# Patient Record
Sex: FEMALE | Race: WHITE | NOT HISPANIC OR LATINO | Employment: OTHER | URBAN - METROPOLITAN AREA
[De-identification: names, ages, dates, MRNs, and addresses within clinical notes are randomized per-mention and may not be internally consistent; named-entity substitution may affect disease eponyms.]

---

## 2017-11-02 ENCOUNTER — HOSPITAL ENCOUNTER (OUTPATIENT)
Dept: RADIOLOGY | Facility: CLINIC | Age: 82
Discharge: HOME | End: 2017-11-02
Attending: PODIATRIST
Payer: MEDICARE

## 2017-11-02 ENCOUNTER — OFFICE VISIT (OUTPATIENT)
Dept: PODIATRY | Facility: CLINIC | Age: 82
End: 2017-11-02
Payer: MEDICARE

## 2017-11-02 VITALS — HEIGHT: 61 IN | WEIGHT: 160 LBS | BODY MASS INDEX: 30.21 KG/M2

## 2017-11-02 DIAGNOSIS — M79.672 FOOT PAIN, LEFT: ICD-10-CM

## 2017-11-02 DIAGNOSIS — M25.572 ACUTE LEFT ANKLE PAIN: ICD-10-CM

## 2017-11-02 DIAGNOSIS — M79.672 FOOT PAIN, LEFT: Primary | ICD-10-CM

## 2017-11-02 PROCEDURE — 73630 X-RAY EXAM OF FOOT: CPT | Mod: 26,LT,S$GLB, | Performed by: RADIOLOGY

## 2017-11-02 PROCEDURE — 99213 OFFICE O/P EST LOW 20 MIN: CPT | Mod: 25,S$PBB,, | Performed by: PODIATRIST

## 2017-11-02 PROCEDURE — 99213 OFFICE O/P EST LOW 20 MIN: CPT | Mod: PBBFAC,25,PO | Performed by: PODIATRIST

## 2017-11-02 PROCEDURE — 29540 STRAPPING ANKLE &/FOOT: CPT | Mod: S$PBB,LT,, | Performed by: PODIATRIST

## 2017-11-02 PROCEDURE — 73610 X-RAY EXAM OF ANKLE: CPT | Mod: TC,PO,LT

## 2017-11-02 PROCEDURE — 29540 STRAPPING ANKLE &/FOOT: CPT | Mod: PBBFAC,PO,LT | Performed by: PODIATRIST

## 2017-11-02 PROCEDURE — 73630 X-RAY EXAM OF FOOT: CPT | Mod: TC,PO,LT

## 2017-11-02 PROCEDURE — 99999 PR PBB SHADOW E&M-EST. PATIENT-LVL III: CPT | Mod: PBBFAC,,, | Performed by: PODIATRIST

## 2017-11-02 PROCEDURE — 73610 X-RAY EXAM OF ANKLE: CPT | Mod: 26,LT,S$GLB, | Performed by: RADIOLOGY

## 2017-11-02 RX ORDER — AMLODIPINE BESYLATE 2.5 MG/1
2.5 TABLET ORAL DAILY
COMMUNITY
Start: 2017-11-01 | End: 2018-08-01

## 2017-11-02 RX ORDER — SOTALOL HYDROCHLORIDE 80 MG/1
TABLET ORAL
Status: ON HOLD | COMMUNITY
Start: 2017-10-09 | End: 2017-12-12 | Stop reason: HOSPADM

## 2017-11-02 RX ORDER — VALACYCLOVIR HYDROCHLORIDE 1 G/1
1000 TABLET, FILM COATED ORAL
COMMUNITY
Start: 2017-09-11 | End: 2018-08-29 | Stop reason: SDUPTHER

## 2017-11-02 RX ORDER — LIDOCAINE HYDROCHLORIDE 20 MG/ML
JELLY TOPICAL
Qty: 30 ML | Refills: 2 | Status: ON HOLD | OUTPATIENT
Start: 2017-11-02 | End: 2017-12-09

## 2017-11-02 RX ORDER — HYDROXYZINE HYDROCHLORIDE 25 MG/1
TABLET, FILM COATED ORAL
Status: ON HOLD | COMMUNITY
Start: 2017-08-25 | End: 2017-12-12 | Stop reason: HOSPADM

## 2017-11-02 RX ORDER — FLUCONAZOLE 150 MG/1
150 TABLET ORAL
COMMUNITY
Start: 2017-08-25 | End: 2018-01-03 | Stop reason: ALTCHOICE

## 2017-11-02 RX ORDER — PRAVASTATIN SODIUM 10 MG/1
TABLET ORAL
COMMUNITY
Start: 2017-09-12 | End: 2018-08-01

## 2017-11-02 RX ORDER — CYCLOBENZAPRINE HCL 5 MG
TABLET ORAL
Status: ON HOLD | COMMUNITY
Start: 2017-11-01 | End: 2017-12-08

## 2017-11-02 RX ORDER — MONTELUKAST SODIUM 10 MG/1
TABLET ORAL
COMMUNITY
Start: 2017-10-10 | End: 2018-01-03 | Stop reason: SDUPTHER

## 2017-11-02 NOTE — PROGRESS NOTES
Subjective:      Patient ID: Karma Chen is a 81 y.o. female.    Chief Complaint: Ankle Pain (Left)    Pt is a 80 y/o female  has a past medical history of Coronary artery disease; Dermatitis; Diabetes mellitus; Diabetes mellitus type II; GERD (gastroesophageal reflux disease); Hypertension; and MI (myocardial infarction). Presents to clinic with CC of left rearfoot/ankle pain. States the pain is different in nature than it was back in 2015 during previous clinic visit. States her pain is on the inside of her ankle. Deep, throbbing pain with gradual onset and without any inciting event. Pt relates she has not done anything for the pain, denies trauma. Denies signs of infection, N/V/F/C. No other pedal complaints at this time.       Review of Systems   Musculoskeletal: Positive for joint pain.   All other systems reviewed and are negative.          Objective:      Physical Exam   Constitutional: She is oriented to person, place, and time. She appears well-developed and well-nourished.   Cardiovascular: Intact distal pulses.    DP and PT pulses 1/4, left. CRT < 3 seconds to digits 1-5, left. No erythema or edema noted, no varicosities noted, left.    Musculoskeletal: She exhibits tenderness.   Strength 5/5, to all LE muscle groups, left.     Bony tenderness to talonavicular joint, left.     Equinus contracture noted to left foot with ankle dorsiflexion of < 10 degrees left.    Neurological: She is alert and oriented to person, place, and time.   Sensation intact to digits via light touch, digits 1-5, left   Skin: Skin is warm and dry. Capillary refill takes 2 to 3 seconds.   Skin is warm, dry, and supple to LLE. No open lesions or SOI noted, LLE.    Psychiatric: She has a normal mood and affect. Her behavior is normal. Judgment and thought content normal.             Assessment:       Encounter Diagnoses   Name Primary?    Foot pain, left Yes    Acute left ankle pain          Plan:       Karma was seen today for  ankle pain.    Diagnoses and all orders for this visit:    Foot pain, left  -     X-Ray Foot Complete Left; Future  -     X-Ray Ankle Complete Left; Future    Acute left ankle pain  -     X-Ray Foot Complete Left; Future  -     X-Ray Ankle Complete Left; Future    Other orders  -     lidocaine HCL 2% (XYLOCAINE) 2 % jelly; Apply topically as needed. Apply topically once nightly to the affected area left foot/ankle.      I counseled the patient on her conditions, their implications and medical management.  Discussed etiology and conservative vs sx treatment options for arthritis. Educated pt on importance of rigid supportive shoes with arch support to help immobilize the joint and decrease inflammation. Educated pt on importance of RICE therapy with stretching   Pt to stretch posterior calf muscle 3x per day as demonstrated in office and handout   I applied a plantar rest strapping to the patient's foot to offload symptomatic area, support the arch, and relieve pain.  Patient will obtain over the counter arch supports and wear them in shoes whenever possible.  Athletic shoes intended for walking or running are usually best.  Xrays left foot/ankle.  Pt to f/u within one month or sooner PRN if symptoms arise.

## 2017-11-02 NOTE — PROGRESS NOTES
Reviewed resident note, exam and procedures were performed under my direct supervision.  Agree with note and care.  Discrepancies discussed.    Topical lidocaine.    I applied a plantar rest strapping to the patient's left foot to offload symptomatic area, support the arch, and relieve pain.  Patient will stretch the tendo achilles complex three times daily as demonstrated in the office.  Literature was dispensed illustrating proper stretching technique.  Patient will obtain over the counter arch supports and wear them in shoes whenever possible.  Athletic shoes intended for walking or running are usually best.  xrays left foot/ankle.    Return to clinic one month, sooner prn.

## 2017-11-30 ENCOUNTER — OFFICE VISIT (OUTPATIENT)
Dept: PODIATRY | Facility: CLINIC | Age: 82
End: 2017-11-30
Payer: MEDICARE

## 2017-11-30 VITALS — BODY MASS INDEX: 30.22 KG/M2 | WEIGHT: 160.06 LBS | HEIGHT: 61 IN

## 2017-11-30 DIAGNOSIS — M79.672 FOOT PAIN, LEFT: Primary | ICD-10-CM

## 2017-11-30 DIAGNOSIS — M19.079 ARTHRITIS OF FOOT: ICD-10-CM

## 2017-11-30 PROCEDURE — 99999 PR PBB SHADOW E&M-EST. PATIENT-LVL III: CPT | Mod: PBBFAC,,, | Performed by: PODIATRIST

## 2017-11-30 PROCEDURE — 99213 OFFICE O/P EST LOW 20 MIN: CPT | Mod: S$PBB,,, | Performed by: PODIATRIST

## 2017-11-30 PROCEDURE — 99213 OFFICE O/P EST LOW 20 MIN: CPT | Mod: PBBFAC,PO | Performed by: PODIATRIST

## 2017-11-30 RX ORDER — NITROGLYCERIN 0.4 MG/1
0.4 TABLET SUBLINGUAL EVERY 5 MIN PRN
COMMUNITY
Start: 2017-11-22 | End: 2021-05-25 | Stop reason: SDUPTHER

## 2017-11-30 RX ORDER — GABAPENTIN 300 MG/1
CAPSULE ORAL
Status: ON HOLD | COMMUNITY
Start: 2017-11-28 | End: 2017-12-08 | Stop reason: CLARIF

## 2017-11-30 NOTE — PROGRESS NOTES
Subjective:      Patient ID: Karma Chen is a 81 y.o. female.    Chief Complaint: Foot Pain (left foot)    Pt is a 82 y/o female  has a past medical history of Coronary artery disease; Dermatitis; Diabetes mellitus; Diabetes mellitus type II; GERD (gastroesophageal reflux disease); Hypertension; and MI (myocardial infarction). Presents to clinic with CC of left rearfoot/ankle pain. States the pain is different in nature than it was back in 2015 during previous clinic visit. States her pain is on the inside of her ankle. Deep, throbbing pain with gradual onset and without any inciting event. Didn't use gel, inserts, or stretch much.    xrays negative acute injury, positive midfoot arthritis.    Review of Systems   Musculoskeletal: Positive for joint pain.   All other systems reviewed and are negative.          Objective:      Physical Exam   Constitutional: She is oriented to person, place, and time. She appears well-developed and well-nourished.   Cardiovascular: Intact distal pulses.    DP and PT pulses 1/4, left. CRT < 3 seconds to digits 1-5, left. No erythema or edema noted, no varicosities noted, left.    Musculoskeletal: She exhibits tenderness.   Strength 5/5, to all LE muscle groups, left.     Bony tenderness to talonavicular joint, left.     Equinus contracture noted to right and left foot with ankle dorsiflexion of < 10 degrees left.    Neurological: She is alert and oriented to person, place, and time. She has normal strength. She displays no atrophy and no tremor. No sensory deficit. She exhibits normal muscle tone.   Sensation intact to digits via light touch, digits 1-5, left   Skin: Skin is warm and dry. Capillary refill takes 2 to 3 seconds.   Skin is warm, dry, and supple to LLE. No open lesions or SOI noted, LLE.    Psychiatric: She has a normal mood and affect. Her behavior is normal. Judgment and thought content normal.             Assessment:       Encounter Diagnoses   Name Primary?     Foot pain, left Yes    Arthritis of foot          Plan:       Karma was seen today for foot pain.    Diagnoses and all orders for this visit:    Foot pain, left  -     Ambulatory consult to Physical Therapy  -     ORTHOTIC DEVICE (DME)    Arthritis of foot  -     Ambulatory consult to Physical Therapy  -     ORTHOTIC DEVICE (DME)      I counseled the patient on her conditions, their implications and medical management.  Patient will stretch the tendo achilles complex three times daily as demonstrated in the office.  Literature was dispensed illustrating proper stretching technique.    Patient will obtain over the counter arch supports and wear them in shoes whenever possible.  Athletic shoes intended for walking or running are usually best.    The patient was advised that NSAID-type medications have two very important potential side effects: gastrointestinal irritation including hemorrhage and renal injuries. She was asked to take the medication with food and to stop if she experiences any GI upset. I asked her to call for vomiting, abdominal pain or black/bloody stools. The patient expresses understanding of these issues and questions were answered.    Discussed conservative treatment with shoes of adequate dimensions, material, and style to alleviate symptoms and delay or prevent surgical intervention.    Rx custom orthotics, PT.    Use nsaid gel prn.    Declines scheduled follow up.

## 2017-12-08 ENCOUNTER — DOCUMENTATION ONLY (OUTPATIENT)
Dept: CARDIOLOGY | Facility: CLINIC | Age: 82
End: 2017-12-08

## 2017-12-08 ENCOUNTER — HOSPITAL ENCOUNTER (INPATIENT)
Facility: HOSPITAL | Age: 82
LOS: 4 days | Discharge: HOME | DRG: 246 | End: 2017-12-12
Attending: HOSPITALIST | Admitting: HOSPITALIST
Payer: MEDICARE

## 2017-12-08 DIAGNOSIS — I25.10 CORONARY ARTERY DISEASE: ICD-10-CM

## 2017-12-08 DIAGNOSIS — I20.0 UNSTABLE ANGINA: ICD-10-CM

## 2017-12-08 DIAGNOSIS — E11.9 DIABETES MELLITUS TYPE 2, DIET-CONTROLLED: ICD-10-CM

## 2017-12-08 DIAGNOSIS — I25.10 CORONARY ARTERY DISEASE INVOLVING LEFT MAIN CORONARY ARTERY: ICD-10-CM

## 2017-12-08 DIAGNOSIS — I10 ESSENTIAL HYPERTENSION: Primary | ICD-10-CM

## 2017-12-08 DIAGNOSIS — R07.9 CHEST PAIN: ICD-10-CM

## 2017-12-08 PROBLEM — I24.9 ACS (ACUTE CORONARY SYNDROME): Status: ACTIVE | Noted: 2017-12-08

## 2017-12-08 PROBLEM — E78.5 HYPERLIPIDEMIA: Status: ACTIVE | Noted: 2017-12-08

## 2017-12-08 PROBLEM — E03.9 HYPOTHYROIDISM: Status: ACTIVE | Noted: 2017-12-08

## 2017-12-08 PROBLEM — M79.7 FIBROMYALGIA: Status: ACTIVE | Noted: 2017-12-08

## 2017-12-08 PROBLEM — F41.9 ANXIETY: Status: ACTIVE | Noted: 2017-12-08

## 2017-12-08 PROBLEM — L30.9 DERMATITIS: Status: ACTIVE | Noted: 2017-12-08

## 2017-12-08 LAB
ALBUMIN SERPL BCP-MCNC: 3.4 G/DL
ALP SERPL-CCNC: 60 U/L
ALT SERPL W/O P-5'-P-CCNC: 13 U/L
ANION GAP SERPL CALC-SCNC: 11 MMOL/L
AST SERPL-CCNC: 14 U/L
BASOPHILS # BLD AUTO: 0.06 K/UL
BASOPHILS NFR BLD: 0.4 %
BILIRUB SERPL-MCNC: 0.3 MG/DL
BUN SERPL-MCNC: 24 MG/DL
CALCIUM SERPL-MCNC: 8.8 MG/DL
CHLORIDE SERPL-SCNC: 108 MMOL/L
CHOLEST SERPL-MCNC: 237 MG/DL
CHOLEST/HDLC SERPL: 5.5 {RATIO}
CO2 SERPL-SCNC: 24 MMOL/L
CREAT SERPL-MCNC: 1.2 MG/DL
DIFFERENTIAL METHOD: ABNORMAL
EOSINOPHIL # BLD AUTO: 0 K/UL
EOSINOPHIL NFR BLD: 0.2 %
ERYTHROCYTE [DISTWIDTH] IN BLOOD BY AUTOMATED COUNT: 13.5 %
EST. GFR  (AFRICAN AMERICAN): 49.3 ML/MIN/1.73 M^2
EST. GFR  (NON AFRICAN AMERICAN): 42.8 ML/MIN/1.73 M^2
GLUCOSE SERPL-MCNC: 128 MG/DL
HCT VFR BLD AUTO: 35.2 %
HDLC SERPL-MCNC: 43 MG/DL
HDLC SERPL: 18.1 %
HGB BLD-MCNC: 11.5 G/DL
IMM GRANULOCYTES # BLD AUTO: 0.16 K/UL
IMM GRANULOCYTES NFR BLD AUTO: 1.1 %
LDLC SERPL CALC-MCNC: 150.6 MG/DL
LYMPHOCYTES # BLD AUTO: 3.8 K/UL
LYMPHOCYTES NFR BLD: 26.2 %
MAGNESIUM SERPL-MCNC: 2.2 MG/DL
MCH RBC QN AUTO: 29.3 PG
MCHC RBC AUTO-ENTMCNC: 32.7 G/DL
MCV RBC AUTO: 90 FL
MONOCYTES # BLD AUTO: 1.4 K/UL
MONOCYTES NFR BLD: 9.7 %
NEUTROPHILS # BLD AUTO: 9.1 K/UL
NEUTROPHILS NFR BLD: 62.4 %
NONHDLC SERPL-MCNC: 194 MG/DL
NRBC BLD-RTO: 0 /100 WBC
PLATELET # BLD AUTO: 346 K/UL
PLATELET RESPONSE PLAVIX: 170 PRU
PMV BLD AUTO: 9.9 FL
POTASSIUM SERPL-SCNC: 3.4 MMOL/L
PROT SERPL-MCNC: 7.2 G/DL
RBC # BLD AUTO: 3.93 M/UL
SODIUM SERPL-SCNC: 143 MMOL/L
TRIGL SERPL-MCNC: 217 MG/DL
TSH SERPL DL<=0.005 MIU/L-ACNC: 2.34 UIU/ML
WBC # BLD AUTO: 14.52 K/UL

## 2017-12-08 PROCEDURE — 63600175 PHARM REV CODE 636 W HCPCS: Performed by: NURSE PRACTITIONER

## 2017-12-08 PROCEDURE — 85576 BLOOD PLATELET AGGREGATION: CPT

## 2017-12-08 PROCEDURE — 93005 ELECTROCARDIOGRAM TRACING: CPT

## 2017-12-08 PROCEDURE — 85025 COMPLETE CBC W/AUTO DIFF WBC: CPT

## 2017-12-08 PROCEDURE — 84443 ASSAY THYROID STIM HORMONE: CPT

## 2017-12-08 PROCEDURE — 99223 1ST HOSP IP/OBS HIGH 75: CPT | Mod: ,,, | Performed by: NURSE PRACTITIONER

## 2017-12-08 PROCEDURE — 93306 TTE W/DOPPLER COMPLETE: CPT

## 2017-12-08 PROCEDURE — 25000003 PHARM REV CODE 250: Performed by: HOSPITALIST

## 2017-12-08 PROCEDURE — 93306 TTE W/DOPPLER COMPLETE: CPT | Mod: 26,,, | Performed by: INTERNAL MEDICINE

## 2017-12-08 PROCEDURE — 93010 ELECTROCARDIOGRAM REPORT: CPT | Mod: ,,, | Performed by: INTERNAL MEDICINE

## 2017-12-08 PROCEDURE — 36415 COLL VENOUS BLD VENIPUNCTURE: CPT

## 2017-12-08 PROCEDURE — 83735 ASSAY OF MAGNESIUM: CPT

## 2017-12-08 PROCEDURE — 99223 1ST HOSP IP/OBS HIGH 75: CPT | Mod: GC,,, | Performed by: INTERNAL MEDICINE

## 2017-12-08 PROCEDURE — A4216 STERILE WATER/SALINE, 10 ML: HCPCS | Performed by: NURSE PRACTITIONER

## 2017-12-08 PROCEDURE — 80061 LIPID PANEL: CPT

## 2017-12-08 PROCEDURE — 20600001 HC STEP DOWN PRIVATE ROOM

## 2017-12-08 PROCEDURE — 80053 COMPREHEN METABOLIC PANEL: CPT

## 2017-12-08 PROCEDURE — 25000003 PHARM REV CODE 250: Performed by: NURSE PRACTITIONER

## 2017-12-08 RX ORDER — LOSARTAN POTASSIUM 50 MG/1
50 TABLET ORAL NIGHTLY
Status: DISCONTINUED | OUTPATIENT
Start: 2017-12-08 | End: 2017-12-12 | Stop reason: HOSPADM

## 2017-12-08 RX ORDER — LEVOTHYROXINE SODIUM 25 UG/1
50 TABLET ORAL DAILY
Status: DISCONTINUED | OUTPATIENT
Start: 2017-12-09 | End: 2017-12-12 | Stop reason: HOSPADM

## 2017-12-08 RX ORDER — ENOXAPARIN SODIUM 100 MG/ML
40 INJECTION SUBCUTANEOUS EVERY 24 HOURS
Status: DISCONTINUED | OUTPATIENT
Start: 2017-12-08 | End: 2017-12-12 | Stop reason: HOSPADM

## 2017-12-08 RX ORDER — METOPROLOL SUCCINATE 25 MG/1
25 TABLET, EXTENDED RELEASE ORAL DAILY
Status: DISCONTINUED | OUTPATIENT
Start: 2017-12-09 | End: 2017-12-12 | Stop reason: HOSPADM

## 2017-12-08 RX ORDER — TRAMADOL HYDROCHLORIDE 50 MG/1
50 TABLET ORAL EVERY 6 HOURS PRN
Status: DISCONTINUED | OUTPATIENT
Start: 2017-12-08 | End: 2017-12-12 | Stop reason: HOSPADM

## 2017-12-08 RX ORDER — SODIUM CHLORIDE 0.9 % (FLUSH) 0.9 %
3 SYRINGE (ML) INJECTION EVERY 8 HOURS
Status: DISCONTINUED | OUTPATIENT
Start: 2017-12-08 | End: 2017-12-12 | Stop reason: HOSPADM

## 2017-12-08 RX ORDER — CHOLECALCIFEROL (VITAMIN D3) 25 MCG
3000 TABLET ORAL DAILY
Status: DISCONTINUED | OUTPATIENT
Start: 2017-12-09 | End: 2017-12-12 | Stop reason: HOSPADM

## 2017-12-08 RX ORDER — NITROGLYCERIN 0.4 MG/1
0.4 TABLET SUBLINGUAL EVERY 5 MIN PRN
Status: DISCONTINUED | OUTPATIENT
Start: 2017-12-08 | End: 2017-12-12 | Stop reason: HOSPADM

## 2017-12-08 RX ORDER — POLYETHYLENE GLYCOL 3350 17 G/17G
17 POWDER, FOR SOLUTION ORAL 2 TIMES DAILY PRN
Status: DISCONTINUED | OUTPATIENT
Start: 2017-12-08 | End: 2017-12-12 | Stop reason: HOSPADM

## 2017-12-08 RX ORDER — ALPRAZOLAM 0.25 MG/1
0.25 TABLET ORAL 2 TIMES DAILY PRN
Status: DISCONTINUED | OUTPATIENT
Start: 2017-12-08 | End: 2017-12-12 | Stop reason: HOSPADM

## 2017-12-08 RX ORDER — PRAVASTATIN SODIUM 10 MG/1
10 TABLET ORAL NIGHTLY
Status: DISCONTINUED | OUTPATIENT
Start: 2017-12-08 | End: 2017-12-09

## 2017-12-08 RX ORDER — ONDANSETRON 2 MG/ML
4 INJECTION INTRAMUSCULAR; INTRAVENOUS EVERY 8 HOURS PRN
Status: DISCONTINUED | OUTPATIENT
Start: 2017-12-08 | End: 2017-12-12 | Stop reason: HOSPADM

## 2017-12-08 RX ORDER — MONTELUKAST SODIUM 10 MG/1
10 TABLET ORAL NIGHTLY
Status: DISCONTINUED | OUTPATIENT
Start: 2017-12-08 | End: 2017-12-12 | Stop reason: HOSPADM

## 2017-12-08 RX ORDER — ACETAMINOPHEN 325 MG/1
650 TABLET ORAL EVERY 4 HOURS PRN
Status: DISCONTINUED | OUTPATIENT
Start: 2017-12-08 | End: 2017-12-12 | Stop reason: HOSPADM

## 2017-12-08 RX ORDER — RAMELTEON 8 MG/1
8 TABLET ORAL NIGHTLY PRN
Status: DISCONTINUED | OUTPATIENT
Start: 2017-12-08 | End: 2017-12-12 | Stop reason: HOSPADM

## 2017-12-08 RX ORDER — POTASSIUM CHLORIDE 20 MEQ/1
40 TABLET, EXTENDED RELEASE ORAL ONCE
Status: COMPLETED | OUTPATIENT
Start: 2017-12-08 | End: 2017-12-08

## 2017-12-08 RX ORDER — ONDANSETRON 8 MG/1
8 TABLET, ORALLY DISINTEGRATING ORAL EVERY 8 HOURS PRN
Status: DISCONTINUED | OUTPATIENT
Start: 2017-12-08 | End: 2017-12-12 | Stop reason: HOSPADM

## 2017-12-08 RX ORDER — AMOXICILLIN 250 MG
1 CAPSULE ORAL 2 TIMES DAILY
Status: DISCONTINUED | OUTPATIENT
Start: 2017-12-08 | End: 2017-12-12 | Stop reason: HOSPADM

## 2017-12-08 RX ORDER — BISACODYL 10 MG
10 SUPPOSITORY, RECTAL RECTAL DAILY PRN
Status: DISCONTINUED | OUTPATIENT
Start: 2017-12-08 | End: 2017-12-12 | Stop reason: HOSPADM

## 2017-12-08 RX ORDER — ASPIRIN 81 MG/1
81 TABLET ORAL DAILY
Status: DISCONTINUED | OUTPATIENT
Start: 2017-12-09 | End: 2017-12-12 | Stop reason: HOSPADM

## 2017-12-08 RX ORDER — AMLODIPINE BESYLATE 5 MG/1
5 TABLET ORAL DAILY
Status: DISCONTINUED | OUTPATIENT
Start: 2017-12-09 | End: 2017-12-12 | Stop reason: HOSPADM

## 2017-12-08 RX ORDER — CLOPIDOGREL BISULFATE 75 MG/1
75 TABLET ORAL DAILY
Status: DISCONTINUED | OUTPATIENT
Start: 2017-12-09 | End: 2017-12-12 | Stop reason: HOSPADM

## 2017-12-08 RX ORDER — FAMOTIDINE 20 MG/1
20 TABLET, FILM COATED ORAL 2 TIMES DAILY
Status: DISCONTINUED | OUTPATIENT
Start: 2017-12-08 | End: 2017-12-12 | Stop reason: HOSPADM

## 2017-12-08 RX ORDER — ISOSORBIDE MONONITRATE 30 MG/1
30 TABLET, EXTENDED RELEASE ORAL DAILY
Status: DISCONTINUED | OUTPATIENT
Start: 2017-12-09 | End: 2017-12-12 | Stop reason: HOSPADM

## 2017-12-08 RX ORDER — EPINEPHRINE 0.22MG
100 AEROSOL WITH ADAPTER (ML) INHALATION 2 TIMES DAILY
Status: DISCONTINUED | OUTPATIENT
Start: 2017-12-08 | End: 2017-12-12 | Stop reason: HOSPADM

## 2017-12-08 RX ADMIN — PRAVASTATIN SODIUM 10 MG: 10 TABLET ORAL at 09:12

## 2017-12-08 RX ADMIN — ENOXAPARIN SODIUM 40 MG: 100 INJECTION SUBCUTANEOUS at 06:12

## 2017-12-08 RX ADMIN — ALPRAZOLAM 0.25 MG: 0.25 TABLET ORAL at 11:12

## 2017-12-08 RX ADMIN — Medication 3 ML: at 09:12

## 2017-12-08 RX ADMIN — Medication 100 MG: at 09:12

## 2017-12-08 RX ADMIN — FAMOTIDINE 20 MG: 20 TABLET, FILM COATED ORAL at 09:12

## 2017-12-08 RX ADMIN — POTASSIUM CHLORIDE 40 MEQ: 1500 TABLET, EXTENDED RELEASE ORAL at 09:12

## 2017-12-08 RX ADMIN — STANDARDIZED SENNA CONCENTRATE AND DOCUSATE SODIUM 1 TABLET: 8.6; 5 TABLET, FILM COATED ORAL at 09:12

## 2017-12-08 RX ADMIN — TRAMADOL HYDROCHLORIDE 50 MG: 50 TABLET, FILM COATED ORAL at 09:12

## 2017-12-08 RX ADMIN — MONTELUKAST SODIUM 10 MG: 10 TABLET, FILM COATED ORAL at 09:12

## 2017-12-08 NOTE — PROGRESS NOTES
Lia Called.  Patient post CABG with ACS...[facility] [address] [phone]    Severe CAD with ACS.  Cath shows patent SVG to RCA, LIMA to LAD supplying small distal LAD, Diagonals fill from proximal LAD which has 90% ostial stenosis.  There are stents from the ostial LCX into the OMB's.  Needs LM bifurcation stenting. Distal disease as well in OMB.    Transferring her to us for complex PCI.  Will let my PA and colleagues know.

## 2017-12-08 NOTE — HPI
81 y/o F with 3 day complaint of right jaw pain that is worse with exertion and relieved with rest. Also relieved with nitro Sl.  Went to [facility], and had an angiogram that demonstrated significant Distal left main disease, ostial LCX and OM disease, there is patent SVG to RCA, LIMA to LAD supplying small distal LAD. Per patient CABG was in 2006 as well as a PCI in 2006. Patient does not recall detailed medical hx.   States that over the past month she has had limitations in her day to day activities, felt increasingly fatigued. Denies chest discomfort, SOB, diaphoeresis, N/v, pre-syncope. States that she is not a diabetic, has not smoked in over 40 years. Has HTN, HLD, and previous Mi.    Just arrived from [place] therefore no EKG in file yet. NO outside hospital course available other than angiogram disk.  Angiogram reviewed and described above.

## 2017-12-08 NOTE — PLAN OF CARE
"Outside Transfer Acceptance Note    Transferring Provider/Specialty: Cardiology (Dr Fitzgerald)    Accepting Physician: Jacques Rice     Date of Acceptance: 12/08/2017     Code Status: Full    Transferring Facility/Hospital: [facility]    Reason for Transfer to [facility]: Advanced cardiac intervention    Report from Transferring provider/ Hospital course:     82 yr o woman with severe CAD now with ACS accepted by Dr. Yoel Rios for complex PCI at [facility] today " Cath shows patent SVG to RCA, LIMA to LAD supplying small distal LAD, Diagonals fill from proximal LAD which has 90% ostial stenosis.  There are stents from the ostial LCX into the OMB's.  Needs LM bifurcation stenting. Distal disease as well in OMB Transferring her to us for complex PCI"  per Dr Yoel Rios.       A copy of the medical record and images on disc have been requested    To do list upon patient arrival:     Consult interventional cardiology    Please call extension [phone] upon patient arrival to floor for Hospital Medicine admit team assignment and for additional admit orders. If patient is coming from another [facility] facility please also call [phone] to inform the admit team/office that patient has arrived from the [facility] facility to the floor so patient can be evaluated.    Jacques Rice MD, Staff Physician, Hospital Medicine,       "

## 2017-12-08 NOTE — SUBJECTIVE & OBJECTIVE
Past Medical History:   Diagnosis Date    Coronary artery disease     Dermatitis     Dermatitis 12/8/2017    Diabetes mellitus     Diabetes mellitus type II     GERD (gastroesophageal reflux disease)     Hypertension     MI (myocardial infarction)        Past Surgical History:   Procedure Laterality Date    adenoids      APPENDECTOMY      CHOLECYSTECTOMY      CORONARY ARTERY BYPASS GRAFT      coronary stents      HYSTERECTOMY      TONSILLECTOMY         Review of patient's allergies indicates:   Allergen Reactions    Arthrotec 50 [diclofenac-misoprostol]     Doxycycline     Effexor [venlafaxine]     Flagyl [metronidazole]     Fluconazole     Iodine     Levaquin [levofloxacin]     Nexium [esomeprazole magnesium]     Penicillins     Shellfish containing products     Sulfa (sulfonamide antibiotics)     Yeast, dried        PTA Medications   Medication Sig    alprazolam (XANAX) 0.25 MG tablet Take 0.25 mg by mouth nightly as needed.      amLODIPine (NORVASC) 5 MG tablet     b complex vitamins tablet Take 1 tablet by mouth once daily.      clopidogrel (PLAVIX) 75 mg tablet Take 75 mg by mouth once daily.      co-enzyme Q-10 30 mg capsule Take 30 mg by mouth 2 (two) times daily.      cyanocobalamin 1,000 mcg/mL injection     estradiol (ESTRACE) 0.01 % (0.1 mg/g) vaginal cream Place vaginally once a week.      hydrOXYzine HCl (ATARAX) 25 MG tablet     levocetirizine (XYZAL) 2.5 mg/5 mL solution Take 2.5 mg by mouth every evening.      levothyroxine (SYNTHROID) 50 MCG tablet Take 50 mcg by mouth once daily.      lidocaine HCL 2% (XYLOCAINE) 2 % jelly Apply topically as needed. Apply topically once nightly to the affected area left foot/ankle.    losartan (COZAAR) 50 MG tablet Take 50 mg by mouth every evening.      montelukast (SINGULAIR) 10 mg tablet     nitroGLYCERIN (NITROBID) 2.5 MG CR capsule Take 2.5 mg by mouth once daily.      nitroGLYCERIN (NITROSTAT) 0.4 MG SL tablet      omeprazole-sodium bicarbonate (ZEGERID) 40-1.1 mg-gram per capsule Take 1 capsule by mouth before breakfast.    pravastatin (PRAVACHOL) 10 MG tablet     sotalol (BETAPACE) 80 MG tablet     tramadol (ULTRAM) 50 mg tablet Take 50 mg by mouth every 6 (six) hours as needed.      vitamin D 185 MG Tab Take 3,000 Units by mouth once daily.      ciclopirox (PENLAC) 8 % Soln Apply topically once daily. Apply topically to affected nails once daily.  Remove once weekly.  Repeat.  Follow package insert.    diclofenac sodium 1 % Gel Apply 2 g topically 4 (four) times daily.    fluconazole (DIFLUCAN) 150 MG Tab 150 mg as needed.     metronidazole (NORITATE) 1 % cream Apply 1 applicator topically once daily.      valACYclovir (VALTREX) 1000 MG tablet Take 1,000 mg by mouth as needed.      Family History     None        Social History Main Topics    Smoking status: Never Smoker    Smokeless tobacco: Not on file    Alcohol use No    Drug use: No    Sexual activity: Not on file     Review of Systems   Constitution: Negative for fever.   Eyes: Negative for double vision.   Cardiovascular: Negative for chest pain, dyspnea on exertion, irregular heartbeat, leg swelling, near-syncope, palpitations and syncope.   Respiratory: Negative for shortness of breath.    Musculoskeletal:        Right jaw pain   Gastrointestinal: Negative for abdominal pain and nausea.   Genitourinary: Negative for dysuria.   Neurological: Negative for brief paralysis.   Psychiatric/Behavioral: Negative for altered mental status.     Objective:     Vital Signs (Most Recent):  Temp: 97.9 °F (36.6 °C) (12/08/17 1500)  Pulse: 69 (12/08/17 1600)  Resp: 18 (12/08/17 1500)  BP: (!) 146/67 (12/08/17 1500)  SpO2: 99 % (12/08/17 1500) Vital Signs (24h Range):  Temp:  [97.9 °F (36.6 °C)] 97.9 °F (36.6 °C)  Pulse:  [69-72] 69  Resp:  [18] 18  SpO2:  [99 %] 99 %  BP: (146)/(67) 146/67        There is no height or weight on file to calculate BMI.    SpO2: 99 %  O2  Device (Oxygen Therapy): room air    No intake or output data in the 24 hours ending 12/08/17 1648    Lines/Drains/Airways          No matching active lines, drains, or airways          Physical Exam   Constitutional: She is oriented to person, place, and time. She appears well-developed and well-nourished. No distress.   HENT:   Head: Normocephalic and atraumatic.   Eyes: Right eye exhibits no discharge. Left eye exhibits no discharge. No scleral icterus.   Neck: No JVD present.   Cardiovascular: Normal rate, regular rhythm, normal heart sounds and intact distal pulses.    No murmur heard.  Pulses:       Radial pulses are 2+ on the right side, and 2+ on the left side.        Femoral pulses are 2+ on the right side, and 1+ on the left side.       Dorsalis pedis pulses are 2+ on the right side, and 2+ on the left side.   Pulmonary/Chest: Effort normal and breath sounds normal.   Abdominal: Soft.   Musculoskeletal: She exhibits no edema.   Neurological: She is alert and oriented to person, place, and time.   Skin: Skin is warm. She is not diaphoretic.   Psychiatric: She has a normal mood and affect.       Significant Labs: Pending

## 2017-12-08 NOTE — ASSESSMENT & PLAN NOTE
83 y/o F with multivessel CAD,  here with stable angina, OSH angiogram shows CABG with patent grafts SVG to RCA and LIMA to LAD, left main disease as well as ostial LCX and Om1.     -Planned for complex PCI on 12/11/2017 (monday)  -Keep NPO after midnight on Sunday   -Recommend adding Imdur 30mg daily and uptitrate as blood pressure tolerates   -Recommend adding metoprolol succinate 25mg daily, and uptitrate as tolerated   -C/w Asa and plavix   -Please get platelet response plavix assay if greater than 210 then load with brilinta 180mg and thereafter start brilinta maintenance dose 90mg Q12h.  -has contrast allergy, pre-medication regimen ordered    1. Cardiac catheterization with probable PCI.   2. Antiplatelets: ASA + plavix   3. Access: Possible right femoral   4. Catheters: Jl4 and Jr4   5. Pt is a OSMANY candidate and understands the importance of taking plavix for at least one year, understands that in case of receiving a drug coated stent the failure to comply with dual anti-platelet therapy is likely to result in stent clothing, heart attack and death.   6. The risks, benefits, and alternatives of coronary vascular angiography and possible intervention were discussed with the patient. All questions were answered and informed consent was obtained. I had a detailed discussion with the patient regarding risk of stroke, MI, bleeding access site complications including limb loss, allergy, kidney failure including dialysis and death.  7. The patient understands the risks and benefits and wishes to go ahead with the procedure.  8. All patient's questions were answered    Madelyn Scott DO  Cardiology Fellow

## 2017-12-08 NOTE — CONSULTS
[facility]  Interventional Cardiology  Consult Note    Patient Name: Karma Chen  MRN: [id number]  Admission Date: 12/8/2017  Hospital Length of Stay: 0 days  Code Status: Full Code   Attending Provider: Jacques Rice MD  Consulting Provider: Madelyn Scott MD  Primary Care Physician: Elena Sullivan MD  Principal Problem:Unstable angina    Patient information was obtained from patient and ER records.     Inpatient consult to Interventional Cardiology  Consult performed by: MADELYN SCOTT  Consult ordered by: DARCIE BUENO        Subjective:     Chief Complaint:  Multivessel CAD     HPI:  83 y/o F with 3 day complaint of right jaw pain that is worse with exertion and relieved with rest. Also relieved with nitro Sl.  Went to [facility], and had an angiogram that demonstrated significant Distal left main disease, ostial LCX and OM disease, there is patent SVG to RCA, LIMA to LAD supplying small distal LAD. Per patient CABG was in 2006 as well as a PCI in 2006. Patient does not recall detailed medical hx.   States that over the past month she has had limitations in her day to day activities, felt increasingly fatigued. Denies chest discomfort, SOB, diaphoeresis, N/v, pre-syncope. States that she is not a diabetic, has not smoked in over 40 years. Has HTN, HLD, and previous Mi.    Just arrived from [place] therefore no EKG in file yet. NO outside hospital course available other than angiogram disk.  Angiogram reviewed and described above.    Past Medical History:   Diagnosis Date    Coronary artery disease     Dermatitis     Dermatitis 12/8/2017    Diabetes mellitus     Diabetes mellitus type II     GERD (gastroesophageal reflux disease)     Hypertension     MI (myocardial infarction)        Past Surgical History:   Procedure Laterality Date    adenoids      APPENDECTOMY      CHOLECYSTECTOMY      CORONARY ARTERY BYPASS GRAFT      coronary stents      HYSTERECTOMY       TONSILLECTOMY         Review of patient's allergies indicates:   Allergen Reactions    Arthrotec 50 [diclofenac-misoprostol]     Doxycycline     Effexor [venlafaxine]     Flagyl [metronidazole]     Fluconazole     Iodine     Levaquin [levofloxacin]     Nexium [esomeprazole magnesium]     Penicillins     Shellfish containing products     Sulfa (sulfonamide antibiotics)     Yeast, dried        PTA Medications   Medication Sig    alprazolam (XANAX) 0.25 MG tablet Take 0.25 mg by mouth nightly as needed.      amLODIPine (NORVASC) 5 MG tablet     b complex vitamins tablet Take 1 tablet by mouth once daily.      clopidogrel (PLAVIX) 75 mg tablet Take 75 mg by mouth once daily.      co-enzyme Q-10 30 mg capsule Take 30 mg by mouth 2 (two) times daily.      cyanocobalamin 1,000 mcg/mL injection     estradiol (ESTRACE) 0.01 % (0.1 mg/g) vaginal cream Place vaginally once a week.      hydrOXYzine HCl (ATARAX) 25 MG tablet     levocetirizine (XYZAL) 2.5 mg/5 mL solution Take 2.5 mg by mouth every evening.      levothyroxine (SYNTHROID) 50 MCG tablet Take 50 mcg by mouth once daily.      lidocaine HCL 2% (XYLOCAINE) 2 % jelly Apply topically as needed. Apply topically once nightly to the affected area left foot/ankle.    losartan (COZAAR) 50 MG tablet Take 50 mg by mouth every evening.      montelukast (SINGULAIR) 10 mg tablet     nitroGLYCERIN (NITROBID) 2.5 MG CR capsule Take 2.5 mg by mouth once daily.      nitroGLYCERIN (NITROSTAT) 0.4 MG SL tablet     omeprazole-sodium bicarbonate (ZEGERID) 40-1.1 mg-gram per capsule Take 1 capsule by mouth before breakfast.    pravastatin (PRAVACHOL) 10 MG tablet     sotalol (BETAPACE) 80 MG tablet     tramadol (ULTRAM) 50 mg tablet Take 50 mg by mouth every 6 (six) hours as needed.      vitamin D 185 MG Tab Take 3,000 Units by mouth once daily.      ciclopirox (PENLAC) 8 % Soln Apply topically once daily. Apply topically to affected nails once daily.   Remove once weekly.  Repeat.  Follow package insert.    diclofenac sodium 1 % Gel Apply 2 g topically 4 (four) times daily.    fluconazole (DIFLUCAN) 150 MG Tab 150 mg as needed.     metronidazole (NORITATE) 1 % cream Apply 1 applicator topically once daily.      valACYclovir (VALTREX) 1000 MG tablet Take 1,000 mg by mouth as needed.      Family History     None        Social History Main Topics    Smoking status: Never Smoker    Smokeless tobacco: Not on file    Alcohol use No    Drug use: No    Sexual activity: Not on file     Review of Systems   Constitution: Negative for fever.   Eyes: Negative for double vision.   Cardiovascular: Negative for chest pain, dyspnea on exertion, irregular heartbeat, leg swelling, near-syncope, palpitations and syncope.   Respiratory: Negative for shortness of breath.    Musculoskeletal:        Right jaw pain   Gastrointestinal: Negative for abdominal pain and nausea.   Genitourinary: Negative for dysuria.   Neurological: Negative for brief paralysis.   Psychiatric/Behavioral: Negative for altered mental status.     Objective:     Vital Signs (Most Recent):  Temp: 97.9 °F (36.6 °C) (12/08/17 1500)  Pulse: 69 (12/08/17 1600)  Resp: 18 (12/08/17 1500)  BP: (!) 146/67 (12/08/17 1500)  SpO2: 99 % (12/08/17 1500) Vital Signs (24h Range):  Temp:  [97.9 °F (36.6 °C)] 97.9 °F (36.6 °C)  Pulse:  [69-72] 69  Resp:  [18] 18  SpO2:  [99 %] 99 %  BP: (146)/(67) 146/67        There is no height or weight on file to calculate BMI.    SpO2: 99 %  O2 Device (Oxygen Therapy): room air    No intake or output data in the 24 hours ending 12/08/17 1648    Lines/Drains/Airways          No matching active lines, drains, or airways          Physical Exam   Constitutional: She is oriented to person, place, and time. She appears well-developed and well-nourished. No distress.   HENT:   Head: Normocephalic and atraumatic.   Eyes: Right eye exhibits no discharge. Left eye exhibits no discharge. No  scleral icterus.   Neck: No JVD present.   Cardiovascular: Normal rate, regular rhythm, normal heart sounds and intact distal pulses.    No murmur heard.  Pulses:       Radial pulses are 2+ on the right side, and 2+ on the left side.        Femoral pulses are 2+ on the right side, and 1+ on the left side.       Dorsalis pedis pulses are 2+ on the right side, and 2+ on the left side.   Pulmonary/Chest: Effort normal and breath sounds normal.   Abdominal: Soft.   Musculoskeletal: She exhibits no edema.   Neurological: She is alert and oriented to person, place, and time.   Skin: Skin is warm. She is not diaphoretic.   Psychiatric: She has a normal mood and affect.       Significant Labs: Pending         Assessment and Plan:     Coronary artery disease involving left main coronary artery    83 y/o F with multivessel CAD,  here with stable angina, OSH angiogram shows CABG with patent grafts SVG to RCA and LIMA to LAD, left main disease as well as ostial LCX and Om1.     -Planned for complex PCI on 12/11/2017 (monday)  -Keep NPO after midnight on Sunday   -Recommend adding Imdur 30mg daily and uptitrate as blood pressure tolerates   -Recommend adding metoprolol succinate 25mg daily, and uptitrate as tolerated   -C/w Asa and plavix   -Please get platelet response plavix assay if greater than 210 then load with brilinta 180mg and thereafter start brilinta maintenance dose 90mg Q12h.  -has contrast allergy, pre-medication regimen ordered    1. Cardiac catheterization with probable PCI.   2. Antiplatelets: ASA + plavix   3. Access: Possible right femoral   4. Catheters: Jl4 and Jr4   5. Pt is a OSMANY candidate and understands the importance of taking plavix for at least one year, understands that in case of receiving a drug coated stent the failure to comply with dual anti-platelet therapy is likely to result in stent clothing, heart attack and death.   6. The risks, benefits, and alternatives of coronary vascular angiography  and possible intervention were discussed with the patient. All questions were answered and informed consent was obtained. I had a detailed discussion with the patient regarding risk of stroke, MI, bleeding access site complications including limb loss, allergy, kidney failure including dialysis and death.  7. The patient understands the risks and benefits and wishes to go ahead with the procedure.  8. All patient's questions were answered    Madelyn Scott DO  Cardiology Fellow             VTE Risk Mitigation         Ordered     enoxaparin injection 40 mg  Daily     Route:  Subcutaneous        12/08/17 1652     Place NICHOLE hose  Until discontinued      12/08/17 1652     Medium Risk of VTE  Once      12/08/17 1652          Thank you for your consult.    Madelyn Scott MD  Interventional Cardiology   [facility]

## 2017-12-08 NOTE — NURSING
Received report from RAMIN Hercules, [facility], [phone].  Patient arrived to floor via stretcher.  Placed on cardiac monitoring and called centralized telemetry to admit patient.  Placed patient on VISI.  DANI Fraire NP notified of patient's arrival.  Vitals and admit assessment completed.  VSS.  Patient oriented to room.  NIA Scott and NIA Garcia at bedside.  Consents signed.  Will continue to monitor patient.  Patient stated she cannot have surgery today because she is allergic to the dye.  Per NIA Garcia patient will be prepped prior to procedure.

## 2017-12-09 PROBLEM — M79.7 FIBROMYALGIA: Status: ACTIVE | Noted: 2017-12-09

## 2017-12-09 PROBLEM — E78.5 HYPERLIPIDEMIA: Status: ACTIVE | Noted: 2017-12-09

## 2017-12-09 PROBLEM — F41.9 ANXIETY: Status: ACTIVE | Noted: 2017-12-09

## 2017-12-09 PROBLEM — E03.9 ACQUIRED HYPOTHYROIDISM: Status: ACTIVE | Noted: 2017-12-09

## 2017-12-09 LAB
ANION GAP SERPL CALC-SCNC: 7 MMOL/L
BASOPHILS # BLD AUTO: 0.06 K/UL
BASOPHILS NFR BLD: 0.6 %
BUN SERPL-MCNC: 23 MG/DL
CALCIUM SERPL-MCNC: 8.6 MG/DL
CHLORIDE SERPL-SCNC: 109 MMOL/L
CO2 SERPL-SCNC: 25 MMOL/L
CREAT SERPL-MCNC: 1 MG/DL
DIFFERENTIAL METHOD: ABNORMAL
EOSINOPHIL # BLD AUTO: 0.2 K/UL
EOSINOPHIL NFR BLD: 1.6 %
ERYTHROCYTE [DISTWIDTH] IN BLOOD BY AUTOMATED COUNT: 13.9 %
EST. GFR  (AFRICAN AMERICAN): >60 ML/MIN/1.73 M^2
EST. GFR  (NON AFRICAN AMERICAN): 53.3 ML/MIN/1.73 M^2
ESTIMATED AVG GLUCOSE: 123 MG/DL
GLUCOSE SERPL-MCNC: 84 MG/DL
HBA1C MFR BLD HPLC: 5.9 %
HCT VFR BLD AUTO: 33.8 %
HGB BLD-MCNC: 11.1 G/DL
IMM GRANULOCYTES # BLD AUTO: 0.05 K/UL
IMM GRANULOCYTES NFR BLD AUTO: 0.5 %
LYMPHOCYTES # BLD AUTO: 4.3 K/UL
LYMPHOCYTES NFR BLD: 41.3 %
MAGNESIUM SERPL-MCNC: 2 MG/DL
MCH RBC QN AUTO: 28.8 PG
MCHC RBC AUTO-ENTMCNC: 32.8 G/DL
MCV RBC AUTO: 88 FL
MONOCYTES # BLD AUTO: 1.3 K/UL
MONOCYTES NFR BLD: 12.3 %
NEUTROPHILS # BLD AUTO: 4.6 K/UL
NEUTROPHILS NFR BLD: 43.7 %
NRBC BLD-RTO: 0 /100 WBC
PLATELET # BLD AUTO: 320 K/UL
PMV BLD AUTO: 10 FL
POTASSIUM SERPL-SCNC: 3.9 MMOL/L
RBC # BLD AUTO: 3.85 M/UL
SODIUM SERPL-SCNC: 141 MMOL/L
WBC # BLD AUTO: 10.49 K/UL

## 2017-12-09 PROCEDURE — A4216 STERILE WATER/SALINE, 10 ML: HCPCS | Performed by: NURSE PRACTITIONER

## 2017-12-09 PROCEDURE — 99233 SBSQ HOSP IP/OBS HIGH 50: CPT | Mod: ,,, | Performed by: NURSE PRACTITIONER

## 2017-12-09 PROCEDURE — 83036 HEMOGLOBIN GLYCOSYLATED A1C: CPT

## 2017-12-09 PROCEDURE — 85025 COMPLETE CBC W/AUTO DIFF WBC: CPT

## 2017-12-09 PROCEDURE — 25000003 PHARM REV CODE 250: Performed by: NURSE PRACTITIONER

## 2017-12-09 PROCEDURE — 83735 ASSAY OF MAGNESIUM: CPT

## 2017-12-09 PROCEDURE — 63600175 PHARM REV CODE 636 W HCPCS: Performed by: NURSE PRACTITIONER

## 2017-12-09 PROCEDURE — 36415 COLL VENOUS BLD VENIPUNCTURE: CPT

## 2017-12-09 PROCEDURE — 80048 BASIC METABOLIC PNL TOTAL CA: CPT

## 2017-12-09 PROCEDURE — 20600001 HC STEP DOWN PRIVATE ROOM

## 2017-12-09 RX ORDER — POTASSIUM CHLORIDE 20 MEQ/1
20 TABLET, EXTENDED RELEASE ORAL ONCE
Status: COMPLETED | OUTPATIENT
Start: 2017-12-09 | End: 2017-12-09

## 2017-12-09 RX ORDER — PRAVASTATIN SODIUM 40 MG/1
40 TABLET ORAL NIGHTLY
Status: DISCONTINUED | OUTPATIENT
Start: 2017-12-09 | End: 2017-12-11

## 2017-12-09 RX ADMIN — LEVOTHYROXINE SODIUM 50 MCG: 25 TABLET ORAL at 08:12

## 2017-12-09 RX ADMIN — FAMOTIDINE 20 MG: 20 TABLET, FILM COATED ORAL at 08:12

## 2017-12-09 RX ADMIN — POTASSIUM CHLORIDE 20 MEQ: 1500 TABLET, EXTENDED RELEASE ORAL at 09:12

## 2017-12-09 RX ADMIN — MONTELUKAST SODIUM 10 MG: 10 TABLET, FILM COATED ORAL at 09:12

## 2017-12-09 RX ADMIN — PRAVASTATIN SODIUM 40 MG: 40 TABLET ORAL at 09:12

## 2017-12-09 RX ADMIN — TRAMADOL HYDROCHLORIDE 50 MG: 50 TABLET, FILM COATED ORAL at 08:12

## 2017-12-09 RX ADMIN — VITAMIN D, TAB 1000IU (100/BT) 3000 UNITS: 25 TAB at 08:12

## 2017-12-09 RX ADMIN — LOSARTAN POTASSIUM 50 MG: 50 TABLET, FILM COATED ORAL at 09:12

## 2017-12-09 RX ADMIN — Medication 100 MG: at 09:12

## 2017-12-09 RX ADMIN — STANDARDIZED SENNA CONCENTRATE AND DOCUSATE SODIUM 1 TABLET: 8.6; 5 TABLET, FILM COATED ORAL at 08:12

## 2017-12-09 RX ADMIN — CLOPIDOGREL 75 MG: 75 TABLET, FILM COATED ORAL at 08:12

## 2017-12-09 RX ADMIN — ENOXAPARIN SODIUM 40 MG: 100 INJECTION SUBCUTANEOUS at 04:12

## 2017-12-09 RX ADMIN — METOPROLOL SUCCINATE 25 MG: 25 TABLET, EXTENDED RELEASE ORAL at 08:12

## 2017-12-09 RX ADMIN — ALPRAZOLAM 0.25 MG: 0.25 TABLET ORAL at 09:12

## 2017-12-09 RX ADMIN — ASPIRIN 81 MG: 81 TABLET, COATED ORAL at 08:12

## 2017-12-09 RX ADMIN — FAMOTIDINE 20 MG: 20 TABLET, FILM COATED ORAL at 09:12

## 2017-12-09 RX ADMIN — Medication 3 ML: at 03:12

## 2017-12-09 RX ADMIN — AMLODIPINE BESYLATE 5 MG: 5 TABLET ORAL at 08:12

## 2017-12-09 RX ADMIN — ISOSORBIDE MONONITRATE 30 MG: 30 TABLET, EXTENDED RELEASE ORAL at 08:12

## 2017-12-09 RX ADMIN — Medication 3 ML: at 09:12

## 2017-12-09 RX ADMIN — Medication 100 MG: at 08:12

## 2017-12-09 NOTE — HPI
Ms. Zacarias is a an 82 year old female with past medical history of CAD (angioplasty ~1987, PCI ~1999, CABG X4 ~2006, repeat PCI ~2011), s/p PPM due to bradycardia in 1/2017, HTN, hypothyroidism, s/p appy and choley, and fibromyalgia. She was admitted 12/5 to [facility] with two day history of jaw pain, more noticeable with exertion and somewhat relieved with SL NTG, she underwent angiogram at OSH that demonstrated significant distal left main disease, ostial LCX and OM disease, there is patent SVG to RCA, LIMA to LAD supplying small distal LAD.     She endorses over the past 6 weeks she has had limitations in her day to day activities, felt increasingly fatigued and occasionally short of breath with minimal activity such as walking to the mailbox; as well as two month history of back and shoulder pain she attributed to fibromyalgia pain and was treated with PRN tramadol and muscle relaxants (however these were not helpful). Denies chest discomfort, SOB, diaphoeresis, N/V, pre-syncope. Endorses occasional constipation. States that she is not a diabetic,monitor sugars and controls with diet, has not smoked in over 40 years. Describes her  has been in ill health requiring PCI himself and possible will need ablation for Afib which has caused her to feel anxiety and stress as of late.     The patient was admitted to the Hospital Medicine Service for further evaluation and management.  Minimal outside records available (recent labs only) NO hospital course available other than angiogram disk.

## 2017-12-09 NOTE — H&P
[facility] Hospital Medicine  History & Physical    Patient Name: Karma Chen  MRN: [id number]  Admission Date: 12/8/2017  Attending Physician: Jacques Rice MD   Primary Care Provider: Elena Sullivan MD    [facility] Medicine Team: [facility] BEREKET Fraire NP     Patient information was obtained from patient, past medical records and ER records.     Subjective:     Principal Problem:Unstable angina    Chief Complaint: No chief complaint on file.       HPI: Ms. Chen is a an 82 year old female with past medical history of CAD (angioplasty ~1987, PCI ~1999, CABG X4 ~2006, repeat PCI ~2008), s/p PPM due to bradycardia in 1/2017, HTN, hypothyroidism, and fibromyalgia. She was admitted 12/5 to [facility] with two day history of jaw pain, more noticeable with exertion and somewhat relieved with SL NTG, she underwent angiogram at OSH that demonstrated significant distal left main disease, ostial LCX and OM disease, there is patent SVG to RCA, LIMA to LAD supplying small distal LAD.     She endorses over the past 6 weeks she has had limitations in her day to day activities, felt increasingly fatigued and occasionally short of breath with minimal activity such as walking to the mailbox; as well as two month history of back and shoulder pain she attributed to fibromyalgia pain and was treated with PRN tramadol and muscle relaxants (however these were not helpful). Denies chest discomfort, SOB, diaphoeresis, N/V, pre-syncope. Endorses occasional constipation. States that she is not a diabetic,monitor sugars and controls with diet, has not smoked in over 40 years. Describes her  has been in ill health requiring PCI himself and possible will need ablation for Afib which has caused her to feel anxiety and stress as of late.     The patient was admitted to the Hospital Medicine Service for further evaluation and management.  Minimal outside records available (recent labs  only) NO hospital course available other than angiogram disk.    Past Medical History:   Diagnosis Date    Coronary artery disease     Dermatitis     Dermatitis 12/8/2017    Diabetes mellitus     Diabetes mellitus type II     GERD (gastroesophageal reflux disease)     Hypertension     MI (myocardial infarction)        Past Surgical History:   Procedure Laterality Date    adenoids      APPENDECTOMY      CHOLECYSTECTOMY      CORONARY ARTERY BYPASS GRAFT      coronary stents      HYSTERECTOMY      TONSILLECTOMY         Review of patient's allergies indicates:   Allergen Reactions    Arthrotec 50 [diclofenac-misoprostol]     Doxycycline     Effexor [venlafaxine]     Flagyl [metronidazole]     Fluconazole     Iodine     Levaquin [levofloxacin]     Nexium [esomeprazole magnesium]     Penicillins     Shellfish containing products     Sulfa (sulfonamide antibiotics)     Yeast, dried        No current facility-administered medications on file prior to encounter.      Current Outpatient Prescriptions on File Prior to Encounter   Medication Sig    alprazolam (XANAX) 0.25 MG tablet Take 0.25 mg by mouth nightly as needed.      amLODIPine (NORVASC) 5 MG tablet     b complex vitamins tablet Take 1 tablet by mouth once daily.      clopidogrel (PLAVIX) 75 mg tablet Take 75 mg by mouth once daily.      co-enzyme Q-10 30 mg capsule Take 30 mg by mouth 2 (two) times daily.      cyanocobalamin 1,000 mcg/mL injection     estradiol (ESTRACE) 0.01 % (0.1 mg/g) vaginal cream Place vaginally once a week.      hydrOXYzine HCl (ATARAX) 25 MG tablet     levocetirizine (XYZAL) 2.5 mg/5 mL solution Take 2.5 mg by mouth every evening.      levothyroxine (SYNTHROID) 50 MCG tablet Take 50 mcg by mouth once daily.      lidocaine HCL 2% (XYLOCAINE) 2 % jelly Apply topically as needed. Apply topically once nightly to the affected area left foot/ankle.    losartan (COZAAR) 50 MG tablet Take 50 mg by mouth every  evening.      montelukast (SINGULAIR) 10 mg tablet     nitroGLYCERIN (NITROBID) 2.5 MG CR capsule Take 2.5 mg by mouth once daily.      nitroGLYCERIN (NITROSTAT) 0.4 MG SL tablet     omeprazole-sodium bicarbonate (ZEGERID) 40-1.1 mg-gram per capsule Take 1 capsule by mouth before breakfast.    pravastatin (PRAVACHOL) 10 MG tablet     sotalol (BETAPACE) 80 MG tablet     tramadol (ULTRAM) 50 mg tablet Take 50 mg by mouth every 6 (six) hours as needed.      vitamin D 185 MG Tab Take 3,000 Units by mouth once daily.      [DISCONTINUED] fish oil-omega-3 fatty acids 300-1,000 mg capsule Take 800 mg by mouth 2 (two) times daily.      [DISCONTINUED] gabapentin (NEURONTIN) 300 MG capsule     [DISCONTINUED] methocarbamol (ROBAXIN) 750 MG Tab Take 500 mg by mouth 4 (four) times daily.      [DISCONTINUED] pitavastatin (LIVALO) 1 mg Tab Take 1 tablet by mouth once daily.      [DISCONTINUED] zafirlukast (ACCOLATE) 20 MG tablet Take 20 mg by mouth 2 (two) times daily.      ciclopirox (PENLAC) 8 % Soln Apply topically once daily. Apply topically to affected nails once daily.  Remove once weekly.  Repeat.  Follow package insert.    diclofenac sodium 1 % Gel Apply 2 g topically 4 (four) times daily.    fluconazole (DIFLUCAN) 150 MG Tab 150 mg as needed.     metronidazole (NORITATE) 1 % cream Apply 1 applicator topically once daily.      valACYclovir (VALTREX) 1000 MG tablet Take 1,000 mg by mouth as needed.     [DISCONTINUED] calcium carbonate 220 mg capsule Take 650 mg by mouth 2 (two) times daily with meals.      [DISCONTINUED] cyclobenzaprine (FLEXERIL) 5 MG tablet     [DISCONTINUED] liothyronine (CYTOMEL) 5 MCG tablet Take 12.5 mcg by mouth once daily.      [DISCONTINUED] magnesium oxide (MAG-OX) 400 mg tablet Take 400 mg by mouth 2 (two) times daily.      [DISCONTINUED] nebivolol (BYSTOLIC) 5 MG tablet Take 5 mg by mouth once daily.       Family History     None        Social History Main Topics     Smoking status: Never Smoker    Smokeless tobacco: Not on file    Alcohol use No    Drug use: No    Sexual activity: Not on file     Review of Systems   Constitutional: Positive for activity change and fatigue. Negative for appetite change, chills, diaphoresis, fever and unexpected weight change.   HENT: Negative for congestion, sinus pain, sinus pressure, sneezing, sore throat, trouble swallowing and voice change.    Eyes: Negative.    Respiratory: Positive for shortness of breath. Negative for cough, chest tightness, wheezing and stridor.    Cardiovascular: Negative for chest pain, palpitations and leg swelling.   Gastrointestinal: Positive for constipation. Negative for abdominal distention, abdominal pain, diarrhea, nausea and vomiting.   Endocrine: Negative.    Genitourinary: Negative for decreased urine volume, difficulty urinating, dysuria, frequency and hematuria.   Musculoskeletal: Positive for arthralgias and myalgias. Negative for back pain and joint swelling.        Jaw pain PTA- now resolved   Skin: Negative.    Allergic/Immunologic: Negative.    Hematological: Negative.      Objective:     Vital Signs (Most Recent):  Temp: 97.9 °F (36.6 °C) (12/08/17 1500)  Pulse: 73 (12/08/17 1900)  Resp: 18 (12/08/17 1800)  BP: (!) 140/81 (12/08/17 1800)  SpO2: 96 % (12/08/17 1800) Vital Signs (24h Range):  Temp:  [97.9 °F (36.6 °C)] 97.9 °F (36.6 °C)  Pulse:  [68-73] 73  Resp:  [16-18] 18  SpO2:  [96 %-99 %] 96 %  BP: (130-146)/(67-81) 140/81     Weight: 70.1 kg (154 lb 8.7 oz)  Body mass index is 30.18 kg/m².    Physical Exam   Constitutional: She is oriented to person, place, and time. She appears well-developed and well-nourished.   HENT:   Head: Normocephalic and atraumatic.   Mouth/Throat: Mucous membranes are normal. Normal dentition.   Eyes: Conjunctivae and lids are normal. Pupils are equal, round, and reactive to light. No scleral icterus.   Neck: Normal range of motion. Neck supple. No JVD present.    Cardiovascular: Normal rate, regular rhythm, normal heart sounds and intact distal pulses.  Exam reveals no gallop and no friction rub.    No murmur heard.  Pulmonary/Chest: Effort normal and breath sounds normal. She exhibits no tenderness.   Abdominal: Soft. Bowel sounds are normal. She exhibits no distension. There is no tenderness.   Musculoskeletal: Normal range of motion. She exhibits no edema or deformity.   Neurological: She is alert and oriented to person, place, and time. No cranial nerve deficit.   Skin: Skin is warm and dry. No rash noted. No erythema.   Psychiatric: She has a normal mood and affect. Her behavior is normal. Judgment and thought content normal.   Nursing note and vitals reviewed.        CRANIAL NERVES     CN III, IV, VI   Pupils are equal, round, and reactive to light.    Significant Labs:   CBC:   Recent Labs  Lab 12/08/17 2000   WBC 14.52*   HGB 11.5*   HCT 35.2*        All pertinent labs within the past 24 hours have been reviewed.    Significant Imaging: I have reviewed all pertinent imaging results/findings within the past 24 hours.    Assessment/Plan:     * Unstable angina    -angiogram at OSH that demonstrated significant distal left main disease, ostial LCX and OM disease, there is patent SVG to RCA, LIMA to LAD supplying small distal LAD  -Interventional Cardiology evaluated on arrival, plans for complex PCI on Monday  -continue amlodipine, toprol XL, ASA, statin, plavix, ISMN, and losartan  -2D echo pending  -PRU pending  -CBC/Chemistry pending  -goal HR 60's, goal -130's  -monitor on tele, currently A pacing   -EKG and SL NTG PRN chest pain         Coronary artery disease involving left main coronary artery    -see above         Essential hypertension    -BP mostly controlled, monitor for goal of -130's  -continue meds as above        Hyperlipidemia    -lipid panel pending  -continue statin         Diabetes mellitus type 2, diet-controlled    -diet  controlled at home, no antihyperglycemics  -HgA1C pending, initiate SSI if appropriate        Acquired hypothyroidism    -continue home levothyroxine  -TSH pending         Fibromyalgia    -denies pain currently  -PRN tramadol        Anxiety    -continue home PRN alprazolam           VTE Risk Mitigation         Ordered     enoxaparin injection 40 mg  Daily     Route:  Subcutaneous        12/08/17 1652     Place NICHOLE hose  Until discontinued      12/08/17 1652     Medium Risk of VTE  Once      12/08/17 1652             Milagros Fraire NP  Department of Hospital Medicine   [facility]

## 2017-12-09 NOTE — ASSESSMENT & PLAN NOTE
-angiogram at OSH that demonstrated significant distal left main disease, ostial LCX and OM disease, there is patent SVG to RCA, LIMA to LAD supplying small distal LAD  -Interventional Cardiology evaluated on arrival, plans for complex PCI on Monday  -continue amlodipine, toprol XL, ASA, statin, plavix, ISMN, and losartan  -2D echo pending  -PRU pending  -CBC/Chemistry pending  -goal HR 60's, goal -130's  -monitor on tele, currently A pacing   -EKG and SL NTG PRN chest pain

## 2017-12-09 NOTE — PROGRESS NOTES
"K 3.3. Pt also questioning Losartan administration, stated she was taken off med over a month ago r/t decreased kidney fx. Dr. Donis notified. Placed order for replacement, will carry out. Meds reviewed and Dr. Donis "ok" with administering Losartan, pt educated on importance of adhering to regimen, refused until she speaks with her PCP. K replaced per MD orders. NAND. Will continue to monitor.   "

## 2017-12-09 NOTE — NURSING TRANSFER
Nursing Transfer Note      12/9/2017     Transfer To: X-Ray    Transfer via stretcher    Transfer with cardiac monitoring, VISI    Transported by Candis Cheng    Medicines sent: No    Chart send with patient: No    Notified: spouse at bedside

## 2017-12-09 NOTE — NURSING
Patient moved from room 389A to room 386.  Spouse is at bedside.  Called telemetry to tranfer patient to box 0312.

## 2017-12-09 NOTE — SUBJECTIVE & OBJECTIVE
Past Medical History:   Diagnosis Date    Coronary artery disease     Dermatitis     Dermatitis 12/8/2017    Diabetes mellitus     Diabetes mellitus type II     GERD (gastroesophageal reflux disease)     Hypertension     MI (myocardial infarction)        Past Surgical History:   Procedure Laterality Date    adenoids      APPENDECTOMY      CHOLECYSTECTOMY      CORONARY ARTERY BYPASS GRAFT      coronary stents      HYSTERECTOMY      TONSILLECTOMY         Review of patient's allergies indicates:   Allergen Reactions    Arthrotec 50 [diclofenac-misoprostol]     Doxycycline     Effexor [venlafaxine]     Flagyl [metronidazole]     Fluconazole     Iodine     Levaquin [levofloxacin]     Nexium [esomeprazole magnesium]     Penicillins     Shellfish containing products     Sulfa (sulfonamide antibiotics)     Yeast, dried        No current facility-administered medications on file prior to encounter.      Current Outpatient Prescriptions on File Prior to Encounter   Medication Sig    alprazolam (XANAX) 0.25 MG tablet Take 0.25 mg by mouth nightly as needed.      amLODIPine (NORVASC) 5 MG tablet     b complex vitamins tablet Take 1 tablet by mouth once daily.      clopidogrel (PLAVIX) 75 mg tablet Take 75 mg by mouth once daily.      co-enzyme Q-10 30 mg capsule Take 30 mg by mouth 2 (two) times daily.      cyanocobalamin 1,000 mcg/mL injection     estradiol (ESTRACE) 0.01 % (0.1 mg/g) vaginal cream Place vaginally once a week.      hydrOXYzine HCl (ATARAX) 25 MG tablet     levocetirizine (XYZAL) 2.5 mg/5 mL solution Take 2.5 mg by mouth every evening.      levothyroxine (SYNTHROID) 50 MCG tablet Take 50 mcg by mouth once daily.      lidocaine HCL 2% (XYLOCAINE) 2 % jelly Apply topically as needed. Apply topically once nightly to the affected area left foot/ankle.    losartan (COZAAR) 50 MG tablet Take 50 mg by mouth every evening.      montelukast (SINGULAIR) 10 mg tablet      nitroGLYCERIN (NITROBID) 2.5 MG CR capsule Take 2.5 mg by mouth once daily.      nitroGLYCERIN (NITROSTAT) 0.4 MG SL tablet     omeprazole-sodium bicarbonate (ZEGERID) 40-1.1 mg-gram per capsule Take 1 capsule by mouth before breakfast.    pravastatin (PRAVACHOL) 10 MG tablet     sotalol (BETAPACE) 80 MG tablet     tramadol (ULTRAM) 50 mg tablet Take 50 mg by mouth every 6 (six) hours as needed.      vitamin D 185 MG Tab Take 3,000 Units by mouth once daily.      [DISCONTINUED] fish oil-omega-3 fatty acids 300-1,000 mg capsule Take 800 mg by mouth 2 (two) times daily.      [DISCONTINUED] gabapentin (NEURONTIN) 300 MG capsule     [DISCONTINUED] methocarbamol (ROBAXIN) 750 MG Tab Take 500 mg by mouth 4 (four) times daily.      [DISCONTINUED] pitavastatin (LIVALO) 1 mg Tab Take 1 tablet by mouth once daily.      [DISCONTINUED] zafirlukast (ACCOLATE) 20 MG tablet Take 20 mg by mouth 2 (two) times daily.      ciclopirox (PENLAC) 8 % Soln Apply topically once daily. Apply topically to affected nails once daily.  Remove once weekly.  Repeat.  Follow package insert.    diclofenac sodium 1 % Gel Apply 2 g topically 4 (four) times daily.    fluconazole (DIFLUCAN) 150 MG Tab 150 mg as needed.     metronidazole (NORITATE) 1 % cream Apply 1 applicator topically once daily.      valACYclovir (VALTREX) 1000 MG tablet Take 1,000 mg by mouth as needed.     [DISCONTINUED] calcium carbonate 220 mg capsule Take 650 mg by mouth 2 (two) times daily with meals.      [DISCONTINUED] cyclobenzaprine (FLEXERIL) 5 MG tablet     [DISCONTINUED] liothyronine (CYTOMEL) 5 MCG tablet Take 12.5 mcg by mouth once daily.      [DISCONTINUED] magnesium oxide (MAG-OX) 400 mg tablet Take 400 mg by mouth 2 (two) times daily.      [DISCONTINUED] nebivolol (BYSTOLIC) 5 MG tablet Take 5 mg by mouth once daily.       Family History     None        Social History Main Topics    Smoking status: Never Smoker    Smokeless tobacco: Not on file     Alcohol use No    Drug use: No    Sexual activity: Not on file     Review of Systems   Constitutional: Positive for activity change and fatigue. Negative for appetite change, chills, diaphoresis, fever and unexpected weight change.   HENT: Negative for congestion, sinus pain, sinus pressure, sneezing, sore throat, trouble swallowing and voice change.    Eyes: Negative.    Respiratory: Positive for shortness of breath. Negative for cough, chest tightness, wheezing and stridor.    Cardiovascular: Negative for chest pain, palpitations and leg swelling.   Gastrointestinal: Positive for constipation. Negative for abdominal distention, abdominal pain, diarrhea, nausea and vomiting.   Endocrine: Negative.    Genitourinary: Negative for decreased urine volume, difficulty urinating, dysuria, frequency and hematuria.   Musculoskeletal: Positive for arthralgias and myalgias. Negative for back pain and joint swelling.        Jaw pain PTA- now resolved   Skin: Negative.    Allergic/Immunologic: Negative.    Hematological: Negative.      Objective:     Vital Signs (Most Recent):  Temp: 97.9 °F (36.6 °C) (12/08/17 1500)  Pulse: 73 (12/08/17 1900)  Resp: 18 (12/08/17 1800)  BP: (!) 140/81 (12/08/17 1800)  SpO2: 96 % (12/08/17 1800) Vital Signs (24h Range):  Temp:  [97.9 °F (36.6 °C)] 97.9 °F (36.6 °C)  Pulse:  [68-73] 73  Resp:  [16-18] 18  SpO2:  [96 %-99 %] 96 %  BP: (130-146)/(67-81) 140/81     Weight: 70.1 kg (154 lb 8.7 oz)  Body mass index is 30.18 kg/m².    Physical Exam   Constitutional: She is oriented to person, place, and time. She appears well-developed and well-nourished.   HENT:   Head: Normocephalic and atraumatic.   Mouth/Throat: Mucous membranes are normal. Normal dentition.   Eyes: Conjunctivae and lids are normal. Pupils are equal, round, and reactive to light. No scleral icterus.   Neck: Normal range of motion. Neck supple. No JVD present.   Cardiovascular: Normal rate, regular rhythm, normal heart sounds  and intact distal pulses.  Exam reveals no gallop and no friction rub.    No murmur heard.  Pulmonary/Chest: Effort normal and breath sounds normal. She exhibits no tenderness.   Abdominal: Soft. Bowel sounds are normal. She exhibits no distension. There is no tenderness.   Musculoskeletal: Normal range of motion. She exhibits no edema or deformity.   Neurological: She is alert and oriented to person, place, and time. No cranial nerve deficit.   Skin: Skin is warm and dry. No rash noted. No erythema.   Psychiatric: She has a normal mood and affect. Her behavior is normal. Judgment and thought content normal.   Nursing note and vitals reviewed.        CRANIAL NERVES     CN III, IV, VI   Pupils are equal, round, and reactive to light.    Significant Labs:   CBC:   Recent Labs  Lab 12/08/17 2000   WBC 14.52*   HGB 11.5*   HCT 35.2*        All pertinent labs within the past 24 hours have been reviewed.    Significant Imaging: I have reviewed all pertinent imaging results/findings within the past 24 hours.

## 2017-12-09 NOTE — PLAN OF CARE
Problem: Patient Care Overview  Goal: Plan of Care Review  Reviewed plan of care with patient.  Patient requested a private room, and is currently placed on the waiting list.  Patient will be NPO at midnight 12/11/17 for coronary angiography.  Patient is allergic to dyes, therefore, pre-cath premedication (50 mg prednisone) will commence at 1800 hours this Sunday for procedure.  K+ = 3.9, 20 mEq SA CR tablet administered once.  Lovenox 40 mg and NICHOLE Hose ordered for medium risk of VTE.  Patient will remain free of fall/trauma/injury by using appropriate lighting, nonskid socks, and by keeping area free of debris.  Patient will call for assistance when needed. Patient verbalized understanding of all instructions.

## 2017-12-09 NOTE — HOSPITAL COURSE
Ms. Zacarias was admitted 12/8 as a transfer from [facility] for complex PCI. Home medications resumed as appropriate, monitor on tele, 2D echo w/ EF 50-55%, WMAs, DD, severe L atrial enlargement, mild to moderate TR, and mild AR. Evaluated by Interventional Cardiology, underwent [facility] 12/11 with OSMANY to the LM bifurcation (OSMANY x 1 distal LM to LAD and OSMANY x1 distal LM to LCX),   OSMANY to mid LCX,   OSMANY to the distal OM, no need for impella support. She had a slight groin ooze and hematoma post procedure, manual pressure held for 30 min along with epinephrine instilled into track ooze.  She had full resolution of her hematoma and dressing is dry. She has been cleared by interventional cardiology to go home.     Disposition plans: family care, outpt follow up with IC, primary cardiology, and PCP.  She has been referred to cardiac rehab as well.

## 2017-12-09 NOTE — ASSESSMENT & PLAN NOTE
-angiogram at OSH that demonstrated significant distal left main disease, ostial LCX and OM disease, there is patent SVG to RCA, LIMA to LAD supplying small distal LAD  -Interventional Cardiology evaluated on arrival, plans for complex PCI on Monday  -continue amlodipine, toprol XL, ASA, statin, plavix, ISMN, and losartan  -2D echo pending  -PRU-responsive   -CBC/Chemistry without significant abnormalities >>> continue to monitor   -goal HR 60's, goal -130's  -monitor on tele, currently A pacing   -EKG and SL NTG PRN chest pain

## 2017-12-09 NOTE — SUBJECTIVE & OBJECTIVE
Interval History: Resting in bed, spouse at bedside, home med list and PMHx information given to AFIA, no complaints or needs at this time, denies chest pain, palpitations, or shortness of breath. Denies any acute events or distress overnight.     Review of Systems   Constitutional: Positive for activity change and fatigue. Negative for appetite change, chills, diaphoresis, fever and unexpected weight change.   HENT: Negative for congestion, sinus pain, sinus pressure, sneezing, sore throat, trouble swallowing and voice change.    Respiratory: Negative for cough, chest tightness, shortness of breath, wheezing and stridor.    Cardiovascular: Negative for chest pain, palpitations and leg swelling.   Gastrointestinal: Positive for constipation. Negative for abdominal distention, abdominal pain, diarrhea, nausea and vomiting.   Genitourinary: Negative for decreased urine volume, difficulty urinating, dysuria, frequency and hematuria.   Musculoskeletal: Positive for arthralgias and myalgias. Negative for back pain and joint swelling.        Jaw pain PTA- now resolved     Objective:     Vital Signs (Most Recent):  Temp: 97.9 °F (36.6 °C) (12/09/17 1154)  Pulse: 72 (12/09/17 1200)  Resp: 13 (12/09/17 1200)  BP: 136/79 (12/09/17 1200)  SpO2: 96 % (12/09/17 1200) Vital Signs (24h Range):  Temp:  [97.6 °F (36.4 °C)-97.9 °F (36.6 °C)] 97.9 °F (36.6 °C)  Pulse:  [68-78] 72  Resp:  [13-25] 13  SpO2:  [92 %-99 %] 96 %  BP: (109-146)/(61-81) 136/79     Weight: 69.9 kg (154 lb 1.6 oz)  Body mass index is 30.1 kg/m².    Intake/Output Summary (Last 24 hours) at 12/09/17 1332  Last data filed at 12/09/17 0932   Gross per 24 hour   Intake              960 ml   Output             2200 ml   Net            -1240 ml      Physical Exam   Constitutional: She is oriented to person, place, and time. She appears well-developed and well-nourished.   HENT:   Head: Normocephalic and atraumatic.   Mouth/Throat: Mucous membranes are normal. Normal  dentition.   Eyes: Conjunctivae and lids are normal. Pupils are equal, round, and reactive to light. No scleral icterus.   Neck: Normal range of motion. Neck supple. No JVD present.   Cardiovascular: Normal rate, regular rhythm, normal heart sounds and intact distal pulses.  Exam reveals no gallop and no friction rub.    No murmur heard.  Pulmonary/Chest: Effort normal and breath sounds normal. She exhibits no tenderness.   Abdominal: Soft. Bowel sounds are normal. She exhibits no distension. There is no tenderness.   Musculoskeletal: Normal range of motion. She exhibits no edema or deformity.   Neurological: She is alert and oriented to person, place, and time. No cranial nerve deficit.   Skin: Skin is warm and dry. No rash noted. No erythema.   Psychiatric: She has a normal mood and affect. Her behavior is normal. Judgment and thought content normal.   Nursing note and vitals reviewed.    Significant Labs:   BMP:   Recent Labs  Lab 12/09/17  0652   GLU 84      K 3.9      CO2 25   BUN 23   CREATININE 1.0   CALCIUM 8.6*   MG 2.0     CBC:   Recent Labs  Lab 12/08/17 2000 12/09/17  0652   WBC 14.52* 10.49   HGB 11.5* 11.1*   HCT 35.2* 33.8*    320     Magnesium:   Recent Labs  Lab 12/08/17 2000 12/09/17  0652   MG 2.2 2.0     All pertinent labs within the past 24 hours have been reviewed.    Significant Imaging: I have reviewed all pertinent imaging results/findings within the past 24 hours.

## 2017-12-09 NOTE — PROGRESS NOTES
[facility] Hospital Medicine  Progress Note    Patient Name: Karma Chen  MRN: [id number]  Patient Class: IP- Inpatient   Admission Date: 12/8/2017  Length of Stay: 1 days  Attending Physician: Td Jones MD  Primary Care Provider: Elena Sullivan MD    [facility] Medicine Team: [facility] ARNEL MED BEREKET Fraire NP    Subjective:     Principal Problem:Unstable angina    HPI:  Ms. 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 She was admitted 12/5 to [facility] with two day history of jaw pain, more noticeable with exertion and somewhat relieved with SL NTG, she underwent angiogram at OSH that demonstrated significant distal left main disease, ostial LCX and OM disease, there is patent SVG to RCA, LIMA to LAD supplying small distal LAD.     She endorses over the past 6 weeks she has had limitations in her day to day activities, felt increasingly fatigued and occasionally short of breath with minimal activity such as walking to the mailbox; as well as two month history of back and shoulder pain she attributed to fibromyalgia pain and was treated with PRN tramadol and muscle relaxants (however these were not helpful). Denies chest discomfort, SOB, diaphoeresis, N/V, pre-syncope. Endorses occasional constipation. States that she is not a diabetic,monitor sugars and controls with diet, has not smoked in over 40 years. Describes her  has been in ill health requiring PCI himself and possible will need ablation for Afib which has caused her to feel anxiety and stress as of late.     The patient was admitted to the Hospital Medicine Service for further evaluation and management.  Minimal outside records available (recent labs only) NO hospital course available other than angiogram  nisha.    Hospital Course:  Ms. Zacarias was admitted 12/8 as a transfer from [facility] for complex PCI. Will resume home medications as appropriate, monitor on tele, and obtain  2D echo. Evaluated by Interventional Cardiology, plans for monitoring and complex PCI on Monday.     Disposition plans: home post procedure with family care, ?? for home health, outpt follow up with IC, primary cardiology, and PCP.     Interval History: Resting in bed, spouse at bedside, home med list and PMHx information given to AFIA, no complaints or needs at this time, denies chest pain, palpitations, or shortness of breath. Denies any acute events or distress overnight.     Review of Systems   Constitutional: Positive for activity change and fatigue. Negative for appetite change, chills, diaphoresis, fever and unexpected weight change.   HENT: Negative for congestion, sinus pain, sinus pressure, sneezing, sore throat, trouble swallowing and voice change.    Respiratory: Negative for cough, chest tightness, shortness of breath, wheezing and stridor.    Cardiovascular: Negative for chest pain, palpitations and leg swelling.   Gastrointestinal: Positive for constipation. Negative for abdominal distention, abdominal pain, diarrhea, nausea and vomiting.   Genitourinary: Negative for decreased urine volume, difficulty urinating, dysuria, frequency and hematuria.   Musculoskeletal: Positive for arthralgias and myalgias. Negative for back pain and joint swelling.        Jaw pain PTA- now resolved     Objective:     Vital Signs (Most Recent):  Temp: 97.9 °F (36.6 °C) (12/09/17 1154)  Pulse: 72 (12/09/17 1200)  Resp: 13 (12/09/17 1200)  BP: 136/79 (12/09/17 1200)  SpO2: 96 % (12/09/17 1200) Vital Signs (24h Range):  Temp:  [97.6 °F (36.4 °C)-97.9 °F (36.6 °C)] 97.9 °F (36.6 °C)  Pulse:  [68-78] 72  Resp:  [13-25] 13  SpO2:  [92 %-99 %] 96 %  BP: (109-146)/(61-81) 136/79     Weight: 69.9 kg (154 lb 1.6 oz)  Body mass index is 30.1  kg/m².    Intake/Output Summary (Last 24 hours) at 12/09/17 1332  Last data filed at 12/09/17 0932   Gross per 24 hour   Intake              960 ml   Output             2200 ml   Net            -1240 ml      Physical Exam   Constitutional: She is oriented to person, place, and time. She appears well-developed and well-nourished.   HENT:   Head: Normocephalic and atraumatic.   Mouth/Throat: Mucous membranes are normal. Normal dentition.   Eyes: Conjunctivae and lids are normal. Pupils are equal, round, and reactive to light. No scleral icterus.   Neck: Normal range of motion. Neck supple. No JVD present.   Cardiovascular: Normal rate, regular rhythm, normal heart sounds and intact distal pulses.  Exam reveals no gallop and no friction rub.    No murmur heard.  Pulmonary/Chest: Effort normal and breath sounds normal. She exhibits no tenderness.   Abdominal: Soft. Bowel sounds are normal. She exhibits no distension. There is no tenderness.   Musculoskeletal: Normal range of motion. She exhibits no edema or deformity.   Neurological: She is alert and oriented to person, place, and time. No cranial nerve deficit.   Skin: Skin is warm and dry. No rash noted. No erythema.   Psychiatric: She has a normal mood and affect. Her behavior is normal. Judgment and thought content normal.   Nursing note and vitals reviewed.    Significant Labs:   BMP:   Recent Labs  Lab 12/09/17  0652   GLU 84      K 3.9      CO2 25   BUN 23   CREATININE 1.0   CALCIUM 8.6*   MG 2.0     CBC:   Recent Labs  Lab 12/08/17 2000 12/09/17  0652   WBC 14.52* 10.49   HGB 11.5* 11.1*   HCT 35.2* 33.8*    320     Magnesium:   Recent Labs  Lab 12/08/17  2000 12/09/17  0652   MG 2.2 2.0     All pertinent labs within the past 24 hours have been reviewed.    Significant Imaging: I have reviewed all pertinent imaging results/findings within the past 24 hours.    Assessment/Plan:      * Unstable angina    -angiogram at OSH that demonstrated  significant distal left main disease, ostial LCX and OM disease, there is patent SVG to RCA, LIMA to LAD supplying small distal LAD  -Interventional Cardiology evaluated on arrival, plans for complex PCI on Monday  -continue amlodipine, toprol XL, ASA, statin, plavix, ISMN, and losartan  -2D echo pending  -PRU-responsive   -CBC/Chemistry without significant abnormalities >>> continue to monitor   -goal HR 60's, goal -130's  -monitor on tele, currently A pacing   -EKG and SL NTG PRN chest pain         Coronary artery disease involving left main coronary artery    -see above         Essential hypertension    -BP mostly controlled, monitor for goal of -130's  -continue meds as above        Hyperlipidemia    -lipid panel w/ cholesterol 237, HDL 43, , triglycerides 217  -increase statin dose        Diabetes mellitus type 2, diet-controlled    -diet controlled at home, no antihyperglycemics  -HgA1C 5.9/123  -initiate SSI if appropriate        Acquired hypothyroidism    -continue home levothyroxine  -TSH 2.340        Fibromyalgia    -denies pain currently  -PRN tramadol        Anxiety    -continue home PRN alprazolam           VTE Risk Mitigation         Ordered     enoxaparin injection 40 mg  Daily     Route:  Subcutaneous        12/08/17 1652     Place NICHOLE hose  Until discontinued      12/08/17 1652     Medium Risk of VTE  Once      12/08/17 1652        Milagros Fraire NP  Department of Hospital Medicine   [facility]  Pager [phone]  [place] [id number]

## 2017-12-09 NOTE — PLAN OF CARE
Problem: Patient Care Overview  Goal: Plan of Care Review  Outcome: Ongoing (interventions implemented as appropriate)  No significant events this shift. 2DEcho collected, results pending. K 3.4, replaced with 40mEq. Refused Losartan this shift, stated primary MD took her off med over a month ago r/t decreased kidney fx and does not want to mess kidneys up before PCI Monday. Pt educated on importance of adhering to medication regimen, still refused, wants to clarify with her PCP prior to taking.VSS. O2 sats remain stable on RA. Afebrile. C/o lower back pain managed with PRN Tramadol, pt reported mild/mod relief. Received PRN Xanax x1 for anxiety with relief. DM managed with diet. Fall precautions maintained, free of fall/trauma/injury or skin breakdown. Plan is for LM PCI Monday with dye prep prior to. Plan of care reviewed and updated with patient, verbalizes understanding. NADN. Pt resting quietly in bed voicing no complaints at this time. Will continue to monitor.

## 2017-12-10 LAB
ANION GAP SERPL CALC-SCNC: 9 MMOL/L
APTT BLDCRRT: 26.9 SEC
BASOPHILS # BLD AUTO: 0.05 K/UL
BASOPHILS NFR BLD: 0.6 %
BUN SERPL-MCNC: 26 MG/DL
CALCIUM SERPL-MCNC: 8.1 MG/DL
CHLORIDE SERPL-SCNC: 106 MMOL/L
CO2 SERPL-SCNC: 23 MMOL/L
CREAT SERPL-MCNC: 1 MG/DL
DIFFERENTIAL METHOD: ABNORMAL
EOSINOPHIL # BLD AUTO: 0.4 K/UL
EOSINOPHIL NFR BLD: 4.4 %
ERYTHROCYTE [DISTWIDTH] IN BLOOD BY AUTOMATED COUNT: 13.6 %
EST. GFR  (AFRICAN AMERICAN): >60 ML/MIN/1.73 M^2
EST. GFR  (NON AFRICAN AMERICAN): 53.3 ML/MIN/1.73 M^2
GLUCOSE SERPL-MCNC: 94 MG/DL
HCT VFR BLD AUTO: 33.8 %
HGB BLD-MCNC: 11 G/DL
IMM GRANULOCYTES # BLD AUTO: 0.02 K/UL
IMM GRANULOCYTES NFR BLD AUTO: 0.2 %
INR PPP: 1
LYMPHOCYTES # BLD AUTO: 3.6 K/UL
LYMPHOCYTES NFR BLD: 41.6 %
MAGNESIUM SERPL-MCNC: 2 MG/DL
MCH RBC QN AUTO: 28.9 PG
MCHC RBC AUTO-ENTMCNC: 32.5 G/DL
MCV RBC AUTO: 89 FL
MONOCYTES # BLD AUTO: 1.1 K/UL
MONOCYTES NFR BLD: 13 %
NEUTROPHILS # BLD AUTO: 3.4 K/UL
NEUTROPHILS NFR BLD: 40.2 %
NRBC BLD-RTO: 0 /100 WBC
PLATELET # BLD AUTO: 297 K/UL
PMV BLD AUTO: 10.1 FL
POTASSIUM SERPL-SCNC: 4.1 MMOL/L
PROTHROMBIN TIME: 10.7 SEC
RBC # BLD AUTO: 3.81 M/UL
SODIUM SERPL-SCNC: 138 MMOL/L
WBC # BLD AUTO: 8.55 K/UL

## 2017-12-10 PROCEDURE — 25000003 PHARM REV CODE 250: Performed by: NURSE PRACTITIONER

## 2017-12-10 PROCEDURE — 85025 COMPLETE CBC W/AUTO DIFF WBC: CPT

## 2017-12-10 PROCEDURE — 63600175 PHARM REV CODE 636 W HCPCS: Performed by: NURSE PRACTITIONER

## 2017-12-10 PROCEDURE — 63600175 PHARM REV CODE 636 W HCPCS: Performed by: STUDENT IN AN ORGANIZED HEALTH CARE EDUCATION/TRAINING PROGRAM

## 2017-12-10 PROCEDURE — 36415 COLL VENOUS BLD VENIPUNCTURE: CPT

## 2017-12-10 PROCEDURE — 85610 PROTHROMBIN TIME: CPT

## 2017-12-10 PROCEDURE — 80048 BASIC METABOLIC PNL TOTAL CA: CPT

## 2017-12-10 PROCEDURE — 99233 SBSQ HOSP IP/OBS HIGH 50: CPT | Mod: ,,, | Performed by: NURSE PRACTITIONER

## 2017-12-10 PROCEDURE — 20600001 HC STEP DOWN PRIVATE ROOM

## 2017-12-10 PROCEDURE — 83735 ASSAY OF MAGNESIUM: CPT

## 2017-12-10 PROCEDURE — 93005 ELECTROCARDIOGRAM TRACING: CPT

## 2017-12-10 PROCEDURE — 85730 THROMBOPLASTIN TIME PARTIAL: CPT

## 2017-12-10 PROCEDURE — 93010 ELECTROCARDIOGRAM REPORT: CPT | Mod: ,,, | Performed by: INTERNAL MEDICINE

## 2017-12-10 PROCEDURE — A4216 STERILE WATER/SALINE, 10 ML: HCPCS | Performed by: NURSE PRACTITIONER

## 2017-12-10 RX ORDER — DIPHENHYDRAMINE HCL 50 MG
50 CAPSULE ORAL EVERY 6 HOURS
Status: COMPLETED | OUTPATIENT
Start: 2017-12-11 | End: 2017-12-11

## 2017-12-10 RX ORDER — SODIUM CHLORIDE 9 MG/ML
3 INJECTION, SOLUTION INTRAVENOUS CONTINUOUS
Status: ACTIVE | OUTPATIENT
Start: 2017-12-10 | End: 2017-12-10

## 2017-12-10 RX ADMIN — RAMELTEON 8 MG: 8 TABLET, FILM COATED ORAL at 09:12

## 2017-12-10 RX ADMIN — Medication 3 ML: at 09:12

## 2017-12-10 RX ADMIN — MONTELUKAST SODIUM 10 MG: 10 TABLET, FILM COATED ORAL at 09:12

## 2017-12-10 RX ADMIN — LOSARTAN POTASSIUM 50 MG: 50 TABLET, FILM COATED ORAL at 09:12

## 2017-12-10 RX ADMIN — FAMOTIDINE 20 MG: 20 TABLET, FILM COATED ORAL at 09:12

## 2017-12-10 RX ADMIN — Medication 100 MG: at 09:12

## 2017-12-10 RX ADMIN — Medication 3 ML: at 04:12

## 2017-12-10 RX ADMIN — ASPIRIN 81 MG: 81 TABLET, COATED ORAL at 08:12

## 2017-12-10 RX ADMIN — CLOPIDOGREL 75 MG: 75 TABLET, FILM COATED ORAL at 08:12

## 2017-12-10 RX ADMIN — METOPROLOL SUCCINATE 25 MG: 25 TABLET, EXTENDED RELEASE ORAL at 08:12

## 2017-12-10 RX ADMIN — ACETAMINOPHEN 650 MG: 325 TABLET ORAL at 12:12

## 2017-12-10 RX ADMIN — ENOXAPARIN SODIUM 40 MG: 100 INJECTION SUBCUTANEOUS at 04:12

## 2017-12-10 RX ADMIN — FAMOTIDINE 20 MG: 20 TABLET, FILM COATED ORAL at 08:12

## 2017-12-10 RX ADMIN — PREDNISONE 50 MG: 20 TABLET ORAL at 05:12

## 2017-12-10 RX ADMIN — VITAMIN D, TAB 1000IU (100/BT) 3000 UNITS: 25 TAB at 08:12

## 2017-12-10 RX ADMIN — AMLODIPINE BESYLATE 5 MG: 5 TABLET ORAL at 08:12

## 2017-12-10 RX ADMIN — Medication 100 MG: at 08:12

## 2017-12-10 RX ADMIN — LEVOTHYROXINE SODIUM 50 MCG: 25 TABLET ORAL at 08:12

## 2017-12-10 RX ADMIN — PRAVASTATIN SODIUM 40 MG: 40 TABLET ORAL at 09:12

## 2017-12-10 RX ADMIN — ISOSORBIDE MONONITRATE 30 MG: 30 TABLET, EXTENDED RELEASE ORAL at 08:12

## 2017-12-10 NOTE — PROGRESS NOTES
[facility] Hospital Medicine  Progress Note    Patient Name: Karma Chen  MRN: [id number]  Patient Class: IP- Inpatient   Admission Date: 12/8/2017  Length of Stay: 2 days  Attending Physician: Td Jones MD  Primary Care Provider: Elena Sullivan MD    [facility] Medicine Team: [facility] ARNEL MED BEREKET Fraire NP    Subjective:     Principal Problem:Unstable angina    HPI:  Ms. 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 She was admitted 12/5 to [facility] with two day history of jaw pain, more noticeable with exertion and somewhat relieved with SL NTG, she underwent angiogram at OSH that demonstrated significant distal left main disease, ostial LCX and OM disease, there is patent SVG to RCA, LIMA to LAD supplying small distal LAD.     She endorses over the past 6 weeks she has had limitations in her day to day activities, felt increasingly fatigued and occasionally short of breath with minimal activity such as walking to the mailbox; as well as two month history of back and shoulder pain she attributed to fibromyalgia pain and was treated with PRN tramadol and muscle relaxants (however these were not helpful). Denies chest discomfort, SOB, diaphoeresis, N/V, pre-syncope. Endorses occasional constipation. States that she is not a diabetic,monitor sugars and controls with diet, has not smoked in over 40 years. Describes her  has been in ill health requiring PCI himself and possible will need ablation for Afib which has caused her to feel anxiety and stress as of late.     The patient was admitted to the Hospital Medicine Service for further evaluation and management.  Minimal outside records available (recent labs only) NO hospital course available other than angiogram  nisha.    Hospital Course:  Ms. Zacarias was admitted 12/8 as a transfer from [facility] for complex PCI. Home medications resumed as appropriate, monitor on tele, 2D echo pending. Evaluated by Interventional Cardiology, plans for monitoring and complex PCI on Monday.     Disposition plans: home post procedure with family care, ?? for home health, outpt follow up with IC, primary cardiology, and PCP.     Interval History: Resting in bed, has no needs or complaints at this time, requesting medication of insomnia tonight as she has some anxiety regarding upcoming procedure. She denies chest pain, palpitations, or shortness of breath. Denies any acute events or distress overnight.     Review of Systems   Constitutional: Positive for activity change and fatigue. Negative for appetite change, chills, diaphoresis, fever and unexpected weight change.   HENT: Negative for congestion, sinus pain, sinus pressure, sneezing, sore throat, trouble swallowing and voice change.    Respiratory: Negative for cough, chest tightness, shortness of breath, wheezing and stridor.    Cardiovascular: Negative for chest pain, palpitations and leg swelling.   Gastrointestinal: Negative for abdominal distention, abdominal pain, constipation, diarrhea, nausea and vomiting.   Genitourinary: Negative for decreased urine volume, difficulty urinating, dysuria, frequency and hematuria.   Musculoskeletal: Positive for arthralgias, back pain and myalgias. Negative for joint swelling.     Objective:     Vital Signs (Most Recent):  Temp: 97 °F (36.1 °C) (12/10/17 1200)  Pulse: 68 (12/10/17 1200)  Resp: 18 (12/10/17 1200)  BP: (!) 106/55 (12/10/17 1200)  SpO2: 96 % (12/10/17 1200) Vital Signs (24h Range):  Temp:  [97 °F (36.1 °C)-97.9 °F (36.6 °C)] 97 °F (36.1 °C)  Pulse:  [68-86] 68  Resp:  [11-19] 18  SpO2:  [91 %-97 %] 96 %  BP: (106-154)/(55-76) 106/55     Weight: 63.5 kg (139 lb 15.9 oz)  Body mass index is 27.34 kg/m².    Intake/Output Summary  (Last 24 hours) at 12/10/17 1406  Last data filed at 12/10/17 0600   Gross per 24 hour   Intake                0 ml   Output             1395 ml   Net            -1395 ml      Physical Exam   Constitutional: She is oriented to person, place, and time. She appears well-developed and well-nourished.   HENT:   Head: Normocephalic and atraumatic.   Mouth/Throat: Mucous membranes are normal. Normal dentition.   Eyes: Conjunctivae and lids are normal. Pupils are equal, round, and reactive to light. No scleral icterus.   Neck: Normal range of motion. Neck supple. No JVD present.   Cardiovascular: Normal rate, regular rhythm, normal heart sounds and intact distal pulses.  Exam reveals no gallop and no friction rub.    No murmur heard.  Pulmonary/Chest: Effort normal and breath sounds normal. She exhibits no tenderness.   Abdominal: Soft. Bowel sounds are normal. She exhibits no distension. There is no tenderness.   Musculoskeletal: Normal range of motion. She exhibits no edema or deformity.   Neurological: She is alert and oriented to person, place, and time. No cranial nerve deficit.   Skin: Skin is warm and dry. No rash noted. No erythema.   Psychiatric: She has a normal mood and affect. Her behavior is normal. Judgment and thought content normal.   Nursing note and vitals reviewed.    Significant Labs:   BMP:   Recent Labs  Lab 12/10/17  0645   GLU 94      K 4.1      CO2 23   BUN 26*   CREATININE 1.0   CALCIUM 8.1*   MG 2.0     CBC:   Recent Labs  Lab 12/08/17 2000 12/09/17  0652 12/10/17  0645   WBC 14.52* 10.49 8.55   HGB 11.5* 11.1* 11.0*   HCT 35.2* 33.8* 33.8*    320 297     Magnesium:   Recent Labs  Lab 12/08/17 2000 12/09/17  0652 12/10/17  0645   MG 2.2 2.0 2.0     All pertinent labs within the past 24 hours have been reviewed.    Significant Imaging: I have reviewed all pertinent imaging results/findings within the past 24 hours.    Assessment/Plan:      * Unstable angina    -angiogram at  OSH that demonstrated significant distal left main disease, ostial LCX and OM disease, there is patent SVG to RCA, LIMA to LAD supplying small distal LAD  -Interventional Cardiology evaluated on arrival, plans for complex PCI on Monday  -continue amlodipine, toprol XL, ASA, statin, plavix, ISMN, and losartan  -2D echo pending  -PRU-responsive   -CBC/Chemistry without significant abnormalities >>> continue to monitor   -goal HR 60's, goal -130's  -monitor on tele, currently A pacing   -EKG and SL NTG PRN chest pain         Coronary artery disease involving left main coronary artery    -see above         Essential hypertension    -BP mostly controlled, monitor for goal of -130's  -continue meds as above        Hyperlipidemia    -lipid panel w/ cholesterol 237, HDL 43, , triglycerides 217  -increase statin dose        Diabetes mellitus type 2, diet-controlled    -diet controlled at home, no antihyperglycemics  -HgA1C 5.9/123  -initiate SSI if appropriate        Acquired hypothyroidism    -continue home levothyroxine  -TSH 2.340        Fibromyalgia    -denies pain currently  -PRN tramadol        Anxiety    -continue home PRN alprazolam  -ramelteon qHS PRN insomnia            VTE Risk Mitigation         Ordered     enoxaparin injection 40 mg  Daily     Route:  Subcutaneous        12/08/17 1652     Place NICHOLE hose  Until discontinued      12/08/17 1652     Medium Risk of VTE  Once      12/08/17 1652        Milagros Fraire NP  Department of Hospital Medicine   [facility]  Pager [phone]  [facility] [id number]

## 2017-12-10 NOTE — PLAN OF CARE
Problem: Patient Care Overview  Goal: Plan of Care Review  Reviewed plan of care with patient.  Patient will be NPO at midnight for coronary angiography.  PreCath (premedication) with prednisone 50 mg will start at 1800 hours today.  NICHOLE Hose and Lovenox 40 mg ordered for medium risk of VTE.  Patient will remain free of fall/trauma/injury by using appropriate lighting, nonskid socks, and by keeping area free of debris.  Patient will call for assistance when needed. Patient and family verbalized understanding of all instructions.

## 2017-12-10 NOTE — PLAN OF CARE
Problem: Patient Care Overview  Goal: Plan of Care Review  Pt remained free of injuries, falls, and trauma. VSS.  Pt c/o lower back pain managed with PRN Tramadol. Pt also received PRN Xanax x1 for anxiety with relief. Pt planned to be NPO 12/11/17 at midnight for coronary angiography. Pre-cath premedication will start at 1800 hours this Sunday. All questions answered. Pt resting quietly in bed voicing no complaints at this time. Will continue to monitor.

## 2017-12-10 NOTE — SUBJECTIVE & OBJECTIVE
Interval History: Resting in bed, has no needs or complaints at this time, requesting medication of insomnia tonight as she has some anxiety regarding upcoming procedure. She denies chest pain, palpitations, or shortness of breath. Denies any acute events or distress overnight.     Review of Systems   Constitutional: Positive for activity change and fatigue. Negative for appetite change, chills, diaphoresis, fever and unexpected weight change.   HENT: Negative for congestion, sinus pain, sinus pressure, sneezing, sore throat, trouble swallowing and voice change.    Respiratory: Negative for cough, chest tightness, shortness of breath, wheezing and stridor.    Cardiovascular: Negative for chest pain, palpitations and leg swelling.   Gastrointestinal: Negative for abdominal distention, abdominal pain, constipation, diarrhea, nausea and vomiting.   Genitourinary: Negative for decreased urine volume, difficulty urinating, dysuria, frequency and hematuria.   Musculoskeletal: Positive for arthralgias, back pain and myalgias. Negative for joint swelling.     Objective:     Vital Signs (Most Recent):  Temp: 97 °F (36.1 °C) (12/10/17 1200)  Pulse: 68 (12/10/17 1200)  Resp: 18 (12/10/17 1200)  BP: (!) 106/55 (12/10/17 1200)  SpO2: 96 % (12/10/17 1200) Vital Signs (24h Range):  Temp:  [97 °F (36.1 °C)-97.9 °F (36.6 °C)] 97 °F (36.1 °C)  Pulse:  [68-86] 68  Resp:  [11-19] 18  SpO2:  [91 %-97 %] 96 %  BP: (106-154)/(55-76) 106/55     Weight: 63.5 kg (139 lb 15.9 oz)  Body mass index is 27.34 kg/m².    Intake/Output Summary (Last 24 hours) at 12/10/17 1406  Last data filed at 12/10/17 0600   Gross per 24 hour   Intake                0 ml   Output             1395 ml   Net            -1395 ml      Physical Exam   Constitutional: She is oriented to person, place, and time. She appears well-developed and well-nourished.   HENT:   Head: Normocephalic and atraumatic.   Mouth/Throat: Mucous membranes are normal. Normal dentition.    Eyes: Conjunctivae and lids are normal. Pupils are equal, round, and reactive to light. No scleral icterus.   Neck: Normal range of motion. Neck supple. No JVD present.   Cardiovascular: Normal rate, regular rhythm, normal heart sounds and intact distal pulses.  Exam reveals no gallop and no friction rub.    No murmur heard.  Pulmonary/Chest: Effort normal and breath sounds normal. She exhibits no tenderness.   Abdominal: Soft. Bowel sounds are normal. She exhibits no distension. There is no tenderness.   Musculoskeletal: Normal range of motion. She exhibits no edema or deformity.   Neurological: She is alert and oriented to person, place, and time. No cranial nerve deficit.   Skin: Skin is warm and dry. No rash noted. No erythema.   Psychiatric: She has a normal mood and affect. Her behavior is normal. Judgment and thought content normal.   Nursing note and vitals reviewed.    Significant Labs:   BMP:   Recent Labs  Lab 12/10/17  0645   GLU 94      K 4.1      CO2 23   BUN 26*   CREATININE 1.0   CALCIUM 8.1*   MG 2.0     CBC:   Recent Labs  Lab 12/08/17 2000 12/09/17 0652 12/10/17  0645   WBC 14.52* 10.49 8.55   HGB 11.5* 11.1* 11.0*   HCT 35.2* 33.8* 33.8*    320 297     Magnesium:   Recent Labs  Lab 12/08/17 2000 12/09/17 0652 12/10/17  0645   MG 2.2 2.0 2.0     All pertinent labs within the past 24 hours have been reviewed.    Significant Imaging: I have reviewed all pertinent imaging results/findings within the past 24 hours.

## 2017-12-11 ENCOUNTER — SURGERY (OUTPATIENT)
Age: 82
End: 2017-12-11

## 2017-12-11 LAB
ABO + RH BLD: NORMAL
ANION GAP SERPL CALC-SCNC: 10 MMOL/L
AORTIC VALVE REGURGITATION: ABNORMAL
BASOPHILS # BLD AUTO: 0.01 K/UL
BASOPHILS NFR BLD: 0.1 %
BLD GP AB SCN CELLS X3 SERPL QL: NORMAL
BUN SERPL-MCNC: 26 MG/DL
CALCIUM SERPL-MCNC: 9 MG/DL
CHLORIDE SERPL-SCNC: 105 MMOL/L
CO2 SERPL-SCNC: 23 MMOL/L
CREAT SERPL-MCNC: 1.2 MG/DL
DIASTOLIC DYSFUNCTION: YES
DIFFERENTIAL METHOD: ABNORMAL
EOSINOPHIL # BLD AUTO: 0 K/UL
EOSINOPHIL NFR BLD: 0 %
ERYTHROCYTE [DISTWIDTH] IN BLOOD BY AUTOMATED COUNT: 13.2 %
EST. GFR  (AFRICAN AMERICAN): 49.3 ML/MIN/1.73 M^2
EST. GFR  (NON AFRICAN AMERICAN): 42.8 ML/MIN/1.73 M^2
ESTIMATED PA SYSTOLIC PRESSURE: 37.11
GLUCOSE SERPL-MCNC: 198 MG/DL
HCT VFR BLD AUTO: 34.9 %
HGB BLD-MCNC: 11.4 G/DL
IMM GRANULOCYTES # BLD AUTO: 0.08 K/UL
IMM GRANULOCYTES NFR BLD AUTO: 1 %
LYMPHOCYTES # BLD AUTO: 1.8 K/UL
LYMPHOCYTES NFR BLD: 23.4 %
MAGNESIUM SERPL-MCNC: 2.2 MG/DL
MCH RBC QN AUTO: 28.4 PG
MCHC RBC AUTO-ENTMCNC: 32.7 G/DL
MCV RBC AUTO: 87 FL
MITRAL VALVE MOBILITY: NORMAL
MONOCYTES # BLD AUTO: 0.1 K/UL
MONOCYTES NFR BLD: 1.3 %
NEUTROPHILS # BLD AUTO: 5.7 K/UL
NEUTROPHILS NFR BLD: 74.2 %
NRBC BLD-RTO: 0 /100 WBC
PLATELET # BLD AUTO: 329 K/UL
PMV BLD AUTO: 10.3 FL
POTASSIUM SERPL-SCNC: 4.5 MMOL/L
RBC # BLD AUTO: 4.01 M/UL
RETIRED EF AND QEF - SEE NOTES: 50 (ref 55–65)
SODIUM SERPL-SCNC: 138 MMOL/L
TRICUSPID VALVE REGURGITATION: ABNORMAL
WBC # BLD AUTO: 7.7 K/UL

## 2017-12-11 PROCEDURE — 0272376 DILATION OF CORONARY ARTERY, THREE ARTERIES, BIFURCATION, WITH FOUR OR MORE DRUG-ELUTING INTRALUMINAL DEVICES, PERCUTANEOUS APPROACH: ICD-10-PCS | Performed by: INTERNAL MEDICINE

## 2017-12-11 PROCEDURE — 86901 BLOOD TYPING SEROLOGIC RH(D): CPT

## 2017-12-11 PROCEDURE — A4216 STERILE WATER/SALINE, 10 ML: HCPCS | Performed by: NURSE PRACTITIONER

## 2017-12-11 PROCEDURE — 25000003 PHARM REV CODE 250: Performed by: NURSE PRACTITIONER

## 2017-12-11 PROCEDURE — 93010 ELECTROCARDIOGRAM REPORT: CPT | Mod: ,,, | Performed by: INTERNAL MEDICINE

## 2017-12-11 PROCEDURE — 25000003 PHARM REV CODE 250: Performed by: STUDENT IN AN ORGANIZED HEALTH CARE EDUCATION/TRAINING PROGRAM

## 2017-12-11 PROCEDURE — 63600175 PHARM REV CODE 636 W HCPCS

## 2017-12-11 PROCEDURE — 86900 BLOOD TYPING SEROLOGIC ABO: CPT

## 2017-12-11 PROCEDURE — 80048 BASIC METABOLIC PNL TOTAL CA: CPT

## 2017-12-11 PROCEDURE — B2111ZZ FLUOROSCOPY OF MULTIPLE CORONARY ARTERIES USING LOW OSMOLAR CONTRAST: ICD-10-PCS | Performed by: INTERNAL MEDICINE

## 2017-12-11 PROCEDURE — 92928 PRQ TCAT PLMT NTRAC ST 1 LES: CPT | Mod: LD,51,, | Performed by: INTERNAL MEDICINE

## 2017-12-11 PROCEDURE — 63600175 PHARM REV CODE 636 W HCPCS: Performed by: STUDENT IN AN ORGANIZED HEALTH CARE EDUCATION/TRAINING PROGRAM

## 2017-12-11 PROCEDURE — 20600001 HC STEP DOWN PRIVATE ROOM

## 2017-12-11 PROCEDURE — 63600175 PHARM REV CODE 636 W HCPCS: Performed by: NURSE PRACTITIONER

## 2017-12-11 PROCEDURE — 83735 ASSAY OF MAGNESIUM: CPT

## 2017-12-11 PROCEDURE — 85025 COMPLETE CBC W/AUTO DIFF WBC: CPT

## 2017-12-11 PROCEDURE — C1887 CATHETER, GUIDING: HCPCS

## 2017-12-11 PROCEDURE — 25000003 PHARM REV CODE 250

## 2017-12-11 PROCEDURE — 36415 COLL VENOUS BLD VENIPUNCTURE: CPT

## 2017-12-11 PROCEDURE — 93005 ELECTROCARDIOGRAM TRACING: CPT

## 2017-12-11 PROCEDURE — 99152 MOD SED SAME PHYS/QHP 5/>YRS: CPT | Mod: ,,, | Performed by: INTERNAL MEDICINE

## 2017-12-11 PROCEDURE — S0077 INJECTION, CLINDAMYCIN PHOSP: HCPCS

## 2017-12-11 PROCEDURE — 99233 SBSQ HOSP IP/OBS HIGH 50: CPT | Mod: ,,, | Performed by: NURSE PRACTITIONER

## 2017-12-11 RX ORDER — PRAVASTATIN SODIUM 40 MG/1
80 TABLET ORAL NIGHTLY
Status: DISCONTINUED | OUTPATIENT
Start: 2017-12-11 | End: 2017-12-12 | Stop reason: HOSPADM

## 2017-12-11 RX ORDER — ACETAMINOPHEN 325 MG/1
650 TABLET ORAL EVERY 4 HOURS PRN
Status: DISCONTINUED | OUTPATIENT
Start: 2017-12-11 | End: 2017-12-12 | Stop reason: HOSPADM

## 2017-12-11 RX ORDER — BACITRACIN 500 [USP'U]/G
OINTMENT TOPICAL 3 TIMES DAILY
Status: DISCONTINUED | OUTPATIENT
Start: 2017-12-11 | End: 2017-12-12 | Stop reason: HOSPADM

## 2017-12-11 RX ORDER — SODIUM CHLORIDE 9 MG/ML
INJECTION, SOLUTION INTRAVENOUS CONTINUOUS
Status: ACTIVE | OUTPATIENT
Start: 2017-12-11 | End: 2017-12-11

## 2017-12-11 RX ADMIN — CLOPIDOGREL 75 MG: 75 TABLET, FILM COATED ORAL at 08:12

## 2017-12-11 RX ADMIN — SODIUM CHLORIDE: 0.9 INJECTION, SOLUTION INTRAVENOUS at 04:12

## 2017-12-11 RX ADMIN — Medication 3 ML: at 02:12

## 2017-12-11 RX ADMIN — LEVOTHYROXINE SODIUM 50 MCG: 25 TABLET ORAL at 08:12

## 2017-12-11 RX ADMIN — VITAMIN D, TAB 1000IU (100/BT) 3000 UNITS: 25 TAB at 08:12

## 2017-12-11 RX ADMIN — PRAVASTATIN SODIUM 80 MG: 40 TABLET ORAL at 09:12

## 2017-12-11 RX ADMIN — METOPROLOL SUCCINATE 25 MG: 25 TABLET, EXTENDED RELEASE ORAL at 08:12

## 2017-12-11 RX ADMIN — BACITRACIN: 500 OINTMENT TOPICAL at 09:12

## 2017-12-11 RX ADMIN — RAMELTEON 8 MG: 8 TABLET, FILM COATED ORAL at 09:12

## 2017-12-11 RX ADMIN — BACITRACIN: 500 OINTMENT TOPICAL at 01:12

## 2017-12-11 RX ADMIN — ACETAMINOPHEN 650 MG: 325 TABLET ORAL at 04:12

## 2017-12-11 RX ADMIN — Medication 100 MG: at 09:12

## 2017-12-11 RX ADMIN — LOSARTAN POTASSIUM 50 MG: 50 TABLET, FILM COATED ORAL at 09:12

## 2017-12-11 RX ADMIN — ENOXAPARIN SODIUM 40 MG: 100 INJECTION SUBCUTANEOUS at 04:12

## 2017-12-11 RX ADMIN — FAMOTIDINE 20 MG: 20 TABLET, FILM COATED ORAL at 08:12

## 2017-12-11 RX ADMIN — AMLODIPINE BESYLATE 5 MG: 5 TABLET ORAL at 08:12

## 2017-12-11 RX ADMIN — Medication 3 ML: at 05:12

## 2017-12-11 RX ADMIN — FAMOTIDINE 20 MG: 20 TABLET, FILM COATED ORAL at 09:12

## 2017-12-11 RX ADMIN — DIPHENHYDRAMINE HYDROCHLORIDE 50 MG: 50 CAPSULE ORAL at 12:12

## 2017-12-11 RX ADMIN — DIPHENHYDRAMINE HYDROCHLORIDE 50 MG: 50 CAPSULE ORAL at 05:12

## 2017-12-11 RX ADMIN — ISOSORBIDE MONONITRATE 30 MG: 30 TABLET, EXTENDED RELEASE ORAL at 08:12

## 2017-12-11 RX ADMIN — PREDNISONE 50 MG: 20 TABLET ORAL at 12:12

## 2017-12-11 RX ADMIN — PREDNISONE 50 MG: 20 TABLET ORAL at 05:12

## 2017-12-11 RX ADMIN — ASPIRIN 81 MG: 81 TABLET, COATED ORAL at 08:12

## 2017-12-11 RX ADMIN — ACETAMINOPHEN 650 MG: 325 TABLET ORAL at 08:12

## 2017-12-11 RX ADMIN — Medication 100 MG: at 08:12

## 2017-12-11 NOTE — SUBJECTIVE & OBJECTIVE
Interval History:     Pre-medicated with prednisone due to drug allergy.      Objective:     Vital Signs (Most Recent):  Temp: 96.6 °F (35.9 °C) (12/11/17 1157)  Pulse: 73 (12/11/17 1157)  Resp: 20 (12/11/17 1157)  BP: 132/63 (12/11/17 1157)  SpO2: (!) 94 % (12/11/17 1157) Vital Signs (24h Range):  Temp:  [96.6 °F (35.9 °C)-98 °F (36.7 °C)] 96.6 °F (35.9 °C)  Pulse:  [68-73] 73  Resp:  [12-20] 20  SpO2:  [93 %-99 %] 94 %  BP: (106-148)/(53-73) 132/63     Weight: 63.5 kg (139 lb 15.9 oz)  Body mass index is 27.34 kg/m².    SpO2: (!) 94 %  O2 Device (Oxygen Therapy): room air      Intake/Output Summary (Last 24 hours) at 12/11/17 1330  Last data filed at 12/11/17 0600   Gross per 24 hour   Intake             1090 ml   Output             1300 ml   Net             -210 ml       Lines/Drains/Airways     Peripheral Intravenous Line                 Peripheral IV - Single Lumen 12/07/17 0800 Left Forearm 4 days         Peripheral IV - Single Lumen 12/07/17 1107 Right Forearm 4 days                Physical Exam   Constitutional: She appears well-developed.   HENT:   Head: Normocephalic and atraumatic.   Eyes: Right eye exhibits no discharge. Left eye exhibits no discharge. No scleral icterus.   Neck: No JVD present.   Cardiovascular: Normal rate, regular rhythm and normal heart sounds.    Pulmonary/Chest: Effort normal and breath sounds normal. No respiratory distress. She has no wheezes. She has no rales.   Musculoskeletal: She exhibits no edema.   Skin: Skin is warm.   Psychiatric: She has a normal mood and affect.       Significant Labs: All pertinent lab results from the last 24 hours have been reviewed.    Significant Imaging:ECho EF 50-55

## 2017-12-11 NOTE — BRIEF OP NOTE
Post Cath Note  Referring Physician: Td Jones MD  Procedure: Angioplasty-coronary (N/A)       Access: Right CFA    LVEDP 15  See full report for further details    Intervention:   Left heart cath   OSMANY to the LM bifurcation (OSMANY x 1 distal LM to LAD and OSMANY x1 distal LM to LCX)  OSMANY to mid LCX  OSMANY to the distal OM1  Closure device: Perclosure    Post Cath Exam:   /63   Pulse 73   Temp 96.6 °F (35.9 °C)   Resp 20   Ht 5' (1.524 m)   Wt 63.5 kg (139 lb 15.9 oz)   LMP  (LMP Unknown)   SpO2 (!) 94%   Breastfeeding? No   BMI 27.34 kg/m²   No unusual pain, hematoma, thrill or bruit at vascular access site.  Distal pulse present without signs of ischemia.    Recommendations:   - Routine post-cath care  - IVF at 150cc/hr for 4 hours   -C/w ASA and Plavix for atleast one year   -Stay inpatient for tonight   -Anticipate Discharge tomorrow    Madelyn Scott DO  Cardiology Fellow

## 2017-12-11 NOTE — PROGRESS NOTES
[facility] Hospital Medicine  Progress Note    Patient Name: Karma Chen  MRN: [id number]  Patient Class: IP- Inpatient   Admission Date: 12/8/2017  Length of Stay: 3 days  Attending Physician: Td Jones MD  Primary Care Provider: Elena Sullivan MD    [facility] Medicine Team: [facility] ARNEL MED BEREKET Fraire NP    Subjective:     Principal Problem:Unstable angina  HPI:  Ms. 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 She was admitted 12/5 to [facility] with two day history of jaw pain, more noticeable with exertion and somewhat relieved with SL NTG, she underwent angiogram at OSH that demonstrated significant distal left main disease, ostial LCX and OM disease, there is patent SVG to RCA, LIMA to LAD supplying small distal LAD.     She endorses over the past 6 weeks she has had limitations in her day to day activities, felt increasingly fatigued and occasionally short of breath with minimal activity such as walking to the mailbox; as well as two month history of back and shoulder pain she attributed to fibromyalgia pain and was treated with PRN tramadol and muscle relaxants (however these were not helpful). Denies chest discomfort, SOB, diaphoeresis, N/V, pre-syncope. Endorses occasional constipation. States that she is not a diabetic,monitor sugars and controls with diet, has not smoked in over 40 years. Describes her  has been in ill health requiring PCI himself and possible will need ablation for Afib which has caused her to feel anxiety and stress as of late.     The patient was admitted to the Hospital Medicine Service for further evaluation and management.  Minimal outside records available (recent labs only) NO hospital course available other than angiogram  nisha.    Hospital Course:  Ms. Zacarias was admitted 12/8 as a transfer from [facility] for complex PCI. Home medications resumed as appropriate, monitor on tele, 2D echo w/ EF 50-55%, WMAs, DD, severe L atrial enlargement, mild to moderate TR, and mild AR. Evaluated by Interventional Cardiology, underwent LHC 12/11 with OSMANY to the LM bifurcation (OMSANY x 1 distal LM to LAD and OSMANY x1 distal LM to LCX),   OSMANY to mid LCX,   OSMANY to the distal OM, no need for impella support. Will monitor overnight, potential DC in AM.     Disposition plans: Home post procedure with family care (likely in AM), outpt follow up with IC, primary cardiology, and PCP.      Interval History: Resting in bed, awaiting LHC, denies needs or complaints at this time, denies chest pain, palpitations, or shortness of breath. Denies any acute events or distress overnight.     Review of Systems   Constitutional: Positive for activity change and fatigue. Negative for appetite change, chills, diaphoresis, fever and unexpected weight change.   HENT: Negative for congestion.    Respiratory: Negative for cough, chest tightness, shortness of breath, wheezing and stridor.    Cardiovascular: Negative for chest pain, palpitations and leg swelling.   Gastrointestinal: Negative for abdominal distention, abdominal pain, constipation, diarrhea, nausea and vomiting.   Musculoskeletal: Positive for arthralgias, back pain and myalgias. Negative for joint swelling.     Objective:     Vital Signs (Most Recent):  Temp: 96.6 °F (35.9 °C) (12/11/17 1157)  Pulse: 73 (12/11/17 1157)  Resp: 20 (12/11/17 1157)  BP: 132/63 (12/11/17 1157)  SpO2: (!) 94 % (12/11/17 1157) Vital Signs (24h Range):  Temp:  [96.6 °F (35.9 °C)-98 °F (36.7 °C)] 96.6 °F (35.9 °C)  Pulse:  [69-73] 73  Resp:  [12-20] 20  SpO2:  [94 %-99 %] 94 %  BP: (106-135)/(53-67) 132/63     Weight: 63.5 kg (139 lb 15.9 oz)  Body mass index is 27.34 kg/m².    Intake/Output Summary (Last 24 hours) at 12/11/17  1633  Last data filed at 12/11/17 1400   Gross per 24 hour   Intake              840 ml   Output             1400 ml   Net             -560 ml      Physical Exam   Constitutional: She is oriented to person, place, and time. She appears well-developed and well-nourished.   HENT:   Head: Normocephalic and atraumatic.   Mouth/Throat: Mucous membranes are normal. Normal dentition.   Eyes: Conjunctivae and lids are normal. Pupils are equal, round, and reactive to light. No scleral icterus.   Neck: Normal range of motion. Neck supple. No JVD present.   Cardiovascular: Normal rate, regular rhythm, normal heart sounds and intact distal pulses.  Exam reveals no gallop and no friction rub.    No murmur heard.  Pulmonary/Chest: Effort normal and breath sounds normal. She exhibits no tenderness.   Abdominal: Soft. Bowel sounds are normal. She exhibits no distension. There is no tenderness.   Musculoskeletal: Normal range of motion. She exhibits no edema or deformity.   Neurological: She is alert and oriented to person, place, and time. No cranial nerve deficit.   Skin: Skin is warm and dry. No rash noted. No erythema.   small skin tear to left chest wall, appears due to tele pad, scabbed, no S/S of infection.    Psychiatric: She has a normal mood and affect. Her behavior is normal. Judgment and thought content normal.   Nursing note and vitals reviewed.    Significant Labs:   CBC:   Recent Labs  Lab 12/10/17  0645 12/11/17  0639   WBC 8.55 7.70   HGB 11.0* 11.4*   HCT 33.8* 34.9*    329     CMP:   Recent Labs  Lab 12/10/17  0645 12/11/17  0639    138   K 4.1 4.5    105   CO2 23 23   GLU 94 198*   BUN 26* 26*   CREATININE 1.0 1.2   CALCIUM 8.1* 9.0   ANIONGAP 9 10   EGFRNONAA 53.3* 42.8*     Magnesium:   Recent Labs  Lab 12/10/17  0645 12/11/17  0639   MG 2.0 2.2     All pertinent labs within the past 24 hours have been reviewed.    Significant Imaging: I have reviewed all pertinent imaging results/findings  within the past 24 hours.    Assessment/Plan:      * Unstable angina    -angiogram at OSH that demonstrated significant distal left main disease, ostial LCX and OM disease, there is patent SVG to RCA, LIMA to LAD supplying small distal LAD  -Interventional Cardiology evaluated on arrival, [facility] (pre cath prep completed) 12/11 with OSMANY to the LM bifurcation (OSMANY x 1 distal LM to LAD and OSMANY x1 distal LM to LCX),   OSMANY to mid LCX,   OSMANY to the distal OM1, no need for impella support   -monitor overnight, DC in AM if stable   -continue amlodipine, toprol XL, ASA, statin, plavix, ISMN, and losartan  -2D echo as above   -PRU-responsive   -CBC/Chemistry without significant abnormalities >>> continue to monitor   -goal HR 60's, goal -130's  -monitor on tele, currently A pacing   -EKG and SL NTG PRN chest pain         Coronary artery disease involving left main coronary artery    -see above         Essential hypertension    -BP mostly controlled, monitor for goal of -130's  -continue meds as above        Hyperlipidemia    -lipid panel w/ cholesterol 237, HDL 43, , triglycerides 217  -transitioned to high dose statin        Diabetes mellitus type 2, diet-controlled    -diet controlled at home, no antihyperglycemics  -HgA1C 5.9/123  -initiate SSI if appropriate >>> thus far stable         Acquired hypothyroidism    -continue home levothyroxine  -TSH 2.340        Fibromyalgia    -denies pain currently  -PRN tramadol        Anxiety    -continue home PRN alprazolam  -ramelteon qHS PRN insomnia            VTE Risk Mitigation         Ordered     enoxaparin injection 40 mg  Daily     Route:  Subcutaneous        12/08/17 1652     Place NICHOLE hose  Until discontinued      12/08/17 1652     Medium Risk of VTE  Once      12/08/17 1652        Milagros Fraire NP  Department of Hospital Medicine   [facility]  Pager [phone]  [id number] [id number]

## 2017-12-11 NOTE — NURSING
Patient is alert and oriented x 4.  Spouse is at bedside and will remain overnight for duration of admission.  Patient denies chest pain and SOB.  Patient will be NPO at midnight.  Administered 50 mg prednisone for premedication regiment for coronary angiograph.

## 2017-12-11 NOTE — ASSESSMENT & PLAN NOTE
81 y/o F with multivessel CAD,  here with stable angina, OSH angiogram shows CABG with patent grafts SVG to RCA and LIMA to LAD, left main disease as well as ostial LCX and Om1.         1. Cardiac catheterization with probable PCI.   2. Antiplatelets: ASA + plavix   3. Access: Possible right femoral   4. Catheters: JL4 and JR4   5. Pt is a OSMANY candidate and understands the importance of taking plavix for at least one year, understands that in case of receiving a drug coated stent the failure to comply with dual anti-platelet therapy is likely to result in stent clothing, heart attack and death.   6. The risks, benefits, and alternatives of coronary vascular angiography and possible intervention were discussed with the patient. All questions were answered and informed consent was obtained. I had a detailed discussion with the patient regarding risk of stroke, MI, bleeding access site complications including limb loss, allergy, kidney failure including dialysis and death.  7. The patient understands the risks and benefits and wishes to go ahead with the procedure.  8. All patient's questions were answered     Madelyn Scott DO  Cardiology Fellow

## 2017-12-11 NOTE — ASSESSMENT & PLAN NOTE
-angiogram at OSH that demonstrated significant distal left main disease, ostial LCX and OM disease, there is patent SVG to RCA, LIMA to LAD supplying small distal LAD  -Interventional Cardiology evaluated on arrival, [facility] (pre cath prep completed) 12/11 with OSMANY to the LM bifurcation (OSMANY x 1 distal LM to LAD and OSMANY x1 distal LM to LCX),   OSMANY to mid LCX,   OSMANY to the distal OM1, no need for impella support   -monitored overnight, stable for discharge   -continue amlodipine, toprol XL, ASA, statin, plavix, ISMN, and losartan  -2D echo as above   -PRU-responsive   -CBC/Chemistry without significant abnormalities >>> continue to monitor   -goal HR 60's, goal -130's  -monitor on tele, currently A pacing   -EKG and SL NTG PRN chest pain

## 2017-12-11 NOTE — PLAN OF CARE
Problem: Patient Care Overview  Goal: Plan of Care Review  Pt remained free of injuries, falls, and trauma. VSS.  Pt denies any pain. Pt is NPO after midnight.  Pre-cath premedication will started. Pt will receive remaining two doses. All questions answered. No complaints or distress noted.  Pt resting quietly in bed. Will continue to monitor.

## 2017-12-11 NOTE — PROGRESS NOTES
Pt returned to room from cath lab. VSS. AAOx4. R groin site CDI, site is soft, no signs of hematoma. Side rail upx2, call light within reach,bed in lowest position, bed flat and pt supine. Will continue to monitor pt.

## 2017-12-11 NOTE — ASSESSMENT & PLAN NOTE
-angiogram at OSH that demonstrated significant distal left main disease, ostial LCX and OM disease, there is patent SVG to RCA, LIMA to LAD supplying small distal LAD  -Interventional Cardiology evaluated on arrival, PCI 12/11 with OSMANY to the LM bifurcation (OSMANY x 1 distal LM to LAD and OSMANY x1 distal LM to LCX),   OSMANY to mid LCX,   OSMANY to the distal OM1, no need for impella support  -monitor overnight, DC in AM if stable   -continue amlodipine, toprol XL, ASA, statin, plavix, ISMN, and losartan  -2D echo as above   -PRU-responsive   -CBC/Chemistry without significant abnormalities >>> continue to monitor   -goal HR 60's, goal -130's  -monitor on tele, currently A pacing   -EKG and SL NTG PRN chest pain

## 2017-12-11 NOTE — PROGRESS NOTES
Pt taken to cath lab. VSS. AAOx4. All ordered premeds administered. Left per stretcher with transport.

## 2017-12-11 NOTE — PROGRESS NOTES
[facility]  Interventional Cardiology  Progress Note    Patient Name: Karma Chen  MRN: [id number]  Admission Date: 12/8/2017  Hospital Length of Stay: 3 days  Code Status: Full Code   Attending Physician: Td Jones MD   Primary Care Physician: Elena Sullivan MD  Principal Problem:Unstable angina    Subjective:     Interval History:     Pre-medicated with prednisone due to drug allergy.      Objective:     Vital Signs (Most Recent):  Temp: 96.6 °F (35.9 °C) (12/11/17 1157)  Pulse: 73 (12/11/17 1157)  Resp: 20 (12/11/17 1157)  BP: 132/63 (12/11/17 1157)  SpO2: (!) 94 % (12/11/17 1157) Vital Signs (24h Range):  Temp:  [96.6 °F (35.9 °C)-98 °F (36.7 °C)] 96.6 °F (35.9 °C)  Pulse:  [68-73] 73  Resp:  [12-20] 20  SpO2:  [93 %-99 %] 94 %  BP: (106-148)/(53-73) 132/63     Weight: 63.5 kg (139 lb 15.9 oz)  Body mass index is 27.34 kg/m².    SpO2: (!) 94 %  O2 Device (Oxygen Therapy): room air      Intake/Output Summary (Last 24 hours) at 12/11/17 1330  Last data filed at 12/11/17 0600   Gross per 24 hour   Intake             1090 ml   Output             1300 ml   Net             -210 ml       Lines/Drains/Airways     Peripheral Intravenous Line                 Peripheral IV - Single Lumen 12/07/17 0800 Left Forearm 4 days         Peripheral IV - Single Lumen 12/07/17 1107 Right Forearm 4 days                Physical Exam   Constitutional: She appears well-developed.   HENT:   Head: Normocephalic and atraumatic.   Eyes: Right eye exhibits no discharge. Left eye exhibits no discharge. No scleral icterus.   Neck: No JVD present.   Cardiovascular: Normal rate, regular rhythm and normal heart sounds.    Pulmonary/Chest: Effort normal and breath sounds normal. No respiratory distress. She has no wheezes. She has no rales.   Musculoskeletal: She exhibits no edema.   Skin: Skin is warm.   Psychiatric: She has a normal mood and affect.       Significant Labs: All pertinent lab results from the last 24  hours have been reviewed.    Significant Imaging:ECho EF 50-55      Assessment and Plan:     Patient is a 82 y.o. female presenting with:    Coronary artery disease involving left main coronary artery    83 y/o F with multivessel CAD,  here with stable angina, OSH angiogram shows CABG with patent grafts SVG to RCA and LIMA to LAD, left main disease as well as ostial LCX and Om1.         1. Cardiac catheterization with probable PCI.   2. Antiplatelets: ASA + plavix   3. Access: Possible right femoral   4. Catheters: JL4 and JR4   5. Pt is a OSMANY candidate and understands the importance of taking plavix for at least one year, understands that in case of receiving a drug coated stent the failure to comply with dual anti-platelet therapy is likely to result in stent clothing, heart attack and death.   6. The risks, benefits, and alternatives of coronary vascular angiography and possible intervention were discussed with the patient. All questions were answered and informed consent was obtained. I had a detailed discussion with the patient regarding risk of stroke, MI, bleeding access site complications including limb loss, allergy, kidney failure including dialysis and death.  7. The patient understands the risks and benefits and wishes to go ahead with the procedure.  8. All patient's questions were answered     Madelyn Scott DO  Cardiology Fellow             VTE Risk Mitigation         Ordered     enoxaparin injection 40 mg  Daily     Route:  Subcutaneous        12/08/17 1652     Place NICHOLE hose  Until discontinued      12/08/17 1652     Medium Risk of VTE  Once      12/08/17 1652          Madelyn Scott MD  Interventional Cardiology  [facility]

## 2017-12-11 NOTE — SUBJECTIVE & OBJECTIVE
Interval History: Resting in bed, awaiting [facility], denies needs or complaints at this time, denies chest pain, palpitations, or shortness of breath. Denies any acute events or distress overnight.     Review of Systems   Constitutional: Positive for activity change and fatigue. Negative for appetite change, chills, diaphoresis, fever and unexpected weight change.   HENT: Negative for congestion.    Respiratory: Negative for cough, chest tightness, shortness of breath, wheezing and stridor.    Cardiovascular: Negative for chest pain, palpitations and leg swelling.   Gastrointestinal: Negative for abdominal distention, abdominal pain, constipation, diarrhea, nausea and vomiting.   Musculoskeletal: Positive for arthralgias, back pain and myalgias. Negative for joint swelling.     Objective:     Vital Signs (Most Recent):  Temp: 96.6 °F (35.9 °C) (12/11/17 1157)  Pulse: 73 (12/11/17 1157)  Resp: 20 (12/11/17 1157)  BP: 132/63 (12/11/17 1157)  SpO2: (!) 94 % (12/11/17 1157) Vital Signs (24h Range):  Temp:  [96.6 °F (35.9 °C)-98 °F (36.7 °C)] 96.6 °F (35.9 °C)  Pulse:  [69-73] 73  Resp:  [12-20] 20  SpO2:  [94 %-99 %] 94 %  BP: (106-135)/(53-67) 132/63     Weight: 63.5 kg (139 lb 15.9 oz)  Body mass index is 27.34 kg/m².    Intake/Output Summary (Last 24 hours) at 12/11/17 1633  Last data filed at 12/11/17 1400   Gross per 24 hour   Intake              840 ml   Output             1400 ml   Net             -560 ml      Physical Exam   Constitutional: She is oriented to person, place, and time. She appears well-developed and well-nourished.   HENT:   Head: Normocephalic and atraumatic.   Mouth/Throat: Mucous membranes are normal. Normal dentition.   Eyes: Conjunctivae and lids are normal. Pupils are equal, round, and reactive to light. No scleral icterus.   Neck: Normal range of motion. Neck supple. No JVD present.   Cardiovascular: Normal rate, regular rhythm, normal heart sounds and intact distal pulses.  Exam reveals no  gallop and no friction rub.    No murmur heard.  Pulmonary/Chest: Effort normal and breath sounds normal. She exhibits no tenderness.   Abdominal: Soft. Bowel sounds are normal. She exhibits no distension. There is no tenderness.   Musculoskeletal: Normal range of motion. She exhibits no edema or deformity.   Neurological: She is alert and oriented to person, place, and time. No cranial nerve deficit.   Skin: Skin is warm and dry. No rash noted. No erythema.   small skin tear to left chest wall, appears due to tele pad, scabbed, no S/S of infection.    Psychiatric: She has a normal mood and affect. Her behavior is normal. Judgment and thought content normal.   Nursing note and vitals reviewed.    Significant Labs:   CBC:   Recent Labs  Lab 12/10/17  0645 12/11/17  0639   WBC 8.55 7.70   HGB 11.0* 11.4*   HCT 33.8* 34.9*    329     CMP:   Recent Labs  Lab 12/10/17  0645 12/11/17  0639    138   K 4.1 4.5    105   CO2 23 23   GLU 94 198*   BUN 26* 26*   CREATININE 1.0 1.2   CALCIUM 8.1* 9.0   ANIONGAP 9 10   EGFRNONAA 53.3* 42.8*     Magnesium:   Recent Labs  Lab 12/10/17  0645 12/11/17  0639   MG 2.0 2.2     All pertinent labs within the past 24 hours have been reviewed.    Significant Imaging: I have reviewed all pertinent imaging results/findings within the past 24 hours.

## 2017-12-11 NOTE — ASSESSMENT & PLAN NOTE
-diet controlled at home, no antihyperglycemics  -HgA1C 5.9/123  -initiate SSI if appropriate >>> thus far stable

## 2017-12-11 NOTE — PLAN OF CARE
12/11/17 1007   Discharge Assessment   Assessment Type Discharge Planning Assessment   Confirmed/corrected address and phone number on facesheet? Yes   Assessment information obtained from? Patient;Medical Record   Expected Length of Stay (days) 4   Communicated expected length of stay with patient/caregiver yes   Prior to hospitilization cognitive status: Alert/Oriented   Prior to hospitalization functional status: Independent;Assistive Equipment   Current cognitive status: Alert/Oriented   Current Functional Status: Independent;Assistive Equipment   Lives With spouse   Able to Return to Prior Arrangements yes   Is patient able to care for self after discharge? Yes   Patient's perception of discharge disposition home or selfcare   Readmission Within The Last 30 Days no previous admission in last 30 days   Patient currently being followed by outpatient case management? No   Patient currently receives home health services? No   Patient currently receives any other outside agency services? No   Equipment Currently Used at Home rollator;bath bench   Do you have any problems affording any of your prescribed medications? No   Is the patient taking medications as prescribed? yes   Does the patient have transportation home? Yes   Transportation Available family or friend will provide  ()   Does the patient receive services at the [facility]? No   Discharge Plan A Home with family   Patient/Family In Agreement With Plan yes   [facility] Lives with  and is independent in her ADLs. Plan is to DC home. No DC needs identified.

## 2017-12-12 VITALS
HEIGHT: 60 IN | RESPIRATION RATE: 14 BRPM | OXYGEN SATURATION: 95 % | HEART RATE: 70 BPM | WEIGHT: 140 LBS | TEMPERATURE: 98 F | BODY MASS INDEX: 27.48 KG/M2 | SYSTOLIC BLOOD PRESSURE: 114 MMHG | DIASTOLIC BLOOD PRESSURE: 61 MMHG

## 2017-12-12 LAB
ANION GAP SERPL CALC-SCNC: 6 MMOL/L
BASOPHILS # BLD AUTO: 0.02 K/UL
BASOPHILS NFR BLD: 0.2 %
BUN SERPL-MCNC: 24 MG/DL
CALCIUM SERPL-MCNC: 8.7 MG/DL
CHLORIDE SERPL-SCNC: 108 MMOL/L
CO2 SERPL-SCNC: 26 MMOL/L
CREAT SERPL-MCNC: 1 MG/DL
DIFFERENTIAL METHOD: ABNORMAL
EOSINOPHIL # BLD AUTO: 0 K/UL
EOSINOPHIL NFR BLD: 0 %
ERYTHROCYTE [DISTWIDTH] IN BLOOD BY AUTOMATED COUNT: 13.3 %
EST. GFR  (AFRICAN AMERICAN): >60 ML/MIN/1.73 M^2
EST. GFR  (NON AFRICAN AMERICAN): 53.3 ML/MIN/1.73 M^2
GLUCOSE SERPL-MCNC: 122 MG/DL
HCT VFR BLD AUTO: 31.3 %
HGB BLD-MCNC: 10.2 G/DL
IMM GRANULOCYTES # BLD AUTO: 0.12 K/UL
IMM GRANULOCYTES NFR BLD AUTO: 0.9 %
LYMPHOCYTES # BLD AUTO: 2.1 K/UL
LYMPHOCYTES NFR BLD: 16.3 %
MAGNESIUM SERPL-MCNC: 2.3 MG/DL
MCH RBC QN AUTO: 28.6 PG
MCHC RBC AUTO-ENTMCNC: 32.6 G/DL
MCV RBC AUTO: 88 FL
MONOCYTES # BLD AUTO: 1.2 K/UL
MONOCYTES NFR BLD: 9.1 %
NEUTROPHILS # BLD AUTO: 9.5 K/UL
NEUTROPHILS NFR BLD: 73.5 %
NRBC BLD-RTO: 0 /100 WBC
PLATELET # BLD AUTO: 311 K/UL
PMV BLD AUTO: 10.3 FL
POC ACTIVATED CLOTTING TIME K: 252 SEC (ref 74–137)
POC ACTIVATED CLOTTING TIME K: 318 SEC (ref 74–137)
POTASSIUM SERPL-SCNC: 4.1 MMOL/L
RBC # BLD AUTO: 3.57 M/UL
SAMPLE: ABNORMAL
SAMPLE: ABNORMAL
SODIUM SERPL-SCNC: 140 MMOL/L
WBC # BLD AUTO: 12.93 K/UL

## 2017-12-12 PROCEDURE — 25000003 PHARM REV CODE 250: Performed by: INTERNAL MEDICINE

## 2017-12-12 PROCEDURE — 25000003 PHARM REV CODE 250: Performed by: NURSE PRACTITIONER

## 2017-12-12 PROCEDURE — A4216 STERILE WATER/SALINE, 10 ML: HCPCS | Performed by: NURSE PRACTITIONER

## 2017-12-12 PROCEDURE — 99239 HOSP IP/OBS DSCHRG MGMT >30: CPT | Mod: ,,, | Performed by: NURSE PRACTITIONER

## 2017-12-12 PROCEDURE — 85025 COMPLETE CBC W/AUTO DIFF WBC: CPT

## 2017-12-12 PROCEDURE — 36415 COLL VENOUS BLD VENIPUNCTURE: CPT

## 2017-12-12 PROCEDURE — 80048 BASIC METABOLIC PNL TOTAL CA: CPT

## 2017-12-12 PROCEDURE — 99232 SBSQ HOSP IP/OBS MODERATE 35: CPT | Mod: GC,,, | Performed by: INTERNAL MEDICINE

## 2017-12-12 PROCEDURE — 83735 ASSAY OF MAGNESIUM: CPT

## 2017-12-12 RX ORDER — BACITRACIN 500 [USP'U]/G
OINTMENT TOPICAL 3 TIMES DAILY
Refills: 0 | COMMUNITY
Start: 2017-12-12 | End: 2018-08-01

## 2017-12-12 RX ORDER — METOPROLOL SUCCINATE 25 MG/1
25 TABLET, EXTENDED RELEASE ORAL DAILY
Qty: 90 TABLET | Refills: 0 | Status: SHIPPED | OUTPATIENT
Start: 2017-12-12 | End: 2018-01-03 | Stop reason: SDUPTHER

## 2017-12-12 RX ORDER — EPINEPHRINE 0.22MG
100 AEROSOL WITH ADAPTER (ML) INHALATION 2 TIMES DAILY
Qty: 60 CAPSULE | Refills: 11 | COMMUNITY
Start: 2017-12-12 | End: 2026-02-18

## 2017-12-12 RX ORDER — ISOSORBIDE MONONITRATE 30 MG/1
30 TABLET, EXTENDED RELEASE ORAL DAILY
Qty: 90 TABLET | Refills: 0 | Status: SHIPPED | OUTPATIENT
Start: 2017-12-12 | End: 2018-11-13 | Stop reason: SDUPTHER

## 2017-12-12 RX ORDER — LIDOCAINE HCL/EPINEPHRINE/PF 2%-1:200K
5 VIAL (ML) INJECTION ONCE
Status: COMPLETED | OUTPATIENT
Start: 2017-12-12 | End: 2017-12-12

## 2017-12-12 RX ORDER — ASPIRIN 81 MG/1
81 TABLET ORAL DAILY
Refills: 0 | COMMUNITY
Start: 2017-12-12 | End: 2026-02-18

## 2017-12-12 RX ADMIN — TRAMADOL HYDROCHLORIDE 50 MG: 50 TABLET, FILM COATED ORAL at 09:12

## 2017-12-12 RX ADMIN — LIDOCAINE HYDROCHLORIDE,EPINEPHRINE BITARTRATE 5 ML: 20; .005 INJECTION, SOLUTION EPIDURAL; INFILTRATION; INTRACAUDAL; PERINEURAL at 06:12

## 2017-12-12 RX ADMIN — BACITRACIN: 500 OINTMENT TOPICAL at 01:12

## 2017-12-12 RX ADMIN — Medication 3 ML: at 02:12

## 2017-12-12 RX ADMIN — CLOPIDOGREL 75 MG: 75 TABLET, FILM COATED ORAL at 10:12

## 2017-12-12 RX ADMIN — BACITRACIN: 500 OINTMENT TOPICAL at 06:12

## 2017-12-12 RX ADMIN — Medication 100 MG: at 10:12

## 2017-12-12 RX ADMIN — ISOSORBIDE MONONITRATE 30 MG: 30 TABLET, EXTENDED RELEASE ORAL at 10:12

## 2017-12-12 RX ADMIN — FAMOTIDINE 20 MG: 20 TABLET, FILM COATED ORAL at 10:12

## 2017-12-12 RX ADMIN — VITAMIN D, TAB 1000IU (100/BT) 3000 UNITS: 25 TAB at 10:12

## 2017-12-12 RX ADMIN — LEVOTHYROXINE SODIUM 50 MCG: 25 TABLET ORAL at 10:12

## 2017-12-12 RX ADMIN — AMLODIPINE BESYLATE 5 MG: 5 TABLET ORAL at 10:12

## 2017-12-12 RX ADMIN — Medication 3 ML: at 06:12

## 2017-12-12 RX ADMIN — ASPIRIN 81 MG: 81 TABLET, COATED ORAL at 10:12

## 2017-12-12 RX ADMIN — METOPROLOL SUCCINATE 25 MG: 25 TABLET, EXTENDED RELEASE ORAL at 10:12

## 2017-12-12 RX ADMIN — STANDARDIZED SENNA CONCENTRATE AND DOCUSATE SODIUM 1 TABLET: 8.6; 5 TABLET, FILM COATED ORAL at 10:12

## 2017-12-12 NOTE — PLAN OF CARE
Problem: Patient Care Overview  Goal: Plan of Care Review  Outcome: Ongoing (interventions implemented as appropriate)  S/P LHC; 4 stents placed. R groin site bled this shift (MD aware; see other note) VSS. O2 sats stable on RA. Pt denies CP, SOB, palpitations, lightheadedness and dizziness. Fall precautions maintained this shift, pt remains free from falls, trauma and injury. POC reviewed with pt, verbalized understanding. NADN. Will continue to monitor.

## 2017-12-12 NOTE — CONSULTS
"Cardiac Rehab     Karma Chen   [id number]   12/12/2017       Activity taught: Yes    Cardiac Rehab Phase Taught: Phase I & II    Risk Factors-Modifiable: diabetes, nutrition, hyperlipidemia, hypertension, obesity, sedentary lifestyle, stress, exercise    Risk Factors-Non modifiable: age    Teaching Method: Verbal, Written and Living with Heart Disease book.    Understanding/Response: Pt verbalized understanding, all questions answered. Pt understands their cardiac rehab order is good for 12 months.   Pt stated her  is in Cardiac Rehab at [place] and would like to attend the same program. Order printed and given to pt to attend Cardiac Rehab at [facility]. Contact information for the facility given as well. Pt verbalized understanding.     Comments: S/P PTCA/NSTEMI. Discussed cardiac rehab and risk factor modification. Team to refer patient to [facility] Cardiac Rehab. Educational materials were used in the process and given to the patient. They included "Your Guide to Living with Heart Disease", Phase Two Cardiac Rehabilitation information along with a sample Mediterranean diet.The patient expressed understanding of the teaching and expressed desire to take a role in modifying the risk factors when they return home.    NEY Carmona RN  Cardiac Rehab Nurse      "

## 2017-12-12 NOTE — PLAN OF CARE
Problem: Patient Care Overview  Goal: Individualization & Mutuality  Plan of care discussed with patient. Patient is free of fall/trauma/injury. Denies CP, SOB. Complains of HA and shoulder pain, prn medication administered. VSS. R groin site CDI, no sign of hematoma. AAOx4. NS infusing at prescribed rate post-cath. All questions addressed. Will continue to monitor.

## 2017-12-12 NOTE — PROGRESS NOTES
[facility]  Interventional Cardiology  Progress Note    Patient Name: Karma Chen  MRN: [id number]  Admission Date: 12/8/2017  Hospital Length of Stay: 4 days  Code Status: Full Code   Attending Physician: Audelia Torres MD   Primary Care Physician: Elena Sullivan MD  Principal Problem:Unstable angina    Subjective:     Interval History:     Overnight CFA site oozing. 30 minutes of manual pressure was applied. No hematoma noted overnight.  This morning dressing with blood, no active oozing per documentation.  Lidocaine+epinephrine (1:200,000) 2% used subcutaneously.    On my exam ddressing is clean, no hematoma noted and Dp pulse 2+ b/l.        Objective:     Vital Signs (Most Recent):  Temp: 97.5 °F (36.4 °C) (12/11/17 1948)  Pulse: 70 (12/12/17 0800)  Resp: 14 (12/12/17 0800)  BP: 138/74 (12/12/17 0800)  SpO2: (!) 94 % (12/12/17 0800) Vital Signs (24h Range):  Temp:  [96.6 °F (35.9 °C)-97.5 °F (36.4 °C)] 97.5 °F (36.4 °C)  Pulse:  [69-76] 70  Resp:  [11-20] 14  SpO2:  [93 %-99 %] 94 %  BP: (102-138)/(53-79) 138/74     Weight: 63.5 kg (139 lb 15.9 oz)  Body mass index is 27.34 kg/m².    SpO2: (!) 94 %  O2 Device (Oxygen Therapy): room air      Intake/Output Summary (Last 24 hours) at 12/12/17 0850  Last data filed at 12/12/17 0400   Gross per 24 hour   Intake              630 ml   Output             1000 ml   Net             -370 ml       Lines/Drains/Airways     Peripheral Intravenous Line                 Peripheral IV - Single Lumen 12/07/17 0800 Left Forearm 5 days         Peripheral IV - Single Lumen 12/07/17 1107 Right Forearm 4 days                Physical Exam   Constitutional: She is oriented to person, place, and time. She appears well-developed and well-nourished. No distress.   HENT:   Head: Normocephalic and atraumatic.   Neck: No JVD present.   Cardiovascular: Normal rate, regular rhythm and normal heart sounds.    Pulses:       Dorsalis pedis pulses are 2+ on the right side, and  2+ on the left side.   Pulmonary/Chest: Effort normal and breath sounds normal.   Neurological: She is alert and oriented to person, place, and time.   Skin: Skin is warm.   Right groin, no hematoma  Dressing clean        Significant Labs: All pertinent lab results from the last 24 hours have been reviewed.        Assessment and Plan:     Patient is a 82 y.o. female presenting with:    Coronary artery disease involving left main coronary artery    83 y/o F with multivessel CAD,  here with stable angina, OSH angiogram shows CABG with patent grafts SVG to RCA and LIMA to LAD, left main disease as well as ostial LCX,  and Om1. . Angiogram yesterday demonstrated significant distal Lm, ostial LAD, and ostial LCX disease.  S/p PCI OSMANY to the LM bifurcation (OSMANY x 1 distal LM to LAD and OSMANY x1 distal LM to LCX), OSMANY to mid LCX  OSMANY to the distal LCX.    -Continue DAPT for atleast one year   -Agree with beta blocker, nitrates and statin   - okay for discharge from interventional cardiology stand point   -follow up with outpatient cardiologist                      VTE Risk Mitigation         Ordered     enoxaparin injection 40 mg  Daily     Route:  Subcutaneous        12/08/17 1652     Place NICHOLE hose  Until discontinued      12/08/17 1652     Medium Risk of VTE  Once      12/08/17 1652          Madelyn Scott MD  Interventional Cardiology  [facility]

## 2017-12-12 NOTE — ASSESSMENT & PLAN NOTE
83 y/o F with multivessel CAD,  here with stable angina, OSH angiogram shows CABG with patent grafts SVG to RCA and LIMA to LAD, left main disease as well as ostial LCX,  and Om1. . Angiogram yesterday demonstrated significant distal Lm, ostial LAD, and ostial LCX disease.  S/p PCI OSMANY to the LM bifurcation (OSMANY x 1 distal LM to LAD and OSMANY x1 distal LM to LCX), OSMANY to mid LCX  OSMANY to the distal LCX.    -Continue DAPT for atleast one year   -Agree with beta blocker, nitrates and statin

## 2017-12-12 NOTE — DISCHARGE SUMMARY
[facility] Hospital Medicine  Discharge Summary      Patient Name: Karma Chen  MRN: [id number]  Admission Date: 12/8/2017  Hospital Length of Stay: 4 days  Discharge Date and Time:  12/12/2017 1:04 PM  Attending Physician: Audelia Torres MD   Discharging Provider: Maranda Solano NP  Primary Care Provider: Elena Sullivan MD  [facility] Medicine Team: [facility]  Maranda Solano NP    HPI:   Ms. 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 She was admitted 12/5 to [facility] with two day history of jaw pain, more noticeable with exertion and somewhat relieved with SL NTG, she underwent angiogram at OSH that demonstrated significant distal left main disease, ostial LCX and OM disease, there is patent SVG to RCA, LIMA to LAD supplying small distal LAD.     She endorses over the past 6 weeks she has had limitations in her day to day activities, felt increasingly fatigued and occasionally short of breath with minimal activity such as walking to the mailbox; as well as two month history of back and shoulder pain she attributed to fibromyalgia pain and was treated with PRN tramadol and muscle relaxants (however these were not helpful). Denies chest discomfort, SOB, diaphoeresis, N/V, pre-syncope. Endorses occasional constipation. States that she is not a diabetic,monitor sugars and controls with diet, has not smoked in over 40 years. Describes her  has been in ill health requiring PCI himself and possible will need ablation for Afib which has caused her to feel anxiety and stress as of late.     The patient was admitted to the Hospital Medicine Service for further evaluation and management.  Minimal outside records available (recent labs only) NO hospital course available other than  angiogram disk.    Procedure(s) (LRB):  Angioplasty-coronary (N/A)      Hospital Course:   Ms. Zacarias was admitted 12/8 as a transfer from [facility] for complex PCI. Home medications resumed as appropriate, monitor on tele, 2D echo w/ EF 50-55%, WMAs, DD, severe L atrial enlargement, mild to moderate TR, and mild AR. Evaluated by Interventional Cardiology, underwent LHC 12/11 with OSMANY to the LM bifurcation (OSMANY x 1 distal LM to LAD and OSMANY x1 distal LM to LCX),   OSMANY to mid LCX,   OSMANY to the distal OM, no need for impella support. She had a slight groin ooze and hematoma post procedure, manual pressure held for 30 min along with epinephrine instilled into track ooze.  She had full resolution of her hematoma and dressing is dry. She has been cleared by interventional cardiology to go home.     Disposition plans: family care, outpt follow up with IC, primary cardiology, and PCP.  She has been referred to cardiac rehab as well.      Consults:   Consults         Status Ordering Provider     Inpatient consult to Interventional Cardiology  Once     Provider:  (Not yet assigned)    DARCIE Rm      Interval History: Resting in bed, awaiting LHC, denies needs or complaints at this time, denies chest pain, palpitations, or shortness of breath. Denies any acute events or distress overnight.      Review of Systems   Constitutional: Positive for activity change and fatigue. Negative for appetite change, chills, diaphoresis, fever and unexpected weight change.   HENT: Negative for congestion.    Respiratory: Negative for cough, chest tightness, shortness of breath, wheezing and stridor.    Cardiovascular: Negative for chest pain, palpitations and leg swelling.   Gastrointestinal: Negative for abdominal distention, abdominal pain, constipation, diarrhea, nausea and vomiting.   Musculoskeletal: Positive for arthralgias, back pain and myalgias. Negative for joint swelling.     Physical Exam    Constitutional: She is oriented to person, place, and time. She appears well-developed and well-nourished.   HENT:   Head: Normocephalic and atraumatic.   Mouth/Throat: Mucous membranes are normal. Normal dentition.   Eyes: Conjunctivae and lids are normal. Pupils are equal, round, and reactive to light. No scleral icterus.   Neck: Normal range of motion. Neck supple. No JVD present.   Cardiovascular: Normal rate, regular rhythm, normal heart sounds and intact distal pulses.  Exam reveals no gallop and no friction rub.  No groin hematoma, dressing dry and intact.   No murmur heard.  Pulmonary/Chest: Effort normal and breath sounds normal. She exhibits no tenderness.   Abdominal: Soft. Bowel sounds are normal. She exhibits no distension. There is no tenderness.   Musculoskeletal: Normal range of motion. She exhibits no edema or deformity.   Neurological: She is alert and oriented to person, place, and time. No cranial nerve deficit.   Skin: Skin is warm and dry. No rash noted. No erythema.   Psychiatric: She has a normal mood and affect. Her behavior is normal. Judgment and thought content normal.   Nursing note and vitals reviewed.    * Unstable angina    -angiogram at OSH that demonstrated significant distal left main disease, ostial LCX and OM disease, there is patent SVG to RCA, LIMA to LAD supplying small distal LAD  -Interventional Cardiology evaluated on arrival, [facility] (pre cath prep completed) 12/11 with OSMANY to the LM bifurcation (OSMANY x 1 distal LM to LAD and OSMANY x1 distal LM to LCX),   OSMANY to mid LCX,   OSMANY to the distal OM1, no need for impella support   -monitored overnight, stable for discharge   -continue amlodipine, toprol XL, ASA, statin, plavix, ISMN, and losartan  -2D echo as above   -PRU-responsive   -CBC/Chemistry without significant abnormalities >>> continue to monitor   -goal HR 60's, goal -130's  -monitor on tele, currently A pacing   -EKG and SL NTG PRN chest pain         Hyperlipidemia     -lipid panel w/ cholesterol 237, HDL 43, , triglycerides 217  -transitioned to high dose statin        Anxiety    -continue home PRN alprazolam  -ramelteon qHS PRN insomnia          Fibromyalgia    -denies pain currently  -PRN tramadol        Acquired hypothyroidism    -continue home levothyroxine  -TSH 2.340        Coronary artery disease involving left main coronary artery    -see above         Diabetes mellitus type 2, diet-controlled    -diet controlled at home, no antihyperglycemics  -HgA1C 5.9/123  -initiate SSI if appropriate >>> thus far stable         Essential hypertension    -BP mostly controlled, monitor for goal of -130's  -continue meds as above          Final Active Diagnoses:    Diagnosis Date Noted POA    PRINCIPAL PROBLEM:  Unstable angina [I20.0] 12/08/2017 Unknown    Acquired hypothyroidism [E03.9] 12/09/2017 Yes    Fibromyalgia [M79.7] 12/09/2017 Yes    Anxiety [F41.9] 12/09/2017 Yes    Hyperlipidemia [E78.5] 12/09/2017 Yes    Coronary artery disease involving left main coronary artery [I25.10] 02/20/2012 Yes    Essential hypertension [I10] 02/20/2012 Yes    Diabetes mellitus type 2, diet-controlled [E11.9] 02/20/2012 Yes      Problems Resolved During this Admission:    Diagnosis Date Noted Date Resolved POA       Discharged Condition: good    Disposition: Home or Self Care    Follow Up:  Follow-up Information     Nadeem Schultz MD In 1 week.    Specialty:  Cardiovascular Disease  Contact information:  [address] [place] [id number]  [phone]                 Patient Instructions:     Ambulatory referral to Cardiology   Referral Priority: Routine Referral Type: Consultation   Referral Reason: Specialty Services Required    Requested Specialty: Cardiology    Number of Visits Requested: 1      Ambulatory Referral to Interventional Cardiology   Referral Priority: Routine Referral Type: Consultation   Referral Reason: Specialty Services Required     Referred to Provider: GRZEGORZ SILVA Requested Specialty: INTERVENTIONAL CARDIOLOGY   Number of Visits Requested: 1      Diet general   Order Specific Question Answer Comments   Additional restrictions: Cardiac (Low Na/Chol)      Activity as tolerated     Call MD for:  temperature >100.4     Call MD for:  persistent nausea and vomiting or diarrhea     Call MD for:  severe uncontrolled pain     Call MD for:  redness, tenderness, or signs of infection (pain, swelling, redness, odor or green/yellow discharge around incision site)     Call MD for:  difficulty breathing or increased cough     Call MD for:  severe persistent headache     Call MD for:  worsening rash     Call MD for:  persistent dizziness, light-headedness, or visual disturbances     Call MD for:  increased confusion or weakness     Cardiac Rehab Phase II   Standing Status: Future  Standing Exp. Date: 12/12/18   Scheduling Instructions: Lives in [place] so whichever is closer   Order Specific Question Answer Comments   Department  [place]   Select qualifying diagnosis: I21.4 - Non-ST elevation (NSTEMI) myocardial infarction          Significant Diagnostic Studies: Labs:   BMP:   Recent Labs  Lab 12/11/17  0639 12/12/17  0648   * 122*    140   K 4.5 4.1    108   CO2 23 26   BUN 26* 24*   CREATININE 1.2 1.0   CALCIUM 9.0 8.7   MG 2.2 2.3      Pending Diagnostic Studies:     None         Medications:  Reconciled Home Medications:   Current Discharge Medication List      START taking these medications    Details   aspirin (ECOTRIN) 81 MG EC tablet Take 1 tablet (81 mg total) by mouth once daily.  Refills: 0      bacitracin 500 unit/gram ointment Apply topically 3 (three) times daily.  Refills: 0      isosorbide mononitrate (IMDUR) 30 MG 24 hr tablet Take 1 tablet (30 mg total) by mouth once daily.  Qty: 90 tablet, Refills: 0      metoprolol succinate (TOPROL-XL) 25 MG 24 hr tablet Take 1 tablet (25 mg total) by mouth once daily.  Qty:  90 tablet, Refills: 0         CONTINUE these medications which have CHANGED    Details   coenzyme Q10 100 mg capsule Take 1 capsule (100 mg total) by mouth 2 (two) times daily.  Qty: 60 capsule, Refills: 11         CONTINUE these medications which have NOT CHANGED    Details   alprazolam (XANAX) 0.25 MG tablet Take 0.25 mg by mouth nightly as needed.        amLODIPine (NORVASC) 5 MG tablet       b complex vitamins tablet Take 1 tablet by mouth once daily.        clopidogrel (PLAVIX) 75 mg tablet Take 75 mg by mouth once daily.        cyanocobalamin 1,000 mcg/mL injection Refills: 0      estradiol (ESTRACE) 0.01 % (0.1 mg/g) vaginal cream Place vaginally once a week.        levothyroxine (SYNTHROID) 50 MCG tablet Take 50 mcg by mouth once daily.        losartan (COZAAR) 50 MG tablet Take 50 mg by mouth every evening.        montelukast (SINGULAIR) 10 mg tablet       nitroGLYCERIN (NITROSTAT) 0.4 MG SL tablet       omeprazole-sodium bicarbonate (ZEGERID) 40-1.1 mg-gram per capsule Take 1 capsule by mouth before breakfast.      pravastatin (PRAVACHOL) 10 MG tablet       tramadol (ULTRAM) 50 mg tablet Take 50 mg by mouth every 6 (six) hours as needed.        vitamin D 185 MG Tab Take 3,000 Units by mouth once daily.        fluconazole (DIFLUCAN) 150 MG Tab 150 mg as needed.       metronidazole (NORITATE) 1 % cream Apply 1 applicator topically once daily.        valACYclovir (VALTREX) 1000 MG tablet Take 1,000 mg by mouth as needed.          STOP taking these medications       hydrOXYzine HCl (ATARAX) 25 MG tablet Comments:   Reason for Stopping:         nitroGLYCERIN (NITROBID) 2.5 MG CR capsule Comments:   Reason for Stopping:         sotalol (BETAPACE) 80 MG tablet Comments:   Reason for Stopping:               Indwelling Lines/Drains at time of discharge:   Lines/Drains/Airways          No matching active lines, drains, or airways          Time spent on the discharge of patient: 35 minutes  Patient was seen and  examined on the date of discharge and determined to be suitable for discharge.         Maranda Solano NP  Department of Hospital Medicine  [facility]  Spectra:  [phone]  Pager: [phone]

## 2017-12-12 NOTE — PROGRESS NOTES
Pt's drsg to R groin site saturated; slowly bleeding; pressure held for 10 minutes and drsg changed (3 times.) No sign of hematoma; VSS. Pt remains flat. MD notified. Dr. Velasco notified; called Dr. Cano to bedside to assess. Dr. Cano to hold pressure for 30minutes. AMRIT

## 2017-12-12 NOTE — PLAN OF CARE
Problem: Patient Care Overview  Goal: Individualization & Mutuality  Plan of care discussed with patient. Patient is free of fall/trauma/injury. Denies CP, SOB. Pt complained of back and hip pain, prn medication administered. VSS. AAOx4. R groin site CDI, no sign of hematoma. All questions addressed. Will continue to monitor

## 2017-12-12 NOTE — SIGNIFICANT EVENT
Dressing with half-dollar sized amount of blood from upper puncture site of the right groin. No bleeding from lower puncture site. No active oozing. Lidocaine+epinephrine (1:200,000) 2% used subcutaneously for suspected track bleed around upper puncture site, as discussed with Dr. Edmonds previously. No hematoma.

## 2017-12-12 NOTE — PROGRESS NOTES
Pt ambulated in hallway, R groin site continue to be CDI, no sign of hematoma. Patient is ready for discharge. Patient stable alert and oriented. VSS. IVs removed. No complaints of pain. Discussed discharge plan. Pt given stent cards. Reviewed medications and side effects, appointments, and answered questions with patient and family.

## 2017-12-12 NOTE — SUBJECTIVE & OBJECTIVE
Interval History:     Overnight CFA site oozing. 30 minutes of manual pressure was applied. No hematoma noted overnight.  This morning dressing with blood, no active oozing per documentation.  Lidocaine+epinephrine (1:200,000) 2% used subcutaneously.    On my exam ddressing is clean, no hematoma noted and Dp pulse 2+ b/l.        Objective:     Vital Signs (Most Recent):  Temp: 97.5 °F (36.4 °C) (12/11/17 1948)  Pulse: 70 (12/12/17 0800)  Resp: 14 (12/12/17 0800)  BP: 138/74 (12/12/17 0800)  SpO2: (!) 94 % (12/12/17 0800) Vital Signs (24h Range):  Temp:  [96.6 °F (35.9 °C)-97.5 °F (36.4 °C)] 97.5 °F (36.4 °C)  Pulse:  [69-76] 70  Resp:  [11-20] 14  SpO2:  [93 %-99 %] 94 %  BP: (102-138)/(53-79) 138/74     Weight: 63.5 kg (139 lb 15.9 oz)  Body mass index is 27.34 kg/m².    SpO2: (!) 94 %  O2 Device (Oxygen Therapy): room air      Intake/Output Summary (Last 24 hours) at 12/12/17 0850  Last data filed at 12/12/17 0400   Gross per 24 hour   Intake              630 ml   Output             1000 ml   Net             -370 ml       Lines/Drains/Airways     Peripheral Intravenous Line                 Peripheral IV - Single Lumen 12/07/17 0800 Left Forearm 5 days         Peripheral IV - Single Lumen 12/07/17 1107 Right Forearm 4 days                Physical Exam   Constitutional: She is oriented to person, place, and time. She appears well-developed and well-nourished. No distress.   HENT:   Head: Normocephalic and atraumatic.   Neck: No JVD present.   Cardiovascular: Normal rate, regular rhythm and normal heart sounds.    Pulses:       Dorsalis pedis pulses are 2+ on the right side, and 2+ on the left side.   Pulmonary/Chest: Effort normal and breath sounds normal.   Neurological: She is alert and oriented to person, place, and time.   Skin: Skin is warm.   Right groin, no hematoma  Dressing clean        Significant Labs: All pertinent lab results from the last 24 hours have been reviewed.

## 2017-12-12 NOTE — SIGNIFICANT EVENT
Right groin stick sites (x2) oozing blood. No hematoma appreciated. Mild ecchymosis. Discussed with Dr. Edmonds (interventional cardiology fellow on call). 30 min of manual pressure applied with hemostasis achieved. Most likely track bleed.

## 2017-12-13 NOTE — PLAN OF CARE
12/13/17 0705   Final Note   Assessment Type Final Discharge Note   Discharge Disposition Home   Hospital Follow Up  Appt(s) scheduled? No  (Called and left message that pt needs fu.)

## 2017-12-14 ENCOUNTER — PATIENT OUTREACH (OUTPATIENT)
Dept: ADMINISTRATIVE | Facility: CLINIC | Age: 82
End: 2017-12-14

## 2017-12-14 NOTE — PATIENT INSTRUCTIONS
Coronary Angioplasty  Your healthcare team will talk to you about your heart problem and explain how angioplasty can help. Angioplasty relieves symptoms of coronary artery disease by improving blood flow to your heart. Chest pain or angina can be caused by poor blood flow through a narrow or blocked artery that would normally supply oxygen and nutrients to the heart muscle. Not all blockages can be fixed by coronary angioplasty alone. You may need other treatments including medicines, surgery, or coronary stents to treat your coronary artery disease.  A heart specialist called an interventional cardiologist does the angioplasty procedure. He or she has specialized training in using the equipment and in doing the procedure as safely as possible.        The balloon compresses the plaque against the artery wall.        Blood flow to the heart muscle increases.      During the procedure  · A member of the team will numb the skin at the insertion site (usually the groin) with a local anesthetic. Next, your doctor will make a  needle puncture to insert the catheter.   · Your doctor will insert a guide wire through the guiding catheter (a thin, flexible tube) and move it to the narrow spot in your heart artery. Your doctor tracks its movement on an angiogram, a special kind of X-ray.  · Your doctor will insert a balloon-tipped catheter through the guiding catheter and thread it over the guide wire. He or she will position it at the narrow part of the artery.  · Next, he or she will inflate and deflate the balloon several times to press the plaque against the artery wall. You may feel angina (chest pain) when the balloon is inflated. Tell your doctor if you do.  · Finally, your doctor deflates the balloon and removes the catheters and guide wire. The artery is now open, and blood flow to the heart muscle increases.  After the procedure  · A member of the healthcare team will tell you how long to lie down and keep the  insertion site still. The amount of time may depend on whether a closure device such as a stitch or collagen plug was used to close the opening that was made in your artery. The time you must be still may be shorter if one of these devices was used. The amount of time will also depend on if there is any bleeding at the artery site.  · A nurse will check the insertion site and your blood pressure. Before going home, you may have a chest X-ray and other tests.  · You usually stay in the hospital for several hours or overnight.  When to call your healthcare provider   Contact your healthcare provider if you have any of the following:  · You have angina (chest pain)  · The insertion site has pain, swelling, redness, bleeding, or drainage  · You have severe pain, coldness, or a bluish color in the leg or arm that held the catheter  · You have blood in your urine, black or tarry stools, or any other kind of bleeding  · You have a fever of 100.4°F (38°C) or higher, or as directed by your healthcare provider   Date Last Reviewed: 9/26/2016  © 9638-1106 [employer] [address] [place] [address]. All rights reserved. This information is not intended as a substitute for professional medical care. Always follow your healthcare professional's instructions.        Coronary Angioplasty  Your healthcare team will talk to you about your heart problem and explain how angioplasty can help. Angioplasty relieves symptoms of coronary artery disease by improving blood flow to your heart. Chest pain or angina can be caused by poor blood flow through a narrow or blocked artery that would normally supply oxygen and nutrients to the heart muscle. Not all blockages can be fixed by coronary angioplasty alone. You may need other treatments including medicines, surgery, or coronary stents to treat your coronary artery disease.  A heart specialist called an interventional cardiologist does the angioplasty procedure. He  or she has specialized training in using the equipment and in doing the procedure as safely as possible.        The balloon compresses the plaque against the artery wall.        Blood flow to the heart muscle increases.      During the procedure  · A member of the team will numb the skin at the insertion site (usually the groin) with a local anesthetic. Next, your doctor will make a  needle puncture to insert the catheter.   · Your doctor will insert a guide wire through the guiding catheter (a thin, flexible tube) and move it to the narrow spot in your heart artery. Your doctor tracks its movement on an angiogram, a special kind of X-ray.  · Your doctor will insert a balloon-tipped catheter through the guiding catheter and thread it over the guide wire. He or she will position it at the narrow part of the artery.  · Next, he or she will inflate and deflate the balloon several times to press the plaque against the artery wall. You may feel angina (chest pain) when the balloon is inflated. Tell your doctor if you do.  · Finally, your doctor deflates the balloon and removes the catheters and guide wire. The artery is now open, and blood flow to the heart muscle increases.  After the procedure  · A member of the healthcare team will tell you how long to lie down and keep the insertion site still. The amount of time may depend on whether a closure device such as a stitch or collagen plug was used to close the opening that was made in your artery. The time you must be still may be shorter if one of these devices was used. The amount of time will also depend on if there is any bleeding at the artery site.  · A nurse will check the insertion site and your blood pressure. Before going home, you may have a chest X-ray and other tests.  · You usually stay in the hospital for several hours or overnight.  When to call your healthcare provider  Contact your healthcare provider if you have any of the following:  · You have angina  (chest pain)  · The insertion site has pain, swelling, redness, bleeding, or drainage  · You have severe pain, coldness, or a bluish color in the leg or arm that held the catheter  · You have blood in your urine, black or tarry stools, or any other kind of bleeding  · You have a fever of 100.4°F (38°C) or higher, or as directed by your healthcare provider   Date Last Reviewed: 9/26/2016 © 2000-2017 [employer]. [address] [place] [address]. All rights reserved. This information is not intended as a substitute for professional medical care. Always follow your healthcare professional's instructions.

## 2017-12-26 LAB
CORONARY STENOSIS: ABNORMAL
CORONARY STENT: YES

## 2018-01-03 ENCOUNTER — OFFICE VISIT (OUTPATIENT)
Dept: INTERNAL MEDICINE | Facility: CLINIC | Age: 83
End: 2018-01-03
Payer: MEDICARE

## 2018-01-03 VITALS
HEART RATE: 72 BPM | WEIGHT: 155 LBS | BODY MASS INDEX: 30.43 KG/M2 | DIASTOLIC BLOOD PRESSURE: 62 MMHG | OXYGEN SATURATION: 98 % | RESPIRATION RATE: 20 BRPM | SYSTOLIC BLOOD PRESSURE: 120 MMHG | TEMPERATURE: 98 F | HEIGHT: 60 IN

## 2018-01-03 DIAGNOSIS — I25.810 CORONARY ARTERY DISEASE INVOLVING AUTOLOGOUS ARTERY CORONARY BYPASS GRAFT WITHOUT ANGINA PECTORIS: Primary | ICD-10-CM

## 2018-01-03 DIAGNOSIS — I10 ESSENTIAL HYPERTENSION: ICD-10-CM

## 2018-01-03 DIAGNOSIS — Z95.5 PRESENCE OF STENT IN CORONARY ARTERY IN PATIENT WITH CORONARY ARTERY DISEASE: ICD-10-CM

## 2018-01-03 DIAGNOSIS — M19.072 PRIMARY OSTEOARTHRITIS OF LEFT ANKLE: ICD-10-CM

## 2018-01-03 DIAGNOSIS — J30.1 CHRONIC SEASONAL ALLERGIC RHINITIS DUE TO POLLEN: Chronic | ICD-10-CM

## 2018-01-03 DIAGNOSIS — M89.9 BONE DISEASE: ICD-10-CM

## 2018-01-03 DIAGNOSIS — Z12.11 SCREENING FOR COLON CANCER: ICD-10-CM

## 2018-01-03 DIAGNOSIS — M89.8X7 RETROCALCANEAL EXOSTOSIS: ICD-10-CM

## 2018-01-03 DIAGNOSIS — I25.10 PRESENCE OF STENT IN CORONARY ARTERY IN PATIENT WITH CORONARY ARTERY DISEASE: ICD-10-CM

## 2018-01-03 PROCEDURE — 99214 OFFICE O/P EST MOD 30 MIN: CPT | Mod: ,,, | Performed by: INTERNAL MEDICINE

## 2018-01-03 PROCEDURE — 1111F DSCHRG MED/CURRENT MED MERGE: CPT | Mod: ,,, | Performed by: INTERNAL MEDICINE

## 2018-01-03 RX ORDER — CLOPIDOGREL BISULFATE 75 MG/1
75 TABLET ORAL DAILY
Qty: 30 TABLET | Refills: 0 | Status: SHIPPED | OUTPATIENT
Start: 2018-01-03 | End: 2018-02-14 | Stop reason: SDUPTHER

## 2018-01-03 RX ORDER — TRAMADOL HYDROCHLORIDE 50 MG/1
50 TABLET ORAL EVERY 6 HOURS PRN
Qty: 28 TABLET | Refills: 0 | Status: SHIPPED | OUTPATIENT
Start: 2018-01-03 | End: 2018-01-10

## 2018-01-03 RX ORDER — TRAMADOL HYDROCHLORIDE 50 MG/1
50 TABLET ORAL EVERY 6 HOURS PRN
Qty: 28 TABLET | Refills: 0 | Status: SHIPPED | OUTPATIENT
Start: 2018-01-03 | End: 2018-01-03 | Stop reason: SDUPTHER

## 2018-01-03 RX ORDER — ACETAMINOPHEN 500 MG
500 TABLET ORAL EVERY 6 HOURS PRN
COMMUNITY

## 2018-01-03 RX ORDER — METOPROLOL SUCCINATE 25 MG/1
25 TABLET, EXTENDED RELEASE ORAL DAILY
Qty: 30 TABLET | Refills: 2 | Status: SHIPPED | OUTPATIENT
Start: 2018-01-03 | End: 2018-11-13 | Stop reason: SDUPTHER

## 2018-01-03 RX ORDER — LEVOTHYROXINE SODIUM 88 UG/1
1 TABLET ORAL DAILY
COMMUNITY
Start: 2017-11-30 | End: 2018-03-07 | Stop reason: SDUPTHER

## 2018-01-03 RX ORDER — MONTELUKAST SODIUM 10 MG/1
10 TABLET ORAL DAILY
Qty: 30 TABLET | Refills: 0 | Status: SHIPPED | OUTPATIENT
Start: 2018-01-03 | End: 2018-02-02

## 2018-01-03 NOTE — PROGRESS NOTES
SUBJECTIVE:    Patient ID: Karma Chen is a 82 y.o. female.    Chief Complaint: Follow-up (hospital (stents))    HPI     PATIENT WAS COOKING AND DEVELOPED PAIN RIGHT JAW-NTG RELIEVED SX BUT THEY RETURNED-RECENT EVAL NEGATIVE PRIOR (BUT IN THE PAST THE STUDIES WERE FALSE NEGATIVE)-ADMITTED AND OBSERVED-ANGIO'D AND FOUR BLOCKAGES NOTED-SENT TO [facility] AND STENTED TIMES FOUR-DID WELL    RX REVIEWED AND RECONCILED POST HOSPITALIZATION    DX CONTROLLLED        Past Medical History:   Diagnosis Date    Coronary artery disease     Dermatitis     Dermatitis 12/8/2017    Diabetes mellitus     Diabetes mellitus type II     GERD (gastroesophageal reflux disease)     Hypertension     MI (myocardial infarction)      Social History     Social History    Marital status:      Spouse name: N/A    Number of children: N/A    Years of education: N/A     Occupational History    Not on file.     Social History Main Topics    Smoking status: Never Smoker    Smokeless tobacco: Never Used    Alcohol use No    Drug use: No    Sexual activity: Not on file     Other Topics Concern    Not on file     Social History Narrative    No narrative on file     Past Surgical History:   Procedure Laterality Date    adenoids      APPENDECTOMY      CHOLECYSTECTOMY      CORONARY ARTERY BYPASS GRAFT      coronary stents      HYSTERECTOMY      TONSILLECTOMY       History reviewed. No pertinent family history.    Review of Systems   Constitutional: Negative for appetite change, chills, diaphoresis, fatigue and fever.   HENT: Negative for congestion, ear pain, hearing loss, sore throat and trouble swallowing.    Eyes: Negative for photophobia, pain and visual disturbance.   Respiratory: Negative for cough, chest tightness and shortness of breath.    Cardiovascular: Negative for chest pain, palpitations and leg swelling.   Gastrointestinal: Negative for abdominal pain, blood in stool, constipation, diarrhea,  nausea and vomiting.   Endocrine: Negative for cold intolerance and heat intolerance.   Genitourinary: Negative for difficulty urinating, flank pain, pelvic pain and vaginal pain.   Musculoskeletal: Negative for arthralgias and myalgias.        LEFT MEDIAL FOOT PAIN ABOVE AND JUST MEDIAL TO THE RIGHT INNER ANKLE AND SOMETIMES THE PAIN CAUSES BACK PAIN-HAS A NEW BRACE AS OF TWO DAYS AGO   Skin: Negative for rash.   Allergic/Immunologic: Negative for immunocompromised state.   Neurological: Negative for dizziness, weakness, light-headedness and headaches.   Hematological: Negative for adenopathy. Does not bruise/bleed easily.   Psychiatric/Behavioral: Negative for confusion, self-injury and suicidal ideas.         Objective:      Physical Exam   Constitutional: She is oriented to person, place, and time. She appears well-developed and well-nourished. She is cooperative. No distress.   HENT:   Head: Normocephalic and atraumatic.   Right Ear: Tympanic membrane normal.   Left Ear: Tympanic membrane normal.   Nose: Nose normal.   Mouth/Throat: Uvula is midline, oropharynx is clear and moist and mucous membranes are normal.   Eyes: Conjunctivae, EOM and lids are normal. Pupils are equal, round, and reactive to light. Right pupil is round and reactive. Left pupil is round and reactive.   Neck: Trachea normal and normal range of motion. Neck supple. No JVD present. No thyromegaly present.   Cardiovascular: Normal rate, regular rhythm, normal heart sounds and intact distal pulses.    Pulses:       Dorsalis pedis pulses are 2+ on the right side, and 2+ on the left side.        Posterior tibial pulses are 2+ on the right side, and 2+ on the left side.   Pulmonary/Chest: Effort normal and breath sounds normal. No tachypnea. No respiratory distress.   Abdominal: Soft. Bowel sounds are normal. There is no tenderness.   Musculoskeletal: Normal range of motion.        Feet:    TENDERNESS AND MINIMAL SWELLING MEDIAL ANKLE  INFERIORALLY LEFT   Feet:   Right Foot:   Protective Sensation: 2 sites tested. 2 sites sensed.   Left Foot:   Protective Sensation: 2 sites tested. 2 sites sensed.   Lymphadenopathy:     She has no cervical adenopathy.   Neurological: She is alert and oriented to person, place, and time. She has normal strength.   Skin: Skin is warm and dry. No rash noted.   Psychiatric: She has a normal mood and affect. Her speech is normal.   Nursing note and vitals reviewed.          Assessment:       1. Coronary artery disease involving autologous artery coronary bypass graft without angina pectoris    2. Presence of stent in coronary artery in patient with coronary artery disease    3. Primary osteoarthritis of left ankle    4. Essential hypertension    5. Retrocalcaneal exostosis    6. Chronic seasonal allergic rhinitis due to pollen    7. Screening for colon cancer    8. Bone disease         Plan:           Coronary artery disease involving autologous artery coronary bypass graft without angina pectoris  -     metoprolol succinate (TOPROL-XL) 25 MG 24 hr tablet; Take 1 tablet (25 mg total) by mouth once daily.  Dispense: 30 tablet; Refill: 2  -     clopidogrel (PLAVIX) 75 mg tablet; Take 1 tablet (75 mg total) by mouth once daily.  Dispense: 30 tablet; Refill: 0  -     Lipid panel; Future; Expected date: 01/03/2018    Presence of stent in coronary artery in patient with coronary artery disease  -     metoprolol succinate (TOPROL-XL) 25 MG 24 hr tablet; Take 1 tablet (25 mg total) by mouth once daily.  Dispense: 30 tablet; Refill: 2  -     clopidogrel (PLAVIX) 75 mg tablet; Take 1 tablet (75 mg total) by mouth once daily.  Dispense: 30 tablet; Refill: 0  -     Lipid panel; Future; Expected date: 01/03/2018    Primary osteoarthritis of left ankle  -     traMADol (ULTRAM) 50 mg tablet; Take 1 tablet (50 mg total) by mouth every 6 (six) hours as needed.  Dispense: 28 tablet; Refill: 0    Essential hypertension  -     metoprolol  succinate (TOPROL-XL) 25 MG 24 hr tablet; Take 1 tablet (25 mg total) by mouth once daily.  Dispense: 30 tablet; Refill: 2    Retrocalcaneal exostosis  -     Discontinue: traMADol (ULTRAM) 50 mg tablet; Take 1 tablet (50 mg total) by mouth every 6 (six) hours as needed.  Dispense: 28 tablet; Refill: 0    Chronic seasonal allergic rhinitis due to pollen    Screening for colon cancer  -     Occult blood x 1, stool; Future; Expected date: 01/03/2018    Bone disease  -     DXA Bone Density Spine And Hip    Other orders  -     montelukast (SINGULAIR) 10 mg tablet; Take 1 tablet (10 mg total) by mouth once daily.  Dispense: 30 tablet; Refill: 0

## 2018-02-12 ENCOUNTER — TELEPHONE (OUTPATIENT)
Dept: INTERNAL MEDICINE | Facility: CLINIC | Age: 83
End: 2018-02-12

## 2018-02-12 RX ORDER — DIPHENOXYLATE HYDROCHLORIDE AND ATROPINE SULFATE 2.5; .025 MG/1; MG/1
1 TABLET ORAL 4 TIMES DAILY PRN
Qty: 12 TABLET | Refills: 0 | Status: SHIPPED | OUTPATIENT
Start: 2018-02-12 | End: 2018-02-22

## 2018-02-12 NOTE — TELEPHONE ENCOUNTER
----- Message from Latoya Vogel sent at 2/12/2018  9:55 AM CST -----  Contact: self  Pt had diarrhea on Thursday night, no bowel movement of Friday.  Diarrhea again on Sunday.  Feels wiped out, is cramping, and her back and chest are achy.  Has had 4 stents put in her heart.  Is there something she can do?

## 2018-02-14 DIAGNOSIS — Z95.5 PRESENCE OF STENT IN CORONARY ARTERY IN PATIENT WITH CORONARY ARTERY DISEASE: ICD-10-CM

## 2018-02-14 DIAGNOSIS — I25.810 CORONARY ARTERY DISEASE INVOLVING AUTOLOGOUS ARTERY CORONARY BYPASS GRAFT WITHOUT ANGINA PECTORIS: ICD-10-CM

## 2018-02-14 DIAGNOSIS — I25.10 PRESENCE OF STENT IN CORONARY ARTERY IN PATIENT WITH CORONARY ARTERY DISEASE: ICD-10-CM

## 2018-02-14 RX ORDER — CLOPIDOGREL BISULFATE 75 MG/1
75 TABLET ORAL DAILY
Qty: 30 TABLET | Refills: 5 | Status: SHIPPED | OUTPATIENT
Start: 2018-02-14 | End: 2018-08-08 | Stop reason: SDUPTHER

## 2018-02-19 ENCOUNTER — OFFICE VISIT (OUTPATIENT)
Dept: INTERNAL MEDICINE | Facility: CLINIC | Age: 83
End: 2018-02-19
Payer: MEDICARE

## 2018-02-19 VITALS
OXYGEN SATURATION: 98 % | HEART RATE: 80 BPM | RESPIRATION RATE: 16 BRPM | SYSTOLIC BLOOD PRESSURE: 120 MMHG | HEIGHT: 60 IN | DIASTOLIC BLOOD PRESSURE: 60 MMHG | BODY MASS INDEX: 30 KG/M2 | TEMPERATURE: 98 F | WEIGHT: 152.81 LBS

## 2018-02-19 DIAGNOSIS — R53.1 WEAKNESS: ICD-10-CM

## 2018-02-19 DIAGNOSIS — R10.30 LOWER ABDOMINAL PAIN: Primary | ICD-10-CM

## 2018-02-19 DIAGNOSIS — R07.89 ATYPICAL CHEST PAIN: ICD-10-CM

## 2018-02-19 PROCEDURE — 1159F MED LIST DOCD IN RCRD: CPT | Mod: ,,, | Performed by: INTERNAL MEDICINE

## 2018-02-19 PROCEDURE — 99214 OFFICE O/P EST MOD 30 MIN: CPT | Mod: ,,, | Performed by: INTERNAL MEDICINE

## 2018-02-19 PROCEDURE — 1126F AMNT PAIN NOTED NONE PRSNT: CPT | Mod: ,,, | Performed by: INTERNAL MEDICINE

## 2018-02-19 RX ORDER — TRAMADOL HYDROCHLORIDE 50 MG/1
50 TABLET ORAL EVERY 6 HOURS PRN
Refills: 0 | COMMUNITY
Start: 2018-01-12 | End: 2018-04-12 | Stop reason: SDUPTHER

## 2018-02-19 NOTE — PROGRESS NOTES
"  SUBJECTIVE:    Patient ID: Karma Chen is a 82 y.o. female.    Chief Complaint: weakness in stomach    HPI     IN FOR RECHECK    PT IS FEELING VERY WEAK AND HAVING A LOW GRADE TEMP-THIS STARTED TWO WEEKS AGO WHEN SHE HAD DIARRHEA-TOOK ONE IMMODIUM AND THE DIARRHEA STOPPED-SHE STARTED EATING PRUNES TO PREVENT CONSTIPATION--CONSTIPATION "MESSES UP STOMACH"-NO CONSTIPATION NOW-SHE HAS NO NAUSEA-NO URINARY SX-NO PAIN IN THE BELLY-LOWER ABDOMEN JUST DOES NOT FEEL GOOD-NO FULLNESS-NO MORE FREQUENCY THAN HER USUAL-HAS TO REST AFTER DOING VERY LITTLE-STOOL LOOKS A LITTLE DARKER THAN PEANUT BUTTER    Past Medical History:   Diagnosis Date    Coronary artery disease     Dermatitis     Dermatitis 12/8/2017    Diabetes mellitus     Diabetes mellitus type II     GERD (gastroesophageal reflux disease)     Hypertension     MI (myocardial infarction)      Social History     Social History    Marital status:      Spouse name: N/A    Number of children: N/A    Years of education: N/A     Occupational History    Not on file.     Social History Main Topics    Smoking status: Never Smoker    Smokeless tobacco: Never Used    Alcohol use No    Drug use: No    Sexual activity: Not on file     Other Topics Concern    Not on file     Social History Narrative    No narrative on file     Past Surgical History:   Procedure Laterality Date    adenoids      APPENDECTOMY      CHOLECYSTECTOMY      CORONARY ARTERY BYPASS GRAFT      coronary stents      HYSTERECTOMY      TONSILLECTOMY       History reviewed. No pertinent family history.    Review of Systems   Constitutional: Positive for appetite change (DECREASE-LOST 2 POUNDS). Negative for chills, diaphoresis, fatigue, fever and unexpected weight change.   HENT: Negative for congestion, ear pain, hearing loss, nosebleeds, postnasal drip, sinus pain, sinus pressure, sneezing, sore throat, tinnitus, trouble swallowing and voice change.    Eyes: Negative for " photophobia, pain, itching and visual disturbance.   Respiratory: Negative for apnea, cough, chest tightness, shortness of breath, wheezing and stridor.    Cardiovascular: Negative for chest pain, palpitations and leg swelling.        AWAKENS WITH SHOULDER PAIN ON LEFT SIDE AND LEFT ARM-SAW CARDIOLOGY --ECG DONE-GIVEN OK BY CARDIOLOGY   Gastrointestinal: Positive for constipation (POST LAAST ILLNESS). Negative for abdominal distention, abdominal pain, blood in stool, diarrhea, nausea and vomiting.   Endocrine: Negative for cold intolerance, heat intolerance, polydipsia and polyuria.   Genitourinary: Negative for difficulty urinating, dyspareunia, dysuria, flank pain, frequency, hematuria, menstrual problem, pelvic pain, urgency, vaginal discharge and vaginal pain.   Musculoskeletal: Positive for myalgias (FIBROMYALGIAS). Negative for arthralgias, back pain, joint swelling, neck pain and neck stiffness.   Skin: Negative for pallor.   Allergic/Immunologic: Negative for environmental allergies and food allergies.   Neurological: Negative for dizziness, tremors, speech difficulty, weakness, light-headedness and numbness.   Hematological: Does not bruise/bleed easily.   Psychiatric/Behavioral: Negative for agitation, confusion, decreased concentration, sleep disturbance and suicidal ideas. The patient is not nervous/anxious.           Objective:      Physical Exam   Constitutional: She is oriented to person, place, and time. She appears well-developed and well-nourished. She is cooperative. No distress.   HENT:   Head: Normocephalic and atraumatic.   Right Ear: Tympanic membrane normal.   Left Ear: Tympanic membrane normal.   Nose: Nose normal.   Mouth/Throat: Uvula is midline, oropharynx is clear and moist and mucous membranes are normal.   Eyes: Conjunctivae, EOM and lids are normal. Pupils are equal, round, and reactive to light. Right pupil is round and reactive. Left pupil is round and reactive.   Neck: Trachea  normal and normal range of motion. Neck supple. No JVD present. No thyromegaly present.   Cardiovascular: Normal rate, regular rhythm, normal heart sounds and intact distal pulses.    Pulmonary/Chest: Effort normal and breath sounds normal. No tachypnea. No respiratory distress.   Abdominal: Soft. Bowel sounds are normal. There is no tenderness.   Musculoskeletal: Normal range of motion.   Lymphadenopathy:     She has no cervical adenopathy.   Neurological: She is alert and oriented to person, place, and time. She has normal strength.   Skin: Skin is warm and dry. No rash noted.   Psychiatric: She has a normal mood and affect. Her speech is normal.   Nursing note and vitals reviewed.          Assessment:       1. Lower abdominal pain    2. Weakness    3. Atypical chest pain         Plan:           Lower abdominal pain  -     Urinalysis  -     CBC auto differential; Future; Expected date: 02/19/2018  -     Comprehensive metabolic panel; Future; Expected date: 02/19/2018  -     X-Ray Abdomen Flat And Erect  -     lipase-protease-amylase 12,000-38,000-60,000 units (CREON) CpDR; Take 1 capsule by mouth 3 (three) times daily with meals.  Dispense: 90 capsule; Refill: 11  -     Urine culture    Weakness  -     CBC auto differential; Future; Expected date: 02/19/2018  -     Comprehensive metabolic panel; Future; Expected date: 02/19/2018  -     Ambulatory referral to Cardiology    Atypical chest pain  -     Ambulatory referral to Cardiology

## 2018-02-21 LAB
ALBUMIN SERPL-MCNC: 4.1 G/DL (ref 3.1–4.7)
ALP SERPL-CCNC: 49 IU/L (ref 40–104)
ALT (SGPT): 15 IU/L (ref 3–33)
AST SERPL-CCNC: 26 IU/L (ref 10–40)
BASOPHILS NFR BLD: 0.1 K/UL (ref 0–0.2)
BASOPHILS NFR BLD: 0.7 %
BILIRUB SERPL-MCNC: 0.6 MG/DL (ref 0.3–1)
BUN SERPL-MCNC: 28 MG/DL (ref 8–20)
CALCIUM SERPL-MCNC: 9.2 MG/DL (ref 7.7–10.4)
CHLORIDE: 106 MMOL/L (ref 98–110)
CO2 SERPL-SCNC: 23.3 MMOL/L (ref 22.8–31.6)
CREATININE: 1.27 MG/DL (ref 0.6–1.4)
EOSINOPHIL NFR BLD: 0.3 K/UL (ref 0–0.7)
EOSINOPHIL NFR BLD: 3.5 %
ERYTHROCYTE [DISTWIDTH] IN BLOOD BY AUTOMATED COUNT: 13.5 % (ref 11.7–14.9)
GLUCOSE: 90 MG/DL (ref 70–99)
GRAN #: 4.4 K/UL (ref 1.4–6.5)
GRAN%: 49.5 %
HCT VFR BLD AUTO: 36.5 % (ref 36–48)
HGB BLD-MCNC: 11.8 G/DL (ref 12–15)
IMMATURE GRANS (ABS): 0 K/UL (ref 0–1)
IMMATURE GRANULOCYTES: 0.2 %
LYMPH #: 3 K/UL (ref 1.2–3.4)
LYMPH%: 33.6 %
MCH RBC QN AUTO: 29.4 PG (ref 25–35)
MCHC RBC AUTO-ENTMCNC: 32.3 G/DL (ref 31–36)
MCV RBC AUTO: 91 FL (ref 79–98)
MONO #: 1.1 K/UL (ref 0.1–0.6)
MONO%: 12.5 %
NUCLEATED RBCS: 0 %
PLATELET # BLD AUTO: 324 K/UL (ref 140–440)
PMV BLD AUTO: 10.8 FL (ref 8.8–12.7)
POTASSIUM SERPL-SCNC: 4.1 MMOL/L (ref 3.5–5)
PROT SERPL-MCNC: 7.7 G/DL (ref 6–8.2)
RBC # BLD AUTO: 4.01 M/UL (ref 3.5–5.5)
SODIUM: 140 MMOL/L (ref 134–144)
WBC # BLD AUTO: 8.8 K/UL (ref 5–10)

## 2018-02-25 ENCOUNTER — TELEPHONE (OUTPATIENT)
Dept: INTERNAL MEDICINE | Facility: CLINIC | Age: 83
End: 2018-02-25

## 2018-02-25 DIAGNOSIS — R82.71 BACTERIA IN URINE: Primary | ICD-10-CM

## 2018-02-27 ENCOUNTER — TELEPHONE (OUTPATIENT)
Dept: INTERNAL MEDICINE | Facility: CLINIC | Age: 83
End: 2018-02-27

## 2018-02-27 NOTE — TELEPHONE ENCOUNTER
PT WANTED TO LET YOU KNOW THAT SHE HAD SIDE EFFECTS FROM CREON SO SHE STOPPED It. PT STATES SHE IS FEELING BETTER WITHOUT It. I ADDED IT TO HER ALLERGIES ALSO

## 2018-03-07 ENCOUNTER — OFFICE VISIT (OUTPATIENT)
Dept: INTERNAL MEDICINE | Facility: CLINIC | Age: 83
End: 2018-03-07
Payer: MEDICARE

## 2018-03-07 VITALS
TEMPERATURE: 98 F | DIASTOLIC BLOOD PRESSURE: 52 MMHG | WEIGHT: 153 LBS | OXYGEN SATURATION: 98 % | HEART RATE: 48 BPM | HEIGHT: 60 IN | SYSTOLIC BLOOD PRESSURE: 120 MMHG | BODY MASS INDEX: 30.04 KG/M2 | RESPIRATION RATE: 18 BRPM

## 2018-03-07 DIAGNOSIS — M79.7 FIBROMYALGIA: ICD-10-CM

## 2018-03-07 DIAGNOSIS — I10 ESSENTIAL HYPERTENSION: ICD-10-CM

## 2018-03-07 DIAGNOSIS — K59.01 SLOW TRANSIT CONSTIPATION: ICD-10-CM

## 2018-03-07 DIAGNOSIS — E11.9 DIABETES MELLITUS TYPE 2, DIET-CONTROLLED: Primary | ICD-10-CM

## 2018-03-07 DIAGNOSIS — E03.9 ACQUIRED HYPOTHYROIDISM: ICD-10-CM

## 2018-03-07 DIAGNOSIS — R53.83 OTHER FATIGUE: ICD-10-CM

## 2018-03-07 DIAGNOSIS — I95.2 HYPOTENSION DUE TO DRUGS: ICD-10-CM

## 2018-03-07 DIAGNOSIS — I25.10 CORONARY ARTERY DISEASE INVOLVING LEFT MAIN CORONARY ARTERY: ICD-10-CM

## 2018-03-07 DIAGNOSIS — R53.1 WEAKNESS: ICD-10-CM

## 2018-03-07 PROCEDURE — 99214 OFFICE O/P EST MOD 30 MIN: CPT | Mod: ,,, | Performed by: INTERNAL MEDICINE

## 2018-03-07 RX ORDER — OMEPRAZOLE AND SODIUM BICARBONATE 40; 1100 MG/1; MG/1
1 CAPSULE ORAL
Qty: 90 CAPSULE | Refills: 1 | Status: SHIPPED | OUTPATIENT
Start: 2018-03-07 | End: 2018-08-01

## 2018-03-07 RX ORDER — LEVOTHYROXINE SODIUM 88 UG/1
88 TABLET ORAL DAILY
Qty: 90 TABLET | Refills: 1 | Status: SHIPPED | OUTPATIENT
Start: 2018-03-07 | End: 2018-04-12 | Stop reason: SDUPTHER

## 2018-03-07 RX ORDER — LUBIPROSTONE 24 UG/1
24 CAPSULE ORAL 2 TIMES DAILY WITH MEALS
Qty: 60 CAPSULE | Refills: 1 | COMMUNITY
Start: 2018-03-07 | End: 2018-08-01

## 2018-03-07 NOTE — PROGRESS NOTES
"  SUBJECTIVE:    Patient ID: Karma Chen is a 82 y.o. female.    Chief Complaint: Follow-up (2 week, dexa and xray in epic) and Abdominal Pain    HPI     Patient in for recheck-      She took Creon and it did not help her abdominal sx in fact she had all the side effects listed for the rx-abdomen continues bloated-she has no abdominal discomfort-abdomen just seems more bloated-she has some gas but not a lot-she seems ok with the belly as it is-she has not tried amitiza yet-says the stols are hard to start and maybe a little larger but they are soft    Chest pain-none-but last week she went to her neighbors and had sudden weakness-had to sit down on a walker and she went back to her house with the walker-she called cardiology and he cut her metoprolol down to 12.5 mg and yesterday he cut the norvasc to 2.5 with instructions to stop it lif she continued to feel "weakish"    Abd xray was negative-her DEXA was excellent    Past Medical History:   Diagnosis Date    Coronary artery disease     Dermatitis     Dermatitis 12/8/2017    Diabetes mellitus     Diabetes mellitus type II     GERD (gastroesophageal reflux disease)     Hypertension     MI (myocardial infarction)      Social History     Social History    Marital status:      Spouse name: N/A    Number of children: N/A    Years of education: N/A     Occupational History    Not on file.     Social History Main Topics    Smoking status: Never Smoker    Smokeless tobacco: Never Used    Alcohol use No    Drug use: No    Sexual activity: Not on file     Other Topics Concern    Not on file     Social History Narrative    No narrative on file     Past Surgical History:   Procedure Laterality Date    adenoids      APPENDECTOMY      CHOLECYSTECTOMY      CORONARY ARTERY BYPASS GRAFT      coronary stents      HYSTERECTOMY      TONSILLECTOMY       History reviewed. No pertinent family history.    Review of Systems   Constitutional: Positive " for fatigue. Negative for appetite change, chills, diaphoresis, fever and unexpected weight change.        Easily fatigued   HENT: Negative for congestion, ear pain, hearing loss, nosebleeds, postnasal drip, sinus pain, sinus pressure, sneezing, sore throat, tinnitus, trouble swallowing and voice change.    Eyes: Negative for photophobia, pain, itching and visual disturbance.   Respiratory: Negative for apnea, cough, chest tightness, shortness of breath, wheezing and stridor.    Cardiovascular: Negative for chest pain, palpitations and leg swelling.        Legs get cold if she is lying down-no claudication   Gastrointestinal: Positive for abdominal distention and constipation. Negative for abdominal pain, blood in stool, diarrhea, nausea and vomiting.        Some acid reflux and she went salo on zegerid-she takes it prn   Endocrine: Negative for cold intolerance, heat intolerance, polydipsia and polyuria.   Genitourinary: Negative for difficulty urinating, dyspareunia, dysuria, flank pain, frequency, hematuria, menstrual problem, pelvic pain, urgency, vaginal discharge and vaginal pain.   Musculoskeletal: Positive for arthralgias and myalgias. Negative for back pain, joint swelling, neck pain and neck stiffness.        Foot and leg pain when she walks-left dorsolateral foot and left dorsolateral thigh and lower dorsolateral lower leg   Skin: Negative for pallor.   Allergic/Immunologic: Negative for environmental allergies and food allergies.   Neurological: Positive for weakness. Negative for dizziness, tremors, speech difficulty, light-headedness and numbness.   Hematological: Does not bruise/bleed easily.   Psychiatric/Behavioral: Negative for agitation, confusion, decreased concentration, sleep disturbance and suicidal ideas. The patient is not nervous/anxious.           Objective:      Physical Exam   Constitutional: She is oriented to person, place, and time. She appears well-developed and well-nourished. She is  cooperative. No distress.   overweight   HENT:   Head: Normocephalic and atraumatic.   Right Ear: Tympanic membrane normal.   Left Ear: Tympanic membrane normal.   Mouth/Throat: Uvula is midline and mucous membranes are normal.   Eyes: Conjunctivae, EOM and lids are normal. Pupils are equal, round, and reactive to light. Right pupil is round and reactive. Left pupil is round and reactive.   Neck: Trachea normal and normal range of motion. Neck supple. No JVD present. No thyromegaly present.   Cardiovascular: Normal rate, regular rhythm, normal heart sounds and intact distal pulses.    Pulses:       Dorsalis pedis pulses are 1+ on the right side, and 1+ on the left side.        Posterior tibial pulses are 1+ on the right side, and 1+ on the left side.   Pulmonary/Chest: Effort normal and breath sounds normal. No tachypnea. No respiratory distress.   Abdominal: Soft. Bowel sounds are normal. There is no tenderness.   Abdominal protuberance-   Musculoskeletal: Normal range of motion. She exhibits tenderness.   tenderness over the lateral buttock and over the trochanteric bursa   Lymphadenopathy:     She has no cervical adenopathy.   Neurological: She is alert and oriented to person, place, and time. She has normal strength.   Skin: Skin is warm and dry. No rash noted.   Psychiatric: She has a normal mood and affect. Her speech is normal.   Nursing note and vitals reviewed.          Assessment:       1. Diabetes mellitus type 2, diet-controlled    2. Other fatigue    3. Weakness    4. Hypotension due to drugs    5. Acquired hypothyroidism    6. Coronary artery disease involving left main coronary artery    7. Fibromyalgia    8. Essential hypertension    9. Slow transit constipation         Plan:           Diabetes mellitus type 2, diet-controlled    Other fatigue    Weakness    Hypotension due to drugs    Acquired hypothyroidism    Coronary artery disease involving left main coronary artery    Fibromyalgia    Essential  hypertension    Slow transit constipation    Other orders  -     SYNTHROID 88 mcg tablet; Take 1 tablet (88 mcg total) by mouth once daily.  Dispense: 90 tablet; Refill: 1  -     omeprazole-sodium bicarbonate (ZEGERID) 40-1.1 mg-gram per capsule; Take 1 capsule by mouth before breakfast.  Dispense: 90 capsule; Refill: 1  -     lubiprostone (AMITIZA) 24 MCG Cap; Take 1 capsule (24 mcg total) by mouth 2 (two) times daily with meals.  Dispense: 60 capsule; Refill: 1

## 2018-03-19 ENCOUNTER — HOSPITAL ENCOUNTER (OUTPATIENT)
Dept: RADIOLOGY | Facility: CLINIC | Age: 83
Discharge: HOME | End: 2018-03-19
Attending: PODIATRIST
Payer: MEDICARE

## 2018-03-19 ENCOUNTER — OFFICE VISIT (OUTPATIENT)
Dept: PODIATRY | Facility: CLINIC | Age: 83
End: 2018-03-19
Payer: MEDICARE

## 2018-03-19 VITALS — BODY MASS INDEX: 30.04 KG/M2 | HEIGHT: 60 IN | WEIGHT: 153 LBS

## 2018-03-19 DIAGNOSIS — M19.079 ARTHRITIS OF FOOT: ICD-10-CM

## 2018-03-19 DIAGNOSIS — M79.672 FOOT PAIN, LEFT: Primary | ICD-10-CM

## 2018-03-19 DIAGNOSIS — M79.672 FOOT PAIN, LEFT: ICD-10-CM

## 2018-03-19 PROCEDURE — 99213 OFFICE O/P EST LOW 20 MIN: CPT | Mod: S$PBB,,, | Performed by: PODIATRIST

## 2018-03-19 PROCEDURE — 73630 X-RAY EXAM OF FOOT: CPT | Mod: TC,FY,PO,LT

## 2018-03-19 PROCEDURE — 99213 OFFICE O/P EST LOW 20 MIN: CPT | Mod: PBBFAC,PO,25 | Performed by: PODIATRIST

## 2018-03-19 PROCEDURE — 73630 X-RAY EXAM OF FOOT: CPT | Mod: 26,LT,S$GLB, | Performed by: RADIOLOGY

## 2018-03-19 PROCEDURE — 73610 X-RAY EXAM OF ANKLE: CPT | Mod: TC,FY,PO,LT

## 2018-03-19 PROCEDURE — 99999 PR PBB SHADOW E&M-EST. PATIENT-LVL III: CPT | Mod: PBBFAC,,, | Performed by: PODIATRIST

## 2018-03-19 PROCEDURE — 29540 STRAPPING ANKLE &/FOOT: CPT | Mod: PBBFAC,PO,LT | Performed by: PODIATRIST

## 2018-03-19 PROCEDURE — 73610 X-RAY EXAM OF ANKLE: CPT | Mod: 26,LT,S$GLB, | Performed by: RADIOLOGY

## 2018-03-19 RX ORDER — LIDOCAINE HYDROCHLORIDE 20 MG/ML
JELLY TOPICAL
Qty: 30 ML | Refills: 3 | Status: SHIPPED | OUTPATIENT
Start: 2018-03-19 | End: 2018-08-29

## 2018-03-19 NOTE — PROGRESS NOTES
Subjective:      Patient ID: Karma Chen is a 82 y.o. female.    Chief Complaint: Foot Pain (Left)    Pt is a 80 y/o female  has a past medical history of Coronary artery disease; Dermatitis; Dermatitis; Diabetes mellitus; Diabetes mellitus type II; GERD (gastroesophageal reflux disease); Hypertension; and MI (myocardial infarction). Presents to clinic with CC of left forefoot/ rearfoot/ankle pain. States the pain is different in nature than it was back in 2015 during previous clinic visit. States her pain is on the inside of her ankle. Deep, throbbing pain with gradual onset and without any inciting event. Didn't use gel, inserts, or stretch much.  No PT.        Review of Systems   Constitution: Negative for chills, diaphoresis, fever, malaise/fatigue and night sweats.   Cardiovascular: Negative for claudication, cyanosis, leg swelling and syncope.   Skin: Negative for color change, dry skin, nail changes, rash, suspicious lesions and unusual hair distribution.   Musculoskeletal: Positive for joint pain. Negative for falls, joint swelling, muscle cramps, muscle weakness and stiffness.   Gastrointestinal: Negative for constipation, diarrhea, nausea and vomiting.   Neurological: Negative for brief paralysis, disturbances in coordination, focal weakness, numbness, paresthesias, sensory change and tremors.   All other systems reviewed and are negative.          Objective:      Physical Exam   Constitutional: She is oriented to person, place, and time. She appears well-developed and well-nourished.   Cardiovascular: Intact distal pulses.    DP and PT pulses 1/4, left. CRT < 3 seconds to digits 1-5, left. No erythema or edema noted, no varicosities noted, left.            Musculoskeletal: She exhibits tenderness.   Strength 5/5, to all LE muscle groups, left.     Bony tenderness to palpation midfoot dorsal, plantar, medial and lateral without deformity or loss of function.     Equinus contracture noted to right and  left foot with ankle dorsiflexion of < 10 degrees left.    Lymphadenopathy:   Negative lymphadenopathy bilateral popliteal fossa and tarsal tunnel.  Negative lymphangitic streaking bilateral foot/ankle bilateral.     Neurological: She is alert and oriented to person, place, and time. She has normal strength. She displays no atrophy and no tremor. No sensory deficit. She exhibits normal muscle tone.   Sensation intact to digits via light touch, digits 1-5, left   Skin: Skin is warm and dry. Capillary refill takes 2 to 3 seconds.   Skin is warm, dry, and supple to LLE. No open lesions or SOI noted, LLE.    Psychiatric: She has a normal mood and affect. Her behavior is normal. Judgment and thought content normal.             Assessment:       Encounter Diagnoses   Name Primary?    Foot pain, left Yes    Arthritis of foot          Plan:       Karma was seen today for foot pain.    Diagnoses and all orders for this visit:    Foot pain, left  -     Ambulatory consult to Physical Therapy  -     ORTHOTIC DEVICE (DME)    Arthritis of foot  -     Ambulatory consult to Physical Therapy  -     ORTHOTIC DEVICE (DME)    Other orders  -     lidocaine HCL 2% (XYLOCAINE) 2 % jelly; Apply topically as needed. Apply topically once nightly to affected area left foot.      I counseled the patient on her conditions, their implications and medical management.    Patient will stretch the tendo achilles complex three times daily as demonstrated in the office.  Literature was dispensed illustrating proper stretching technique.    I applied a plantar rest strapping to the patient's left foot to offload symptomatic area, support the arch, and relieve pain.    Patient will obtain over the counter arch supports and wear them in shoes whenever possible.  Athletic shoes intended for walking or running are usually best.    The patient was advised that NSAID-type medications have two very important potential side effects: gastrointestinal  irritation including hemorrhage and renal injuries. She was asked to take the medication with food and to stop if she experiences any GI upset. I asked her to call for vomiting, abdominal pain or black/bloody stools. The patient expresses understanding of these issues and questions were answered.    Discussed conservative treatment with shoes of adequate dimensions, material, and style to alleviate symptoms and delay or prevent surgical intervention.    Rx lidocaine gel.    Repeat xrays left foot/ankle.    Rx PT, custom orthotics.

## 2018-04-12 ENCOUNTER — OFFICE VISIT (OUTPATIENT)
Dept: INTERNAL MEDICINE | Facility: CLINIC | Age: 83
End: 2018-04-12
Payer: MEDICARE

## 2018-04-12 VITALS
WEIGHT: 151 LBS | RESPIRATION RATE: 18 BRPM | HEIGHT: 60 IN | SYSTOLIC BLOOD PRESSURE: 132 MMHG | DIASTOLIC BLOOD PRESSURE: 64 MMHG | BODY MASS INDEX: 29.64 KG/M2 | TEMPERATURE: 98 F | OXYGEN SATURATION: 98 % | HEART RATE: 72 BPM

## 2018-04-12 DIAGNOSIS — M54.42 CHRONIC BILATERAL LOW BACK PAIN WITH BILATERAL SCIATICA: ICD-10-CM

## 2018-04-12 DIAGNOSIS — F41.9 ANXIETY: ICD-10-CM

## 2018-04-12 DIAGNOSIS — M54.41 CHRONIC BILATERAL LOW BACK PAIN WITH BILATERAL SCIATICA: ICD-10-CM

## 2018-04-12 DIAGNOSIS — E11.9 DIABETES MELLITUS TYPE 2, DIET-CONTROLLED: Primary | ICD-10-CM

## 2018-04-12 DIAGNOSIS — G89.29 CHRONIC BILATERAL LOW BACK PAIN WITH BILATERAL SCIATICA: ICD-10-CM

## 2018-04-12 DIAGNOSIS — Z91.09 ENVIRONMENTAL ALLERGIES: ICD-10-CM

## 2018-04-12 DIAGNOSIS — I10 ESSENTIAL HYPERTENSION: ICD-10-CM

## 2018-04-12 DIAGNOSIS — E03.9 ACQUIRED HYPOTHYROIDISM: ICD-10-CM

## 2018-04-12 PROCEDURE — 99213 OFFICE O/P EST LOW 20 MIN: CPT | Mod: ,,, | Performed by: INTERNAL MEDICINE

## 2018-04-12 RX ORDER — LEVOTHYROXINE SODIUM 88 UG/1
88 TABLET ORAL DAILY
Qty: 90 TABLET | Refills: 1 | Status: SHIPPED | OUTPATIENT
Start: 2018-04-12 | End: 2018-07-16 | Stop reason: SDUPTHER

## 2018-04-12 RX ORDER — ALPRAZOLAM 0.25 MG/1
0.25 TABLET ORAL 2 TIMES DAILY PRN
Qty: 180 TABLET | Refills: 0 | Status: SHIPPED | OUTPATIENT
Start: 2018-04-12 | End: 2018-08-29 | Stop reason: SDUPTHER

## 2018-04-12 RX ORDER — MONTELUKAST SODIUM 10 MG/1
10 TABLET ORAL NIGHTLY
COMMUNITY
End: 2018-04-12 | Stop reason: SDUPTHER

## 2018-04-12 RX ORDER — MONTELUKAST SODIUM 10 MG/1
10 TABLET ORAL NIGHTLY
Qty: 90 TABLET | Refills: 1 | Status: SHIPPED | OUTPATIENT
Start: 2018-04-12 | End: 2018-08-29 | Stop reason: SDUPTHER

## 2018-04-12 RX ORDER — TRAMADOL HYDROCHLORIDE 50 MG/1
50 TABLET ORAL EVERY 6 HOURS PRN
Qty: 90 TABLET | Refills: 0 | Status: SHIPPED | OUTPATIENT
Start: 2018-04-12 | End: 2018-08-29 | Stop reason: SDUPTHER

## 2018-04-12 NOTE — PROGRESS NOTES
SUBJECTIVE:    Patient ID: Karma Chen is a 82 y.o. female.    Chief Complaint: Follow-up; Diabetes; and Abdominal Pain    HPI     Patient comes in for recheck-she is about to have a recheck appt with cardiology who wants to do some labs including A1C which I will not do in clinic due to same    Patient is feeling tired-Her legs have been hurting--entire legs ache much of the time-    She states the Amitiza makes her have diarrhea so she does not take it every day-        Past Medical History:   Diagnosis Date    Coronary artery disease     Dermatitis     Dermatitis 12/8/2017    Diabetes mellitus     Diabetes mellitus type II     GERD (gastroesophageal reflux disease)     Hypertension     MI (myocardial infarction)      Social History     Social History    Marital status:      Spouse name: N/A    Number of children: N/A    Years of education: N/A     Occupational History    Not on file.     Social History Main Topics    Smoking status: Never Smoker    Smokeless tobacco: Never Used    Alcohol use No    Drug use: No    Sexual activity: Not on file     Other Topics Concern    Not on file     Social History Narrative    No narrative on file     Past Surgical History:   Procedure Laterality Date    adenoids      APPENDECTOMY      CHOLECYSTECTOMY      CORONARY ARTERY BYPASS GRAFT      coronary stents      HYSTERECTOMY      TONSILLECTOMY       Family History   Problem Relation Age of Onset    No Known Problems Mother     No Known Problems Father        Review of Systems   Constitutional: Negative for appetite change, chills, diaphoresis, fatigue, fever and unexpected weight change.   HENT: Negative for congestion, ear pain, hearing loss, nosebleeds, postnasal drip, sinus pain, sinus pressure, sneezing, sore throat, tinnitus, trouble swallowing and voice change.    Eyes: Negative for photophobia, pain, itching and visual disturbance.   Respiratory: Negative for apnea, cough, chest  tightness, shortness of breath, wheezing and stridor.    Cardiovascular: Negative for chest pain, palpitations and leg swelling.   Gastrointestinal: Negative for abdominal distention, abdominal pain, blood in stool, constipation, diarrhea, nausea and vomiting.   Endocrine: Negative for cold intolerance, heat intolerance, polydipsia and polyuria.   Genitourinary: Negative for difficulty urinating, dyspareunia, dysuria, flank pain, frequency, hematuria, menstrual problem, pelvic pain, urgency, vaginal discharge and vaginal pain.   Musculoskeletal: Positive for arthralgias (some numbness in the thighs). Negative for back pain, joint swelling, myalgias, neck pain and neck stiffness.   Skin: Negative for pallor.   Allergic/Immunologic: Negative for environmental allergies and food allergies.   Neurological: Negative for dizziness, tremors, speech difficulty, weakness, light-headedness and numbness.   Hematological: Does not bruise/bleed easily.   Psychiatric/Behavioral: Negative for agitation, confusion, decreased concentration, sleep disturbance and suicidal ideas. The patient is not nervous/anxious.           Objective:      Physical Exam   Constitutional: She is oriented to person, place, and time. She appears well-developed and well-nourished. She is cooperative. No distress.   HENT:   Head: Normocephalic and atraumatic.   Right Ear: Tympanic membrane normal.   Left Ear: Tympanic membrane normal.   Nose: Nose normal.   Mouth/Throat: Uvula is midline, oropharynx is clear and moist and mucous membranes are normal.   Eyes: Conjunctivae, EOM and lids are normal. Pupils are equal, round, and reactive to light. Right pupil is round and reactive. Left pupil is round and reactive.   Neck: Trachea normal and normal range of motion. Neck supple. No JVD present. No thyromegaly present.   Cardiovascular: Normal rate, regular rhythm, normal heart sounds and intact distal pulses.    Pulmonary/Chest: Effort normal and breath sounds  normal. No tachypnea. No respiratory distress.   Abdominal: Soft. Bowel sounds are normal. There is no tenderness.   Abdominal distention   Musculoskeletal: Normal range of motion.   Lymphadenopathy:     She has no cervical adenopathy.   Neurological: She is alert and oriented to person, place, and time. She has normal strength.   Skin: Skin is warm and dry. No rash noted.   Psychiatric: She has a normal mood and affect. Her speech is normal.   Nursing note and vitals reviewed.          Assessment:       1. Diabetes mellitus type 2, diet-controlled    2. Anxiety    3. Essential hypertension    4. Acquired hypothyroidism    5. Environmental allergies    6. Chronic bilateral low back pain with bilateral sciatica         Plan:           Diabetes mellitus type 2, diet-controlled    Anxiety  -     ALPRAZolam (XANAX) 0.25 MG tablet; Take 1 tablet (0.25 mg total) by mouth 2 (two) times daily as needed.  Dispense: 180 tablet; Refill: 0    Essential hypertension    Acquired hypothyroidism  -     SYNTHROID 88 mcg tablet; Take 1 tablet (88 mcg total) by mouth once daily.  Dispense: 90 tablet; Refill: 1    Environmental allergies  -     montelukast (SINGULAIR) 10 mg tablet; Take 1 tablet (10 mg total) by mouth every evening.  Dispense: 90 tablet; Refill: 1    Chronic bilateral low back pain with bilateral sciatica  -     traMADol (ULTRAM) 50 mg tablet; Take 1 tablet (50 mg total) by mouth every 6 (six) hours as needed.  Dispense: 90 tablet; Refill: 0

## 2018-04-24 ENCOUNTER — TELEPHONE (OUTPATIENT)
Dept: INTERNAL MEDICINE | Facility: CLINIC | Age: 83
End: 2018-04-24

## 2018-07-16 DIAGNOSIS — E03.9 ACQUIRED HYPOTHYROIDISM: ICD-10-CM

## 2018-07-16 RX ORDER — LEVOTHYROXINE SODIUM 88 UG/1
88 TABLET ORAL DAILY
Qty: 90 TABLET | Refills: 1 | Status: SHIPPED | OUTPATIENT
Start: 2018-07-16 | End: 2018-08-29 | Stop reason: SDUPTHER

## 2018-07-30 RX ORDER — LOSARTAN POTASSIUM 100 MG/1
TABLET ORAL
Qty: 90 TABLET | Refills: 1 | Status: SHIPPED | OUTPATIENT
Start: 2018-07-30 | End: 2018-08-01

## 2018-08-01 ENCOUNTER — OFFICE VISIT (OUTPATIENT)
Dept: INTERNAL MEDICINE | Facility: CLINIC | Age: 83
End: 2018-08-01
Payer: MEDICARE

## 2018-08-01 VITALS
OXYGEN SATURATION: 98 % | WEIGHT: 148 LBS | SYSTOLIC BLOOD PRESSURE: 124 MMHG | HEART RATE: 66 BPM | TEMPERATURE: 98 F | BODY MASS INDEX: 29.06 KG/M2 | HEIGHT: 60 IN | RESPIRATION RATE: 16 BRPM | DIASTOLIC BLOOD PRESSURE: 62 MMHG

## 2018-08-01 DIAGNOSIS — E11.9 DIABETES MELLITUS TYPE 2, DIET-CONTROLLED: ICD-10-CM

## 2018-08-01 DIAGNOSIS — Z12.11 COLON CANCER SCREENING: ICD-10-CM

## 2018-08-01 DIAGNOSIS — I49.1 APC (ATRIAL PREMATURE CONTRACTIONS): ICD-10-CM

## 2018-08-01 DIAGNOSIS — B34.9 VIRAL SYNDROME: Primary | ICD-10-CM

## 2018-08-01 PROCEDURE — 99214 OFFICE O/P EST MOD 30 MIN: CPT | Mod: ,,, | Performed by: INTERNAL MEDICINE

## 2018-08-01 RX ORDER — VALACYCLOVIR HYDROCHLORIDE 1 G/1
1000 TABLET, FILM COATED ORAL
Qty: 21 TABLET | Refills: 0 | Status: SHIPPED | OUTPATIENT
Start: 2018-08-01 | End: 2018-08-29

## 2018-08-01 RX ORDER — AMOXICILLIN 500 MG
CAPSULE ORAL DAILY
COMMUNITY
End: 2019-10-30

## 2018-08-01 RX ORDER — HYDROCODONE BITARTRATE AND ACETAMINOPHEN 5; 325 MG/1; MG/1
1 TABLET ORAL EVERY 12 HOURS PRN
COMMUNITY
End: 2019-10-30

## 2018-08-01 NOTE — PROGRESS NOTES
"  SUBJECTIVE:    Patient ID: Karma Chen is a 82 y.o. female.    Chief Complaint: Fatigue; Constipation; and Heartburn    HPI     Pt in for recheck-    Fatigue-feels bad-head doesn't feel good-feels clogged up and sometime feels like enough blood going too it-does not want to do anything-some good days-went to store this weekend and walked around store-felt ok-next pm she started with stomach cramps and with her OTC tranquil tract she has no BM then goes a good bit-she has a break-out on lip and a spot on the nose-has a "white streak" in the throat but no sore throat-no fever-denies dizziness-no orthostatic changes-just feels weak-says she has really been warm-almost feels like sinuses are blocked...    Constipation-see above-felt like a "hunk" coming out but was watery-sample of Camitz given caused diarrhea-(24 mg)     Heartburn-recently felt some epigastric discomfort-taken off Zetia due to Plavix-taken off tagamet-now on zyrtec-no pains but some discomfort in chest area-took one NTG and it went away-      Past Medical History:   Diagnosis Date    Coronary artery disease     Dermatitis     Dermatitis 12/8/2017    Diabetes mellitus     Diabetes mellitus type II     GERD (gastroesophageal reflux disease)     Hypertension     MI (myocardial infarction)      Social History     Social History    Marital status:      Spouse name: N/A    Number of children: N/A    Years of education: N/A     Occupational History    Not on file.     Social History Main Topics    Smoking status: Never Smoker    Smokeless tobacco: Never Used    Alcohol use No    Drug use: No    Sexual activity: Not on file     Other Topics Concern    Not on file     Social History Narrative    No narrative on file     Past Surgical History:   Procedure Laterality Date    adenoids      APPENDECTOMY      CHOLECYSTECTOMY      CORONARY ARTERY BYPASS GRAFT      coronary stents      HYSTERECTOMY      TONSILLECTOMY       Family " History   Problem Relation Age of Onset    No Known Problems Mother     No Known Problems Father      Vitals:    08/01/18 0929   BP: 124/62   Pulse: 66   Resp: 16   Temp: 98 °F (36.7 °C)   SpO2: 98%   Weight: 67.1 kg (148 lb)   Height: 5' (1.524 m)       Review of Systems   Constitutional: Negative for appetite change, chills, diaphoresis, fatigue, fever and unexpected weight change.        HPI   HENT: Positive for mouth sores (HPI) and rhinorrhea (after she eats). Negative for congestion, ear pain, hearing loss, nosebleeds, postnasal drip, sinus pain, sinus pressure, sneezing, sore throat, tinnitus, trouble swallowing and voice change.    Eyes: Negative for photophobia, pain, itching and visual disturbance.   Respiratory: Negative for apnea, cough, chest tightness, shortness of breath, wheezing and stridor.    Cardiovascular: Positive for chest pain (HPI). Negative for palpitations and leg swelling.   Gastrointestinal: Negative for abdominal distention, abdominal pain, blood in stool, constipation, diarrhea, nausea and vomiting.        HPI   Endocrine: Negative for cold intolerance, heat intolerance (recent), polydipsia and polyuria.   Genitourinary: Negative for difficulty urinating, dyspareunia, dysuria, flank pain, frequency, hematuria, menstrual problem, pelvic pain, urgency, vaginal discharge and vaginal pain.        Aching low in the abd when she first gets up in the am-after urination pain goes away for the most part   Musculoskeletal: Positive for back pain (due for rhizotomy on the 10th of August). Negative for arthralgias, joint swelling, myalgias, neck pain and neck stiffness.   Skin: Negative for pallor.   Allergic/Immunologic: Negative for environmental allergies and food allergies.   Neurological: Positive for light-headedness (? related to present sx-see HPI). Negative for dizziness, tremors, speech difficulty, weakness and numbness.   Hematological: Does not bruise/bleed easily.    Psychiatric/Behavioral: Negative for agitation, confusion, decreased concentration, sleep disturbance and suicidal ideas. The patient is not nervous/anxious.           Objective:      Physical Exam   Constitutional: She is oriented to person, place, and time. She appears well-developed and well-nourished. She is cooperative. No distress.   HENT:   Head: Normocephalic and atraumatic.   Right Ear: Tympanic membrane normal.   Left Ear: Tympanic membrane normal.   Mouth/Throat: Uvula is midline and mucous membranes are normal.   Crimson crescents   Eyes: Conjunctivae and EOM are normal. Right pupil is round and reactive. Left pupil is round and reactive.   Neck: Trachea normal and normal range of motion. Neck supple. No JVD present. No thyromegaly present.   Cardiovascular: Normal rate, regular rhythm, normal heart sounds and intact distal pulses.    Multiple APC's   Pulmonary/Chest: Effort normal and breath sounds normal. No tachypnea. No respiratory distress.   Abdominal: Soft. Bowel sounds are normal. There is no tenderness.   Musculoskeletal: Normal range of motion.   Lymphadenopathy:     She has no cervical adenopathy.   Neurological: She is alert and oriented to person, place, and time. She has normal strength.   Skin: Skin is warm and dry. No rash noted.   Healing blister side of nose on right   Psychiatric: She has a normal mood and affect. Her speech is normal.   Nursing note and vitals reviewed.          Assessment:       1. Viral syndrome    2. APC (atrial premature contractions)    3. Diabetes mellitus type 2, diet-controlled    4. BMI 28.0-28.9,adult    5. Colon cancer screening         Plan:           Viral syndrome  -     valacyclovir (VALTREX) 1000 MG tablet; Take 1 tablet (1,000 mg total) by mouth 3 (three) times daily after meals.  Dispense: 21 tablet; Refill: 0    PC (atrial premature contractions)         -     benign    Diabetes mellitus type 2, diet-controlled  -     Lipid panel; Future; Expected  date: 08/02/2018  -     Hemoglobin AC; Future; Expected date: 08/01/2018    BMI 28.0-28.9,adult        -      Dietary recks    Colon cancer screening  -     Kirby guard Screening (Multi target Stool DNA)    Other orders  -     Cancel: CBC auto differential; Future; Expected date: 08/01/2018  -     Cancel: Comprehensive metabolic panel; Future; Expected date: 08/01/2018  -     Cancel: TSH; Future; Expected date: 08/01/2018

## 2018-08-01 NOTE — PATIENT INSTRUCTIONS
The patient is asked to make an attempt to improve diet and exercise patterns to aid in medical management of this problem.    ECHINACEA-3 grams every 6 hours for three days plus Vit C 2 grams every 6 hours for three days

## 2018-08-08 DIAGNOSIS — I25.10 PRESENCE OF STENT IN CORONARY ARTERY IN PATIENT WITH CORONARY ARTERY DISEASE: ICD-10-CM

## 2018-08-08 DIAGNOSIS — I25.810 CORONARY ARTERY DISEASE INVOLVING AUTOLOGOUS ARTERY CORONARY BYPASS GRAFT WITHOUT ANGINA PECTORIS: ICD-10-CM

## 2018-08-08 DIAGNOSIS — Z95.5 PRESENCE OF STENT IN CORONARY ARTERY IN PATIENT WITH CORONARY ARTERY DISEASE: ICD-10-CM

## 2018-08-08 RX ORDER — CLOPIDOGREL BISULFATE 75 MG/1
75 TABLET ORAL DAILY
Qty: 30 TABLET | Refills: 5 | Status: SHIPPED | OUTPATIENT
Start: 2018-08-08 | End: 2018-12-07 | Stop reason: SDUPTHER

## 2018-08-13 ENCOUNTER — TELEPHONE (OUTPATIENT)
Dept: INTERNAL MEDICINE | Facility: CLINIC | Age: 83
End: 2018-08-13

## 2018-08-13 NOTE — TELEPHONE ENCOUNTER
----- Message from Elena Sullivan MD sent at 8/13/2018  3:13 PM CDT -----  Tell pt study negative  ----- Message -----  From: Milagros Garcia MA  Sent: 8/13/2018   2:09 PM  To: Elena Sullivan MD        ----- Message -----  From: Tamara Deng  Sent: 8/13/2018  10:43 AM  To: Laurie Parker Staff    Lab, [facility], 8/06/18

## 2018-08-15 LAB
ALBUMIN SERPL-MCNC: 3.8 G/DL (ref 3.1–4.7)
ALP SERPL-CCNC: 46 IU/L (ref 40–104)
ALT (SGPT): 17 IU/L (ref 3–33)
AST SERPL-CCNC: 21 IU/L (ref 10–40)
BASOPHILS NFR BLD: 0.1 K/UL (ref 0–0.2)
BASOPHILS NFR BLD: 0.7 %
BILIRUB SERPL-MCNC: 0.4 MG/DL (ref 0.3–1)
BUN SERPL-MCNC: 22 MG/DL (ref 8–20)
CALCIUM SERPL-MCNC: 8.8 MG/DL (ref 7.7–10.4)
CHLORIDE: 109 MMOL/L (ref 98–110)
CO2 SERPL-SCNC: 21.4 MMOL/L (ref 22.8–31.6)
CREATININE: 1.45 MG/DL (ref 0.6–1.4)
EOSINOPHIL NFR BLD: 0.3 K/UL (ref 0–0.7)
EOSINOPHIL NFR BLD: 3.5 %
ERYTHROCYTE [DISTWIDTH] IN BLOOD BY AUTOMATED COUNT: 14.1 % (ref 11.7–14.9)
GLUCOSE: 85 MG/DL (ref 70–99)
GRAN #: 4.5 K/UL (ref 1.4–6.5)
GRAN%: 49.3 %
HBA1C MFR BLD: 6 % (ref 3.1–6.5)
HCT VFR BLD AUTO: 34.3 % (ref 36–48)
HGB BLD-MCNC: 11.1 G/DL (ref 12–15)
IMMATURE GRANS (ABS): 0 K/UL (ref 0–1)
IMMATURE GRANULOCYTES: 0.3 %
LYMPH #: 3 K/UL (ref 1.2–3.4)
LYMPH%: 33.1 %
MCH RBC QN AUTO: 30.6 PG (ref 25–35)
MCHC RBC AUTO-ENTMCNC: 32.4 G/DL (ref 31–36)
MCV RBC AUTO: 94.5 FL (ref 79–98)
MONO #: 1.2 K/UL (ref 0.1–0.6)
MONO%: 13.1 %
NUCLEATED RBCS: 0 %
PLATELET # BLD AUTO: 305 K/UL (ref 140–440)
PMV BLD AUTO: 10.6 FL (ref 8.8–12.7)
POTASSIUM SERPL-SCNC: 3.8 MMOL/L (ref 3.5–5)
PROT SERPL-MCNC: 7 G/DL (ref 6–8.2)
RBC # BLD AUTO: 3.63 M/UL (ref 3.5–5.5)
SODIUM: 139 MMOL/L (ref 134–144)
WBC # BLD AUTO: 9 K/UL (ref 5–10)

## 2018-08-16 ENCOUNTER — TELEPHONE (OUTPATIENT)
Dept: INTERNAL MEDICINE | Facility: CLINIC | Age: 83
End: 2018-08-16

## 2018-08-16 NOTE — TELEPHONE ENCOUNTER
----- Message from Elena Sullivan MD sent at 8/16/2018 10:47 AM CDT -----  Test results are acceptable for A!C but do reflect the need for possible medication for prediabetic state. I will not start this medicine until I reevaluate kidney function. Cholesterol is elevated and LDL is a little elevated but I am not going to treat that due to observation of renal function in the history was suggest that LDL needs to be 70 I just do not want to take the chance

## 2018-08-16 NOTE — TELEPHONE ENCOUNTER
----- Message from Elena Sullivan MD sent at 8/16/2018 10:44 AM CDT -----  Please call the patient regarding her abnormal result.there has been some decrease in renal function since February and the only change has been the start of losartan 25 mg.Therefore, I want to recheck the studies in  month and if there is any further decline in function we will stop that medication-please pinned a lab for basal metabolic panel with diagnosis of chronic kidney disease stage III

## 2018-08-16 NOTE — TELEPHONE ENCOUNTER
PATIENT IS HAVING A TEST WHERE THEY GO IN AND BLOCK NERVES. SHE STATED THEY DO PUT HER TO SLEEP FOR THE PROCEDURE.  I EXPLAINED HER LAB WORK TO HER AND SHE STATED THAT SHE WILL RECHECK IN ONE MONTH

## 2018-08-29 ENCOUNTER — OFFICE VISIT (OUTPATIENT)
Dept: FAMILY MEDICINE | Facility: CLINIC | Age: 83
End: 2018-08-29
Payer: MEDICARE

## 2018-08-29 VITALS
WEIGHT: 150 LBS | HEART RATE: 68 BPM | OXYGEN SATURATION: 97 % | RESPIRATION RATE: 16 BRPM | TEMPERATURE: 98 F | BODY MASS INDEX: 29.45 KG/M2 | HEIGHT: 60 IN | SYSTOLIC BLOOD PRESSURE: 124 MMHG | DIASTOLIC BLOOD PRESSURE: 58 MMHG

## 2018-08-29 DIAGNOSIS — M54.42 CHRONIC BILATERAL LOW BACK PAIN WITH BILATERAL SCIATICA: Primary | ICD-10-CM

## 2018-08-29 DIAGNOSIS — F41.9 ANXIETY: ICD-10-CM

## 2018-08-29 DIAGNOSIS — L30.9 DERMATITIS: ICD-10-CM

## 2018-08-29 DIAGNOSIS — E11.9 DIABETES MELLITUS TYPE 2, DIET-CONTROLLED: ICD-10-CM

## 2018-08-29 DIAGNOSIS — G89.29 CHRONIC BILATERAL LOW BACK PAIN WITH BILATERAL SCIATICA: Primary | ICD-10-CM

## 2018-08-29 DIAGNOSIS — M54.41 CHRONIC BILATERAL LOW BACK PAIN WITH BILATERAL SCIATICA: Primary | ICD-10-CM

## 2018-08-29 DIAGNOSIS — I10 ESSENTIAL HYPERTENSION: ICD-10-CM

## 2018-08-29 DIAGNOSIS — N18.30 CHRONIC RENAL DISEASE, STAGE III: ICD-10-CM

## 2018-08-29 DIAGNOSIS — Z91.09 ENVIRONMENTAL ALLERGIES: ICD-10-CM

## 2018-08-29 DIAGNOSIS — E78.00 PURE HYPERCHOLESTEROLEMIA: ICD-10-CM

## 2018-08-29 DIAGNOSIS — E03.9 ACQUIRED HYPOTHYROIDISM: ICD-10-CM

## 2018-08-29 PROCEDURE — 99213 OFFICE O/P EST LOW 20 MIN: CPT | Mod: ,,, | Performed by: INTERNAL MEDICINE

## 2018-08-29 RX ORDER — ALPRAZOLAM 0.25 MG/1
0.25 TABLET ORAL 2 TIMES DAILY PRN
Qty: 180 TABLET | Refills: 0 | Status: SHIPPED | OUTPATIENT
Start: 2018-08-29 | End: 2019-02-04

## 2018-08-29 RX ORDER — LORATADINE 10 MG/1
10 TABLET ORAL DAILY
COMMUNITY
End: 2018-10-15

## 2018-08-29 RX ORDER — TRIAMCINOLONE ACETONIDE 1 MG/G
CREAM TOPICAL 2 TIMES DAILY
Qty: 15 G | Refills: 1 | Status: SHIPPED | OUTPATIENT
Start: 2018-08-29 | End: 2018-11-13 | Stop reason: SDUPTHER

## 2018-08-29 RX ORDER — ATORVASTATIN CALCIUM 40 MG/1
40 TABLET, FILM COATED ORAL DAILY
Qty: 30 TABLET | Refills: 5 | Status: SHIPPED | OUTPATIENT
Start: 2018-08-29 | End: 2018-10-10

## 2018-08-29 RX ORDER — MONTELUKAST SODIUM 10 MG/1
10 TABLET ORAL NIGHTLY
Qty: 90 TABLET | Refills: 1 | Status: SHIPPED | OUTPATIENT
Start: 2018-08-29 | End: 2019-03-12 | Stop reason: SDUPTHER

## 2018-08-29 RX ORDER — TRAMADOL HYDROCHLORIDE 50 MG/1
50 TABLET ORAL EVERY 6 HOURS PRN
Qty: 90 TABLET | Refills: 0 | Status: SHIPPED | OUTPATIENT
Start: 2018-08-29 | End: 2018-11-13 | Stop reason: SDUPTHER

## 2018-08-29 RX ORDER — LEVOTHYROXINE SODIUM 88 UG/1
88 TABLET ORAL DAILY
Qty: 90 TABLET | Refills: 1 | Status: SHIPPED | OUTPATIENT
Start: 2018-08-29 | End: 2018-10-15

## 2018-08-29 NOTE — PROGRESS NOTES
SUBJECTIVE:    Patient ID: Karma Chen is a 82 y.o. female.    Chief Complaint: Diabetes; Hypertension; and Follow-up    HPI     Patient comes in for recheck--her renal function is  a little down and we will follow and we will review medications-CBC is improved--her CHOL is elevated with the     Pt has chronic back pain in and tries to stay away from her codeine-she has had 90 over the past 4-5 months and is very reliable-pain is inconstant and in the leg only when she walks a great deal-pain is a 7 when she relies on Rx for same      No results found for this or any previous visit (from the past 336 hour(s)).  Orders Only on 08/15/2018   Component Date Value Ref Range Status    Glucose 08/15/2018 85  70 - 99 mg/dL Final    BUN, Bld 08/15/2018 22* 8 - 20 mg/dL Final    Creatinine 08/15/2018 1.45* 0.60 - 1.40 mg/dL Final    Calcium 08/15/2018 8.8  7.7 - 10.4 mg/dL Final    Sodium 08/15/2018 139  134 - 144 mmol/L Final    Potassium 08/15/2018 3.8  3.5 - 5.0 mmol/L Final    Chloride 08/15/2018 109  98 - 110 mmol/L Final    CO2 08/15/2018 21.4* 22.8 - 31.6 mmol/L Final    Albumin 08/15/2018 3.8  3.1 - 4.7 g/dL Final    Total Bilirubin 08/15/2018 0.4  0.3 - 1.0 mg/dL Final    Alkaline Phosphatase 08/15/2018 46  40 - 104 IU/L Final    Total Protein 08/15/2018 7.0  6.0 - 8.2 g/dL Final    ALT (SGPT) 08/15/2018 17  3 - 33 IU/L Final    AST 08/15/2018 21  10 - 40 IU/L Final   Orders Only on 08/15/2018   Component Date Value Ref Range Status    WBC 08/15/2018 9.0  5.0 - 10.0 K/uL Final    RBC 08/15/2018 3.63  3.50 - 5.50 M/uL Final    Hemoglobin 08/15/2018 11.1* 12.0 - 15.0 g/dL Final    Hematocrit 08/15/2018 34.3* 36.0 - 48.0 % Final    MCV 08/15/2018 94.5  79.0 - 98.0 fL Final    MCH 08/15/2018 30.6  25.0 - 35.0 pg Final    MCHC 08/15/2018 32.4  31.0 - 36.0 g/dL Final    RDW 08/15/2018 14.1  11.7 - 14.9 % Final    Platelets 08/15/2018 305  140 - 440 K/uL Final    MPV 08/15/2018 10.6  8.8  - 12.7 fL Final    Gran% 08/15/2018 49.3  % Final    Lymph% 08/15/2018 33.1  % Final    Mono% 08/15/2018 13.1  % Final    Eosinophil% 08/15/2018 3.5  % Final    Basophil% 08/15/2018 0.7  % Final    Gran # (ANC) 08/15/2018 4.5  1.4 - 6.5 K/uL Final    Lymph # 08/15/2018 3.0  1.2 - 3.4 K/uL Final    Mono # 08/15/2018 1.2* 0.1 - 0.6 K/uL Final    Eos # 08/15/2018 0.3  0.0 - 0.7 K/uL Final    Baso # 08/15/2018 0.1  0.0 - 0.2 K/uL Final    Immature Grans (Abs) 08/15/2018 0.0  0.0 - 1.0 K/uL Final    Immature Granulocytes 08/15/2018 0.3  % Final    nRBC% 08/15/2018 0  % Final   Office Visit on 08/01/2018   Component Date Value Ref Range Status    Hemoglobin A1C 08/15/2018 6.0  3.1 - 6.5 % Final       Past Medical History:   Diagnosis Date    Coronary artery disease     Dermatitis     Dermatitis 12/8/2017    Diabetes mellitus     Diabetes mellitus type II     GERD (gastroesophageal reflux disease)     Hypertension     MI (myocardial infarction)      Social History     Socioeconomic History    Marital status:      Spouse name: Not on file    Number of children: Not on file    Years of education: Not on file    Highest education level: Not on file   Social Needs    Financial resource strain: Not on file    Food insecurity - worry: Not on file    Food insecurity - inability: Not on file    Transportation needs - medical: Not on file    Transportation needs - non-medical: Not on file   Occupational History    Not on file   Tobacco Use    Smoking status: Never Smoker    Smokeless tobacco: Never Used   Substance and Sexual Activity    Alcohol use: No    Drug use: No    Sexual activity: Not on file   Other Topics Concern    Not on file   Social History Narrative    Not on file     Past Surgical History:   Procedure Laterality Date    adenoids      APPENDECTOMY      CHOLECYSTECTOMY      CORONARY ARTERY BYPASS GRAFT      coronary stents      HYSTERECTOMY      TONSILLECTOMY        Family History   Problem Relation Age of Onset    No Known Problems Mother     No Known Problems Father      Vitals:    08/29/18 1300   BP: (!) 124/58   Pulse: 68   Resp: 16   Temp: 98 °F (36.7 °C)   SpO2: 97%   Weight: 68 kg (150 lb)   Height: 5' (1.524 m)       Review of Systems   Constitutional: Negative for chills, fatigue, fever and unexpected weight change.        Had a very fatigued weeend and some nonspecific URI sx sounded viral   HENT: Negative for congestion, ear pain, hearing loss, sinus pain and sore throat (several days ago with fatigue).    Eyes: Negative for pain and visual disturbance.        Occ ache beneath right eye   Respiratory: Negative for cough, shortness of breath and wheezing.    Cardiovascular: Negative for chest pain, palpitations and leg swelling.   Gastrointestinal: Negative for abdominal pain, blood in stool, constipation, diarrhea (recent loiose stools now cleared), nausea and vomiting.   Endocrine: Negative for cold intolerance and heat intolerance.   Genitourinary: Negative for difficulty urinating, dysuria, frequency, pelvic pain and urgency.   Musculoskeletal: Negative for back pain, joint swelling and neck pain.        Has had some injections of cortisone this year and is concerned re her sugar-the sugar is good however and A1C is good--upcoming rhizotomy right low back   Skin: Negative for pallor and rash.   Neurological: Negative for dizziness, tremors, weakness, numbness and headaches.   Hematological: Does not bruise/bleed easily.   Psychiatric/Behavioral: Negative for agitation, sleep disturbance and suicidal ideas.          Objective:      Physical Exam   Constitutional: She is oriented to person, place, and time. She appears well-developed and well-nourished. She is cooperative.   HENT:   Head: Normocephalic and atraumatic.   Right Ear: Tympanic membrane normal.   Left Ear: Tympanic membrane normal.   Pharynx dry with mild injection   Eyes: Conjunctivae and EOM are  normal. Right pupil is round and reactive. Left pupil is round and reactive.   Neck: Trachea normal and normal range of motion. Neck supple.   Cardiovascular: Normal rate, regular rhythm, S1 normal, S2 normal, normal heart sounds and intact distal pulses.   Pulmonary/Chest: Breath sounds normal.   Abdominal: Soft. Bowel sounds are normal. There is no rigidity and no guarding.   Musculoskeletal: Normal range of motion.   Lymphadenopathy:     She has no cervical adenopathy.     She has no axillary adenopathy.   Neurological: She is alert and oriented to person, place, and time.   Skin: Skin is warm and dry. Capillary refill takes less than 2 seconds.   Psychiatric: She has a normal mood and affect. Her behavior is normal. Judgment and thought content normal.   Nursing note and vitals reviewed.          Assessment:       1. Chronic bilateral low back pain with bilateral sciatica    2. Diabetes mellitus type 2, diet-controlled    3. Anxiety    4. Environmental allergies    5. Acquired hypothyroidism    6. Essential hypertension    7. Pure hypercholesterolemia    8. Chronic renal disease, stage III    9. Dermatitis         Plan:           Chronic bilateral low back pain with bilateral sciatica  -     traMADol (ULTRAM) 50 mg tablet; Take 1 tablet (50 mg total) by mouth every 6 (six) hours as needed.  Dispense: 90 tablet; Refill: 0    Diabetes mellitus type 2, diet-controlled    Anxiety  -     ALPRAZolam (XANAX) 0.25 MG tablet; Take 1 tablet (0.25 mg total) by mouth 2 (two) times daily as needed.  Dispense: 180 tablet; Refill: 0    Environmental allergies  -     montelukast (SINGULAIR) 10 mg tablet; Take 1 tablet (10 mg total) by mouth every evening.  Dispense: 90 tablet; Refill: 1    Acquired hypothyroidism  -     SYNTHROID 88 mcg tablet; Take 1 tablet (88 mcg total) by mouth once daily.  Dispense: 90 tablet; Refill: 1    Essential hypertension    Pure hypercholesterolemia  -     atorvastatin (LIPITOR) 40 MG tablet; Take  1 tablet (40 mg total) by mouth once daily.  Dispense: 30 tablet; Refill: 5    Chronic renal disease, stage III  -     Basic metabolic panel; Future; Expected date: 08/29/2018    Dermatitis  -     triamcinolone acetonide 0.1% (KENALOG) 0.1 % cream; Apply topically 2 (two) times daily.  Dispense: 15 g; Refill: 1

## 2018-10-10 ENCOUNTER — OFFICE VISIT (OUTPATIENT)
Dept: FAMILY MEDICINE | Facility: CLINIC | Age: 83
End: 2018-10-10
Payer: MEDICARE

## 2018-10-10 VITALS
TEMPERATURE: 98 F | OXYGEN SATURATION: 98 % | DIASTOLIC BLOOD PRESSURE: 70 MMHG | RESPIRATION RATE: 14 BRPM | WEIGHT: 149 LBS | HEART RATE: 78 BPM | SYSTOLIC BLOOD PRESSURE: 146 MMHG | BODY MASS INDEX: 29.25 KG/M2 | HEIGHT: 60 IN

## 2018-10-10 DIAGNOSIS — R53.1 WEAKNESS: Primary | ICD-10-CM

## 2018-10-10 DIAGNOSIS — I95.2 HYPOTENSION DUE TO DRUGS: ICD-10-CM

## 2018-10-10 DIAGNOSIS — R23.1 PALLOR: ICD-10-CM

## 2018-10-10 DIAGNOSIS — T46.6X5A STATIN MYOPATHY: ICD-10-CM

## 2018-10-10 DIAGNOSIS — Z78.9 STATIN INTOLERANCE: ICD-10-CM

## 2018-10-10 DIAGNOSIS — G72.0 STATIN MYOPATHY: ICD-10-CM

## 2018-10-10 DIAGNOSIS — R07.89 ATYPICAL CHEST PAIN: ICD-10-CM

## 2018-10-10 LAB
BASOPHILS NFR BLD: 0 K/UL (ref 0–0.2)
BASOPHILS NFR BLD: 0.5 %
BUN SERPL-MCNC: 26 MG/DL (ref 8–20)
CALCIUM SERPL-MCNC: 9.6 MG/DL (ref 7.7–10.4)
CHLORIDE: 102 MMOL/L (ref 98–110)
CO2 SERPL-SCNC: 27.2 MMOL/L (ref 22.8–31.6)
CREATININE: 1.41 MG/DL (ref 0.6–1.4)
EOSINOPHIL NFR BLD: 0.3 K/UL (ref 0–0.7)
EOSINOPHIL NFR BLD: 3.1 %
ERYTHROCYTE [DISTWIDTH] IN BLOOD BY AUTOMATED COUNT: 13.4 % (ref 11.7–14.9)
GLUCOSE: 110 MG/DL (ref 70–99)
GRAN #: 3.7 K/UL (ref 1.4–6.5)
GRAN%: 45.9 %
HCT VFR BLD AUTO: 36.8 % (ref 36–48)
HGB BLD-MCNC: 11.8 G/DL (ref 12–15)
IMMATURE GRANS (ABS): 0 K/UL (ref 0–1)
IMMATURE GRANULOCYTES: 0.1 %
LYMPH #: 3.1 K/UL (ref 1.2–3.4)
LYMPH%: 38.7 %
MCH RBC QN AUTO: 29.8 PG (ref 25–35)
MCHC RBC AUTO-ENTMCNC: 32.1 G/DL (ref 31–36)
MCV RBC AUTO: 92.9 FL (ref 79–98)
MONO #: 1 K/UL (ref 0.1–0.6)
MONO%: 11.7 %
NUCLEATED RBCS: 0 %
PLATELET # BLD AUTO: 270 K/UL (ref 140–440)
PMV BLD AUTO: 10.7 FL (ref 8.8–12.7)
POTASSIUM SERPL-SCNC: 4 MMOL/L (ref 3.5–5)
RBC # BLD AUTO: 3.96 M/UL (ref 3.5–5.5)
SODIUM: 140 MMOL/L (ref 134–144)
WBC # BLD AUTO: 8.1 K/UL (ref 5–10)

## 2018-10-10 PROCEDURE — 99214 OFFICE O/P EST MOD 30 MIN: CPT | Mod: ,,, | Performed by: INTERNAL MEDICINE

## 2018-10-10 NOTE — PROGRESS NOTES
SUBJECTIVE:    Patient ID: Karma Chen is a 82 y.o. female.    Chief Complaint: Fatigue (just not feeling well started on sunday night); Diarrhea (could not get labs done); and Hypertension (pt has notice bp is low in am then it gets high in evening)    HPI     Patient started feeling feeling bad after eating and she had two BM's -had some LLQ pain-the next night she had diarrhea-after BMs she felt better-just hasnt felt good-BP's have been low the last few mornings-she states she has had no more LLQ pain-what's left now is low BP and feeling poorly-hx EBarr ? Recurrence-feels weak even sitting up. After the exam I asked the patient again if she had any chest pain or pressure and she denied same but said incidentally she had had some upper back pain that had responded to nitroglycerin !!! EKG did not look significantly different from before but was faxed to  her cardiologist    Had to get off lipitor due to cramps-    Rhizotomy 9-14-hasnt felt good enough to do enough to see if it helped or not    Orders Only on 08/15/2018   Component Date Value Ref Range Status    Glucose 08/15/2018 85  70 - 99 mg/dL Final    BUN, Bld 08/15/2018 22* 8 - 20 mg/dL Final    Creatinine 08/15/2018 1.45* 0.60 - 1.40 mg/dL Final    Calcium 08/15/2018 8.8  7.7 - 10.4 mg/dL Final    Sodium 08/15/2018 139  134 - 144 mmol/L Final    Potassium 08/15/2018 3.8  3.5 - 5.0 mmol/L Final    Chloride 08/15/2018 109  98 - 110 mmol/L Final    CO2 08/15/2018 21.4* 22.8 - 31.6 mmol/L Final    Albumin 08/15/2018 3.8  3.1 - 4.7 g/dL Final    Total Bilirubin 08/15/2018 0.4  0.3 - 1.0 mg/dL Final    Alkaline Phosphatase 08/15/2018 46  40 - 104 IU/L Final    Total Protein 08/15/2018 7.0  6.0 - 8.2 g/dL Final    ALT (SGPT) 08/15/2018 17  3 - 33 IU/L Final    AST 08/15/2018 21  10 - 40 IU/L Final   Orders Only on 08/15/2018   Component Date Value Ref Range Status    WBC 08/15/2018 9.0  5.0 - 10.0 K/uL Final    RBC 08/15/2018 3.63  3.50 -  5.50 M/uL Final    Hemoglobin 08/15/2018 11.1* 12.0 - 15.0 g/dL Final    Hematocrit 08/15/2018 34.3* 36.0 - 48.0 % Final    MCV 08/15/2018 94.5  79.0 - 98.0 fL Final    MCH 08/15/2018 30.6  25.0 - 35.0 pg Final    MCHC 08/15/2018 32.4  31.0 - 36.0 g/dL Final    RDW 08/15/2018 14.1  11.7 - 14.9 % Final    Platelets 08/15/2018 305  140 - 440 K/uL Final    MPV 08/15/2018 10.6  8.8 - 12.7 fL Final    Gran% 08/15/2018 49.3  % Final    Lymph% 08/15/2018 33.1  % Final    Mono% 08/15/2018 13.1  % Final    Eosinophil% 08/15/2018 3.5  % Final    Basophil% 08/15/2018 0.7  % Final    Gran # (ANC) 08/15/2018 4.5  1.4 - 6.5 K/uL Final    Lymph # 08/15/2018 3.0  1.2 - 3.4 K/uL Final    Mono # 08/15/2018 1.2* 0.1 - 0.6 K/uL Final    Eos # 08/15/2018 0.3  0.0 - 0.7 K/uL Final    Baso # 08/15/2018 0.1  0.0 - 0.2 K/uL Final    Immature Grans (Abs) 08/15/2018 0.0  0.0 - 1.0 K/uL Final    Immature Granulocytes 08/15/2018 0.3  % Final    nRBC% 08/15/2018 0  % Final   Office Visit on 08/01/2018   Component Date Value Ref Range Status    Hemoglobin A1C 08/15/2018 6.0  3.1 - 6.5 % Final       Past Medical History:   Diagnosis Date    Coronary artery disease     Dermatitis     Dermatitis 12/8/2017    Diabetes mellitus     Diabetes mellitus type II     GERD (gastroesophageal reflux disease)     Hypertension     MI (myocardial infarction)      Social History     Socioeconomic History    Marital status:      Spouse name: Not on file    Number of children: Not on file    Years of education: Not on file    Highest education level: Not on file   Social Needs    Financial resource strain: Not on file    Food insecurity - worry: Not on file    Food insecurity - inability: Not on file    Transportation needs - medical: Not on file    Transportation needs - non-medical: Not on file   Occupational History    Not on file   Tobacco Use    Smoking status: Never Smoker    Smokeless tobacco: Never Used    Substance and Sexual Activity    Alcohol use: No    Drug use: No    Sexual activity: Not on file   Other Topics Concern    Not on file   Social History Narrative    Not on file     Past Surgical History:   Procedure Laterality Date    adenoids      Angioplasty-coronary N/A 12/11/2017    Performed by [facility] at [facility] CATH LAB    APPENDECTOMY      CHOLECYSTECTOMY      CORONARY ARTERY BYPASS GRAFT      coronary stents      HYSTERECTOMY      TONSILLECTOMY       Family History   Problem Relation Age of Onset    No Known Problems Mother     No Known Problems Father      Vitals:    10/10/18 1513 10/10/18 1550 10/10/18 1551   BP: 134/60 (!) 148/72 (!) 146/70   Pulse: 66 74 78   Resp: 14     Temp: 98 °F (36.7 °C)     SpO2: 98%     Weight: 67.6 kg (149 lb)     Height: 5' (1.524 m)         Review of Systems   Constitutional: Negative for appetite change, chills, diaphoresis, fatigue, fever and unexpected weight change.        HPI   HENT: Negative for congestion, ear pain, hearing loss, nosebleeds, postnasal drip, sinus pressure, sinus pain, sneezing, sore throat, tinnitus, trouble swallowing and voice change.    Eyes: Negative for photophobia, pain, itching and visual disturbance.   Respiratory: Negative for apnea, cough, chest tightness, shortness of breath, wheezing and stridor.    Cardiovascular: Negative for chest pain, palpitations and leg swelling.        Pain acrooss the upper part of the back and she took a NTG yesterday and she did start feeling better-if she sits too long in a chair she may get pain in the upper back     Gastrointestinal: Negative for abdominal distention, abdominal pain, blood in stool, constipation, diarrhea, nausea and vomiting.        HPI   Endocrine: Negative for cold intolerance, heat intolerance, polydipsia and polyuria.   Genitourinary: Negative for difficulty urinating, dyspareunia, dysuria, flank pain, frequency, hematuria, menstrual problem, pelvic pain,  urgency, vaginal discharge and vaginal pain.   Musculoskeletal: Negative for arthralgias, back pain, joint swelling, myalgias, neck pain and neck stiffness.   Skin: Negative for pallor.   Allergic/Immunologic: Negative for environmental allergies and food allergies.   Neurological: Negative for dizziness, tremors, speech difficulty, weakness, light-headedness and numbness.   Hematological: Does not bruise/bleed easily.   Psychiatric/Behavioral: Negative for agitation, confusion, decreased concentration, sleep disturbance and suicidal ideas. The patient is not nervous/anxious.           Objective:      Physical Exam   Constitutional: She is oriented to person, place, and time. She appears well-developed and well-nourished. She is cooperative. No distress.   HENT:   Head: Normocephalic and atraumatic.   Right Ear: Tympanic membrane normal.   Left Ear: Tympanic membrane normal.   Mouth/Throat: Uvula is midline and mucous membranes are normal.   Pallor of inferior palpebral conjunctiva   Eyes: Conjunctivae and EOM are normal. Right pupil is round and reactive. Left pupil is round and reactive.   Neck: Trachea normal and normal range of motion. Neck supple. No JVD present. No thyromegaly present.   Cardiovascular: Normal rate, regular rhythm, normal heart sounds and intact distal pulses.   Occasion two beat run of APC's without pause   Pulmonary/Chest: Effort normal and breath sounds normal. No tachypnea. No respiratory distress.   Abdominal: Soft. Bowel sounds are normal. There is no tenderness.   Mild  Upper abd tenderness of no significance and not reproducible-BS normal   Lymphadenopathy:     She has no cervical adenopathy.   Neurological: She is alert and oriented to person, place, and time. She has normal strength.   Skin: Skin is warm and dry. No rash noted.   Psychiatric: She has a normal mood and affect. Her speech is normal.   Nursing note and vitals reviewed.          Assessment:       1. Weakness    2. Pallor     3. Hypotension due to drugs    4. Statin intolerance    5. Statin myopathy    6. Atypical chest pain    7. BMI 29.0-29.9,adult         Plan:           Weakness  -     Basic metabolic panel; Future; Expected date: 10/10/2018  -     IN OFFICE EKG 12-LEAD (to [facility])  -     CBC auto differential; Future; Expected date: 10/17/2018    Pallor  -     CBC auto differential; Future; Expected date: 10/17/2018    Hypotension due to drugs  -     Basic metabolic panel; Future; Expected date: 10/10/2018    Statin intolerance    Statin myopathy    Atypical chest pain  -     IN OFFICE EKG 12-LEAD (to [facility])    BMI 29.0-29.9,adult

## 2018-10-11 ENCOUNTER — TELEPHONE (OUTPATIENT)
Dept: FAMILY MEDICINE | Facility: CLINIC | Age: 83
End: 2018-10-11

## 2018-10-11 NOTE — TELEPHONE ENCOUNTER
Pt called wanting lab results. Informed her once labs are sent to us, the  Still needs to result out. We will possibly know by Monday but will call her with definite results. Patient stated we can state the results over her voicemail if she does not answer.

## 2018-10-15 ENCOUNTER — OFFICE VISIT (OUTPATIENT)
Dept: FAMILY MEDICINE | Facility: CLINIC | Age: 83
End: 2018-10-15
Payer: MEDICARE

## 2018-10-15 ENCOUNTER — TELEPHONE (OUTPATIENT)
Dept: FAMILY MEDICINE | Facility: CLINIC | Age: 83
End: 2018-10-15

## 2018-10-15 VITALS
TEMPERATURE: 98 F | HEART RATE: 70 BPM | BODY MASS INDEX: 29.25 KG/M2 | HEIGHT: 60 IN | RESPIRATION RATE: 14 BRPM | SYSTOLIC BLOOD PRESSURE: 120 MMHG | OXYGEN SATURATION: 98 % | DIASTOLIC BLOOD PRESSURE: 60 MMHG | WEIGHT: 149 LBS

## 2018-10-15 DIAGNOSIS — J30.1 CHRONIC SEASONAL ALLERGIC RHINITIS DUE TO POLLEN: Chronic | ICD-10-CM

## 2018-10-15 DIAGNOSIS — Z23 FLU VACCINE NEED: ICD-10-CM

## 2018-10-15 DIAGNOSIS — I10 ESSENTIAL HYPERTENSION: ICD-10-CM

## 2018-10-15 DIAGNOSIS — R07.89 ATYPICAL CHEST PAIN: Primary | ICD-10-CM

## 2018-10-15 DIAGNOSIS — G72.0 STATIN MYOPATHY: ICD-10-CM

## 2018-10-15 DIAGNOSIS — T46.6X5A STATIN MYOPATHY: ICD-10-CM

## 2018-10-15 DIAGNOSIS — E78.00 PURE HYPERCHOLESTEROLEMIA: ICD-10-CM

## 2018-10-15 DIAGNOSIS — N18.4 CHRONIC KIDNEY DISEASE, STAGE IV (SEVERE): ICD-10-CM

## 2018-10-15 DIAGNOSIS — R53.1 WEAKNESS: ICD-10-CM

## 2018-10-15 DIAGNOSIS — Z78.9 STATIN INTOLERANCE: ICD-10-CM

## 2018-10-15 DIAGNOSIS — E11.9 DIABETES MELLITUS TYPE 2, DIET-CONTROLLED: ICD-10-CM

## 2018-10-15 PROCEDURE — G0008 ADMIN INFLUENZA VIRUS VAC: HCPCS | Mod: ,,, | Performed by: INTERNAL MEDICINE

## 2018-10-15 PROCEDURE — 99214 OFFICE O/P EST MOD 30 MIN: CPT | Mod: 25,,, | Performed by: INTERNAL MEDICINE

## 2018-10-15 PROCEDURE — 90662 IIV NO PRSV INCREASED AG IM: CPT | Mod: ,,, | Performed by: INTERNAL MEDICINE

## 2018-10-15 RX ORDER — CETIRIZINE HYDROCHLORIDE 5 MG/1
5 TABLET ORAL DAILY
Qty: 90 TABLET | Refills: 0 | COMMUNITY
Start: 2018-10-15 | End: 2019-10-30

## 2018-10-15 RX ORDER — ISOSORBIDE DINITRATE 10 MG/1
10 TABLET ORAL 2 TIMES DAILY
Qty: 60 TABLET | Refills: 2 | Status: SHIPPED | OUTPATIENT
Start: 2018-10-15 | End: 2018-10-17

## 2018-10-15 RX ORDER — LEVOTHYROXINE SODIUM 88 UG/1
88 TABLET ORAL DAILY
COMMUNITY
End: 2019-01-08 | Stop reason: SDUPTHER

## 2018-10-15 NOTE — PATIENT INSTRUCTIONS
Kidney Disease: Eating a Safe Amount of Potassium  Potassium is a mineral found in many foods. The body needs some potassium to keep the heart working normally. But if your kidneys dont work well, potassium can build up in your blood. It can be serious and even deadly if the levels go up too high. By controlling the amount of potassium you eat, you can keep a safe level in your blood.  Using this guide  Use this serving guide along with the food list below. Always follow your dietitians instructions on the number and size of servings to eat. You can casanova some of the potassium out of some high potassium foods by soaking and cooking them in large amounts of water. Ask your dietitian about how to do this. Also, talk with your dietitian before eating foods that arent on this list.  · ___ daily servings of foods that have high potassium content (250 mg to 500 mg per serving).  · ___ daily servings of foods that have medium potassium content (150 mg to 250 mg per serving).  · ___ daily servings of foods that have low potassium content (5 mg to 150 mg per serving).  · You can substitute food choices in the following way:  Potassium content of some foods      Vegetable Fruit Starches   High   Artichokes (1)  Bok kateryna (½ cup)  Spinach (½ cup)  Tomatoes (½ cup) Bananas (1)  Cantaloupe or honeydew  (½ melon)  Oranges (1)  Peaches, fresh (1) Beans, dried (½ cup)  Lentils (½ cup)  Potatoes (½ cup  or 1 small)  Winter squash,  yams (½ cup)   Medium   Broccoli (½ cup)  Carrots (½ cup)  Eggplant (½ cup)  Peppers (1) Apples (1)  Cherries (½ cup)  Peaches, canned (½ cup)  Pears, fresh (½ cup) Bread, pumpernickel  (1 slice)  Chickpeas, cooked (½ cup)  Corn, fresh (½ cup)  Tortillas, corn (4 small)   Low   Asparagus (4 ford)  Green beans (½ cup)  Cauliflower (½ cup)  Cucumbers (½)    Blueberries (1 cup)  Grapefruit (½ cup)  Grapes (½ cup)  Strawberries (½ cup)  Watermelon (½ cup) Bagel, plain (1)  Bread, white (2  slices)  Oatmeal (¾ cup)  Pasta, plain (1 cup)  Rice, white (1 cup)   Date Last Reviewed: 1/1/2017 © 2000-2017 [employer]. [address] [place] [address], [place] [address]. All rights reserved. This information is not intended as a substitute for professional medical care. Always follow your healthcare professional's instructions.        Kidney Disease: Getting the Right Amount of Protein     One portion (3 to 4 ounces) of fish, chicken, or red meat is about the size of a deck of playing cards.   Your body needs protein to build and repair muscles and bones along with other important body functions. But as the body uses protein, a waste product (blood urea nitrogen or BUN) is produced. If your kidneys cant filter wastes from your blood normally, the BUN level increases. If the level gets too high, you can become sick. Because of this, you need to control the amount of protein you eat each day. Use this handout to help you.     Measuring protein content  You know how many grams of protein to eat, but most food portions are measured in ounces. Use the chart below to help determine the protein content of some common foods.  Chicken breast 3 to 4 ounces 21 to 28 grams   Chicken thigh 2 to 2.5 ounces 14 to 18 grams   Fish 3 ounces 21 grams   Pork chop 2 to 2.5 ounces 14 to18 grams   Roast beef 3 ounces 21 grams   Steak 3 to 4 ounces 21 to 28 grams   Hamburger 3 to 4 ounces 21 to 28 grams   Eggs 1 egg 7 grams   Cheese 1 ounce 7 grams   Most beans 4 ounces 7 to 10 grams   Tofu 2 ounces 5 grams   Most nuts 2 ounces 5 to 8 grams      If you eat too much protein  Eating too much protein may cause the following:     · Nausea, vomiting  · Fatigue  · Mental confusion  · Increased potassium levels  · Increased phosphorus levels   · Increased time on hemodialysis  · Risk of speeding the loss of kidney function  If you eat too little protein  Eating too little protein may cause the following:  · Muscle loss,  weakness  · Fatigue  · Weight loss  · Slower wound healing      Date Last Reviewed: 1/1/2017  © 5419-6159 [employer]. [address] [place] [address], [place] [address]. All rights reserved. This information is not intended as a substitute for professional medical care. Always follow your healthcare professional's instructions.      FOR ELEVATED LIPIDS, DUE TO STATIN INTOLERANCE, START 2 GRAMS OF VITAMIN D3 AND OMEGA 3 FISH OIL 3 GRAMS DAILY

## 2018-10-15 NOTE — TELEPHONE ENCOUNTER
PATIENT CALLED AND SET UP HER APPT TO SEE KIDNEY AND SHE IS NOT GOING TO BE SEEN IN January THE PATIENT WOULD LIKE TO BE REFERRED TO ANOTHER KIDNEY DOCTOR AS SHE CAN NOT WORK TILL January.

## 2018-10-15 NOTE — PROGRESS NOTES
SUBJECTIVE:    Patient ID: Karma Chen is a 82 y.o. female.    Chief Complaint: Results (labs) and Follow-up    HPI      See labs- I am concerned re her regression in renal function and have traced her Creat back to 2012-most changes between December and now         ( She did have valtrex for Leah Barr and two steroid injections for rhizotomies) bur otherwise I don't see any med changes that would account for this reduction in renal function    Feels sluggish in the mornings-better in the afternoons-    In the mornings she gets congestion in the throat-gets a white hunk of material then a thin liquid--sometimes she feels sinus congestion-sometimes her nose runs a lot-belches a lot    !!!  She had angiogram with four stents placed -two in area of prior stents-recently!!!--today the ECG suggests some lateral changes compared to prior studies-in the past her cardiac sx were not typical of cardiac sx-mostly fatigue in the past---all the time nuclear studies being negative...    Last glucose 110 with AC 5.9-when her renal studies stabilize I will start Rx, possible jardiance      Office Visit on 10/10/2018   Component Date Value Ref Range Status    Glucose 10/10/2018 110* 70 - 99 mg/dL Final    BUN, Bld 10/10/2018 26* 8 - 20 mg/dL Final    Creatinine 10/10/2018 1.41* 0.60 - 1.40 mg/dL Final    Calcium 10/10/2018 9.6  7.7 - 10.4 mg/dL Final    Sodium 10/10/2018 140  134 - 144 mmol/L Final    Potassium 10/10/2018 4.0  3.5 - 5.0 mmol/L Final    Chloride 10/10/2018 102  98 - 110 mmol/L Final    CO2 10/10/2018 27.2  22.8 - 31.6 mmol/L Final    WBC 10/10/2018 8.1  5.0 - 10.0 K/uL Final    RBC 10/10/2018 3.96  3.50 - 5.50 M/uL Final    Hemoglobin 10/10/2018 11.8* 12.0 - 15.0 g/dL Final    Hematocrit 10/10/2018 36.8  36.0 - 48.0 % Final    MCV 10/10/2018 92.9  79.0 - 98.0 fL Final    MCH 10/10/2018 29.8  25.0 - 35.0 pg Final    MCHC 10/10/2018 32.1  31.0 - 36.0 g/dL Final    RDW 10/10/2018 13.4  11.7 -  14.9 % Final    Platelets 10/10/2018 270  140 - 440 K/uL Final    MPV 10/10/2018 10.7  8.8 - 12.7 fL Final    Gran% 10/10/2018 45.9  % Final    Lymph% 10/10/2018 38.7  % Final    Mono% 10/10/2018 11.7  % Final    Eosinophil% 10/10/2018 3.1  % Final    Basophil% 10/10/2018 0.5  % Final    Gran # (ANC) 10/10/2018 3.7  1.4 - 6.5 K/uL Final    Lymph # 10/10/2018 3.1  1.2 - 3.4 K/uL Final    Mono # 10/10/2018 1.0* 0.1 - 0.6 K/uL Final    Eos # 10/10/2018 0.3  0.0 - 0.7 K/uL Final    Baso # 10/10/2018 0.0  0.0 - 0.2 K/uL Final    Immature Grans (Abs) 10/10/2018 0.0  0.0 - 1.0 K/uL Final    Immature Granulocytes 10/10/2018 0.1  % Final    nRBC% 10/10/2018 0  % Final   Orders Only on 08/15/2018   Component Date Value Ref Range Status    Glucose 08/15/2018 85  70 - 99 mg/dL Final    BUN, Bld 08/15/2018 22* 8 - 20 mg/dL Final    Creatinine 08/15/2018 1.45* 0.60 - 1.40 mg/dL Final    Calcium 08/15/2018 8.8  7.7 - 10.4 mg/dL Final    Sodium 08/15/2018 139  134 - 144 mmol/L Final    Potassium 08/15/2018 3.8  3.5 - 5.0 mmol/L Final    Chloride 08/15/2018 109  98 - 110 mmol/L Final    CO2 08/15/2018 21.4* 22.8 - 31.6 mmol/L Final    Albumin 08/15/2018 3.8  3.1 - 4.7 g/dL Final    Total Bilirubin 08/15/2018 0.4  0.3 - 1.0 mg/dL Final    Alkaline Phosphatase 08/15/2018 46  40 - 104 IU/L Final    Total Protein 08/15/2018 7.0  6.0 - 8.2 g/dL Final    ALT (SGPT) 08/15/2018 17  3 - 33 IU/L Final    AST 08/15/2018 21  10 - 40 IU/L Final   Orders Only on 08/15/2018   Component Date Value Ref Range Status    WBC 08/15/2018 9.0  5.0 - 10.0 K/uL Final    RBC 08/15/2018 3.63  3.50 - 5.50 M/uL Final    Hemoglobin 08/15/2018 11.1* 12.0 - 15.0 g/dL Final    Hematocrit 08/15/2018 34.3* 36.0 - 48.0 % Final    MCV 08/15/2018 94.5  79.0 - 98.0 fL Final    MCH 08/15/2018 30.6  25.0 - 35.0 pg Final    MCHC 08/15/2018 32.4  31.0 - 36.0 g/dL Final    RDW 08/15/2018 14.1  11.7 - 14.9 % Final    Platelets 08/15/2018  305  140 - 440 K/uL Final    MPV 08/15/2018 10.6  8.8 - 12.7 fL Final    Gran% 08/15/2018 49.3  % Final    Lymph% 08/15/2018 33.1  % Final    Mono% 08/15/2018 13.1  % Final    Eosinophil% 08/15/2018 3.5  % Final    Basophil% 08/15/2018 0.7  % Final    Gran # (ANC) 08/15/2018 4.5  1.4 - 6.5 K/uL Final    Lymph # 08/15/2018 3.0  1.2 - 3.4 K/uL Final    Mono # 08/15/2018 1.2* 0.1 - 0.6 K/uL Final    Eos # 08/15/2018 0.3  0.0 - 0.7 K/uL Final    Baso # 08/15/2018 0.1  0.0 - 0.2 K/uL Final    Immature Grans (Abs) 08/15/2018 0.0  0.0 - 1.0 K/uL Final    Immature Granulocytes 08/15/2018 0.3  % Final    nRBC% 08/15/2018 0  % Final   Office Visit on 08/01/2018   Component Date Value Ref Range Status    Hemoglobin A1C 08/15/2018 6.0  3.1 - 6.5 % Final       Past Medical History:   Diagnosis Date    Coronary artery disease     Dermatitis     Dermatitis 12/8/2017    Diabetes mellitus     Diabetes mellitus type II     GERD (gastroesophageal reflux disease)     Hypertension     MI (myocardial infarction)      Social History     Socioeconomic History    Marital status:      Spouse name: Not on file    Number of children: Not on file    Years of education: Not on file    Highest education level: Not on file   Social Needs    Financial resource strain: Not on file    Food insecurity - worry: Not on file    Food insecurity - inability: Not on file    Transportation needs - medical: Not on file    Transportation needs - non-medical: Not on file   Occupational History    Not on file   Tobacco Use    Smoking status: Never Smoker    Smokeless tobacco: Never Used   Substance and Sexual Activity    Alcohol use: No    Drug use: No    Sexual activity: Not on file   Other Topics Concern    Not on file   Social History Narrative    Not on file     Past Surgical History:   Procedure Laterality Date    adenoids      Angioplasty-coronary N/A 12/11/2017    [facility]  CATH LAB    APPENDECTOMY      CHOLECYSTECTOMY      CORONARY ARTERY BYPASS GRAFT      coronary stents      HYSTERECTOMY      TONSILLECTOMY       Family History   Problem Relation Age of Onset    No Known Problems Mother     No Known Problems Father      Vitals:    10/15/18 1317   BP: 120/60   Pulse: 70   Resp: 14   Temp: 98 °F (36.7 °C)   SpO2: 98%   Weight: 67.6 kg (149 lb)   Height: 5' (1.524 m)       Review of Systems   Constitutional: Negative for appetite change, chills, diaphoresis, fatigue, fever and unexpected weight change.        HPI   HENT: Negative for congestion, ear pain, hearing loss, nosebleeds, postnasal drip, sinus pressure, sinus pain, sneezing, sore throat, tinnitus, trouble swallowing and voice change.         HPI   Eyes: Negative for photophobia, pain, itching and visual disturbance.   Respiratory: Negative for apnea, cough, chest tightness, shortness of breath, wheezing and stridor.    Cardiovascular: Negative for chest pain, palpitations and leg swelling.   Gastrointestinal: Negative for abdominal distention, abdominal pain, blood in stool, constipation, diarrhea, nausea and vomiting.   Endocrine: Negative for cold intolerance, heat intolerance, polydipsia and polyuria.   Genitourinary: Negative for difficulty urinating, dyspareunia, dysuria, flank pain, frequency, hematuria, menstrual problem, pelvic pain, urgency, vaginal discharge and vaginal pain.   Musculoskeletal: Negative for arthralgias, back pain (upper back aching-heat helps), joint swelling, myalgias, neck pain and neck stiffness.   Skin: Negative for pallor.   Allergic/Immunologic: Negative for environmental allergies (needs rx) and food allergies.   Neurological: Negative for dizziness, tremors, speech difficulty, weakness (for a week), light-headedness and numbness.   Hematological: Does not bruise/bleed easily.   Psychiatric/Behavioral: Negative for agitation, confusion, decreased concentration, sleep disturbance and  suicidal ideas. The patient is not nervous/anxious.           Objective:      Physical Exam   Constitutional: She is oriented to person, place, and time. She appears well-developed and well-nourished. She is cooperative. No distress.   HENT:   Head: Normocephalic and atraumatic.   Right Ear: Tympanic membrane normal.   Left Ear: Tympanic membrane normal.   Mouth/Throat: Uvula is midline and mucous membranes are normal.   Eyes: Conjunctivae and EOM are normal. Right pupil is round and reactive. Left pupil is round and reactive.   No pallor of inferior palpebrae   Neck: Trachea normal and normal range of motion. Neck supple. No JVD present. No thyromegaly present.   Cardiovascular: Normal rate, regular rhythm, normal heart sounds and intact distal pulses.   Pulmonary/Chest: Effort normal and breath sounds normal. No tachypnea. No respiratory distress.   Abdominal: Soft. Bowel sounds are normal. There is no tenderness.   Lymphadenopathy:     She has no cervical adenopathy.   Neurological: She is alert and oriented to person, place, and time. She has normal strength.   Skin: Skin is warm and dry. No rash noted.   Psychiatric: She has a normal mood and affect. Her speech is normal.   Nursing note and vitals reviewed.          Assessment:       1. Atypical chest pain    2. Weakness    3. Chronic kidney disease, stage IV (severe)    4. Essential hypertension    5. Chronic seasonal allergic rhinitis due to pollen    6. Diabetes mellitus type 2, diet-controlled    7. Pure hypercholesterolemia    8. Flu vaccine need    9. Statin intolerance    10. Statin myopathy         Plan:           Atypical chest pain  -     IN OFFICE EKG 12-LEAD (to [place])  -     Ambulatory referral to Cardiology  -     Ambulatory referral to Nephrology        -     Isosorbide 10 mg bid no 60 with 2 refills    Weakness  -     Ambulatory referral to Cardiology  -     Ambulatory referral to Nephrology    Chronic kidney disease, stage IV (severe)  -      Cancel: Ambulatory referral to Nephrology  -      Renal Artery Stenosis Hyperten (xpd); Future; Expected date: 10/15/2018    Essential hypertension        -     continue as she is for now    Chronic seasonal allergic rhinitis due to pollen        -     xyzal 5 mg no 90    Diabetes mellitus type 2, diet-controlled        -     Will start Rx once we stabilize renal function    Pure hypercholesterolemia        -     Start Vit  2 grams and Omega 3 fish oil 3 grams    Flu vaccine need  -     Influenza - High Dose (65+) (PF) (IM)

## 2018-10-16 ENCOUNTER — TELEPHONE (OUTPATIENT)
Dept: FAMILY MEDICINE | Facility: CLINIC | Age: 83
End: 2018-10-16

## 2018-10-16 NOTE — TELEPHONE ENCOUNTER
HER CARDIOLOGIST LOWERED HER LOSARTAN TO 25 MG DAILY HE DID A URINE TEST   AND IS GOING FOR US ON KIDNEYS AT 4 PM TODAY.  SHE HAD A ANGIOGRAM WHICH COULD AFFECT HER KIDNEYS.

## 2018-10-16 NOTE — TELEPHONE ENCOUNTER
----- Message from Elena Sullivan MD sent at 10/15/2018  8:45 PM CDT -----  Tell pt     I sent chris zyrtec to her local pharmacy to replace her present anti allergen for a while  CALL HER TO START VITAMIN d3 2 G DAILY AND OMEGA 3 FISHOIL 3 GRAMS FOR LOWERING LIPIDS

## 2018-10-16 NOTE — TELEPHONE ENCOUNTER
----- Message from Elena Sullivan MD sent at 10/14/2018  5:24 PM CDT -----  Please call the patient regarding her abnormal result. glucose 110 and she needs A1C-her renal function is reduced and we will have to stop some meds  On recheck-have her come in-we will need to substitute rx's-

## 2018-10-17 ENCOUNTER — TELEPHONE (OUTPATIENT)
Dept: FAMILY MEDICINE | Facility: CLINIC | Age: 83
End: 2018-10-17

## 2018-10-17 NOTE — TELEPHONE ENCOUNTER
Patient might not come in today due to she saw the cardiologist and he cut down her losartan. She had the us of kidneys. And will see the kidney doctor in December. She is not sure if or why she would need to come in.

## 2018-10-23 ENCOUNTER — TELEPHONE (OUTPATIENT)
Dept: FAMILY MEDICINE | Facility: CLINIC | Age: 83
End: 2018-10-23

## 2018-10-23 NOTE — TELEPHONE ENCOUNTER
----- Message from Elena Sullivan MD sent at 10/21/2018  9:18 AM CDT -----  Test results are normal-dont see any evidence of renal artery stenosis

## 2018-11-05 ENCOUNTER — TELEPHONE (OUTPATIENT)
Dept: FAMILY MEDICINE | Facility: CLINIC | Age: 83
End: 2018-11-05

## 2018-11-05 NOTE — TELEPHONE ENCOUNTER
----- Message from Elena Sullivan MD sent at 11/4/2018  9:15 AM CST -----  FIND OUT HOW SHE IS  ----- Message -----  From: Milagros Garcia MA  Sent: 10/30/2018   1:38 PM  To: Elena Sullivan MD        ----- Message -----  From: Tamara Deng  Sent: 10/30/2018   9:20 AM  To: Laurie Parker Staff    EMERGENCY ROOM, [facility], 10/29/18

## 2018-11-13 ENCOUNTER — OFFICE VISIT (OUTPATIENT)
Dept: FAMILY MEDICINE | Facility: CLINIC | Age: 83
End: 2018-11-13
Payer: MEDICARE

## 2018-11-13 VITALS
BODY MASS INDEX: 29.64 KG/M2 | HEIGHT: 60 IN | TEMPERATURE: 98 F | HEART RATE: 70 BPM | WEIGHT: 151 LBS | OXYGEN SATURATION: 98 % | SYSTOLIC BLOOD PRESSURE: 124 MMHG | DIASTOLIC BLOOD PRESSURE: 66 MMHG | RESPIRATION RATE: 12 BRPM

## 2018-11-13 DIAGNOSIS — Z95.5 PRESENCE OF STENT IN CORONARY ARTERY IN PATIENT WITH CORONARY ARTERY DISEASE: ICD-10-CM

## 2018-11-13 DIAGNOSIS — R53.83 FATIGUE, UNSPECIFIED TYPE: Primary | ICD-10-CM

## 2018-11-13 DIAGNOSIS — G89.29 CHRONIC BILATERAL LOW BACK PAIN WITH BILATERAL SCIATICA: ICD-10-CM

## 2018-11-13 DIAGNOSIS — R07.2 PRECORDIAL PAIN: ICD-10-CM

## 2018-11-13 DIAGNOSIS — I10 ESSENTIAL HYPERTENSION: ICD-10-CM

## 2018-11-13 DIAGNOSIS — I25.10 PRESENCE OF STENT IN CORONARY ARTERY IN PATIENT WITH CORONARY ARTERY DISEASE: ICD-10-CM

## 2018-11-13 DIAGNOSIS — L30.9 DERMATITIS: ICD-10-CM

## 2018-11-13 DIAGNOSIS — R73.01 IFG (IMPAIRED FASTING GLUCOSE): ICD-10-CM

## 2018-11-13 DIAGNOSIS — M54.41 CHRONIC BILATERAL LOW BACK PAIN WITH BILATERAL SCIATICA: ICD-10-CM

## 2018-11-13 DIAGNOSIS — I25.810 CORONARY ARTERY DISEASE INVOLVING AUTOLOGOUS ARTERY CORONARY BYPASS GRAFT WITHOUT ANGINA PECTORIS: ICD-10-CM

## 2018-11-13 DIAGNOSIS — M54.42 CHRONIC BILATERAL LOW BACK PAIN WITH BILATERAL SCIATICA: ICD-10-CM

## 2018-11-13 LAB — HBA1C MFR BLD: 5.7 %

## 2018-11-13 PROCEDURE — 99214 OFFICE O/P EST MOD 30 MIN: CPT | Mod: ,,, | Performed by: INTERNAL MEDICINE

## 2018-11-13 PROCEDURE — 83036 HEMOGLOBIN GLYCOSYLATED A1C: CPT | Mod: QW,,, | Performed by: INTERNAL MEDICINE

## 2018-11-13 RX ORDER — TRIAMCINOLONE ACETONIDE 1 MG/G
CREAM TOPICAL 2 TIMES DAILY
Qty: 15 G | Refills: 1 | Status: SHIPPED | OUTPATIENT
Start: 2018-11-13 | End: 2019-02-04

## 2018-11-13 RX ORDER — LOSARTAN POTASSIUM 25 MG/1
25 TABLET ORAL DAILY
Qty: 90 TABLET | Refills: 1 | Status: SHIPPED | OUTPATIENT
Start: 2018-11-13 | End: 2018-12-27

## 2018-11-13 RX ORDER — VALACYCLOVIR HYDROCHLORIDE 1 G/1
1000 TABLET, FILM COATED ORAL 3 TIMES DAILY
COMMUNITY
End: 2018-11-13 | Stop reason: SDUPTHER

## 2018-11-13 RX ORDER — ISOSORBIDE MONONITRATE 60 MG/1
60 TABLET, EXTENDED RELEASE ORAL DAILY
Qty: 90 TABLET | Refills: 1 | Status: SHIPPED | OUTPATIENT
Start: 2018-11-13 | End: 2019-05-06 | Stop reason: SDUPTHER

## 2018-11-13 RX ORDER — CALCIUM/MAGNESIUM/ZINC 333-133 MG
1 TABLET ORAL 2 TIMES DAILY
COMMUNITY
End: 2019-11-20

## 2018-11-13 RX ORDER — METOPROLOL SUCCINATE 25 MG/1
12.5 TABLET, EXTENDED RELEASE ORAL DAILY
Qty: 45 TABLET | Refills: 2 | Status: SHIPPED | OUTPATIENT
Start: 2018-11-13 | End: 2018-12-27

## 2018-11-13 RX ORDER — ISOSORBIDE MONONITRATE 60 MG/1
60 TABLET, EXTENDED RELEASE ORAL DAILY
Qty: 90 TABLET | Refills: 1 | Status: SHIPPED | OUTPATIENT
Start: 2018-11-13 | End: 2018-11-13 | Stop reason: SDUPTHER

## 2018-11-13 RX ORDER — TRAMADOL HYDROCHLORIDE 50 MG/1
50 TABLET ORAL EVERY 6 HOURS PRN
Qty: 90 TABLET | Refills: 0 | Status: SHIPPED | OUTPATIENT
Start: 2018-11-13 | End: 2018-12-27

## 2018-11-13 RX ORDER — VALACYCLOVIR HYDROCHLORIDE 1 G/1
1000 TABLET, FILM COATED ORAL 3 TIMES DAILY
Qty: 270 TABLET | Refills: 1 | Status: SHIPPED | OUTPATIENT
Start: 2018-11-13 | End: 2018-11-19 | Stop reason: SDUPTHER

## 2018-11-13 RX ORDER — LOSARTAN POTASSIUM 25 MG/1
TABLET ORAL
COMMUNITY
Start: 2018-11-11 | End: 2018-11-13 | Stop reason: SDUPTHER

## 2018-11-13 NOTE — PROGRESS NOTES
"  SUBJECTIVE:    Patient ID: Karma Chen is a 82 y.o. female.    Chief Complaint: Fatigue and Follow-up    HPI    Pt comes in for recheck-We discussed the sugar of 110 and also the rise in her creatinine since last December which is about the time we started LOSARTAN    Pt states her EB "kicked in again"-last week she went back on the valtrex and echinacea due to small bumps that were occurring on the side of the nose-taking 1 gram valtrex tid and 120mg of echinacea tid-THE NASAL LESIONS HAVE LEFT-STILL FEELING DOWN-NOT MUCH ACTIVITY SINCE THIS ALL STARTED-    P-T STATES SHE WAS HOSPITALIZED AT THE END OF October WITH ATYPICAL CHEST PAIN AND SHE WAS GIVEN DOUBLE DOSE OF LONG ACTING NITROGLYCERIN-ECHO REVEALED GOOD LV FUNCTION----      Office Visit on 10/10/2018   Component Date Value Ref Range Status    Glucose 10/10/2018 110* 70 - 99 mg/dL Final    BUN, Bld 10/10/2018 26* 8 - 20 mg/dL Final    Creatinine 10/10/2018 1.41* 0.60 - 1.40 mg/dL Final    Calcium 10/10/2018 9.6  7.7 - 10.4 mg/dL Final    Sodium 10/10/2018 140  134 - 144 mmol/L Final    Potassium 10/10/2018 4.0  3.5 - 5.0 mmol/L Final    Chloride 10/10/2018 102  98 - 110 mmol/L Final    CO2 10/10/2018 27.2  22.8 - 31.6 mmol/L Final    WBC 10/10/2018 8.1  5.0 - 10.0 K/uL Final    RBC 10/10/2018 3.96  3.50 - 5.50 M/uL Final    Hemoglobin 10/10/2018 11.8* 12.0 - 15.0 g/dL Final    Hematocrit 10/10/2018 36.8  36.0 - 48.0 % Final    MCV 10/10/2018 92.9  79.0 - 98.0 fL Final    MCH 10/10/2018 29.8  25.0 - 35.0 pg Final    MCHC 10/10/2018 32.1  31.0 - 36.0 g/dL Final    RDW 10/10/2018 13.4  11.7 - 14.9 % Final    Platelets 10/10/2018 270  140 - 440 K/uL Final    MPV 10/10/2018 10.7  8.8 - 12.7 fL Final    Gran% 10/10/2018 45.9  % Final    Lymph% 10/10/2018 38.7  % Final    Mono% 10/10/2018 11.7  % Final    Eosinophil% 10/10/2018 3.1  % Final    Basophil% 10/10/2018 0.5  % Final    Gran # (ANC) 10/10/2018 3.7  1.4 - 6.5 K/uL Final    " Lymph # 10/10/2018 3.1  1.2 - 3.4 K/uL Final    Mono # 10/10/2018 1.0* 0.1 - 0.6 K/uL Final    Eos # 10/10/2018 0.3  0.0 - 0.7 K/uL Final    Baso # 10/10/2018 0.0  0.0 - 0.2 K/uL Final    Immature Grans (Abs) 10/10/2018 0.0  0.0 - 1.0 K/uL Final    Immature Granulocytes 10/10/2018 0.1  % Final    nRBC% 10/10/2018 0  % Final   Orders Only on 08/15/2018   Component Date Value Ref Range Status    Glucose 08/15/2018 85  70 - 99 mg/dL Final    BUN, Bld 08/15/2018 22* 8 - 20 mg/dL Final    Creatinine 08/15/2018 1.45* 0.60 - 1.40 mg/dL Final    Calcium 08/15/2018 8.8  7.7 - 10.4 mg/dL Final    Sodium 08/15/2018 139  134 - 144 mmol/L Final    Potassium 08/15/2018 3.8  3.5 - 5.0 mmol/L Final    Chloride 08/15/2018 109  98 - 110 mmol/L Final    CO2 08/15/2018 21.4* 22.8 - 31.6 mmol/L Final    Albumin 08/15/2018 3.8  3.1 - 4.7 g/dL Final    Total Bilirubin 08/15/2018 0.4  0.3 - 1.0 mg/dL Final    Alkaline Phosphatase 08/15/2018 46  40 - 104 IU/L Final    Total Protein 08/15/2018 7.0  6.0 - 8.2 g/dL Final    ALT (SGPT) 08/15/2018 17  3 - 33 IU/L Final    AST 08/15/2018 21  10 - 40 IU/L Final   Orders Only on 08/15/2018   Component Date Value Ref Range Status    WBC 08/15/2018 9.0  5.0 - 10.0 K/uL Final    RBC 08/15/2018 3.63  3.50 - 5.50 M/uL Final    Hemoglobin 08/15/2018 11.1* 12.0 - 15.0 g/dL Final    Hematocrit 08/15/2018 34.3* 36.0 - 48.0 % Final    MCV 08/15/2018 94.5  79.0 - 98.0 fL Final    MCH 08/15/2018 30.6  25.0 - 35.0 pg Final    MCHC 08/15/2018 32.4  31.0 - 36.0 g/dL Final    RDW 08/15/2018 14.1  11.7 - 14.9 % Final    Platelets 08/15/2018 305  140 - 440 K/uL Final    MPV 08/15/2018 10.6  8.8 - 12.7 fL Final    Gran% 08/15/2018 49.3  % Final    Lymph% 08/15/2018 33.1  % Final    Mono% 08/15/2018 13.1  % Final    Eosinophil% 08/15/2018 3.5  % Final    Basophil% 08/15/2018 0.7  % Final    Gran # (ANC) 08/15/2018 4.5  1.4 - 6.5 K/uL Final    Lymph # 08/15/2018 3.0  1.2 - 3.4 K/uL  Final    Mono # 08/15/2018 1.2* 0.1 - 0.6 K/uL Final    Eos # 08/15/2018 0.3  0.0 - 0.7 K/uL Final    Baso # 08/15/2018 0.1  0.0 - 0.2 K/uL Final    Immature Grans (Abs) 08/15/2018 0.0  0.0 - 1.0 K/uL Final    Immature Granulocytes 08/15/2018 0.3  % Final    nRBC% 08/15/2018 0  % Final   Office Visit on 08/01/2018   Component Date Value Ref Range Status    Hemoglobin A1C 08/15/2018 6.0  3.1 - 6.5 % Final       Past Medical History:   Diagnosis Date    Coronary artery disease     Dermatitis     Dermatitis 12/8/2017    Diabetes mellitus     Diabetes mellitus type II     GERD (gastroesophageal reflux disease)     Hypertension     MI (myocardial infarction)      Social History     Socioeconomic History    Marital status:      Spouse name: Not on file    Number of children: Not on file    Years of education: Not on file    Highest education level: Not on file   Social Needs    Financial resource strain: Not on file    Food insecurity - worry: Not on file    Food insecurity - inability: Not on file    Transportation needs - medical: Not on file    Transportation needs - non-medical: Not on file   Occupational History    Not on file   Tobacco Use    Smoking status: Never Smoker    Smokeless tobacco: Never Used   Substance and Sexual Activity    Alcohol use: No    Drug use: No    Sexual activity: Not on file   Other Topics Concern    Not on file   Social History Narrative    Not on file     Past Surgical History:   Procedure Laterality Date    adenoids      Angioplasty-coronary N/A 12/11/2017    [facility]    APPENDECTOMY      CHOLECYSTECTOMY      CORONARY ARTERY BYPASS GRAFT      coronary stents      HYSTERECTOMY      TONSILLECTOMY       Family History   Problem Relation Age of Onset    No Known Problems Mother     No Known Problems Father      Vitals:    11/13/18 1641   BP: 124/66   Pulse: 70   Resp: 12   Temp: 97.8 °F (36.6 °C)    SpO2: 98%   Weight: 68.5 kg (151 lb)   Height: 5' (1.524 m)       Review of Systems   Constitutional: Negative for appetite change, chills, diaphoresis, fatigue, fever and unexpected weight change.   HENT: Negative for congestion, ear pain, hearing loss, nosebleeds, postnasal drip, sinus pressure, sinus pain, sneezing, sore throat, tinnitus, trouble swallowing and voice change.    Eyes: Negative for photophobia, pain, itching and visual disturbance.   Respiratory: Negative for apnea, cough, chest tightness, shortness of breath, wheezing and stridor.    Cardiovascular: Negative for chest pain, palpitations and leg swelling.        Hpi   Gastrointestinal: Negative for abdominal distention, abdominal pain, blood in stool, constipation, diarrhea, nausea and vomiting.        FEELS BLOATED AND IF SHE TAKES A LAXATIVE SHE GETS NAUSEATED-AND GETS DRY HEAVES-SHE REMAINS CONSTIPATED AND PASSES SMALL- BALLS OF STOOL-SHE WILL NOT TAKE THE SMALL DOSE OF LINZESS-   Endocrine: Negative for cold intolerance, heat intolerance, polydipsia and polyuria.   Genitourinary: Negative for difficulty urinating, dyspareunia, dysuria, flank pain, frequency, hematuria, menstrual problem, pelvic pain, urgency, vaginal discharge and vaginal pain.   Musculoskeletal: Negative for arthralgias, back pain, joint swelling, myalgias, neck pain and neck stiffness.   Skin: Negative for pallor.   Allergic/Immunologic: Negative for environmental allergies and food allergies.   Neurological: Negative for dizziness, tremors, speech difficulty, weakness, light-headedness and numbness.   Hematological: Does not bruise/bleed easily.   Psychiatric/Behavioral: Negative for agitation, confusion, decreased concentration, sleep disturbance and suicidal ideas. The patient is not nervous/anxious.           Objective:      Physical Exam   Constitutional: She is oriented to person, place, and time. She appears well-developed and well-nourished. She is cooperative. No  distress.   HENT:   Head: Normocephalic and atraumatic.   Right Ear: Tympanic membrane normal.   Left Ear: Tympanic membrane normal.   Mouth/Throat: Uvula is midline and mucous membranes are normal.   No more nasal lesions   Eyes: Conjunctivae and EOM are normal. Right pupil is round and reactive. Left pupil is round and reactive.   Neck: Trachea normal and normal range of motion. Neck supple. No JVD present. No thyromegaly present.   Cardiovascular: Normal rate, regular rhythm, normal heart sounds and intact distal pulses.   Pulmonary/Chest: Effort normal and breath sounds normal. No tachypnea. No respiratory distress.   Abdominal: Soft. Bowel sounds are normal. There is no tenderness.   Musculoskeletal: Normal range of motion.   Lymphadenopathy:     She has no cervical adenopathy.   Neurological: She is alert and oriented to person, place, and time. She has normal strength.   Skin: Skin is warm and dry. No rash noted.   Psychiatric: She has a normal mood and affect. Her speech is normal.   Nursing note and vitals reviewed.          Assessment:       1. Fatigue, unspecified type    2. Chronic bilateral low back pain with bilateral sciatica    3. Coronary artery disease involving autologous artery coronary bypass graft without angina pectoris    4. Presence of stent in coronary artery in patient with coronary artery disease    5. Essential hypertension    6. Dermatitis    7. BMI 29.0-29.9,adult         Plan:           Fatigue, unspecified type        -     Echinacea 3000 mg and vitamin C 2000 ucg every 6 hours for three days--L lysine once a day    Chronic bilateral low back pain with bilateral sciatica  -     tramadol (ULTRAM) 50 mg tablet; Take 1 tablet (50 mg total) by mouth every 6 (six) hours as needed.  Dispense: 90 tablet; Refill: 0    Coronary artery disease involving autologous artery coronary bypass graft without angina pectoris  -     metoprolol succinate (TOPROL-XL) 25 MG 24 hr tablet; Take 0.5 tablets  (12.5 mg total) by mouth once daily.  Dispense: 45 tablet; Refill: 2    Presence of stent in coronary artery in patient with coronary artery disease  -     metoprolol succinate (TOPROL-XL) 25 MG 24 hr tablet; Take 0.5 tablets (12.5 mg total) by mouth once daily.  Dispense: 45 tablet; Refill: 2    Essential hypertension  -     metoprolol succinate (TOPROL-XL) 25 MG 24 hr tablet; Take 0.5 tablets (12.5 mg total) by mouth once daily.  Dispense: 45 tablet; Refill: 2    Dermatitis  -     triamcinolone acetonide 0.1% (KENALOG) 0.1 % cream; Apply topically 2 (two) times daily.  Dispense: 15 g; Refill: 1    BMI 29.0-29.9,adult    Other orders  -     valACYclovir (VALTREX) 1000 MG tablet; Take 1 tablet (1,000 mg total) by mouth 3 (three) times daily.  Dispense: 270 tablet; Refill: 1  -     Stop losartan and we will follow renal function  -     isosorbide mononitrate (IMDUR) 60 MG 24 hr tablet; Take 1 tablet (60 mg total) by mouth once daily.  Dispense: 90 tablet; Refill: 1  -     Hemoglobin A1c; Future; Expected date: 04/01/2019  -     Microalbumin/creatinine urine ratio; Future; Expected date: 04/01/2019  -     Basic metabolic panel; Future; Expected date: 04/01/2019  -     Hemoglobin A1C, POCT

## 2018-11-13 NOTE — PATIENT INSTRUCTIONS
ECHINACEA 3000 MG AND VITAMIN C 2000 UCG EVERY 6 HOURS FOR THREE DAYS AND REPEAT SAME EVERY MONTH AND PRN ACUTE VIRAL FLARES-    The patient is asked to make an attempt to improve diet and exercise patterns to aid in medical management of this problem.

## 2018-11-14 ENCOUNTER — TELEPHONE (OUTPATIENT)
Dept: FAMILY MEDICINE | Facility: CLINIC | Age: 83
End: 2018-11-14

## 2018-11-14 LAB — TSH SERPL DL<=0.005 MIU/L-ACNC: 0.59 ULU/ML (ref 0.3–5.6)

## 2018-11-14 NOTE — TELEPHONE ENCOUNTER
----- Message from Elena Sullivan MD sent at 11/14/2018  1:14 PM CST -----  Test results are normal

## 2018-11-19 DIAGNOSIS — R53.83 FATIGUE, UNSPECIFIED TYPE: ICD-10-CM

## 2018-11-19 DIAGNOSIS — G89.29 CHRONIC BILATERAL LOW BACK PAIN WITH BILATERAL SCIATICA: ICD-10-CM

## 2018-11-19 DIAGNOSIS — M54.41 CHRONIC BILATERAL LOW BACK PAIN WITH BILATERAL SCIATICA: ICD-10-CM

## 2018-11-19 DIAGNOSIS — M54.42 CHRONIC BILATERAL LOW BACK PAIN WITH BILATERAL SCIATICA: ICD-10-CM

## 2018-11-19 RX ORDER — LIOTHYRONINE SODIUM 5 UG/1
5 TABLET ORAL DAILY
Qty: 30 TABLET | Refills: 2 | Status: SHIPPED | OUTPATIENT
Start: 2018-11-19 | End: 2018-12-27

## 2018-11-19 RX ORDER — VALACYCLOVIR HYDROCHLORIDE 1 G/1
TABLET, FILM COATED ORAL
Qty: 90 TABLET | Refills: 1 | Status: SHIPPED | OUTPATIENT
Start: 2018-11-19 | End: 2019-10-30

## 2018-11-19 NOTE — TELEPHONE ENCOUNTER
----- Message from Elena Sullivan MD sent at 11/19/2018 12:53 PM CST -----  Please call the patient regarding her abnormal result.ADD 5 ucg of T3 for three months only then recheck TSH and reverse T3

## 2018-11-20 ENCOUNTER — TELEPHONE (OUTPATIENT)
Dept: FAMILY MEDICINE | Facility: CLINIC | Age: 83
End: 2018-11-20

## 2018-12-07 DIAGNOSIS — Z95.5 PRESENCE OF STENT IN CORONARY ARTERY IN PATIENT WITH CORONARY ARTERY DISEASE: ICD-10-CM

## 2018-12-07 DIAGNOSIS — I25.810 CORONARY ARTERY DISEASE INVOLVING AUTOLOGOUS ARTERY CORONARY BYPASS GRAFT WITHOUT ANGINA PECTORIS: ICD-10-CM

## 2018-12-07 DIAGNOSIS — I25.10 PRESENCE OF STENT IN CORONARY ARTERY IN PATIENT WITH CORONARY ARTERY DISEASE: ICD-10-CM

## 2018-12-09 RX ORDER — CLOPIDOGREL BISULFATE 75 MG/1
75 TABLET ORAL DAILY
Qty: 90 TABLET | Refills: 0 | Status: SHIPPED | OUTPATIENT
Start: 2018-12-09 | End: 2020-12-31

## 2018-12-27 ENCOUNTER — OFFICE VISIT (OUTPATIENT)
Dept: FAMILY MEDICINE | Facility: CLINIC | Age: 83
End: 2018-12-27
Payer: MEDICARE

## 2018-12-27 VITALS
SYSTOLIC BLOOD PRESSURE: 130 MMHG | RESPIRATION RATE: 18 BRPM | WEIGHT: 155 LBS | TEMPERATURE: 98 F | HEIGHT: 60 IN | DIASTOLIC BLOOD PRESSURE: 66 MMHG | HEART RATE: 79 BPM | BODY MASS INDEX: 30.43 KG/M2

## 2018-12-27 DIAGNOSIS — J00 ACUTE NASOPHARYNGITIS: Primary | ICD-10-CM

## 2018-12-27 DIAGNOSIS — J02.9 ACUTE PHARYNGITIS, UNSPECIFIED ETIOLOGY: Primary | ICD-10-CM

## 2018-12-27 PROCEDURE — 99212 OFFICE O/P EST SF 10 MIN: CPT | Mod: ,,, | Performed by: INTERNAL MEDICINE

## 2018-12-27 RX ORDER — TIZANIDINE 4 MG/1
4 TABLET ORAL DAILY
COMMUNITY
End: 2020-11-09 | Stop reason: SDUPTHER

## 2018-12-27 RX ORDER — PREDNISONE 20 MG/1
20 TABLET ORAL DAILY
Qty: 4 TABLET | Refills: 0 | Status: SHIPPED | OUTPATIENT
Start: 2018-12-27 | End: 2018-12-31

## 2018-12-27 RX ORDER — AZITHROMYCIN 250 MG/1
TABLET, FILM COATED ORAL
Qty: 6 TABLET | Refills: 0 | Status: SHIPPED | OUTPATIENT
Start: 2018-12-27 | End: 2019-01-08

## 2018-12-27 NOTE — PATIENT INSTRUCTIONS
Viral Upper Respiratory Illness (Adult)  You have a viral upper respiratory illness (URI), which is another term for the common cold. This illness is contagious during the first few days. It is spread through the air by coughing and sneezing. It may also be spread by direct contact (touching the sick person and then touching your own eyes, nose, or mouth). Frequent handwashing will decrease risk of spread. Most viral illnesses go away within 7 to 10 days with rest and simple home remedies. Sometimes the illness may last for several weeks. Antibiotics will not kill a virus, and they are generally not prescribed for this condition.    Home care  · If symptoms are severe, rest at home for the first 2 to 3 days. When you resume activity, don't let yourself get too tired.  · Avoid being exposed to cigarette smoke (yours or others).  · You may use acetaminophen or ibuprofen to control pain and fever, unless another medicine was prescribed. (Note: If you have chronic liver or kidney disease, have ever had a stomach ulcer or gastrointestinal bleeding, or are taking blood-thinning medicines, talk with your healthcare provider before using these medicines.) Aspirin should never be given to anyone under 18 years of age who is ill with a viral infection or fever. It may cause severe liver or brain damage.  · Your appetite may be poor, so a light diet is fine. Avoid dehydration by drinking 6 to 8 glasses of fluids per day (water, soft drinks, juices, tea, or soup). Extra fluids will help loosen secretions in the nose and lungs.  · Over-the-counter cold medicines will not shorten the length of time youre sick, but they may be helpful for the following symptoms: cough, sore throat, and nasal and sinus congestion. (Note: Do not use decongestants if you have high blood pressure.)  Follow-up care  Follow up with your healthcare provider, or as advised.  When to seek medical advice  Call your healthcare provider right away if any  of these occur:  · Cough with lots of colored sputum (mucus)  · Severe headache; face, neck, or ear pain  · Difficulty swallowing due to throat pain  · Fever of 100.4°F (38°C)  Call 911, or get immediate medical care  Call emergency services right away if any of these occur:  · Chest pain, shortness of breath, wheezing, or difficulty breathing  · Coughing up blood  · Inability to swallow due to throat pain  Date Last Reviewed: 9/13/2015  © 8556-1422 [employer]. [address] [place] [id number]. All rights reserved. This information is not intended as a substitute for professional medical care. Always follow your healthcare professional's instructions.

## 2018-12-27 NOTE — PROGRESS NOTES
Subjective:       Patient ID: Karma Chen is a 83 y.o. female.    Chief Complaint: Cough and Sore Throat    Cough   This is a new problem. The current episode started yesterday. The problem has been waxing and waning. The problem occurs constantly. The cough is productive of sputum. Associated symptoms include a sore throat. Pertinent negatives include no chest pain.   Sore Throat    This is a new problem. The current episode started in the past 7 days. Associated symptoms include congestion and coughing. Pertinent negatives include no abdominal pain. The treatment provided no relief.       Past Medical History:   Diagnosis Date    Coronary artery disease     Dermatitis     Dermatitis 12/8/2017    Diabetes mellitus     Diabetes mellitus type II     GERD (gastroesophageal reflux disease)     Hypertension     MI (myocardial infarction)      Social History     Socioeconomic History    Marital status:      Spouse name: Not on file    Number of children: Not on file    Years of education: Not on file    Highest education level: Not on file   Social Needs    Financial resource strain: Not on file    Food insecurity - worry: Not on file    Food insecurity - inability: Not on file    Transportation needs - medical: Not on file    Transportation needs - non-medical: Not on file   Occupational History    Not on file   Tobacco Use    Smoking status: Never Smoker    Smokeless tobacco: Never Used   Substance and Sexual Activity    Alcohol use: No    Drug use: No    Sexual activity: Not on file   Other Topics Concern    Not on file   Social History Narrative    Not on file     Past Surgical History:   Procedure Laterality Date    adenoids      Angioplasty-coronary N/A 12/11/2017    [facility] LAB    APPENDECTOMY      CHOLECYSTECTOMY      CORONARY ARTERY BYPASS GRAFT      coronary stents      HYSTERECTOMY      TONSILLECTOMY       Family History    Problem Relation Age of Onset    No Known Problems Mother     No Known Problems Father        Review of Systems   Constitutional: Positive for activity change.   HENT: Positive for congestion, sinus pain (rt side) and sore throat.    Respiratory: Positive for cough. Negative for chest tightness.    Cardiovascular: Negative for chest pain and leg swelling.   Gastrointestinal: Negative for abdominal distention and abdominal pain.         Objective:      Blood pressure 130/66, pulse 79, temperature 98.3 °F (36.8 °C), resp. rate 18, height 5' (1.524 m), weight 70.3 kg (155 lb). Body mass index is 30.27 kg/m².  Physical Exam   Constitutional: She appears well-developed.   HENT:   Nose: Mucosal edema present. No sinus tenderness. Right sinus exhibits no maxillary sinus tenderness and no frontal sinus tenderness. Left sinus exhibits no maxillary sinus tenderness and no frontal sinus tenderness.   Mouth/Throat: Posterior oropharyngeal erythema present. No oropharyngeal exudate or posterior oropharyngeal edema.   Eyes: Conjunctivae are normal.   Neck: Normal range of motion. Neck supple. No tracheal deviation present. No thyromegaly present.   Cardiovascular: Normal rate and regular rhythm.   Pulmonary/Chest: Effort normal and breath sounds normal.   Abdominal: Soft. Bowel sounds are normal.         Assessment:       1. Acute nasopharyngitis                 Plan:           Acute nasopharyngitis      Increase your water intake to 64-80 oz daily    Nasal Saline spray (Over the counter Terry spray or Ayr)  2 sprays each nostril 2-3 times a day for nasal congestion    Tylenol 500 mg 2 tablets or Ibuprofen 200 mg 2 tablets every 4-6 hours as needed for fever, headaches, sore throat, ear pain, bodyaches, and/or nasal/sinus inflammation    Warm salt water gargles with 1/2 cup water and 1 tablespoon salt every 4 hours    Warm tea with honey and lemon    Follow up with PCP in 1 week if symptoms do not resolve              Current  Outpatient Medications:     acetaminophen (TYLENOL) 500 MG tablet, Take 500 mg by mouth every 6 (six) hours as needed for Pain., Disp: , Rfl:     ALPRAZolam (XANAX) 0.25 MG tablet, Take 1 tablet (0.25 mg total) by mouth 2 (two) times daily as needed., Disp: 180 tablet, Rfl: 0    aspirin (ECOTRIN) 81 MG EC tablet, Take 1 tablet (81 mg total) by mouth once daily., Disp: , Rfl: 0    b complex vitamins tablet, Take 1 tablet by mouth once daily.  , Disp: , Rfl:     cetirizine (ZYRTEC) 5 MG tablet, Take 1 tablet (5 mg total) by mouth once daily., Disp: 90 tablet, Rfl: 0    cholecalciferol, vitamin D3, 5,000 unit TbDL, Take 5,000 Units by mouth once daily. Patient is taking vitamin D3, Disp: , Rfl:     clopidogrel (PLAVIX) 75 mg tablet, Take 1 tablet (75 mg total) by mouth once daily., Disp: 90 tablet, Rfl: 0    echinacea 400 mg Cap, Take 3 capsules by mouth 3 (three) times daily., Disp: , Rfl:     estradiol (ESTRACE) 0.01 % (0.1 mg/g) vaginal cream, Place vaginally once a week.  , Disp: , Rfl:     fish oil-omega-3 fatty acids 300-1,000 mg capsule, Take by mouth once daily., Disp: , Rfl:     HYDROcodone-acetaminophen (NORCO) 5-325 mg per tablet, Take 1 tablet by mouth every 12 (twelve) hours as needed for Pain., Disp: , Rfl:     isosorbide mononitrate (IMDUR) 60 MG 24 hr tablet, Take 1 tablet (60 mg total) by mouth once daily., Disp: 90 tablet, Rfl: 1    Lactobac no.41-Bifidobact no.7 (PROBIOTIC-10) 70 mg (3 billion cell) Cap, Take 1 capsule by mouth once daily., Disp: , Rfl:     levothyroxine (SYNTHROID) 88 MCG tablet, Take 88 mcg by mouth once daily., Disp: , Rfl:     metoprolol succinate (TOPROL-XL) 25 MG 24 hr tablet, Take 0.5 tablets (12.5 mg total) by mouth once daily., Disp: 45 tablet, Rfl: 2    montelukast (SINGULAIR) 10 mg tablet, Take 1 tablet (10 mg total) by mouth every evening., Disp: 90 tablet, Rfl: 1    nitroGLYCERIN (NITROSTAT) 0.4 MG SL tablet, , Disp: , Rfl:     ranitidine (ZANTAC) 150  MG tablet, Take 150 mg by mouth nightly., Disp: , Rfl:     tiZANidine (ZANAFLEX) 4 MG tablet, Take 4 mg by mouth every 6 (six) hours as needed., Disp: , Rfl:     triamcinolone acetonide 0.1% (KENALOG) 0.1 % cream, Apply topically 2 (two) times daily., Disp: 15 g, Rfl: 1    valACYclovir (VALTREX) 1000 MG tablet, TAKE 1 TABLET EVERY 24     HOURS, Disp: 90 tablet, Rfl: 1    coenzyme Q10 100 mg capsule, Take 1 capsule (100 mg total) by mouth 2 (two) times daily., Disp: 60 capsule, Rfl: 11

## 2019-01-08 ENCOUNTER — OFFICE VISIT (OUTPATIENT)
Dept: FAMILY MEDICINE | Facility: CLINIC | Age: 84
End: 2019-01-08
Payer: MEDICARE

## 2019-01-08 VITALS
TEMPERATURE: 98 F | DIASTOLIC BLOOD PRESSURE: 68 MMHG | BODY MASS INDEX: 29.45 KG/M2 | WEIGHT: 150 LBS | HEIGHT: 60 IN | RESPIRATION RATE: 12 BRPM | SYSTOLIC BLOOD PRESSURE: 126 MMHG | OXYGEN SATURATION: 98 % | HEART RATE: 74 BPM

## 2019-01-08 DIAGNOSIS — E03.9 ACQUIRED HYPOTHYROIDISM: ICD-10-CM

## 2019-01-08 DIAGNOSIS — N18.30 CHRONIC RENAL DISEASE, STAGE III: ICD-10-CM

## 2019-01-08 DIAGNOSIS — E11.9 DIABETES MELLITUS TYPE 2, DIET-CONTROLLED: ICD-10-CM

## 2019-01-08 DIAGNOSIS — R82.90 CLOUDY URINE: ICD-10-CM

## 2019-01-08 DIAGNOSIS — J32.4 PANSINUSITIS, UNSPECIFIED CHRONICITY: Primary | ICD-10-CM

## 2019-01-08 LAB — POC MICROALBUMIN URINE: NORMAL

## 2019-01-08 PROCEDURE — 82044 UR ALBUMIN SEMIQUANTITATIVE: CPT | Mod: QW,,, | Performed by: INTERNAL MEDICINE

## 2019-01-08 PROCEDURE — 82044 POCT MICROALBUMIN: ICD-10-PCS | Mod: QW,,, | Performed by: INTERNAL MEDICINE

## 2019-01-08 PROCEDURE — 99214 OFFICE O/P EST MOD 30 MIN: CPT | Mod: 25,,, | Performed by: INTERNAL MEDICINE

## 2019-01-08 PROCEDURE — 99214 PR OFFICE/OUTPT VISIT, EST, LEVL IV, 30-39 MIN: ICD-10-PCS | Mod: 25,,, | Performed by: INTERNAL MEDICINE

## 2019-01-08 PROCEDURE — 96372 THER/PROPH/DIAG INJ SC/IM: CPT | Mod: ,,, | Performed by: INTERNAL MEDICINE

## 2019-01-08 PROCEDURE — 96372 PR INJECTION,THERAP/PROPH/DIAG2ST, IM OR SUBCUT: ICD-10-PCS | Mod: ,,, | Performed by: INTERNAL MEDICINE

## 2019-01-08 RX ORDER — LEVOTHYROXINE SODIUM 88 UG/1
88 TABLET ORAL DAILY
Qty: 90 TABLET | Refills: 1 | Status: SHIPPED | OUTPATIENT
Start: 2019-01-08 | End: 2019-06-18 | Stop reason: SDUPTHER

## 2019-01-08 RX ORDER — LINCOMYCIN HYDROCHLORIDE 300 MG/ML
600 INJECTION, SOLUTION INTRAMUSCULAR; INTRAVENOUS; SUBCONJUNCTIVAL
Status: COMPLETED | OUTPATIENT
Start: 2019-01-08 | End: 2019-01-10

## 2019-01-08 RX ADMIN — LINCOMYCIN HYDROCHLORIDE 600 MG: 300 INJECTION, SOLUTION INTRAMUSCULAR; INTRAVENOUS; SUBCONJUNCTIVAL at 11:01

## 2019-01-08 NOTE — PROGRESS NOTES
SUBJECTIVE:    Patient ID: Karma Chen is a 83 y.o. female.    Chief Complaint: Back Pain; Hypothyroidism; Hypertension; and Follow-up    HPI     Patient in for follow up labs,  back pain and thyroid-TSH is normal-[facility] follow-up revealed A1C of 5.7-Time for microalbumin--BUN was 26 and creatinine 1.41-electrolytes were normal.Saw renal who will follow yearly-we will check BMP every three months.     Hemoglobin and hematocrit 11.8 and 36.8 respectively. Platelets 270,000. The WBC is normal with normal differential.-    Back--rhizotomy was to have been done-nerve blocks had to be cancelled-if she takes one hydrocodone in am she does ok and with muscle relaxer at night-    URI--saw MD after Yusuf who gave her steroids and antibiotic-took the rx and still has sx--head fullness, drainage, weakness-no fever-just does not feel well today    Office Visit on 01/08/2019   Component Date Value Ref Range Status    Microalbumin, POC 01/08/2019 neg   Final   Office Visit on 11/13/2018   Component Date Value Ref Range Status    TSH 11/14/2018 0.59  0.30 - 5.60 ulU/ml Final    Hemoglobin A1C 11/13/2018 5.7   Final   Office Visit on 10/10/2018   Component Date Value Ref Range Status    Glucose 10/10/2018 110* 70 - 99 mg/dL Final    BUN, Bld 10/10/2018 26* 8 - 20 mg/dL Final    Creatinine 10/10/2018 1.41* 0.60 - 1.40 mg/dL Final    Calcium 10/10/2018 9.6  7.7 - 10.4 mg/dL Final    Sodium 10/10/2018 140  134 - 144 mmol/L Final    Potassium 10/10/2018 4.0  3.5 - 5.0 mmol/L Final    Chloride 10/10/2018 102  98 - 110 mmol/L Final    CO2 10/10/2018 27.2  22.8 - 31.6 mmol/L Final    WBC 10/10/2018 8.1  5.0 - 10.0 K/uL Final    RBC 10/10/2018 3.96  3.50 - 5.50 M/uL Final    Hemoglobin 10/10/2018 11.8* 12.0 - 15.0 g/dL Final    Hematocrit 10/10/2018 36.8  36.0 - 48.0 % Final    MCV 10/10/2018 92.9  79.0 - 98.0 fL Final    MCH 10/10/2018 29.8  25.0 - 35.0 pg Final    MCHC 10/10/2018 32.1  31.0 - 36.0 g/dL Final     RDW 10/10/2018 13.4  11.7 - 14.9 % Final    Platelets 10/10/2018 270  140 - 440 K/uL Final    MPV 10/10/2018 10.7  8.8 - 12.7 fL Final    Gran% 10/10/2018 45.9  % Final    Lymph% 10/10/2018 38.7  % Final    Mono% 10/10/2018 11.7  % Final    Eosinophil% 10/10/2018 3.1  % Final    Basophil% 10/10/2018 0.5  % Final    Gran # (ANC) 10/10/2018 3.7  1.4 - 6.5 K/uL Final    Lymph # 10/10/2018 3.1  1.2 - 3.4 K/uL Final    Mono # 10/10/2018 1.0* 0.1 - 0.6 K/uL Final    Eos # 10/10/2018 0.3  0.0 - 0.7 K/uL Final    Baso # 10/10/2018 0.0  0.0 - 0.2 K/uL Final    Immature Grans (Abs) 10/10/2018 0.0  0.0 - 1.0 K/uL Final    Immature Granulocytes 10/10/2018 0.1  % Final    nRBC% 10/10/2018 0  % Final   Orders Only on 08/15/2018   Component Date Value Ref Range Status    Glucose 08/15/2018 85  70 - 99 mg/dL Final    BUN, Bld 08/15/2018 22* 8 - 20 mg/dL Final    Creatinine 08/15/2018 1.45* 0.60 - 1.40 mg/dL Final    Calcium 08/15/2018 8.8  7.7 - 10.4 mg/dL Final    Sodium 08/15/2018 139  134 - 144 mmol/L Final    Potassium 08/15/2018 3.8  3.5 - 5.0 mmol/L Final    Chloride 08/15/2018 109  98 - 110 mmol/L Final    CO2 08/15/2018 21.4* 22.8 - 31.6 mmol/L Final    Albumin 08/15/2018 3.8  3.1 - 4.7 g/dL Final    Total Bilirubin 08/15/2018 0.4  0.3 - 1.0 mg/dL Final    Alkaline Phosphatase 08/15/2018 46  40 - 104 IU/L Final    Total Protein 08/15/2018 7.0  6.0 - 8.2 g/dL Final    ALT (SGPT) 08/15/2018 17  3 - 33 IU/L Final    AST 08/15/2018 21  10 - 40 IU/L Final   Orders Only on 08/15/2018   Component Date Value Ref Range Status    WBC 08/15/2018 9.0  5.0 - 10.0 K/uL Final    RBC 08/15/2018 3.63  3.50 - 5.50 M/uL Final    Hemoglobin 08/15/2018 11.1* 12.0 - 15.0 g/dL Final    Hematocrit 08/15/2018 34.3* 36.0 - 48.0 % Final    MCV 08/15/2018 94.5  79.0 - 98.0 fL Final    MCH 08/15/2018 30.6  25.0 - 35.0 pg Final    MCHC 08/15/2018 32.4  31.0 - 36.0 g/dL Final    RDW 08/15/2018 14.1  11.7 - 14.9 %  Final    Platelets 08/15/2018 305  140 - 440 K/uL Final    MPV 08/15/2018 10.6  8.8 - 12.7 fL Final    Gran% 08/15/2018 49.3  % Final    Lymph% 08/15/2018 33.1  % Final    Mono% 08/15/2018 13.1  % Final    Eosinophil% 08/15/2018 3.5  % Final    Basophil% 08/15/2018 0.7  % Final    Gran # (ANC) 08/15/2018 4.5  1.4 - 6.5 K/uL Final    Lymph # 08/15/2018 3.0  1.2 - 3.4 K/uL Final    Mono # 08/15/2018 1.2* 0.1 - 0.6 K/uL Final    Eos # 08/15/2018 0.3  0.0 - 0.7 K/uL Final    Baso # 08/15/2018 0.1  0.0 - 0.2 K/uL Final    Immature Grans (Abs) 08/15/2018 0.0  0.0 - 1.0 K/uL Final    Immature Granulocytes 08/15/2018 0.3  % Final    nRBC% 08/15/2018 0  % Final   Office Visit on 08/01/2018   Component Date Value Ref Range Status    Hemoglobin A1C 08/15/2018 6.0  3.1 - 6.5 % Final       Past Medical History:   Diagnosis Date    Coronary artery disease     Dermatitis     Dermatitis 12/8/2017    Diabetes mellitus     Diabetes mellitus type II     GERD (gastroesophageal reflux disease)     Hypertension     MI (myocardial infarction)      Social History     Socioeconomic History    Marital status:      Spouse name: Not on file    Number of children: Not on file    Years of education: Not on file    Highest education level: Not on file   Social Needs    Financial resource strain: Not on file    Food insecurity - worry: Not on file    Food insecurity - inability: Not on file    Transportation needs - medical: Not on file    Transportation needs - non-medical: Not on file   Occupational History    Not on file   Tobacco Use    Smoking status: Never Smoker    Smokeless tobacco: Never Used   Substance and Sexual Activity    Alcohol use: No    Drug use: No    Sexual activity: Not on file   Other Topics Concern    Not on file   Social History Narrative    Not on file     Past Surgical History:   Procedure Laterality Date    adenoids      Angioplasty-coronary N/A 12/11/2017    Performed by  [facility] at [facility] C[facility] LAB    APPENDECTOMY      CHOLECYSTECTOMY      CORONARY ARTERY BYPASS GRAFT      coronary stents      HYSTERECTOMY      TONSILLECTOMY       Family History   Problem Relation Age of Onset    No Known Problems Mother     No Known Problems Father      Vitals:    01/08/19 1048   BP: 126/68   Pulse: 74   Resp: 12   Temp: 98.2 °F (36.8 °C)   SpO2: 98%   Weight: 68 kg (150 lb)   Height: 5' (1.524 m)       Review of Systems   Constitutional: Positive for unexpected weight change (5 pound weight loss since last check). Negative for appetite change, chills, diaphoresis, fatigue and fever.   HENT: Negative for congestion, ear pain, hearing loss, nosebleeds, postnasal drip, sinus pressure, sinus pain, sneezing, sore throat, tinnitus, trouble swallowing and voice change.         HPI   Eyes: Negative for photophobia, pain, itching and visual disturbance.   Respiratory: Negative for apnea, cough, chest tightness, shortness of breath, wheezing and stridor.    Cardiovascular: Negative for chest pain (upper back pain for which she tool NTG last m but she went back to sleep easily), palpitations and leg swelling.   Gastrointestinal: Positive for diarrhea (softer stools post zithromax). Negative for abdominal distention, abdominal pain, blood in stool, constipation, nausea and vomiting.   Endocrine: Negative for cold intolerance, heat intolerance, polydipsia and polyuria.   Genitourinary: Negative for difficulty urinating, dyspareunia, dysuria, flank pain, frequency, hematuria, menstrual problem, pelvic pain, urgency, vaginal discharge and vaginal pain.        Stress bladder leakage   Musculoskeletal: Negative for arthralgias, back pain, joint swelling, myalgias, neck pain and neck stiffness.        HPI   Skin: Negative for pallor.   Allergic/Immunologic: Negative for environmental allergies and food allergies.   Neurological: Negative for dizziness, tremors, speech difficulty, weakness,  light-headedness and numbness.   Hematological: Does not bruise/bleed easily.   Psychiatric/Behavioral: Negative for agitation, confusion, decreased concentration, sleep disturbance and suicidal ideas. The patient is not nervous/anxious.           Objective:      Physical Exam   Constitutional: She is oriented to person, place, and time. She appears well-developed and well-nourished. She is cooperative. No distress.   HENT:   Head: Normocephalic and atraumatic.   Right Ear: Tympanic membrane normal.   Left Ear: Tympanic membrane normal.   Mouth/Throat: Uvula is midline and mucous membranes are normal.   Eyes: Conjunctivae and EOM are normal. Right pupil is round and reactive. Left pupil is round and reactive.   Neck: Trachea normal and normal range of motion. Neck supple. No JVD present. No thyromegaly present.   Cardiovascular: Normal rate, regular rhythm and normal heart sounds.   Pulmonary/Chest: Effort normal and breath sounds normal. No tachypnea. No respiratory distress.   Abdominal: Soft. Bowel sounds are normal. There is no tenderness.   Musculoskeletal: Normal range of motion.   Lymphadenopathy:     She has no cervical adenopathy.   Neurological: She is alert and oriented to person, place, and time. She has normal strength.   Skin: Skin is warm and dry. No rash noted.   Psychiatric: She has a normal mood and affect. Her speech is normal.   Nursing note and vitals reviewed.          Assessment:       1. Pansinusitis, unspecified chronicity    2. Acquired hypothyroidism    3. Diabetes mellitus type 2, diet-controlled    4. Chronic renal disease, stage III    5. Cloudy urine         Plan:           Pansinusitis, unspecified chronicity  -     lincomycin injection 600 mg  -     Return 24 and 48 hours here at clinic    Acquired hypothyroidism  -     levothyroxine (SYNTHROID) 88 MCG tablet; Take 1 tablet (88 mcg total) by mouth once daily. Can substitute  Dispense: 90 tablet; Refill: 1    Diabetes mellitus type 2,  diet-controlled  -     POCT microalbumin    Chronic renal disease, stage III  -     Basic metabolic panel; Standing    Cloudy urine  -     Culture urine

## 2019-01-09 ENCOUNTER — CLINICAL SUPPORT (OUTPATIENT)
Dept: FAMILY MEDICINE | Facility: CLINIC | Age: 84
End: 2019-01-09
Payer: MEDICARE

## 2019-01-09 DIAGNOSIS — J30.1 CHRONIC SEASONAL ALLERGIC RHINITIS DUE TO POLLEN: Primary | ICD-10-CM

## 2019-01-09 PROCEDURE — 96372 PR INJECTION,THERAP/PROPH/DIAG2ST, IM OR SUBCUT: ICD-10-PCS | Mod: ,,, | Performed by: INTERNAL MEDICINE

## 2019-01-09 PROCEDURE — 96372 THER/PROPH/DIAG INJ SC/IM: CPT | Mod: ,,, | Performed by: INTERNAL MEDICINE

## 2019-01-09 RX ADMIN — LINCOMYCIN HYDROCHLORIDE 600 MG: 300 INJECTION, SOLUTION INTRAMUSCULAR; INTRAVENOUS; SUBCONJUNCTIVAL at 02:01

## 2019-01-09 NOTE — PROGRESS NOTES
GAVE LINCOMYCIN INJECTION TO PATIENT SHE TOLERATED IT WELL NO PROBLEMS. STATED SHE WILL BE BACK TOMORROW FOR THE 3RD INJECTION  [facility]

## 2019-01-10 ENCOUNTER — CLINICAL SUPPORT (OUTPATIENT)
Dept: FAMILY MEDICINE | Facility: CLINIC | Age: 84
End: 2019-01-10
Payer: MEDICARE

## 2019-01-10 VITALS
TEMPERATURE: 97 F | OXYGEN SATURATION: 98 % | SYSTOLIC BLOOD PRESSURE: 130 MMHG | RESPIRATION RATE: 18 BRPM | HEART RATE: 86 BPM | DIASTOLIC BLOOD PRESSURE: 72 MMHG

## 2019-01-10 DIAGNOSIS — J32.4 PANSINUSITIS, UNSPECIFIED CHRONICITY: Primary | ICD-10-CM

## 2019-01-10 PROCEDURE — 96372 PR INJECTION,THERAP/PROPH/DIAG2ST, IM OR SUBCUT: ICD-10-PCS | Mod: ,,, | Performed by: INTERNAL MEDICINE

## 2019-01-10 PROCEDURE — 96372 THER/PROPH/DIAG INJ SC/IM: CPT | Mod: ,,, | Performed by: INTERNAL MEDICINE

## 2019-01-10 RX ADMIN — LINCOMYCIN HYDROCHLORIDE 600 MG: 300 INJECTION, SOLUTION INTRAMUSCULAR; INTRAVENOUS; SUBCONJUNCTIVAL at 01:01

## 2019-01-13 DIAGNOSIS — I10 ESSENTIAL HYPERTENSION: ICD-10-CM

## 2019-01-13 DIAGNOSIS — I25.810 CORONARY ARTERY DISEASE INVOLVING AUTOLOGOUS ARTERY CORONARY BYPASS GRAFT WITHOUT ANGINA PECTORIS: ICD-10-CM

## 2019-01-13 DIAGNOSIS — Z95.5 PRESENCE OF STENT IN CORONARY ARTERY IN PATIENT WITH CORONARY ARTERY DISEASE: ICD-10-CM

## 2019-01-13 DIAGNOSIS — I25.10 PRESENCE OF STENT IN CORONARY ARTERY IN PATIENT WITH CORONARY ARTERY DISEASE: ICD-10-CM

## 2019-01-13 RX ORDER — METOPROLOL SUCCINATE 25 MG/1
25 TABLET, EXTENDED RELEASE ORAL DAILY
Qty: 30 TABLET | Refills: 2 | Status: SHIPPED | OUTPATIENT
Start: 2019-01-13 | End: 2019-10-30 | Stop reason: SDUPTHER

## 2019-01-16 ENCOUNTER — OFFICE VISIT (OUTPATIENT)
Dept: FAMILY MEDICINE | Facility: CLINIC | Age: 84
End: 2019-01-16
Payer: MEDICARE

## 2019-01-16 VITALS
DIASTOLIC BLOOD PRESSURE: 61 MMHG | TEMPERATURE: 99 F | BODY MASS INDEX: 29.25 KG/M2 | HEART RATE: 95 BPM | HEIGHT: 60 IN | WEIGHT: 149 LBS | SYSTOLIC BLOOD PRESSURE: 125 MMHG

## 2019-01-16 DIAGNOSIS — R05.9 COUGH: ICD-10-CM

## 2019-01-16 DIAGNOSIS — J30.1 CHRONIC SEASONAL ALLERGIC RHINITIS DUE TO POLLEN: Primary | ICD-10-CM

## 2019-01-16 PROCEDURE — 99213 OFFICE O/P EST LOW 20 MIN: CPT | Mod: ,,, | Performed by: INTERNAL MEDICINE

## 2019-01-16 PROCEDURE — 99213 PR OFFICE/OUTPT VISIT, EST, LEVL III, 20-29 MIN: ICD-10-PCS | Mod: ,,, | Performed by: INTERNAL MEDICINE

## 2019-01-16 RX ORDER — IPRATROPIUM BROMIDE 21 UG/1
2 SPRAY, METERED NASAL 2 TIMES DAILY
Qty: 30 ML | Refills: 1 | Status: SHIPPED | OUTPATIENT
Start: 2019-01-16 | End: 2019-02-04

## 2019-01-16 NOTE — PROGRESS NOTES
Subjective:       Patient ID: Karma Chen is a 83 y.o. female.    Chief Complaint: URI    URI    This is a recurrent problem. The current episode started more than 1 month ago. The problem has been waxing and waning. Associated symptoms include congestion, coughing, sinus pain (rt side) and a sore throat. Pertinent negatives include no abdominal pain or chest pain.   Sinus Problem   This is a recurrent problem. The current episode started more than 1 month ago. The problem has been waxing and waning since onset. There has been no fever. Associated symptoms include congestion, coughing and a sore throat. Past treatments include antibiotics. The treatment provided mild relief.       Past Medical History:   Diagnosis Date    Coronary artery disease     Dermatitis     Dermatitis 12/8/2017    Diabetes mellitus     Diabetes mellitus type II     GERD (gastroesophageal reflux disease)     Hypertension     MI (myocardial infarction)      Social History     Socioeconomic History    Marital status:      Spouse name: Not on file    Number of children: Not on file    Years of education: Not on file    Highest education level: Not on file   Social Needs    Financial resource strain: Not on file    Food insecurity - worry: Not on file    Food insecurity - inability: Not on file    Transportation needs - medical: Not on file    Transportation needs - non-medical: Not on file   Occupational History    Not on file   Tobacco Use    Smoking status: Never Smoker    Smokeless tobacco: Never Used   Substance and Sexual Activity    Alcohol use: No    Drug use: No    Sexual activity: Not on file   Other Topics Concern    Not on file   Social History Narrative    Not on file     Past Surgical History:   Procedure Laterality Date    adenoids      Angioplasty-coronary N/A 12/11/2017    Performed [facility] CATH LAB    APPENDECTOMY      CHOLECYSTECTOMY      CORONARY ARTERY BYPASS  GRAFT      coronary stents      HYSTERECTOMY      TONSILLECTOMY       Family History   Problem Relation Age of Onset    No Known Problems Mother     No Known Problems Father        Review of Systems   Constitutional: Positive for activity change.   HENT: Positive for congestion, sinus pain (rt side) and sore throat.    Respiratory: Positive for cough. Negative for chest tightness.    Cardiovascular: Negative for chest pain and leg swelling.   Gastrointestinal: Negative for abdominal distention and abdominal pain.         Objective:      Blood pressure 125/61, pulse 95, temperature 98.5 °F (36.9 °C), height 5' (1.524 m), weight 67.6 kg (149 lb). Body mass index is 29.1 kg/m².  Physical Exam   Constitutional: She appears well-developed.   HENT:   Nose: Mucosal edema present. No sinus tenderness. Right sinus exhibits no maxillary sinus tenderness and no frontal sinus tenderness. Left sinus exhibits no maxillary sinus tenderness and no frontal sinus tenderness.   Mouth/Throat: Posterior oropharyngeal erythema present. No oropharyngeal exudate or posterior oropharyngeal edema.   Small external nares. Nasal bridge appears to be somewhat collapsed. Nasal turbulence observed   Eyes: Conjunctivae are normal.   Neck: Normal range of motion. Neck supple. No tracheal deviation present. No thyromegaly present.   Cardiovascular: Normal rate and regular rhythm.   Pulmonary/Chest: Effort normal and breath sounds normal.   Abdominal: Soft. Bowel sounds are normal.         Assessment:       1. Chronic seasonal allergic rhinitis due to pollen    2. Cough           Office Visit on 01/08/2019   Component Date Value Ref Range Status    Microalbumin, POC 01/08/2019 neg   Final   Office Visit on 11/13/2018   Component Date Value Ref Range Status    TSH 11/14/2018 0.59  0.30 - 5.60 ulU/ml Final    Hemoglobin A1C 11/13/2018 5.7   Final         Plan:           Chronic seasonal allergic rhinitis due to pollen  -     ipratropium  (ATROVENT) 0.03 % nasal spray; 2 sprays by Nasal route 2 (two) times daily.  Dispense: 30 mL; Refill: 1    Cough      Patient's chronic sinus symptoms and cough symptoms have been reviewed. She is not on any ACE inhibitors. She does not have any fever or expectoration. She is nonsmoker. She seems to complains of sinus congestion and postnasal dripping. No significant reflux like symptoms.    I will give trial of ipratropium nasal spray and see how she does in a couple of weeks. Saltwater gargles and steam inhalation will also be helpful. No need for antibiotics at this point.    She'll keep her regular appointment with Dr. Sullivan.      Current Outpatient Medications:     acetaminophen (TYLENOL) 500 MG tablet, Take 500 mg by mouth every 6 (six) hours as needed for Pain., Disp: , Rfl:     ALPRAZolam (XANAX) 0.25 MG tablet, Take 1 tablet (0.25 mg total) by mouth 2 (two) times daily as needed., Disp: 180 tablet, Rfl: 0    b complex vitamins tablet, Take 1 tablet by mouth once daily.  , Disp: , Rfl:     cetirizine (ZYRTEC) 5 MG tablet, Take 1 tablet (5 mg total) by mouth once daily., Disp: 90 tablet, Rfl: 0    cholecalciferol, vitamin D3, 5,000 unit TbDL, Take 5,000 Units by mouth once daily. Patient is taking vitamin D3, Disp: , Rfl:     clopidogrel (PLAVIX) 75 mg tablet, Take 1 tablet (75 mg total) by mouth once daily., Disp: 90 tablet, Rfl: 0    echinacea 400 mg Cap, Take 3 capsules by mouth 3 (three) times daily., Disp: , Rfl:     estradiol (ESTRACE) 0.01 % (0.1 mg/g) vaginal cream, Place vaginally once a week.  , Disp: , Rfl:     fish oil-omega-3 fatty acids 300-1,000 mg capsule, Take by mouth once daily., Disp: , Rfl:     HYDROcodone-acetaminophen (NORCO) 5-325 mg per tablet, Take 1 tablet by mouth every 12 (twelve) hours as needed for Pain., Disp: , Rfl:     isosorbide mononitrate (IMDUR) 60 MG 24 hr tablet, Take 1 tablet (60 mg total) by mouth once daily., Disp: 90 tablet, Rfl: 1    Lactobac  no.41-Bifidobact no.7 (PROBIOTIC-10) 70 mg (3 billion cell) Cap, Take 1 capsule by mouth once daily., Disp: , Rfl:     levothyroxine (SYNTHROID) 88 MCG tablet, Take 1 tablet (88 mcg total) by mouth once daily. Can substitute, Disp: 90 tablet, Rfl: 1    metoprolol succinate (TOPROL-XL) 25 MG 24 hr tablet, TAKE 1 TABLET (25 MG TOTAL) BY MOUTH ONCE DAILY., Disp: 30 tablet, Rfl: 2    montelukast (SINGULAIR) 10 mg tablet, Take 1 tablet (10 mg total) by mouth every evening., Disp: 90 tablet, Rfl: 1    nitroGLYCERIN (NITROSTAT) 0.4 MG SL tablet, , Disp: , Rfl:     ranitidine (ZANTAC) 150 MG tablet, Take 150 mg by mouth nightly., Disp: , Rfl:     tiZANidine (ZANAFLEX) 4 MG tablet, Take 4 mg by mouth every 6 (six) hours as needed., Disp: , Rfl:     triamcinolone acetonide 0.1% (KENALOG) 0.1 % cream, Apply topically 2 (two) times daily., Disp: 15 g, Rfl: 1    valACYclovir (VALTREX) 1000 MG tablet, TAKE 1 TABLET EVERY 24     HOURS, Disp: 90 tablet, Rfl: 1    aspirin (ECOTRIN) 81 MG EC tablet, Take 1 tablet (81 mg total) by mouth once daily., Disp: , Rfl: 0    coenzyme Q10 100 mg capsule, Take 1 capsule (100 mg total) by mouth 2 (two) times daily., Disp: 60 capsule, Rfl: 11    ipratropium (ATROVENT) 0.03 % nasal spray, 2 sprays by Nasal route 2 (two) times daily., Disp: 30 mL, Rfl: 1

## 2019-01-16 NOTE — PATIENT INSTRUCTIONS

## 2019-02-04 ENCOUNTER — OFFICE VISIT (OUTPATIENT)
Dept: FAMILY MEDICINE | Facility: CLINIC | Age: 84
End: 2019-02-04
Payer: MEDICARE

## 2019-02-04 VITALS
SYSTOLIC BLOOD PRESSURE: 152 MMHG | OXYGEN SATURATION: 97 % | DIASTOLIC BLOOD PRESSURE: 72 MMHG | HEART RATE: 94 BPM | WEIGHT: 150.69 LBS | BODY MASS INDEX: 29.58 KG/M2 | HEIGHT: 60 IN

## 2019-02-04 DIAGNOSIS — M54.6 ACUTE MIDLINE THORACIC BACK PAIN: ICD-10-CM

## 2019-02-04 DIAGNOSIS — R53.83 FATIGUE, UNSPECIFIED TYPE: ICD-10-CM

## 2019-02-04 DIAGNOSIS — R45.89 ANXIETY ABOUT HEALTH: Primary | ICD-10-CM

## 2019-02-04 DIAGNOSIS — I10 ESSENTIAL HYPERTENSION: ICD-10-CM

## 2019-02-04 DIAGNOSIS — E66.3 OVERWEIGHT (BMI 25.0-29.9): ICD-10-CM

## 2019-02-04 DIAGNOSIS — M79.7 FIBROMYALGIA: ICD-10-CM

## 2019-02-04 PROCEDURE — 93000 ELECTROCARDIOGRAM COMPLETE: CPT | Mod: ,,, | Performed by: NURSE PRACTITIONER

## 2019-02-04 PROCEDURE — 99214 PR OFFICE/OUTPT VISIT, EST, LEVL IV, 30-39 MIN: ICD-10-PCS | Mod: ,,, | Performed by: NURSE PRACTITIONER

## 2019-02-04 PROCEDURE — 99214 OFFICE O/P EST MOD 30 MIN: CPT | Mod: ,,, | Performed by: NURSE PRACTITIONER

## 2019-02-04 PROCEDURE — 93000 PR ELECTROCARDIOGRAM, COMPLETE: ICD-10-PCS | Mod: ,,, | Performed by: NURSE PRACTITIONER

## 2019-02-04 RX ORDER — LORATADINE 10 MG/1
10 TABLET ORAL DAILY
COMMUNITY
End: 2019-11-20

## 2019-02-04 NOTE — PATIENT INSTRUCTIONS
Anxiety Reaction  Anxiety is the feeling we all get when we think something bad might happen. It is a normal response to stress and usually causes only a mild reaction. When anxiety becomes more severe, it can interfere with daily life. In some cases, you may not even be aware of what it is youre anxious about. There may also be a genetic link or it may be a learned behavior in the home.  Both psychological and physical triggers cause stress reaction. It's often a response to fear or emotional stress, real or imagined. This stress may come from home, family, work, or social relationships.  During an anxiety reaction, you may feel:  · Helpless  · Nervous  · Depressed  · Irritable  Your body may show signs of anxiety in many ways. You may experience:  · Dry mouth  · Shakiness  · Dizziness  · Weakness  · Trouble breathing  · Breathing fast (hyperventilating)  · Chest pressure  · Sweating  · Headache  · Nausea  · Diarrhea  · Tiredness  · Inability to sleep  · Sexual problems  Home care  · Try to locate the sources of stress in your life. They may not be obvious. These may include:  ¨ Daily hassles of life (traffic jams, missed appointments, car troubles, etc.)  ¨ Major life changes, both good (new baby, job promotion) and bad (loss of job, loss of loved one)  ¨ Overload: feeling that you have too many responsibilities and can't take care of all of them at once  ¨ Feeling helpless, feeling that your problems are beyond what youre able to solve  · Notice how your body reacts to stress. Learn to listen to your body signals. This will help you take action before the stress becomes severe.  · When you can, do something about the source of your stress. (Avoid hassles, limit the amount of change that happens in your life at one time and take a break when you feel overloaded).  · Unfortunately, many stressful situations can't be avoided. It is necessary to learn how to better manage stress. There are many proven methods  that will reduce your anxiety. These include simple things like exercise, good nutrition and adequate rest. Also, there are certain techniques that are helpful:  ¨ Relaxation  ¨ Breathing exercises  ¨ Visualization  ¨ Biofeedback  ¨ Meditation  For more information about this, consult your doctor or go to a local bookstore and review the many books and tapes available on this subject.  Follow-up care  If you feel that your anxiety is not responding to self-help measures, contact your doctor or make an appointment with a counselor. You may need short-term psychological counseling and temporary medicine to help you manage stress.  Call 911  Call your healthcare provider right away if any of these occur:  · Trouble breathing  · Confusion  · Drowsiness or trouble wakening  · Fainting or loss of consciousness  · Rapid heart rate  · Seizure  · New chest pain that becomes more severe, lasts longer, or spreads into your shoulder, arm, neck, jaw, or back  When to seek medical advice  Call your healthcare provider right away if any of these occur:  · Your symptoms get worse  · Severe headache not relieved by rest and mild pain reliever  Date Last Reviewed: 9/29/2015  © 8820-7492 [employer]. [address] [place] [id number]. All rights reserved. This information is not intended as a substitute for professional medical care. Always follow your healthcare professional's instructions.        Back Care Tips    Caring for your back  These are things you can do to prevent a recurrence of acute back pain and to reduce symptoms from chronic back pain:  · Maintain a healthy weight. If you are overweight, losing weight will help most types of back pain.  · Exercise is an important part of recovery from most types of back pain. The muscles behind and in front of the spine support the back. This means strengthening both the back muscles and the abdominal muscles will provide better support for your  spine.   · Swimming and brisk walking are good overall exercises to improve your fitness level.  · Practice safe lifting methods (below).  · Practice good posture when sitting, standing and walking. Avoid prolonged sitting. This puts more stress on the lower back than standing or walking.  · Wear quality shoes with sufficient arch support. Foot and ankle alignment can affect back symptoms. Women should avoid wearing high heels.  · Therapeutic massage can help relax the back muscles without stretching them.  · During the first 24 to 72 hours after an acute injury or flare-up of chronic back pain, apply an ice pack to the painful area for 20 minutes and then remove it for 20 minutes, over a period of 60 to 90 minutes, or several times a day. As a safety precaution, do not use a heating pad at bedtime. Sleeping on a heating pad can lead to skin burns or tissue damage.  · You can alternate ice and heat therapies.  Medications  Talk to your healthcare provider before using medicines, especially if you have other medical problems or are taking other medicines.  · You may use acetaminophen or ibuprofen to control pain, unless your healthcare provider prescribed other pain medicine. If you have chronic conditions like diabetes, liver or kidney disease, stomach ulcers, or gastrointestinal bleeding, or are taking blood thinners, talk with your healthcare provider before taking any medicines.  · Be careful if you are given prescription pain medicines, narcotics, or medicine for muscle spasm. They can cause drowsiness, affect your coordination, reflexes, and judgment. Do not drive or operate heavy machinery while taking these types of medicines. Take prescription pain medicine only as prescribed by your healthcare provider.  Lumbar stretch  Here is a simple stretching exercise that will help relax muscle spasm and keep your back more limber. If exercise makes your back pain worse, dont do it.  · Lie on your back with your  knees bent and both feet on the ground.  · Slowly raise your left knee to your chest as you flatten your lower back against the floor. Hold for 5 seconds.  · Relax and repeat the exercise with your right knee.  · Do 10 of these exercises for each leg.  Safe lifting method  · Dont bend over at the waist to lift an object off the floor.  Instead, bend your knees and hips in a squat.   · Keep your back and head upright  · Hold the object close to your body, directly in front of you.  · Straighten your legs to lift the object.   · Lower the object to the floor in the reverse fashion.  · If you must slide something across the floor, push it.  Posture tips  Sitting  Sit in chairs with straight backs or low-back support. Keep your knees lower than your hips, with your feet flat on the floor.  When driving, sit up straight. Adjust the seat forward so you are not leaning toward the steering wheel.  A small pillow or rolled towel behind your lower back may help if you are driving long distances.   Standing  When standing for long periods, shift most of your weight to one leg at a time. Alternate legs every few minutes.   Sleeping  The best way to sleep is on your side with your knees bent. Put a low pillow under your head to support your neck in a neutral spine position. Avoid thick pillows that bend your neck to one side. Put a pillow between your legs to further relax your lower back. If you sleep on your back, put pillows under your knees to support your legs in a slightly flexed position. Use a firm mattress. If your mattress sags, replace it, or use a 1/2-inch plywood board under the mattress to add support.  Follow-up care  Follow up with your healthcare provider, or as advised.  If X-rays, a CT scan or an MRI scan were taken, they will be reviewed by a radiologist. You will be notified of any new findings that may affect your care.  Call 911  Seek emergency medical care if any of the following occur:  · Trouble  breathing  · Confusion  · Very drowsy  · Fainting or loss of consciousness  · Rapid or very slow heart rate  · Loss of  bowel or bladder control  When to seek medical care  Call your healthcare provider if any of the following occur:  · Pain becomes worse or spreads to your arms or legs  · Weakness or numbness in one or both arms or legs  · Numbness in the groin area  Date Last Reviewed: 6/1/2016  © 4704-4165 [employer]. [address] [place] [address], [place] [address]. All rights reserved. This information is not intended as a substitute for professional medical care. Always follow your healthcare professional's instructions.

## 2019-02-04 NOTE — PROGRESS NOTES
"Subjective:       Patient ID: Karma Chen is a 83 y.o. female.    Chief Complaint: Headache; Arm Pain; Back Pain; Hypertension (BP was 140/80); and Fatigue    Patient presents today with feelings of fatigue and generalized unwell feeling.  Patient complains of upper back pain she contributes to her fibromyalgia but fears it could be her heart.  Patient describes her heart history indicating she has had stents placed and bypass surgery.  Patient denies chest pain or shortness of breath.  Patient denies peripheral edema or productive cough.  Patient denies fever or urinary symptoms.  Patient is requesting an EKG for her "peace of mind" she states because she feels her feeling of abnormalities could be stemming from her heart.  Patient sees Dr. Villarreal for her cardiologist which she saw around 2 months ago.  Patient is overweight with a BMI of 29.43.      Review of Systems   Constitutional: Positive for activity change and fatigue. Negative for appetite change, fever and unexpected weight change.   HENT: Negative for congestion, sore throat, trouble swallowing and voice change.    Eyes: Negative for photophobia, pain, discharge and visual disturbance.   Respiratory: Negative for cough, chest tightness and shortness of breath.    Cardiovascular: Negative for chest pain, palpitations and leg swelling.        Hypertension   Gastrointestinal: Negative for abdominal pain, nausea and vomiting.   Endocrine: Negative for cold intolerance and heat intolerance.   Genitourinary: Negative for difficulty urinating and dysuria.   Musculoskeletal: Positive for back pain and myalgias. Negative for arthralgias.   Skin: Negative for rash.   Allergic/Immunologic: Negative for immunocompromised state.   Neurological: Negative for speech difficulty and headaches.   Psychiatric/Behavioral: Negative for confusion, self-injury and suicidal ideas.       Past Medical History:   Diagnosis Date    Coronary artery disease     Dermatitis  "    Dermatitis 12/8/2017    Diabetes mellitus     Diabetes mellitus type II     Leah Madrigal virus infection     Fibromyalgia     GERD (gastroesophageal reflux disease)     Hypertension     MI (myocardial infarction) 09/23/2011      Past Surgical History:   Procedure Laterality Date    adenoids      ANGIOPLASTY  1987    Angioplasty-coronary N/A 12/11/2017    Performed [facility] at [facility] CATH LAB    APPENDECTOMY      CARDIAC PACEMAKER PLACEMENT  01/12/2017    CATARACT EXTRACTION, BILATERAL Bilateral 9/2/15, 3/17/15    right eye-9/2/15, left eye-3/17/15    CHOLECYSTECTOMY  1987    CORONARY ARTERY BYPASS GRAFT  2006    quadruple    coronary stents  1999    x2    CYST REMOVAL  04/25/2017    on back    HYSTERECTOMY  1966    OVARY SURGERY  1948    Ovary burst & was repaired    RHIZOTOMY  09/14/2018    TONSILLECTOMY  1940       Family History   Problem Relation Age of Onset    No Known Problems Mother     No Known Problems Father        Social History     Socioeconomic History    Marital status:      Spouse name: None    Number of children: None    Years of education: None    Highest education level: None   Social Needs    Financial resource strain: None    Food insecurity - worry: None    Food insecurity - inability: None    Transportation needs - medical: None    Transportation needs - non-medical: None   Occupational History    None   Tobacco Use    Smoking status: Never Smoker    Smokeless tobacco: Never Used   Substance and Sexual Activity    Alcohol use: No    Drug use: No    Sexual activity: None   Other Topics Concern    None   Social History Narrative    None       Current Outpatient Medications   Medication Sig Dispense Refill    acetaminophen (TYLENOL) 500 MG tablet Take 500 mg by mouth every 6 (six) hours as needed for Pain.      aspirin (ECOTRIN) 81 MG EC tablet Take 1 tablet (81 mg total) by mouth once daily.  0    b complex vitamins tablet Take  1 tablet by mouth once daily.        cetirizine (ZYRTEC) 5 MG tablet Take 1 tablet (5 mg total) by mouth once daily. (Patient taking differently: Take 10 mg by mouth once daily. ) 90 tablet 0    clopidogrel (PLAVIX) 75 mg tablet Take 1 tablet (75 mg total) by mouth once daily. 90 tablet 0    coenzyme Q10 100 mg capsule Take 1 capsule (100 mg total) by mouth 2 (two) times daily. 60 capsule 11    echinacea 400 mg Cap Take 1 capsule by mouth 2 (two) times daily.       estradiol (ESTRACE) 0.01 % (0.1 mg/g) vaginal cream Place vaginally once a week.        fish oil-omega-3 fatty acids 300-1,000 mg capsule Take by mouth once daily.      HYDROcodone-acetaminophen (NORCO) 5-325 mg per tablet Take 1 tablet by mouth every 12 (twelve) hours as needed for Pain.      isosorbide mononitrate (IMDUR) 60 MG 24 hr tablet Take 1 tablet (60 mg total) by mouth once daily. 90 tablet 1    levothyroxine (SYNTHROID) 88 MCG tablet Take 1 tablet (88 mcg total) by mouth once daily. Can substitute 90 tablet 1    loratadine (CLARITIN) 10 mg tablet Take 25 mg by mouth once daily.      metoprolol succinate (TOPROL-XL) 25 MG 24 hr tablet TAKE 1 TABLET (25 MG TOTAL) BY MOUTH ONCE DAILY. (Patient taking differently: Take 12.5 mg by mouth once daily. ) 30 tablet 2    montelukast (SINGULAIR) 10 mg tablet Take 1 tablet (10 mg total) by mouth every evening. 90 tablet 1    nitroGLYCERIN (NITROSTAT) 0.4 MG SL tablet       ranitidine (ZANTAC) 150 MG tablet Take 150 mg by mouth nightly as needed.       tiZANidine (ZANAFLEX) 4 MG tablet Take 4 mg by mouth every 6 (six) hours as needed.      UNABLE TO FIND Take 1 tablet by mouth 2 (two) times daily. Tamaflex      UNABLE TO FIND Take 1 tablet by mouth 2 (two) times daily. Tranquil Tract      valACYclovir (VALTREX) 1000 MG tablet TAKE 1 TABLET EVERY 24     HOURS (Patient taking differently: TAKE 1 TABLET EVERY 24 HOURS PRN) 90 tablet 1     No current facility-administered medications for this  visit.        Review of patient's allergies indicates:   Allergen Reactions    Lipitor [atorvastatin] Other (See Comments)     Muscle pain and weakness in legs    Arthrotec 50 [diclofenac-misoprostol]     Doxycycline     Effexor [venlafaxine]     Estradiol     Flagyl [metronidazole]     Fluconazole     Iodine     Levaquin [levofloxacin]     Nexium [esomeprazole magnesium]     Penicillins     Shellfish containing products     Sulfa (sulfonamide antibiotics)     Tea tree oil     Tegaserod      ZOLNORM    Trazodone     Yeast, dried      Objective:    HPI     Hypertension      Additional comments: BP was 140/80          Last edited by Iqra Rodriguez LPN on 2/4/2019  2:30 PM. (History)      Blood pressure (!) 152/72, pulse 94, height 5' (1.524 m), weight 68.4 kg (150 lb 11.2 oz), SpO2 97 %. Body mass index is 29.43 kg/m².   Physical Exam   Constitutional: She is oriented to person, place, and time. She appears well-developed. She is cooperative. No distress.   HENT:   Head: Normocephalic and atraumatic.   Right Ear: Tympanic membrane and external ear normal.   Left Ear: Tympanic membrane and external ear normal.   Nose: Nose normal.   Mouth/Throat: Oropharynx is clear and moist.   Eyes: Conjunctivae, EOM and lids are normal. Pupils are equal, round, and reactive to light. Lids are everted and swept, no foreign bodies found. Right pupil is round and reactive. Left pupil is round and reactive.   Neck: Trachea normal and normal range of motion. Neck supple.   Cardiovascular: Normal rate, regular rhythm, S1 normal, S2 normal, normal heart sounds and intact distal pulses.   EKG performed in clinic   Pulmonary/Chest: Effort normal and breath sounds normal. She has no wheezes.   Abdominal: Soft. Bowel sounds are normal. There is no rigidity and no guarding.   Musculoskeletal: Normal range of motion.   Lymphadenopathy:     She has no cervical adenopathy.     She has no axillary adenopathy.   Neurological: She is  alert and oriented to person, place, and time.   Skin: Skin is warm and dry. Capillary refill takes less than 2 seconds.   Psychiatric: She has a normal mood and affect. Her behavior is normal. Judgment and thought content normal.   Nursing note and vitals reviewed.          Assessment:       1. Anxiety about health    2. Essential hypertension    3. Fibromyalgia    4. Overweight (BMI 25.0-29.9)    5. Fatigue, unspecified type        Plan:       Karma was seen today for headache, arm pain, back pain, hypertension and fatigue.    Diagnoses and all orders for this visit:    Anxiety about health  -Reassured patient she is not having an MI at this time according to EKG.  Patient is worried about health and is comforted now that she has had the EKG.    Essential hypertension  -Lifestyle changes: Reduce the amount of salt in your diet; Lose weight; Avoid drinking too much alcohol; Exercise at least 30 minutes per day most days of the week.  Continue current medications and home BP monitoring.     Fibromyalgia  -Continue current treatment. Stable.    Overweight (BMI 25.0-29.9)  -The patient is asked to make an attempt to improve diet and exercise patterns to aid in medical management of this problem.    Fatigue, unspecified type  -Possible viral syndrome.       EKG  -Completed.    Follow up with cardiology as scheduled.

## 2019-02-18 ENCOUNTER — OFFICE VISIT (OUTPATIENT)
Dept: FAMILY MEDICINE | Facility: CLINIC | Age: 84
End: 2019-02-18
Payer: MEDICARE

## 2019-02-18 VITALS
RESPIRATION RATE: 14 BRPM | SYSTOLIC BLOOD PRESSURE: 132 MMHG | HEART RATE: 72 BPM | TEMPERATURE: 98 F | BODY MASS INDEX: 29.06 KG/M2 | OXYGEN SATURATION: 98 % | DIASTOLIC BLOOD PRESSURE: 68 MMHG | WEIGHT: 148 LBS | HEIGHT: 60 IN

## 2019-02-18 DIAGNOSIS — E11.9 DIABETES MELLITUS TYPE 2, DIET-CONTROLLED: ICD-10-CM

## 2019-02-18 DIAGNOSIS — M54.16 LEFT LUMBAR RADICULOPATHY: Primary | ICD-10-CM

## 2019-02-18 DIAGNOSIS — I10 ESSENTIAL HYPERTENSION: ICD-10-CM

## 2019-02-18 PROCEDURE — 99214 OFFICE O/P EST MOD 30 MIN: CPT | Mod: ,,, | Performed by: INTERNAL MEDICINE

## 2019-02-18 PROCEDURE — 99214 PR OFFICE/OUTPT VISIT, EST, LEVL IV, 30-39 MIN: ICD-10-PCS | Mod: ,,, | Performed by: INTERNAL MEDICINE

## 2019-02-18 NOTE — PROGRESS NOTES
SUBJECTIVE:    Patient ID: Karma Chen is a 83 y.o. female.    Chief Complaint: No chief complaint on file.    HPI     Patient in for recheck this morning. Laboratory study since last clinic visit included BUN of 26 and creatinine 1.41. Blood sugar is 110 last A1C was 5.7.    Three nights ago she started spitting up chunks of white or clear thick mucous-last night she took some children's benadryl and did not have any this am-the sinus infection she saw us for is resolved.    Pt is post rhizotomy right side and was to have same on left side but could not have it due to recurrent sinus infections-Dr Driscoll ordered some OPT at home-she has to walk with a walker in the am for two to three hours-pain is terrible in the buttock and left postero-lateral leg-the leg is very tender--taking hydrocodone and it finally helped-she is starting to feel some tingling in the toes at night-she is taking some nerve renewal supplement for neuropathy-strength seems to be equal--        Office Visit on 01/08/2019   Component Date Value Ref Range Status    Microalbumin, POC 01/08/2019 neg   Final   Office Visit on 11/13/2018   Component Date Value Ref Range Status    TSH 11/14/2018 0.59  0.30 - 5.60 ulU/ml Final    Hemoglobin A1C 11/13/2018 5.7   Final   Office Visit on 10/10/2018   Component Date Value Ref Range Status    Glucose 10/10/2018 110* 70 - 99 mg/dL Final    BUN, Bld 10/10/2018 26* 8 - 20 mg/dL Final    Creatinine 10/10/2018 1.41* 0.60 - 1.40 mg/dL Final    Calcium 10/10/2018 9.6  7.7 - 10.4 mg/dL Final    Sodium 10/10/2018 140  134 - 144 mmol/L Final    Potassium 10/10/2018 4.0  3.5 - 5.0 mmol/L Final    Chloride 10/10/2018 102  98 - 110 mmol/L Final    CO2 10/10/2018 27.2  22.8 - 31.6 mmol/L Final    WBC 10/10/2018 8.1  5.0 - 10.0 K/uL Final    RBC 10/10/2018 3.96  3.50 - 5.50 M/uL Final    Hemoglobin 10/10/2018 11.8* 12.0 - 15.0 g/dL Final    Hematocrit 10/10/2018 36.8  36.0 - 48.0 % Final    MCV  10/10/2018 92.9  79.0 - 98.0 fL Final    MCH 10/10/2018 29.8  25.0 - 35.0 pg Final    MCHC 10/10/2018 32.1  31.0 - 36.0 g/dL Final    RDW 10/10/2018 13.4  11.7 - 14.9 % Final    Platelets 10/10/2018 270  140 - 440 K/uL Final    MPV 10/10/2018 10.7  8.8 - 12.7 fL Final    Gran% 10/10/2018 45.9  % Final    Lymph% 10/10/2018 38.7  % Final    Mono% 10/10/2018 11.7  % Final    Eosinophil% 10/10/2018 3.1  % Final    Basophil% 10/10/2018 0.5  % Final    Gran # (ANC) 10/10/2018 3.7  1.4 - 6.5 K/uL Final    Lymph # 10/10/2018 3.1  1.2 - 3.4 K/uL Final    Mono # 10/10/2018 1.0* 0.1 - 0.6 K/uL Final    Eos # 10/10/2018 0.3  0.0 - 0.7 K/uL Final    Baso # 10/10/2018 0.0  0.0 - 0.2 K/uL Final    Immature Grans (Abs) 10/10/2018 0.0  0.0 - 1.0 K/uL Final    Immature Granulocytes 10/10/2018 0.1  % Final    nRBC% 10/10/2018 0  % Final       Past Medical History:   Diagnosis Date    Coronary artery disease     Dermatitis     Dermatitis 12/8/2017    Diabetes mellitus     Diabetes mellitus type II     Leah Madrigal virus infection     Fibromyalgia     GERD (gastroesophageal reflux disease)     Hypertension     MI (myocardial infarction) 09/23/2011     Past Surgical History:   Procedure Laterality Date    adenoids      ANGIOPLASTY  1987    Angioplasty-coronary N/A 12/11/2017    Performed by [facility] at [facility] CATH LAB    APPENDECTOMY      CARDIAC PACEMAKER PLACEMENT  01/12/2017    CATARACT EXTRACTION, BILATERAL Bilateral 9/2/15, 3/17/15    right eye-9/2/15, left eye-3/17/15    CHOLECYSTECTOMY  1987    CORONARY ARTERY BYPASS GRAFT  2006    quadruple    coronary stents  1999    x2    CYST REMOVAL  04/25/2017    on back    HYSTERECTOMY  1966    OVARY SURGERY  1948    Ovary burst & was repaired    RHIZOTOMY  09/14/2018    TONSILLECTOMY  1940     Family History   Problem Relation Age of Onset    No Known Problems Mother     No Known Problems Father        Marital Status:   Alcohol  History:  reports that she does not drink alcohol.  Tobacco History:  reports that  has never smoked. she has never used smokeless tobacco.  Drug History:  reports that she does not use drugs.    Review of patient's allergies indicates:   Allergen Reactions    Lipitor [atorvastatin] Other (See Comments)     Muscle pain and weakness in legs    Arthrotec 50 [diclofenac-misoprostol]     Doxycycline     Effexor [venlafaxine]     Estradiol     Flagyl [metronidazole]     Fluconazole     Iodine     Levaquin [levofloxacin]     Nexium [esomeprazole magnesium]     Penicillins     Shellfish containing products     Sulfa (sulfonamide antibiotics)     Tea tree oil     Tegaserod      ZOLNORM    Trazodone     Yeast, dried      Current Outpatient Medications   Medication Sig Dispense Refill    acetaminophen (TYLENOL) 500 MG tablet Take 500 mg by mouth every 6 (six) hours as needed for Pain.      aspirin (ECOTRIN) 81 MG EC tablet Take 1 tablet (81 mg total) by mouth once daily.  0    b complex vitamins tablet Take 1 tablet by mouth once daily.        cetirizine (ZYRTEC) 5 MG tablet Take 1 tablet (5 mg total) by mouth once daily. (Patient taking differently: Take 10 mg by mouth once daily. ) 90 tablet 0    clopidogrel (PLAVIX) 75 mg tablet Take 1 tablet (75 mg total) by mouth once daily. 90 tablet 0    coenzyme Q10 100 mg capsule Take 1 capsule (100 mg total) by mouth 2 (two) times daily. 60 capsule 11    echinacea 400 mg Cap Take 1 capsule by mouth 2 (two) times daily.       estradiol (ESTRACE) 0.01 % (0.1 mg/g) vaginal cream Place vaginally once a week.        fish oil-omega-3 fatty acids 300-1,000 mg capsule Take by mouth once daily.      HYDROcodone-acetaminophen (NORCO) 5-325 mg per tablet Take 1 tablet by mouth every 12 (twelve) hours as needed for Pain.      isosorbide mononitrate (IMDUR) 60 MG 24 hr tablet Take 1 tablet (60 mg total) by mouth once daily. 90 tablet 1    levothyroxine (SYNTHROID) 88  MCG tablet Take 1 tablet (88 mcg total) by mouth once daily. Can substitute 90 tablet 1    loratadine (CLARITIN) 10 mg tablet Take 25 mg by mouth once daily.      metoprolol succinate (TOPROL-XL) 25 MG 24 hr tablet TAKE 1 TABLET (25 MG TOTAL) BY MOUTH ONCE DAILY. (Patient taking differently: Take 12.5 mg by mouth once daily. ) 30 tablet 2    montelukast (SINGULAIR) 10 mg tablet Take 1 tablet (10 mg total) by mouth every evening. 90 tablet 1    nitroGLYCERIN (NITROSTAT) 0.4 MG SL tablet       ranitidine (ZANTAC) 150 MG tablet Take 150 mg by mouth nightly as needed.       tiZANidine (ZANAFLEX) 4 MG tablet Take 4 mg by mouth every 6 (six) hours as needed.      UNABLE TO FIND Take 1 tablet by mouth 2 (two) times daily. Tamaflex      UNABLE TO FIND Take 1 tablet by mouth 2 (two) times daily. Tranquil [place]ract      valACYclovir (VALTREX) 1000 MG tablet TAKE 1 TABLET EVERY 24     HOURS (Patient taking differently: TAKE 1 TABLET EVERY 24 HOURS PRN) 90 tablet 1     No current facility-administered medications for this visit.        Review of Systems   Constitutional: Negative for appetite change, chills, diaphoresis, fatigue, fever and unexpected weight change.   HENT: Negative for congestion, ear pain, hearing loss, nosebleeds, postnasal drip, sinus pressure, sinus pain, sneezing, sore throat, tinnitus, trouble swallowing and voice change.    Eyes: Negative for photophobia, pain, itching and visual disturbance.   Respiratory: Negative for apnea, cough (HPI), chest tightness, shortness of breath, wheezing and stridor.    Cardiovascular: Positive for chest pain. Negative for palpitations and leg swelling.   Gastrointestinal: Negative for abdominal distention, abdominal pain, blood in stool, constipation, diarrhea, nausea and vomiting.   Endocrine: Negative for cold intolerance, heat intolerance, polydipsia and polyuria.   Genitourinary: Negative for difficulty urinating, dyspareunia, dysuria, flank pain, frequency,  hematuria, menstrual problem, pelvic pain, urgency, vaginal discharge and vaginal pain.   Musculoskeletal: Positive for back pain (she saw Mariam for some back sx she flet might be related to the heart). Negative for arthralgias, joint swelling, myalgias, neck pain and neck stiffness.   Skin: Negative for pallor.   Allergic/Immunologic: Negative for environmental allergies and food allergies.   Neurological: Negative for dizziness, tremors, speech difficulty, weakness, light-headedness and numbness.   Hematological: Does not bruise/bleed easily.   Psychiatric/Behavioral: Negative for agitation, confusion, decreased concentration, sleep disturbance (not disturbed by her back and leg pain) and suicidal ideas. The patient is not nervous/anxious.           Objective:        Physical Exam   Constitutional: She is oriented to person, place, and time. She appears well-developed and well-nourished. She is cooperative. No distress.   HENT:   Head: Normocephalic and atraumatic.   Right Ear: Tympanic membrane normal.   Left Ear: Tympanic membrane normal.   Mouth/Throat: Uvula is midline and mucous membranes are normal.   Eyes: Conjunctivae and EOM are normal. Pupils are equal, round, and reactive to light. Right eye exhibits no discharge. Left eye exhibits no discharge. No scleral icterus. Right pupil is round and reactive. Left pupil is round and reactive.   Neck: Trachea normal and normal range of motion. Neck supple. No JVD present. Carotid bruit is not present. No thyromegaly present.   Cardiovascular: Normal rate, regular rhythm and intact distal pulses. Exam reveals no gallop and no friction rub.   No murmur heard.  Pulmonary/Chest: Effort normal and breath sounds normal. No respiratory distress. She has no wheezes. She has no rales.   Abdominal: Soft. Bowel sounds are normal. She exhibits no distension and no mass. There is no tenderness. There is no guarding. No hernia.   Musculoskeletal: Normal range of motion. She  exhibits no edema.   Neurological: She is alert and oriented to person, place, and time. She has normal strength.   Decreased Achilles reflex bilaterally   Skin: Skin is warm and dry. Capillary refill takes less than 2 seconds. No lesion and no rash noted. No cyanosis. Nails show no clubbing.   Psychiatric: She has a normal mood and affect. Her speech is normal and behavior is normal. Judgment and thought content normal.   Nursing note and vitals reviewed.          Assessment:       No diagnosis found.     Plan:       There are no diagnoses linked to this encounter.  No Follow-up on file.        2/18/2019 Elena Sullivan M.D.

## 2019-03-12 DIAGNOSIS — Z91.09 ENVIRONMENTAL ALLERGIES: ICD-10-CM

## 2019-03-12 RX ORDER — MONTELUKAST SODIUM 10 MG/1
TABLET ORAL
Qty: 90 TABLET | Refills: 1 | Status: SHIPPED | OUTPATIENT
Start: 2019-03-12 | End: 2019-09-01 | Stop reason: SDUPTHER

## 2019-03-20 ENCOUNTER — OFFICE VISIT (OUTPATIENT)
Dept: FAMILY MEDICINE | Facility: CLINIC | Age: 84
End: 2019-03-20
Payer: MEDICARE

## 2019-03-20 VITALS
SYSTOLIC BLOOD PRESSURE: 132 MMHG | DIASTOLIC BLOOD PRESSURE: 64 MMHG | BODY MASS INDEX: 28.86 KG/M2 | HEIGHT: 60 IN | RESPIRATION RATE: 12 BRPM | TEMPERATURE: 98 F | OXYGEN SATURATION: 97 % | WEIGHT: 147 LBS | HEART RATE: 70 BPM

## 2019-03-20 DIAGNOSIS — I10 ESSENTIAL HYPERTENSION, BENIGN: ICD-10-CM

## 2019-03-20 DIAGNOSIS — E11.9 TYPE 2 DIABETES MELLITUS WITHOUT COMPLICATION, WITHOUT LONG-TERM CURRENT USE OF INSULIN: ICD-10-CM

## 2019-03-20 DIAGNOSIS — J02.9 VIRAL PHARYNGITIS: Primary | ICD-10-CM

## 2019-03-20 DIAGNOSIS — N18.30 CHRONIC KIDNEY DISEASE, STAGE III (MODERATE): ICD-10-CM

## 2019-03-20 PROCEDURE — 99213 PR OFFICE/OUTPT VISIT, EST, LEVL III, 20-29 MIN: ICD-10-PCS | Mod: 25,,, | Performed by: INTERNAL MEDICINE

## 2019-03-20 PROCEDURE — 99213 OFFICE O/P EST LOW 20 MIN: CPT | Mod: 25,,, | Performed by: INTERNAL MEDICINE

## 2019-03-20 PROCEDURE — 96372 THER/PROPH/DIAG INJ SC/IM: CPT | Mod: ,,, | Performed by: INTERNAL MEDICINE

## 2019-03-20 PROCEDURE — 96372 PR INJECTION,THERAP/PROPH/DIAG2ST, IM OR SUBCUT: ICD-10-PCS | Mod: ,,, | Performed by: INTERNAL MEDICINE

## 2019-03-20 RX ORDER — DIPHENHYDRAMINE HCL 25 MG
25 CAPSULE ORAL EVERY 6 HOURS PRN
COMMUNITY
End: 2019-03-27 | Stop reason: ALTCHOICE

## 2019-03-20 RX ORDER — LOSARTAN POTASSIUM 25 MG/1
50 TABLET ORAL
Refills: 1 | COMMUNITY
Start: 2019-03-12 | End: 2019-10-30

## 2019-03-20 RX ORDER — LOSARTAN POTASSIUM 50 MG/1
50 TABLET ORAL DAILY
Refills: 3 | COMMUNITY
Start: 2019-03-04 | End: 2019-03-20

## 2019-03-20 RX ORDER — LINCOMYCIN HYDROCHLORIDE 300 MG/ML
600 INJECTION, SOLUTION INTRAMUSCULAR; INTRAVENOUS; SUBCONJUNCTIVAL
Status: COMPLETED | OUTPATIENT
Start: 2019-03-20 | End: 2019-03-20

## 2019-03-20 RX ADMIN — LINCOMYCIN HYDROCHLORIDE 600 MG: 300 INJECTION, SOLUTION INTRAMUSCULAR; INTRAVENOUS; SUBCONJUNCTIVAL at 05:03

## 2019-03-20 NOTE — PROGRESS NOTES
SUBJECTIVE:    Patient ID: Karma Chen is a 83 y.o. female.    Chief Complaint: Sore Throat (took zithromax monday and tuesday but got upset stomach from it)    HPI     Pt began with sore throat 5 days ago-two days later she had temp of 100.9-she took some antibiotics she had had at urgent care-zithromax, and took it again -throat continued to hurt and she felt very weak-no myagias or arthralgias-sinuses minimally congested--ears hurt more-throat has blisters in it and throat hurts with swallowing-hurts more on right than left side-no nausea-GI only from antibiotics-slight headache-no visual disturbances-slight coughing      Office Visit on 01/08/2019   Component Date Value Ref Range Status    Microalbumin, POC 01/08/2019 neg   Final   Office Visit on 11/13/2018   Component Date Value Ref Range Status    TSH 11/14/2018 0.59  0.30 - 5.60 ulU/ml Final    Hemoglobin A1C 11/13/2018 5.7   Final   Office Visit on 10/10/2018   Component Date Value Ref Range Status    Glucose 10/10/2018 110* 70 - 99 mg/dL Final    BUN, Bld 10/10/2018 26* 8 - 20 mg/dL Final    Creatinine 10/10/2018 1.41* 0.60 - 1.40 mg/dL Final    Calcium 10/10/2018 9.6  7.7 - 10.4 mg/dL Final    Sodium 10/10/2018 140  134 - 144 mmol/L Final    Potassium 10/10/2018 4.0  3.5 - 5.0 mmol/L Final    Chloride 10/10/2018 102  98 - 110 mmol/L Final    CO2 10/10/2018 27.2  22.8 - 31.6 mmol/L Final    WBC 10/10/2018 8.1  5.0 - 10.0 K/uL Final    RBC 10/10/2018 3.96  3.50 - 5.50 M/uL Final    Hemoglobin 10/10/2018 11.8* 12.0 - 15.0 g/dL Final    Hematocrit 10/10/2018 36.8  36.0 - 48.0 % Final    MCV 10/10/2018 92.9  79.0 - 98.0 fL Final    MCH 10/10/2018 29.8  25.0 - 35.0 pg Final    MCHC 10/10/2018 32.1  31.0 - 36.0 g/dL Final    RDW 10/10/2018 13.4  11.7 - 14.9 % Final    Platelets 10/10/2018 270  140 - 440 K/uL Final    MPV 10/10/2018 10.7  8.8 - 12.7 fL Final    Gran% 10/10/2018 45.9  % Final    Lymph% 10/10/2018 38.7  % Final     Mono% 10/10/2018 11.7  % Final    Eosinophil% 10/10/2018 3.1  % Final    Basophil% 10/10/2018 0.5  % Final    Gran # (ANC) 10/10/2018 3.7  1.4 - 6.5 K/uL Final    Lymph # 10/10/2018 3.1  1.2 - 3.4 K/uL Final    Mono # 10/10/2018 1.0* 0.1 - 0.6 K/uL Final    Eos # 10/10/2018 0.3  0.0 - 0.7 K/uL Final    Baso # 10/10/2018 0.0  0.0 - 0.2 K/uL Final    Immature Grans (Abs) 10/10/2018 0.0  0.0 - 1.0 K/uL Final    Immature Granulocytes 10/10/2018 0.1  % Final    nRBC% 10/10/2018 0  % Final       Past Medical History:   Diagnosis Date    Coronary artery disease     Dermatitis     Dermatitis 12/8/2017    Diabetes mellitus     Diabetes mellitus type II     Leah Madrigal virus infection     Fibromyalgia     GERD (gastroesophageal reflux disease)     Hypertension     MI (myocardial infarction) 09/23/2011     Past Surgical History:   Procedure Laterality Date    adenoids      ANGIOPLASTY  1987    Angioplasty-coronary N/A 12/11/2017    Performed [facility] at [facility] CATH LAB    APPENDECTOMY      CARDIAC PACEMAKER PLACEMENT  01/12/2017    CATARACT EXTRACTION, BILATERAL Bilateral 9/2/15, 3/17/15    right eye-9/2/15, left eye-3/17/15    CHOLECYSTECTOMY  1987    CORONARY ARTERY BYPASS GRAFT  2006    quadruple    coronary stents  1999    x2    CYST REMOVAL  04/25/2017    on back    HYSTERECTOMY  1966    OVARY SURGERY  1948    Ovary burst & was repaired    RHIZOTOMY  09/14/2018    TONSILLECTOMY  1940     Family History   Problem Relation Age of Onset    No Known Problems Mother     No Known Problems Father        Marital Status:   Alcohol History:  reports that she does not drink alcohol.  Tobacco History:  reports that she has never smoked. She has never used smokeless tobacco.  Drug History:  reports that she does not use drugs.    Review of patient's allergies indicates:   Allergen Reactions    Lipitor [atorvastatin] Other (See Comments)     Muscle pain and weakness in legs     Arthrotec 50 [diclofenac-misoprostol]     Doxycycline     Effexor [venlafaxine]     Estradiol     Flagyl [metronidazole]     Fluconazole     Iodine     Levaquin [levofloxacin]     Nexium [esomeprazole magnesium]     Penicillins     Shellfish containing products     Sulfa (sulfonamide antibiotics)     Tea tree oil     Tegaserod      ZOLNORM    Trazodone     Yeast, dried        Current Outpatient Medications:     acetaminophen (TYLENOL) 500 MG tablet, Take 500 mg by mouth every 6 (six) hours as needed for Pain., Disp: , Rfl:     aspirin (ECOTRIN) 81 MG EC tablet, Take 1 tablet (81 mg total) by mouth once daily., Disp: , Rfl: 0    b complex vitamins tablet, Take 1 tablet by mouth once daily.  , Disp: , Rfl:     cetirizine (ZYRTEC) 5 MG tablet, Take 1 tablet (5 mg total) by mouth once daily. (Patient taking differently: Take 10 mg by mouth once daily. ), Disp: 90 tablet, Rfl: 0    clopidogrel (PLAVIX) 75 mg tablet, Take 1 tablet (75 mg total) by mouth once daily., Disp: 90 tablet, Rfl: 0    coenzyme Q10 100 mg capsule, Take 1 capsule (100 mg total) by mouth 2 (two) times daily., Disp: 60 capsule, Rfl: 11    diphenhydrAMINE (BENADRYL) 25 mg capsule, Take 25 mg by mouth every 6 (six) hours as needed for Itching., Disp: , Rfl:     echinacea 400 mg Cap, Take 1 capsule by mouth 2 (two) times daily. , Disp: , Rfl:     estradiol (ESTRACE) 0.01 % (0.1 mg/g) vaginal cream, Place vaginally once a week.  , Disp: , Rfl:     fish oil-omega-3 fatty acids 300-1,000 mg capsule, Take by mouth once daily., Disp: , Rfl:     HYDROcodone-acetaminophen (NORCO) 5-325 mg per tablet, Take 1 tablet by mouth every 12 (twelve) hours as needed for Pain., Disp: , Rfl:     isosorbide mononitrate (IMDUR) 60 MG 24 hr tablet, Take 1 tablet (60 mg total) by mouth once daily., Disp: 90 tablet, Rfl: 1    levothyroxine (SYNTHROID) 88 MCG tablet, Take 1 tablet (88 mcg total) by mouth once daily. Can substitute, Disp: 90  tablet, Rfl: 1    loratadine (CLARITIN) 10 mg tablet, Take 10 mg by mouth once daily. , Disp: , Rfl:     losartan (COZAAR) 25 MG tablet, Take 25 mg by mouth. 25mg in am and 12.5mg in pm, Disp: , Rfl: 1    metoprolol succinate (TOPROL-XL) 25 MG 24 hr tablet, TAKE 1 TABLET (25 MG TOTAL) BY MOUTH ONCE DAILY. (Patient taking differently: Take 12.5 mg by mouth once daily. ), Disp: 30 tablet, Rfl: 2    montelukast (SINGULAIR) 10 mg tablet, TAKE 1 TABLET BY MOUTH EVERY DAY IN THE EVENING, Disp: 90 tablet, Rfl: 1    nitroGLYCERIN (NITROSTAT) 0.4 MG SL tablet, , Disp: , Rfl:     ranitidine (ZANTAC) 150 MG tablet, Take 150 mg by mouth nightly as needed. , Disp: , Rfl:     tiZANidine (ZANAFLEX) 4 MG tablet, Take 4 mg by mouth every 6 (six) hours as needed., Disp: , Rfl:     UNABLE TO FIND, Take 1 tablet by mouth 2 (two) times daily. Tamaflex, Disp: , Rfl:     UNABLE TO FIND, Take 1 tablet by mouth 2 (two) times daily. [place], Disp: , Rfl:     valACYclovir (VALTREX) 1000 MG tablet, TAKE 1 TABLET EVERY 24     HOURS (Patient taking differently: TAKE 1 TABLET EVERY 24 HOURS PRN), Disp: 90 tablet, Rfl: 1    Review of Systems   Constitutional: Negative for chills, fatigue, fever and unexpected weight change.   HENT: Positive for congestion (HPI). Negative for ear pain, hearing loss, sinus pain and sore throat.    Eyes: Negative for pain and visual disturbance.   Respiratory: Negative for cough, shortness of breath and wheezing.    Cardiovascular: Negative for chest pain, palpitations and leg swelling.   Gastrointestinal: Negative for abdominal pain, blood in stool, constipation, diarrhea, nausea and vomiting.   Endocrine: Negative for cold intolerance and heat intolerance.   Genitourinary: Negative for difficulty urinating, dysuria, frequency, pelvic pain and urgency.        Saw renal and she increased losartan and then decreased it again   Musculoskeletal: Negative for back pain, joint swelling and neck pain.         HPI   Skin: Negative for pallor and rash.   Neurological: Positive for light-headedness (last week, had to lie on sofa-BP was low). Negative for dizziness, tremors, weakness, numbness and headaches (HPI).   Hematological: Does not bruise/bleed easily.   Psychiatric/Behavioral: Negative for agitation, sleep disturbance and suicidal ideas.          Objective:      Vitals:    03/20/19 1659   BP: 132/64   Pulse: 70   Resp: 12   Temp: 98.2 °F (36.8 °C)   SpO2: 97%   Weight: 66.7 kg (147 lb)   Height: 5' (1.524 m)     Physical Exam   Constitutional: She is oriented to person, place, and time. She appears well-developed and well-nourished. She is cooperative.   HENT:   Head: Normocephalic and atraumatic.   Right Ear: Tympanic membrane normal.   Left Ear: Tympanic membrane normal.   Sinus congestion with hoarseness-no nodes appreciated-throat looks granular   Eyes: Conjunctivae, EOM and lids are normal. Right pupil is round and reactive. Left pupil is round and reactive.   Neck: Trachea normal and normal range of motion. Neck supple. No JVD present. Carotid bruit is not present. No thyromegaly present.   Cardiovascular: Normal rate, regular rhythm, S1 normal, S2 normal, normal heart sounds and intact distal pulses. Exam reveals no gallop and no friction rub.   No murmur heard.  Pulmonary/Chest: Breath sounds normal. No respiratory distress. She has no wheezes. She has no rales.   Abdominal: She exhibits no mass. There is no tenderness. There is no rigidity and no guarding.   Musculoskeletal: She exhibits no edema.   Neurological: She is alert and oriented to person, place, and time.   Skin: Skin is warm and dry. No lesion and no rash noted. There is cyanosis. Nails show no clubbing.   Psychiatric: She has a normal mood and affect. Her behavior is normal. Judgment and thought content normal.   Nursing note and vitals reviewed.        Assessment:       1. Viral pharyngitis    2. Chronic kidney disease, stage III (moderate)     3. Type 2 diabetes mellitus without complication, without long-term current use of insulin    4. Essential hypertension, benign         Plan:       Viral pharyngitis  -     lincomycin injection 600 mg  -     methylPREDNISolone sod suc(PF) injection 125 mg    Chronic kidney disease, stage III (moderate)        -     stable    Type 2 diabetes mellitus without complication, without long-term current use of insulin        -     Controlled-continue as is with monitoring    Essential hypertension, benign      No follow-ups on file.        3/24/2019 Elena Sullivan M.D.

## 2019-03-25 ENCOUNTER — TELEPHONE (OUTPATIENT)
Dept: FAMILY MEDICINE | Facility: CLINIC | Age: 84
End: 2019-03-25

## 2019-03-25 NOTE — TELEPHONE ENCOUNTER
Pt l/m to let you know that she is feeling better, still feeling a little weak but better than she was.

## 2019-03-27 ENCOUNTER — OFFICE VISIT (OUTPATIENT)
Dept: FAMILY MEDICINE | Facility: CLINIC | Age: 84
End: 2019-03-27
Payer: MEDICARE

## 2019-03-27 VITALS
OXYGEN SATURATION: 98 % | WEIGHT: 148.19 LBS | HEART RATE: 86 BPM | DIASTOLIC BLOOD PRESSURE: 68 MMHG | HEIGHT: 60 IN | TEMPERATURE: 98 F | BODY MASS INDEX: 29.09 KG/M2 | RESPIRATION RATE: 18 BRPM | SYSTOLIC BLOOD PRESSURE: 122 MMHG

## 2019-03-27 DIAGNOSIS — J06.9 VIRAL UPPER RESPIRATORY ILLNESS: Primary | ICD-10-CM

## 2019-03-27 PROCEDURE — 99213 OFFICE O/P EST LOW 20 MIN: CPT | Mod: ,,, | Performed by: FAMILY MEDICINE

## 2019-03-27 PROCEDURE — 99213 PR OFFICE/OUTPT VISIT, EST, LEVL III, 20-29 MIN: ICD-10-PCS | Mod: ,,, | Performed by: FAMILY MEDICINE

## 2019-03-27 RX ORDER — MECLIZINE HYDROCHLORIDE 25 MG/1
25 TABLET ORAL 2 TIMES DAILY PRN
Qty: 10 TABLET | Refills: 0 | Status: SHIPPED | OUTPATIENT
Start: 2019-03-27 | End: 2019-05-20

## 2019-03-27 RX ORDER — GUAIFENESIN 600 MG/1
1200 TABLET, EXTENDED RELEASE ORAL 2 TIMES DAILY
COMMUNITY
End: 2019-05-20

## 2019-03-27 RX ORDER — FLUTICASONE PROPIONATE 50 MCG
1 SPRAY, SUSPENSION (ML) NASAL DAILY
Qty: 9.9 ML | Refills: 0 | Status: SHIPPED | OUTPATIENT
Start: 2019-03-27 | End: 2019-05-20

## 2019-03-27 RX ORDER — PREDNISONE 10 MG/1
TABLET ORAL DAILY
Qty: 10 TABLET | Refills: 0 | Status: SHIPPED | OUTPATIENT
Start: 2019-03-27 | End: 2019-04-06

## 2019-03-27 NOTE — TELEPHONE ENCOUNTER
Elena Sullivan MD sent to Renetta Hummel LPN   Caller: Unspecified (Today,  8:19 AM)             Three days antivert 12.5 tid, 3 days of lasix 20 a day and 3 days of prednisone 40 mg a day-check allergies

## 2019-03-27 NOTE — PROGRESS NOTES
SUBJECTIVE:    Patient ID: Karma Chen is a 83 y.o. female.    Chief Complaint: Sore Throat (coughing) and Fatigue    URI    This is a new problem. The current episode started 1 to 4 weeks ago. The problem has been waxing and waning. There has been no fever. Associated symptoms include congestion, coughing, a plugged ear sensation and a sore throat. Pertinent negatives include no abdominal pain, chest pain, diarrhea, dysuria, ear pain, headaches, joint pain, joint swelling, nausea, neck pain, rash, rhinorrhea, sinus pain, sneezing, swollen glands, vomiting or wheezing. She has tried antihistamine for the symptoms. The treatment provided no relief.     82 yo female new to this provider presents to clinic complaining of Sore Throat, productive cough, rhinnorhea (clear) sinus pressure and ear pressure. Pt was seen 7 days ago treated for viral pharyngitis.  Pt states that she has been feeling a little light headed.    Pt denies any current fever, chills night sweats, no sinus pain, no SOB or chest pain, no Nausea, no vomiting no diarrhea.    By history she has been on multiple medications but has not taken anything consistantly, She has had a steroid injection, an abx injection, mucinex, zyrtec, Singulare.      Past Medical History:   Diagnosis Date    Coronary artery disease     Dermatitis     Dermatitis 12/8/2017    Diabetes mellitus     Diabetes mellitus type II     Leah Madrigal virus infection     Fibromyalgia     GERD (gastroesophageal reflux disease)     Hypertension     MI (myocardial infarction) 09/23/2011     Social History     Socioeconomic History    Marital status:      Spouse name: Not on file    Number of children: Not on file    Years of education: Not on file    Highest education level: Not on file   Occupational History    Not on file   Social Needs    Financial resource strain: Not on file    Food insecurity:     Worry: Not on file     Inability: Not on file     Transportation needs:     Medical: Not on file     Non-medical: Not on file   Tobacco Use    Smoking status: Never Smoker    Smokeless tobacco: Never Used   Substance and Sexual Activity    Alcohol use: No    Drug use: No    Sexual activity: Not on file   Lifestyle    Physical activity:     Days per week: Not on file     Minutes per session: Not on file    Stress: Not on file   Relationships    Social connections:     Talks on phone: Not on file     Gets together: Not on file     Attends [employer] service: Not on file     Active member of club or organization: Not on file     Attends meetings of clubs or organizations: Not on file     Relationship status: Not on file    Intimate partner violence:     Fear of current or ex partner: Not on file     Emotionally abused: Not on file     Physically abused: Not on file     Forced sexual activity: Not on file   Other Topics Concern    Not on file   Social History Narrative    Not on file     Past Surgical History:   Procedure Laterality Date    adenoids      ANGIOPLASTY  1987    Angioplasty-coronary N/A 12/11/2017    Performed by Devante Garces MD at [facility] C[facility]    APPENDECTOMY      CARDIAC PACEMAKER PLACEMENT  01/12/2017    CATARACT EXTRACTION, BILATERAL Bilateral 9/2/15, 3/17/15    right eye-9/2/15, left eye-3/17/15    CHOLECYSTECTOMY  1987    CORONARY ARTERY BYPASS GRAFT  2006    quadruple    coronary stents  1999    x2    CYST REMOVAL  04/25/2017    on back    HYSTERECTOMY  1966    OVARY SURGERY  1948    Ovary burst & was repaired    RHIZOTOMY  09/14/2018    TONSILLECTOMY  1940     Family History   Problem Relation Age of Onset    No Known Problems Mother     No Known Problems Father      Current Outpatient Medications   Medication Sig Dispense Refill    acetaminophen (TYLENOL) 500 MG tablet Take 500 mg by mouth every 6 (six) hours as needed for Pain.      aspirin (ECOTRIN) 81 MG EC tablet Take 1 tablet (81 mg total) by mouth once  daily.  0    b complex vitamins tablet Take 1 tablet by mouth once daily.        cetirizine (ZYRTEC) 5 MG tablet Take 1 tablet (5 mg total) by mouth once daily. (Patient taking differently: Take 10 mg by mouth once daily. ) 90 tablet 0    clopidogrel (PLAVIX) 75 mg tablet Take 1 tablet (75 mg total) by mouth once daily. 90 tablet 0    coenzyme Q10 100 mg capsule Take 1 capsule (100 mg total) by mouth 2 (two) times daily. 60 capsule 11    echinacea 400 mg Cap Take 1 capsule by mouth 2 (two) times daily.       echinacea purpurea extract (ECHINACEA) 125 mg Tab Take by mouth.      estradiol (ESTRACE) 0.01 % (0.1 mg/g) vaginal cream Place vaginally once a week.        fish oil-omega-3 fatty acids 300-1,000 mg capsule Take by mouth once daily.      guaiFENesin (MUCINEX) 600 mg 12 hr tablet Take 1,200 mg by mouth 2 (two) times daily.      HYDROcodone-acetaminophen (NORCO) 5-325 mg per tablet Take 1 tablet by mouth every 12 (twelve) hours as needed for Pain.      isosorbide mononitrate (IMDUR) 60 MG 24 hr tablet Take 1 tablet (60 mg total) by mouth once daily. 90 tablet 1    levothyroxine (SYNTHROID) 88 MCG tablet Take 1 tablet (88 mcg total) by mouth once daily. Can substitute 90 tablet 1    loratadine (CLARITIN) 10 mg tablet Take 10 mg by mouth once daily.       losartan (COZAAR) 25 MG tablet Take 25 mg by mouth. 25mg in am and 12.5mg in pm  1    metoprolol succinate (TOPROL-XL) 25 MG 24 hr tablet TAKE 1 TABLET (25 MG TOTAL) BY MOUTH ONCE DAILY. (Patient taking differently: Take 12.5 mg by mouth once daily. ) 30 tablet 2    montelukast (SINGULAIR) 10 mg tablet TAKE 1 TABLET BY MOUTH EVERY DAY IN THE EVENING 90 tablet 1    nitroGLYCERIN (NITROSTAT) 0.4 MG SL tablet       ranitidine (ZANTAC) 150 MG tablet Take 150 mg by mouth nightly as needed.       tiZANidine (ZANAFLEX) 4 MG tablet Take 4 mg by mouth every 6 (six) hours as needed.      UNABLE TO FIND Take 1 tablet by mouth 2 (two) times daily. Tamaflex       UNABLE TO FIND Take 1 tablet by mouth 2 (two) times daily. [place]      valACYclovir (VALTREX) 1000 MG tablet TAKE 1 TABLET EVERY 24     HOURS (Patient taking differently: TAKE 1 TABLET EVERY 24 HOURS PRN) 90 tablet 1    fluticasone (FLONASE) 50 mcg/actuation nasal spray 1 spray (50 mcg total) by Each Nare route once daily. 9.9 mL 0    meclizine (ANTIVERT) 25 mg tablet Take 1 tablet (25 mg total) by mouth 2 (two) times daily as needed. 10 tablet 0    predniSONE (DELTASONE) 10 mg tablet pack Take by mouth once daily. for 10 days 10 tablet 0     No current facility-administered medications for this visit.      Review of patient's allergies indicates:   Allergen Reactions    Lipitor [atorvastatin] Other (See Comments)     Muscle pain and weakness in legs    Arthrotec 50 [diclofenac-misoprostol]     Doxycycline     Effexor [venlafaxine]     Estradiol     Flagyl [metronidazole]     Fluconazole     Iodine     Levaquin [levofloxacin]     Nexium [esomeprazole magnesium]     Penicillins     Shellfish containing products     Sulfa (sulfonamide antibiotics)     Tea tree oil     Tegaserod      ZOLNORM    Trazodone     Yeast, dried        Review of Systems   Constitutional: Negative for activity change, appetite change, diaphoresis, fatigue, fever and unexpected weight change.   HENT: Positive for congestion, sinus pressure and sore throat. Negative for ear pain, rhinorrhea, sinus pain and sneezing.    Respiratory: Positive for cough. Negative for chest tightness, shortness of breath and wheezing.    Cardiovascular: Negative for chest pain, palpitations and leg swelling.   Gastrointestinal: Negative for abdominal pain, diarrhea, nausea and vomiting.   Genitourinary: Negative for dysuria.   Musculoskeletal: Negative for joint pain and neck pain.   Skin: Negative for rash.   Neurological: Negative for headaches.          Blood pressure 122/68, pulse 86, temperature 98.4 °F (36.9 °C), temperature  source Oral, resp. rate 18, height 5' (1.524 m), weight 67.2 kg (148 lb 3.2 oz), SpO2 98 %. Body mass index is 28.94 kg/m².   Objective:      Physical Exam   Constitutional: She is oriented to person, place, and time. She appears well-developed and well-nourished. No distress.   HENT:   Head: Normocephalic and atraumatic.   Right Ear: External ear normal.   Left Ear: External ear normal.   Nose: Nose normal.   Mouth/Throat: Oropharynx is clear and moist. No oropharyngeal exudate.   Negative valsalva bilaterally, left ear drum slightly retracted.  Clear nasal discharge   Eyes: Pupils are equal, round, and reactive to light. Conjunctivae and EOM are normal. Right eye exhibits no discharge. Left eye exhibits no discharge. No scleral icterus.   Neck: Normal range of motion. Neck supple. No thyromegaly present.   Cardiovascular: Normal rate, regular rhythm and normal heart sounds.   No murmur heard.  Pulmonary/Chest: Effort normal and breath sounds normal. No respiratory distress. She has no wheezes.   Lymphadenopathy:     She has no cervical adenopathy.   Neurological: She is alert and oriented to person, place, and time.   Skin: Skin is warm and dry. Capillary refill takes less than 2 seconds. She is not diaphoretic. No pallor.           Assessment:       1. Viral upper respiratory illness         Plan:           Viral upper respiratory illness  -     meclizine (ANTIVERT) 25 mg tablet; Take 1 tablet (25 mg total) by mouth 2 (two) times daily as needed.  Dispense: 10 tablet; Refill: 0  -     predniSONE (DELTASONE) 10 mg tablet pack; Take by mouth once daily. for 10 days  Dispense: 10 tablet; Refill: 0  -     fluticasone (FLONASE) 50 mcg/actuation nasal spray; 1 spray (50 mcg total) by Each Nare route once daily.  Dispense: 9.9 mL; Refill: 0    Pt with symptoms consistent with viral or allergic upper respiratory illness, pt initially felt better after the steroid injection,  Patient has been afebrile no sinus pain, no  ear pain, no SOB do not suspect a bacterial infection currently. Pt has been having sx's for > 72 hours no reason to screen for influenza.Pt given a hand written note to purchase the following OTC medications.  1) Coricidin HBP Antihistamine/decongestant combination  2) Continue with the zyrtec  3) Expectorant with Guaifenasin  4) Tylenol for fever and body aches  5) Increase her daily fluid intake.  6) Pt to instructed to follow up if her symptoms are worsening or not improving in the next 7 days or if she is having fever or shortness of breath.

## 2019-03-27 NOTE — TELEPHONE ENCOUNTER
Patient called scheduling and reported that still feeling sick and having more dizziness.  Please adivse.

## 2019-03-28 RX ORDER — PREDNISONE 20 MG/1
40 TABLET ORAL DAILY
Qty: 6 TABLET | Refills: 0 | Status: SHIPPED | OUTPATIENT
Start: 2019-03-28 | End: 2019-03-31

## 2019-03-28 RX ORDER — FUROSEMIDE 20 MG/1
20 TABLET ORAL DAILY
Qty: 3 TABLET | Refills: 0 | Status: SHIPPED | OUTPATIENT
Start: 2019-03-28 | End: 2019-05-20

## 2019-03-28 RX ORDER — MECLIZINE HCL 12.5 MG 12.5 MG/1
12.5 TABLET ORAL 3 TIMES DAILY
Qty: 9 TABLET | Refills: 0 | Status: SHIPPED | OUTPATIENT
Start: 2019-03-28 | End: 2019-03-31

## 2019-04-30 ENCOUNTER — PATIENT MESSAGE (OUTPATIENT)
Dept: FAMILY MEDICINE | Facility: CLINIC | Age: 84
End: 2019-04-30

## 2019-05-06 DIAGNOSIS — R07.2 PRECORDIAL PAIN: ICD-10-CM

## 2019-05-07 RX ORDER — ISOSORBIDE MONONITRATE 60 MG/1
60 TABLET, EXTENDED RELEASE ORAL DAILY
Qty: 90 TABLET | Refills: 1 | Status: SHIPPED | OUTPATIENT
Start: 2019-05-07 | End: 2019-10-30 | Stop reason: SDUPTHER

## 2019-05-16 ENCOUNTER — PATIENT MESSAGE (OUTPATIENT)
Dept: FAMILY MEDICINE | Facility: CLINIC | Age: 84
End: 2019-05-16

## 2019-05-16 ENCOUNTER — TELEPHONE (OUTPATIENT)
Dept: FAMILY MEDICINE | Facility: CLINIC | Age: 84
End: 2019-05-16

## 2019-05-20 ENCOUNTER — OFFICE VISIT (OUTPATIENT)
Dept: FAMILY MEDICINE | Facility: CLINIC | Age: 84
End: 2019-05-20
Payer: MEDICARE

## 2019-05-20 VITALS
HEART RATE: 72 BPM | SYSTOLIC BLOOD PRESSURE: 132 MMHG | BODY MASS INDEX: 29.25 KG/M2 | DIASTOLIC BLOOD PRESSURE: 70 MMHG | OXYGEN SATURATION: 98 % | TEMPERATURE: 98 F | WEIGHT: 149 LBS | HEIGHT: 60 IN | RESPIRATION RATE: 12 BRPM

## 2019-05-20 DIAGNOSIS — E11.9 DIABETES MELLITUS TYPE 2, DIET-CONTROLLED: ICD-10-CM

## 2019-05-20 DIAGNOSIS — E78.00 PURE HYPERCHOLESTEROLEMIA: ICD-10-CM

## 2019-05-20 DIAGNOSIS — I25.10 CORONARY ARTERY DISEASE INVOLVING LEFT MAIN CORONARY ARTERY: ICD-10-CM

## 2019-05-20 DIAGNOSIS — I10 ESSENTIAL HYPERTENSION: Primary | ICD-10-CM

## 2019-05-20 PROCEDURE — 99213 OFFICE O/P EST LOW 20 MIN: CPT | Mod: ,,, | Performed by: INTERNAL MEDICINE

## 2019-05-20 PROCEDURE — 99213 PR OFFICE/OUTPT VISIT, EST, LEVL III, 20-29 MIN: ICD-10-PCS | Mod: ,,, | Performed by: INTERNAL MEDICINE

## 2019-05-20 RX ORDER — KETOCONAZOLE 20 MG/G
CREAM TOPICAL
Refills: 0 | COMMUNITY
Start: 2019-05-15 | End: 2019-10-30

## 2019-05-20 RX ORDER — TRIAMCINOLONE ACETONIDE 1 MG/G
CREAM TOPICAL
Refills: 0 | COMMUNITY
Start: 2019-05-15 | End: 2019-10-30

## 2019-05-22 ENCOUNTER — TELEPHONE (OUTPATIENT)
Dept: FAMILY MEDICINE | Facility: CLINIC | Age: 84
End: 2019-05-22

## 2019-05-27 RX ORDER — MECLIZINE HCL 12.5 MG 12.5 MG/1
12.5 TABLET ORAL 3 TIMES DAILY PRN
Qty: 90 TABLET | Refills: 1 | Status: SHIPPED | OUTPATIENT
Start: 2019-05-27 | End: 2019-11-20

## 2019-05-27 RX ORDER — MECLIZINE HCL 12.5 MG 12.5 MG/1
12.5 TABLET ORAL 3 TIMES DAILY PRN
COMMUNITY
End: 2019-05-27 | Stop reason: SDUPTHER

## 2019-06-18 DIAGNOSIS — E03.9 ACQUIRED HYPOTHYROIDISM: ICD-10-CM

## 2019-06-18 RX ORDER — LEVOTHYROXINE SODIUM 88 UG/1
TABLET ORAL
Qty: 90 TABLET | Refills: 1 | Status: SHIPPED | OUTPATIENT
Start: 2019-06-18 | End: 2020-01-01

## 2019-08-29 ENCOUNTER — LAB VISIT (OUTPATIENT)
Dept: LAB | Facility: HOSPITAL | Age: 84
End: 2019-08-29
Attending: INTERNAL MEDICINE
Payer: MEDICARE

## 2019-08-29 DIAGNOSIS — E11.9 DIABETES MELLITUS WITHOUT COMPLICATION: ICD-10-CM

## 2019-08-29 DIAGNOSIS — I25.10 CORONARY ATHEROSCLEROSIS OF NATIVE CORONARY ARTERY: ICD-10-CM

## 2019-08-29 DIAGNOSIS — N18.9 ANEMIA OF CHRONIC RENAL FAILURE: Primary | ICD-10-CM

## 2019-08-29 DIAGNOSIS — I10 ESSENTIAL HYPERTENSION, MALIGNANT: ICD-10-CM

## 2019-08-29 DIAGNOSIS — E78.5 HYPERLIPEMIA: ICD-10-CM

## 2019-08-29 DIAGNOSIS — I12.9 PARENCHYMAL RENAL HYPERTENSION: ICD-10-CM

## 2019-08-29 DIAGNOSIS — N25.81 SECONDARY HYPERPARATHYROIDISM OF RENAL ORIGIN: ICD-10-CM

## 2019-08-29 DIAGNOSIS — D63.1 ANEMIA OF CHRONIC RENAL FAILURE: Primary | ICD-10-CM

## 2019-08-29 DIAGNOSIS — N18.30 CHRONIC KIDNEY DISEASE, STAGE III (MODERATE): ICD-10-CM

## 2019-08-29 DIAGNOSIS — N18.9 ANEMIA OF CHRONIC RENAL FAILURE: ICD-10-CM

## 2019-08-29 DIAGNOSIS — D63.1 ANEMIA OF CHRONIC RENAL FAILURE: ICD-10-CM

## 2019-08-29 LAB
ALBUMIN/CREAT UR: NORMAL UG/MG (ref 0–30)
BILIRUB UR QL STRIP: NEGATIVE
CLARITY UR: CLEAR
COLOR UR: YELLOW
CREAT UR-MCNC: 82 MG/DL (ref 15–325)
CREAT UR-MCNC: 82 MG/DL (ref 15–325)
GLUCOSE UR QL STRIP: NEGATIVE
HGB UR QL STRIP: NEGATIVE
KETONES UR QL STRIP: NEGATIVE
LEUKOCYTE ESTERASE UR QL STRIP: NEGATIVE
MICROALBUMIN UR DL<=1MG/L-MCNC: <2 UG/ML
NITRITE UR QL STRIP: NEGATIVE
PH UR STRIP: 6 [PH] (ref 5–8)
PROT UR QL STRIP: NEGATIVE
PROT UR-MCNC: <6 MG/DL (ref 0–15)
PROT/CREAT UR: NORMAL MG/G{CREAT} (ref 0–0.2)
SP GR UR STRIP: 1.01 (ref 1–1.03)
URN SPEC COLLECT METH UR: NORMAL
UROBILINOGEN UR STRIP-ACNC: NEGATIVE EU/DL

## 2019-08-29 PROCEDURE — 82043 UR ALBUMIN QUANTITATIVE: CPT

## 2019-08-29 PROCEDURE — 84156 ASSAY OF PROTEIN URINE: CPT

## 2019-08-29 PROCEDURE — 81003 URINALYSIS AUTO W/O SCOPE: CPT

## 2019-09-01 DIAGNOSIS — Z91.09 ENVIRONMENTAL ALLERGIES: ICD-10-CM

## 2019-09-02 RX ORDER — MONTELUKAST SODIUM 10 MG/1
TABLET ORAL
Qty: 90 TABLET | Refills: 1 | Status: SHIPPED | OUTPATIENT
Start: 2019-09-02 | End: 2020-02-26 | Stop reason: SDUPTHER

## 2019-09-19 ENCOUNTER — LAB VISIT (OUTPATIENT)
Dept: LAB | Facility: HOSPITAL | Age: 84
End: 2019-09-19
Attending: INTERNAL MEDICINE
Payer: MEDICARE

## 2019-09-19 DIAGNOSIS — I25.10 CORONARY ATHEROSCLEROSIS OF NATIVE CORONARY ARTERY: ICD-10-CM

## 2019-09-19 DIAGNOSIS — E78.5 HYPERLIPEMIA: ICD-10-CM

## 2019-09-19 DIAGNOSIS — I47.10 PAROXYSMAL SUPRAVENTRICULAR TACHYCARDIA: ICD-10-CM

## 2019-09-19 DIAGNOSIS — N18.9 CHRONIC KIDNEY DISEASE, UNSPECIFIED: ICD-10-CM

## 2019-09-19 DIAGNOSIS — I10 ESSENTIAL HYPERTENSION, MALIGNANT: ICD-10-CM

## 2019-09-19 DIAGNOSIS — E78.00 PURE HYPERCHOLESTEROLEMIA: ICD-10-CM

## 2019-09-19 DIAGNOSIS — E11.9 DIABETES MELLITUS WITHOUT COMPLICATION: ICD-10-CM

## 2019-09-19 DIAGNOSIS — E78.5 HYPERLIPEMIA: Primary | ICD-10-CM

## 2019-09-19 LAB
ALBUMIN SERPL BCP-MCNC: 3.7 G/DL (ref 3.5–5.2)
ALP SERPL-CCNC: 44 U/L (ref 55–135)
ALT SERPL W/O P-5'-P-CCNC: 20 U/L (ref 10–44)
ANION GAP SERPL CALC-SCNC: 9 MMOL/L (ref 8–16)
AST SERPL-CCNC: 21 U/L (ref 10–40)
BASOPHILS # BLD AUTO: 0.05 K/UL (ref 0–0.2)
BASOPHILS NFR BLD: 0.5 % (ref 0–1.9)
BILIRUB SERPL-MCNC: 0.6 MG/DL (ref 0.1–1)
BUN SERPL-MCNC: 32 MG/DL (ref 8–23)
CALCIUM SERPL-MCNC: 9.1 MG/DL (ref 8.7–10.5)
CHLORIDE SERPL-SCNC: 106 MMOL/L (ref 95–110)
CHOLEST SERPL-MCNC: 217 MG/DL (ref 120–199)
CHOLEST/HDLC SERPL: 5.7 {RATIO} (ref 2–5)
CO2 SERPL-SCNC: 26 MMOL/L (ref 23–29)
CREAT SERPL-MCNC: 1.6 MG/DL (ref 0.5–1.4)
DIFFERENTIAL METHOD: ABNORMAL
EOSINOPHIL # BLD AUTO: 0.3 K/UL (ref 0–0.5)
EOSINOPHIL NFR BLD: 3.1 % (ref 0–8)
ERYTHROCYTE [DISTWIDTH] IN BLOOD BY AUTOMATED COUNT: 13.2 % (ref 11.5–14.5)
EST. GFR  (AFRICAN AMERICAN): 34.6 ML/MIN/1.73 M^2
EST. GFR  (NON AFRICAN AMERICAN): 30 ML/MIN/1.73 M^2
ESTIMATED AVG GLUCOSE: 128 MG/DL (ref 68–131)
GLUCOSE SERPL-MCNC: 98 MG/DL (ref 70–110)
HBA1C MFR BLD HPLC: 6.1 % (ref 4.5–6.2)
HCT VFR BLD AUTO: 41.5 % (ref 37–48.5)
HDLC SERPL-MCNC: 38 MG/DL (ref 40–75)
HDLC SERPL: 17.5 % (ref 20–50)
HGB BLD-MCNC: 13.2 G/DL (ref 12–16)
IMM GRANULOCYTES # BLD AUTO: 0.08 K/UL (ref 0–0.04)
IMM GRANULOCYTES NFR BLD AUTO: 0.8 % (ref 0–0.5)
LDLC SERPL CALC-MCNC: 106.8 MG/DL (ref 63–159)
LYMPHOCYTES # BLD AUTO: 4.3 K/UL (ref 1–4.8)
LYMPHOCYTES NFR BLD: 41.1 % (ref 18–48)
MCH RBC QN AUTO: 28.6 PG (ref 27–31)
MCHC RBC AUTO-ENTMCNC: 31.8 G/DL (ref 32–36)
MCV RBC AUTO: 90 FL (ref 82–98)
MONOCYTES # BLD AUTO: 0.8 K/UL (ref 0.3–1)
MONOCYTES NFR BLD: 7.9 % (ref 4–15)
NEUTROPHILS # BLD AUTO: 4.9 K/UL (ref 1.8–7.7)
NEUTROPHILS NFR BLD: 46.6 % (ref 38–73)
NONHDLC SERPL-MCNC: 179 MG/DL
NRBC BLD-RTO: 0 /100 WBC
PLATELET # BLD AUTO: 316 K/UL (ref 150–350)
PMV BLD AUTO: 10.2 FL (ref 9.2–12.9)
POTASSIUM SERPL-SCNC: 4.2 MMOL/L (ref 3.5–5.1)
PROT SERPL-MCNC: 6.9 G/DL (ref 6–8.4)
RBC # BLD AUTO: 4.62 M/UL (ref 4–5.4)
SODIUM SERPL-SCNC: 141 MMOL/L (ref 136–145)
TRIGL SERPL-MCNC: 361 MG/DL (ref 30–150)
WBC # BLD AUTO: 10.5 K/UL (ref 3.9–12.7)

## 2019-09-19 PROCEDURE — 80053 COMPREHEN METABOLIC PANEL: CPT

## 2019-09-19 PROCEDURE — 80061 LIPID PANEL: CPT

## 2019-09-19 PROCEDURE — 83036 HEMOGLOBIN GLYCOSYLATED A1C: CPT

## 2019-09-19 PROCEDURE — 85025 COMPLETE CBC W/AUTO DIFF WBC: CPT

## 2019-09-19 PROCEDURE — 36415 COLL VENOUS BLD VENIPUNCTURE: CPT

## 2019-09-24 ENCOUNTER — PATIENT MESSAGE (OUTPATIENT)
Dept: FAMILY MEDICINE | Facility: CLINIC | Age: 84
End: 2019-09-24

## 2019-09-24 ENCOUNTER — TELEPHONE (OUTPATIENT)
Dept: FAMILY MEDICINE | Facility: CLINIC | Age: 84
End: 2019-09-24

## 2019-09-24 NOTE — TELEPHONE ENCOUNTER
----- Message from Elena Sullivan MD sent at 9/21/2019  1:22 PM CDT -----  Pend vascepa 2g bid for three months with one refill-

## 2019-09-25 RX ORDER — ICOSAPENT ETHYL 1000 MG/1
2 CAPSULE ORAL DAILY
Qty: 180 CAPSULE | Refills: 1 | Status: SHIPPED | OUTPATIENT
Start: 2019-09-25 | End: 2019-11-20

## 2019-09-26 ENCOUNTER — TELEPHONE (OUTPATIENT)
Dept: FAMILY MEDICINE | Facility: CLINIC | Age: 84
End: 2019-09-26

## 2019-09-26 RX ORDER — VALACYCLOVIR HYDROCHLORIDE 1 G/1
1000 TABLET, FILM COATED ORAL EVERY 12 HOURS
Qty: 20 TABLET | Refills: 0 | Status: SHIPPED | OUTPATIENT
Start: 2019-09-26 | End: 2019-11-20

## 2019-09-26 NOTE — TELEPHONE ENCOUNTER
Pt notified by phone.    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 Did medrol dose pack 3 days and got very sick. Stopped it and now aziza marin kicked in. Saw cardiologist yesterday, ekg: no pvc's

## 2019-09-26 NOTE — TELEPHONE ENCOUNTER
Please inform pt we can try to see if she will feel better with valtrex trial 1000 mg three times daily for ten days-

## 2019-10-02 ENCOUNTER — TELEPHONE (OUTPATIENT)
Dept: FAMILY MEDICINE | Facility: CLINIC | Age: 84
End: 2019-10-02

## 2019-10-02 NOTE — TELEPHONE ENCOUNTER
You put pt on Vascepa but she looked up the SE's and she saw something about allergy to shell fish. 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  Please advise

## 2019-10-03 NOTE — TELEPHONE ENCOUNTER
There is a caution so if the reaction is severe I would not do it and I will consider another alternative-have you ever taken plain fish-oil?

## 2019-10-03 NOTE — TELEPHONE ENCOUNTER
Pt notifed. States reaction was mild after eating crabs everyday for a week. She will try the Vascepa and see how she does. If any problems she will let us know.

## 2019-10-04 ENCOUNTER — HOSPITAL ENCOUNTER (OUTPATIENT)
Dept: RADIOLOGY | Facility: HOSPITAL | Age: 84
Discharge: HOME | End: 2019-10-04
Attending: INTERNAL MEDICINE
Payer: MEDICARE

## 2019-10-04 DIAGNOSIS — Z95.0 CARDIAC PACEMAKER IN SITU: ICD-10-CM

## 2019-10-04 DIAGNOSIS — Z95.0 CARDIAC PACEMAKER IN SITU: Primary | ICD-10-CM

## 2019-10-04 PROCEDURE — 71046 X-RAY EXAM CHEST 2 VIEWS: CPT | Mod: TC,PO

## 2019-10-29 NOTE — PROGRESS NOTES
SUBJECTIVE:    Patient ID: Karma Chen is a 83 y.o. female.    Chief Complaint: Allergic Reaction (reaction to medication)    Pt in for evaluation of dizziness and near falls, she feels possible secondary to some medication.Recently took steroid shot and then started a medrol dose awa-On the second night of the medrol awa she suddenly started feeling poorly--then EB [id number] her usual rec -finally got over that-then, one nite she took one of her nerve pills-then took a thyroid rx and took a thyroid rx and got very dizzy-lasted 1-2 minutes-    Dizziness:   Chronicity:  New  Progression since onset:  Resolved  Frequency - weeks/days included: once.  Severity:  Moderate  Dizziness characteristics:  Off-balance  Time frame: rare.no hearing loss, no ear pain, no fever, no tinnitus, no nausea, no vomiting, no diaphoresis, no weakness, no light-headedness, no palpitations and no chest pain.  Aggravated by:  Position changes  Treatments tried:  Nothingno environmental allergies.      Lab Visit on 09/19/2019   Component Date Value Ref Range Status    Microalbum.,U,Random 09/19/2019 <2.0  ug/mL Final    Creatinine, Random Ur 09/19/2019 144.0  15.0 - 325.0 mg/dL Final    Microalb Creat Ratio 09/19/2019 Unable to calculate  0.0 - 30.0 ug/mg Final   Lab Visit on 09/19/2019   Component Date Value Ref Range Status    Cholesterol 09/19/2019 217* 120 - 199 mg/dL Final    Triglycerides 09/19/2019 361* 30 - 150 mg/dL Final    HDL 09/19/2019 38* 40 - 75 mg/dL Final    LDL Cholesterol 09/19/2019 106.8  63.0 - 159.0 mg/dL Final    Hdl/Cholesterol Ratio 09/19/2019 17.5* 20.0 - 50.0 % Final    Total Cholesterol/HDL Ratio 09/19/2019 5.7* 2.0 - 5.0 Final    Non-HDL Cholesterol 09/19/2019 179  mg/dL Final    Hemoglobin A1C 09/19/2019 6.1  4.5 - 6.2 % Final    Estimated Avg Glucose 09/19/2019 128  68 - 131 mg/dL Final    WBC 09/19/2019 10.50  3.90 - 12.70 K/uL Final    RBC 09/19/2019 4.62  4.00 - 5.40 M/uL Final     Hemoglobin 09/19/2019 13.2  12.0 - 16.0 g/dL Final    Hematocrit 09/19/2019 41.5  37.0 - 48.5 % Final    Mean Corpuscular Volume 09/19/2019 90  82 - 98 fL Final    Mean Corpuscular Hemoglobin 09/19/2019 28.6  27.0 - 31.0 pg Final    Mean Corpuscular Hemoglobin Conc 09/19/2019 31.8* 32.0 - 36.0 g/dL Final    RDW 09/19/2019 13.2  11.5 - 14.5 % Final    Platelets 09/19/2019 316  150 - 350 K/uL Final    MPV 09/19/2019 10.2  9.2 - 12.9 fL Final    Immature Granulocytes 09/19/2019 0.8* 0.0 - 0.5 % Final    Gran # (ANC) 09/19/2019 4.9  1.8 - 7.7 K/uL Final    Immature Grans (Abs) 09/19/2019 0.08* 0.00 - 0.04 K/uL Final    Lymph # 09/19/2019 4.3  1.0 - 4.8 K/uL Final    Mono # 09/19/2019 0.8  0.3 - 1.0 K/uL Final    Eos # 09/19/2019 0.3  0.0 - 0.5 K/uL Final    Baso # 09/19/2019 0.05  0.00 - 0.20 K/uL Final    nRBC 09/19/2019 0  0 /100 WBC Final    Gran% 09/19/2019 46.6  38.0 - 73.0 % Final    Lymph% 09/19/2019 41.1  18.0 - 48.0 % Final    Mono% 09/19/2019 7.9  4.0 - 15.0 % Final    Eosinophil% 09/19/2019 3.1  0.0 - 8.0 % Final    Basophil% 09/19/2019 0.5  0.0 - 1.9 % Final    Differential Method 09/19/2019 Automated   Final    Sodium 09/19/2019 141  136 - 145 mmol/L Final    Potassium 09/19/2019 4.2  3.5 - 5.1 mmol/L Final    Chloride 09/19/2019 106  95 - 110 mmol/L Final    CO2 09/19/2019 26  23 - 29 mmol/L Final    Glucose 09/19/2019 98  70 - 110 mg/dL Final    BUN, Bld 09/19/2019 32* 8 - 23 mg/dL Final    Creatinine 09/19/2019 1.6* 0.5 - 1.4 mg/dL Final    Calcium 09/19/2019 9.1  8.7 - 10.5 mg/dL Final    Total Protein 09/19/2019 6.9  6.0 - 8.4 g/dL Final    Albumin 09/19/2019 3.7  3.5 - 5.2 g/dL Final    Total Bilirubin 09/19/2019 0.6  0.1 - 1.0 mg/dL Final    Alkaline Phosphatase 09/19/2019 44* 55 - 135 U/L Final    AST 09/19/2019 21  10 - 40 U/L Final    ALT 09/19/2019 20  10 - 44 U/L Final    Anion Gap 09/19/2019 9  8 - 16 mmol/L Final    eGFR if  09/19/2019  34.6* >60 mL/min/1.73 m^2 Final    eGFR if non African American 09/19/2019 30.0* >60 mL/min/1.73 m^2 Final   Lab Visit on 08/29/2019   Component Date Value Ref Range Status    WBC 08/29/2019 8.90  3.90 - 12.70 K/uL Final    RBC 08/29/2019 4.59  4.00 - 5.40 M/uL Final    Hemoglobin 08/29/2019 13.1  12.0 - 16.0 g/dL Final    Hematocrit 08/29/2019 41.9  37.0 - 48.5 % Final    Mean Corpuscular Volume 08/29/2019 91  82 - 98 fL Final    Mean Corpuscular Hemoglobin 08/29/2019 28.5  27.0 - 31.0 pg Final    Mean Corpuscular Hemoglobin Conc 08/29/2019 31.3* 32.0 - 36.0 g/dL Final    RDW 08/29/2019 12.3  11.5 - 14.5 % Final    Platelets 08/29/2019 312  150 - 350 K/uL Final    MPV 08/29/2019 10.2  9.2 - 12.9 fL Final    Immature Granulocytes 08/29/2019 0.4  0.0 - 0.5 % Final    Gran # (ANC) 08/29/2019 4.4  1.8 - 7.7 K/uL Final    Immature Grans (Abs) 08/29/2019 0.04  0.00 - 0.04 K/uL Final    Lymph # 08/29/2019 2.8  1.0 - 4.8 K/uL Final    Mono # 08/29/2019 1.2* 0.3 - 1.0 K/uL Final    Eos # 08/29/2019 0.4  0.0 - 0.5 K/uL Final    Baso # 08/29/2019 0.05  0.00 - 0.20 K/uL Final    nRBC 08/29/2019 0  0 /100 WBC Final    Gran% 08/29/2019 49.3  38.0 - 73.0 % Final    Lymph% 08/29/2019 31.8  18.0 - 48.0 % Final    Mono% 08/29/2019 13.1  4.0 - 15.0 % Final    Eosinophil% 08/29/2019 4.8  0.0 - 8.0 % Final    Basophil% 08/29/2019 0.6  0.0 - 1.9 % Final    Differential Method 08/29/2019 Automated   Final    Phosphorus 08/29/2019 4.0  2.7 - 4.5 mg/dL Final    PTH, Intact 08/29/2019 15.3  9.0 - 77.0 pg/mL Final    Glucose 08/29/2019 107  70 - 110 mg/dL Final    Sodium 08/29/2019 141  136 - 145 mmol/L Final    Potassium 08/29/2019 3.8  3.5 - 5.1 mmol/L Final    Chloride 08/29/2019 105  95 - 110 mmol/L Final    CO2 08/29/2019 28  23 - 29 mmol/L Final    BUN, Bld 08/29/2019 21  8 - 23 mg/dL Final    Calcium 08/29/2019 9.5  8.7 - 10.5 mg/dL Final    Creatinine 08/29/2019 1.5* 0.5 - 1.4 mg/dL Final     Albumin 08/29/2019 4.0  3.5 - 5.2 g/dL Final    Phosphorus 08/29/2019 4.0  2.7 - 4.5 mg/dL Final    eGFR if  08/29/2019 37.4* >60 mL/min/1.73 m^2 Final    eGFR if non African American 08/29/2019 32.4* >60 mL/min/1.73 m^2 Final    Anion Gap 08/29/2019 8  8 - 16 mmol/L Final    Magnesium 08/29/2019 2.0  1.6 - 2.6 mg/dL Final    Uric Acid 08/29/2019 5.9* 2.4 - 5.7 mg/dL Final   Lab Visit on 08/29/2019   Component Date Value Ref Range Status    Specimen UA 08/29/2019 Urine, Clean Catch   Final    Color, UA 08/29/2019 Yellow  Yellow, Straw, Anila Final    Appearance, UA 08/29/2019 Clear  Clear Final    pH, UA 08/29/2019 6.0  5.0 - 8.0 Final    Specific Gravity, UA 08/29/2019 1.010  1.005 - 1.030 Final    Protein, UA 08/29/2019 Negative  Negative Final    Glucose, UA 08/29/2019 Negative  Negative Final    Ketones, UA 08/29/2019 Negative  Negative Final    Bilirubin (UA) 08/29/2019 Negative  Negative Final    Occult Blood UA 08/29/2019 Negative  Negative Final    Nitrite, UA 08/29/2019 Negative  Negative Final    Urobilinogen, UA 08/29/2019 Negative  Negative EU/dL Final    Leukocytes, UA 08/29/2019 Negative  Negative Final    Protein, Urine Random 08/29/2019 <6  0 - 15 mg/dL Final    Creatinine, Random Ur 08/29/2019 82.0  15.0 - 325.0 mg/dL Final    Prot/Creat Ratio, Ur 08/29/2019 Unable to calculate  0.00 - 0.20 Final    Microalbum.,U,Random 08/29/2019 <2.0  ug/mL Final    Creatinine, Random Ur 08/29/2019 82.0  15.0 - 325.0 mg/dL Final    Microalb Creat Ratio 08/29/2019 Unable to calculate  0.0 - 30.0 ug/mg Final       Past Medical History:   Diagnosis Date    Coronary artery disease     Dermatitis     Dermatitis 12/8/2017    Diabetes mellitus     Diabetes mellitus type II     Leah Madrigal virus infection     Fibromyalgia     GERD (gastroesophageal reflux disease)     Hypertension     MI (myocardial infarction) 09/23/2011     Past Surgical History:   Procedure Laterality  Date    adenoids      ANGIOPLASTY  1987    APPENDECTOMY      CARDIAC PACEMAKER PLACEMENT  01/12/2017    CATARACT EXTRACTION, BILATERAL Bilateral 9/2/15, 3/17/15    right eye-9/2/15, left eye-3/17/15    CHOLECYSTECTOMY  1987    CORONARY ARTERY BYPASS GRAFT  2006    quadruple    coronary stents  1999    x2    CYST REMOVAL  04/25/2017    on back    HYSTERECTOMY  1966    OVARY SURGERY  1948    Ovary burst & was repaired    RHIZOTOMY  09/14/2018    TONSILLECTOMY  1940     Family History   Problem Relation Age of Onset    No Known Problems Mother     No Known Problems Father        Marital Status:   Alcohol History:  reports that she does not drink alcohol.  Tobacco History:  reports that she has never smoked. She has never used smokeless tobacco.  Drug History:  reports that she does not use drugs.    Review of patient's allergies indicates:   Allergen Reactions    Lipitor [atorvastatin] Other (See Comments)     Muscle pain and weakness in legs    Arthrotec 50 [diclofenac-misoprostol]     Doxycycline     Effexor [venlafaxine]     Estradiol     Flagyl [metronidazole]     Fluconazole     Iodine     Levaquin [levofloxacin]     Nexium [esomeprazole magnesium]     Penicillins     Shellfish containing products     Sulfa (sulfonamide antibiotics)     Tea tree oil     Tegaserod      ZOLNORM    Trazodone     Yeast, dried        Current Outpatient Medications:     acetaminophen (TYLENOL) 500 MG tablet, Take 500 mg by mouth every 6 (six) hours as needed for Pain., Disp: , Rfl:     aspirin (ECOTRIN) 81 MG EC tablet, Take 1 tablet (81 mg total) by mouth once daily., Disp: , Rfl: 0    cholecalciferol, vitamin D3, (VITAMIN D3) 2,000 unit Cap, Take 1 capsule by mouth once daily., Disp: , Rfl:     clopidogrel (PLAVIX) 75 mg tablet, Take 1 tablet (75 mg total) by mouth once daily., Disp: 90 tablet, Rfl: 0    coenzyme Q10 100 mg capsule, Take 1 capsule (100 mg total) by mouth 2 (two) times  daily., Disp: 60 capsule, Rfl: 11    echinacea 400 mg Cap, Take 1 capsule by mouth 2 (two) times daily. , Disp: , Rfl:     estradiol (ESTRACE) 0.01 % (0.1 mg/g) vaginal cream, Place vaginally once a week.  , Disp: , Rfl:     isosorbide mononitrate (IMDUR) 60 MG 24 hr tablet, Take 1 tablet (60 mg total) by mouth once daily., Disp: 90 tablet, Rfl: 1    Lactobacillus rhamnosus GG (CULTURELLE) 10 billion cell capsule, Take 1 capsule by mouth once daily., Disp: , Rfl:     loratadine (CLARITIN) 10 mg tablet, Take 10 mg by mouth once daily. , Disp: , Rfl:     magnesium 250 mg Tab, Take 1 tablet by mouth once., Disp: , Rfl:     metoprolol succinate (TOPROL-XL) 25 MG 24 hr tablet, Take 1 tablet (25 mg total) by mouth once daily., Disp: 30 tablet, Rfl: 2    montelukast (SINGULAIR) 10 mg tablet, TAKE 1 TABLET BY MOUTH EVERY DAY IN THE EVENING, Disp: 90 tablet, Rfl: 1    multivitamin (THERAGRAN) tablet, Take 1 tablet by mouth once daily., Disp: , Rfl:     nitroGLYCERIN (NITROSTAT) 0.4 MG SL tablet, , Disp: , Rfl:     prasterone, dhea, (DHEA) 25 mg Cap, Take 1 capsule by mouth once daily., Disp: , Rfl:     ranitidine (ZANTAC) 150 MG tablet, Take 150 mg by mouth nightly., Disp: , Rfl:     SYNTHROID 88 mcg tablet, TAKE 1 TABLET ONCE DAILY, Disp: 90 tablet, Rfl: 1    tiZANidine (ZANAFLEX) 4 MG tablet, Take 4 mg by mouth every 6 (six) hours as needed., Disp: , Rfl:     traMADol (ULTRAM) 50 mg tablet, Take 50 mg by mouth 2 (two) times daily as needed., Disp: , Rfl: 1    ALPRAZolam (NIRAVAM) 0.25 MG dissolvable tablet, Take 1 tablet (0.25 mg total) by mouth nightly as needed for Anxiety., Disp: 30 tablet, Rfl: 0    icosapent ethyl (VASCEPA) 1 gram Cap, Take 2 g by mouth once daily. (Patient not taking: Reported on 10/30/2019), Disp: 180 capsule, Rfl: 1    losartan (COZAAR) 50 MG tablet, Take 1 tablet (50 mg total) by mouth once daily., Disp: 90 tablet, Rfl: 1    meclizine (ANTIVERT) 12.5 mg tablet, Take 1 tablet  (12.5 mg total) by mouth 3 (three) times daily as needed., Disp: 90 tablet, Rfl: 1    valACYclovir (VALTREX) 1000 MG tablet, Take 1 tablet (1,000 mg total) by mouth every 12 (twelve) hours. (Patient not taking: Reported on 10/30/2019), Disp: 20 tablet, Rfl: 0    Review of Systems   Constitutional: Negative for appetite change, chills, diaphoresis, fatigue, fever and unexpected weight change.   HENT: Negative for congestion (throat congestion), ear pain, hearing loss, nosebleeds, postnasal drip, sinus pressure, sinus pain, sneezing, sore throat, tinnitus, trouble swallowing and voice change.    Eyes: Negative for photophobia, pain, itching and visual disturbance.   Respiratory: Negative for apnea, cough, chest tightness, shortness of breath, wheezing and stridor.    Cardiovascular: Negative for chest pain, palpitations and leg swelling.   Gastrointestinal: Negative for abdominal distention, abdominal pain, blood in stool, constipation (MOM prn), diarrhea, nausea and vomiting.   Endocrine: Negative for cold intolerance, heat intolerance, polydipsia and polyuria.   Genitourinary: Negative for difficulty urinating, dyspareunia, dysuria, flank pain, frequency, hematuria, menstrual problem, pelvic pain, urgency, vaginal discharge and vaginal pain.        Minimal leakage-some incomplete voiding   Musculoskeletal: Positive for back pain (sciatica at night). Negative for arthralgias, gait problem, joint swelling, myalgias, neck pain and neck stiffness.   Skin: Negative for pallor.   Allergic/Immunologic: Negative for environmental allergies and food allergies.   Neurological: Positive for dizziness (HPI). Negative for tremors, speech difficulty, weakness, light-headedness and numbness.   Hematological: Does not bruise/bleed easily.   Psychiatric/Behavioral: Negative for agitation, confusion, decreased concentration, sleep disturbance and suicidal ideas. The patient is not nervous/anxious.           Objective:      Vitals:     10/30/19 1142 10/30/19 1215 10/30/19 1216   BP: 132/64 134/72 (!) 140/78   Pulse: 64 78 80   Resp: 16     SpO2: 98%     Weight: 68 kg (150 lb)     Height: 5' (1.524 m)       Physical Exam   Constitutional: She is oriented to person, place, and time. She appears well-developed and well-nourished. She is cooperative. No distress.   HENT:   Head: Normocephalic and atraumatic.   Right Ear: Tympanic membrane normal.   Left Ear: Tympanic membrane normal.   Mouth/Throat: Uvula is midline and mucous membranes are normal.   Eyes: Pupils are equal, round, and reactive to light. Conjunctivae and EOM are normal. Right eye exhibits no discharge. Left eye exhibits no discharge. No scleral icterus. Right pupil is round and reactive. Left pupil is round and reactive.   Neck: Trachea normal and normal range of motion. Neck supple. No JVD present. Carotid bruit is not present. No thyromegaly present.   Cardiovascular: Normal rate, regular rhythm and intact distal pulses. Exam reveals no gallop and no friction rub.   No murmur heard.  Pulmonary/Chest: Effort normal and breath sounds normal. No respiratory distress. She has no wheezes. She has no rales.   Abdominal: Soft. Bowel sounds are normal. She exhibits no distension and no mass. There is no tenderness. There is no guarding. No hernia.   Musculoskeletal: Normal range of motion. She exhibits no edema.   Neurological: She is alert and oriented to person, place, and time. She has normal strength.   Skin: Skin is warm and dry. Capillary refill takes less than 2 seconds. No lesion and no rash noted. No cyanosis. Nails show no clubbing.   Psychiatric: She has a normal mood and affect. Her speech is normal and behavior is normal. Judgment and thought content normal.   Nursing note and vitals reviewed.        Assessment:       1. Dysfunction of inner ear, unspecified laterality    2. Chronic renal disease, stage III    3. Essential hypertension, benign    4. Presence of stent in  coronary artery in patient with coronary artery disease    5. Coronary artery disease involving autologous artery coronary bypass graft without angina pectoris    6. Precordial pain    7. Other screening mammogram    8. Bone disease    9. Anxiety         Plan:       Dysfunction of inner ear, unspecified laterality        -     Avoid positions that create same    Chronic renal disease, stage III  -     Comprehensive metabolic panel; Future; Expected date: 12/20/2019    Essential hypertension, benign  -     losartan (COZAAR) 50 MG tablet; Take 1 tablet (50 mg total) by mouth once daily.  Dispense: 90 tablet; Refill: 1    Presence of stent in coronary artery in patient with coronary artery disease  -     metoprolol succinate (TOPROL-XL) 25 MG 24 hr tablet; Take 1 tablet (25 mg total) by mouth once daily.  Dispense: 30 tablet; Refill: 2    Coronary artery disease involving autologous artery coronary bypass graft without angina pectoris  -     metoprolol succinate (TOPROL-XL) 25 MG 24 hr tablet; Take 1 tablet (25 mg total) by mouth once daily.  Dispense: 30 tablet; Refill: 2    Precordial pain  -     isosorbide mononitrate (IMDUR) 60 MG 24 hr tablet; Take 1 tablet (60 mg total) by mouth once daily.  Dispense: 90 tablet; Refill: 1    Other screening mammogram  -     Mammo Digital Screening Bilat without CA    Bone disease  -     DXA Bone Density Spine And Hip; Future; Expected date: 11/03/2019    Anxiety  -     ALPRAZolam (NIRAVAM) 0.25 MG dissolvable tablet; Take 1 tablet (0.25 mg total) by mouth nightly as needed for Anxiety.  Dispense: 30 tablet; Refill: 0      No follow-ups on file.        10/30/2019 Elena Sullivan M.D.

## 2019-10-30 ENCOUNTER — OFFICE VISIT (OUTPATIENT)
Dept: FAMILY MEDICINE | Facility: CLINIC | Age: 84
End: 2019-10-30
Payer: MEDICARE

## 2019-10-30 VITALS
SYSTOLIC BLOOD PRESSURE: 140 MMHG | BODY MASS INDEX: 29.45 KG/M2 | DIASTOLIC BLOOD PRESSURE: 78 MMHG | RESPIRATION RATE: 16 BRPM | WEIGHT: 150 LBS | HEIGHT: 60 IN | OXYGEN SATURATION: 98 % | HEART RATE: 80 BPM

## 2019-10-30 DIAGNOSIS — I10 ESSENTIAL HYPERTENSION, BENIGN: ICD-10-CM

## 2019-10-30 DIAGNOSIS — F41.9 ANXIETY: ICD-10-CM

## 2019-10-30 DIAGNOSIS — H83.90 DYSFUNCTION OF INNER EAR, UNSPECIFIED LATERALITY: Primary | ICD-10-CM

## 2019-10-30 DIAGNOSIS — I25.810 CORONARY ARTERY DISEASE INVOLVING AUTOLOGOUS ARTERY CORONARY BYPASS GRAFT WITHOUT ANGINA PECTORIS: ICD-10-CM

## 2019-10-30 DIAGNOSIS — I25.10 PRESENCE OF STENT IN CORONARY ARTERY IN PATIENT WITH CORONARY ARTERY DISEASE: ICD-10-CM

## 2019-10-30 DIAGNOSIS — R07.2 PRECORDIAL PAIN: ICD-10-CM

## 2019-10-30 DIAGNOSIS — Z12.31 OTHER SCREENING MAMMOGRAM: ICD-10-CM

## 2019-10-30 DIAGNOSIS — M89.9 BONE DISEASE: ICD-10-CM

## 2019-10-30 DIAGNOSIS — N18.30 CHRONIC RENAL DISEASE, STAGE III: ICD-10-CM

## 2019-10-30 DIAGNOSIS — Z95.5 PRESENCE OF STENT IN CORONARY ARTERY IN PATIENT WITH CORONARY ARTERY DISEASE: ICD-10-CM

## 2019-10-30 PROCEDURE — 99215 OFFICE O/P EST HI 40 MIN: CPT | Performed by: INTERNAL MEDICINE

## 2019-10-30 PROCEDURE — 99214 PR OFFICE/OUTPT VISIT, EST, LEVL IV, 30-39 MIN: ICD-10-PCS | Mod: S$PBB,,, | Performed by: INTERNAL MEDICINE

## 2019-10-30 PROCEDURE — 99214 OFFICE O/P EST MOD 30 MIN: CPT | Mod: S$PBB,,, | Performed by: INTERNAL MEDICINE

## 2019-10-30 RX ORDER — METOPROLOL SUCCINATE 25 MG/1
25 TABLET, EXTENDED RELEASE ORAL DAILY
Qty: 30 TABLET | Refills: 2 | Status: SHIPPED | OUTPATIENT
Start: 2019-10-30 | End: 2020-06-04

## 2019-10-30 RX ORDER — ALPRAZOLAM 0.25 MG/1
0.25 TABLET, ORALLY DISINTEGRATING ORAL NIGHTLY PRN
Qty: 30 TABLET | Refills: 0 | Status: SHIPPED | OUTPATIENT
Start: 2019-10-30 | End: 2019-11-20

## 2019-10-30 RX ORDER — LOSARTAN POTASSIUM 25 MG/1
50 TABLET ORAL
Refills: 1 | Status: CANCELLED | OUTPATIENT
Start: 2019-10-30

## 2019-10-30 RX ORDER — SOTALOL HYDROCHLORIDE 80 MG/1
TABLET ORAL
COMMUNITY
End: 2019-10-30

## 2019-10-30 RX ORDER — CRANBERRY FRUIT EXTRACT 650 MG
1 CAPSULE ORAL DAILY
COMMUNITY
End: 2020-02-26

## 2019-10-30 RX ORDER — ISOSORBIDE MONONITRATE 60 MG/1
60 TABLET, EXTENDED RELEASE ORAL DAILY
Qty: 90 TABLET | Refills: 1 | Status: SHIPPED | OUTPATIENT
Start: 2019-10-30 | End: 2020-05-03

## 2019-10-30 RX ORDER — TRAMADOL HYDROCHLORIDE 50 MG/1
50 TABLET ORAL 2 TIMES DAILY PRN
Refills: 1 | COMMUNITY
Start: 2019-08-22 | End: 2021-11-10 | Stop reason: SDUPTHER

## 2019-10-30 RX ORDER — LOSARTAN POTASSIUM 50 MG/1
50 TABLET ORAL DAILY
Qty: 90 TABLET | Refills: 1 | Status: SHIPPED | OUTPATIENT
Start: 2019-10-30 | End: 2019-11-20

## 2019-11-12 ENCOUNTER — LAB VISIT (OUTPATIENT)
Dept: LAB | Facility: HOSPITAL | Age: 84
End: 2019-11-12
Attending: INTERNAL MEDICINE
Payer: MEDICARE

## 2019-11-12 DIAGNOSIS — N18.30 CHRONIC RENAL DISEASE, STAGE III: ICD-10-CM

## 2019-11-12 LAB
ALBUMIN SERPL BCP-MCNC: 4 G/DL (ref 3.5–5.2)
ALP SERPL-CCNC: 47 U/L (ref 55–135)
ALT SERPL W/O P-5'-P-CCNC: 18 U/L (ref 10–44)
ANION GAP SERPL CALC-SCNC: 10 MMOL/L (ref 8–16)
AST SERPL-CCNC: 25 U/L (ref 10–40)
BILIRUB SERPL-MCNC: 0.7 MG/DL (ref 0.1–1)
BUN SERPL-MCNC: 24 MG/DL (ref 8–23)
CALCIUM SERPL-MCNC: 9.2 MG/DL (ref 8.7–10.5)
CHLORIDE SERPL-SCNC: 103 MMOL/L (ref 95–110)
CO2 SERPL-SCNC: 26 MMOL/L (ref 23–29)
CREAT SERPL-MCNC: 1.4 MG/DL (ref 0.5–1.4)
EST. GFR  (AFRICAN AMERICAN): 40.6 ML/MIN/1.73 M^2
EST. GFR  (NON AFRICAN AMERICAN): 35.3 ML/MIN/1.73 M^2
GLUCOSE SERPL-MCNC: 91 MG/DL (ref 70–110)
POTASSIUM SERPL-SCNC: 3.8 MMOL/L (ref 3.5–5.1)
PROT SERPL-MCNC: 7.1 G/DL (ref 6–8.4)
SODIUM SERPL-SCNC: 139 MMOL/L (ref 136–145)

## 2019-11-12 PROCEDURE — 80053 COMPREHEN METABOLIC PANEL: CPT

## 2019-11-12 PROCEDURE — 36415 COLL VENOUS BLD VENIPUNCTURE: CPT

## 2019-11-20 ENCOUNTER — OFFICE VISIT (OUTPATIENT)
Dept: FAMILY MEDICINE | Facility: CLINIC | Age: 84
End: 2019-11-20
Payer: MEDICARE

## 2019-11-20 VITALS
SYSTOLIC BLOOD PRESSURE: 128 MMHG | OXYGEN SATURATION: 98 % | RESPIRATION RATE: 16 BRPM | HEIGHT: 60 IN | DIASTOLIC BLOOD PRESSURE: 70 MMHG | WEIGHT: 148 LBS | HEART RATE: 70 BPM | TEMPERATURE: 98 F | BODY MASS INDEX: 29.06 KG/M2

## 2019-11-20 DIAGNOSIS — Z01.00 ENCOUNTER FOR VISION SCREENING: ICD-10-CM

## 2019-11-20 DIAGNOSIS — N18.30 CHRONIC KIDNEY DISEASE, STAGE III (MODERATE): ICD-10-CM

## 2019-11-20 DIAGNOSIS — K21.00 GERD WITH ESOPHAGITIS: Primary | ICD-10-CM

## 2019-11-20 DIAGNOSIS — I10 ESSENTIAL HYPERTENSION: ICD-10-CM

## 2019-11-20 DIAGNOSIS — M54.32 SCIATICA OF LEFT SIDE: ICD-10-CM

## 2019-11-20 DIAGNOSIS — E11.9 DIABETES MELLITUS TYPE 2, DIET-CONTROLLED: ICD-10-CM

## 2019-11-20 DIAGNOSIS — F41.9 ANXIETY: ICD-10-CM

## 2019-11-20 PROCEDURE — 1159F PR MEDICATION LIST DOCUMENTED IN MEDICAL RECORD: ICD-10-PCS | Mod: ,,, | Performed by: INTERNAL MEDICINE

## 2019-11-20 PROCEDURE — 99214 OFFICE O/P EST MOD 30 MIN: CPT | Mod: S$PBB,,, | Performed by: INTERNAL MEDICINE

## 2019-11-20 PROCEDURE — 1170F FXNL STATUS ASSESSED: CPT | Mod: ,,, | Performed by: INTERNAL MEDICINE

## 2019-11-20 PROCEDURE — 1170F PR FUNCTIONAL STATUS ASSESSED: ICD-10-PCS | Mod: ,,, | Performed by: INTERNAL MEDICINE

## 2019-11-20 PROCEDURE — 1125F PR PAIN SEVERITY QUANTIFIED, PAIN PRESENT: ICD-10-PCS | Mod: ,,, | Performed by: INTERNAL MEDICINE

## 2019-11-20 PROCEDURE — 99214 PR OFFICE/OUTPT VISIT, EST, LEVL IV, 30-39 MIN: ICD-10-PCS | Mod: S$PBB,,, | Performed by: INTERNAL MEDICINE

## 2019-11-20 PROCEDURE — 99215 OFFICE O/P EST HI 40 MIN: CPT | Performed by: INTERNAL MEDICINE

## 2019-11-20 PROCEDURE — 1159F MED LIST DOCD IN RCRD: CPT | Mod: ,,, | Performed by: INTERNAL MEDICINE

## 2019-11-20 PROCEDURE — 1125F AMNT PAIN NOTED PAIN PRSNT: CPT | Mod: ,,, | Performed by: INTERNAL MEDICINE

## 2019-11-20 RX ORDER — DICLOFENAC SODIUM 10 MG/G
2 GEL TOPICAL 4 TIMES DAILY
Refills: 0 | Status: ON HOLD | COMMUNITY
Start: 2019-11-07 | End: 2020-07-25 | Stop reason: HOSPADM

## 2019-11-20 RX ORDER — ALPRAZOLAM 0.25 MG/1
TABLET ORAL
Qty: 60 TABLET | Refills: 0 | Status: SHIPPED | OUTPATIENT
Start: 2019-11-20 | End: 2020-02-26

## 2019-11-20 RX ORDER — CETIRIZINE HYDROCHLORIDE 10 MG/1
10 TABLET ORAL DAILY
COMMUNITY
End: 2022-08-03

## 2019-11-20 RX ORDER — LOSARTAN POTASSIUM 25 MG/1
25 TABLET ORAL DAILY
COMMUNITY
End: 2020-04-08 | Stop reason: SDUPTHER

## 2019-11-20 RX ORDER — OMEPRAZOLE 20 MG/1
20 CAPSULE, DELAYED RELEASE ORAL DAILY
Qty: 30 CAPSULE | Refills: 5 | Status: SHIPPED | OUTPATIENT
Start: 2019-11-20 | End: 2020-05-23

## 2019-11-20 NOTE — PROGRESS NOTES
SUBJECTIVE:    Patient ID: Karma Chen is a 83 y.o. female.    Chief Complaint: Diabetes; Hypertension; Anxiety; and Follow-up    HPI     Patient comes in for follow-up of hypertension and diabetes.  Blood sugars seems controlled at 91.  Her last A1c was 6.1.  Her urine is negative for protein.    Anxiety-doing well    She continues with buttock pain from lumbar radiculopathy-sciatica-will have an injection next week-    Labs -below--she is not on Repatha but will ask her cardiologist about it    There have been timeaftert taking pills that she feels a heaviness in the chest but she is not choking-belching several times causes relief-yesterday she ate a macaroon and when she drank something she started coughing and throwing up and could taste the coconut from the macaroon-sounds like esophagitis-hasnt been on rx for reflux-occasional spontaneous regurgitation    Lab Visit on 11/12/2019   Component Date Value Ref Range Status    Sodium 11/12/2019 139  136 - 145 mmol/L Final    Potassium 11/12/2019 3.8  3.5 - 5.1 mmol/L Final    Chloride 11/12/2019 103  95 - 110 mmol/L Final    CO2 11/12/2019 26  23 - 29 mmol/L Final    Glucose 11/12/2019 91  70 - 110 mg/dL Final    BUN, Bld 11/12/2019 24* 8 - 23 mg/dL Final    Creatinine 11/12/2019 1.4  0.5 - 1.4 mg/dL Final    Calcium 11/12/2019 9.2  8.7 - 10.5 mg/dL Final    Total Protein 11/12/2019 7.1  6.0 - 8.4 g/dL Final    Albumin 11/12/2019 4.0  3.5 - 5.2 g/dL Final    Total Bilirubin 11/12/2019 0.7  0.1 - 1.0 mg/dL Final    Alkaline Phosphatase 11/12/2019 47* 55 - 135 U/L Final    AST 11/12/2019 25  10 - 40 U/L Final    ALT 11/12/2019 18  10 - 44 U/L Final    Anion Gap 11/12/2019 10  8 - 16 mmol/L Final    eGFR if  11/12/2019 40.6* >60 mL/min/1.73 m^2 Final    eGFR if non African American 11/12/2019 35.3* >60 mL/min/1.73 m^2 Final   Lab Visit on 09/19/2019   Component Date Value Ref Range Status    Microalbum.,U,Random 09/19/2019 <2.0   ug/mL Final    Creatinine, Random Ur 09/19/2019 144.0  15.0 - 325.0 mg/dL Final    Microalb Creat Ratio 09/19/2019 Unable to calculate  0.0 - 30.0 ug/mg Final   Lab Visit on 09/19/2019   Component Date Value Ref Range Status    Cholesterol 09/19/2019 217* 120 - 199 mg/dL Final    Triglycerides 09/19/2019 361* 30 - 150 mg/dL Final    HDL 09/19/2019 38* 40 - 75 mg/dL Final    LDL Cholesterol 09/19/2019 106.8  63.0 - 159.0 mg/dL Final    Hdl/Cholesterol Ratio 09/19/2019 17.5* 20.0 - 50.0 % Final    Total Cholesterol/HDL Ratio 09/19/2019 5.7* 2.0 - 5.0 Final    Non-HDL Cholesterol 09/19/2019 179  mg/dL Final    Hemoglobin A1C 09/19/2019 6.1  4.5 - 6.2 % Final    Estimated Avg Glucose 09/19/2019 128  68 - 131 mg/dL Final    WBC 09/19/2019 10.50  3.90 - 12.70 K/uL Final    RBC 09/19/2019 4.62  4.00 - 5.40 M/uL Final    Hemoglobin 09/19/2019 13.2  12.0 - 16.0 g/dL Final    Hematocrit 09/19/2019 41.5  37.0 - 48.5 % Final    Mean Corpuscular Volume 09/19/2019 90  82 - 98 fL Final    Mean Corpuscular Hemoglobin 09/19/2019 28.6  27.0 - 31.0 pg Final    Mean Corpuscular Hemoglobin Conc 09/19/2019 31.8* 32.0 - 36.0 g/dL Final    RDW 09/19/2019 13.2  11.5 - 14.5 % Final    Platelets 09/19/2019 316  150 - 350 K/uL Final    MPV 09/19/2019 10.2  9.2 - 12.9 fL Final    Immature Granulocytes 09/19/2019 0.8* 0.0 - 0.5 % Final    Gran # (ANC) 09/19/2019 4.9  1.8 - 7.7 K/uL Final    Immature Grans (Abs) 09/19/2019 0.08* 0.00 - 0.04 K/uL Final    Lymph # 09/19/2019 4.3  1.0 - 4.8 K/uL Final    Mono # 09/19/2019 0.8  0.3 - 1.0 K/uL Final    Eos # 09/19/2019 0.3  0.0 - 0.5 K/uL Final    Baso # 09/19/2019 0.05  0.00 - 0.20 K/uL Final    nRBC 09/19/2019 0  0 /100 WBC Final    Gran% 09/19/2019 46.6  38.0 - 73.0 % Final    Lymph% 09/19/2019 41.1  18.0 - 48.0 % Final    Mono% 09/19/2019 7.9  4.0 - 15.0 % Final    Eosinophil% 09/19/2019 3.1  0.0 - 8.0 % Final    Basophil% 09/19/2019 0.5  0.0 - 1.9 % Final     Differential Method 09/19/2019 Automated   Final    Sodium 09/19/2019 141  136 - 145 mmol/L Final    Potassium 09/19/2019 4.2  3.5 - 5.1 mmol/L Final    Chloride 09/19/2019 106  95 - 110 mmol/L Final    CO2 09/19/2019 26  23 - 29 mmol/L Final    Glucose 09/19/2019 98  70 - 110 mg/dL Final    BUN, Bld 09/19/2019 32* 8 - 23 mg/dL Final    Creatinine 09/19/2019 1.6* 0.5 - 1.4 mg/dL Final    Calcium 09/19/2019 9.1  8.7 - 10.5 mg/dL Final    Total Protein 09/19/2019 6.9  6.0 - 8.4 g/dL Final    Albumin 09/19/2019 3.7  3.5 - 5.2 g/dL Final    Total Bilirubin 09/19/2019 0.6  0.1 - 1.0 mg/dL Final    Alkaline Phosphatase 09/19/2019 44* 55 - 135 U/L Final    AST 09/19/2019 21  10 - 40 U/L Final    ALT 09/19/2019 20  10 - 44 U/L Final    Anion Gap 09/19/2019 9  8 - 16 mmol/L Final    eGFR if  09/19/2019 34.6* >60 mL/min/1.73 m^2 Final    eGFR if non African American 09/19/2019 30.0* >60 mL/min/1.73 m^2 Final   Lab Visit on 08/29/2019   Component Date Value Ref Range Status    WBC 08/29/2019 8.90  3.90 - 12.70 K/uL Final    RBC 08/29/2019 4.59  4.00 - 5.40 M/uL Final    Hemoglobin 08/29/2019 13.1  12.0 - 16.0 g/dL Final    Hematocrit 08/29/2019 41.9  37.0 - 48.5 % Final    Mean Corpuscular Volume 08/29/2019 91  82 - 98 fL Final    Mean Corpuscular Hemoglobin 08/29/2019 28.5  27.0 - 31.0 pg Final    Mean Corpuscular Hemoglobin Conc 08/29/2019 31.3* 32.0 - 36.0 g/dL Final    RDW 08/29/2019 12.3  11.5 - 14.5 % Final    Platelets 08/29/2019 312  150 - 350 K/uL Final    MPV 08/29/2019 10.2  9.2 - 12.9 fL Final    Immature Granulocytes 08/29/2019 0.4  0.0 - 0.5 % Final    Gran # (ANC) 08/29/2019 4.4  1.8 - 7.7 K/uL Final    Immature Grans (Abs) 08/29/2019 0.04  0.00 - 0.04 K/uL Final    Lymph # 08/29/2019 2.8  1.0 - 4.8 K/uL Final    Mono # 08/29/2019 1.2* 0.3 - 1.0 K/uL Final    Eos # 08/29/2019 0.4  0.0 - 0.5 K/uL Final    Baso # 08/29/2019 0.05  0.00 - 0.20 K/uL Final    nRBC  08/29/2019 0  0 /100 WBC Final    Gran% 08/29/2019 49.3  38.0 - 73.0 % Final    Lymph% 08/29/2019 31.8  18.0 - 48.0 % Final    Mono% 08/29/2019 13.1  4.0 - 15.0 % Final    Eosinophil% 08/29/2019 4.8  0.0 - 8.0 % Final    Basophil% 08/29/2019 0.6  0.0 - 1.9 % Final    Differential Method 08/29/2019 Automated   Final    Phosphorus 08/29/2019 4.0  2.7 - 4.5 mg/dL Final    PTH, Intact 08/29/2019 15.3  9.0 - 77.0 pg/mL Final    Glucose 08/29/2019 107  70 - 110 mg/dL Final    Sodium 08/29/2019 141  136 - 145 mmol/L Final    Potassium 08/29/2019 3.8  3.5 - 5.1 mmol/L Final    Chloride 08/29/2019 105  95 - 110 mmol/L Final    CO2 08/29/2019 28  23 - 29 mmol/L Final    BUN, Bld 08/29/2019 21  8 - 23 mg/dL Final    Calcium 08/29/2019 9.5  8.7 - 10.5 mg/dL Final    Creatinine 08/29/2019 1.5* 0.5 - 1.4 mg/dL Final    Albumin 08/29/2019 4.0  3.5 - 5.2 g/dL Final    Phosphorus 08/29/2019 4.0  2.7 - 4.5 mg/dL Final    eGFR if  08/29/2019 37.4* >60 mL/min/1.73 m^2 Final    eGFR if non African American 08/29/2019 32.4* >60 mL/min/1.73 m^2 Final    Anion Gap 08/29/2019 8  8 - 16 mmol/L Final    Magnesium 08/29/2019 2.0  1.6 - 2.6 mg/dL Final    Uric Acid 08/29/2019 5.9* 2.4 - 5.7 mg/dL Final   Lab Visit on 08/29/2019   Component Date Value Ref Range Status    Specimen UA 08/29/2019 Urine, Clean Catch   Final    Color, UA 08/29/2019 Yellow  Yellow, Straw, Anila Final    Appearance, UA 08/29/2019 Clear  Clear Final    pH, UA 08/29/2019 6.0  5.0 - 8.0 Final    Specific Gravity, UA 08/29/2019 1.010  1.005 - 1.030 Final    Protein, UA 08/29/2019 Negative  Negative Final    Glucose, UA 08/29/2019 Negative  Negative Final    Ketones, UA 08/29/2019 Negative  Negative Final    Bilirubin (UA) 08/29/2019 Negative  Negative Final    Occult Blood UA 08/29/2019 Negative  Negative Final    Nitrite, UA 08/29/2019 Negative  Negative Final    Urobilinogen, UA 08/29/2019 Negative  Negative EU/dL Final     Leukocytes, UA 08/29/2019 Negative  Negative Final    Protein, Urine Random 08/29/2019 <6  0 - 15 mg/dL Final    Creatinine, Random Ur 08/29/2019 82.0  15.0 - 325.0 mg/dL Final    Prot/Creat Ratio, Ur 08/29/2019 Unable to calculate  0.00 - 0.20 Final    Microalbum.,U,Random 08/29/2019 <2.0  ug/mL Final    Creatinine, Random Ur 08/29/2019 82.0  15.0 - 325.0 mg/dL Final    Microalb Creat Ratio 08/29/2019 Unable to calculate  0.0 - 30.0 ug/mg Final       Past Medical History:   Diagnosis Date    Coronary artery disease     Dermatitis     Dermatitis 12/8/2017    Diabetes mellitus     Diabetes mellitus type II     Leah Madrigal virus infection     Fibromyalgia     GERD (gastroesophageal reflux disease)     Hypertension     MI (myocardial infarction) 09/23/2011     Past Surgical History:   Procedure Laterality Date    adenoids      ANGIOPLASTY  1987    APPENDECTOMY      CARDIAC PACEMAKER PLACEMENT  01/12/2017    CATARACT EXTRACTION, BILATERAL Bilateral 9/2/15, 3/17/15    right eye-9/2/15, left eye-3/17/15    CHOLECYSTECTOMY  1987    CORONARY ARTERY BYPASS GRAFT  2006    quadruple    coronary stents  1999    x2    CYST REMOVAL  04/25/2017    on back    HYSTERECTOMY  1966    OVARY SURGERY  1948    Ovary burst & was repaired    RHIZOTOMY  09/14/2018    TONSILLECTOMY  1940     Family History   Problem Relation Age of Onset    No Known Problems Mother     No Known Problems Father        Marital Status:   Alcohol History:  reports that she does not drink alcohol.  Tobacco History:  reports that she has never smoked. She has never used smokeless tobacco.  Drug History:  reports that she does not use drugs.    Review of patient's allergies indicates:   Allergen Reactions    Lipitor [atorvastatin] Other (See Comments)     Muscle pain and weakness in legs    Arthrotec 50 [diclofenac-misoprostol]     Doxycycline     Effexor [venlafaxine]     Estradiol     Flagyl [metronidazole]      Fluconazole     Iodine     Levaquin [levofloxacin]     Nexium [esomeprazole magnesium]     Penicillins     Shellfish containing products     Sulfa (sulfonamide antibiotics)     Tea tree oil     Tegaserod      ZOLNORM    Trazodone     Yeast, dried        Current Outpatient Medications:     acetaminophen (TYLENOL) 500 MG tablet, Take 500 mg by mouth every 6 (six) hours as needed for Pain., Disp: , Rfl:     aspirin (ECOTRIN) 81 MG EC tablet, Take 1 tablet (81 mg total) by mouth once daily., Disp: , Rfl: 0    cetirizine (ZYRTEC) 10 MG tablet, Take 10 mg by mouth once daily., Disp: , Rfl:     cholecalciferol, vitamin D3, (VITAMIN D3) 2,000 unit Cap, Take 1 capsule by mouth once daily., Disp: , Rfl:     clopidogrel (PLAVIX) 75 mg tablet, Take 1 tablet (75 mg total) by mouth once daily., Disp: 90 tablet, Rfl: 0    coenzyme Q10 100 mg capsule, Take 1 capsule (100 mg total) by mouth 2 (two) times daily., Disp: 60 capsule, Rfl: 11    diclofenac sodium (VOLTAREN) 1 % Gel, APPLY 2 GRAMS TO THE AFFECTED AREA(S) BY TOPICAL ROUTE 4 TIMES PER DAY, Disp: , Rfl: 0    isosorbide mononitrate (IMDUR) 60 MG 24 hr tablet, Take 1 tablet (60 mg total) by mouth once daily., Disp: 90 tablet, Rfl: 1    Lactobacillus rhamnosus GG (CULTURELLE) 10 billion cell capsule, Take 1 capsule by mouth once daily., Disp: , Rfl:     losartan (COZAAR) 25 MG tablet, Take 25 mg by mouth once daily. And 12.5mg in evening, Disp: , Rfl:     magnesium 250 mg Tab, Take 1 tablet by mouth once., Disp: , Rfl:     metoprolol succinate (TOPROL-XL) 25 MG 24 hr tablet, Take 1 tablet (25 mg total) by mouth once daily. (Patient taking differently: Take 12.5 mg by mouth once daily. ), Disp: 30 tablet, Rfl: 2    montelukast (SINGULAIR) 10 mg tablet, TAKE 1 TABLET BY MOUTH EVERY DAY IN THE EVENING, Disp: 90 tablet, Rfl: 1    multivitamin (THERAGRAN) tablet, Take 1 tablet by mouth once daily., Disp: , Rfl:     nitroGLYCERIN (NITROSTAT) 0.4 MG SL tablet,  , Disp: , Rfl:     prasterone, dhea, (DHEA) 25 mg Cap, Take 1 capsule by mouth once daily., Disp: , Rfl:     SYNTHROID 88 mcg tablet, TAKE 1 TABLET ONCE DAILY, Disp: 90 tablet, Rfl: 1    tiZANidine (ZANAFLEX) 4 MG tablet, Take 4 mg by mouth every 6 (six) hours as needed., Disp: , Rfl:     traMADol (ULTRAM) 50 mg tablet, Take 50 mg by mouth 2 (two) times daily as needed., Disp: , Rfl: 1    ALPRAZolam (XANAX) 0.25 MG tablet, One po hs prn anxiety, Disp: 60 tablet, Rfl: 0    estradiol (ESTRACE) 0.01 % (0.1 mg/g) vaginal cream, Place vaginally once a week.  , Disp: , Rfl:     omeprazole (PRILOSEC) 20 MG capsule, Take 1 capsule (20 mg total) by mouth once daily., Disp: 30 capsule, Rfl: 5    Review of Systems   Constitutional: Negative for appetite change, chills, diaphoresis, fatigue, fever and unexpected weight change.   HENT: Negative for congestion, ear pain, hearing loss, nosebleeds, postnasal drip, sinus pressure, sinus pain, sneezing, sore throat, tinnitus, trouble swallowing and voice change.    Eyes: Negative for photophobia, pain, itching and visual disturbance.   Respiratory: Negative for apnea, cough, chest tightness, shortness of breath, wheezing and stridor.    Cardiovascular: Negative for chest pain, palpitations and leg swelling.   Gastrointestinal: Negative for abdominal distention, abdominal pain, blood in stool, constipation, diarrhea, nausea and vomiting.        See HPI   Endocrine: Negative for cold intolerance, heat intolerance, polydipsia and polyuria.   Genitourinary: Negative for difficulty urinating, dyspareunia, dysuria, flank pain, frequency, hematuria, menstrual problem, pelvic pain, urgency, vaginal discharge and vaginal pain.   Musculoskeletal: Negative for arthralgias, back pain, joint swelling, myalgias, neck pain and neck stiffness.   Skin: Negative for pallor.   Allergic/Immunologic: Negative for environmental allergies and food allergies.   Neurological: Negative for dizziness,  tremors, speech difficulty, weakness, light-headedness and numbness.   Hematological: Does not bruise/bleed easily.   Psychiatric/Behavioral: Negative for agitation, confusion, decreased concentration, sleep disturbance and suicidal ideas. Nervous/anxious: See HPI.           Objective:      Vitals:    11/20/19 1106   BP: 128/70   Pulse: 70   Resp: 16   Temp: 98.3 °F (36.8 °C)   SpO2: 98%   Weight: 67.1 kg (148 lb)   Height: 5' (1.524 m)     Physical Exam   Constitutional: She is oriented to person, place, and time. She appears well-developed and well-nourished. She is cooperative. No distress.   HENT:   Head: Normocephalic and atraumatic.   Right Ear: Tympanic membrane normal.   Left Ear: Tympanic membrane normal.   Nose: Nose normal.   Mouth/Throat: Uvula is midline and mucous membranes are normal.   Eyes: Pupils are equal, round, and reactive to light. Conjunctivae and EOM are normal. Right eye exhibits no discharge. Left eye exhibits no discharge. No scleral icterus. Right pupil is round and reactive. Left pupil is round and reactive.   Neck: Trachea normal and normal range of motion. Neck supple. No JVD present. Carotid bruit is not present. No thyromegaly present.   Cardiovascular: Normal rate, regular rhythm and intact distal pulses. Exam reveals no gallop and no friction rub.   No murmur heard.  Pulmonary/Chest: Effort normal and breath sounds normal. No respiratory distress. She has no wheezes. She has no rales.   Abdominal: Soft. Bowel sounds are normal. She exhibits no distension and no mass. There is no tenderness. There is no guarding. No hernia.   Musculoskeletal: Normal range of motion. She exhibits no edema.   Neurological: She is alert and oriented to person, place, and time. She has normal strength.   Skin: Skin is warm and dry. Capillary refill takes less than 2 seconds. No lesion and no rash noted. No cyanosis. Nails show no clubbing.   Psychiatric: She has a normal mood and affect. Her speech is  normal and behavior is normal. Judgment and thought content normal.   Nursing note and vitals reviewed.        Assessment:       1. GERD with esophagitis    2. Sciatica of left side    3. Diabetes mellitus type 2, diet-controlled    4. Chronic kidney disease, stage III (moderate)    5. Essential hypertension    6. Anxiety    7. Encounter for vision screening         Plan:       GERD with esophagitis  -     Ambulatory referral to Gastroenterology  -     omeprazole (PRILOSEC) 20 MG capsule; Take 1 capsule (20 mg total) by mouth once daily.  Dispense: 30 capsule; Refill: 5    Sciatica of left side        -     Per Pain Management    Diabetes mellitus type 2, diet-controlled  -     Basic metabolic panel; Future; Expected date: 03/20/2020  -     Hemoglobin A1c; Future; Expected date: 03/21/2020  -     Microalbumin/creatinine urine ratio; Future; Expected date: 03/20/2020    Chronic kidney disease, stage III (moderate)    Essential Hypertension        -     Controlled-continue same regimen             Anxiety  -     ALPRAZolam (XANAX) 0.25 MG tablet; One po hs prn anxiety  Dispense: 60 tablet; Refill: 0    Encounter for vision screening  -     Ambulatory referral to Optometry      Follow up in about 3 months (around 2/20/2020) for labs-GERD.        11/20/2019 Elena Sullivan M.D.

## 2019-12-05 ENCOUNTER — LAB VISIT (OUTPATIENT)
Dept: LAB | Facility: HOSPITAL | Age: 84
End: 2019-12-05
Attending: INTERNAL MEDICINE
Payer: MEDICARE

## 2019-12-05 DIAGNOSIS — E11.9 DIABETES MELLITUS WITHOUT COMPLICATION: ICD-10-CM

## 2019-12-05 DIAGNOSIS — I51.9 MYXEDEMA HEART DISEASE: ICD-10-CM

## 2019-12-05 DIAGNOSIS — N18.30 CHRONIC KIDNEY DISEASE, STAGE III (MODERATE): ICD-10-CM

## 2019-12-05 DIAGNOSIS — N18.9 CHRONIC KIDNEY DISEASE, UNSPECIFIED: ICD-10-CM

## 2019-12-05 DIAGNOSIS — I25.10 CORONARY ATHEROSCLEROSIS OF NATIVE CORONARY ARTERY: ICD-10-CM

## 2019-12-05 DIAGNOSIS — E78.5 HYPERLIPEMIA: ICD-10-CM

## 2019-12-05 DIAGNOSIS — E03.9 MYXEDEMA HEART DISEASE: ICD-10-CM

## 2019-12-05 DIAGNOSIS — E11.9 DIABETES MELLITUS TYPE 2, DIET-CONTROLLED: ICD-10-CM

## 2019-12-05 DIAGNOSIS — I12.9 PARENCHYMAL RENAL HYPERTENSION: ICD-10-CM

## 2019-12-05 DIAGNOSIS — E79.0 URICACIDEMIA: ICD-10-CM

## 2019-12-05 DIAGNOSIS — N25.81 SECONDARY HYPERPARATHYROIDISM OF RENAL ORIGIN: ICD-10-CM

## 2019-12-05 DIAGNOSIS — D63.1 ANEMIA OF CHRONIC RENAL FAILURE: Primary | ICD-10-CM

## 2019-12-05 DIAGNOSIS — I10 ESSENTIAL HYPERTENSION, MALIGNANT: ICD-10-CM

## 2019-12-05 DIAGNOSIS — B00.1 FEVER BLISTER: Primary | ICD-10-CM

## 2019-12-05 DIAGNOSIS — N18.9 ANEMIA OF CHRONIC RENAL FAILURE: Primary | ICD-10-CM

## 2019-12-05 LAB
ALBUMIN SERPL BCP-MCNC: 3.8 G/DL (ref 3.5–5.2)
ALBUMIN/CREAT UR: 4.5 UG/MG (ref 0–30)
ANION GAP SERPL CALC-SCNC: 11 MMOL/L (ref 8–16)
ANION GAP SERPL CALC-SCNC: 11 MMOL/L (ref 8–16)
BILIRUB UR QL STRIP: NEGATIVE
BUN SERPL-MCNC: 21 MG/DL (ref 8–23)
BUN SERPL-MCNC: 21 MG/DL (ref 8–23)
CALCIUM SERPL-MCNC: 8.9 MG/DL (ref 8.7–10.5)
CALCIUM SERPL-MCNC: 8.9 MG/DL (ref 8.7–10.5)
CHLORIDE SERPL-SCNC: 103 MMOL/L (ref 95–110)
CHLORIDE SERPL-SCNC: 103 MMOL/L (ref 95–110)
CHOLEST SERPL-MCNC: 230 MG/DL (ref 120–199)
CHOLEST/HDLC SERPL: 4.9 {RATIO} (ref 2–5)
CLARITY UR: CLEAR
CO2 SERPL-SCNC: 26 MMOL/L (ref 23–29)
CO2 SERPL-SCNC: 26 MMOL/L (ref 23–29)
COLOR UR: YELLOW
CREAT SERPL-MCNC: 1.2 MG/DL (ref 0.5–1.4)
CREAT SERPL-MCNC: 1.2 MG/DL (ref 0.5–1.4)
CREAT UR-MCNC: 183 MG/DL (ref 15–325)
EST. GFR  (AFRICAN AMERICAN): 49 ML/MIN/1.73 M^2
EST. GFR  (AFRICAN AMERICAN): 49 ML/MIN/1.73 M^2
EST. GFR  (NON AFRICAN AMERICAN): 42.5 ML/MIN/1.73 M^2
EST. GFR  (NON AFRICAN AMERICAN): 42.5 ML/MIN/1.73 M^2
ESTIMATED AVG GLUCOSE: 123 MG/DL (ref 68–131)
GLUCOSE SERPL-MCNC: 81 MG/DL (ref 70–110)
GLUCOSE SERPL-MCNC: 81 MG/DL (ref 70–110)
GLUCOSE UR QL STRIP: NEGATIVE
HBA1C MFR BLD HPLC: 5.9 % (ref 4.5–6.2)
HDLC SERPL-MCNC: 47 MG/DL (ref 40–75)
HDLC SERPL: 20.4 % (ref 20–50)
HGB UR QL STRIP: NEGATIVE
KETONES UR QL STRIP: NEGATIVE
LDLC SERPL CALC-MCNC: 156.2 MG/DL (ref 63–159)
LEUKOCYTE ESTERASE UR QL STRIP: NEGATIVE
MICROALBUMIN UR DL<=1MG/L-MCNC: 8.2 UG/ML
NITRITE UR QL STRIP: NEGATIVE
NONHDLC SERPL-MCNC: 183 MG/DL
PH UR STRIP: 6 [PH] (ref 5–8)
PHOSPHATE SERPL-MCNC: 3.9 MG/DL (ref 2.7–4.5)
POTASSIUM SERPL-SCNC: 4 MMOL/L (ref 3.5–5.1)
POTASSIUM SERPL-SCNC: 4 MMOL/L (ref 3.5–5.1)
PROT UR QL STRIP: NEGATIVE
SODIUM SERPL-SCNC: 140 MMOL/L (ref 136–145)
SODIUM SERPL-SCNC: 140 MMOL/L (ref 136–145)
SP GR UR STRIP: 1.02 (ref 1–1.03)
TRIGL SERPL-MCNC: 134 MG/DL (ref 30–150)
URATE SERPL-MCNC: 6.1 MG/DL (ref 2.4–5.7)
URN SPEC COLLECT METH UR: NORMAL
UROBILINOGEN UR STRIP-ACNC: NEGATIVE EU/DL

## 2019-12-05 PROCEDURE — 80069 RENAL FUNCTION PANEL: CPT

## 2019-12-05 PROCEDURE — 83036 HEMOGLOBIN GLYCOSYLATED A1C: CPT

## 2019-12-05 PROCEDURE — 82043 UR ALBUMIN QUANTITATIVE: CPT

## 2019-12-05 PROCEDURE — 81003 URINALYSIS AUTO W/O SCOPE: CPT

## 2019-12-05 PROCEDURE — 84550 ASSAY OF BLOOD/URIC ACID: CPT

## 2019-12-05 PROCEDURE — 80061 LIPID PANEL: CPT

## 2019-12-05 PROCEDURE — 36415 COLL VENOUS BLD VENIPUNCTURE: CPT

## 2019-12-05 RX ORDER — ACYCLOVIR 50 MG/G
CREAM TOPICAL 2 TIMES DAILY PRN
Qty: 5 G | Refills: 1 | Status: SHIPPED | OUTPATIENT
Start: 2019-12-05 | End: 2020-07-24 | Stop reason: CLARIF

## 2020-01-01 DIAGNOSIS — E03.9 ACQUIRED HYPOTHYROIDISM: ICD-10-CM

## 2020-01-01 RX ORDER — LEVOTHYROXINE SODIUM 88 UG/1
TABLET ORAL
Qty: 90 TABLET | Refills: 1 | Status: SHIPPED | OUTPATIENT
Start: 2020-01-01 | End: 2020-07-04

## 2020-02-24 ENCOUNTER — HOSPITAL ENCOUNTER (OUTPATIENT)
Dept: RADIOLOGY | Facility: HOSPITAL | Age: 85
Discharge: HOME | End: 2020-02-24
Attending: INTERNAL MEDICINE
Payer: MEDICARE

## 2020-02-24 DIAGNOSIS — M89.9 BONE DISEASE: ICD-10-CM

## 2020-02-24 PROCEDURE — 77067 SCR MAMMO BI INCL CAD: CPT | Mod: TC,PO

## 2020-02-24 PROCEDURE — 77080 DXA BONE DENSITY AXIAL: CPT | Mod: TC,PO

## 2020-02-26 ENCOUNTER — OFFICE VISIT (OUTPATIENT)
Dept: FAMILY MEDICINE | Facility: CLINIC | Age: 85
End: 2020-02-26
Payer: MEDICARE

## 2020-02-26 VITALS
HEIGHT: 60 IN | WEIGHT: 151 LBS | SYSTOLIC BLOOD PRESSURE: 126 MMHG | TEMPERATURE: 98 F | BODY MASS INDEX: 29.64 KG/M2 | RESPIRATION RATE: 16 BRPM | HEART RATE: 70 BPM | OXYGEN SATURATION: 98 % | DIASTOLIC BLOOD PRESSURE: 64 MMHG

## 2020-02-26 DIAGNOSIS — E11.22 TYPE 2 DIABETES MELLITUS WITH CHRONIC KIDNEY DISEASE, WITHOUT LONG-TERM CURRENT USE OF INSULIN, UNSPECIFIED CKD STAGE: ICD-10-CM

## 2020-02-26 DIAGNOSIS — E78.00 PURE HYPERCHOLESTEROLEMIA: ICD-10-CM

## 2020-02-26 DIAGNOSIS — J30.0 VASOMOTOR RHINITIS: Primary | ICD-10-CM

## 2020-02-26 DIAGNOSIS — Z91.09 ENVIRONMENTAL ALLERGIES: ICD-10-CM

## 2020-02-26 DIAGNOSIS — E11.9 DIABETES MELLITUS TYPE 2, DIET-CONTROLLED: ICD-10-CM

## 2020-02-26 DIAGNOSIS — I10 ESSENTIAL HYPERTENSION: ICD-10-CM

## 2020-02-26 PROBLEM — E78.2 MIXED HYPERLIPIDEMIA: Status: ACTIVE | Noted: 2017-12-09

## 2020-02-26 PROBLEM — E78.2 MIXED HYPERLIPIDEMIA: Status: RESOLVED | Noted: 2017-12-09 | Resolved: 2020-02-26

## 2020-02-26 PROCEDURE — 99214 PR OFFICE/OUTPT VISIT, EST, LEVL IV, 30-39 MIN: ICD-10-PCS | Mod: S$PBB,,, | Performed by: INTERNAL MEDICINE

## 2020-02-26 PROCEDURE — 99214 OFFICE O/P EST MOD 30 MIN: CPT | Performed by: INTERNAL MEDICINE

## 2020-02-26 PROCEDURE — 99214 OFFICE O/P EST MOD 30 MIN: CPT | Mod: S$PBB,,, | Performed by: INTERNAL MEDICINE

## 2020-02-26 RX ORDER — IPRATROPIUM BROMIDE 21 UG/1
2 SPRAY, METERED NASAL 2 TIMES DAILY
Qty: 30 ML | Refills: 2 | Status: SHIPPED | OUTPATIENT
Start: 2020-02-26 | End: 2020-09-10

## 2020-02-26 RX ORDER — GENTAMICIN SULFATE 1 MG/G
OINTMENT TOPICAL
COMMUNITY
Start: 2019-12-12 | End: 2020-07-24 | Stop reason: CLARIF

## 2020-02-26 RX ORDER — MONTELUKAST SODIUM 10 MG/1
10 TABLET ORAL NIGHTLY
Qty: 90 TABLET | Refills: 1 | Status: SHIPPED | OUTPATIENT
Start: 2020-02-26 | End: 2020-08-05 | Stop reason: SDUPTHER

## 2020-02-26 RX ORDER — LEVOCARNITINE 500 MG
1 TABLET ORAL DAILY
COMMUNITY
End: 2020-07-24 | Stop reason: CLARIF

## 2020-02-26 NOTE — PROGRESS NOTES
SUBJECTIVE:    Patient ID: Karma Chen is a 84 y.o. female.    Chief Complaint: Diabetes; Hypertension; and Follow-up    HPI     In for follow-up of diabetes and hypertension--GFR 43cc-her A1c is come down from 6.1 to 5.9.  Electrolytes are normal.  Lipids are elevated and she cannot take statins and I will direct her to Cardiology to discuss repeat past on her next visit with them.-Says Reid was discussed with her-she preferred a natural approach.    BP is controlled.    Asks re magnesium dosage-has 500 mg    Omeprazole-should she stay on same?     Lab Visit on 12/05/2019   Component Date Value Ref Range Status    Sodium 12/05/2019 140  136 - 145 mmol/L Final    Potassium 12/05/2019 4.0  3.5 - 5.1 mmol/L Final    Chloride 12/05/2019 103  95 - 110 mmol/L Final    CO2 12/05/2019 26  23 - 29 mmol/L Final    Glucose 12/05/2019 81  70 - 110 mg/dL Final    BUN, Bld 12/05/2019 21  8 - 23 mg/dL Final    Creatinine 12/05/2019 1.2  0.5 - 1.4 mg/dL Final    Calcium 12/05/2019 8.9  8.7 - 10.5 mg/dL Final    Anion Gap 12/05/2019 11  8 - 16 mmol/L Final    eGFR if  12/05/2019 49.0* >60 mL/min/1.73 m^2 Final    eGFR if non African American 12/05/2019 42.5* >60 mL/min/1.73 m^2 Final    Hemoglobin A1C 12/05/2019 5.9  4.5 - 6.2 % Final    Estimated Avg Glucose 12/05/2019 123  68 - 131 mg/dL Final    Microalbum.,U,Random 12/05/2019 8.2  ug/mL Final    Creatinine, Random Ur 12/05/2019 183.0  15.0 - 325.0 mg/dL Final    Microalb Creat Ratio 12/05/2019 4.5  0.0 - 30.0 ug/mg Final    Specimen UA 12/05/2019 Urine, Clean Catch   Final    Color, UA 12/05/2019 Yellow  Yellow, Straw, Anila Final    Appearance, UA 12/05/2019 Clear  Clear Final    pH, UA 12/05/2019 6.0  5.0 - 8.0 Final    Specific Gravity, UA 12/05/2019 1.020  1.005 - 1.030 Final    Protein, UA 12/05/2019 Negative  Negative Final    Glucose, UA 12/05/2019 Negative  Negative Final    Ketones, UA 12/05/2019 Negative  Negative Final     Bilirubin (UA) 12/05/2019 Negative  Negative Final    Occult Blood UA 12/05/2019 Negative  Negative Final    Nitrite, UA 12/05/2019 Negative  Negative Final    Urobilinogen, UA 12/05/2019 Negative  Negative EU/dL Final    Leukocytes, UA 12/05/2019 Negative  Negative Final    Glucose 12/05/2019 81  70 - 110 mg/dL Final    Sodium 12/05/2019 140  136 - 145 mmol/L Final    Potassium 12/05/2019 4.0  3.5 - 5.1 mmol/L Final    Chloride 12/05/2019 103  95 - 110 mmol/L Final    CO2 12/05/2019 26  23 - 29 mmol/L Final    BUN, Bld 12/05/2019 21  8 - 23 mg/dL Final    Calcium 12/05/2019 8.9  8.7 - 10.5 mg/dL Final    Creatinine 12/05/2019 1.2  0.5 - 1.4 mg/dL Final    Albumin 12/05/2019 3.8  3.5 - 5.2 g/dL Final    Phosphorus 12/05/2019 3.9  2.7 - 4.5 mg/dL Final    eGFR if  12/05/2019 49.0* >60 mL/min/1.73 m^2 Final    eGFR if non African American 12/05/2019 42.5* >60 mL/min/1.73 m^2 Final    Anion Gap 12/05/2019 11  8 - 16 mmol/L Final    Uric Acid 12/05/2019 6.1* 2.4 - 5.7 mg/dL Final    Cholesterol 12/05/2019 230* 120 - 199 mg/dL Final    Triglycerides 12/05/2019 134  30 - 150 mg/dL Final    HDL 12/05/2019 47  40 - 75 mg/dL Final    LDL Cholesterol 12/05/2019 156.2  63.0 - 159.0 mg/dL Final    Hdl/Cholesterol Ratio 12/05/2019 20.4  20.0 - 50.0 % Final    Total Cholesterol/HDL Ratio 12/05/2019 4.9  2.0 - 5.0 Final    Non-HDL Cholesterol 12/05/2019 183  mg/dL Final   Lab Visit on 11/12/2019   Component Date Value Ref Range Status    Sodium 11/12/2019 139  136 - 145 mmol/L Final    Potassium 11/12/2019 3.8  3.5 - 5.1 mmol/L Final    Chloride 11/12/2019 103  95 - 110 mmol/L Final    CO2 11/12/2019 26  23 - 29 mmol/L Final    Glucose 11/12/2019 91  70 - 110 mg/dL Final    BUN, Bld 11/12/2019 24* 8 - 23 mg/dL Final    Creatinine 11/12/2019 1.4  0.5 - 1.4 mg/dL Final    Calcium 11/12/2019 9.2  8.7 - 10.5 mg/dL Final    Total Protein 11/12/2019 7.1  6.0 - 8.4 g/dL Final     Albumin 11/12/2019 4.0  3.5 - 5.2 g/dL Final    Total Bilirubin 11/12/2019 0.7  0.1 - 1.0 mg/dL Final    Alkaline Phosphatase 11/12/2019 47* 55 - 135 U/L Final    AST 11/12/2019 25  10 - 40 U/L Final    ALT 11/12/2019 18  10 - 44 U/L Final    Anion Gap 11/12/2019 10  8 - 16 mmol/L Final    eGFR if  11/12/2019 40.6* >60 mL/min/1.73 m^2 Final    eGFR if non African American 11/12/2019 35.3* >60 mL/min/1.73 m^2 Final   Lab Visit on 09/19/2019   Component Date Value Ref Range Status    Microalbum.,U,Random 09/19/2019 <2.0  ug/mL Final    Creatinine, Random Ur 09/19/2019 144.0  15.0 - 325.0 mg/dL Final    Microalb Creat Ratio 09/19/2019 Unable to calculate  0.0 - 30.0 ug/mg Final   Lab Visit on 09/19/2019   Component Date Value Ref Range Status    Cholesterol 09/19/2019 217* 120 - 199 mg/dL Final    Triglycerides 09/19/2019 361* 30 - 150 mg/dL Final    HDL 09/19/2019 38* 40 - 75 mg/dL Final    LDL Cholesterol 09/19/2019 106.8  63.0 - 159.0 mg/dL Final    Hdl/Cholesterol Ratio 09/19/2019 17.5* 20.0 - 50.0 % Final    Total Cholesterol/HDL Ratio 09/19/2019 5.7* 2.0 - 5.0 Final    Non-HDL Cholesterol 09/19/2019 179  mg/dL Final    Hemoglobin A1C 09/19/2019 6.1  4.5 - 6.2 % Final    Estimated Avg Glucose 09/19/2019 128  68 - 131 mg/dL Final    WBC 09/19/2019 10.50  3.90 - 12.70 K/uL Final    RBC 09/19/2019 4.62  4.00 - 5.40 M/uL Final    Hemoglobin 09/19/2019 13.2  12.0 - 16.0 g/dL Final    Hematocrit 09/19/2019 41.5  37.0 - 48.5 % Final    Mean Corpuscular Volume 09/19/2019 90  82 - 98 fL Final    Mean Corpuscular Hemoglobin 09/19/2019 28.6  27.0 - 31.0 pg Final    Mean Corpuscular Hemoglobin Conc 09/19/2019 31.8* 32.0 - 36.0 g/dL Final    RDW 09/19/2019 13.2  11.5 - 14.5 % Final    Platelets 09/19/2019 316  150 - 350 K/uL Final    MPV 09/19/2019 10.2  9.2 - 12.9 fL Final    Immature Granulocytes 09/19/2019 0.8* 0.0 - 0.5 % Final    Gran # (ANC) 09/19/2019 4.9  1.8 - 7.7 K/uL  Final    Immature Grans (Abs) 09/19/2019 0.08* 0.00 - 0.04 K/uL Final    Lymph # 09/19/2019 4.3  1.0 - 4.8 K/uL Final    Mono # 09/19/2019 0.8  0.3 - 1.0 K/uL Final    Eos # 09/19/2019 0.3  0.0 - 0.5 K/uL Final    Baso # 09/19/2019 0.05  0.00 - 0.20 K/uL Final    nRBC 09/19/2019 0  0 /100 WBC Final    Gran% 09/19/2019 46.6  38.0 - 73.0 % Final    Lymph% 09/19/2019 41.1  18.0 - 48.0 % Final    Mono% 09/19/2019 7.9  4.0 - 15.0 % Final    Eosinophil% 09/19/2019 3.1  0.0 - 8.0 % Final    Basophil% 09/19/2019 0.5  0.0 - 1.9 % Final    Differential Method 09/19/2019 Automated   Final    Sodium 09/19/2019 141  136 - 145 mmol/L Final    Potassium 09/19/2019 4.2  3.5 - 5.1 mmol/L Final    Chloride 09/19/2019 106  95 - 110 mmol/L Final    CO2 09/19/2019 26  23 - 29 mmol/L Final    Glucose 09/19/2019 98  70 - 110 mg/dL Final    BUN, Bld 09/19/2019 32* 8 - 23 mg/dL Final    Creatinine 09/19/2019 1.6* 0.5 - 1.4 mg/dL Final    Calcium 09/19/2019 9.1  8.7 - 10.5 mg/dL Final    Total Protein 09/19/2019 6.9  6.0 - 8.4 g/dL Final    Albumin 09/19/2019 3.7  3.5 - 5.2 g/dL Final    Total Bilirubin 09/19/2019 0.6  0.1 - 1.0 mg/dL Final    Alkaline Phosphatase 09/19/2019 44* 55 - 135 U/L Final    AST 09/19/2019 21  10 - 40 U/L Final    ALT 09/19/2019 20  10 - 44 U/L Final    Anion Gap 09/19/2019 9  8 - 16 mmol/L Final    eGFR if  09/19/2019 34.6* >60 mL/min/1.73 m^2 Final    eGFR if non African American 09/19/2019 30.0* >60 mL/min/1.73 m^2 Final   Lab Visit on 08/29/2019   Component Date Value Ref Range Status    WBC 08/29/2019 8.90  3.90 - 12.70 K/uL Final    RBC 08/29/2019 4.59  4.00 - 5.40 M/uL Final    Hemoglobin 08/29/2019 13.1  12.0 - 16.0 g/dL Final    Hematocrit 08/29/2019 41.9  37.0 - 48.5 % Final    Mean Corpuscular Volume 08/29/2019 91  82 - 98 fL Final    Mean Corpuscular Hemoglobin 08/29/2019 28.5  27.0 - 31.0 pg Final    Mean Corpuscular Hemoglobin Conc 08/29/2019 31.3*  32.0 - 36.0 g/dL Final    RDW 08/29/2019 12.3  11.5 - 14.5 % Final    Platelets 08/29/2019 312  150 - 350 K/uL Final    MPV 08/29/2019 10.2  9.2 - 12.9 fL Final    Immature Granulocytes 08/29/2019 0.4  0.0 - 0.5 % Final    Gran # (ANC) 08/29/2019 4.4  1.8 - 7.7 K/uL Final    Immature Grans (Abs) 08/29/2019 0.04  0.00 - 0.04 K/uL Final    Lymph # 08/29/2019 2.8  1.0 - 4.8 K/uL Final    Mono # 08/29/2019 1.2* 0.3 - 1.0 K/uL Final    Eos # 08/29/2019 0.4  0.0 - 0.5 K/uL Final    Baso # 08/29/2019 0.05  0.00 - 0.20 K/uL Final    nRBC 08/29/2019 0  0 /100 WBC Final    Gran% 08/29/2019 49.3  38.0 - 73.0 % Final    Lymph% 08/29/2019 31.8  18.0 - 48.0 % Final    Mono% 08/29/2019 13.1  4.0 - 15.0 % Final    Eosinophil% 08/29/2019 4.8  0.0 - 8.0 % Final    Basophil% 08/29/2019 0.6  0.0 - 1.9 % Final    Differential Method 08/29/2019 Automated   Final    Phosphorus 08/29/2019 4.0  2.7 - 4.5 mg/dL Final    PTH, Intact 08/29/2019 15.3  9.0 - 77.0 pg/mL Final    Glucose 08/29/2019 107  70 - 110 mg/dL Final    Sodium 08/29/2019 141  136 - 145 mmol/L Final    Potassium 08/29/2019 3.8  3.5 - 5.1 mmol/L Final    Chloride 08/29/2019 105  95 - 110 mmol/L Final    CO2 08/29/2019 28  23 - 29 mmol/L Final    BUN, Bld 08/29/2019 21  8 - 23 mg/dL Final    Calcium 08/29/2019 9.5  8.7 - 10.5 mg/dL Final    Creatinine 08/29/2019 1.5* 0.5 - 1.4 mg/dL Final    Albumin 08/29/2019 4.0  3.5 - 5.2 g/dL Final    Phosphorus 08/29/2019 4.0  2.7 - 4.5 mg/dL Final    eGFR if  08/29/2019 37.4* >60 mL/min/1.73 m^2 Final    eGFR if non African American 08/29/2019 32.4* >60 mL/min/1.73 m^2 Final    Anion Gap 08/29/2019 8  8 - 16 mmol/L Final    Magnesium 08/29/2019 2.0  1.6 - 2.6 mg/dL Final    Uric Acid 08/29/2019 5.9* 2.4 - 5.7 mg/dL Final   Lab Visit on 08/29/2019   Component Date Value Ref Range Status    Specimen UA 08/29/2019 Urine, Clean Catch   Final    Color, UA 08/29/2019 Yellow  Yellow, Straw,  Anila Final    Appearance, UA 08/29/2019 Clear  Clear Final    pH, UA 08/29/2019 6.0  5.0 - 8.0 Final    Specific Gravity, UA 08/29/2019 1.010  1.005 - 1.030 Final    Protein, UA 08/29/2019 Negative  Negative Final    Glucose, UA 08/29/2019 Negative  Negative Final    Ketones, UA 08/29/2019 Negative  Negative Final    Bilirubin (UA) 08/29/2019 Negative  Negative Final    Occult Blood UA 08/29/2019 Negative  Negative Final    Nitrite, UA 08/29/2019 Negative  Negative Final    Urobilinogen, UA 08/29/2019 Negative  Negative EU/dL Final    Leukocytes, UA 08/29/2019 Negative  Negative Final    Protein, Urine Random 08/29/2019 <6  0 - 15 mg/dL Final    Creatinine, Random Ur 08/29/2019 82.0  15.0 - 325.0 mg/dL Final    Prot/Creat Ratio, Ur 08/29/2019 Unable to calculate  0.00 - 0.20 Final    Microalbum.,U,Random 08/29/2019 <2.0  ug/mL Final    Creatinine, Random Ur 08/29/2019 82.0  15.0 - 325.0 mg/dL Final    Microalb Creat Ratio 08/29/2019 Unable to calculate  0.0 - 30.0 ug/mg Final       Past Medical History:   Diagnosis Date    Coronary artery disease     Dermatitis     Dermatitis 12/8/2017    Diabetes mellitus     Diabetes mellitus type II     Leah Madrigal virus infection     Fibromyalgia     GERD (gastroesophageal reflux disease)     Hypertension     MI (myocardial infarction) 09/23/2011    Pure hypercholesterolemia 2/26/2020     Past Surgical History:   Procedure Laterality Date    adenoids      ANGIOPLASTY  1987    APPENDECTOMY      CARDIAC PACEMAKER PLACEMENT  01/12/2017    CATARACT EXTRACTION, BILATERAL Bilateral 9/2/15, 3/17/15    right eye-9/2/15, left eye-3/17/15    CHOLECYSTECTOMY  1987    CORONARY ARTERY BYPASS GRAFT  2006    quadruple    coronary stents  1999    x2    CYST REMOVAL  04/25/2017    on back    HYSTERECTOMY  1966    OVARY SURGERY  1948    Ovary burst & was repaired    RHIZOTOMY  09/14/2018    TONSILLECTOMY  1940     Family History   Problem Relation Age of  Onset    No Known Problems Mother     No Known Problems Father        Marital Status:   Alcohol History:  reports that she does not drink alcohol.  Tobacco History:  reports that she has never smoked. She has never used smokeless tobacco.  Drug History:  reports that she does not use drugs.    Review of patient's allergies indicates:   Allergen Reactions    Lipitor [atorvastatin] Other (See Comments)     Muscle pain and weakness in legs    Arthrotec 50 [diclofenac-misoprostol]     Doxycycline     Effexor [venlafaxine]     Estradiol     Flagyl [metronidazole]     Fluconazole     Iodine     Levaquin [levofloxacin]     Nexium [esomeprazole magnesium]     Penicillins     Shellfish containing products     Sulfa (sulfonamide antibiotics)     Tea tree oil     Tegaserod      ZOLNORM    Trazodone     Yeast, dried        Current Outpatient Medications:     acetaminophen (TYLENOL) 500 MG tablet, Take 500 mg by mouth every 6 (six) hours as needed for Pain., Disp: , Rfl:     aspirin (ECOTRIN) 81 MG EC tablet, Take 1 tablet (81 mg total) by mouth once daily., Disp: , Rfl: 0    cetirizine (ZYRTEC) 10 MG tablet, Take 10 mg by mouth once daily., Disp: , Rfl:     cholecalciferol, vitamin D3, (VITAMIN D3) 2,000 unit Cap, Take 1 capsule by mouth once daily., Disp: , Rfl:     clopidogrel (PLAVIX) 75 mg tablet, Take 1 tablet (75 mg total) by mouth once daily., Disp: 90 tablet, Rfl: 0    coenzyme Q10 100 mg capsule, Take 1 capsule (100 mg total) by mouth 2 (two) times daily., Disp: 60 capsule, Rfl: 11    diclofenac sodium (VOLTAREN) 1 % Gel, APPLY 2 GRAMS TO THE AFFECTED AREA(S) BY TOPICAL ROUTE 4 TIMES PER DAY, Disp: , Rfl: 0    estradiol (ESTRACE) 0.01 % (0.1 mg/g) vaginal cream, Place vaginally once a week.  , Disp: , Rfl:     isosorbide mononitrate (IMDUR) 60 MG 24 hr tablet, Take 1 tablet (60 mg total) by mouth once daily., Disp: 90 tablet, Rfl: 1    Lactobacillus rhamnosus GG (CULTURELLE) 10  billion cell capsule, Take 1 capsule by mouth once daily., Disp: , Rfl:     losartan (COZAAR) 25 MG tablet, Take 25 mg by mouth once daily. And 12.5mg in evening, Disp: , Rfl:     MAGNESIUM ORAL, Take 500 mg by mouth once daily. , Disp: , Rfl:     metoprolol succinate (TOPROL-XL) 25 MG 24 hr tablet, Take 1 tablet (25 mg total) by mouth once daily. (Patient taking differently: Take 12.5 mg by mouth once daily. ), Disp: 30 tablet, Rfl: 2    multivitamin (THERAGRAN) tablet, Take 1 tablet by mouth once daily., Disp: , Rfl:     niacin, inositol niacinate, (NIACIN FLUSH FREE) 400 mg niacin (500 mg) Cap, Take 1 capsule by mouth once daily., Disp: , Rfl:     nitroGLYCERIN (NITROSTAT) 0.4 MG SL tablet, , Disp: , Rfl:     omeprazole (PRILOSEC) 20 MG capsule, Take 1 capsule (20 mg total) by mouth once daily., Disp: 30 capsule, Rfl: 5    SYNTHROID 88 mcg tablet, TAKE 1 TABLET ONCE DAILY, Disp: 90 tablet, Rfl: 1    tiZANidine (ZANAFLEX) 4 MG tablet, Take 4 mg by mouth every 6 (six) hours as needed., Disp: , Rfl:     traMADol (ULTRAM) 50 mg tablet, Take 50 mg by mouth 2 (two) times daily as needed., Disp: , Rfl: 1    acyclovir 5% (ZOVIRAX) 5 % Crea, Apply topically 2 (two) times daily as needed. (Patient not taking: Reported on 2/26/2020), Disp: 5 g, Rfl: 1    gentamicin (GARAMYCIN) 0.1 % ointment, , Disp: , Rfl:     ipratropium (ATROVENT) 0.03 % nasal spray, 2 sprays by Nasal route 2 (two) times daily., Disp: 30 mL, Rfl: 2    montelukast (SINGULAIR) 10 mg tablet, Take 1 tablet (10 mg total) by mouth every evening., Disp: 90 tablet, Rfl: 1    Review of Systems   Constitutional: Negative for chills, fatigue, fever and unexpected weight change.   HENT: Positive for rhinorrhea (vasomotor rhinitis). Negative for congestion, ear pain, hearing loss, sinus pain and sore throat.    Eyes: Negative for pain and visual disturbance.   Respiratory: Negative for cough, shortness of breath and wheezing.    Cardiovascular: Negative  for chest pain, palpitations and leg swelling.   Gastrointestinal: Negative for abdominal pain, blood in stool, constipation, diarrhea, nausea and vomiting.   Endocrine: Negative for cold intolerance and heat intolerance.   Genitourinary: Negative for difficulty urinating, dysuria, frequency, pelvic pain and urgency.   Musculoskeletal: Negative for back pain, joint swelling and neck pain.   Skin: Negative for pallor and rash.   Neurological: Negative for dizziness, tremors, weakness, numbness and headaches.   Hematological: Does not bruise/bleed easily.   Psychiatric/Behavioral: Negative for agitation, sleep disturbance and suicidal ideas.          Objective:      Vitals:    02/26/20 1053   BP: 126/64   Pulse: 70   Resp: 16   Temp: 97.8 °F (36.6 °C)   SpO2: 98%   Weight: 68.5 kg (151 lb)   Height: 5' (1.524 m)     Physical Exam   Constitutional: She is oriented to person, place, and time. She appears well-developed and well-nourished. She is cooperative.   Eyes: Conjunctivae, EOM and lids are normal. Right pupil is round and reactive. Left pupil is round and reactive.   Neck: Trachea normal and normal range of motion. Neck supple. No JVD present. Carotid bruit is not present. No thyromegaly present.   Cardiovascular: Normal rate, regular rhythm, S1 normal, S2 normal, normal heart sounds and intact distal pulses. Exam reveals no gallop and no friction rub.   No murmur heard.  Pulmonary/Chest: Breath sounds normal. No respiratory distress. She has no wheezes. She has no rales.   Abdominal: Soft. Bowel sounds are normal. She exhibits no mass. There is no tenderness. There is no rigidity and no guarding.   Musculoskeletal: She exhibits no edema.   Neurological: She is alert and oriented to person, place, and time.   Skin: Skin is warm and dry. Capillary refill takes less than 2 seconds. No lesion and no rash noted. Nails show no clubbing.   Psychiatric: She has a normal mood and affect. Her behavior is normal. Judgment  and thought content normal.   Nursing note and vitals reviewed.      Protective Sensation (w/ 10 gram monofilament):  Right: Intact  Left: Intact    Visual Inspection:  Normal -  Bilateral    Pedal Pulses:   Right: Present  Left: Present    Posterior tibialis:   Right:Present  Left: Present    Assessment:       1. Vasomotor rhinitis    2. Environmental allergies    3. Type 2 diabetes mellitus with chronic kidney disease, without long-term current use of insulin, unspecified CKD stage    4. Diabetes mellitus type 2, diet-controlled    5. Essential hypertension    6. Pure hypercholesterolemia         Plan:       Vasomotor rhinitis  -     ipratropium (ATROVENT) 0.03 % nasal spray; 2 sprays by Nasal route 2 (two) times daily.  Dispense: 30 mL; Refill: 2    Environmental allergies  -     montelukast (SINGULAIR) 10 mg tablet; Take 1 tablet (10 mg total) by mouth every evening.  Dispense: 90 tablet; Refill: 1    Type 2 diabetes mellitus with chronic kidney disease, without long-term current use of insulin, unspecified CKD stage             Continue as is    Diabetes mellitus type 2, diet-controlled    Essential hypertension        -     Stable    Pure hypercholesterolemia       -     Speak with Cardiology again re Repatha    Follow up in about 3 months (around 5/26/2020).        2/26/2020 Elena Sullivan M.D.

## 2020-02-27 ENCOUNTER — TELEPHONE (OUTPATIENT)
Dept: FAMILY MEDICINE | Facility: CLINIC | Age: 85
End: 2020-02-27

## 2020-02-27 NOTE — TELEPHONE ENCOUNTER
----- Message from Elena Sullivan MD sent at 2/25/2020  4:30 PM CST -----  Test results are normal.

## 2020-04-08 DIAGNOSIS — I10 ESSENTIAL HYPERTENSION: Primary | ICD-10-CM

## 2020-04-08 RX ORDER — LOSARTAN POTASSIUM 25 MG/1
25 TABLET ORAL DAILY
Qty: 135 TABLET | Refills: 1 | Status: ON HOLD | OUTPATIENT
Start: 2020-04-08 | End: 2020-05-02 | Stop reason: HOSPADM

## 2020-05-01 DIAGNOSIS — R07.2 PRECORDIAL PAIN: ICD-10-CM

## 2020-05-02 ENCOUNTER — HOSPITAL ENCOUNTER (OUTPATIENT)
Facility: HOSPITAL | Age: 85
Discharge: HOME | End: 2020-05-02
Attending: EMERGENCY MEDICINE | Admitting: INTERNAL MEDICINE
Payer: MEDICARE

## 2020-05-02 VITALS
WEIGHT: 148.13 LBS | DIASTOLIC BLOOD PRESSURE: 69 MMHG | TEMPERATURE: 98 F | HEIGHT: 60 IN | RESPIRATION RATE: 18 BRPM | BODY MASS INDEX: 29.08 KG/M2 | SYSTOLIC BLOOD PRESSURE: 156 MMHG | HEART RATE: 69 BPM | OXYGEN SATURATION: 97 %

## 2020-05-02 DIAGNOSIS — I16.0 HYPERTENSIVE URGENCY: Primary | ICD-10-CM

## 2020-05-02 DIAGNOSIS — I24.9 ACS (ACUTE CORONARY SYNDROME): ICD-10-CM

## 2020-05-02 DIAGNOSIS — I10 HYPERTENSION, UNSPECIFIED TYPE: ICD-10-CM

## 2020-05-02 DIAGNOSIS — R07.9 CHEST PAIN: ICD-10-CM

## 2020-05-02 LAB
ALBUMIN SERPL BCP-MCNC: 4.3 G/DL (ref 3.5–5.2)
ALP SERPL-CCNC: 58 U/L (ref 55–135)
ALT SERPL W/O P-5'-P-CCNC: 17 U/L (ref 10–44)
ANION GAP SERPL CALC-SCNC: 10 MMOL/L (ref 8–16)
AST SERPL-CCNC: 42 U/L (ref 10–40)
BASOPHILS # BLD AUTO: 0.07 K/UL (ref 0–0.2)
BASOPHILS NFR BLD: 0.7 % (ref 0–1.9)
BILIRUB SERPL-MCNC: 0.9 MG/DL (ref 0.1–1)
BILIRUB UR QL STRIP: NEGATIVE
BNP SERPL-MCNC: 534 PG/ML (ref 0–99)
BUN SERPL-MCNC: 21 MG/DL (ref 8–23)
CALCIUM SERPL-MCNC: 9.2 MG/DL (ref 8.7–10.5)
CHLORIDE SERPL-SCNC: 104 MMOL/L (ref 95–110)
CLARITY UR: CLEAR
CO2 SERPL-SCNC: 26 MMOL/L (ref 23–29)
COLOR UR: YELLOW
CREAT SERPL-MCNC: 1.2 MG/DL (ref 0.5–1.4)
DIFFERENTIAL METHOD: ABNORMAL
EOSINOPHIL # BLD AUTO: 0.3 K/UL (ref 0–0.5)
EOSINOPHIL NFR BLD: 2.7 % (ref 0–8)
ERYTHROCYTE [DISTWIDTH] IN BLOOD BY AUTOMATED COUNT: 13 % (ref 11.5–14.5)
EST. GFR  (AFRICAN AMERICAN): 48.6 ML/MIN/1.73 M^2
EST. GFR  (NON AFRICAN AMERICAN): 42.2 ML/MIN/1.73 M^2
GLUCOSE SERPL-MCNC: 110 MG/DL (ref 70–110)
GLUCOSE UR QL STRIP: NEGATIVE
HCT VFR BLD AUTO: 41.4 % (ref 37–48.5)
HGB BLD-MCNC: 13 G/DL (ref 12–16)
HGB UR QL STRIP: NEGATIVE
IMM GRANULOCYTES # BLD AUTO: 0.04 K/UL (ref 0–0.04)
IMM GRANULOCYTES NFR BLD AUTO: 0.4 % (ref 0–0.5)
INR PPP: 1.1
KETONES UR QL STRIP: NEGATIVE
LEUKOCYTE ESTERASE UR QL STRIP: NEGATIVE
LYMPHOCYTES # BLD AUTO: 4.1 K/UL (ref 1–4.8)
LYMPHOCYTES NFR BLD: 41.6 % (ref 18–48)
MAGNESIUM SERPL-MCNC: 2.4 MG/DL (ref 1.6–2.6)
MCH RBC QN AUTO: 28.8 PG (ref 27–31)
MCHC RBC AUTO-ENTMCNC: 31.4 G/DL (ref 32–36)
MCV RBC AUTO: 92 FL (ref 82–98)
MONOCYTES # BLD AUTO: 1.2 K/UL (ref 0.3–1)
MONOCYTES NFR BLD: 12.3 % (ref 4–15)
NEUTROPHILS # BLD AUTO: 4.1 K/UL (ref 1.8–7.7)
NEUTROPHILS NFR BLD: 42.3 % (ref 38–73)
NITRITE UR QL STRIP: NEGATIVE
NRBC BLD-RTO: 0 /100 WBC
PH UR STRIP: 7 [PH] (ref 5–8)
PLATELET # BLD AUTO: 282 K/UL (ref 150–350)
PMV BLD AUTO: 10.1 FL (ref 9.2–12.9)
POTASSIUM SERPL-SCNC: 4.2 MMOL/L (ref 3.5–5.1)
PROT SERPL-MCNC: 8.1 G/DL (ref 6–8.4)
PROT UR QL STRIP: NEGATIVE
PROTHROMBIN TIME: 13.6 SEC (ref 10.6–14.8)
RBC # BLD AUTO: 4.52 M/UL (ref 4–5.4)
SODIUM SERPL-SCNC: 140 MMOL/L (ref 136–145)
SP GR UR STRIP: 1.01 (ref 1–1.03)
TROPONIN I SERPL DL<=0.01 NG/ML-MCNC: 0.03 NG/ML
TROPONIN I SERPL DL<=0.01 NG/ML-MCNC: 0.03 NG/ML
TROPONIN I SERPL DL<=0.01 NG/ML-MCNC: <0.03 NG/ML
TSH SERPL DL<=0.005 MIU/L-ACNC: 2.64 UIU/ML (ref 0.34–5.6)
URN SPEC COLLECT METH UR: NORMAL
UROBILINOGEN UR STRIP-ACNC: NEGATIVE EU/DL
WBC # BLD AUTO: 9.76 K/UL (ref 3.9–12.7)

## 2020-05-02 PROCEDURE — 25000003 PHARM REV CODE 250: Performed by: NURSE PRACTITIONER

## 2020-05-02 PROCEDURE — G0378 HOSPITAL OBSERVATION PER HR: HCPCS

## 2020-05-02 PROCEDURE — 99285 EMERGENCY DEPT VISIT HI MDM: CPT | Mod: 25

## 2020-05-02 PROCEDURE — 80053 COMPREHEN METABOLIC PANEL: CPT

## 2020-05-02 PROCEDURE — 93005 ELECTROCARDIOGRAM TRACING: CPT | Performed by: INTERNAL MEDICINE

## 2020-05-02 PROCEDURE — 85610 PROTHROMBIN TIME: CPT

## 2020-05-02 PROCEDURE — 84484 ASSAY OF TROPONIN QUANT: CPT | Mod: 91

## 2020-05-02 PROCEDURE — 81003 URINALYSIS AUTO W/O SCOPE: CPT

## 2020-05-02 PROCEDURE — 84484 ASSAY OF TROPONIN QUANT: CPT

## 2020-05-02 PROCEDURE — 36415 COLL VENOUS BLD VENIPUNCTURE: CPT

## 2020-05-02 PROCEDURE — 83880 ASSAY OF NATRIURETIC PEPTIDE: CPT

## 2020-05-02 PROCEDURE — 83735 ASSAY OF MAGNESIUM: CPT

## 2020-05-02 PROCEDURE — 85025 COMPLETE CBC W/AUTO DIFF WBC: CPT

## 2020-05-02 PROCEDURE — 84443 ASSAY THYROID STIM HORMONE: CPT

## 2020-05-02 PROCEDURE — 25000003 PHARM REV CODE 250: Performed by: EMERGENCY MEDICINE

## 2020-05-02 RX ORDER — TRAMADOL HYDROCHLORIDE 50 MG/1
50 TABLET ORAL 2 TIMES DAILY PRN
Status: DISCONTINUED | OUTPATIENT
Start: 2020-05-02 | End: 2020-05-02 | Stop reason: HOSPADM

## 2020-05-02 RX ORDER — CLOPIDOGREL BISULFATE 75 MG/1
75 TABLET ORAL DAILY
Status: DISCONTINUED | OUTPATIENT
Start: 2020-05-02 | End: 2020-05-02 | Stop reason: HOSPADM

## 2020-05-02 RX ORDER — ROSUVASTATIN CALCIUM 20 MG/1
40 TABLET, COATED ORAL DAILY
Status: DISCONTINUED | OUTPATIENT
Start: 2020-05-02 | End: 2020-05-02 | Stop reason: HOSPADM

## 2020-05-02 RX ORDER — LANOLIN ALCOHOL/MO/W.PET/CERES
400 CREAM (GRAM) TOPICAL DAILY
Status: DISCONTINUED | OUTPATIENT
Start: 2020-05-02 | End: 2020-05-02 | Stop reason: HOSPADM

## 2020-05-02 RX ORDER — ISOSORBIDE MONONITRATE 60 MG/1
60 TABLET, EXTENDED RELEASE ORAL DAILY
Status: DISCONTINUED | OUTPATIENT
Start: 2020-05-02 | End: 2020-05-02 | Stop reason: HOSPADM

## 2020-05-02 RX ORDER — LOSARTAN POTASSIUM 25 MG/1
25 TABLET ORAL DAILY
Status: DISCONTINUED | OUTPATIENT
Start: 2020-05-02 | End: 2020-05-02

## 2020-05-02 RX ORDER — MONTELUKAST SODIUM 10 MG/1
10 TABLET ORAL NIGHTLY
Status: DISCONTINUED | OUTPATIENT
Start: 2020-05-02 | End: 2020-05-02 | Stop reason: HOSPADM

## 2020-05-02 RX ORDER — IPRATROPIUM BROMIDE 21 UG/1
2 SPRAY, METERED NASAL 2 TIMES DAILY
Status: DISCONTINUED | OUTPATIENT
Start: 2020-05-02 | End: 2020-05-02 | Stop reason: HOSPADM

## 2020-05-02 RX ORDER — LOSARTAN POTASSIUM 25 MG/1
25 TABLET ORAL 2 TIMES DAILY
Qty: 180 TABLET | Refills: 3 | Status: SHIPPED | OUTPATIENT
Start: 2020-05-02 | End: 2020-08-05 | Stop reason: SDUPTHER

## 2020-05-02 RX ORDER — LOSARTAN POTASSIUM 25 MG/1
25 TABLET ORAL 2 TIMES DAILY
Status: DISCONTINUED | OUTPATIENT
Start: 2020-05-02 | End: 2020-05-02 | Stop reason: HOSPADM

## 2020-05-02 RX ORDER — ASPIRIN 325 MG
325 TABLET ORAL DAILY
Status: DISCONTINUED | OUTPATIENT
Start: 2020-05-03 | End: 2020-05-02 | Stop reason: HOSPADM

## 2020-05-02 RX ORDER — LEVOTHYROXINE SODIUM 88 UG/1
88 TABLET ORAL DAILY
Status: DISCONTINUED | OUTPATIENT
Start: 2020-05-02 | End: 2020-05-02 | Stop reason: HOSPADM

## 2020-05-02 RX ADMIN — ALUMINUM HYDROXIDE, MAGNESIUM HYDROXIDE, AND SIMETHICONE: 200; 200; 20 SUSPENSION ORAL at 06:05

## 2020-05-02 RX ADMIN — NITROGLYCERIN 1 INCH: 20 OINTMENT TOPICAL at 06:05

## 2020-05-02 RX ADMIN — LEVOTHYROXINE SODIUM 88 MCG: 88 TABLET ORAL at 10:05

## 2020-05-02 RX ADMIN — METOPROLOL SUCCINATE 12.5 MG: 25 TABLET, EXTENDED RELEASE ORAL at 10:05

## 2020-05-02 RX ADMIN — TRAMADOL HYDROCHLORIDE 50 MG: 50 TABLET, FILM COATED ORAL at 10:05

## 2020-05-02 RX ADMIN — IPRATROPIUM BROMIDE 2 SPRAY: 21 SPRAY, METERED NASAL at 10:05

## 2020-05-02 RX ADMIN — ISOSORBIDE MONONITRATE 60 MG: 60 TABLET, EXTENDED RELEASE ORAL at 12:05

## 2020-05-02 RX ADMIN — CLOPIDOGREL BISULFATE 75 MG: 75 TABLET, FILM COATED ORAL at 12:05

## 2020-05-02 RX ADMIN — MAGNESIUM OXIDE 400 MG: 400 TABLET ORAL at 12:05

## 2020-05-02 RX ADMIN — LOSARTAN POTASSIUM 25 MG: 25 TABLET, FILM COATED ORAL at 10:05

## 2020-05-02 NOTE — H&P
[facility] Medicine History & Physical Examination   Patient Name: Karma Chen  MRN: [id number]  Patient Class: Emergency   Admission Date: 5/2/2020  3:49 AM  Length of Stay: 0  Attending Physician:   Primary Care Provider: Elena Sullivan MD  Face-to-Face encounter date: 05/02/2020  Code Status:Full Code  MPOA:  Chief Complaint: Hypertension (reports checked her BP routinely at 1030 pm and it was high. Took a Catapres and it came down,.  Rechecked BP at 3 am and it was 180/89 .   Denies any headache or weakness)        Patient information was obtained from patient, past medical records and ER records.   HISTORY OF PRESENT ILLNESS:   Karma Chen is a 84 y.o. old  female who  has a past medical history of Coronary artery disease, Dermatitis, Dermatitis (12/8/2017), Diabetes mellitus, Diabetes mellitus type II, Leah Barr virus infection, Fibromyalgia, GERD (gastroesophageal reflux disease), Hypertension, MI (myocardial infarction) (09/23/2011), and Pure hypercholesterolemia (2/26/2020).. The patient presented to [facility] on 5/2/2020 with a primary complaint of Hypertension (reports checked her BP routinely at 1030 pm and it was high. Took a Catapres and it came down,.  Rechecked BP at 3 am and it was 180/89 .   Denies any headache or weakness)  .   84-year-old  female presents to emergency room with complaints of high blood pressure.  The patient states she took her blood pressure at 10 o'clock last night and noted her systolic pressures to be greater than 180.  She took on routine eye medications and took her pressure again in 3:00 a.m. and was still elevated so she came to the emergency room for further evaluation.  She had also complained mid sternum/left-sided chest pain with radiation to her back.  There is no associated nausea, vomiting, hematemesis or hemoptysis.    REVIEW OF SYSTEMS:   10 Point Review of System was performed and was found to be  negative except for that mentioned already in the HPI and   Review of Systems (Negative unless checked off)  Review of Systems   Constitutional: Positive for malaise/fatigue.   Respiratory: Positive for cough and shortness of breath.    Cardiovascular: Positive for chest pain and leg swelling.   Gastrointestinal: Positive for nausea.   Musculoskeletal: Positive for myalgias.   Neurological: Negative.    Endo/Heme/Allergies: Negative.    Psychiatric/Behavioral: Negative.            PAST MEDICAL HISTORY:     Past Medical History:   Diagnosis Date    Coronary artery disease     Dermatitis     Dermatitis 12/8/2017    Diabetes mellitus     Diabetes mellitus type II     Leah Madrigal virus infection     Fibromyalgia     GERD (gastroesophageal reflux disease)     Hypertension     MI (myocardial infarction) 09/23/2011    Pure hypercholesterolemia 2/26/2020       PAST SURGICAL HISTORY:     Past Surgical History:   Procedure Laterality Date    adenoids      ANGIOPLASTY  1987    APPENDECTOMY      CARDIAC PACEMAKER PLACEMENT  01/12/2017    CATARACT EXTRACTION, BILATERAL Bilateral 9/2/15, 3/17/15    right eye-9/2/15, left eye-3/17/15    CHOLECYSTECTOMY  1987    CORONARY ARTERY BYPASS GRAFT  2006    quadruple    coronary stents  1999    x2    CYST REMOVAL  04/25/2017    on back    HYSTERECTOMY  1966    OVARY SURGERY  1948    Ovary burst & was repaired    RHIZOTOMY  09/14/2018    TONSILLECTOMY  1940       ALLERGIES:   Arthrotec 50 [diclofenac-misoprostol]; Doxycycline; Effexor [venlafaxine]; Estradiol; Flagyl [metronidazole]; Fluconazole; Iodine; Levaquin [levofloxacin]; Nexium [esomeprazole magnesium]; Penicillins; Shellfish containing products; Statins-hmg-coa reductase inhibitors; Sulfa (sulfonamide antibiotics); Tea tree oil; Tegaserod; Trazodone; and Yeast, dried    FAMILY HISTORY:     Family History   Problem Relation Age of Onset    No Known Problems Mother     No Known Problems Father        SOCIAL  HISTORY:     Social History     Tobacco Use    Smoking status: Never Smoker    Smokeless tobacco: Never Used   Substance Use Topics    Alcohol use: No        Social History     Substance and Sexual Activity   Sexual Activity Not on file        HOME MEDICATIONS:     Prior to Admission medications    Medication Sig Start Date End Date Taking? Authorizing Provider   acetaminophen (TYLENOL) 500 MG tablet Take 500 mg by mouth every 6 (six) hours as needed for Pain.   Yes Historical Provider, MD   aspirin (ECOTRIN) 81 MG EC tablet Take 1 tablet (81 mg total) by mouth once daily. 12/12/17 2/18/26 Yes Maranda Solano NP   cetirizine (ZYRTEC) 10 MG tablet Take 10 mg by mouth once daily.   Yes Historical Provider, MD   cholecalciferol, vitamin D3, (VITAMIN D3) 2,000 unit Cap Take 1 capsule by mouth once daily.   Yes Historical Provider, MD   clopidogrel (PLAVIX) 75 mg tablet Take 1 tablet (75 mg total) by mouth once daily. 12/9/18 2/26/29 Yes Elena Sullivan MD   coenzyme Q10 100 mg capsule Take 1 capsule (100 mg total) by mouth 2 (two) times daily. 12/12/17 2/18/26 Yes Maranda Solano NP   diclofenac sodium (VOLTAREN) 1 % Gel APPLY 2 GRAMS TO THE AFFECTED AREA(S) BY TOPICAL ROUTE 4 TIMES PER DAY 11/7/19  Yes Historical Provider, MD   estradiol (ESTRACE) 0.01 % (0.1 mg/g) vaginal cream Place vaginally once a week.     Yes Historical Provider, MD   ipratropium (ATROVENT) 0.03 % nasal spray 2 sprays by Nasal route 2 (two) times daily. 2/26/20  Yes Elena Sullivan MD   isosorbide mononitrate (IMDUR) 60 MG 24 hr tablet Take 1 tablet (60 mg total) by mouth once daily. 10/30/19 10/29/20 Yes Elena Sullivan MD   Lactobacillus rhamnosus GG (CULTURELLE) 10 billion cell capsule Take 1 capsule by mouth once daily.   Yes Historical Provider, MD   losartan (COZAAR) 25 MG tablet Take 1 tablet (25 mg total) by mouth once daily. And 12.5mg in evening 4/8/20  Yes Elena Sullivan MD   MAGNESIUM ORAL Take 250 mg by mouth 2 (two)  times a day.    Yes Historical Provider, MD   metoprolol succinate (TOPROL-XL) 25 MG 24 hr tablet Take 1 tablet (25 mg total) by mouth once daily.  Patient taking differently: Take 12.5 mg by mouth once daily.  10/30/19 2/26/29 Yes Elena Sullivan MD   montelukast (SINGULAIR) 10 mg tablet Take 1 tablet (10 mg total) by mouth every evening. 2/26/20  Yes Elena Sullivan MD   multivitamin (THERAGRAN) tablet Take 1 tablet by mouth once daily.   Yes Historical Provider, MD   nitroGLYCERIN (NITROSTAT) 0.4 MG SL tablet  11/22/17  Yes Historical Provider, MD   SYNTHROID 88 mcg tablet TAKE 1 TABLET ONCE DAILY 1/1/20  Yes Elena Sullivan MD   tiZANidine (ZANAFLEX) 4 MG tablet Take 4 mg by mouth every 6 (six) hours as needed.   Yes Historical Provider, MD   traMADol (ULTRAM) 50 mg tablet Take 50 mg by mouth 2 (two) times daily as needed. 8/22/19  Yes Historical Provider, MD   acyclovir 5% (ZOVIRAX) 5 % Crea Apply topically 2 (two) times daily as needed.  Patient not taking: Reported on 2/26/2020 12/5/19   Elena Sullivan MD   gentamicin (GARAMYCIN) 0.1 % ointment  12/12/19   Historical Provider, MD   niacin, inositol niacinate, (NIACIN FLUSH FREE) 400 mg niacin (500 mg) Cap Take 1 capsule by mouth once daily.    Historical Provider, MD   omeprazole (PRILOSEC) 20 MG capsule Take 1 capsule (20 mg total) by mouth once daily. 11/20/19 11/19/20  Elena Sullivan MD         PHYSICAL EXAM:   BP (!) 185/74   Pulse 71   Temp 98.5 °F (36.9 °C) (Oral)   Resp 20   Ht 5' (1.524 m)   Wt 68 kg (150 lb)   LMP  (LMP Unknown)   SpO2 95%   BMI 29.29 kg/m²   Vitals Reviewed  General appearance: Well-developed, well-nourished female in no apparent distress.  Skin: No Rash.   Neuro: Motor and sensory exams grossly intact. Good tone. Power in all 4 extremities 5/5.   HENT: Atraumatic head. Moist mucous membranes of oral cavity.  Eyes: Normal extraocular movements.   Neck: Supple. No evidence of lymphadenopathy. No thyroidomegaly.  Lungs:  Clear to auscultation bilaterally. No wheezing present.   Heart: Regular rate and rhythm. S1 and S2 present with+ murmurs no gallop/rub. No pedal edema. No JVD present.   Abdomen: Soft, non-distended, non-tender. No rebound tenderness/guarding. No masses or organomegaly. Bowel sounds are normal. Bladder is not palpable.   Extremities: No cyanosis, clubbing, or edema.  Psych/mental status: Alert and oriented. Cooperative. Responds appropriately to questions.   EMERGENCY DEPARTMENT LABS AND IMAGING:   Following labs were Reviewed   Recent Labs   Lab 05/02/20  0400   WBC 9.76   HGB 13.0   HCT 41.4      CALCIUM 9.2   ALBUMIN 4.3   PROT 8.1      K 4.2   CO2 26      BUN 21   CREATININE 1.2   ALKPHOS 58   ALT 17   AST 42*   BILITOT 0.9         BMP:   Recent Labs   Lab 05/02/20  0400         K 4.2      CO2 26   BUN 21   CREATININE 1.2   CALCIUM 9.2   MG 2.4   , CMP   Recent Labs   Lab 05/02/20  0400      K 4.2      CO2 26      BUN 21   CREATININE 1.2   CALCIUM 9.2   PROT 8.1   ALBUMIN 4.3   BILITOT 0.9   ALKPHOS 58   AST 42*   ALT 17   ANIONGAP 10   ESTGFRAFRICA 48.6*   EGFRNONAA 42.2*   , CBC   Recent Labs   Lab 05/02/20  0400   WBC 9.76   HGB 13.0   HCT 41.4      , INR   Lab Results   Component Value Date    INR 1.1 05/02/2020    INR 1.0 12/10/2017    INR 1.02 02/20/2012   , Lipid Panel   Lab Results   Component Value Date    CHOL 230 (H) 12/05/2019    HDL 47 12/05/2019    LDLCALC 156.2 12/05/2019    TRIG 134 12/05/2019    CHOLHDL 20.4 12/05/2019   , Troponin   Recent Labs   Lab 05/02/20  0400   TROPONINI <0.030   , A1C:   Recent Labs   Lab 12/05/19  0810   HGBA1C 5.9   , All labs within the past 24 hours have been reviewed and   Microbiology Results (last 7 days)     ** No results found for the last 168 hours. **        X-Ray Chest AP Portable    (Results Pending)       ASSESSMENT & PLAN:   Karma Villedaanc is a 84 y.o. female admitted for    1. Chest pain  with frequent PVC  -trend cardiac enzymes and troponin  Start oral magnesium   -2D echo complete  -cardioprotective medications to include aspirin, beta-blocker, statin, nitrates and O2  -consult cardiologist Dr. Boyce    2.  Accelerated hypertension  -patient has a alpha-blocker (clonidine) prior to arrival she was also given nitro paste with improvement in her blood pressure in the emergency room    3. CKD stage III  - avoid all nephrotoxic medications    4. Hypothyroidism  - check TSH      DVT Prophylaxis: will be placed on SCDsfor DVT prophylaxis and will be advised to be as mobile as possible and sit in a chair as tolerated.   ________________________________________________________________________________    Discharge Planning and Disposition: No mobility needs. Ambulating well. Good social support system. Patient will be discharged in   Face-to-Face encounter date: 05/02/2020  Encounter included review of the medical records, interviewing and examining the patient face-to-face, discussion with family and other health care providers including emergency medicine physician, admission orders, interpreting lab/test results and formulating a plan of care.   Medical Decision Making during this encounter was  [_] Low Complexity  [_] Moderate Complexity  [x] High Complexity  _________________________________________________________________________________    INPATIENT LIST OF MEDICATIONS   No current facility-administered medications for this encounter.     Current Outpatient Medications:     acetaminophen (TYLENOL) 500 MG tablet, Take 500 mg by mouth every 6 (six) hours as needed for Pain., Disp: , Rfl:     aspirin (ECOTRIN) 81 MG EC tablet, Take 1 tablet (81 mg total) by mouth once daily., Disp: , Rfl: 0    cetirizine (ZYRTEC) 10 MG tablet, Take 10 mg by mouth once daily., Disp: , Rfl:     cholecalciferol, vitamin D3, (VITAMIN D3) 2,000 unit Cap, Take 1 capsule by mouth once daily., Disp: , Rfl:      clopidogrel (PLAVIX) 75 mg tablet, Take 1 tablet (75 mg total) by mouth once daily., Disp: 90 tablet, Rfl: 0    coenzyme Q10 100 mg capsule, Take 1 capsule (100 mg total) by mouth 2 (two) times daily., Disp: 60 capsule, Rfl: 11    diclofenac sodium (VOLTAREN) 1 % Gel, APPLY 2 GRAMS TO THE AFFECTED AREA(S) BY TOPICAL ROUTE 4 TIMES PER DAY, Disp: , Rfl: 0    estradiol (ESTRACE) 0.01 % (0.1 mg/g) vaginal cream, Place vaginally once a week.  , Disp: , Rfl:     ipratropium (ATROVENT) 0.03 % nasal spray, 2 sprays by Nasal route 2 (two) times daily., Disp: 30 mL, Rfl: 2    isosorbide mononitrate (IMDUR) 60 MG 24 hr tablet, Take 1 tablet (60 mg total) by mouth once daily., Disp: 90 tablet, Rfl: 1    Lactobacillus rhamnosus GG (CULTURELLE) 10 billion cell capsule, Take 1 capsule by mouth once daily., Disp: , Rfl:     losartan (COZAAR) 25 MG tablet, Take 1 tablet (25 mg total) by mouth once daily. And 12.5mg in evening, Disp: 135 tablet, Rfl: 1    MAGNESIUM ORAL, Take 250 mg by mouth 2 (two) times a day. , Disp: , Rfl:     metoprolol succinate (TOPROL-XL) 25 MG 24 hr tablet, Take 1 tablet (25 mg total) by mouth once daily. (Patient taking differently: Take 12.5 mg by mouth once daily. ), Disp: 30 tablet, Rfl: 2    montelukast (SINGULAIR) 10 mg tablet, Take 1 tablet (10 mg total) by mouth every evening., Disp: 90 tablet, Rfl: 1    multivitamin (THERAGRAN) tablet, Take 1 tablet by mouth once daily., Disp: , Rfl:     nitroGLYCERIN (NITROSTAT) 0.4 MG SL tablet, , Disp: , Rfl:     SYNTHROID 88 mcg tablet, TAKE 1 TABLET ONCE DAILY, Disp: 90 tablet, Rfl: 1    tiZANidine (ZANAFLEX) 4 MG tablet, Take 4 mg by mouth every 6 (six) hours as needed., Disp: , Rfl:     traMADol (ULTRAM) 50 mg tablet, Take 50 mg by mouth 2 (two) times daily as needed., Disp: , Rfl: 1    acyclovir 5% (ZOVIRAX) 5 % Crea, Apply topically 2 (two) times daily as needed. (Patient not taking: Reported on 2/26/2020), Disp: 5 g, Rfl: 1    gentamicin  (GARAMYCIN) 0.1 % ointment, , Disp: , Rfl:     niacin, inositol niacinate, (NIACIN FLUSH FREE) 400 mg niacin (500 mg) Cap, Take 1 capsule by mouth once daily., Disp: , Rfl:     omeprazole (PRILOSEC) 20 MG capsule, Take 1 capsule (20 mg total) by mouth once daily., Disp: 30 capsule, Rfl: 5      Scheduled Meds:  Continuous Infusions:  PRN Meds:.      Ameena Mccoy  [facility] Hospitalist NP  05/02/2020

## 2020-05-02 NOTE — ED NOTES
Patient is resting comfortably in bed.  Patient's  has gone home.    Her BP has come down significantly but will continue to monitor. Explained that she will be informed as soon as room becomes available.  She has no questions or concerns at this time.

## 2020-05-02 NOTE — NURSING
Pt given discharge information. Removed 1 PIV, tip intact without difficulty and removed cardiac monitor. Pt left via wheelchair without incident to 's car. Pt left with pajama top, pants, underwear, robe, shoes, socks, and flip phone.

## 2020-05-02 NOTE — PLAN OF CARE
05/02/20 1437   Final Note   Assessment Type Final Discharge Note   Anticipated Discharge Disposition Home  (DC to Home per Dr Woodward, with no DC PLanning Needs)   Post-Acute Status   Discharge Delays None known at this time     CM will follow for any DC planning Needs.

## 2020-05-02 NOTE — CONSULTS
[facility]  Cardiology  Consult Note    Patient Name: Karma Chen  MRN: [id number]  Admission Date: 5/2/2020  Hospital Length of Stay: 0 days  Code Status: Full Code   Attending Provider: Allyssa Woodward MD   Consulting Provider: Bridgett Vanessa NP  Primary Care Physician: Elena Sullivan MD  Principal Problem:Chest pain    Patient information was obtained from patient, past medical records and ER records.     Consults  Subjective:     Chief Complaint:  Elevated BP    From H&P:  Karma Chen is a 84 y.o. old  female who  has a past medical history of Coronary artery disease, Dermatitis, Dermatitis (12/8/2017), Diabetes mellitus, Diabetes mellitus type II, Leah Barr virus infection, Fibromyalgia, GERD (gastroesophageal reflux disease), Hypertension, MI (myocardial infarction) (09/23/2011), and Pure hypercholesterolemia (2/26/2020).. The patient presented to [facility] on 5/2/2020 with a primary complaint of Hypertension (reports checked her BP routinely at 1030 pm and it was high. Took a Catapres and it came down,.  Rechecked BP at 3 am and it was 180/89 .   Denies any headache or weakness)  .   84-year-old  female presents to emergency room with complaints of high blood pressure.  The patient states she took her blood pressure at 10 o'clock last night and noted her systolic pressures to be greater than 180.  She took on routine eye medications and took her pressure again in 3:00 a.m. and was still elevated so she came to the emergency room for further evaluation.  She had also complained mid sternum/left-sided chest pain with radiation to her back.  There is no associated nausea, vomiting, hematemesis or hemoptysis.    HPI: Ms. Chen is an 84 year old female who presented to the ER with complaints of elevated BP. Patient also reported right upper back/shoulder pain which worsened with certain position changes. She denied chest pain but does have a hx of CAD with  stable angina.  Patient denies any dizziness, shortness of breath, syncope, palpitations, blood in the stool, or blood in the urine.    Other past medical history include dermatitis, diabetes, Ebstein Barr virus, fibromyalgia, GERD, hypertension, mi, and hypercholesterolemia.     Past Medical History:   Diagnosis Date    Coronary artery disease     Dermatitis     Dermatitis 12/8/2017    Diabetes mellitus     Diabetes mellitus type II     Leah Madrigal virus infection     Fibromyalgia     GERD (gastroesophageal reflux disease)     Hypertension     MI (myocardial infarction) 09/23/2011    Pure hypercholesterolemia 2/26/2020       Past Surgical History:   Procedure Laterality Date    adenoids      ANGIOPLASTY  1987    APPENDECTOMY      CARDIAC PACEMAKER PLACEMENT  01/12/2017    CATARACT EXTRACTION, BILATERAL Bilateral 9/2/15, 3/17/15    right eye-9/2/15, left eye-3/17/15    CHOLECYSTECTOMY  1987    CORONARY ARTERY BYPASS GRAFT  2006    quadruple    coronary stents  1999    x2    CYST REMOVAL  04/25/2017    on back    HYSTERECTOMY  1966    OVARY SURGERY  1948    Ovary burst & was repaired    RHIZOTOMY  09/14/2018    TONSILLECTOMY  1940       Review of patient's allergies indicates:   Allergen Reactions    Arthrotec 50 [diclofenac-misoprostol]     Doxycycline     Effexor [venlafaxine]     Estradiol     Flagyl [metronidazole]     Fluconazole     Iodine     Levaquin [levofloxacin]     Nexium [esomeprazole magnesium]     Penicillins     Shellfish containing products     Statins-hmg-coa reductase inhibitors     Sulfa (sulfonamide antibiotics)     Tea tree oil     Tegaserod      ZOLNORM    Trazodone     Yeast, dried        No current facility-administered medications on file prior to encounter.      Current Outpatient Medications on File Prior to Encounter   Medication Sig    acetaminophen (TYLENOL) 500 MG tablet Take 500 mg by mouth every 6 (six) hours as needed for Pain.    aspirin  (ECOTRIN) 81 MG EC tablet Take 1 tablet (81 mg total) by mouth once daily.    cetirizine (ZYRTEC) 10 MG tablet Take 10 mg by mouth once daily.    cholecalciferol, vitamin D3, (VITAMIN D3) 2,000 unit Cap Take 1 capsule by mouth once daily.    clopidogrel (PLAVIX) 75 mg tablet Take 1 tablet (75 mg total) by mouth once daily.    coenzyme Q10 100 mg capsule Take 1 capsule (100 mg total) by mouth 2 (two) times daily.    diclofenac sodium (VOLTAREN) 1 % Gel APPLY 2 GRAMS TO THE AFFECTED AREA(S) BY TOPICAL ROUTE 4 TIMES PER DAY    estradiol (ESTRACE) 0.01 % (0.1 mg/g) vaginal cream Place vaginally once a week.      ipratropium (ATROVENT) 0.03 % nasal spray 2 sprays by Nasal route 2 (two) times daily.    isosorbide mononitrate (IMDUR) 60 MG 24 hr tablet Take 1 tablet (60 mg total) by mouth once daily.    Lactobacillus rhamnosus GG (CULTURELLE) 10 billion cell capsule Take 1 capsule by mouth once daily.    losartan (COZAAR) 25 MG tablet Take 1 tablet (25 mg total) by mouth once daily. And 12.5mg in evening    MAGNESIUM ORAL Take 250 mg by mouth 2 (two) times a day.     metoprolol succinate (TOPROL-XL) 25 MG 24 hr tablet Take 1 tablet (25 mg total) by mouth once daily. (Patient taking differently: Take 12.5 mg by mouth once daily. )    montelukast (SINGULAIR) 10 mg tablet Take 1 tablet (10 mg total) by mouth every evening.    multivitamin (THERAGRAN) tablet Take 1 tablet by mouth once daily.    nitroGLYCERIN (NITROSTAT) 0.4 MG SL tablet     SYNTHROID 88 mcg tablet TAKE 1 TABLET ONCE DAILY    tiZANidine (ZANAFLEX) 4 MG tablet Take 4 mg by mouth every 6 (six) hours as needed.    traMADol (ULTRAM) 50 mg tablet Take 50 mg by mouth 2 (two) times daily as needed.    acyclovir 5% (ZOVIRAX) 5 % Crea Apply topically 2 (two) times daily as needed. (Patient not taking: Reported on 2/26/2020)    gentamicin (GARAMYCIN) 0.1 % ointment     niacin, inositol niacinate, (NIACIN FLUSH FREE) 400 mg niacin (500 mg) Cap Take 1  capsule by mouth once daily.    omeprazole (PRILOSEC) 20 MG capsule Take 1 capsule (20 mg total) by mouth once daily.     Family History     Problem Relation (Age of Onset)    No Known Problems Mother, Father        Tobacco Use    Smoking status: Never Smoker    Smokeless tobacco: Never Used   Substance and Sexual Activity    Alcohol use: No    Drug use: No    Sexual activity: Not on file     ROS     Review of Systems   Constitution: Negative for diaphoresis and fever.   HENT: Negative for nosebleeds.    Cardiovascular: Negative for chest pain, dyspnea on exertion, leg swelling and palpitations. Very elevated BP last night   Respiratory: Negative for shortness of breath and wheezing.    Hematologic/Lymphatic: Negative for bleeding problem. Does not bruise/bleed easily.   Skin: Negative for color change and rash.   Musculoskeletal: Negative for falls and myalgias.   Gastrointestinal: Negative for hematemesis and hematochezia.   Genitourinary: Negative for hematuria.   Neurological: Negative for dizziness and light-headedness.   Psychiatric/Behavioral: Negative for altered mental status and memory loss.     Objective:     Vital Signs (Most Recent):  Temp: 97.5 °F (36.4 °C) (05/02/20 1238)  Pulse: 73 (05/02/20 1238)  Resp: 18 (05/02/20 1238)  BP: (!) 154/67 (05/02/20 1238)  SpO2: 99 % (05/02/20 1238) Vital Signs (24h Range):  Temp:  [97.5 °F (36.4 °C)-98.5 °F (36.9 °C)] 97.5 °F (36.4 °C)  Pulse:  [69-86] 73  Resp:  [12-40] 18  SpO2:  [94 %-100 %] 99 %  BP: (142-202)/(62-95) 154/67     Weight: 67.2 kg (148 lb 2.4 oz)  Body mass index is 28.93 kg/m².    SpO2: 99 %  O2 Device (Oxygen Therapy): room air    No intake or output data in the 24 hours ending 05/02/20 1313    Lines/Drains/Airways     Peripheral Intravenous Line                 Peripheral IV - Single Lumen 05/02/20 20 G Right Forearm less than 1 day                Physical Exam     HEENT: Normocephalic, atraumatic,   PERRL, Conjunctiva pink, no scleral  icterus.   NECK:  No JVD no carotid bruit  CVS: S1S2+, RRR, no murmurs, rubs or gallops, JVP: Normal.  LUNGS: Clear  ABDOMEN: Soft, NT, BS+  EXTREMITIES: No cyanosis, clubbing or edema  Gu: No mccarthy catheter, denies dysuria, no hematuria  NEURO: AAO X 3.   PSY :  Normal affect      Significant Labs:     Results for JOHANNY MIRELES (MRN [id number]) as of 5/2/2020 13:10   Ref. Range 5/2/2020 04:00   WBC Latest Ref Range: 3.90 - 12.70 K/uL 9.76   RBC Latest Ref Range: 4.00 - 5.40 M/uL 4.52   Hemoglobin Latest Ref Range: 12.0 - 16.0 g/dL 13.0   Hematocrit Latest Ref Range: 37.0 - 48.5 % 41.4   MCV Latest Ref Range: 82 - 98 fL 92   MCH Latest Ref Range: 27.0 - 31.0 pg 28.8   MCHC Latest Ref Range: 32.0 - 36.0 g/dL 31.4 (L)   RDW Latest Ref Range: 11.5 - 14.5 % 13.0   Platelets Latest Ref Range: 150 - 350 K/uL 282   MPV Latest Ref Range: 9.2 - 12.9 fL 10.1   Gran% Latest Ref Range: 38.0 - 73.0 % 42.3   Gran # (ANC) Latest Ref Range: 1.8 - 7.7 K/uL 4.1   Lymph% Latest Ref Range: 18.0 - 48.0 % 41.6   Lymph # Latest Ref Range: 1.0 - 4.8 K/uL 4.1   Mono% Latest Ref Range: 4.0 - 15.0 % 12.3   Mono # Latest Ref Range: 0.3 - 1.0 K/uL 1.2 (H)   Eosinophil% Latest Ref Range: 0.0 - 8.0 % 2.7   Eos # Latest Ref Range: 0.0 - 0.5 K/uL 0.3   Basophil% Latest Ref Range: 0.0 - 1.9 % 0.7   Baso # Latest Ref Range: 0.00 - 0.20 K/uL 0.07   nRBC Latest Ref Range: 0 /100 WBC 0   Differential Method Unknown Automated   Immature Grans (Abs) Latest Ref Range: 0.00 - 0.04 K/uL 0.04   Immature Granulocytes Latest Ref Range: 0.0 - 0.5 % 0.4   INR Unknown 1.1   PT Latest Ref Range: 10.6 - 14.8 sec 13.6   Sodium Latest Ref Range: 136 - 145 mmol/L 140   Potassium Latest Ref Range: 3.5 - 5.1 mmol/L 4.2   Chloride Latest Ref Range: 95 - 110 mmol/L 104   CO2 Latest Ref Range: 23 - 29 mmol/L 26   Anion Gap Latest Ref Range: 8 - 16 mmol/L 10   BUN, Bld Latest Ref Range: 8 - 23 mg/dL 21   Creatinine Latest Ref Range: 0.5 - 1.4 mg/dL 1.2   eGFR if non African  American Latest Ref Range: >60 mL/min/1.73 m^2 42.2 (A)   eGFR if African American Latest Ref Range: >60 mL/min/1.73 m^2 48.6 (A)   Glucose Latest Ref Range: 70 - 110 mg/dL 110   Calcium Latest Ref Range: 8.7 - 10.5 mg/dL 9.2   Magnesium Latest Ref Range: 1.6 - 2.6 mg/dL 2.4   Alkaline Phosphatase Latest Ref Range: 55 - 135 U/L 58   PROTEIN TOTAL Latest Ref Range: 6.0 - 8.4 g/dL 8.1   Albumin Latest Ref Range: 3.5 - 5.2 g/dL 4.3   BILIRUBIN TOTAL Latest Ref Range: 0.1 - 1.0 mg/dL 0.9   AST Latest Ref Range: 10 - 40 U/L 42 (H)   ALT Latest Ref Range: 10 - 44 U/L 17       Significant Imaging:    Chest xray:   Impression:       Enlarged cardiomediastinal silhouette.       Assessment and Plan:    1. Accelerated HTN- better controlled now  2. Right shoulder pain- likely musculoskeletal  3. ASCAD with hx of CABG and PCI  4. CKD  5. Hx of sick sinus syndrome s/p pacemaker      PLAN:    1. Bps have improved to the 150's systolic.  Increase losartan to 25 mg po BID. Advise patient to carefully monitor her BP.   2. Troponins are negative x 3 sets. Patient denies any ischemic symptoms such as chest pain, shortness of breath, or feeling easily fatigued.   EKG is stable.   3. Recommend close follow up with Dr. Fitzgerald. Patient to call for an appointment on Monday.     Discussed case with Dr. Woodward. Anticipate dc home today.      Active Diagnoses:    Diagnosis Date Noted POA    PRINCIPAL PROBLEM:  Chest pain [R07.9] 05/02/2020 Yes    Accelerated hypertension [I10] 05/02/2020 Yes    Hypertensive urgency [I16.0] 05/02/2020 Yes    Acquired hypothyroidism [E03.9] 12/09/2017 Yes    Diabetes mellitus type 2, diet-controlled [E11.9] 02/20/2012 Yes    Coronary artery disease involving left main coronary artery [I25.10] 02/20/2012 Yes      Problems Resolved During this Admission:       VTE Risk Mitigation (From admission, onward)    None          Thank you for your consult. I will sign off. Please contact us if you have any  additional questions.    Bridgett Vanessa NP  Cardiology   formerly [facility]

## 2020-05-02 NOTE — ED PROVIDER NOTES
Encounter Date: 5/2/2020       History     Chief Complaint   Patient presents with    Hypertension     reports checked her BP routinely at 1030 pm and it was high. Took a Catapres and it came down,.  Rechecked BP at 3 am and it was 180/89 .   Denies any headache or weakness     84-year-old female past medical history of CAD, diabetes, hypertension, mi, quadruple bypass surgery and 4 stents presents with elevated blood pressure.  Patient states he takes 2 blood pressure at home routinely and noticed at this morning it was high after taking medications.  Patient has recorded blood pressure of 190 systolic.  At that time patient states she took clonidine which during her pressure to 130 systolic but after 1 hr it remained elevated in the 180 systolic region.  Patient describes chest discomfort associated with her hypertension.  She denies any shortness of breath, nausea, vomiting, diarrhea, fever, chills or sweats.  She is otherwise stable and has no other complaints.        Review of patient's allergies indicates:   Allergen Reactions    Arthrotec 50 [diclofenac-misoprostol]     Doxycycline     Effexor [venlafaxine]     Estradiol     Flagyl [metronidazole]     Fluconazole     Iodine     Levaquin [levofloxacin]     Nexium [esomeprazole magnesium]     Penicillins     Shellfish containing products     Statins-hmg-coa reductase inhibitors     Sulfa (sulfonamide antibiotics)     Tea tree oil     Tegaserod      ZOLNORM    Trazodone     Yeast, dried      Past Medical History:   Diagnosis Date    Coronary artery disease     Dermatitis     Dermatitis 12/8/2017    Diabetes mellitus     Diabetes mellitus type II     Leah Madrigal virus infection     Fibromyalgia     GERD (gastroesophageal reflux disease)     Hypertension     MI (myocardial infarction) 09/23/2011    Pure hypercholesterolemia 2/26/2020     Past Surgical History:   Procedure Laterality Date    adenoids      ANGIOPLASTY  1987     APPENDECTOMY      CARDIAC PACEMAKER PLACEMENT  01/12/2017    CATARACT EXTRACTION, BILATERAL Bilateral 9/2/15, 3/17/15    right eye-9/2/15, left eye-3/17/15    CHOLECYSTECTOMY  1987    CORONARY ARTERY BYPASS GRAFT  2006    quadruple    coronary stents  1999    x2    CYST REMOVAL  04/25/2017    on back    HYSTERECTOMY  1966    OVARY SURGERY  1948    Ovary burst & was repaired    RHIZOTOMY  09/14/2018    TONSILLECTOMY  1940     Family History   Problem Relation Age of Onset    No Known Problems Mother     No Known Problems Father      Social History     Tobacco Use    Smoking status: Never Smoker    Smokeless tobacco: Never Used   Substance Use Topics    Alcohol use: No    Drug use: No     Review of Systems   Constitutional: Negative for fever.   HENT: Negative for sore throat.    Respiratory: Negative for shortness of breath.    Cardiovascular: Positive for chest pain.   Gastrointestinal: Negative for nausea.   Genitourinary: Negative for dysuria.   Musculoskeletal: Negative for back pain.   Skin: Negative for rash.   Neurological: Negative for weakness.   Hematological: Does not bruise/bleed easily.   All other systems reviewed and are negative.      Physical Exam     Initial Vitals [05/02/20 0343]   BP Pulse Resp Temp SpO2   (!) 189/85 70 20 98.5 °F (36.9 °C) 100 %      MAP       --         Physical Exam    Nursing note and vitals reviewed.  Constitutional: She appears well-developed and well-nourished. No distress.   HENT:   Head: Normocephalic and atraumatic.   Mouth/Throat: Oropharynx is clear and moist.   Eyes: Conjunctivae and EOM are normal. Pupils are equal, round, and reactive to light.   Neck: Normal range of motion. No tracheal deviation present. No JVD present.   Cardiovascular: Normal rate, regular rhythm, normal heart sounds and intact distal pulses. Exam reveals no gallop and no friction rub.    No murmur heard.  Pulmonary/Chest: Breath sounds normal. No respiratory distress. She has no  wheezes. She exhibits no tenderness.   Abdominal: Soft. Bowel sounds are normal. She exhibits no distension. There is no tenderness. There is no rebound and no guarding.   Musculoskeletal: Normal range of motion. She exhibits no edema or tenderness.   Neurological: She is alert and oriented to person, place, and time. She has normal strength. No cranial nerve deficit or sensory deficit.   Skin: Skin is warm and dry. Capillary refill takes less than 2 seconds. No erythema.   Psychiatric: She has a normal mood and affect.         ED Course   Procedures  Labs Reviewed   CBC W/ AUTO DIFFERENTIAL - Abnormal; Notable for the following components:       Result Value    Mean Corpuscular Hemoglobin Conc 31.4 (*)     Mono # 1.2 (*)     All other components within normal limits   COMPREHENSIVE METABOLIC PANEL - Abnormal; Notable for the following components:    AST 42 (*)     eGFR if  48.6 (*)     eGFR if non  42.2 (*)     All other components within normal limits   B-TYPE NATRIURETIC PEPTIDE - Abnormal; Notable for the following components:     (*)     All other components within normal limits   TROPONIN I   PROTIME-INR   MAGNESIUM        ECG Results          EKG 12-lead (In process)  Result time 05/02/20 05:02:35    In process by Interface, Lab In [facility] (05/02/20 05:02:35)                 Narrative:    Test Reason : R07.9,    Vent. Rate : 083 BPM     Atrial Rate : 083 BPM     P-R Int : 240 ms          QRS Dur : 116 ms      QT Int : 386 ms       P-R-T Axes : 096 -27 151 degrees     QTc Int : 453 ms    Atrial-paced rhythm with prolonged AV conduction  Septal infarct (cited on or before 11-DEC-2017)  ST and T wave abnormality, consider lateral ischemia  Abnormal ECG  When compared with ECG of 11-DEC-2017 17:07,  T wave inversion no longer evident in Anterior leads    Referred By: [email or web address]   SELF           Confirmed By:                             Imaging Results          X-Ray Chest AP  Portable (In process)                  Medical Decision Making:   Initial Assessment:   84 year old female on initial assessment in mild distress secondary to high blood pressure and chest discomfort.  Patient is alert oriented x3, neurologically and neurovascular intact with no focal deficits.  She is nontoxic appearing vital stable at this time.  Differential Diagnosis:   Hypertensive emergency, mi, renal artery stenosis  Independently Interpreted Test(s):   I have ordered and independently interpreted X-rays - see prior notes.  I have ordered and independently interpreted EKG Reading(s) - see prior notes  Clinical Tests:   Lab Tests: Ordered and Reviewed  The following lab test(s) were unremarkable: CBC, CMP, Urinalysis, Troponin and BNP  Radiological Study: Ordered and Reviewed  Medical Tests: Reviewed and Ordered  ED Management:  Patient has been reassessed noted to have no acute changes in her condition.  Labs remarkable showed no acute pathology at this time.  Patient noted to have significant heart history and complains of heart discomfort while using clonidine to treat her hypertension.  Her blood pressure remains elevated to the 170s to 180 systolic while in the ED. Patient placed on nitro paste and consulted to hospitalist for admission secondary to ACS rule out and hypertensive urgency.  Patient has otherwise remained stable while in the ED and Ms. Chen is aware of the plan and in agreement with admission.    Laboratory results and radiology imaging results reviewed.  Based on the patient's history, physical, and workup here in the emergency department I believe the patient requires admission for a diagnosis of ACS and HTN urgency.  I discussed the patient's case with the on-call NP who has agreed to evaluate the patient for admission.  In addition, I discussed the results with the patient and they have verbalized understanding of the results, plan, and need for  admission.    ________________________  NIKI Chan MD   Emergency Medicine                                   Clinical Impression:       ICD-10-CM ICD-9-CM   1. Hypertensive urgency I16.0 401.9   2. Chest pain R07.9 786.50   3. Hypertension, unspecified type I10 401.9   4. ACS (acute coronary syndrome) I24.9 411.1                                Donald Chan MD  05/02/20 0504

## 2020-05-02 NOTE — PLAN OF CARE
Pt being discharged  Problem: Wound  Goal: Optimal Wound Healing  Outcome: Met     Problem: Adult Inpatient Plan of Care  Goal: Plan of Care Review  Outcome: Met  Goal: Patient-Specific Goal (Individualization)  Outcome: Met  Goal: Absence of Hospital-Acquired Illness or Injury  Outcome: Met  Goal: Optimal Comfort and Wellbeing  Outcome: Met  Goal: Readiness for Transition of Care  Outcome: Met  Goal: Rounds/Family Conference  Outcome: Met     Problem: Diabetes Comorbidity  Goal: Blood Glucose Level Within Desired Range  Outcome: Met     Problem: Fall Injury Risk  Goal: Absence of Fall and Fall-Related Injury  Outcome: Met

## 2020-05-02 NOTE — DISCHARGE SUMMARY
[facility]  Discharge Summary  Patient Name: Karma Chen MRN: [id number]   Patient Class: OP- Observation  Length of Stay: 0   Admission Date: 5/2/2020  3:49 AM Attending Physician: Allyssa Woodward MD   Primary Care Provider: Elena Sullivan MD Face-to-Face encounter date: 05/02/2020   Chief Complaint: Hypertension (reports checked her BP routinely at 1030 pm and it was high. Took a Catapres and it came down,.  Rechecked BP at 3 am and it was 180/89 .   Denies any headache or weakness)    Date of Discharge: 5/2/2020  Discharge Disposition: Home  Condition: Stable    Reason for Hospitalization   Primary Diagnosis: hypertensive urgency    Secondary Diagnosis: CKD stage III, hypothyrodisim      Patient Active Problem List   Diagnosis    Essential hypertension    Diabetes mellitus type 2, diet-controlled    Coronary artery disease involving left main coronary artery    Calcaneal spur    Achilles tendinosis    Retrocalcaneal exostosis    Dermatitis    Unstable angina    Acquired hypothyroidism    Fibromyalgia    Anxiety    Chronic seasonal allergic rhinitis due to pollen    Acute nasopharyngitis    Cough    Type 2 diabetes mellitus with chronic kidney disease, without long-term current use of insulin    Pure hypercholesterolemia    Chest pain    Accelerated hypertension    Hypertensive urgency       Brief History of Present Illness    Karma Chen is a 84 y.o.  female who  has a past medical history of Coronary artery disease, Dermatitis, Dermatitis (12/8/2017), Diabetes mellitus, Diabetes mellitus type II, Leah Barr virus infection, Fibromyalgia, GERD (gastroesophageal reflux disease), Hypertension, MI (myocardial infarction) (09/23/2011), and Pure hypercholesterolemia (2/26/2020).. The patient presented to [facility] on 5/2/2020 with a primary complaint of Hypertension (reports checked her BP routinely at 1030 pm and it was high. Took a Catapres and  it came down,.  Rechecked BP at 3 am and it was 180/89 .   Denies any headache or weakness)      For the full HPI please refer to the History & Physical from this admission.    Hospital Course By Problem with Pertinent Findings     This is a 84-year-old female admitted for hypertensive urgency and chest pain. Her troponin remained negative., Her losartan was increased to 25 mg Bid. Cardiology saw the patient and recommended outpatient follow. She was discharged in stable condition.      Physical Exam  BP (!) 154/67 (BP Location: Left arm, Patient Position: Lying)   Pulse 73   Temp 97.5 °F (36.4 °C) (Oral)   Resp 18   Ht 5' (1.524 m)   Wt 67.2 kg (148 lb 2.4 oz)   LMP  (LMP Unknown)   SpO2 99%   Breastfeeding? No   BMI 28.93 kg/m²   Vitals reviewed.    Constitutional: No distress.   HENT: Atraumatic.   Cardiovascular: Normal rate, regular rhythm and normal heart sounds.   Pulmonary/Chest: Effort normal. Clear to auscultation bilaterally. No wheezes.   Abdominal: Soft. Bowel sounds are normal. Exhibits no distension and no mass. No tenderness  Neurological: Alert.   Skin: Skin is warm and dry.     Following labs were Reviewed   Recent Labs   Lab 05/02/20  0400   WBC 9.76   HGB 13.0   HCT 41.4      CALCIUM 9.2   ALBUMIN 4.3   PROT 8.1      K 4.2   CO2 26      BUN 21   CREATININE 1.2   ALKPHOS 58   ALT 17   AST 42*   BILITOT 0.9     No results found for: POCTGLUCOSE     All labs within the past 24 hours have been reviewed    Microbiology Results (last 7 days)     ** No results found for the last 168 hours. **        X-Ray Chest AP Portable   Final Result      Enlarged cardiomediastinal silhouette.         Electronically signed by: Jimmy Carrizales MD   Date:    05/02/2020   Time:    06:47          No results found for this or any previous visit.      Consultants and Procedures   Consultants:  Chaz    Procedures:   none    Discharge Information:   Diet:  Resume regular diet    Physical  Activity:  Activity as tolerated    Instructions:  1. Take all medications as prescribed  2. Keep all follow-up appointments  3. Return to the hospital or call your primary care physicians if any worsening symptoms such as chest pain occur.      Follow-Up Appointments:  1. Please call your primary care physician to schedule an appointment in 1 week time.      Pending laboratory work/Tests to be performed/followed by the Primary care Physician: none    The patient was discharged in the care of her parents//wife/family/caregiver, with discharge instructions were reviewed in written and verbal form. All pertinent questions were discussed and prescriptions were provided. The importance of making follow up appointments and compliance of medications has been stressed repeatedly. The patient will follow up in 1 week or sooner as needed with the PCP, and the patient is on board with the plan. Upon discharge, patient needs to be on following medications.    Discharge Medications:     Medication List      CHANGE how you take these medications    losartan 25 MG tablet  Commonly known as:  COZAAR  Take 1 tablet (25 mg total) by mouth 2 (two) times a day.  What changed:    · when to take this  · additional instructions     metoprolol succinate 25 MG 24 hr tablet  Commonly known as:  TOPROL-XL  Take 1 tablet (25 mg total) by mouth once daily.  What changed:  how much to take        CONTINUE taking these medications    acetaminophen 500 MG tablet  Commonly known as:  TYLENOL     acyclovir 5% 5 % Crea  Commonly known as:  ZOVIRAX  Apply topically 2 (two) times daily as needed.     aspirin 81 MG EC tablet  Commonly known as:  ECOTRIN  Take 1 tablet (81 mg total) by mouth once daily.     cetirizine 10 MG tablet  Commonly known as:  ZYRTEC     clopidogreL 75 mg tablet  Commonly known as:  PLAVIX  Take 1 tablet (75 mg total) by mouth once daily.     coenzyme Q10 100 mg capsule  Take 1 capsule (100 mg total) by mouth 2 (two)  times daily.     diclofenac sodium 1 % Gel  Commonly known as:  VOLTAREN     estradioL 0.01 % (0.1 mg/gram) vaginal cream  Commonly known as:  ESTRACE     gentamicin 0.1 % ointment  Commonly known as:  GARAMYCIN     ipratropium 0.03 % nasal spray  Commonly known as:  ATROVENT  2 sprays by Nasal route 2 (two) times daily.     isosorbide mononitrate 60 MG 24 hr tablet  Commonly known as:  IMDUR  Take 1 tablet (60 mg total) by mouth once daily.     Lactobacillus rhamnosus GG 10 billion cell capsule  Commonly known as:  CULTURELLE     MAGNESIUM ORAL     montelukast 10 mg tablet  Commonly known as:  SINGULAIR  Take 1 tablet (10 mg total) by mouth every evening.     multivitamin tablet  Commonly known as:  THERAGRAN     NIACIN FLUSH FREE 400 mg niacin (500 mg) Cap  Generic drug:  niacin (inositol niacinate)     nitroGLYCERIN 0.4 MG SL tablet  Commonly known as:  NITROSTAT     omeprazole 20 MG capsule  Commonly known as:  PRILOSEC  Take 1 capsule (20 mg total) by mouth once daily.     SYNTHROID 88 MCG tablet  Generic drug:  levothyroxine  TAKE 1 TABLET ONCE DAILY     tiZANidine 4 MG tablet  Commonly known as:  ZANAFLEX     traMADoL 50 mg tablet  Commonly known as:  ULTRAM     VITAMIN D3 50 mcg (2,000 unit) Cap  Generic drug:  cholecalciferol (vitamin D3)           Where to Get Your Medications      Information about where to get these medications is not yet available    Ask your nurse or doctor about these medications  · losartan 25 MG tablet           I spent 30 minutes preparing the discharge including reviewing records from previous encounters, preparation of discharge summary, assessing and final examination of the patient, discharge medicine reconciliation, discussing plan of care, follow up and education and prescriptions.       Allyssa Woodward  [facility] Hospitalist  05/02/2020

## 2020-05-03 RX ORDER — ISOSORBIDE MONONITRATE 60 MG/1
TABLET, EXTENDED RELEASE ORAL
Qty: 90 TABLET | Refills: 1 | Status: SHIPPED | OUTPATIENT
Start: 2020-05-03 | End: 2021-01-11 | Stop reason: SDUPTHER

## 2020-05-04 ENCOUNTER — LAB VISIT (OUTPATIENT)
Dept: LAB | Facility: HOSPITAL | Age: 85
End: 2020-05-04
Attending: INTERNAL MEDICINE
Payer: MEDICARE

## 2020-05-04 DIAGNOSIS — I25.810 CORONARY ATHEROSCLEROSIS OF AUTOLOGOUS VEIN BYPASS GRAFT: ICD-10-CM

## 2020-05-04 DIAGNOSIS — E78.5 HYPERLIPEMIA: Primary | ICD-10-CM

## 2020-05-04 DIAGNOSIS — I51.9 MYXEDEMA HEART DISEASE: ICD-10-CM

## 2020-05-04 DIAGNOSIS — E03.9 MYXEDEMA HEART DISEASE: ICD-10-CM

## 2020-05-04 DIAGNOSIS — N25.81 SECONDARY HYPERPARATHYROIDISM OF RENAL ORIGIN: ICD-10-CM

## 2020-05-04 DIAGNOSIS — I49.3 VENTRICULAR PREMATURE BEATS: ICD-10-CM

## 2020-05-04 DIAGNOSIS — N18.30 CHRONIC KIDNEY DISEASE, STAGE III (MODERATE): ICD-10-CM

## 2020-05-04 LAB
ALBUMIN SERPL BCP-MCNC: 4 G/DL (ref 3.5–5.2)
ALBUMIN/CREAT UR: NORMAL UG/MG (ref 0–30)
ANION GAP SERPL CALC-SCNC: 10 MMOL/L (ref 8–16)
BACTERIA #/AREA URNS HPF: ABNORMAL /HPF
BASOPHILS # BLD AUTO: 0.05 K/UL (ref 0–0.2)
BASOPHILS NFR BLD: 0.6 % (ref 0–1.9)
BUN SERPL-MCNC: 21 MG/DL (ref 8–23)
CALCIUM SERPL-MCNC: 9.3 MG/DL (ref 8.7–10.5)
CHLORIDE SERPL-SCNC: 104 MMOL/L (ref 95–110)
CHOLEST SERPL-MCNC: 237 MG/DL (ref 120–199)
CHOLEST/HDLC SERPL: 4.9 {RATIO} (ref 2–5)
CO2 SERPL-SCNC: 26 MMOL/L (ref 23–29)
CREAT SERPL-MCNC: 1.2 MG/DL (ref 0.5–1.4)
CREAT UR-MCNC: 51 MG/DL (ref 15–325)
DIFFERENTIAL METHOD: ABNORMAL
EOSINOPHIL # BLD AUTO: 0.3 K/UL (ref 0–0.5)
EOSINOPHIL NFR BLD: 3.3 % (ref 0–8)
ERYTHROCYTE [DISTWIDTH] IN BLOOD BY AUTOMATED COUNT: 12.9 % (ref 11.5–14.5)
EST. GFR  (AFRICAN AMERICAN): 48.6 ML/MIN/1.73 M^2
EST. GFR  (NON AFRICAN AMERICAN): 42.2 ML/MIN/1.73 M^2
GLUCOSE SERPL-MCNC: 92 MG/DL (ref 70–110)
HCT VFR BLD AUTO: 40.2 % (ref 37–48.5)
HDLC SERPL-MCNC: 48 MG/DL (ref 40–75)
HDLC SERPL: 20.3 % (ref 20–50)
HGB BLD-MCNC: 12.6 G/DL (ref 12–16)
HYALINE CASTS #/AREA URNS LPF: 13 /LPF
IMM GRANULOCYTES # BLD AUTO: 0.03 K/UL (ref 0–0.04)
IMM GRANULOCYTES NFR BLD AUTO: 0.4 % (ref 0–0.5)
LDLC SERPL CALC-MCNC: 147.6 MG/DL (ref 63–159)
LYMPHOCYTES # BLD AUTO: 3.5 K/UL (ref 1–4.8)
LYMPHOCYTES NFR BLD: 42.5 % (ref 18–48)
MCH RBC QN AUTO: 28.9 PG (ref 27–31)
MCHC RBC AUTO-ENTMCNC: 31.3 G/DL (ref 32–36)
MCV RBC AUTO: 92 FL (ref 82–98)
MICROALBUMIN UR DL<=1MG/L-MCNC: <2 UG/ML
MICROSCOPIC COMMENT: ABNORMAL
MONOCYTES # BLD AUTO: 0.9 K/UL (ref 0.3–1)
MONOCYTES NFR BLD: 11.4 % (ref 4–15)
NEUTROPHILS # BLD AUTO: 3.4 K/UL (ref 1.8–7.7)
NEUTROPHILS NFR BLD: 41.8 % (ref 38–73)
NONHDLC SERPL-MCNC: 189 MG/DL
NRBC BLD-RTO: 0 /100 WBC
PHOSPHATE SERPL-MCNC: 3.8 MG/DL (ref 2.7–4.5)
PLATELET # BLD AUTO: 278 K/UL (ref 150–350)
PMV BLD AUTO: 10.2 FL (ref 9.2–12.9)
POTASSIUM SERPL-SCNC: 4.1 MMOL/L (ref 3.5–5.1)
PROT UR-MCNC: <6 MG/DL (ref 6–15)
PROT/CREAT UR: NORMAL MG/G{CREAT} (ref 0–0.2)
PTH-INTACT SERPL-MCNC: 40.5 PG/ML (ref 9–77)
RBC # BLD AUTO: 4.36 M/UL (ref 4–5.4)
RBC #/AREA URNS HPF: 1 /HPF (ref 0–4)
SODIUM SERPL-SCNC: 140 MMOL/L (ref 136–145)
SQUAMOUS #/AREA URNS HPF: 5 /HPF
TRIGL SERPL-MCNC: 207 MG/DL (ref 30–150)
WBC # BLD AUTO: 8.16 K/UL (ref 3.9–12.7)
WBC #/AREA URNS HPF: 7 /HPF (ref 0–5)

## 2020-05-04 PROCEDURE — 82043 UR ALBUMIN QUANTITATIVE: CPT

## 2020-05-04 PROCEDURE — 84156 ASSAY OF PROTEIN URINE: CPT

## 2020-05-04 PROCEDURE — 81001 URINALYSIS AUTO W/SCOPE: CPT

## 2020-05-04 PROCEDURE — 83970 ASSAY OF PARATHORMONE: CPT

## 2020-05-04 PROCEDURE — 80061 LIPID PANEL: CPT

## 2020-05-04 PROCEDURE — 80069 RENAL FUNCTION PANEL: CPT

## 2020-05-04 PROCEDURE — 36415 COLL VENOUS BLD VENIPUNCTURE: CPT

## 2020-05-04 PROCEDURE — 85025 COMPLETE CBC W/AUTO DIFF WBC: CPT

## 2020-05-21 ENCOUNTER — TELEPHONE (OUTPATIENT)
Dept: FAMILY MEDICINE | Facility: CLINIC | Age: 85
End: 2020-05-21

## 2020-05-21 NOTE — TELEPHONE ENCOUNTER
Called pt and offered appointment sooner nxt week with shannan pt states doing ok , she will call if needed next week

## 2020-05-22 DIAGNOSIS — K21.00 GERD WITH ESOPHAGITIS: ICD-10-CM

## 2020-05-23 RX ORDER — OMEPRAZOLE 20 MG/1
CAPSULE, DELAYED RELEASE ORAL
Qty: 90 CAPSULE | Refills: 1 | Status: SHIPPED | OUTPATIENT
Start: 2020-05-23 | End: 2020-08-05 | Stop reason: SDUPTHER

## 2020-07-24 ENCOUNTER — HOSPITAL ENCOUNTER (OUTPATIENT)
Facility: HOSPITAL | Age: 85
Discharge: HOME | End: 2020-07-25
Attending: EMERGENCY MEDICINE | Admitting: INTERNAL MEDICINE
Payer: MEDICARE

## 2020-07-24 DIAGNOSIS — R07.9 CHEST PAIN: ICD-10-CM

## 2020-07-24 DIAGNOSIS — I25.10 CAD (CORONARY ARTERY DISEASE): ICD-10-CM

## 2020-07-24 DIAGNOSIS — R07.89 OTHER CHEST PAIN: Primary | ICD-10-CM

## 2020-07-24 LAB
ALBUMIN SERPL BCP-MCNC: 3.9 G/DL (ref 3.5–5.2)
ALP SERPL-CCNC: 58 U/L (ref 55–135)
ALT SERPL W/O P-5'-P-CCNC: 20 U/L (ref 10–44)
ANION GAP SERPL CALC-SCNC: 14 MMOL/L (ref 8–16)
AST SERPL-CCNC: 24 U/L (ref 10–40)
BASOPHILS # BLD AUTO: 0.06 K/UL (ref 0–0.2)
BASOPHILS NFR BLD: 0.8 % (ref 0–1.9)
BILIRUB SERPL-MCNC: 0.6 MG/DL (ref 0.1–1)
BNP SERPL-MCNC: 265 PG/ML (ref 0–99)
BUN SERPL-MCNC: 23 MG/DL (ref 8–23)
CALCIUM SERPL-MCNC: 9.3 MG/DL (ref 8.7–10.5)
CHLORIDE SERPL-SCNC: 102 MMOL/L (ref 95–110)
CO2 SERPL-SCNC: 25 MMOL/L (ref 23–29)
CREAT SERPL-MCNC: 1.1 MG/DL (ref 0.5–1.4)
DIFFERENTIAL METHOD: ABNORMAL
EOSINOPHIL # BLD AUTO: 0.2 K/UL (ref 0–0.5)
EOSINOPHIL NFR BLD: 2 % (ref 0–8)
ERYTHROCYTE [DISTWIDTH] IN BLOOD BY AUTOMATED COUNT: 13.2 % (ref 11.5–14.5)
EST. GFR  (AFRICAN AMERICAN): 54 ML/MIN/1.73 M^2
EST. GFR  (NON AFRICAN AMERICAN): 46.9 ML/MIN/1.73 M^2
GLUCOSE SERPL-MCNC: 147 MG/DL (ref 70–110)
HCT VFR BLD AUTO: 36.8 % (ref 37–48.5)
HGB BLD-MCNC: 11.8 G/DL (ref 12–16)
IMM GRANULOCYTES # BLD AUTO: 0.03 K/UL (ref 0–0.04)
IMM GRANULOCYTES NFR BLD AUTO: 0.4 % (ref 0–0.5)
LYMPHOCYTES # BLD AUTO: 2.6 K/UL (ref 1–4.8)
LYMPHOCYTES NFR BLD: 34.5 % (ref 18–48)
MCH RBC QN AUTO: 28.4 PG (ref 27–31)
MCHC RBC AUTO-ENTMCNC: 32.1 G/DL (ref 32–36)
MCV RBC AUTO: 89 FL (ref 82–98)
MONOCYTES # BLD AUTO: 0.9 K/UL (ref 0.3–1)
MONOCYTES NFR BLD: 11.9 % (ref 4–15)
NEUTROPHILS # BLD AUTO: 3.7 K/UL (ref 1.8–7.7)
NEUTROPHILS NFR BLD: 50.4 % (ref 38–73)
NRBC BLD-RTO: 0 /100 WBC
PLATELET # BLD AUTO: 294 K/UL (ref 150–350)
PMV BLD AUTO: 9.7 FL (ref 9.2–12.9)
POTASSIUM SERPL-SCNC: 3.6 MMOL/L (ref 3.5–5.1)
PROT SERPL-MCNC: 7.5 G/DL (ref 6–8.4)
RBC # BLD AUTO: 4.16 M/UL (ref 4–5.4)
SARS-COV-2 RDRP RESP QL NAA+PROBE: NEGATIVE
SODIUM SERPL-SCNC: 141 MMOL/L (ref 136–145)
TROPONIN I SERPL DL<=0.01 NG/ML-MCNC: 0.04 NG/ML
TROPONIN I SERPL DL<=0.01 NG/ML-MCNC: 0.06 NG/ML
WBC # BLD AUTO: 7.42 K/UL (ref 3.9–12.7)

## 2020-07-24 PROCEDURE — 25000003 PHARM REV CODE 250: Performed by: INTERNAL MEDICINE

## 2020-07-24 PROCEDURE — 36415 COLL VENOUS BLD VENIPUNCTURE: CPT

## 2020-07-24 PROCEDURE — U0002 COVID-19 LAB TEST NON-CDC: HCPCS

## 2020-07-24 PROCEDURE — 85025 COMPLETE CBC W/AUTO DIFF WBC: CPT

## 2020-07-24 PROCEDURE — G0378 HOSPITAL OBSERVATION PER HR: HCPCS

## 2020-07-24 PROCEDURE — 99285 EMERGENCY DEPT VISIT HI MDM: CPT | Mod: 25

## 2020-07-24 PROCEDURE — 63600175 PHARM REV CODE 636 W HCPCS: Performed by: INTERNAL MEDICINE

## 2020-07-24 PROCEDURE — 96372 THER/PROPH/DIAG INJ SC/IM: CPT | Mod: 59

## 2020-07-24 PROCEDURE — 84484 ASSAY OF TROPONIN QUANT: CPT | Mod: 91

## 2020-07-24 PROCEDURE — 83880 ASSAY OF NATRIURETIC PEPTIDE: CPT

## 2020-07-24 PROCEDURE — 80053 COMPREHEN METABOLIC PANEL: CPT

## 2020-07-24 PROCEDURE — 93005 ELECTROCARDIOGRAM TRACING: CPT | Performed by: INTERNAL MEDICINE

## 2020-07-24 RX ORDER — ASPIRIN 81 MG/1
81 TABLET ORAL DAILY
Status: DISCONTINUED | OUTPATIENT
Start: 2020-07-25 | End: 2020-07-25 | Stop reason: HOSPADM

## 2020-07-24 RX ORDER — CLOPIDOGREL BISULFATE 75 MG/1
75 TABLET ORAL DAILY
Status: DISCONTINUED | OUTPATIENT
Start: 2020-07-25 | End: 2020-07-25 | Stop reason: HOSPADM

## 2020-07-24 RX ORDER — CETIRIZINE HYDROCHLORIDE 10 MG/1
10 TABLET ORAL DAILY
Status: DISCONTINUED | OUTPATIENT
Start: 2020-07-25 | End: 2020-07-25 | Stop reason: HOSPADM

## 2020-07-24 RX ORDER — CHOLECALCIFEROL (VITAMIN D3) 50 MCG
2000 TABLET ORAL DAILY
Status: DISCONTINUED | OUTPATIENT
Start: 2020-07-25 | End: 2020-07-25 | Stop reason: HOSPADM

## 2020-07-24 RX ORDER — LEVOTHYROXINE SODIUM 88 UG/1
88 TABLET ORAL
Status: DISCONTINUED | OUTPATIENT
Start: 2020-07-25 | End: 2020-07-25 | Stop reason: HOSPADM

## 2020-07-24 RX ORDER — ISOSORBIDE MONONITRATE 60 MG/1
60 TABLET, EXTENDED RELEASE ORAL DAILY
Status: DISCONTINUED | OUTPATIENT
Start: 2020-07-25 | End: 2020-07-25 | Stop reason: HOSPADM

## 2020-07-24 RX ORDER — ENOXAPARIN SODIUM 100 MG/ML
40 INJECTION SUBCUTANEOUS EVERY 24 HOURS
Status: DISCONTINUED | OUTPATIENT
Start: 2020-07-24 | End: 2020-07-25 | Stop reason: HOSPADM

## 2020-07-24 RX ORDER — ACETAMINOPHEN 325 MG/1
650 TABLET ORAL EVERY 8 HOURS PRN
Status: DISCONTINUED | OUTPATIENT
Start: 2020-07-24 | End: 2020-07-25 | Stop reason: HOSPADM

## 2020-07-24 RX ORDER — CYCLOBENZAPRINE HCL 5 MG
5 TABLET ORAL NIGHTLY
Status: DISCONTINUED | OUTPATIENT
Start: 2020-07-24 | End: 2020-07-25 | Stop reason: HOSPADM

## 2020-07-24 RX ORDER — IPRATROPIUM BROMIDE 21 UG/1
2 SPRAY, METERED NASAL 2 TIMES DAILY
Status: DISCONTINUED | OUTPATIENT
Start: 2020-07-24 | End: 2020-07-25 | Stop reason: HOSPADM

## 2020-07-24 RX ORDER — FLUTICASONE PROPIONATE 50 MCG
1 SPRAY, SUSPENSION (ML) NASAL DAILY
Status: DISCONTINUED | OUTPATIENT
Start: 2020-07-25 | End: 2020-07-25 | Stop reason: HOSPADM

## 2020-07-24 RX ORDER — LOSARTAN POTASSIUM 25 MG/1
25 TABLET ORAL 2 TIMES DAILY
Status: DISCONTINUED | OUTPATIENT
Start: 2020-07-24 | End: 2020-07-25 | Stop reason: HOSPADM

## 2020-07-24 RX ORDER — NITROGLYCERIN 0.4 MG/1
0.4 TABLET SUBLINGUAL EVERY 5 MIN PRN
Status: DISCONTINUED | OUTPATIENT
Start: 2020-07-24 | End: 2020-07-25 | Stop reason: HOSPADM

## 2020-07-24 RX ORDER — TRAMADOL HYDROCHLORIDE 50 MG/1
50 TABLET ORAL 2 TIMES DAILY PRN
Status: DISCONTINUED | OUTPATIENT
Start: 2020-07-24 | End: 2020-07-25 | Stop reason: HOSPADM

## 2020-07-24 RX ORDER — ALPRAZOLAM 0.25 MG/1
0.25 TABLET ORAL 2 TIMES DAILY PRN
Status: DISCONTINUED | OUTPATIENT
Start: 2020-07-24 | End: 2020-07-25 | Stop reason: HOSPADM

## 2020-07-24 RX ORDER — LANOLIN ALCOHOL/MO/W.PET/CERES
400 CREAM (GRAM) TOPICAL DAILY
Status: DISCONTINUED | OUTPATIENT
Start: 2020-07-25 | End: 2020-07-25 | Stop reason: HOSPADM

## 2020-07-24 RX ORDER — PANTOPRAZOLE SODIUM 40 MG/1
40 TABLET, DELAYED RELEASE ORAL DAILY
Status: DISCONTINUED | OUTPATIENT
Start: 2020-07-25 | End: 2020-07-25 | Stop reason: HOSPADM

## 2020-07-24 RX ORDER — CYCLOBENZAPRINE HCL 10 MG
5 TABLET ORAL NIGHTLY
COMMUNITY
End: 2020-08-05

## 2020-07-24 RX ORDER — DEXTROMETHORPHAN HYDROBROMIDE, GUAIFENESIN 5; 100 MG/5ML; MG/5ML
650 LIQUID ORAL
Status: ON HOLD | COMMUNITY
End: 2020-07-25 | Stop reason: HOSPADM

## 2020-07-24 RX ORDER — TIZANIDINE 4 MG/1
4 TABLET ORAL EVERY 6 HOURS PRN
Status: DISCONTINUED | OUTPATIENT
Start: 2020-07-24 | End: 2020-07-25 | Stop reason: HOSPADM

## 2020-07-24 RX ORDER — MONTELUKAST SODIUM 10 MG/1
10 TABLET ORAL NIGHTLY
Status: DISCONTINUED | OUTPATIENT
Start: 2020-07-25 | End: 2020-07-25 | Stop reason: HOSPADM

## 2020-07-24 RX ORDER — METOPROLOL SUCCINATE 25 MG/1
25 TABLET, EXTENDED RELEASE ORAL DAILY
COMMUNITY
End: 2020-08-05 | Stop reason: SDUPTHER

## 2020-07-24 RX ORDER — METOPROLOL SUCCINATE 25 MG/1
25 TABLET, EXTENDED RELEASE ORAL DAILY
Status: DISCONTINUED | OUTPATIENT
Start: 2020-07-25 | End: 2020-07-25 | Stop reason: HOSPADM

## 2020-07-24 RX ORDER — ALPRAZOLAM 0.25 MG/1
0.25 TABLET ORAL
COMMUNITY
End: 2020-08-05 | Stop reason: SDUPTHER

## 2020-07-24 RX ORDER — FLUTICASONE PROPIONATE 50 MCG
1 SPRAY, SUSPENSION (ML) NASAL DAILY
COMMUNITY
End: 2022-08-03

## 2020-07-24 RX ADMIN — CYCLOBENZAPRINE HYDROCHLORIDE 5 MG: 5 TABLET, FILM COATED ORAL at 09:07

## 2020-07-24 RX ADMIN — LOSARTAN POTASSIUM 25 MG: 25 TABLET, FILM COATED ORAL at 09:07

## 2020-07-24 RX ADMIN — ENOXAPARIN SODIUM 40 MG: 100 INJECTION SUBCUTANEOUS at 09:07

## 2020-07-24 NOTE — ED PROVIDER NOTES
Encounter Date: 7/24/2020       History   No chief complaint on file.    HPI  Review of patient's allergies indicates:   Allergen Reactions    Arthrotec 50 [diclofenac-misoprostol]     Doxycycline     Effexor [venlafaxine]     Estradiol     Flagyl [metronidazole]     Fluconazole     Iodine     Levaquin [levofloxacin]     Nexium [esomeprazole magnesium]     Penicillins     Shellfish containing products     Statins-hmg-coa reductase inhibitors     Sulfa (sulfonamide antibiotics)     Tea tree oil     Tegaserod      ZOLNORM    Trazodone     Yeast, dried      Past Medical History:   Diagnosis Date    Coronary artery disease     Dermatitis     Dermatitis 12/8/2017    Diabetes mellitus     Diabetes mellitus type II     Leah Madrigal virus infection     Fibromyalgia     GERD (gastroesophageal reflux disease)     Hypertension     MI (myocardial infarction) 09/23/2011    Pure hypercholesterolemia 2/26/2020     Past Surgical History:   Procedure Laterality Date    adenoids      ANGIOPLASTY  1987    APPENDECTOMY      CARDIAC PACEMAKER PLACEMENT  01/12/2017    CATARACT EXTRACTION, BILATERAL Bilateral 9/2/15, 3/17/15    right eye-9/2/15, left eye-3/17/15    CHOLECYSTECTOMY  1987    CORONARY ARTERY BYPASS GRAFT  2006    quadruple    coronary stents  1999    x2    CYST REMOVAL  04/25/2017    on back    HYSTERECTOMY  1966    OVARY SURGERY  1948    Ovary burst & was repaired    RHIZOTOMY  09/14/2018    TONSILLECTOMY  1940     Family History   Problem Relation Age of Onset    No Known Problems Mother     No Known Problems Father      Social History     Tobacco Use    Smoking status: Never Smoker    Smokeless tobacco: Never Used   Substance Use Topics    Alcohol use: No    Drug use: No     Review of Systems    Physical Exam     Initial Vitals   BP Pulse Resp Temp SpO2   -- -- -- -- --      MAP       --         Physical Exam    ED Course   Procedures  Labs Reviewed - No data to display        Imaging Results    None                            ED Course as of Jul 24 1712 Fri Jul 24, 2020   1702 Chest pain Onset on MOnday and this AM Denies SOB    [KN]      ED Course User Index  [KN] Estefany Simons NP                Clinical Impression:   {Add your Clinical Impression here. If you haven't documented one yet, please pend the note, finalize a Clinical Impression, and refresh your note before signing.:[id number]}

## 2020-07-25 ENCOUNTER — CLINICAL SUPPORT (OUTPATIENT)
Dept: CARDIOLOGY | Facility: HOSPITAL | Age: 85
End: 2020-07-25
Attending: INTERNAL MEDICINE
Payer: MEDICARE

## 2020-07-25 VITALS
HEIGHT: 60 IN | DIASTOLIC BLOOD PRESSURE: 75 MMHG | RESPIRATION RATE: 20 BRPM | WEIGHT: 146.81 LBS | TEMPERATURE: 98 F | BODY MASS INDEX: 28.66 KG/M2 | BODY MASS INDEX: 28.82 KG/M2 | SYSTOLIC BLOOD PRESSURE: 177 MMHG | HEIGHT: 60 IN | WEIGHT: 146 LBS | HEART RATE: 70 BPM | OXYGEN SATURATION: 98 %

## 2020-07-25 PROBLEM — I48.0 PAF (PAROXYSMAL ATRIAL FIBRILLATION): Chronic | Status: ACTIVE | Noted: 2020-07-25

## 2020-07-25 PROBLEM — I25.10 CAD (CORONARY ARTERY DISEASE): Chronic | Status: ACTIVE | Noted: 2020-07-25

## 2020-07-25 PROBLEM — Z95.0 PACEMAKER: Chronic | Status: ACTIVE | Noted: 2020-07-25

## 2020-07-25 PROBLEM — E11.9 TYPE 2 DIABETES MELLITUS, WITHOUT LONG-TERM CURRENT USE OF INSULIN: Chronic | Status: ACTIVE | Noted: 2020-07-25

## 2020-07-25 LAB
ANION GAP SERPL CALC-SCNC: 10 MMOL/L (ref 8–16)
BUN SERPL-MCNC: 22 MG/DL (ref 8–23)
CALCIUM SERPL-MCNC: 9 MG/DL (ref 8.7–10.5)
CHLORIDE SERPL-SCNC: 107 MMOL/L (ref 95–110)
CO2 SERPL-SCNC: 26 MMOL/L (ref 23–29)
CREAT SERPL-MCNC: 1.1 MG/DL (ref 0.5–1.4)
EST. GFR  (AFRICAN AMERICAN): 54 ML/MIN/1.73 M^2
EST. GFR  (NON AFRICAN AMERICAN): 46.9 ML/MIN/1.73 M^2
ESTIMATED AVG GLUCOSE: 126 MG/DL (ref 68–131)
GLUCOSE SERPL-MCNC: 88 MG/DL (ref 70–110)
GLUCOSE SERPL-MCNC: 91 MG/DL (ref 70–110)
GLUCOSE SERPL-MCNC: 98 MG/DL (ref 70–110)
HBA1C MFR BLD HPLC: 6 % (ref 4.5–6.2)
POTASSIUM SERPL-SCNC: 3.8 MMOL/L (ref 3.5–5.1)
SODIUM SERPL-SCNC: 143 MMOL/L (ref 136–145)
TROPONIN I SERPL DL<=0.01 NG/ML-MCNC: 0.08 NG/ML

## 2020-07-25 PROCEDURE — 36415 COLL VENOUS BLD VENIPUNCTURE: CPT

## 2020-07-25 PROCEDURE — 83036 HEMOGLOBIN GLYCOSYLATED A1C: CPT

## 2020-07-25 PROCEDURE — 93306 TTE W/DOPPLER COMPLETE: CPT

## 2020-07-25 PROCEDURE — 80048 BASIC METABOLIC PNL TOTAL CA: CPT

## 2020-07-25 PROCEDURE — G0378 HOSPITAL OBSERVATION PER HR: HCPCS

## 2020-07-25 PROCEDURE — 84484 ASSAY OF TROPONIN QUANT: CPT

## 2020-07-25 PROCEDURE — 25000003 PHARM REV CODE 250: Performed by: INTERNAL MEDICINE

## 2020-07-25 RX ORDER — MAGNESIUM SULFATE HEPTAHYDRATE 40 MG/ML
2 INJECTION, SOLUTION INTRAVENOUS
Status: DISCONTINUED | OUTPATIENT
Start: 2020-07-25 | End: 2020-07-25 | Stop reason: HOSPADM

## 2020-07-25 RX ORDER — POTASSIUM CHLORIDE 20 MEQ/1
20 TABLET, EXTENDED RELEASE ORAL
Status: DISCONTINUED | OUTPATIENT
Start: 2020-07-25 | End: 2020-07-25 | Stop reason: HOSPADM

## 2020-07-25 RX ORDER — CALCIUM CHLORIDE IN 0.9 % NACL 1 G/100 ML
1 INTRAVENOUS SOLUTION, PIGGYBACK (ML) INTRAVENOUS
Status: DISCONTINUED | OUTPATIENT
Start: 2020-07-25 | End: 2020-07-25 | Stop reason: HOSPADM

## 2020-07-25 RX ORDER — MAGNESIUM SULFATE 1 G/100ML
1 INJECTION INTRAVENOUS
Status: DISCONTINUED | OUTPATIENT
Start: 2020-07-25 | End: 2020-07-25 | Stop reason: HOSPADM

## 2020-07-25 RX ORDER — POTASSIUM CHLORIDE 20 MEQ/1
40 TABLET, EXTENDED RELEASE ORAL
Status: DISCONTINUED | OUTPATIENT
Start: 2020-07-25 | End: 2020-07-25 | Stop reason: HOSPADM

## 2020-07-25 RX ORDER — POTASSIUM CHLORIDE 7.45 MG/ML
40 INJECTION INTRAVENOUS
Status: DISCONTINUED | OUTPATIENT
Start: 2020-07-25 | End: 2020-07-25 | Stop reason: HOSPADM

## 2020-07-25 RX ORDER — POTASSIUM CHLORIDE 7.45 MG/ML
20 INJECTION INTRAVENOUS
Status: DISCONTINUED | OUTPATIENT
Start: 2020-07-25 | End: 2020-07-25 | Stop reason: HOSPADM

## 2020-07-25 RX ORDER — MAGNESIUM SULFATE HEPTAHYDRATE 40 MG/ML
4 INJECTION, SOLUTION INTRAVENOUS
Status: DISCONTINUED | OUTPATIENT
Start: 2020-07-25 | End: 2020-07-25 | Stop reason: HOSPADM

## 2020-07-25 RX ORDER — LANOLIN ALCOHOL/MO/W.PET/CERES
800 CREAM (GRAM) TOPICAL
Status: DISCONTINUED | OUTPATIENT
Start: 2020-07-25 | End: 2020-07-25 | Stop reason: HOSPADM

## 2020-07-25 RX ADMIN — THERA TABS 1 TABLET: TAB at 09:07

## 2020-07-25 RX ADMIN — METOPROLOL SUCCINATE 25 MG: 25 TABLET, FILM COATED, EXTENDED RELEASE ORAL at 09:07

## 2020-07-25 RX ADMIN — CLOPIDOGREL BISULFATE 75 MG: 75 TABLET, FILM COATED ORAL at 09:07

## 2020-07-25 RX ADMIN — LEVOTHYROXINE SODIUM 88 MCG: 88 TABLET ORAL at 05:07

## 2020-07-25 RX ADMIN — ASPIRIN 81 MG: 81 TABLET, DELAYED RELEASE ORAL at 09:07

## 2020-07-25 RX ADMIN — ISOSORBIDE MONONITRATE 60 MG: 60 TABLET, EXTENDED RELEASE ORAL at 09:07

## 2020-07-25 RX ADMIN — MAGNESIUM OXIDE 400 MG: 400 TABLET ORAL at 09:07

## 2020-07-25 RX ADMIN — PANTOPRAZOLE SODIUM 40 MG: 40 TABLET, DELAYED RELEASE ORAL at 05:07

## 2020-07-25 RX ADMIN — CETIRIZINE HYDROCHLORIDE 10 MG: 10 TABLET, FILM COATED ORAL at 09:07

## 2020-07-25 RX ADMIN — Medication 2000 UNITS: at 09:07

## 2020-07-25 RX ADMIN — LOSARTAN POTASSIUM 25 MG: 25 TABLET, FILM COATED ORAL at 09:07

## 2020-07-25 NOTE — PLAN OF CARE
Plan of care progressing. Patient currently chest pain free. NPO for stress testing in the am. Bed alarm on and functional. Patient instructed to use call light for assistance, verbalized understanding. Safety maintained. Will continue to monitor.     Problem: Fall Injury Risk  Goal: Absence of Fall and Fall-Related Injury  Outcome: Ongoing, Progressing     Problem: Adult Inpatient Plan of Care  Goal: Plan of Care Review  Outcome: Ongoing, Progressing  Goal: Patient-Specific Goal (Individualization)  Outcome: Ongoing, Progressing  Goal: Absence of Hospital-Acquired Illness or Injury  Outcome: Ongoing, Progressing  Goal: Optimal Comfort and Wellbeing  Outcome: Ongoing, Progressing  Goal: Readiness for Transition of Care  Outcome: Ongoing, Progressing  Goal: Rounds/Family Conference  Outcome: Ongoing, Progressing

## 2020-07-25 NOTE — PROGRESS NOTES
[facility]  Progress Note    Patient Name: Karma Chen  MRN: [id number]  Patient Class: OP- Observation   Admission Date: 7/24/2020  Length of Stay: 0 days  Attending Physician: Marlen Shoemaker MD  Primary Care Provider: Elena Sullivan MD        Subjective:     Principal Problem:Other chest pain        HPI:  84 year old female presented to ED complaining of two episodes of central chest discomfort. Fist 4 days ago and was relieved with 1 NTG sublingually. She had another episode today, also relieved with 1 NTG. She called her Cardiologist's office, who recommended that she come to ED. Her discomfort is central tightness no radiations. No SOB. No orthopnea/PND/edema. No cough or sputum. Last stress was 2017 after which she received #4 stents at [facility]. No ECHO since then either on chart review.  Troponin <0.03. EKG: atrial pacing without acute segments. CXR: [facility]    Overview/Hospital Course:  Patient presenting with recurrent chest tightness.  Initially occurred while at chiropractor, was experiencing upper back and shoulder pain, then upper chest tightness, at rest, relieved by sublingual nitro.  Had recurrent episode throughout the course of the week.  Admitted for ACS evaluation history of known coronary artery disease status post 4 vessel CABG and PCI.  EKG paced, troponin mildly elevated at 0.077.  Blood pressure grossly uncontrolled, resuming home medications and monitoring.  Await Cardiology recommendations.    Interval History:  Patient reports over the last week was experiencing upper back and shoulder pain, was at chiropractor office when had upper chest tightness, relieved by sublingual nitro.  Later on the week recurrent episode again relieved by sublingual nitro.  These episodes occurred at rest.  Has not had any further episodes since admitted.  Had previous admission for uncontrolled blood pressure however states at home was better controlled with  systolics in the 120s, in the ED here blood pressure uncontrolled up to 190s.  Known history of coronary artery disease status post 4 vessel CABG followed by multiple PCI.  BP not well controlled here.  BNP still 265, troponin increased to 0.077.  Chest x-ray with cardiomegaly.  EKG paced.  Telemetry sinus rhythm.  Discussed plan of care with patient and relative at bedside.      Review of Systems   Constitutional: Negative for chills and fever.   HENT: Negative for trouble swallowing.    Respiratory: Negative for cough and shortness of breath.    Cardiovascular: Positive for chest pain. Negative for palpitations and leg swelling.   Gastrointestinal: Negative for abdominal pain, constipation, nausea and vomiting.   Musculoskeletal: Positive for back pain and neck pain.   Skin: Negative for wound.   Neurological: Negative for headaches.   Hematological: Does not bruise/bleed easily.   Psychiatric/Behavioral: Negative for confusion.     Objective:     Vital Signs (Most Recent):  Temp: 98.4 °F (36.9 °C) (07/25/20 0734)  Pulse: 74 (07/25/20 0734)  Resp: 16 (07/25/20 0734)  BP: (!) 151/68 (07/25/20 0734)  SpO2: 98 % (07/25/20 0734) Vital Signs (24h Range):  Temp:  [97.8 °F (36.6 °C)-99 °F (37.2 °C)] 98.4 °F (36.9 °C)  Pulse:  [70-95] 74  Resp:  [16-23] 16  SpO2:  [95 %-99 %] 98 %  BP: (149-191)/(67-82) 151/68     Weight: 66.6 kg (146 lb 13.2 oz)  Body mass index is 28.68 kg/m².    Intake/Output Summary (Last 24 hours) at 7/25/2020 1045  Last data filed at 7/25/2020 1024  Gross per 24 hour   Intake 240 ml   Output 1450 ml   Net -1210 ml      Physical Exam  Vitals signs and nursing note reviewed.   Constitutional:       General: She is not in acute distress.     Appearance: Normal appearance. She is not ill-appearing, toxic-appearing or diaphoretic.   HENT:      Head: Normocephalic and atraumatic.      Mouth/Throat:      Mouth: Mucous membranes are moist.   Eyes:      General:         Left eye: No discharge.    Cardiovascular:      Rate and Rhythm: Normal rate and regular rhythm.      Comments: Left-sided subcutaneous device consistent with known pacemaker, no significant lower extremity edema  Pulmonary:      Effort: Pulmonary effort is normal. No respiratory distress.      Breath sounds: Normal breath sounds. No stridor. No wheezing.      Comments: Not on supplemental oxygen  Abdominal:      General: Bowel sounds are normal. There is no distension.      Palpations: Abdomen is soft. There is no mass.      Tenderness: There is no abdominal tenderness. There is no guarding.   Genitourinary:     Comments: No Schmidt catheter present  Musculoskeletal:      Comments: No reproducible chest wall tenderness to palpation   Skin:     General: Skin is warm and dry.   Neurological:      Mental Status: She is oriented to person, place, and time.   Psychiatric:         Mood and Affect: Mood normal.         Significant Labs:   BMP:   Recent Labs   Lab 07/25/20 0253   GLU 88      K 3.8      CO2 26   BUN 22   CREATININE 1.1   CALCIUM 9.0     CBC:   Recent Labs   Lab 07/24/20 1712   WBC 7.42   HGB 11.8*   HCT 36.8*        CMP:   Recent Labs   Lab 07/24/20 1712 07/25/20 0253    143   K 3.6 3.8    107   CO2 25 26   * 88   BUN 23 22   CREATININE 1.1 1.1   CALCIUM 9.3 9.0   PROT 7.5  --    ALBUMIN 3.9  --    BILITOT 0.6  --    ALKPHOS 58  --    AST 24  --    ALT 20  --    ANIONGAP 14 10   EGFRNONAA 46.9* 46.9*     Cardiac Markers:   Recent Labs   Lab 07/24/20 1712   *     Lactic Acid: No results for input(s): LACTATE in the last 48 hours.  Lipase: No results for input(s): LIPASE in the last 48 hours.  Magnesium: No results for input(s): MG in the last 48 hours.  POCT Glucose: No results for input(s): POCTGLUCOSE in the last 48 hours.  Troponin:   Recent Labs   Lab 07/24/20 1712 07/24/20 2033 07/25/20 0253   TROPONINI 0.038 0.059* 0.077*     TSH:   Recent Labs   Lab 05/02/20  0816   TSH 2.640      Urine Culture: No results for input(s): LABURIN in the last 48 hours.  Urine Studies: No results for input(s): COLORU, APPEARANCEUA, PHUR, SPECGRAV, PROTEINUA, GLUCUA, KETONESU, BILIRUBINUA, OCCULTUA, NITRITE, UROBILINOGEN, LEUKOCYTESUR, RBCUA, WBCUA, BACTERIA, SQUAMEPITHEL, HYALINECASTS in the last 48 hours.    Invalid input(s): WRIGHTSUR  All pertinent labs within the past 24 hours have been reviewed.    Significant Imaging: I have reviewed all pertinent imaging results/findings within the past 24 hours.     X-ray Chest Ap Portable    Result Date: 7/24/2020  Portable chest x-ray at 5:27 PM is compared to prior study dated 5/20/2020 Clinical history is hypertension, chest pain Patient has had a prior median sternotomy. There is a left subclavian vein implantable cardiac device with lead tips overlying the right atrium and right ventricle. The heart is mildly enlarged. The lungs are clear. There are no acute osseous abnormalities. IMPRESSION: Mild cardiomegaly with no acute pulmonary process Electronically Signed by Laurel Clark M.D. on 7/24/2020 5:33 PM  ECG Results          EKG 12-lead (In process)  Result time 07/25/20 04:59:50    In process by Interface, Lab In [facility] (07/25/20 04:59:50)                 Narrative:    Test Reason : R07.9,    Vent. Rate : 071 BPM     Atrial Rate : 071 BPM     P-R Int : 214 ms          QRS Dur : 130 ms      QT Int : 458 ms       P-R-T Axes : 088 085 108 degrees     QTc Int : 497 ms    Atrial-paced rhythm with prolonged AV conduction  Nonspecific intraventricular block  Abnormal ECG  When compared with ECG of 02-MAY-2020 03:52,  Questionable change in QRS duration  Criteria for Septal infarct are no longer Present    Referred By: [email or web address]   SELF           Confirmed By:                             Echocardiogram 2018  Summary Limited study. 1. LV size and systolic function normal. 2. Moderately enlarged LA. 3. Mildly enlarged RV. 4. No pericardial effusion.          Assessment/Plan:      * Chest pain concerning for angina  Presenting with chest pain at rest, relieved by sublingual nitro on history of significant coronary artery disease status post CABG and PCI.  EKG is paced, troponin is elevated, highest 0.077.  Currently patient is chest pain-free.  Continue to optimize blood pressure control  Complete echocardiogram pending  A.m. labs ordered for review  Continue aspirin, Plavix, metoprolol, losartan, p.r.n. sublingual nitro, Imdur  Ambulate and monitor for recurrent symptoms  Await Cardiology recommendations      Essential hypertension, not well controlled  Blood pressure was not controlled at time of admission, better today.  Recent admission for hypertensive urgency, admits full compliant with medications.  Continue metoprolol, losartan, Imdur, monitor and adjust as needed      CAD (coronary artery disease)  Known history of coronary artery disease status post 4 vessel CABG 2006 followed by PCI /20 X 4 2017.      Type 2 diabetes mellitus, without long-term current use of insulin  Not on medical therapy at home.  HbA1c 6%.  Continue low-dose sliding scale as needed.  Accu-Cheks.  Cardiac diabetic diet.  As needed hypoglycemic measures.      Pacemaker in place        Anxiety  Chronic medical condition      Acquired hypothyroidism  Continue levothyroxine supplementation        VTE Risk Mitigation (From admission, onward)         Ordered     enoxaparin injection 40 mg  Every 24 hours      07/24/20 1924     IP VTE HIGH RISK PATIENT  Once      07/24/20 1924     Place sequential compression device  Until discontinued      07/24/20 1924                      Marlen Shoemaker MD  Department of Hospital Medicine   [facility]

## 2020-07-25 NOTE — SUBJECTIVE & OBJECTIVE
Interval History:  Patient reports over the last week was experiencing upper back and shoulder pain, was at chiropractor office when had upper chest tightness, relieved by sublingual nitro.  Later on the week recurrent episode again relieved by sublingual nitro.  These episodes occurred at rest.  Has not had any further episodes since admitted.  Had previous admission for uncontrolled blood pressure however states at home was better controlled with systolics in the 120s, in the ED here blood pressure uncontrolled up to 190s.  Known history of coronary artery disease status post 4 vessel CABG followed by multiple PCI.  BP not well controlled here.  BNP still 265, troponin increased to 0.077.  Chest x-ray with cardiomegaly.  EKG paced.  Telemetry sinus rhythm.  Discussed plan of care with patient and relative at bedside.      Review of Systems   Constitutional: Negative for chills and fever.   HENT: Negative for trouble swallowing.    Respiratory: Negative for cough and shortness of breath.    Cardiovascular: Positive for chest pain. Negative for palpitations and leg swelling.   Gastrointestinal: Negative for abdominal pain, constipation, nausea and vomiting.   Musculoskeletal: Positive for back pain and neck pain.   Skin: Negative for wound.   Neurological: Negative for headaches.   Hematological: Does not bruise/bleed easily.   Psychiatric/Behavioral: Negative for confusion.     Objective:     Vital Signs (Most Recent):  Temp: 98.4 °F (36.9 °C) (07/25/20 0734)  Pulse: 74 (07/25/20 0734)  Resp: 16 (07/25/20 0734)  BP: (!) 151/68 (07/25/20 0734)  SpO2: 98 % (07/25/20 0734) Vital Signs (24h Range):  Temp:  [97.8 °F (36.6 °C)-99 °F (37.2 °C)] 98.4 °F (36.9 °C)  Pulse:  [70-95] 74  Resp:  [16-23] 16  SpO2:  [95 %-99 %] 98 %  BP: (149-191)/(67-82) 151/68     Weight: 66.6 kg (146 lb 13.2 oz)  Body mass index is 28.68 kg/m².    Intake/Output Summary (Last 24 hours) at 7/25/2020 1047  Last data filed at 7/25/2020 1024  Gross  per 24 hour   Intake 240 ml   Output 1450 ml   Net -1210 ml      Physical Exam  Vitals signs and nursing note reviewed.   Constitutional:       General: She is not in acute distress.     Appearance: Normal appearance. She is not ill-appearing, toxic-appearing or diaphoretic.   HENT:      Head: Normocephalic and atraumatic.      Mouth/Throat:      Mouth: Mucous membranes are moist.   Eyes:      General:         Left eye: No discharge.   Cardiovascular:      Rate and Rhythm: Normal rate and regular rhythm.      Comments: Left-sided subcutaneous device consistent with known pacemaker, no significant lower extremity edema  Pulmonary:      Effort: Pulmonary effort is normal. No respiratory distress.      Breath sounds: Normal breath sounds. No stridor. No wheezing.      Comments: Not on supplemental oxygen  Abdominal:      General: Bowel sounds are normal. There is no distension.      Palpations: Abdomen is soft. There is no mass.      Tenderness: There is no abdominal tenderness. There is no guarding.   Genitourinary:     Comments: No Schmidt catheter present  Musculoskeletal:      Comments: No reproducible chest wall tenderness to palpation   Skin:     General: Skin is warm and dry.   Neurological:      Mental Status: She is oriented to person, place, and time.   Psychiatric:         Mood and Affect: Mood normal.         Significant Labs:   BMP:   Recent Labs   Lab 07/25/20  0253   GLU 88      K 3.8      CO2 26   BUN 22   CREATININE 1.1   CALCIUM 9.0     CBC:   Recent Labs   Lab 07/24/20  1712   WBC 7.42   HGB 11.8*   HCT 36.8*        CMP:   Recent Labs   Lab 07/24/20  1712 07/25/20  0253    143   K 3.6 3.8    107   CO2 25 26   * 88   BUN 23 22   CREATININE 1.1 1.1   CALCIUM 9.3 9.0   PROT 7.5  --    ALBUMIN 3.9  --    BILITOT 0.6  --    ALKPHOS 58  --    AST 24  --    ALT 20  --    ANIONGAP 14 10   EGFRNONAA 46.9* 46.9*     Cardiac Markers:   Recent Labs   Lab 07/24/20  1712   BNP  265*     Lactic Acid: No results for input(s): LACTATE in the last 48 hours.  Lipase: No results for input(s): LIPASE in the last 48 hours.  Magnesium: No results for input(s): MG in the last 48 hours.  POCT Glucose: No results for input(s): POCTGLUCOSE in the last 48 hours.  Troponin:   Recent Labs   Lab 07/24/20  1712 07/24/20  2033 07/25/20  0253   TROPONINI 0.038 0.059* 0.077*     TSH:   Recent Labs   Lab 05/02/20  0816   TSH 2.640     Urine Culture: No results for input(s): LABURIN in the last 48 hours.  Urine Studies: No results for input(s): COLORU, APPEARANCEUA, PHUR, SPECGRAV, PROTEINUA, GLUCUA, KETONESU, BILIRUBINUA, OCCULTUA, NITRITE, UROBILINOGEN, LEUKOCYTESUR, RBCUA, WBCUA, BACTERIA, SQUAMEPITHEL, HYALINECASTS in the last 48 hours.    Invalid input(s): WRIGHTSUR  All pertinent labs within the past 24 hours have been reviewed.    Significant Imaging: I have reviewed all pertinent imaging results/findings within the past 24 hours.     X-ray Chest Ap Portable    Result Date: 7/24/2020  Portable chest x-ray at 5:27 PM is compared to prior study dated 5/20/2020 Clinical history is hypertension, chest pain Patient has had a prior median sternotomy. There is a left subclavian vein implantable cardiac device with lead tips overlying the right atrium and right ventricle. The heart is mildly enlarged. The lungs are clear. There are no acute osseous abnormalities. IMPRESSION: Mild cardiomegaly with no acute pulmonary process Electronically Signed by Laurel Clark M.D. on 7/24/2020 5:33 PM  ECG Results          EKG 12-lead (In process)  Result time 07/25/20 04:59:50    In process by Interface, Lab In [facility] (07/25/20 04:59:50)                 Narrative:    Test Reason : R07.9,    Vent. Rate : 071 BPM     Atrial Rate : 071 BPM     P-R Int : 214 ms          QRS Dur : 130 ms      QT Int : 458 ms       P-R-T Axes : 088 085 108 degrees     QTc Int : 497 ms    Atrial-paced rhythm with prolonged AV  conduction  Nonspecific intraventricular block  Abnormal ECG  When compared with ECG of 02-MAY-2020 03:52,  Questionable change in QRS duration  Criteria for Septal infarct are no longer Present    Referred By: [email or web address]   SELF           Confirmed By:                             Echocardiogram 2018  Summary Limited study. 1. LV size and systolic function normal. 2. Moderately enlarged LA. 3. Mildly enlarged RV. 4. No pericardial effusion.

## 2020-07-25 NOTE — ASSESSMENT & PLAN NOTE
As discussion with Cardiology, ppm interrogation revealed paroxysmal AFib runs, metoprolol increased.

## 2020-07-25 NOTE — CONSULTS
[facility]  Cardiology  Consult Note    Patient Name: Karma Chen  MRN: [id number]  Admission Date: 7/24/2020  Hospital Length of Stay: 0 days  Code Status: Full Code   Attending Provider: Marlen Shoemaker MD   Consulting Provider: Nadeem Schultz MD  Primary Care Physician: Eelna Sullivan MD  Principal Problem:Other chest pain    Patient information was obtained from patient, past medical records and ER records.     Consults  Subjective:     REASON FOR CONSULT:  Chest pain     HPI:  84-year-old female with a past medical history significant for paroxysmal atrial fibrillation, coronary artery disease status post CABG status post left main with LAD and left circumflex bifurcation stenting, hypertension, dyslipidemia, diabetes mellitus presented to the hospital with complaints of back pain and neck pain.  She reportedly has been feeling fatigued.  She went to see a chiropractor on Monday and with manipulation she felt better.  She also had some chest tightness that was radiating to the back and took a couple of sublingual nitroglycerin with resolution of the symptoms.  Her blood pressure reportedly has been running high.  On initial presentation to the emergency room her blood pressure was 191/82.  She currently feels better and is back to baseline.  EKG shows changes which are at baseline.  Troponin is only minimally elevated.    Past Medical History:   Diagnosis Date    Coronary artery disease     Dermatitis     Dermatitis 12/8/2017    Diabetes mellitus     Diabetes mellitus type II     Leah Madrigal virus infection     Fibromyalgia     GERD (gastroesophageal reflux disease)     Hypertension     MI (myocardial infarction) 09/23/2011    Pure hypercholesterolemia 2/26/2020       Past Surgical History:   Procedure Laterality Date    adenoids      ANGIOPLASTY  1987    APPENDECTOMY      CARDIAC PACEMAKER PLACEMENT  01/12/2017    CATARACT EXTRACTION, BILATERAL Bilateral 9/2/15,  3/17/15    right eye-9/2/15, left eye-3/17/15    CHOLECYSTECTOMY  1987    CORONARY ARTERY BYPASS GRAFT  2006    quadruple    coronary stents  1999    x2    CYST REMOVAL  04/25/2017    on back    HYSTERECTOMY  1966    OVARY SURGERY  1948    Ovary burst & was repaired    RHIZOTOMY  09/14/2018    TONSILLECTOMY  1940       Review of patient's allergies indicates:   Allergen Reactions    Arthrotec 50 [diclofenac-misoprostol]     Doxycycline     Effexor [venlafaxine]     Estradiol     Flagyl [metronidazole]     Fluconazole     Iodine     Levaquin [levofloxacin]     Nexium [esomeprazole magnesium]     Penicillins     Shellfish containing products     Statins-hmg-coa reductase inhibitors     Sulfa (sulfonamide antibiotics)     Tea tree oil     Tegaserod      ZOLNORM    Trazodone     Yeast, dried        No current facility-administered medications on file prior to encounter.      Current Outpatient Medications on File Prior to Encounter   Medication Sig    acetaminophen (TYLENOL 8 HOUR) 650 MG TbSR Take 650 mg by mouth as needed.    ALPRAZolam (XANAX) 0.25 MG tablet Take 0.25 mg by mouth as needed for Anxiety.    aspirin (ECOTRIN) 81 MG EC tablet Take 1 tablet (81 mg total) by mouth once daily.    cetirizine (ZYRTEC) 10 MG tablet Take 10 mg by mouth once daily.    cholecalciferol, vitamin D3, (VITAMIN D3) 100 mcg (4,000 unit) Cap Take 1 capsule by mouth once daily.     clopidogrel (PLAVIX) 75 mg tablet Take 1 tablet (75 mg total) by mouth once daily.    coenzyme Q10 100 mg capsule Take 1 capsule (100 mg total) by mouth 2 (two) times daily.    cyclobenzaprine (FLEXERIL) 10 MG tablet Take 5 mg by mouth every evening.    diclofenac sodium (VOLTAREN) 1 % Gel Apply 2 g topically 4 (four) times daily.     fluticasone propionate (FLONASE) 50 mcg/actuation nasal spray 1 spray by Each Nostril route once daily.    isosorbide mononitrate (IMDUR) 60 MG 24 hr tablet TAKE 1 TABLET BY MOUTH ONCE DAILY  (Patient taking differently: Take 60 mg by mouth once daily. DO NOT CRUSH OR CHEW; SWALLOW WHOLE.)    Lactobacillus rhamnosus GG (CULTURELLE) 10 billion cell capsule Take 1 capsule by mouth once daily.    losartan (COZAAR) 25 MG tablet Take 1 tablet (25 mg total) by mouth 2 (two) times a day.    MAGNESIUM ORAL Take 500 mg by mouth once daily.     metoprolol succinate (TOPROL-XL) 25 MG 24 hr tablet Take 25 mg by mouth once daily.    montelukast (SINGULAIR) 10 mg tablet Take 1 tablet (10 mg total) by mouth every evening.    multivitamin (THERAGRAN) tablet Take 1 tablet by mouth 2 (two) times a day.     nitroGLYCERIN (NITROSTAT) 0.4 MG SL tablet Place 0.4 mg under the tongue every 5 (five) minutes as needed for Chest pain.     omeprazole (PRILOSEC) 20 MG capsule TAKE 1 CAPSULE BY MOUTH EVERY DAY (Patient taking differently: Take 20 mg by mouth once daily. )    SYNTHROID 88 mcg tablet TAKE 1 TABLET ONCE DAILY (Patient taking differently: Take 88 mcg by mouth before breakfast. )    traMADol (ULTRAM) 50 mg tablet Take 50 mg by mouth 2 (two) times daily as needed.    acetaminophen (TYLENOL) 500 MG tablet Take 500 mg by mouth every 6 (six) hours as needed for Pain.    estradiol (ESTRACE) 0.01 % (0.1 mg/g) vaginal cream Place 1 g vaginally once a week.     ipratropium (ATROVENT) 0.03 % nasal spray 2 sprays by Nasal route 2 (two) times daily.    tiZANidine (ZANAFLEX) 4 MG tablet Take 4 mg by mouth every 6 (six) hours as needed.       Scheduled Meds:   aspirin  81 mg Oral Daily    cetirizine  10 mg Oral Daily    cholecalciferol (vitamin D3)  2,000 Units Oral Daily    clopidogreL  75 mg Oral Daily    cyclobenzaprine  5 mg Oral QHS    enoxaparin  40 mg Subcutaneous Q24H    fluticasone propionate  1 spray Each Nostril Daily    ipratropium  2 spray Nasal BID    isosorbide mononitrate  60 mg Oral Daily    levothyroxine  88 mcg Oral Before breakfast    losartan  25 mg Oral BID    magnesium oxide  400 mg  Oral Daily    metoprolol succinate  25 mg Oral Daily    montelukast  10 mg Oral QHS    multivitamin  1 tablet Oral Daily    pantoprazole  40 mg Oral Daily     Continuous Infusions:  PRN Meds:.acetaminophen, ALPRAZolam, calcium chloride IVPB, calcium chloride IVPB, calcium chloride IVPB, dextrose 50%, dextrose 50%, insulin regular, magnesium oxide, magnesium sulfate IVPB, magnesium sulfate IVPB, magnesium sulfate IVPB, magnesium sulfate IVPB, nitroGLYCERIN, potassium chloride in water, potassium chloride in water, potassium chloride in water, potassium chloride in water, potassium chloride, potassium chloride, potassium chloride, potassium chloride, sodium phosphate IVPB, sodium phosphate IVPB, sodium phosphate IVPB, sodium phosphate IVPB, sodium phosphate IVPB, tiZANidine, traMADoL    Family History     Problem Relation (Age of Onset)    No Known Problems Mother, Father          Tobacco Use    Smoking status: Never Smoker    Smokeless tobacco: Never Used   Substance and Sexual Activity    Alcohol use: No    Drug use: No    Sexual activity: Not on file       ROS   No significant headaches or sore throat or runny nose.   No recent changes in vision.   No recent changes in hearing.  No dysphagia or odynophagia.  Reports chest heavy and shortness of breath at baseline.   Denies any cough or hemoptysis.   Denies any abdominal pain, nausea, vomiting, diarrhea or constipation.   Denies any dysuria or polyuria.   Denies any fevers or chills.   Denies any recent significant weight changes.   Denies bleeding diathesis    Objective:     Vital Signs (Most Recent):  Temp: 98.4 °F (36.9 °C) (07/25/20 0734)  Pulse: 74 (07/25/20 0734)  Resp: 16 (07/25/20 0734)  BP: (!) 151/68 (07/25/20 0734)  SpO2: 98 % (07/25/20 0734) Vital Signs (24h Range):  Temp:  [97.8 °F (36.6 °C)-99 °F (37.2 °C)] 98.4 °F (36.9 °C)  Pulse:  [70-95] 74  Resp:  [16-23] 16  SpO2:  [95 %-99 %] 98 %  BP: (149-191)/(67-82) 151/68     Weight: 66.6 kg (146  lb 13.2 oz)  Body mass index is 28.68 kg/m².    SpO2: 98 %  O2 Device (Oxygen Therapy): room air      Intake/Output Summary (Last 24 hours) at 7/25/2020 1029  Last data filed at 7/25/2020 1024  Gross per 24 hour   Intake 240 ml   Output 1450 ml   Net -1210 ml       Lines/Drains/Airways     Peripheral Intravenous Line                 Peripheral IV - Single Lumen 05/02/20 20 G Right Forearm 84 days         Peripheral IV - Single Lumen 07/24/20 1815 20 G Right Forearm less than 1 day                Physical Exam  HEENT: Normocephalic, atraumatic, PERRL, Conjunctiva pink, no scleral icterus.   CVS: S1S2+, RRR, no murmurs, rubs or gallops, JVP: Normal.  LUNGS: Clear  ABDOMEN: Soft, NT, BS+  EXTREMITIES: No cyanosis, clubbing or edema  NEURO: AAO X 3.       Significant Labs:   BMP:   Recent Labs   Lab 07/24/20 1712 07/25/20  0253   * 88    143   K 3.6 3.8    107   CO2 25 26   BUN 23 22   CREATININE 1.1 1.1   CALCIUM 9.3 9.0   , CMP   Recent Labs   Lab 07/24/20 1712 07/25/20  0253    143   K 3.6 3.8    107   CO2 25 26   * 88   BUN 23 22   CREATININE 1.1 1.1   CALCIUM 9.3 9.0   PROT 7.5  --    ALBUMIN 3.9  --    BILITOT 0.6  --    ALKPHOS 58  --    AST 24  --    ALT 20  --    ANIONGAP 14 10   ESTGFRAFRICA 54.0* 54.0*   EGFRNONAA 46.9* 46.9*   , CBC   Recent Labs   Lab 07/24/20 1712   WBC 7.42   HGB 11.8*   HCT 36.8*      , INR No results for input(s): INR, PROTIME in the last 48 hours., Lipid Panel No results for input(s): CHOL, HDL, LDLCALC, TRIG, CHOLHDL in the last 48 hours. and Troponin   Recent Labs   Lab 07/24/20 1712 07/24/20 2033 07/25/20  0253   TROPONINI 0.038 0.059* 0.077*       Significant Imaging: Reviewed  Assessment and Plan:     IMPRESSION:  Atypical chest pain.  Minimally elevated troponin  Coronary artery disease status post CABG status post PCI. Currently with angina. Currently without any ischemic symptoms.   Atrial fibrillation.  Paroxysmal.  Had a few  episodes of atrial fibrillation with rapid ventricular response on pacemaker interrogation in the clinic.  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  Not well controlled.  Likely contributing to the chest pain symptoms.  Dyslipidemia. LDL from 106 to 156. Trig 341 to 135. Intolerant to statins. Does not wish to try injectables.   Hypothyroidism  Chronic kidney disease.  Stage III.  Serum creatinine has been around 1.37-1.49 February of this year. Creatinine 1.1  EBV infection      RECOMMENDATIONS:  1.  I discussed at length the options of further therapy including optimization of medical management with blood pressure management, invasive options as well as a stress test for further risk stratification.  After discussion of the various options at this point in time patient preferred medical management.  2.  Increase metoprolol to 25 mg p.o. q.day.  3.  Continue current medical regimen otherwise.  4.  Follow-up in the clinic on 07/31/2020 or sooner if needed.  5.  Despite optimization of blood pressure and medical management if the patient continues to have symptoms, we may need to consider a left heart catheterization as an outpatient.  6.  Discussed with Dr. Shoemaker.    Thank you for your consult. I will sign off. Please contact us if you have any additional questions.    Nadeem Schultz MD  Cardiology   [facility]

## 2020-07-25 NOTE — ASSESSMENT & PLAN NOTE
Continue levothyroxine supplementation     Joi Pnadya  (RN)  2019 11:56:59 Joi Pandya  (RN)  2019 12:00:00

## 2020-07-25 NOTE — ASSESSMENT & PLAN NOTE
Blood pressure was not controlled at time of admission, better today.  Recent admission for hypertensive urgency, admits full compliant with medications.  Continue metoprolol, losartan, Imdur, monitor and adjust as needed

## 2020-07-25 NOTE — DISCHARGE SUMMARY
[facility] Medicine  Discharge Summary      Patient Name: Karma Chen  MRN: [id number]  Admission Date: 7/24/2020  Hospital Length of Stay: 0 days  Discharge Date and Time:  07/25/2020 12:57 PM  Attending Physician: Marlen Shoemaker MD   Discharging Provider: Marlen Shoemaker MD  Primary Care Provider: Elena Sullivan MD      HPI:   84 year old female presented to ED complaining of two episodes of central chest discomfort. Fist 4 days ago and was relieved with 1 NTG sublingually. She had another episode today, also relieved with 1 NTG. She called her Cardiologist's office, who recommended that she come to ED. Her discomfort is central tightness no radiations. No SOB. No orthopnea/PND/edema. No cough or sputum. Last stress was 2017 after which she received #4 stents at [facility]. No ECHO since then either on chart review.  Troponin <0.03. EKG: atrial pacing without acute segments. CXR: NAPD    * No surgery found *      Hospital Course:   Patient presenting with recurrent chest tightness.  Initially occurred while at chiropractor, was experiencing upper back and shoulder pain, then upper chest tightness, at rest, relieved by sublingual nitro.  Had recurrent episode throughout the course of the week.  Admitted for ACS evaluation given history of known coronary artery disease status post 4 vessel CABG and PCI.  EKG paced, troponin mildly elevated at 0.077.  Blood pressure grossly uncontrolled, resuming home medications and monitoring. Cardiology evaluated, known disease, pAF on interrogation of pacemaker recently, not limiting activities of daily living, metoprolol increased to 25 daily (she was on 12.5 prior to admission, sublingual nitro given, plans for close outpatient Cardiology follow-up next week to determine recurrent symptomatology and need for angiogram.  Echo with EF of 50%, trace valvular abnormalities.  Case was discussed with Cardiology on day of discharge, cleared by  cardiology for discharge.  Discharge plan including medication changes, follow-up as well as return precautions explained to the patient and relative at bedside.    Discharge examination  Eyes:      General:         Left eye: No discharge.   Cardiovascular:      Rate and Rhythm: Normal rate and regular rhythm.      Comments: Left-sided subcutaneous device consistent with known pacemaker, no significant lower extremity edema  Pulmonary:      Effort: Pulmonary effort is normal. No respiratory distress.      Breath sounds: Normal breath sounds. No stridor. No wheezing.      Comments: Not on supplemental oxygen  Abdominal:      General: Bowel sounds are normal. There is no distension.      Palpations: Abdomen is soft. There is no mass.      Tenderness: There is no abdominal tenderness. There is no guarding.   Genitourinary:     Comments: No Schmidt catheter present  Musculoskeletal:      Comments: No reproducible chest wall tenderness to palpation   Skin:     General: Skin is warm and dry.   Neurological:      Mental Status: She is oriented to person, place, and time.   Psychiatric:         Mood and Affect: Mood normal.      Consults:   Consults (From admission, onward)        Status Ordering Provider     Inpatient consult to Cardiology  Once     Provider:  Nadeem Schultz MD    Acknowledged SELENE ARCOS     Inpatient consult to Hospitalist  Once     Provider:  Selene Arcos MD    Acknowledged FREDIS MCHUGH          * Angina  Presenting with chest pain at rest, relieved by sublingual nitro on history of significant coronary artery disease status post CABG and PCI.  EKG is paced, troponin is elevated, highest 0.077.  Seen by Cardiology, metoprolol dose increased, given not limiting quality of life without recurrence during admission, will aim for medication optimization with outpatient follow-up in 1 week to determine need for left heart catheterization.  Outpatient cardiology follow-up.    Essential  hypertension, not well controlled  Blood pressure was not controlled at time of admission, better today.        CAD (coronary artery disease)  Known history of coronary artery disease status post 4 vessel CABG 2006 followed by PCI /20 X 4 2017.      PAF (paroxysmal atrial fibrillation)  As discussion with Cardiology, ppm interrogation revealed paroxysmal AFib runs, metoprolol increased.      Type 2 diabetes mellitus, without long-term current use of insulin  Not on medical therapy at home.  HbA1c 6%.        Pacemaker in place        Anxiety  Chronic medical condition      Acquired hypothyroidism  Continue levothyroxine supplementation        Final Active Diagnoses:    Diagnosis Date Noted POA    PRINCIPAL PROBLEM:  Angina [R07.89] 05/02/2020 Yes    Essential hypertension, not well controlled [I10] 02/20/2012 Yes    CAD (coronary artery disease) [I25.10] 07/25/2020 Yes     Chronic    Pacemaker in place [Z95.0] 07/25/2020 Yes     Chronic    Type 2 diabetes mellitus, without long-term current use of insulin [E11.9] 07/25/2020 Yes     Chronic    PAF (paroxysmal atrial fibrillation) [I48.0] 07/25/2020 Yes     Chronic    Acquired hypothyroidism [E03.9] 12/09/2017 Yes    Anxiety [F41.9] 12/09/2017 Yes      Problems Resolved During this Admission:       Discharged Condition: good    Disposition: Home or Self Care    Follow Up:  Follow-up Information     Nadeem Schultz MD. Schedule an appointment as soon as possible for a visit in 1 week.    Specialties: Cardiovascular Disease, Cardiology, INTERVENTIONAL CARDIOLOGY  Why: chest pain, post hospital follow up  Contact information:  [address] [place] [id number]  [phone]                 Patient Instructions:      Diet Cardiac     Notify your health care provider if you experience any of the following:  temperature >100.4     Notify your health care provider if you experience any of the following:  severe uncontrolled pain     Notify your  health care provider if you experience any of the following:  persistent dizziness, light-headedness, or visual disturbances     Activity as tolerated       Significant Diagnostic Studies: Labs:   BMP:   Recent Labs   Lab 07/24/20  1712 07/25/20  0253   * 88    143   K 3.6 3.8    107   CO2 25 26   BUN 23 22   CREATININE 1.1 1.1   CALCIUM 9.3 9.0   , CMP   Recent Labs   Lab 07/24/20  1712 07/25/20  0253    143   K 3.6 3.8    107   CO2 25 26   * 88   BUN 23 22   CREATININE 1.1 1.1   CALCIUM 9.3 9.0   PROT 7.5  --    ALBUMIN 3.9  --    BILITOT 0.6  --    ALKPHOS 58  --    AST 24  --    ALT 20  --    ANIONGAP 14 10   ESTGFRAFRICA 54.0* 54.0*   EGFRNONAA 46.9* 46.9*   , CBC   Recent Labs   Lab 07/24/20 1712   WBC 7.42   HGB 11.8*   HCT 36.8*      , INR   Lab Results   Component Value Date    INR 1.1 05/02/2020    INR 1.0 12/10/2017    INR 1.02 02/20/2012   , Lipid Panel   Lab Results   Component Value Date    CHOL 237 (H) 05/04/2020    HDL 48 05/04/2020    LDLCALC 147.6 05/04/2020    TRIG 207 (H) 05/04/2020    CHOLHDL 20.3 05/04/2020   , Troponin   Recent Labs   Lab 07/25/20 0253   TROPONINI 0.077*   , A1C:   Recent Labs   Lab 07/25/20  0253   HGBA1C 6.0    and All labs within the past 24 hours have been reviewed    Pending Diagnostic Studies:     Procedure Component Value Units Date/Time    Echo Color Flow Doppler? Yes; Bubble Contrast? No [[id number]]  (Abnormal) Resulted: 07/25/20 1120    Order Status: Sent Lab Status: In process Updated: 07/25/20 1120     BSA 1.67 m2      Right Atrial Pressure (from IVC) 3 mmHg      TDI SEPTAL 0.05 m/s      LV LATERAL E/E' RATIO 11.83 m/s      LV SEPTAL E/E' RATIO 14.20 m/s      AORTIC VALVE CUSP SEPERATION 1.83 cm      TDI LATERAL 0.06 m/s      PV PEAK VELOCITY 100.22 cm/s      LVIDD 4.17 cm      IVS 1.62 cm      PW 1.15 cm      Ao root annulus 3.17 cm      LVIDS 2.95 cm      FS 29 %      LV mass 218.37 g      LA size 3.90 cm      RVDD  234.00 cm      Left Ventricle Relative Wall Thickness 0.55 cm      AV mean gradient 4 mmHg      AV valve area 2.01 cm2      AV Velocity Ratio 52.90     AV index (prosthetic) 0.71     E/A ratio 1.01     Mean e' 0.06 m/s      E wave decelartion time 186.47 msec      IVRT 96.69 msec      LVOT diameter 1.90 cm      LVOT area 2.8 cm2      LVOT peak dylan 77.24 m/s      LVOT peak VTI 18.74 cm      Ao peak dylan 1.46 m/s      Ao VTI 26.39 cm      LVOT stroke volume 53.11 cm3      AV peak gradient 9 mmHg      TV rest pulmonary artery pressure 27 mmHg      E/E' ratio 12.91 m/s      MV Peak E Dylan 0.71 m/s      TR Max Dylan 2.43 m/s      MV Peak A Dylan 0.70 m/s      LV Systolic Volume 32.30 mL      LV Systolic Volume Index 19.8 mL/m2      LV Diastolic Volume 73.90 mL      LV Diastolic Volume Index 45.26 mL/m2      LV Mass Index 134 g/m2      Triscuspid Valve Regurgitation Peak Gradient 24 mmHg     Narrative:      · The estimated PA systolic pressure is 27 mmHg.       Impression:             X-ray Chest Ap Portable    Result Date: 7/24/2020  Portable chest x-ray at 5:27 PM is compared to prior study dated 5/20/2020 Clinical history is hypertension, chest pain Patient has had a prior median sternotomy. There is a left subclavian vein implantable cardiac device with lead tips overlying the right atrium and right ventricle. The heart is mildly enlarged. The lungs are clear. There are no acute osseous abnormalities. IMPRESSION: Mild cardiomegaly with no acute pulmonary process Electronically Signed by Laurel Clark M.D. on 7/24/2020 5:33 PM    Medications:  Reconciled Home Medications:      Medication List      CHANGE how you take these medications    acetaminophen 500 MG tablet  Commonly known as: TYLENOL  Take 500 mg by mouth every 6 (six) hours as needed for Pain.  What changed: Another medication with the same name was removed. Continue taking this medication, and follow the directions you see here.     isosorbide mononitrate 60 MG  24 hr tablet  Commonly known as: IMDUR  TAKE 1 TABLET BY MOUTH ONCE DAILY  What changed: additional instructions     SYNTHROID 88 MCG tablet  Generic drug: levothyroxine  TAKE 1 TABLET ONCE DAILY  What changed:   · how much to take  · when to take this        CONTINUE taking these medications    ALPRAZolam 0.25 MG tablet  Commonly known as: XANAX  Take 0.25 mg by mouth as needed for Anxiety.     aspirin 81 MG EC tablet  Commonly known as: ECOTRIN  Take 1 tablet (81 mg total) by mouth once daily.     cetirizine 10 MG tablet  Commonly known as: ZYRTEC  Take 10 mg by mouth once daily.     clopidogreL 75 mg tablet  Commonly known as: PLAVIX  Take 1 tablet (75 mg total) by mouth once daily.     coenzyme Q10 100 mg capsule  Take 1 capsule (100 mg total) by mouth 2 (two) times daily.     cyclobenzaprine 10 MG tablet  Commonly known as: FLEXERIL  Take 5 mg by mouth every evening.     estradioL 0.01 % (0.1 mg/gram) vaginal cream  Commonly known as: ESTRACE  Place 1 g vaginally once a week.     fluticasone propionate 50 mcg/actuation nasal spray  Commonly known as: FLONASE  1 spray by Each Nostril route once daily.     ipratropium 0.03 % nasal spray  Commonly known as: ATROVENT  2 sprays by Nasal route 2 (two) times daily.     Lactobacillus rhamnosus GG 10 billion cell capsule  Commonly known as: CULTURELLE  Take 1 capsule by mouth once daily.     losartan 25 MG tablet  Commonly known as: COZAAR  Take 1 tablet (25 mg total) by mouth 2 (two) times a day.     MAGNESIUM ORAL  Take 500 mg by mouth once daily.     metoprolol succinate 25 MG 24 hr tablet  Commonly known as: TOPROL-XL  Take 25 mg by mouth once daily.     montelukast 10 mg tablet  Commonly known as: SINGULAIR  Take 1 tablet (10 mg total) by mouth every evening.     multivitamin tablet  Commonly known as: THERAGRAN  Take 1 tablet by mouth 2 (two) times a day.     nitroGLYCERIN 0.4 MG SL tablet  Commonly known as: NITROSTAT  Place 0.4 mg under the tongue every 5 (five)  minutes as needed for Chest pain.     omeprazole 20 MG capsule  Commonly known as: PRILOSEC  TAKE 1 CAPSULE BY MOUTH EVERY DAY     tiZANidine 4 MG tablet  Commonly known as: ZANAFLEX  Take 4 mg by mouth every 6 (six) hours as needed.     traMADoL 50 mg tablet  Commonly known as: ULTRAM  Take 50 mg by mouth 2 (two) times daily as needed.     VITAMIN D3 100 mcg (4,000 unit) Cap  Generic drug: cholecalciferol (vitamin D3)  Take 1 capsule by mouth once daily.        STOP taking these medications    diclofenac sodium 1 % Gel  Commonly known as: VOLTAREN            Indwelling Lines/Drains at time of discharge:   Lines/Drains/Airways     None                 Time spent on the discharge of patient: 32 minutes  Patient was seen and examined on the date of discharge and determined to be suitable for discharge.         Marlen Shoemaker MD  Department of Hospital Medicine  [facility]

## 2020-07-25 NOTE — ASSESSMENT & PLAN NOTE
Not on medical therapy at home.  HbA1c 6%.  Continue low-dose sliding scale as needed.  Accu-Cheks.  Cardiac diabetic diet.  As needed hypoglycemic measures.

## 2020-07-25 NOTE — H&P
[facility] Medicine  History & Physical    Patient Name: Karma Chen  MRN: [id number]  Admission Date: 7/24/2020  Attending Physician: Julio Cesar Arcos MD  Primary Care Provider: Elena Sullivan MD         Patient information was obtained from patient, spouse/SO and ER records.     Subjective:     Principal Problem:Other chest pain    Chief Complaint:   Chief Complaint   Patient presents with    Chest Pain        HPI: 84 year old female presented to ED complaining of two episodes of central chest discomfort. Fist 4 days ago and was relieved with 1 NTG sublingually. She had another episode today, also relieved with 1 NTG. She called her Cardiologist's office, who recommended that she come to ED. Her discomfort is central tightness no radiations. No SOB. No orthopnea/PND/edema. No cough or sputum. Last stress was 2017 after which she received #4 stents at [facility]. No ECHO since then either on chart review.  Troponin <0.03. EKG: atrial pacing without acute segments. CXR: NAPD    Past Medical History:   Diagnosis Date    Coronary artery disease     Dermatitis     Dermatitis 12/8/2017    Diabetes mellitus     Diabetes mellitus type II     Leah Madrigal virus infection     Fibromyalgia     GERD (gastroesophageal reflux disease)     Hypertension     MI (myocardial infarction) 09/23/2011    Pure hypercholesterolemia 2/26/2020       Past Surgical History:   Procedure Laterality Date    adenoids      ANGIOPLASTY  1987    APPENDECTOMY      CARDIAC PACEMAKER PLACEMENT  01/12/2017    CATARACT EXTRACTION, BILATERAL Bilateral 9/2/15, 3/17/15    right eye-9/2/15, left eye-3/17/15    CHOLECYSTECTOMY  1987    CORONARY ARTERY BYPASS GRAFT  2006    quadruple    coronary stents  1999    x2    CYST REMOVAL  04/25/2017    on back    HYSTERECTOMY  1966    OVARY SURGERY  1948    Ovary burst & was repaired    RHIZOTOMY  09/14/2018    TONSILLECTOMY  1940       Review of patient's  allergies indicates:   Allergen Reactions    Arthrotec 50 [diclofenac-misoprostol]     Doxycycline     Effexor [venlafaxine]     Estradiol     Flagyl [metronidazole]     Fluconazole     Iodine     Levaquin [levofloxacin]     Nexium [esomeprazole magnesium]     Penicillins     Shellfish containing products     Statins-hmg-coa reductase inhibitors     Sulfa (sulfonamide antibiotics)     Tea tree oil     Tegaserod      ZOLNORM    Trazodone     Yeast, dried        No current facility-administered medications on file prior to encounter.      Current Outpatient Medications on File Prior to Encounter   Medication Sig    acetaminophen (TYLENOL 8 HOUR) 650 MG TbSR Take 650 mg by mouth as needed.    ALPRAZolam (XANAX) 0.25 MG tablet Take 0.25 mg by mouth as needed for Anxiety.    aspirin (ECOTRIN) 81 MG EC tablet Take 1 tablet (81 mg total) by mouth once daily.    cetirizine (ZYRTEC) 10 MG tablet Take 10 mg by mouth once daily.    cholecalciferol, vitamin D3, (VITAMIN D3) 100 mcg (4,000 unit) Cap Take 1 capsule by mouth once daily.     clopidogrel (PLAVIX) 75 mg tablet Take 1 tablet (75 mg total) by mouth once daily.    coenzyme Q10 100 mg capsule Take 1 capsule (100 mg total) by mouth 2 (two) times daily.    cyclobenzaprine (FLEXERIL) 10 MG tablet Take 5 mg by mouth every evening.    diclofenac sodium (VOLTAREN) 1 % Gel Apply 2 g topically 4 (four) times daily.     fluticasone propionate (FLONASE) 50 mcg/actuation nasal spray 1 spray by Each Nostril route once daily.    isosorbide mononitrate (IMDUR) 60 MG 24 hr tablet TAKE 1 TABLET BY MOUTH ONCE DAILY (Patient taking differently: Take 60 mg by mouth once daily. DO NOT CRUSH OR CHEW; SWALLOW WHOLE.)    Lactobacillus rhamnosus GG (CULTURELLE) 10 billion cell capsule Take 1 capsule by mouth once daily.    losartan (COZAAR) 25 MG tablet Take 1 tablet (25 mg total) by mouth 2 (two) times a day.    MAGNESIUM ORAL Take 500 mg by mouth once daily.      metoprolol succinate (TOPROL-XL) 25 MG 24 hr tablet Take 25 mg by mouth once daily.    montelukast (SINGULAIR) 10 mg tablet Take 1 tablet (10 mg total) by mouth every evening.    multivitamin (THERAGRAN) tablet Take 1 tablet by mouth 2 (two) times a day.     nitroGLYCERIN (NITROSTAT) 0.4 MG SL tablet Place 0.4 mg under the tongue every 5 (five) minutes as needed for Chest pain.     omeprazole (PRILOSEC) 20 MG capsule TAKE 1 CAPSULE BY MOUTH EVERY DAY (Patient taking differently: Take 20 mg by mouth once daily. )    SYNTHROID 88 mcg tablet TAKE 1 TABLET ONCE DAILY (Patient taking differently: Take 88 mcg by mouth before breakfast. )    traMADol (ULTRAM) 50 mg tablet Take 50 mg by mouth 2 (two) times daily as needed.    acetaminophen (TYLENOL) 500 MG tablet Take 500 mg by mouth every 6 (six) hours as needed for Pain.    estradiol (ESTRACE) 0.01 % (0.1 mg/g) vaginal cream Place 1 g vaginally once a week.     ipratropium (ATROVENT) 0.03 % nasal spray 2 sprays by Nasal route 2 (two) times daily.    tiZANidine (ZANAFLEX) 4 MG tablet Take 4 mg by mouth every 6 (six) hours as needed.    [DISCONTINUED] acyclovir 5% (ZOVIRAX) 5 % Crea Apply topically 2 (two) times daily as needed. (Patient not taking: Reported on 2/26/2020)    [DISCONTINUED] gentamicin (GARAMYCIN) 0.1 % ointment     [DISCONTINUED] metoprolol succinate (TOPROL-XL) 25 MG 24 hr tablet TAKE 1 TABLET BY MOUTH EVERY DAY (Patient taking differently: 25 mg. DO NOT CRUSH OR CHEW; SWALLOW WHOLE.)    [DISCONTINUED] niacin, inositol niacinate, (NIACIN FLUSH FREE) 400 mg niacin (500 mg) Cap Take 1 capsule by mouth once daily.     Family History     Problem Relation (Age of Onset)    No Known Problems Mother, Father        Tobacco Use    Smoking status: Never Smoker    Smokeless tobacco: Never Used   Substance and Sexual Activity    Alcohol use: No    Drug use: No    Sexual activity: Not on file     Review of Systems   Constitutional: Negative.     HENT: Negative.    Eyes: Negative.    Respiratory: Negative.    Cardiovascular: Positive for chest pain.   Gastrointestinal: Negative.    Endocrine: Negative.    Genitourinary: Negative.    Musculoskeletal: Negative.    Skin: Negative.    Allergic/Immunologic: Negative.    Neurological: Negative.    Hematological: Negative.    All other systems reviewed and are negative.    Objective:     Vital Signs (Most Recent):  Temp: 99 °F (37.2 °C) (07/24/20 1703)  Pulse: 72 (07/24/20 1930)  Resp: 16 (07/24/20 1930)  BP: (!) 172/70 (07/24/20 1930)  SpO2: 99 % (07/24/20 1930) Vital Signs (24h Range):  Temp:  [99 °F (37.2 °C)] 99 °F (37.2 °C)  Pulse:  [70-95] 72  Resp:  [16-23] 16  SpO2:  [97 %-99 %] 99 %  BP: (172-191)/(70-82) 172/70     Weight: 67.1 kg (148 lb)  Body mass index is 28.9 kg/m².    Physical Exam  Vitals signs and nursing note reviewed.   Constitutional:       Appearance: She is well-developed.   HENT:      Head: Normocephalic and atraumatic.      Right Ear: External ear normal.      Left Ear: External ear normal.      Nose: Nose normal.   Eyes:      Conjunctiva/sclera: Conjunctivae normal.      Pupils: Pupils are equal, round, and reactive to light.   Neck:      Musculoskeletal: Normal range of motion and neck supple.   Cardiovascular:      Rate and Rhythm: Normal rate and regular rhythm.      Heart sounds: Normal heart sounds.   Pulmonary:      Effort: Pulmonary effort is normal.      Breath sounds: Normal breath sounds.   Abdominal:      General: Bowel sounds are normal.      Palpations: Abdomen is soft.   Musculoskeletal: Normal range of motion.   Skin:     General: Skin is warm and dry.      Capillary Refill: Capillary refill takes less than 2 seconds.   Neurological:      Mental Status: She is alert and oriented to person, place, and time.   Psychiatric:         Behavior: Behavior normal.         Thought Content: Thought content normal.         Judgment: Judgment normal.           CRANIAL NERVES     CN  III, IV, VI   Pupils are equal, round, and reactive to light.       Significant Labs:   CBC:   Recent Labs   Lab 07/24/20  1712   WBC 7.42   HGB 11.8*   HCT 36.8*        CMP:   Recent Labs   Lab 07/24/20  1712      K 3.6      CO2 25   *   BUN 23   CREATININE 1.1   CALCIUM 9.3   PROT 7.5   ALBUMIN 3.9   BILITOT 0.6   ALKPHOS 58   AST 24   ALT 20   ANIONGAP 14   EGFRNONAA 46.9*       Significant Imaging: I have reviewed and interpreted all pertinent imaging results/findings within the past 24 hours.    Assessment/Plan:     * Other chest pain  Stress myoview, Cardiology, serial troponin      Essential hypertension  Continue home regimen        VTE Risk Mitigation (From admission, onward)         Ordered     enoxaparin injection 40 mg  Every 24 hours      07/24/20 1924     IP VTE HIGH RISK PATIENT  Once      07/24/20 1924     Place sequential compression device  Until discontinued      07/24/20 1924                   Julio Cesar Arcos MD  Department of Hospital Medicine   [facility]

## 2020-07-25 NOTE — SUBJECTIVE & OBJECTIVE
Past Medical History:   Diagnosis Date    Coronary artery disease     Dermatitis     Dermatitis 12/8/2017    Diabetes mellitus     Diabetes mellitus type II     Leah Madrigal virus infection     Fibromyalgia     GERD (gastroesophageal reflux disease)     Hypertension     MI (myocardial infarction) 09/23/2011    Pure hypercholesterolemia 2/26/2020       Past Surgical History:   Procedure Laterality Date    adenoids      ANGIOPLASTY  1987    APPENDECTOMY      CARDIAC PACEMAKER PLACEMENT  01/12/2017    CATARACT EXTRACTION, BILATERAL Bilateral 9/2/15, 3/17/15    right eye-9/2/15, left eye-3/17/15    CHOLECYSTECTOMY  1987    CORONARY ARTERY BYPASS GRAFT  2006    quadruple    coronary stents  1999    x2    CYST REMOVAL  04/25/2017    on back    HYSTERECTOMY  1966    OVARY SURGERY  1948    Ovary burst & was repaired    RHIZOTOMY  09/14/2018    TONSILLECTOMY  1940       Review of patient's allergies indicates:   Allergen Reactions    Arthrotec 50 [diclofenac-misoprostol]     Doxycycline     Effexor [venlafaxine]     Estradiol     Flagyl [metronidazole]     Fluconazole     Iodine     Levaquin [levofloxacin]     Nexium [esomeprazole magnesium]     Penicillins     Shellfish containing products     Statins-hmg-coa reductase inhibitors     Sulfa (sulfonamide antibiotics)     Tea tree oil     Tegaserod      ZOLNORM    Trazodone     Yeast, dried        No current facility-administered medications on file prior to encounter.      Current Outpatient Medications on File Prior to Encounter   Medication Sig    acetaminophen (TYLENOL 8 HOUR) 650 MG TbSR Take 650 mg by mouth as needed.    ALPRAZolam (XANAX) 0.25 MG tablet Take 0.25 mg by mouth as needed for Anxiety.    aspirin (ECOTRIN) 81 MG EC tablet Take 1 tablet (81 mg total) by mouth once daily.    cetirizine (ZYRTEC) 10 MG tablet Take 10 mg by mouth once daily.    cholecalciferol, vitamin D3, (VITAMIN D3) 100 mcg (4,000 unit) Cap Take 1  capsule by mouth once daily.     clopidogrel (PLAVIX) 75 mg tablet Take 1 tablet (75 mg total) by mouth once daily.    coenzyme Q10 100 mg capsule Take 1 capsule (100 mg total) by mouth 2 (two) times daily.    cyclobenzaprine (FLEXERIL) 10 MG tablet Take 5 mg by mouth every evening.    diclofenac sodium (VOLTAREN) 1 % Gel Apply 2 g topically 4 (four) times daily.     fluticasone propionate (FLONASE) 50 mcg/actuation nasal spray 1 spray by Each Nostril route once daily.    isosorbide mononitrate (IMDUR) 60 MG 24 hr tablet TAKE 1 TABLET BY MOUTH ONCE DAILY (Patient taking differently: Take 60 mg by mouth once daily. DO NOT CRUSH OR CHEW; SWALLOW WHOLE.)    Lactobacillus rhamnosus GG (CULTURELLE) 10 billion cell capsule Take 1 capsule by mouth once daily.    losartan (COZAAR) 25 MG tablet Take 1 tablet (25 mg total) by mouth 2 (two) times a day.    MAGNESIUM ORAL Take 500 mg by mouth once daily.     metoprolol succinate (TOPROL-XL) 25 MG 24 hr tablet Take 25 mg by mouth once daily.    montelukast (SINGULAIR) 10 mg tablet Take 1 tablet (10 mg total) by mouth every evening.    multivitamin (THERAGRAN) tablet Take 1 tablet by mouth 2 (two) times a day.     nitroGLYCERIN (NITROSTAT) 0.4 MG SL tablet Place 0.4 mg under the tongue every 5 (five) minutes as needed for Chest pain.     omeprazole (PRILOSEC) 20 MG capsule TAKE 1 CAPSULE BY MOUTH EVERY DAY (Patient taking differently: Take 20 mg by mouth once daily. )    SYNTHROID 88 mcg tablet TAKE 1 TABLET ONCE DAILY (Patient taking differently: Take 88 mcg by mouth before breakfast. )    traMADol (ULTRAM) 50 mg tablet Take 50 mg by mouth 2 (two) times daily as needed.    acetaminophen (TYLENOL) 500 MG tablet Take 500 mg by mouth every 6 (six) hours as needed for Pain.    estradiol (ESTRACE) 0.01 % (0.1 mg/g) vaginal cream Place 1 g vaginally once a week.     ipratropium (ATROVENT) 0.03 % nasal spray 2 sprays by Nasal route 2 (two) times daily.    tiZANidine  (ZANAFLEX) 4 MG tablet Take 4 mg by mouth every 6 (six) hours as needed.    [DISCONTINUED] acyclovir 5% (ZOVIRAX) 5 % Crea Apply topically 2 (two) times daily as needed. (Patient not taking: Reported on 2/26/2020)    [DISCONTINUED] gentamicin (GARAMYCIN) 0.1 % ointment     [DISCONTINUED] metoprolol succinate (TOPROL-XL) 25 MG 24 hr tablet TAKE 1 TABLET BY MOUTH EVERY DAY (Patient taking differently: 25 mg. DO NOT CRUSH OR CHEW; SWALLOW WHOLE.)    [DISCONTINUED] niacin, inositol niacinate, (NIACIN FLUSH FREE) 400 mg niacin (500 mg) Cap Take 1 capsule by mouth once daily.     Family History     Problem Relation (Age of Onset)    No Known Problems Mother, Father        Tobacco Use    Smoking status: Never Smoker    Smokeless tobacco: Never Used   Substance and Sexual Activity    Alcohol use: No    Drug use: No    Sexual activity: Not on file     Review of Systems   Constitutional: Negative.    HENT: Negative.    Eyes: Negative.    Respiratory: Negative.    Cardiovascular: Positive for chest pain.   Gastrointestinal: Negative.    Endocrine: Negative.    Genitourinary: Negative.    Musculoskeletal: Negative.    Skin: Negative.    Allergic/Immunologic: Negative.    Neurological: Negative.    Hematological: Negative.    All other systems reviewed and are negative.    Objective:     Vital Signs (Most Recent):  Temp: 99 °F (37.2 °C) (07/24/20 1703)  Pulse: 72 (07/24/20 1930)  Resp: 16 (07/24/20 1930)  BP: (!) 172/70 (07/24/20 1930)  SpO2: 99 % (07/24/20 1930) Vital Signs (24h Range):  Temp:  [99 °F (37.2 °C)] 99 °F (37.2 °C)  Pulse:  [70-95] 72  Resp:  [16-23] 16  SpO2:  [97 %-99 %] 99 %  BP: (172-191)/(70-82) 172/70     Weight: 67.1 kg (148 lb)  Body mass index is 28.9 kg/m².    Physical Exam  Vitals signs and nursing note reviewed.   Constitutional:       Appearance: She is well-developed.   HENT:      Head: Normocephalic and atraumatic.      Right Ear: External ear normal.      Left Ear: External ear normal.       Nose: Nose normal.   Eyes:      Conjunctiva/sclera: Conjunctivae normal.      Pupils: Pupils are equal, round, and reactive to light.   Neck:      Musculoskeletal: Normal range of motion and neck supple.   Cardiovascular:      Rate and Rhythm: Normal rate and regular rhythm.      Heart sounds: Normal heart sounds.   Pulmonary:      Effort: Pulmonary effort is normal.      Breath sounds: Normal breath sounds.   Abdominal:      General: Bowel sounds are normal.      Palpations: Abdomen is soft.   Musculoskeletal: Normal range of motion.   Skin:     General: Skin is warm and dry.      Capillary Refill: Capillary refill takes less than 2 seconds.   Neurological:      Mental Status: She is alert and oriented to person, place, and time.   Psychiatric:         Behavior: Behavior normal.         Thought Content: Thought content normal.         Judgment: Judgment normal.           CRANIAL NERVES     CN III, IV, VI   Pupils are equal, round, and reactive to light.       Significant Labs:   CBC:   Recent Labs   Lab 07/24/20  1712   WBC 7.42   HGB 11.8*   HCT 36.8*        CMP:   Recent Labs   Lab 07/24/20  1712      K 3.6      CO2 25   *   BUN 23   CREATININE 1.1   CALCIUM 9.3   PROT 7.5   ALBUMIN 3.9   BILITOT 0.6   ALKPHOS 58   AST 24   ALT 20   ANIONGAP 14   EGFRNONAA 46.9*       Significant Imaging: I have reviewed and interpreted all pertinent imaging results/findings within the past 24 hours.

## 2020-07-25 NOTE — HPI
84 year old female presented to ED complaining of two episodes of central chest discomfort. Fist 4 days ago and was relieved with 1 NTG sublingually. She had another episode today, also relieved with 1 NTG. She called her Cardiologist's office, who recommended that she come to ED. Her discomfort is central tightness no radiations. No SOB. No orthopnea/PND/edema. No cough or sputum. Last stress was 2017 after which she received #4 stents at [facility]. No ECHO since then either on chart review.  Troponin <0.03. EKG: atrial pacing without acute segments. CXR: [facility]

## 2020-07-25 NOTE — ASSESSMENT & PLAN NOTE
Presenting with chest pain at rest, relieved by sublingual nitro on history of significant coronary artery disease status post CABG and PCI.  EKG is paced, troponin is elevated, highest 0.077.  Currently patient is chest pain-free.  Continue to optimize blood pressure control  Complete echocardiogram pending  A.m. labs ordered for review  Continue aspirin, Plavix, metoprolol, losartan, p.r.n. sublingual nitro, Imdur  Ambulate and monitor for recurrent symptoms  Await Cardiology recommendations

## 2020-07-25 NOTE — NURSING
Patient received on unit at 2105. NAD noted. Denies chest pain or sob. Ambulated with assistance to bathroom.   Troponin critical at 0.059, Dr. Arcos notified. New orders to consult Dr. Fitzgerald. Dr. Fitzgerald notified of troponin. Will see patient in the morning.   Informed patient of stress test and echo ordered for the am. Verbalized understanding of testing and NPO status after midnight.   Educated patient on bed alarm, and calling for assistance ambulating. Patient verbalized understanding. Will continue to monitor.

## 2020-07-25 NOTE — ASSESSMENT & PLAN NOTE
Known history of coronary artery disease status post 4 vessel CABG 2006 followed by PCI /20 X 4 2017.

## 2020-07-25 NOTE — ASSESSMENT & PLAN NOTE
Presenting with chest pain at rest, relieved by sublingual nitro on history of significant coronary artery disease status post CABG and PCI.  EKG is paced, troponin is elevated, highest 0.077.  Seen by Cardiology, metoprolol dose increased, given not limiting quality of life without recurrence during admission, will aim for medication optimization with outpatient follow-up in 1 week to determine need for left heart catheterization.  Outpatient cardiology follow-up.

## 2020-07-25 NOTE — HOSPITAL COURSE
Patient presenting with recurrent chest tightness.  Initially occurred while at chiropractor, was experiencing upper back and shoulder pain, then upper chest tightness, at rest, relieved by sublingual nitro.  Had recurrent episode throughout the course of the week.  Admitted for ACS evaluation given history of known coronary artery disease status post 4 vessel CABG and PCI.  EKG paced, troponin mildly elevated at 0.077.  Blood pressure grossly uncontrolled, resuming home medications and monitoring. Cardiology evaluated, known disease, pAF on interrogation of pacemaker recently, not limiting activities of daily living, metoprolol increased to 25 daily (she was on 12.5 prior to admission, sublingual nitro given, plans for close outpatient Cardiology follow-up next week to determine recurrent symptomatology and need for angiogram.  Echo with EF of 50%, trace valvular abnormalities.  Case was discussed with Cardiology on day of discharge, cleared by cardiology for discharge.  Discharge plan including medication changes, follow-up as well as return precautions explained to the patient and relative at bedside.    Discharge examination  Eyes:      General:         Left eye: No discharge.   Cardiovascular:      Rate and Rhythm: Normal rate and regular rhythm.      Comments: Left-sided subcutaneous device consistent with known pacemaker, no significant lower extremity edema  Pulmonary:      Effort: Pulmonary effort is normal. No respiratory distress.      Breath sounds: Normal breath sounds. No stridor. No wheezing.      Comments: Not on supplemental oxygen  Abdominal:      General: Bowel sounds are normal. There is no distension.      Palpations: Abdomen is soft. There is no mass.      Tenderness: There is no abdominal tenderness. There is no guarding.   Genitourinary:     Comments: No Schmidt catheter present  Musculoskeletal:      Comments: No reproducible chest wall tenderness to palpation   Skin:     General: Skin is warm  and dry.   Neurological:      Mental Status: She is oriented to person, place, and time.   Psychiatric:         Mood and Affect: Mood normal.

## 2020-07-25 NOTE — PLAN OF CARE
07/25/20 1308   Final Note   Assessment Type Final Discharge Note   Anticipated Discharge Disposition Home

## 2020-07-26 NOTE — ED PROVIDER NOTES
Encounter Date: 7/24/2020       History     Chief Complaint   Patient presents with    Chest Pain     This is a pleasant 84-year-old female who presents complaining of intermittent abdominal pain.  Patient has a history of coronary artery disease.  She has had previous bypass surgery and multiple cardiac stents as well as a pacemaker.  She states that several days ago she had an episode of midsternal chest pressure with some shortness of breath.  The symptoms were alleviated with nitroglycerin.  She had a 2nd episode of chest discomfort today prompting presentation to the emergency room.  She is currently chest pain free.  She has not had any recent cardiac testing.  The pain has been dull in nature when it is present.  She has been having shortness of breath with exertion.  She has not been able to associate the chest discomfort with exertion but has been feeling fatigued over the last several days as well.  She denies fever chills cough and or any COVID-19 type symptoms.  She denies any abdominal pain nausea vomiting diarrhea or gastrointestinal bleeding.  Currently she denies any symptoms or complaints.        Review of patient's allergies indicates:   Allergen Reactions    Arthrotec 50 [diclofenac-misoprostol]     Doxycycline     Effexor [venlafaxine]     Estradiol     Flagyl [metronidazole]     Fluconazole     Iodine     Levaquin [levofloxacin]     Nexium [esomeprazole magnesium]     Penicillins     Shellfish containing products     Statins-hmg-coa reductase inhibitors     Sulfa (sulfonamide antibiotics)     Tea tree oil     Tegaserod      ZOLNORM    Trazodone     Yeast, dried      Past Medical History:   Diagnosis Date    Coronary artery disease     Dermatitis     Dermatitis 12/8/2017    Diabetes mellitus     Diabetes mellitus type II     Leah Madrigal virus infection     Fibromyalgia     GERD (gastroesophageal reflux disease)     Hypertension     MI (myocardial infarction)  09/23/2011    Pure hypercholesterolemia 2/26/2020     Past Surgical History:   Procedure Laterality Date    adenoids      ANGIOPLASTY  1987    APPENDECTOMY      CARDIAC PACEMAKER PLACEMENT  01/12/2017    CATARACT EXTRACTION, BILATERAL Bilateral 9/2/15, 3/17/15    right eye-9/2/15, left eye-3/17/15    CHOLECYSTECTOMY  1987    CORONARY ARTERY BYPASS GRAFT  2006    quadruple    coronary stents  1999    x2    CYST REMOVAL  04/25/2017    on back    HYSTERECTOMY  1966    OVARY SURGERY  1948    Ovary burst & was repaired    RHIZOTOMY  09/14/2018    TONSILLECTOMY  1940     Family History   Problem Relation Age of Onset    No Known Problems Mother     No Known Problems Father      Social History     Tobacco Use    Smoking status: Never Smoker    Smokeless tobacco: Never Used   Substance Use Topics    Alcohol use: No    Drug use: No     Review of Systems   Constitutional: Positive for fatigue. Negative for activity change, appetite change, chills and fever.   HENT: Negative.  Negative for congestion, dental problem, ear pain, rhinorrhea, sinus pressure, sinus pain, sore throat and trouble swallowing.    Eyes: Negative.  Negative for photophobia, pain, redness and visual disturbance.   Respiratory: Positive for shortness of breath. Negative for cough, chest tightness and wheezing.    Cardiovascular: Positive for chest pain. Negative for palpitations and leg swelling.   Gastrointestinal: Negative.  Negative for abdominal distention, abdominal pain, anal bleeding, blood in stool, constipation, diarrhea, nausea and vomiting.   Endocrine: Negative.    Genitourinary: Negative.  Negative for decreased urine volume, difficulty urinating, dysuria, flank pain, frequency, hematuria, pelvic pain and urgency.   Musculoskeletal: Negative.  Negative for arthralgias, back pain, gait problem, joint swelling, myalgias, neck pain and neck stiffness.   Skin: Negative.  Negative for color change, pallor and rash.    Neurological: Negative.  Negative for dizziness, tremors, seizures, syncope, facial asymmetry, speech difficulty, weakness, light-headedness, numbness and headaches.   Hematological: Negative.  Does not bruise/bleed easily.   Psychiatric/Behavioral: Negative.  Negative for confusion.   All other systems reviewed and are negative.      Physical Exam     Initial Vitals   BP Pulse Resp Temp SpO2   07/24/20 1706 07/24/20 1703 07/24/20 1703 07/24/20 1703 07/24/20 1703   (!) 187/78 95 17 99 °F (37.2 °C) 99 %      MAP       --                Physical Exam    Nursing note and vitals reviewed.  Constitutional: She is active and cooperative.  Non-toxic appearance. She does not have a sickly appearance. She does not appear ill. No distress.   HENT:   Head: Normocephalic and atraumatic.   Right Ear: Tympanic membrane normal.   Left Ear: Tympanic membrane normal.   Nose: Nose normal.   Mouth/Throat: Uvula is midline, oropharynx is clear and moist and mucous membranes are normal. No oral lesions. No uvula swelling. No oropharyngeal exudate, posterior oropharyngeal edema or posterior oropharyngeal erythema.   Eyes: Conjunctivae, EOM and lids are normal. Pupils are equal, round, and reactive to light. No scleral icterus.   Neck: Trachea normal, normal range of motion, full passive range of motion without pain and phonation normal. Neck supple. No thyroid mass present. No stridor present. No spinous process tenderness and no muscular tenderness present. No edema, no erythema and normal range of motion present. No neck rigidity. No JVD present.   Cardiovascular: Normal rate, regular rhythm, normal heart sounds, intact distal pulses and normal pulses. Exam reveals no gallop and no friction rub.    No murmur heard.  Pulmonary/Chest: Effort normal and breath sounds normal. No accessory muscle usage. No tachypnea. No respiratory distress. She has no wheezes. She has no rhonchi. She has no rales.   Abdominal: Soft. Normal appearance and  bowel sounds are normal. She exhibits no distension, no pulsatile midline mass and no mass. There is no abdominal tenderness. There is no rigidity, no guarding and no CVA tenderness.   Musculoskeletal: Normal range of motion. No tenderness or edema.      Comments: Pulses are 2+ throughout, cap refill is less than 2 sec throughout, extremities are nontender throughout with full range of motion. There is no spinal tenderness to palpation.   Neurological: She is alert and oriented to person, place, and time. She has normal strength. She displays normal reflexes. No cranial nerve deficit or sensory deficit.   No focal deficits.   Skin: Skin is warm, dry and intact. Capillary refill takes less than 2 seconds. No ecchymosis, no petechiae and no rash noted. No erythema. No pallor.   Psychiatric: She has a normal mood and affect. Her speech is normal and behavior is normal. Judgment and thought content normal. Cognition and memory are normal.         ED Course   Procedures  Labs Reviewed   CBC W/ AUTO DIFFERENTIAL - Abnormal; Notable for the following components:       Result Value    Hemoglobin 11.8 (*)     Hematocrit 36.8 (*)     All other components within normal limits   COMPREHENSIVE METABOLIC PANEL - Abnormal; Notable for the following components:    Glucose 147 (*)     eGFR if  54.0 (*)     eGFR if non  46.9 (*)     All other components within normal limits   B-TYPE NATRIURETIC PEPTIDE - Abnormal; Notable for the following components:     (*)     All other components within normal limits   TROPONIN I   SARS-COV-2 RNA AMPLIFICATION, QUAL        ECG Results          EKG 12-lead (Final result)  Result time 07/25/20 12:05:45    Final result by Interface, Lab In [facility] (07/25/20 12:05:45)                 Narrative:    Test Reason : R07.9,    Vent. Rate : 071 BPM     Atrial Rate : 071 BPM     P-R Int : 214 ms          QRS Dur : 130 ms      QT Int : 458 ms       P-R-T Axes : 088 085  108 degrees     QTc Int : 497 ms    Atrial-paced rhythm with prolonged AV conduction  Nonspecific intraventricular block  Abnormal ECG  When compared with ECG of 02-MAY-2020 03:52,  Questionable change in QRS duration  Criteria for Septal infarct are no longer Present  Confirmed by Isaak Miller MD ([id number]) on 7/25/2020 12:05:34 PM    Referred By: JIMI   SELF           Confirmed By:Isaak Miller MD                            Imaging Results          X-Ray Chest AP Portable (Final result)  Result time 07/24/20 17:26:19    Final result by Laurel Clark MD (07/24/20 17:26:19)                 Narrative:    Portable chest x-ray at 5:27 PM is compared to prior study dated  5/20/2020    Clinical history is hypertension, chest pain    Patient has had a prior median sternotomy. There is a left subclavian  vein implantable cardiac device with lead tips overlying the right  atrium and right ventricle.    The heart is mildly enlarged. The lungs are clear. There are no acute  osseous abnormalities.    IMPRESSION: Mild cardiomegaly with no acute pulmonary process    Electronically Signed by Laurel Clark M.D. on 7/24/2020 5:33 PM                               Medical Decision Making:   Clinical Tests:   Lab Tests: Reviewed  Radiological Study: Reviewed  Medical Tests: Reviewed  ED Management:  EKG is a paced rhythm with no evidence of acute ischemia.  Troponin is normal.  Patient is asymptomatic.  I have discussed the case with the hospitalist who will admit for further care.                   ED Course as of Jul 26 0325 Fri Jul 24, 2020   1702 Chest pain Onset on MOnday and this AM Denies SOB    [KN]      ED Course User Index  [KN] Estefany Simons NP                Clinical Impression:       ICD-10-CM ICD-9-CM   1. Chest pain concerning for angina  R07.89 786.59   2. Chest pain  R07.9 786.50   3. CAD (coronary artery disease)  I25.10 414.00             ED Disposition Condition    Observation                            Diana Glover MD  07/26/20 9579

## 2020-07-29 LAB
AORTIC ROOT ANNULUS: 3.17 CM
AORTIC VALVE CUSP SEPERATION: 1.83 CM
AV INDEX (PROSTH): 0.71
AV MEAN GRADIENT: 4 MMHG
AV PEAK GRADIENT: 9 MMHG
AV VALVE AREA: 2.01 CM2
AV VELOCITY RATIO: 52.9
BSA FOR ECHO PROCEDURE: 1.67 M2
CV ECHO LV RWT: 0.55 CM
DOP CALC AO PEAK VEL: 1.46 M/S
DOP CALC AO VTI: 26.39 CM
DOP CALC LVOT AREA: 2.8 CM2
DOP CALC LVOT DIAMETER: 1.9 CM
DOP CALC LVOT PEAK VEL: 77.24 M/S
DOP CALC LVOT STROKE VOLUME: 53.11 CM3
DOP CALCLVOT PEAK VEL VTI: 18.74 CM
E WAVE DECELERATION TIME: 186.47 MSEC
E/A RATIO: 1.01
E/E' RATIO: 12.91 M/S
ECHO LV POSTERIOR WALL: 1.15 CM (ref 0.6–1.1)
FRACTIONAL SHORTENING: 29 % (ref 28–44)
INTERVENTRICULAR SEPTUM: 1.1 CM (ref 0.6–1.1)
IVRT: 96.69 MSEC
LEFT ATRIUM SIZE: 3.9 CM
LEFT INTERNAL DIMENSION IN SYSTOLE: 2.95 CM (ref 2.1–4)
LEFT VENTRICLE DIASTOLIC VOLUME INDEX: 45.26 ML/M2
LEFT VENTRICLE DIASTOLIC VOLUME: 73.9 ML
LEFT VENTRICLE MASS INDEX: 98 G/M2
LEFT VENTRICLE SYSTOLIC VOLUME INDEX: 19.8 ML/M2
LEFT VENTRICLE SYSTOLIC VOLUME: 32.3 ML
LEFT VENTRICULAR INTERNAL DIMENSION IN DIASTOLE: 4.17 CM (ref 3.5–6)
LEFT VENTRICULAR MASS: 160.43 G
LV LATERAL E/E' RATIO: 11.83 M/S
LV SEPTAL E/E' RATIO: 14.2 M/S
MV PEAK A VEL: 0.7 M/S
MV PEAK E VEL: 0.71 M/S
PISA TR MAX VEL: 2.43 M/S
PV PEAK VELOCITY: 100.22 CM/S
RA PRESSURE: 3 MMHG
RIGHT VENTRICULAR END-DIASTOLIC DIMENSION: 234 CM
TDI LATERAL: 0.06 M/S
TDI SEPTAL: 0.05 M/S
TDI: 0.06 M/S
TR MAX PG: 24 MMHG
TV REST PULMONARY ARTERY PRESSURE: 27 MMHG

## 2020-08-05 ENCOUNTER — OFFICE VISIT (OUTPATIENT)
Dept: FAMILY MEDICINE | Facility: CLINIC | Age: 85
End: 2020-08-05
Payer: MEDICARE

## 2020-08-05 VITALS
HEART RATE: 70 BPM | RESPIRATION RATE: 16 BRPM | SYSTOLIC BLOOD PRESSURE: 132 MMHG | WEIGHT: 149.88 LBS | HEIGHT: 60 IN | TEMPERATURE: 98 F | DIASTOLIC BLOOD PRESSURE: 60 MMHG | BODY MASS INDEX: 29.42 KG/M2 | OXYGEN SATURATION: 98 %

## 2020-08-05 DIAGNOSIS — I10 ESSENTIAL HYPERTENSION, BENIGN: ICD-10-CM

## 2020-08-05 DIAGNOSIS — F41.9 SEVERE ANXIETY: ICD-10-CM

## 2020-08-05 DIAGNOSIS — I20.89 ANGINA AT REST: Primary | ICD-10-CM

## 2020-08-05 DIAGNOSIS — K21.00 GERD WITH ESOPHAGITIS: ICD-10-CM

## 2020-08-05 DIAGNOSIS — Z91.09 ENVIRONMENTAL ALLERGIES: ICD-10-CM

## 2020-08-05 PROCEDURE — 99495 TCM SERVICES (MODERATE COMPLEXITY): ICD-10-PCS | Mod: S$PBB,,, | Performed by: INTERNAL MEDICINE

## 2020-08-05 PROCEDURE — 99495 TRANSJ CARE MGMT MOD F2F 14D: CPT | Mod: S$PBB,,, | Performed by: INTERNAL MEDICINE

## 2020-08-05 PROCEDURE — 99215 OFFICE O/P EST HI 40 MIN: CPT | Performed by: INTERNAL MEDICINE

## 2020-08-05 RX ORDER — IBUPROFEN 100 MG/5ML
1000 SUSPENSION, ORAL (FINAL DOSE FORM) ORAL DAILY
COMMUNITY
End: 2020-11-09

## 2020-08-05 RX ORDER — MONTELUKAST SODIUM 10 MG/1
10 TABLET ORAL NIGHTLY
Qty: 90 TABLET | Refills: 1 | Status: SHIPPED | OUTPATIENT
Start: 2020-08-05 | End: 2021-03-29

## 2020-08-05 RX ORDER — ALPRAZOLAM 0.25 MG/1
0.25 TABLET ORAL
Qty: 30 TABLET | Refills: 0 | Status: SHIPPED | OUTPATIENT
Start: 2020-08-05 | End: 2020-11-09 | Stop reason: SDUPTHER

## 2020-08-05 RX ORDER — METOPROLOL SUCCINATE 25 MG/1
25 TABLET, EXTENDED RELEASE ORAL DAILY
Qty: 90 TABLET | Refills: 1 | Status: SHIPPED | OUTPATIENT
Start: 2020-08-05 | End: 2021-01-11 | Stop reason: SDUPTHER

## 2020-08-05 RX ORDER — CYANOCOBALAMIN (VITAMIN B-12) 2500 MCG
1 TABLET, SUBLINGUAL SUBLINGUAL 2 TIMES DAILY
COMMUNITY
End: 2020-11-09

## 2020-08-05 RX ORDER — LOSARTAN POTASSIUM 25 MG/1
25 TABLET ORAL 2 TIMES DAILY
Qty: 180 TABLET | Refills: 2 | Status: SHIPPED | OUTPATIENT
Start: 2020-08-05 | End: 2021-01-11 | Stop reason: SDUPTHER

## 2020-08-05 RX ORDER — DICLOFENAC SODIUM 10 MG/G
2 GEL TOPICAL DAILY
COMMUNITY
End: 2022-09-08 | Stop reason: SDUPTHER

## 2020-08-05 RX ORDER — OMEPRAZOLE 20 MG/1
20 CAPSULE, DELAYED RELEASE ORAL DAILY
Qty: 90 CAPSULE | Refills: 1 | Status: SHIPPED | OUTPATIENT
Start: 2020-08-05 | End: 2020-08-06

## 2020-08-05 RX ORDER — FOLIC ACID 0.4 MG
400 TABLET ORAL DAILY
COMMUNITY
End: 2021-01-11

## 2020-08-05 RX ORDER — ESTRADIOL 0.1 MG/G
1 CREAM VAGINAL WEEKLY
Qty: 42.5 G | Refills: 1 | Status: SHIPPED | OUTPATIENT
Start: 2020-08-05 | End: 2022-09-15

## 2020-08-05 NOTE — PROGRESS NOTES
SUBJECTIVE:    Patient ID: Karma Chen is a 84 y.o. female.    Chief Complaint: Hospital Follow Up    HPI     Patient went to ER with recurrent chest tightness and heaviness.  Initially occurred while at chiropractor, was experiencing upper back and shoulder pain, then upper chest tightness, at rest, relieved by sublingual nitro.  Had recurrent episode throughout the course of the week.  Admitted for ACS evaluation given history of known coronary artery disease status post 4 vessel CABG and PCI.  EKG paced, troponin mildly elevated at 0.077.  Blood pressure grossly uncontrolled, resuming home medications and monitoring. Cardiology evaluated, known disease, pAF on interrogation of pacemaker recently, not limiting activities of daily living, metoprolol increased to 25 daily (she was on 12.5 prior to admission, sublingual nitro given, plans for close outpatient Cardiology is to decide on the need for angiogram.  Echo with EF of 50%, trace valvular abnormalities.        Admit Date: 7/24/20   Discharge Date: 7/25/20  Discharge Facility: Hospital    Medication Reconciliation:  Medications changed/added/deleted. see below    process Electronically Signed by Laurel Clark M.D. on 7/24/2020 5:33 PM     Medications:  Reconciled Home Medications:      Medication List       CHANGE how you take these medications    acetaminophen 500 MG tablet  Commonly known as: TYLENOL  Take 500 mg by mouth every 6 (six) hours as needed for Pain.  What changed: Another medication with the same name was removed. Continue taking this medication, and follow the directions you see here.      isosorbide mononitrate 60 MG 24 hr tablet  Commonly known as: IMDUR  TAKE 1 TABLET BY MOUTH ONCE DAILY  What changed: additional instructions      SYNTHROID 88 MCG tablet  Generic drug: levothyroxine  TAKE 1 TABLET ONCE DAILY  What changed:   · how much to take  · when to take this          CONTINUE taking these medications    ALPRAZolam 0.25 MG  tablet  Commonly known as: XANAX  Take 0.25 mg by mouth as needed for Anxiety.      aspirin 81 MG EC tablet  Commonly known as: ECOTRIN  Take 1 tablet (81 mg total) by mouth once daily.      cetirizine 10 MG tablet  Commonly known as: ZYRTEC  Take 10 mg by mouth once daily.      clopidogreL 75 mg tablet  Commonly known as: PLAVIX  Take 1 tablet (75 mg total) by mouth once daily.      coenzyme Q10 100 mg capsule  Take 1 capsule (100 mg total) by mouth 2 (two) times daily.      cyclobenzaprine 10 MG tablet  Commonly known as: FLEXERIL  Take 5 mg by mouth every evening.      estradioL 0.01 % (0.1 mg/gram) vaginal cream  Commonly known as: ESTRACE  Place 1 g vaginally once a week.      fluticasone propionate 50 mcg/actuation nasal spray  Commonly known as: FLONASE  1 spray by Each Nostril route once daily.      ipratropium 0.03 % nasal spray  Commonly known as: ATROVENT  2 sprays by Nasal route 2 (two) times daily.      Lactobacillus rhamnosus GG 10 billion cell capsule  Commonly known as: CULTURELLE  Take 1 capsule by mouth once daily.      losartan 25 MG tablet  Commonly known as: COZAAR  Take 1 tablet (25 mg total) by mouth 2 (two) times a day.      MAGNESIUM ORAL  Take 500 mg by mouth once daily.      metoprolol succinate 25 MG 24 hr tablet  Commonly known as: TOPROL-XL  Take 25 mg by mouth once daily.      montelukast 10 mg tablet  Commonly known as: SINGULAIR  Take 1 tablet (10 mg total) by mouth every evening.      multivitamin tablet  Commonly known as: THERAGRAN  Take 1 tablet by mouth 2 (two) times a day.      nitroGLYCERIN 0.4 MG SL tablet  Commonly known as: NITROSTAT  Place 0.4 mg under the tongue every 5 (five) minutes as needed for Chest pain.      omeprazole 20 MG capsule  Commonly known as: PRILOSEC  TAKE 1 CAPSULE BY MOUTH EVERY DAY      tiZANidine 4 MG tablet  Commonly known as: ZANAFLEX  Take 4 mg by mouth every 6 (six) hours as needed.      traMADoL 50 mg tablet  Commonly known as: ULTRAM  Take 50  mg by mouth 2 (two) times daily as needed.      VITAMIN D3 100 mcg (4,000 unit) Cap  Generic drug: cholecalciferol (vitamin D3)  Take 1 capsule by mouth once daily.          STOP taking these medications    diclofenac sodium 1 % Gel  Commonly known as: VOLTAREN       New Prescriptions filled after discharge: yes  Discharge summary reviewed:  yes  Pending test results at discharge reviewed:   no  Follow up appointments scheduled:  no   with Cardiology   Follow up labs/tests ordered:   no  Home Health ordered on discharge:   no  Home Health company name: none  DME ordered at discharge:   no  How patient is feeling since discharge from the hospital?  Pt says when she first left the hospital she was very fatigued after just walking to the auto-but since NTG 60 bid she is feeling better and doing more-         Admission on 07/24/2020, Discharged on 07/25/2020   Component Date Value Ref Range Status    WBC 07/24/2020 7.42  3.90 - 12.70 K/uL Final    RBC 07/24/2020 4.16  4.00 - 5.40 M/uL Final    Hemoglobin 07/24/2020 11.8* 12.0 - 16.0 g/dL Final    Hematocrit 07/24/2020 36.8* 37.0 - 48.5 % Final    Mean Corpuscular Volume 07/24/2020 89  82 - 98 fL Final    Mean Corpuscular Hemoglobin 07/24/2020 28.4  27.0 - 31.0 pg Final    Mean Corpuscular Hemoglobin Conc 07/24/2020 32.1  32.0 - 36.0 g/dL Final    RDW 07/24/2020 13.2  11.5 - 14.5 % Final    Platelets 07/24/2020 294  150 - 350 K/uL Final    MPV 07/24/2020 9.7  9.2 - 12.9 fL Final    Immature Granulocytes 07/24/2020 0.4  0.0 - 0.5 % Final    Gran # (ANC) 07/24/2020 3.7  1.8 - 7.7 K/uL Final    Immature Grans (Abs) 07/24/2020 0.03  0.00 - 0.04 K/uL Final    Lymph # 07/24/2020 2.6  1.0 - 4.8 K/uL Final    Mono # 07/24/2020 0.9  0.3 - 1.0 K/uL Final    Eos # 07/24/2020 0.2  0.0 - 0.5 K/uL Final    Baso # 07/24/2020 0.06  0.00 - 0.20 K/uL Final    nRBC 07/24/2020 0  0 /100 WBC Final    Gran% 07/24/2020 50.4  38.0 - 73.0 % Final    Lymph% 07/24/2020 34.5   18.0 - 48.0 % Final    Mono% 07/24/2020 11.9  4.0 - 15.0 % Final    Eosinophil% 07/24/2020 2.0  0.0 - 8.0 % Final    Basophil% 07/24/2020 0.8  0.0 - 1.9 % Final    Differential Method 07/24/2020 Automated   Final    Sodium 07/24/2020 141  136 - 145 mmol/L Final    Potassium 07/24/2020 3.6  3.5 - 5.1 mmol/L Final    Chloride 07/24/2020 102  95 - 110 mmol/L Final    CO2 07/24/2020 25  23 - 29 mmol/L Final    Glucose 07/24/2020 147* 70 - 110 mg/dL Final    BUN, Bld 07/24/2020 23  8 - 23 mg/dL Final    Creatinine 07/24/2020 1.1  0.5 - 1.4 mg/dL Final    Calcium 07/24/2020 9.3  8.7 - 10.5 mg/dL Final    Total Protein 07/24/2020 7.5  6.0 - 8.4 g/dL Final    Albumin 07/24/2020 3.9  3.5 - 5.2 g/dL Final    Total Bilirubin 07/24/2020 0.6  0.1 - 1.0 mg/dL Final    Alkaline Phosphatase 07/24/2020 58  55 - 135 U/L Final    AST 07/24/2020 24  10 - 40 U/L Final    ALT 07/24/2020 20  10 - 44 U/L Final    Anion Gap 07/24/2020 14  8 - 16 mmol/L Final    eGFR if  07/24/2020 54.0* >60 mL/min/1.73 m^2 Final    eGFR if non African American 07/24/2020 46.9* >60 mL/min/1.73 m^2 Final    Troponin I 07/24/2020 0.038  <=0.040 ng/mL Final    Troponin I 07/24/2020 0.059* <=0.040 ng/mL Final    BNP 07/24/2020 265* 0 - 99 pg/mL Final    SARS-CoV-2 RNA, Amplification, Qual 07/24/2020 Negative  Negative Final    Hemoglobin A1C 07/25/2020 6.0  4.5 - 6.2 % Final    Estimated Avg Glucose 07/25/2020 126  68 - 131 mg/dL Final    Troponin I 07/25/2020 0.077* <=0.040 ng/mL Final    BSA 07/25/2020 1.67  m2 Final    Right Atrial Pressure (from IVC) 07/25/2020 3  mmHg Final    TDI SEPTAL 07/25/2020 0.05  m/s Final    LV LATERAL E/E' RATIO 07/25/2020 11.83  m/s Final    LV SEPTAL E/E' RATIO 07/25/2020 14.20  m/s Final    AORTIC VALVE CUSP SEPERATION 07/25/2020 1.83  cm Final    TDI LATERAL 07/25/2020 0.06  m/s Final    PV PEAK VELOCITY 07/25/2020 100.22  cm/s Final    LVIDD 07/25/2020 4.17  3.5 - 6.0 cm  Final    IVS 07/25/2020 1.10* 0.6 - 1.1 cm Final    PW 07/25/2020 1.15* 0.6 - 1.1 cm Final    Ao root annulus 07/25/2020 3.17  cm Final    LVIDS 07/25/2020 2.95  2.1 - 4.0 cm Final    FS 07/25/2020 29  28 - 44 % Final    LV mass 07/25/2020 160.43  g Final    LA size 07/25/2020 3.90  cm Final    RVDD 07/25/2020 234.00  cm Final    Left Ventricle Relative Wall Thick* 07/25/2020 0.55  cm Final    AV mean gradient 07/25/2020 4  mmHg Final    AV valve area 07/25/2020 2.01  cm2 Final    AV Velocity Ratio 07/25/2020 52.90   Final    AV index (prosthetic) 07/25/2020 0.71   Final    E/A ratio 07/25/2020 1.01   Final    Mean e' 07/25/2020 0.06  m/s Final    E wave decelartion time 07/25/2020 186.47  msec Final    IVRT 07/25/2020 96.69  msec Final    LVOT diameter 07/25/2020 1.90  cm Final    LVOT area 07/25/2020 2.8  cm2 Final    LVOT peak dylan 07/25/2020 77.24  m/s Final    LVOT peak VTI 07/25/2020 18.74  cm Final    Ao peak dylan 07/25/2020 1.46  m/s Final    Ao VTI 07/25/2020 26.39  cm Final    LVOT stroke volume 07/25/2020 53.11  cm3 Final    AV peak gradient 07/25/2020 9  mmHg Final    TV rest pulmonary artery pressure 07/25/2020 27  mmHg Final    E/E' ratio 07/25/2020 12.91  m/s Final    MV Peak E Dylan 07/25/2020 0.71  m/s Final    TR Max Dylan 07/25/2020 2.43  m/s Final    MV Peak A Dylan 07/25/2020 0.70  m/s Final    LV Systolic Volume 07/25/2020 32.30  mL Final    LV Systolic Volume Index 07/25/2020 19.8  mL/m2 Final    LV Diastolic Volume 07/25/2020 73.90  mL Final    LV Diastolic Volume Index 07/25/2020 45.26  mL/m2 Final    LV Mass Index 07/25/2020 98  g/m2 Final    Triscuspid Valve Regurgitation Pea* 07/25/2020 24  mmHg Final    Sodium 07/25/2020 143  136 - 145 mmol/L Final    Potassium 07/25/2020 3.8  3.5 - 5.1 mmol/L Final    Chloride 07/25/2020 107  95 - 110 mmol/L Final    CO2 07/25/2020 26  23 - 29 mmol/L Final    Glucose 07/25/2020 88  70 - 110 mg/dL Final    BUN, Bld  07/25/2020 22  8 - 23 mg/dL Final    Creatinine 07/25/2020 1.1  0.5 - 1.4 mg/dL Final    Calcium 07/25/2020 9.0  8.7 - 10.5 mg/dL Final    Anion Gap 07/25/2020 10  8 - 16 mmol/L Final    eGFR if African American 07/25/2020 54.0* >60 mL/min/1.73 m^2 Final    eGFR if non African American 07/25/2020 46.9* >60 mL/min/1.73 m^2 Final    POC Glucose 07/25/2020 91  70 - 110 Final    POC Glucose 07/25/2020 98  70 - 110 Final   Lab Visit on 05/04/2020   Component Date Value Ref Range Status    Cholesterol 05/04/2020 237* 120 - 199 mg/dL Final    Triglycerides 05/04/2020 207* 30 - 150 mg/dL Final    HDL 05/04/2020 48  40 - 75 mg/dL Final    LDL Cholesterol 05/04/2020 147.6  63.0 - 159.0 mg/dL Final    Hdl/Cholesterol Ratio 05/04/2020 20.3  20.0 - 50.0 % Final    Total Cholesterol/HDL Ratio 05/04/2020 4.9  2.0 - 5.0 Final    Non-HDL Cholesterol 05/04/2020 189  mg/dL Final    Glucose 05/04/2020 92  70 - 110 mg/dL Final    Sodium 05/04/2020 140  136 - 145 mmol/L Final    Potassium 05/04/2020 4.1  3.5 - 5.1 mmol/L Final    Chloride 05/04/2020 104  95 - 110 mmol/L Final    CO2 05/04/2020 26  23 - 29 mmol/L Final    BUN, Bld 05/04/2020 21  8 - 23 mg/dL Final    Calcium 05/04/2020 9.3  8.7 - 10.5 mg/dL Final    Creatinine 05/04/2020 1.2  0.5 - 1.4 mg/dL Final    Albumin 05/04/2020 4.0  3.5 - 5.2 g/dL Final    Phosphorus 05/04/2020 3.8  2.7 - 4.5 mg/dL Final    eGFR if African American 05/04/2020 48.6* >60 mL/min/1.73 m^2 Final    eGFR if non African American 05/04/2020 42.2* >60 mL/min/1.73 m^2 Final    Anion Gap 05/04/2020 10  8 - 16 mmol/L Final    WBC 05/04/2020 8.16  3.90 - 12.70 K/uL Final    RBC 05/04/2020 4.36  4.00 - 5.40 M/uL Final    Hemoglobin 05/04/2020 12.6  12.0 - 16.0 g/dL Final    Hematocrit 05/04/2020 40.2  37.0 - 48.5 % Final    Mean Corpuscular Volume 05/04/2020 92  82 - 98 fL Final    Mean Corpuscular Hemoglobin 05/04/2020 28.9  27.0 - 31.0 pg Final    Mean Corpuscular Hemoglobin  Conc 05/04/2020 31.3* 32.0 - 36.0 g/dL Final    RDW 05/04/2020 12.9  11.5 - 14.5 % Final    Platelets 05/04/2020 278  150 - 350 K/uL Final    MPV 05/04/2020 10.2  9.2 - 12.9 fL Final    Immature Granulocytes 05/04/2020 0.4  0.0 - 0.5 % Final    Gran # (ANC) 05/04/2020 3.4  1.8 - 7.7 K/uL Final    Immature Grans (Abs) 05/04/2020 0.03  0.00 - 0.04 K/uL Final    Lymph # 05/04/2020 3.5  1.0 - 4.8 K/uL Final    Mono # 05/04/2020 0.9  0.3 - 1.0 K/uL Final    Eos # 05/04/2020 0.3  0.0 - 0.5 K/uL Final    Baso # 05/04/2020 0.05  0.00 - 0.20 K/uL Final    nRBC 05/04/2020 0  0 /100 WBC Final    Gran% 05/04/2020 41.8  38.0 - 73.0 % Final    Lymph% 05/04/2020 42.5  18.0 - 48.0 % Final    Mono% 05/04/2020 11.4  4.0 - 15.0 % Final    Eosinophil% 05/04/2020 3.3  0.0 - 8.0 % Final    Basophil% 05/04/2020 0.6  0.0 - 1.9 % Final    Differential Method 05/04/2020 Automated   Final    PTH, Intact 05/04/2020 40.5  9.0 - 77.0 pg/mL Final    RBC, UA 05/04/2020 1  0 - 4 /hpf Final    WBC, UA 05/04/2020 7* 0 - 5 /hpf Final    Bacteria 05/04/2020 Rare  None-Occ /hpf Final    Squam Epithel, UA 05/04/2020 5  /hpf Final    Hyaline Casts, UA 05/04/2020 13* 0-1/lpf /lpf Final    Microscopic Comment 05/04/2020 SEE COMMENT   Final    Protein, Urine Random 05/04/2020 <6  6 - 15 mg/dL Final    Creatinine, Random Ur 05/04/2020 51.0  15.0 - 325.0 mg/dL Final    Prot/Creat Ratio, Ur 05/04/2020 Unable to calculate  0.00 - 0.20 Final    Creatinine, Random Ur 05/04/2020 51.0  15.0 - 325.0 mg/dL Final    Microalbum.,U,Random 05/04/2020 <2.0  ug/mL Final    Creatinine, Random Ur 05/04/2020 51.0  15.0 - 325.0 mg/dL Final    Microalb Creat Ratio 05/04/2020 Unable to calculate  0.0 - 30.0 ug/mg Final   Admission on 05/02/2020, Discharged on 05/02/2020   Component Date Value Ref Range Status    WBC 05/02/2020 9.76  3.90 - 12.70 K/uL Final    RBC 05/02/2020 4.52  4.00 - 5.40 M/uL Final    Hemoglobin 05/02/2020 13.0  12.0 - 16.0  g/dL Final    Hematocrit 05/02/2020 41.4  37.0 - 48.5 % Final    Mean Corpuscular Volume 05/02/2020 92  82 - 98 fL Final    Mean Corpuscular Hemoglobin 05/02/2020 28.8  27.0 - 31.0 pg Final    Mean Corpuscular Hemoglobin Conc 05/02/2020 31.4* 32.0 - 36.0 g/dL Final    RDW 05/02/2020 13.0  11.5 - 14.5 % Final    Platelets 05/02/2020 282  150 - 350 K/uL Final    MPV 05/02/2020 10.1  9.2 - 12.9 fL Final    Immature Granulocytes 05/02/2020 0.4  0.0 - 0.5 % Final    Gran # (ANC) 05/02/2020 4.1  1.8 - 7.7 K/uL Final    Immature Grans (Abs) 05/02/2020 0.04  0.00 - 0.04 K/uL Final    Lymph # 05/02/2020 4.1  1.0 - 4.8 K/uL Final    Mono # 05/02/2020 1.2* 0.3 - 1.0 K/uL Final    Eos # 05/02/2020 0.3  0.0 - 0.5 K/uL Final    Baso # 05/02/2020 0.07  0.00 - 0.20 K/uL Final    nRBC 05/02/2020 0  0 /100 WBC Final    Gran% 05/02/2020 42.3  38.0 - 73.0 % Final    Lymph% 05/02/2020 41.6  18.0 - 48.0 % Final    Mono% 05/02/2020 12.3  4.0 - 15.0 % Final    Eosinophil% 05/02/2020 2.7  0.0 - 8.0 % Final    Basophil% 05/02/2020 0.7  0.0 - 1.9 % Final    Differential Method 05/02/2020 Automated   Final    Sodium 05/02/2020 140  136 - 145 mmol/L Final    Potassium 05/02/2020 4.2  3.5 - 5.1 mmol/L Final    Chloride 05/02/2020 104  95 - 110 mmol/L Final    CO2 05/02/2020 26  23 - 29 mmol/L Final    Glucose 05/02/2020 110  70 - 110 mg/dL Final    BUN, Bld 05/02/2020 21  8 - 23 mg/dL Final    Creatinine 05/02/2020 1.2  0.5 - 1.4 mg/dL Final    Calcium 05/02/2020 9.2  8.7 - 10.5 mg/dL Final    Total Protein 05/02/2020 8.1  6.0 - 8.4 g/dL Final    Albumin 05/02/2020 4.3  3.5 - 5.2 g/dL Final    Total Bilirubin 05/02/2020 0.9  0.1 - 1.0 mg/dL Final    Alkaline Phosphatase 05/02/2020 58  55 - 135 U/L Final    AST 05/02/2020 42* 10 - 40 U/L Final    ALT 05/02/2020 17  10 - 44 U/L Final    Anion Gap 05/02/2020 10  8 - 16 mmol/L Final    eGFR if African American 05/02/2020 48.6* >60 mL/min/1.73 m^2 Final    eGFR  if non  05/02/2020 42.2* >60 mL/min/1.73 m^2 Final    BNP 05/02/2020 534* 0 - 99 pg/mL Final    Troponin I 05/02/2020 <0.030  <=0.040 ng/mL Final    PT 05/02/2020 13.6  10.6 - 14.8 sec Final    INR 05/02/2020 1.1   Final    Magnesium 05/02/2020 2.4  1.6 - 2.6 mg/dL Final    Specimen UA 05/02/2020 Urine, Unspecified   Final    Color, UA 05/02/2020 Yellow  Yellow, Straw, Anila Final    Appearance, UA 05/02/2020 Clear  Clear Final    pH, UA 05/02/2020 7.0  5.0 - 8.0 Final    Specific Gravity, UA 05/02/2020 1.010  1.005 - 1.030 Final    Protein, UA 05/02/2020 Negative  Negative Final    Glucose, UA 05/02/2020 Negative  Negative Final    Ketones, UA 05/02/2020 Negative  Negative Final    Bilirubin (UA) 05/02/2020 Negative  Negative Final    Occult Blood UA 05/02/2020 Negative  Negative Final    Nitrite, UA 05/02/2020 Negative  Negative Final    Urobilinogen, UA 05/02/2020 Negative  Negative EU/dL Final    Leukocytes, UA 05/02/2020 Negative  Negative Final    Troponin I 05/02/2020 0.032  <=0.040 ng/mL Final    TSH 05/02/2020 2.640  0.340 - 5.600 uIU/mL Final    Troponin I 05/02/2020 0.030  <=0.040 ng/mL Final       Past Medical History:   Diagnosis Date    Coronary artery disease     Dermatitis     Dermatitis 12/8/2017    Diabetes mellitus     Diabetes mellitus type II     Leah Madrigal virus infection     Fibromyalgia     GERD (gastroesophageal reflux disease)     Hypertension     MI (myocardial infarction) 09/23/2011    Pure hypercholesterolemia 2/26/2020     Past Surgical History:   Procedure Laterality Date    adenoids      ANGIOPLASTY  1987    APPENDECTOMY      CARDIAC PACEMAKER PLACEMENT  01/12/2017    CATARACT EXTRACTION, BILATERAL Bilateral 9/2/15, 3/17/15    right eye-9/2/15, left eye-3/17/15    CHOLECYSTECTOMY  1987    CORONARY ARTERY BYPASS GRAFT  2006    quadruple    coronary stents  1999    x2    CYST REMOVAL  04/25/2017    on back    HYSTERECTOMY  1966     OVARY SURGERY  1948    Ovary burst & was repaired    RHIZOTOMY  09/14/2018    TONSILLECTOMY  1940     Family History   Problem Relation Age of Onset    No Known Problems Mother     No Known Problems Father        Marital Status:   Alcohol History:  reports no history of alcohol use.  Tobacco History:  reports that she has never smoked. She has never used smokeless tobacco.  Drug History:  reports no history of drug use.    Review of patient's allergies indicates:   Allergen Reactions    Arthrotec 50 [diclofenac-misoprostol]     Doxycycline     Effexor [venlafaxine]     Estradiol     Flagyl [metronidazole]     Fluconazole     Iodine     Levaquin [levofloxacin]     Nexium [esomeprazole magnesium]     Penicillins     Shellfish containing products     Statins-hmg-coa reductase inhibitors     Sulfa (sulfonamide antibiotics)     Tea tree oil     Tegaserod      ZOLNORM    Trazodone     Yeast, dried        Current Outpatient Medications:     acetaminophen (TYLENOL) 500 MG tablet, Take 500 mg by mouth every 6 (six) hours as needed for Pain., Disp: , Rfl:     ALPRAZolam (XANAX) 0.25 MG tablet, Take 1 tablet (0.25 mg total) by mouth as needed for Anxiety., Disp: 30 tablet, Rfl: 0    ascorbic acid, vitamin C, (VITAMIN C) 1000 MG tablet, Take 1,000 mg by mouth once daily., Disp: , Rfl:     aspirin (ECOTRIN) 81 MG EC tablet, Take 1 tablet (81 mg total) by mouth once daily., Disp: , Rfl: 0    biotin 5,000 mcg Subl, Place 1 each under the tongue 2 (two) times a day., Disp: , Rfl:     cetirizine (ZYRTEC) 10 MG tablet, Take 10 mg by mouth once daily., Disp: , Rfl:     cholecalciferol, vitamin D3, (VITAMIN D3) 100 mcg (4,000 unit) Cap, Take 1 capsule by mouth once daily. , Disp: , Rfl:     clopidogrel (PLAVIX) 75 mg tablet, Take 1 tablet (75 mg total) by mouth once daily., Disp: 90 tablet, Rfl: 0    coenzyme Q10 100 mg capsule, Take 1 capsule (100 mg total) by mouth 2 (two) times daily., Disp:  60 capsule, Rfl: 11    diclofenac sodium (VOLTAREN) 1 % Gel, Apply 2 g topically once daily., Disp: , Rfl:     ECHINACEA ORAL, Take 750 mg by mouth 2 (two) times a day., Disp: , Rfl:     estradioL (ESTRACE) 0.01 % (0.1 mg/gram) vaginal cream, Place 1 g vaginally once a week., Disp: 42.5 g, Rfl: 1    folic acid (FOLVITE) 400 MCG tablet, Take 400 mcg by mouth once daily., Disp: , Rfl:     isosorbide mononitrate (IMDUR) 60 MG 24 hr tablet, TAKE 1 TABLET BY MOUTH ONCE DAILY (Patient taking differently: Take 60 mg by mouth 2 (two) times daily. DO NOT CRUSH OR CHEW; SWALLOW WHOLE.), Disp: 90 tablet, Rfl: 1    Lactobacillus rhamnosus GG (CULTURELLE) 10 billion cell capsule, Take 1 capsule by mouth once daily., Disp: , Rfl:     losartan (COZAAR) 25 MG tablet, Take 1 tablet (25 mg total) by mouth 2 (two) times a day., Disp: 180 tablet, Rfl: 2    MAGNESIUM ORAL, Take 500 mg by mouth once daily. , Disp: , Rfl:     metoprolol succinate (TOPROL-XL) 25 MG 24 hr tablet, Take 1 tablet (25 mg total) by mouth once daily., Disp: 90 tablet, Rfl: 1    montelukast (SINGULAIR) 10 mg tablet, Take 1 tablet (10 mg total) by mouth every evening., Disp: 90 tablet, Rfl: 1    multivitamin (THERAGRAN) tablet, Take 1 tablet by mouth 2 (two) times a day. , Disp: , Rfl:     nitroGLYCERIN (NITROSTAT) 0.4 MG SL tablet, Place 0.4 mg under the tongue every 5 (five) minutes as needed for Chest pain. , Disp: , Rfl:     SYNTHROID 88 mcg tablet, TAKE 1 TABLET ONCE DAILY (Patient taking differently: Take 88 mcg by mouth before breakfast. ), Disp: 90 tablet, Rfl: 1    tiZANidine (ZANAFLEX) 4 MG tablet, Take 4 mg by mouth once daily. , Disp: , Rfl:     traMADol (ULTRAM) 50 mg tablet, Take 50 mg by mouth 2 (two) times daily as needed., Disp: , Rfl: 1    TURMERIC ORAL, Take 1,200 mg by mouth once daily., Disp: , Rfl:     UNABLE TO FIND, Trexar (for neuropathy) OTC, Disp: , Rfl:     UNABLE TO FIND, Bio-Smoothe- premium nerve, Disp: , Rfl:      fluticasone propionate (FLONASE) 50 mcg/actuation nasal spray, 1 spray by Each Nostril route once daily., Disp: , Rfl:     ipratropium (ATROVENT) 0.03 % nasal spray, 2 sprays by Nasal route 2 (two) times daily. (Patient not taking: Reported on 8/5/2020), Disp: 30 mL, Rfl: 2    pantoprazole (PROTONIX) 40 MG tablet, Take 1 tablet (40 mg total) by mouth once daily., Disp: 30 tablet, Rfl: 11    Review of Systems   Constitutional: Positive for fatigue (improving now). Negative for appetite change, chills, diaphoresis, fever and unexpected weight change.   HENT: Negative for congestion, ear pain, hearing loss, nosebleeds, postnasal drip, sinus pressure, sinus pain, sneezing, sore throat, tinnitus, trouble swallowing and voice change.    Eyes: Negative for photophobia, pain, itching and visual disturbance.   Respiratory: Negative for apnea, cough, chest tightness, shortness of breath, wheezing and stridor.    Cardiovascular: Negative for chest pain, palpitations and leg swelling.   Gastrointestinal: Negative for abdominal distention, abdominal pain, blood in stool, constipation, diarrhea, nausea and vomiting.   Endocrine: Negative for cold intolerance, heat intolerance, polydipsia and polyuria.   Genitourinary: Negative for difficulty urinating, dyspareunia, dysuria, flank pain, frequency, hematuria, menstrual problem, pelvic pain, urgency, vaginal discharge and vaginal pain.   Musculoskeletal: Positive for arthralgias, back pain and myalgias. Negative for joint swelling, neck pain and neck stiffness.   Skin: Negative for pallor.        Fever blisters   Allergic/Immunologic: Negative for environmental allergies and food allergies.   Neurological: Negative for dizziness, tremors, speech difficulty, weakness, light-headedness and numbness.   Hematological: Does not bruise/bleed easily.   Psychiatric/Behavioral: Negative for agitation, confusion, decreased concentration, sleep disturbance and suicidal ideas. The patient is  not nervous/anxious.         Objective:      Vitals:    08/05/20 1443   BP: 132/60   Pulse: 70   Resp: 16   Temp: 98 °F (36.7 °C)   SpO2: 98%   Weight: 68 kg (149 lb 14.4 oz)   Height: 5' (1.524 m)     Physical Exam  Vitals signs and nursing note reviewed.   Constitutional:       General: She is not in acute distress.     Appearance: She is not ill-appearing or diaphoretic.   HENT:      Head: Normocephalic and atraumatic.      Right Ear: External ear normal.      Left Ear: External ear normal.   Eyes:      General: No scleral icterus.        Right eye: No discharge.         Left eye: No discharge.      Extraocular Movements: Extraocular movements intact.      Conjunctiva/sclera: Conjunctivae normal.      Pupils: Pupils are equal, round, and reactive to light.   Cardiovascular:      Rate and Rhythm: Normal rate and regular rhythm.   Pulmonary:      Effort: Pulmonary effort is normal.      Breath sounds: Normal breath sounds.      Comments: End inspiratory rhonchi  Abdominal:      General: Abdomen is flat. Bowel sounds are normal.      Palpations: Abdomen is soft.   Musculoskeletal:         General: No swelling, tenderness or deformity.      Right lower leg: No edema.      Left lower leg: No edema.   Skin:     General: Skin is warm and dry.      Coloration: Skin is not jaundiced or pale.      Findings: No bruising, erythema, lesion or rash.   Neurological:      General: No focal deficit present.      Mental Status: She is alert and oriented to person, place, and time. Mental status is at baseline.   Psychiatric:         Mood and Affect: Mood normal.         Behavior: Behavior normal.         Thought Content: Thought content normal.         Judgment: Judgment normal.         Assessment:       1. Angina at rest    2. Environmental allergies    3. Severe anxiety    4. Essential hypertension, benign    5. GERD with esophagitis         Plan:       Angina at rest        -     Per Cardiology    Environmental allergies  -      montelukast (SINGULAIR) 10 mg tablet; Take 1 tablet (10 mg total) by mouth every evening.  Dispense: 90 tablet; Refill: 1    Severe anxiety  -     ALPRAZolam (XANAX) 0.25 MG tablet; Take 1 tablet (0.25 mg total) by mouth as needed for Anxiety.  Dispense: 30 tablet; Refill: 0    Essential hypertension, benign  -     estradioL (ESTRACE) 0.01 % (0.1 mg/gram) vaginal cream; Place 1 g vaginally once a week.  Dispense: 42.5 g; Refill: 1  -     metoprolol succinate (TOPROL-XL) 25 MG 24 hr tablet; Take 1 tablet (25 mg total) by mouth once daily.  Dispense: 90 tablet; Refill: 1  -     losartan (COZAAR) 25 MG tablet; Take 1 tablet (25 mg total) by mouth 2 (two) times a day.  Dispense: 180 tablet; Refill: 2    GERD with esophagitis  -     Discontinue: omeprazole (PRILOSEC) 20 MG capsule; Take 1 capsule (20 mg total) by mouth once daily.  Dispense: 90 capsule; Refill: 1      No follow-ups on file.

## 2020-08-05 NOTE — PROGRESS NOTES
SUBJECTIVE:    Patient ID: Karma Chen is a 84 y.o. female.    Chief Complaint: No chief complaint on file.    HPI         Admission on 07/24/2020, Discharged on 07/25/2020   Component Date Value Ref Range Status    WBC 07/24/2020 7.42  3.90 - 12.70 K/uL Final    RBC 07/24/2020 4.16  4.00 - 5.40 M/uL Final    Hemoglobin 07/24/2020 11.8* 12.0 - 16.0 g/dL Final    Hematocrit 07/24/2020 36.8* 37.0 - 48.5 % Final    Mean Corpuscular Volume 07/24/2020 89  82 - 98 fL Final    Mean Corpuscular Hemoglobin 07/24/2020 28.4  27.0 - 31.0 pg Final    Mean Corpuscular Hemoglobin Conc 07/24/2020 32.1  32.0 - 36.0 g/dL Final    RDW 07/24/2020 13.2  11.5 - 14.5 % Final    Platelets 07/24/2020 294  150 - 350 K/uL Final    MPV 07/24/2020 9.7  9.2 - 12.9 fL Final    Immature Granulocytes 07/24/2020 0.4  0.0 - 0.5 % Final    Gran # (ANC) 07/24/2020 3.7  1.8 - 7.7 K/uL Final    Immature Grans (Abs) 07/24/2020 0.03  0.00 - 0.04 K/uL Final    Lymph # 07/24/2020 2.6  1.0 - 4.8 K/uL Final    Mono # 07/24/2020 0.9  0.3 - 1.0 K/uL Final    Eos # 07/24/2020 0.2  0.0 - 0.5 K/uL Final    Baso # 07/24/2020 0.06  0.00 - 0.20 K/uL Final    nRBC 07/24/2020 0  0 /100 WBC Final    Gran% 07/24/2020 50.4  38.0 - 73.0 % Final    Lymph% 07/24/2020 34.5  18.0 - 48.0 % Final    Mono% 07/24/2020 11.9  4.0 - 15.0 % Final    Eosinophil% 07/24/2020 2.0  0.0 - 8.0 % Final    Basophil% 07/24/2020 0.8  0.0 - 1.9 % Final    Differential Method 07/24/2020 Automated   Final    Sodium 07/24/2020 141  136 - 145 mmol/L Final    Potassium 07/24/2020 3.6  3.5 - 5.1 mmol/L Final    Chloride 07/24/2020 102  95 - 110 mmol/L Final    CO2 07/24/2020 25  23 - 29 mmol/L Final    Glucose 07/24/2020 147* 70 - 110 mg/dL Final    BUN, Bld 07/24/2020 23  8 - 23 mg/dL Final    Creatinine 07/24/2020 1.1  0.5 - 1.4 mg/dL Final    Calcium 07/24/2020 9.3  8.7 - 10.5 mg/dL Final    Total Protein 07/24/2020 7.5  6.0 - 8.4 g/dL Final    Albumin  07/24/2020 3.9  3.5 - 5.2 g/dL Final    Total Bilirubin 07/24/2020 0.6  0.1 - 1.0 mg/dL Final    Alkaline Phosphatase 07/24/2020 58  55 - 135 U/L Final    AST 07/24/2020 24  10 - 40 U/L Final    ALT 07/24/2020 20  10 - 44 U/L Final    Anion Gap 07/24/2020 14  8 - 16 mmol/L Final    eGFR if  07/24/2020 54.0* >60 mL/min/1.73 m^2 Final    eGFR if non African American 07/24/2020 46.9* >60 mL/min/1.73 m^2 Final    Troponin I 07/24/2020 0.038  <=0.040 ng/mL Final    Troponin I 07/24/2020 0.059* <=0.040 ng/mL Final    BNP 07/24/2020 265* 0 - 99 pg/mL Final    SARS-CoV-2 RNA, Amplification, Qual 07/24/2020 Negative  Negative Final    Hemoglobin A1C 07/25/2020 6.0  4.5 - 6.2 % Final    Estimated Avg Glucose 07/25/2020 126  68 - 131 mg/dL Final    Troponin I 07/25/2020 0.077* <=0.040 ng/mL Final    BSA 07/25/2020 1.67  m2 Final    Right Atrial Pressure (from IVC) 07/25/2020 3  mmHg Final    TDI SEPTAL 07/25/2020 0.05  m/s Final    LV LATERAL E/E' RATIO 07/25/2020 11.83  m/s Final    LV SEPTAL E/E' RATIO 07/25/2020 14.20  m/s Final    AORTIC VALVE CUSP SEPERATION 07/25/2020 1.83  cm Final    TDI LATERAL 07/25/2020 0.06  m/s Final    PV PEAK VELOCITY 07/25/2020 100.22  cm/s Final    LVIDD 07/25/2020 4.17  3.5 - 6.0 cm Final    IVS 07/25/2020 1.10* 0.6 - 1.1 cm Final    PW 07/25/2020 1.15* 0.6 - 1.1 cm Final    Ao root annulus 07/25/2020 3.17  cm Final    LVIDS 07/25/2020 2.95  2.1 - 4.0 cm Final    FS 07/25/2020 29  28 - 44 % Final    LV mass 07/25/2020 160.43  g Final    LA size 07/25/2020 3.90  cm Final    RVDD 07/25/2020 234.00  cm Final    Left Ventricle Relative Wall Thick* 07/25/2020 0.55  cm Final    AV mean gradient 07/25/2020 4  mmHg Final    AV valve area 07/25/2020 2.01  cm2 Final    AV Velocity Ratio 07/25/2020 52.90   Final    AV index (prosthetic) 07/25/2020 0.71   Final    E/A ratio 07/25/2020 1.01   Final    Mean e' 07/25/2020 0.06  m/s Final    E wave  decelartion time 07/25/2020 186.47  msec Final    IVRT 07/25/2020 96.69  msec Final    LVOT diameter 07/25/2020 1.90  cm Final    LVOT area 07/25/2020 2.8  cm2 Final    LVOT peak dylan 07/25/2020 77.24  m/s Final    LVOT peak VTI 07/25/2020 18.74  cm Final    Ao peak dylan 07/25/2020 1.46  m/s Final    Ao VTI 07/25/2020 26.39  cm Final    LVOT stroke volume 07/25/2020 53.11  cm3 Final    AV peak gradient 07/25/2020 9  mmHg Final    TV rest pulmonary artery pressure 07/25/2020 27  mmHg Final    E/E' ratio 07/25/2020 12.91  m/s Final    MV Peak E Dylan 07/25/2020 0.71  m/s Final    TR Max Dylan 07/25/2020 2.43  m/s Final    MV Peak A Dylan 07/25/2020 0.70  m/s Final    LV Systolic Volume 07/25/2020 32.30  mL Final    LV Systolic Volume Index 07/25/2020 19.8  mL/m2 Final    LV Diastolic Volume 07/25/2020 73.90  mL Final    LV Diastolic Volume Index 07/25/2020 45.26  mL/m2 Final    LV Mass Index 07/25/2020 98  g/m2 Final    Triscuspid Valve Regurgitation Pea* 07/25/2020 24  mmHg Final    Sodium 07/25/2020 143  136 - 145 mmol/L Final    Potassium 07/25/2020 3.8  3.5 - 5.1 mmol/L Final    Chloride 07/25/2020 107  95 - 110 mmol/L Final    CO2 07/25/2020 26  23 - 29 mmol/L Final    Glucose 07/25/2020 88  70 - 110 mg/dL Final    BUN, Bld 07/25/2020 22  8 - 23 mg/dL Final    Creatinine 07/25/2020 1.1  0.5 - 1.4 mg/dL Final    Calcium 07/25/2020 9.0  8.7 - 10.5 mg/dL Final    Anion Gap 07/25/2020 10  8 - 16 mmol/L Final    eGFR if African American 07/25/2020 54.0* >60 mL/min/1.73 m^2 Final    eGFR if non African American 07/25/2020 46.9* >60 mL/min/1.73 m^2 Final    POC Glucose 07/25/2020 91  70 - 110 Final    POC Glucose 07/25/2020 98  70 - 110 Final   Lab Visit on 05/04/2020   Component Date Value Ref Range Status    Cholesterol 05/04/2020 237* 120 - 199 mg/dL Final    Triglycerides 05/04/2020 207* 30 - 150 mg/dL Final    HDL 05/04/2020 48  40 - 75 mg/dL Final    LDL Cholesterol 05/04/2020 147.6   63.0 - 159.0 mg/dL Final    Hdl/Cholesterol Ratio 05/04/2020 20.3  20.0 - 50.0 % Final    Total Cholesterol/HDL Ratio 05/04/2020 4.9  2.0 - 5.0 Final    Non-HDL Cholesterol 05/04/2020 189  mg/dL Final    Glucose 05/04/2020 92  70 - 110 mg/dL Final    Sodium 05/04/2020 140  136 - 145 mmol/L Final    Potassium 05/04/2020 4.1  3.5 - 5.1 mmol/L Final    Chloride 05/04/2020 104  95 - 110 mmol/L Final    CO2 05/04/2020 26  23 - 29 mmol/L Final    BUN, Bld 05/04/2020 21  8 - 23 mg/dL Final    Calcium 05/04/2020 9.3  8.7 - 10.5 mg/dL Final    Creatinine 05/04/2020 1.2  0.5 - 1.4 mg/dL Final    Albumin 05/04/2020 4.0  3.5 - 5.2 g/dL Final    Phosphorus 05/04/2020 3.8  2.7 - 4.5 mg/dL Final    eGFR if African American 05/04/2020 48.6* >60 mL/min/1.73 m^2 Final    eGFR if non African American 05/04/2020 42.2* >60 mL/min/1.73 m^2 Final    Anion Gap 05/04/2020 10  8 - 16 mmol/L Final    WBC 05/04/2020 8.16  3.90 - 12.70 K/uL Final    RBC 05/04/2020 4.36  4.00 - 5.40 M/uL Final    Hemoglobin 05/04/2020 12.6  12.0 - 16.0 g/dL Final    Hematocrit 05/04/2020 40.2  37.0 - 48.5 % Final    Mean Corpuscular Volume 05/04/2020 92  82 - 98 fL Final    Mean Corpuscular Hemoglobin 05/04/2020 28.9  27.0 - 31.0 pg Final    Mean Corpuscular Hemoglobin Conc 05/04/2020 31.3* 32.0 - 36.0 g/dL Final    RDW 05/04/2020 12.9  11.5 - 14.5 % Final    Platelets 05/04/2020 278  150 - 350 K/uL Final    MPV 05/04/2020 10.2  9.2 - 12.9 fL Final    Immature Granulocytes 05/04/2020 0.4  0.0 - 0.5 % Final    Gran # (ANC) 05/04/2020 3.4  1.8 - 7.7 K/uL Final    Immature Grans (Abs) 05/04/2020 0.03  0.00 - 0.04 K/uL Final    Lymph # 05/04/2020 3.5  1.0 - 4.8 K/uL Final    Mono # 05/04/2020 0.9  0.3 - 1.0 K/uL Final    Eos # 05/04/2020 0.3  0.0 - 0.5 K/uL Final    Baso # 05/04/2020 0.05  0.00 - 0.20 K/uL Final    nRBC 05/04/2020 0  0 /100 WBC Final    Gran% 05/04/2020 41.8  38.0 - 73.0 % Final    Lymph% 05/04/2020 42.5  18.0 -  48.0 % Final    Mono% 05/04/2020 11.4  4.0 - 15.0 % Final    Eosinophil% 05/04/2020 3.3  0.0 - 8.0 % Final    Basophil% 05/04/2020 0.6  0.0 - 1.9 % Final    Differential Method 05/04/2020 Automated   Final    PTH, Intact 05/04/2020 40.5  9.0 - 77.0 pg/mL Final    RBC, UA 05/04/2020 1  0 - 4 /hpf Final    WBC, UA 05/04/2020 7* 0 - 5 /hpf Final    Bacteria 05/04/2020 Rare  None-Occ /hpf Final    Squam Epithel, UA 05/04/2020 5  /hpf Final    Hyaline Casts, UA 05/04/2020 13* 0-1/lpf /lpf Final    Microscopic Comment 05/04/2020 SEE COMMENT   Final    Protein, Urine Random 05/04/2020 <6  6 - 15 mg/dL Final    Creatinine, Random Ur 05/04/2020 51.0  15.0 - 325.0 mg/dL Final    Prot/Creat Ratio, Ur 05/04/2020 Unable to calculate  0.00 - 0.20 Final    Creatinine, Random Ur 05/04/2020 51.0  15.0 - 325.0 mg/dL Final    Microalbum.,U,Random 05/04/2020 <2.0  ug/mL Final    Creatinine, Random Ur 05/04/2020 51.0  15.0 - 325.0 mg/dL Final    Microalb Creat Ratio 05/04/2020 Unable to calculate  0.0 - 30.0 ug/mg Final   Admission on 05/02/2020, Discharged on 05/02/2020   Component Date Value Ref Range Status    WBC 05/02/2020 9.76  3.90 - 12.70 K/uL Final    RBC 05/02/2020 4.52  4.00 - 5.40 M/uL Final    Hemoglobin 05/02/2020 13.0  12.0 - 16.0 g/dL Final    Hematocrit 05/02/2020 41.4  37.0 - 48.5 % Final    Mean Corpuscular Volume 05/02/2020 92  82 - 98 fL Final    Mean Corpuscular Hemoglobin 05/02/2020 28.8  27.0 - 31.0 pg Final    Mean Corpuscular Hemoglobin Conc 05/02/2020 31.4* 32.0 - 36.0 g/dL Final    RDW 05/02/2020 13.0  11.5 - 14.5 % Final    Platelets 05/02/2020 282  150 - 350 K/uL Final    MPV 05/02/2020 10.1  9.2 - 12.9 fL Final    Immature Granulocytes 05/02/2020 0.4  0.0 - 0.5 % Final    Gran # (ANC) 05/02/2020 4.1  1.8 - 7.7 K/uL Final    Immature Grans (Abs) 05/02/2020 0.04  0.00 - 0.04 K/uL Final    Lymph # 05/02/2020 4.1  1.0 - 4.8 K/uL Final    Mono # 05/02/2020 1.2* 0.3 - 1.0 K/uL  Final    Eos # 05/02/2020 0.3  0.0 - 0.5 K/uL Final    Baso # 05/02/2020 0.07  0.00 - 0.20 K/uL Final    nRBC 05/02/2020 0  0 /100 WBC Final    Gran% 05/02/2020 42.3  38.0 - 73.0 % Final    Lymph% 05/02/2020 41.6  18.0 - 48.0 % Final    Mono% 05/02/2020 12.3  4.0 - 15.0 % Final    Eosinophil% 05/02/2020 2.7  0.0 - 8.0 % Final    Basophil% 05/02/2020 0.7  0.0 - 1.9 % Final    Differential Method 05/02/2020 Automated   Final    Sodium 05/02/2020 140  136 - 145 mmol/L Final    Potassium 05/02/2020 4.2  3.5 - 5.1 mmol/L Final    Chloride 05/02/2020 104  95 - 110 mmol/L Final    CO2 05/02/2020 26  23 - 29 mmol/L Final    Glucose 05/02/2020 110  70 - 110 mg/dL Final    BUN, Bld 05/02/2020 21  8 - 23 mg/dL Final    Creatinine 05/02/2020 1.2  0.5 - 1.4 mg/dL Final    Calcium 05/02/2020 9.2  8.7 - 10.5 mg/dL Final    Total Protein 05/02/2020 8.1  6.0 - 8.4 g/dL Final    Albumin 05/02/2020 4.3  3.5 - 5.2 g/dL Final    Total Bilirubin 05/02/2020 0.9  0.1 - 1.0 mg/dL Final    Alkaline Phosphatase 05/02/2020 58  55 - 135 U/L Final    AST 05/02/2020 42* 10 - 40 U/L Final    ALT 05/02/2020 17  10 - 44 U/L Final    Anion Gap 05/02/2020 10  8 - 16 mmol/L Final    eGFR if African American 05/02/2020 48.6* >60 mL/min/1.73 m^2 Final    eGFR if non African American 05/02/2020 42.2* >60 mL/min/1.73 m^2 Final    BNP 05/02/2020 534* 0 - 99 pg/mL Final    Troponin I 05/02/2020 <0.030  <=0.040 ng/mL Final    PT 05/02/2020 13.6  10.6 - 14.8 sec Final    INR 05/02/2020 1.1   Final    Magnesium 05/02/2020 2.4  1.6 - 2.6 mg/dL Final    Specimen UA 05/02/2020 Urine, Unspecified   Final    Color, UA 05/02/2020 Yellow  Yellow, Straw, Anila Final    Appearance, UA 05/02/2020 Clear  Clear Final    pH, UA 05/02/2020 7.0  5.0 - 8.0 Final    Specific Gravity, UA 05/02/2020 1.010  1.005 - 1.030 Final    Protein, UA 05/02/2020 Negative  Negative Final    Glucose, UA 05/02/2020 Negative  Negative Final    Ketones, UA  05/02/2020 Negative  Negative Final    Bilirubin (UA) 05/02/2020 Negative  Negative Final    Occult Blood UA 05/02/2020 Negative  Negative Final    Nitrite, UA 05/02/2020 Negative  Negative Final    Urobilinogen, UA 05/02/2020 Negative  Negative EU/dL Final    Leukocytes, UA 05/02/2020 Negative  Negative Final    Troponin I 05/02/2020 0.032  <=0.040 ng/mL Final    TSH 05/02/2020 2.640  0.340 - 5.600 uIU/mL Final    Troponin I 05/02/2020 0.030  <=0.040 ng/mL Final       Past Medical History:   Diagnosis Date    Coronary artery disease     Dermatitis     Dermatitis 12/8/2017    Diabetes mellitus     Diabetes mellitus type II     Leah Madrigal virus infection     Fibromyalgia     GERD (gastroesophageal reflux disease)     Hypertension     MI (myocardial infarction) 09/23/2011    Pure hypercholesterolemia 2/26/2020     Past Surgical History:   Procedure Laterality Date    adenoids      ANGIOPLASTY  1987    APPENDECTOMY      CARDIAC PACEMAKER PLACEMENT  01/12/2017    CATARACT EXTRACTION, BILATERAL Bilateral 9/2/15, 3/17/15    right eye-9/2/15, left eye-3/17/15    CHOLECYSTECTOMY  1987    CORONARY ARTERY BYPASS GRAFT  2006    quadruple    coronary stents  1999    x2    CYST REMOVAL  04/25/2017    on back    HYSTERECTOMY  1966    OVARY SURGERY  1948    Ovary burst & was repaired    RHIZOTOMY  09/14/2018    TONSILLECTOMY  1940     Family History   Problem Relation Age of Onset    No Known Problems Mother     No Known Problems Father        Marital Status:   Alcohol History:  reports no history of alcohol use.  Tobacco History:  reports that she has never smoked. She has never used smokeless tobacco.  Drug History:  reports no history of drug use.    Review of patient's allergies indicates:   Allergen Reactions    Arthrotec 50 [diclofenac-misoprostol]     Doxycycline     Effexor [venlafaxine]     Estradiol     Flagyl [metronidazole]     Fluconazole     Iodine     Levaquin  [levofloxacin]     Nexium [esomeprazole magnesium]     Penicillins     Shellfish containing products     Statins-hmg-coa reductase inhibitors     Sulfa (sulfonamide antibiotics)     Tea tree oil     Tegaserod      ZOLNORM    Trazodone     Yeast, dried        Current Outpatient Medications:     acetaminophen (TYLENOL) 500 MG tablet, Take 500 mg by mouth every 6 (six) hours as needed for Pain., Disp: , Rfl:     ALPRAZolam (XANAX) 0.25 MG tablet, Take 0.25 mg by mouth as needed for Anxiety., Disp: , Rfl:     aspirin (ECOTRIN) 81 MG EC tablet, Take 1 tablet (81 mg total) by mouth once daily., Disp: , Rfl: 0    cetirizine (ZYRTEC) 10 MG tablet, Take 10 mg by mouth once daily., Disp: , Rfl:     cholecalciferol, vitamin D3, (VITAMIN D3) 100 mcg (4,000 unit) Cap, Take 1 capsule by mouth once daily. , Disp: , Rfl:     clopidogrel (PLAVIX) 75 mg tablet, Take 1 tablet (75 mg total) by mouth once daily., Disp: 90 tablet, Rfl: 0    coenzyme Q10 100 mg capsule, Take 1 capsule (100 mg total) by mouth 2 (two) times daily., Disp: 60 capsule, Rfl: 11    cyclobenzaprine (FLEXERIL) 10 MG tablet, Take 5 mg by mouth every evening., Disp: , Rfl:     estradiol (ESTRACE) 0.01 % (0.1 mg/g) vaginal cream, Place 1 g vaginally once a week. , Disp: , Rfl:     fluticasone propionate (FLONASE) 50 mcg/actuation nasal spray, 1 spray by Each Nostril route once daily., Disp: , Rfl:     ipratropium (ATROVENT) 0.03 % nasal spray, 2 sprays by Nasal route 2 (two) times daily., Disp: 30 mL, Rfl: 2    isosorbide mononitrate (IMDUR) 60 MG 24 hr tablet, TAKE 1 TABLET BY MOUTH ONCE DAILY (Patient taking differently: Take 60 mg by mouth once daily. DO NOT CRUSH OR CHEW; SWALLOW WHOLE.), Disp: 90 tablet, Rfl: 1    Lactobacillus rhamnosus GG (CULTURELLE) 10 billion cell capsule, Take 1 capsule by mouth once daily., Disp: , Rfl:     losartan (COZAAR) 25 MG tablet, Take 1 tablet (25 mg total) by mouth 2 (two) times a day., Disp: 180 tablet,  Rfl: 3    MAGNESIUM ORAL, Take 500 mg by mouth once daily. , Disp: , Rfl:     metoprolol succinate (TOPROL-XL) 25 MG 24 hr tablet, Take 25 mg by mouth once daily., Disp: , Rfl:     montelukast (SINGULAIR) 10 mg tablet, Take 1 tablet (10 mg total) by mouth every evening., Disp: 90 tablet, Rfl: 1    multivitamin (THERAGRAN) tablet, Take 1 tablet by mouth 2 (two) times a day. , Disp: , Rfl:     nitroGLYCERIN (NITROSTAT) 0.4 MG SL tablet, Place 0.4 mg under the tongue every 5 (five) minutes as needed for Chest pain. , Disp: , Rfl:     omeprazole (PRILOSEC) 20 MG capsule, TAKE 1 CAPSULE BY MOUTH EVERY DAY (Patient taking differently: Take 20 mg by mouth once daily. ), Disp: 90 capsule, Rfl: 1    SYNTHROID 88 mcg tablet, TAKE 1 TABLET ONCE DAILY (Patient taking differently: Take 88 mcg by mouth before breakfast. ), Disp: 90 tablet, Rfl: 1    tiZANidine (ZANAFLEX) 4 MG tablet, Take 4 mg by mouth every 6 (six) hours as needed., Disp: , Rfl:     traMADol (ULTRAM) 50 mg tablet, Take 50 mg by mouth 2 (two) times daily as needed., Disp: , Rfl: 1    Review of Systems       Objective:      There were no vitals filed for this visit.  Physical Exam      Assessment:       No diagnosis found.     Plan:       There are no diagnoses linked to this encounter.  No follow-ups on file.        8/4/2020 Elena Sullivan M.D.

## 2020-08-06 RX ORDER — PANTOPRAZOLE SODIUM 40 MG/1
40 TABLET, DELAYED RELEASE ORAL DAILY
Qty: 30 TABLET | Refills: 11 | Status: SHIPPED | OUTPATIENT
Start: 2020-08-06 | End: 2020-09-10

## 2020-09-02 ENCOUNTER — TELEPHONE (OUTPATIENT)
Dept: CARDIOLOGY | Facility: CLINIC | Age: 85
End: 2020-09-02

## 2020-09-02 NOTE — TELEPHONE ENCOUNTER
----- Message from Tamie Cortez MA sent at 9/2/2020 12:07 PM CDT -----  Regarding: medication  Contact: Karma [phone]  Calling to see what is going on with the cholesterol medication injection.

## 2020-09-10 ENCOUNTER — OFFICE VISIT (OUTPATIENT)
Dept: FAMILY MEDICINE | Facility: CLINIC | Age: 85
End: 2020-09-10
Payer: MEDICARE

## 2020-09-10 VITALS
WEIGHT: 147 LBS | HEART RATE: 70 BPM | DIASTOLIC BLOOD PRESSURE: 64 MMHG | SYSTOLIC BLOOD PRESSURE: 132 MMHG | RESPIRATION RATE: 16 BRPM | TEMPERATURE: 98 F | BODY MASS INDEX: 28.86 KG/M2 | OXYGEN SATURATION: 98 % | HEIGHT: 60 IN

## 2020-09-10 DIAGNOSIS — I10 ESSENTIAL HYPERTENSION, BENIGN: ICD-10-CM

## 2020-09-10 DIAGNOSIS — M54.32 SCIATICA OF LEFT SIDE: ICD-10-CM

## 2020-09-10 DIAGNOSIS — E11.22 TYPE 2 DIABETES MELLITUS WITH CHRONIC KIDNEY DISEASE, WITHOUT LONG-TERM CURRENT USE OF INSULIN, UNSPECIFIED CKD STAGE: ICD-10-CM

## 2020-09-10 DIAGNOSIS — M62.838 CERVICAL PARASPINAL MUSCLE SPASM: Primary | ICD-10-CM

## 2020-09-10 PROCEDURE — 99215 OFFICE O/P EST HI 40 MIN: CPT | Performed by: INTERNAL MEDICINE

## 2020-09-10 PROCEDURE — 99214 PR OFFICE/OUTPT VISIT, EST, LEVL IV, 30-39 MIN: ICD-10-PCS | Mod: S$PBB,,, | Performed by: INTERNAL MEDICINE

## 2020-09-10 PROCEDURE — 99214 OFFICE O/P EST MOD 30 MIN: CPT | Mod: S$PBB,,, | Performed by: INTERNAL MEDICINE

## 2020-09-10 RX ORDER — GABAPENTIN 100 MG/1
1 CAPSULE ORAL 3 TIMES DAILY
COMMUNITY
Start: 2020-09-05 | End: 2020-09-10 | Stop reason: SDUPTHER

## 2020-09-10 RX ORDER — GABAPENTIN 100 MG/1
100 CAPSULE ORAL 3 TIMES DAILY
Qty: 270 CAPSULE | Refills: 0 | Status: SHIPPED | OUTPATIENT
Start: 2020-09-10 | End: 2020-11-09

## 2020-09-10 NOTE — PROGRESS NOTES
SUBJECTIVE:    Patient ID: Karma Chen is a 84 y.o. female.    Chief Complaint: Neck Pain and Follow-up (feeling better, seen chiropractor)    HPI     Patient developed pain in the right lateral neck and shoulder and the  Next day it was very severe--hard pain that was constant-only thing that helped was a massager-dx at [facility] found arthritis in the neck, neck spasm, and cervical spondylosis-she got gabapentin 100 bid there is one area of tenderness medial trapezius on the right-She cold not turn her head to the right-after chiropractic she could turn her head somewhat to the right-she had some pain going into the right occipital area of the head-she noted some burning mid T spine to the right-she is sleeping on a regular pillow-trying to sleep to the left-has some gabapentin left    Another issue-in the am when she awakens she feels like the entire leg is asleep-she is also having some neuropathy in the feet.as she is up over the next 1-2 hours the pain goes away-sometimes it is just in the feet but lately it has been coming up into the right thigh[-she has lumbar adjustments regularly-she has noted some pain in the lower left back when sleeping-    Labs from July -see below-elevated BNP and lipids glucose A1C  6.0  And glucose 147--    Admission on 07/24/2020, Discharged on 07/25/2020   Component Date Value Ref Range Status    WBC 07/24/2020 7.42  3.90 - 12.70 K/uL Final    RBC 07/24/2020 4.16  4.00 - 5.40 M/uL Final    Hemoglobin 07/24/2020 11.8* 12.0 - 16.0 g/dL Final    Hematocrit 07/24/2020 36.8* 37.0 - 48.5 % Final    Mean Corpuscular Volume 07/24/2020 89  82 - 98 fL Final    Mean Corpuscular Hemoglobin 07/24/2020 28.4  27.0 - 31.0 pg Final    Mean Corpuscular Hemoglobin Conc 07/24/2020 32.1  32.0 - 36.0 g/dL Final    RDW 07/24/2020 13.2  11.5 - 14.5 % Final    Platelets 07/24/2020 294  150 - 350 K/uL Final    MPV 07/24/2020 9.7  9.2 - 12.9 fL Final    Immature Granulocytes 07/24/2020  0.4  0.0 - 0.5 % Final    Gran # (ANC) 07/24/2020 3.7  1.8 - 7.7 K/uL Final    Immature Grans (Abs) 07/24/2020 0.03  0.00 - 0.04 K/uL Final    Lymph # 07/24/2020 2.6  1.0 - 4.8 K/uL Final    Mono # 07/24/2020 0.9  0.3 - 1.0 K/uL Final    Eos # 07/24/2020 0.2  0.0 - 0.5 K/uL Final    Baso # 07/24/2020 0.06  0.00 - 0.20 K/uL Final    nRBC 07/24/2020 0  0 /100 WBC Final    Gran% 07/24/2020 50.4  38.0 - 73.0 % Final    Lymph% 07/24/2020 34.5  18.0 - 48.0 % Final    Mono% 07/24/2020 11.9  4.0 - 15.0 % Final    Eosinophil% 07/24/2020 2.0  0.0 - 8.0 % Final    Basophil% 07/24/2020 0.8  0.0 - 1.9 % Final    Differential Method 07/24/2020 Automated   Final    Sodium 07/24/2020 141  136 - 145 mmol/L Final    Potassium 07/24/2020 3.6  3.5 - 5.1 mmol/L Final    Chloride 07/24/2020 102  95 - 110 mmol/L Final    CO2 07/24/2020 25  23 - 29 mmol/L Final    Glucose 07/24/2020 147* 70 - 110 mg/dL Final    BUN, Bld 07/24/2020 23  8 - 23 mg/dL Final    Creatinine 07/24/2020 1.1  0.5 - 1.4 mg/dL Final    Calcium 07/24/2020 9.3  8.7 - 10.5 mg/dL Final    Total Protein 07/24/2020 7.5  6.0 - 8.4 g/dL Final    Albumin 07/24/2020 3.9  3.5 - 5.2 g/dL Final    Total Bilirubin 07/24/2020 0.6  0.1 - 1.0 mg/dL Final    Alkaline Phosphatase 07/24/2020 58  55 - 135 U/L Final    AST 07/24/2020 24  10 - 40 U/L Final    ALT 07/24/2020 20  10 - 44 U/L Final    Anion Gap 07/24/2020 14  8 - 16 mmol/L Final    eGFR if  07/24/2020 54.0* >60 mL/min/1.73 m^2 Final    eGFR if non African American 07/24/2020 46.9* >60 mL/min/1.73 m^2 Final    Troponin I 07/24/2020 0.038  <=0.040 ng/mL Final    Troponin I 07/24/2020 0.059* <=0.040 ng/mL Final    BNP 07/24/2020 265* 0 - 99 pg/mL Final    SARS-CoV-2 RNA, Amplification, Qual 07/24/2020 Negative  Negative Final    Hemoglobin A1C 07/25/2020 6.0  4.5 - 6.2 % Final    Estimated Avg Glucose 07/25/2020 126  68 - 131 mg/dL Final    Troponin I 07/25/2020 0.077* <=0.040  ng/mL Final    BSA 07/25/2020 1.67  m2 Final    Right Atrial Pressure (from IVC) 07/25/2020 3  mmHg Final    TDI SEPTAL 07/25/2020 0.05  m/s Final    LV LATERAL E/E' RATIO 07/25/2020 11.83  m/s Final    LV SEPTAL E/E' RATIO 07/25/2020 14.20  m/s Final    AORTIC VALVE CUSP SEPERATION 07/25/2020 1.83  cm Final    TDI LATERAL 07/25/2020 0.06  m/s Final    PV PEAK VELOCITY 07/25/2020 100.22  cm/s Final    LVIDD 07/25/2020 4.17  3.5 - 6.0 cm Final    IVS 07/25/2020 1.10* 0.6 - 1.1 cm Final    PW 07/25/2020 1.15* 0.6 - 1.1 cm Final    Ao root annulus 07/25/2020 3.17  cm Final    LVIDS 07/25/2020 2.95  2.1 - 4.0 cm Final    FS 07/25/2020 29  28 - 44 % Final    LV mass 07/25/2020 160.43  g Final    LA size 07/25/2020 3.90  cm Final    RVDD 07/25/2020 234.00  cm Final    Left Ventricle Relative Wall Thick* 07/25/2020 0.55  cm Final    AV mean gradient 07/25/2020 4  mmHg Final    AV valve area 07/25/2020 2.01  cm2 Final    AV Velocity Ratio 07/25/2020 52.90   Final    AV index (prosthetic) 07/25/2020 0.71   Final    E/A ratio 07/25/2020 1.01   Final    Mean e' 07/25/2020 0.06  m/s Final    E wave decelartion time 07/25/2020 186.47  msec Final    IVRT 07/25/2020 96.69  msec Final    LVOT diameter 07/25/2020 1.90  cm Final    LVOT area 07/25/2020 2.8  cm2 Final    LVOT peak dylan 07/25/2020 77.24  m/s Final    LVOT peak VTI 07/25/2020 18.74  cm Final    Ao peak dylan 07/25/2020 1.46  m/s Final    Ao VTI 07/25/2020 26.39  cm Final    LVOT stroke volume 07/25/2020 53.11  cm3 Final    AV peak gradient 07/25/2020 9  mmHg Final    TV rest pulmonary artery pressure 07/25/2020 27  mmHg Final    E/E' ratio 07/25/2020 12.91  m/s Final    MV Peak E Dylan 07/25/2020 0.71  m/s Final    TR Max Dylan 07/25/2020 2.43  m/s Final    MV Peak A Dylan 07/25/2020 0.70  m/s Final    LV Systolic Volume 07/25/2020 32.30  mL Final    LV Systolic Volume Index 07/25/2020 19.8  mL/m2 Final    LV Diastolic Volume 07/25/2020  73.90  mL Final    LV Diastolic Volume Index 07/25/2020 45.26  mL/m2 Final    LV Mass Index 07/25/2020 98  g/m2 Final    Triscuspid Valve Regurgitation Pea* 07/25/2020 24  mmHg Final    Sodium 07/25/2020 143  136 - 145 mmol/L Final    Potassium 07/25/2020 3.8  3.5 - 5.1 mmol/L Final    Chloride 07/25/2020 107  95 - 110 mmol/L Final    CO2 07/25/2020 26  23 - 29 mmol/L Final    Glucose 07/25/2020 88  70 - 110 mg/dL Final    BUN, Bld 07/25/2020 22  8 - 23 mg/dL Final    Creatinine 07/25/2020 1.1  0.5 - 1.4 mg/dL Final    Calcium 07/25/2020 9.0  8.7 - 10.5 mg/dL Final    Anion Gap 07/25/2020 10  8 - 16 mmol/L Final    eGFR if African American 07/25/2020 54.0* >60 mL/min/1.73 m^2 Final    eGFR if non African American 07/25/2020 46.9* >60 mL/min/1.73 m^2 Final    POC Glucose 07/25/2020 91  70 - 110 Final    POC Glucose 07/25/2020 98  70 - 110 Final   Lab Visit on 05/04/2020   Component Date Value Ref Range Status    Cholesterol 05/04/2020 237* 120 - 199 mg/dL Final    Triglycerides 05/04/2020 207* 30 - 150 mg/dL Final    HDL 05/04/2020 48  40 - 75 mg/dL Final    LDL Cholesterol 05/04/2020 147.6  63.0 - 159.0 mg/dL Final    Hdl/Cholesterol Ratio 05/04/2020 20.3  20.0 - 50.0 % Final    Total Cholesterol/HDL Ratio 05/04/2020 4.9  2.0 - 5.0 Final    Non-HDL Cholesterol 05/04/2020 189  mg/dL Final    Glucose 05/04/2020 92  70 - 110 mg/dL Final    Sodium 05/04/2020 140  136 - 145 mmol/L Final    Potassium 05/04/2020 4.1  3.5 - 5.1 mmol/L Final    Chloride 05/04/2020 104  95 - 110 mmol/L Final    CO2 05/04/2020 26  23 - 29 mmol/L Final    BUN, Bld 05/04/2020 21  8 - 23 mg/dL Final    Calcium 05/04/2020 9.3  8.7 - 10.5 mg/dL Final    Creatinine 05/04/2020 1.2  0.5 - 1.4 mg/dL Final    Albumin 05/04/2020 4.0  3.5 - 5.2 g/dL Final    Phosphorus 05/04/2020 3.8  2.7 - 4.5 mg/dL Final    eGFR if African American 05/04/2020 48.6* >60 mL/min/1.73 m^2 Final    eGFR if non African American 05/04/2020  42.2* >60 mL/min/1.73 m^2 Final    Anion Gap 05/04/2020 10  8 - 16 mmol/L Final    WBC 05/04/2020 8.16  3.90 - 12.70 K/uL Final    RBC 05/04/2020 4.36  4.00 - 5.40 M/uL Final    Hemoglobin 05/04/2020 12.6  12.0 - 16.0 g/dL Final    Hematocrit 05/04/2020 40.2  37.0 - 48.5 % Final    Mean Corpuscular Volume 05/04/2020 92  82 - 98 fL Final    Mean Corpuscular Hemoglobin 05/04/2020 28.9  27.0 - 31.0 pg Final    Mean Corpuscular Hemoglobin Conc 05/04/2020 31.3* 32.0 - 36.0 g/dL Final    RDW 05/04/2020 12.9  11.5 - 14.5 % Final    Platelets 05/04/2020 278  150 - 350 K/uL Final    MPV 05/04/2020 10.2  9.2 - 12.9 fL Final    Immature Granulocytes 05/04/2020 0.4  0.0 - 0.5 % Final    Gran # (ANC) 05/04/2020 3.4  1.8 - 7.7 K/uL Final    Immature Grans (Abs) 05/04/2020 0.03  0.00 - 0.04 K/uL Final    Lymph # 05/04/2020 3.5  1.0 - 4.8 K/uL Final    Mono # 05/04/2020 0.9  0.3 - 1.0 K/uL Final    Eos # 05/04/2020 0.3  0.0 - 0.5 K/uL Final    Baso # 05/04/2020 0.05  0.00 - 0.20 K/uL Final    nRBC 05/04/2020 0  0 /100 WBC Final    Gran% 05/04/2020 41.8  38.0 - 73.0 % Final    Lymph% 05/04/2020 42.5  18.0 - 48.0 % Final    Mono% 05/04/2020 11.4  4.0 - 15.0 % Final    Eosinophil% 05/04/2020 3.3  0.0 - 8.0 % Final    Basophil% 05/04/2020 0.6  0.0 - 1.9 % Final    Differential Method 05/04/2020 Automated   Final    PTH, Intact 05/04/2020 40.5  9.0 - 77.0 pg/mL Final    RBC, UA 05/04/2020 1  0 - 4 /hpf Final    WBC, UA 05/04/2020 7* 0 - 5 /hpf Final    Bacteria 05/04/2020 Rare  None-Occ /hpf Final    Squam Epithel, UA 05/04/2020 5  /hpf Final    Hyaline Casts, UA 05/04/2020 13* 0-1/lpf /lpf Final    Microscopic Comment 05/04/2020 SEE COMMENT   Final    Protein, Urine Random 05/04/2020 <6  6 - 15 mg/dL Final    Creatinine, Random Ur 05/04/2020 51.0  15.0 - 325.0 mg/dL Final    Prot/Creat Ratio, Ur 05/04/2020 Unable to calculate  0.00 - 0.20 Final    Creatinine, Random Ur 05/04/2020 51.0  15.0 - 325.0  mg/dL Final    Microalbum.,U,Random 05/04/2020 <2.0  ug/mL Final    Creatinine, Random Ur 05/04/2020 51.0  15.0 - 325.0 mg/dL Final    Microalb Creat Ratio 05/04/2020 Unable to calculate  0.0 - 30.0 ug/mg Final   Admission on 05/02/2020, Discharged on 05/02/2020   Component Date Value Ref Range Status    WBC 05/02/2020 9.76  3.90 - 12.70 K/uL Final    RBC 05/02/2020 4.52  4.00 - 5.40 M/uL Final    Hemoglobin 05/02/2020 13.0  12.0 - 16.0 g/dL Final    Hematocrit 05/02/2020 41.4  37.0 - 48.5 % Final    Mean Corpuscular Volume 05/02/2020 92  82 - 98 fL Final    Mean Corpuscular Hemoglobin 05/02/2020 28.8  27.0 - 31.0 pg Final    Mean Corpuscular Hemoglobin Conc 05/02/2020 31.4* 32.0 - 36.0 g/dL Final    RDW 05/02/2020 13.0  11.5 - 14.5 % Final    Platelets 05/02/2020 282  150 - 350 K/uL Final    MPV 05/02/2020 10.1  9.2 - 12.9 fL Final    Immature Granulocytes 05/02/2020 0.4  0.0 - 0.5 % Final    Gran # (ANC) 05/02/2020 4.1  1.8 - 7.7 K/uL Final    Immature Grans (Abs) 05/02/2020 0.04  0.00 - 0.04 K/uL Final    Lymph # 05/02/2020 4.1  1.0 - 4.8 K/uL Final    Mono # 05/02/2020 1.2* 0.3 - 1.0 K/uL Final    Eos # 05/02/2020 0.3  0.0 - 0.5 K/uL Final    Baso # 05/02/2020 0.07  0.00 - 0.20 K/uL Final    nRBC 05/02/2020 0  0 /100 WBC Final    Gran% 05/02/2020 42.3  38.0 - 73.0 % Final    Lymph% 05/02/2020 41.6  18.0 - 48.0 % Final    Mono% 05/02/2020 12.3  4.0 - 15.0 % Final    Eosinophil% 05/02/2020 2.7  0.0 - 8.0 % Final    Basophil% 05/02/2020 0.7  0.0 - 1.9 % Final    Differential Method 05/02/2020 Automated   Final    Sodium 05/02/2020 140  136 - 145 mmol/L Final    Potassium 05/02/2020 4.2  3.5 - 5.1 mmol/L Final    Chloride 05/02/2020 104  95 - 110 mmol/L Final    CO2 05/02/2020 26  23 - 29 mmol/L Final    Glucose 05/02/2020 110  70 - 110 mg/dL Final    BUN, Bld 05/02/2020 21  8 - 23 mg/dL Final    Creatinine 05/02/2020 1.2  0.5 - 1.4 mg/dL Final    Calcium 05/02/2020 9.2  8.7 - 10.5  mg/dL Final    Total Protein 05/02/2020 8.1  6.0 - 8.4 g/dL Final    Albumin 05/02/2020 4.3  3.5 - 5.2 g/dL Final    Total Bilirubin 05/02/2020 0.9  0.1 - 1.0 mg/dL Final    Alkaline Phosphatase 05/02/2020 58  55 - 135 U/L Final    AST 05/02/2020 42* 10 - 40 U/L Final    ALT 05/02/2020 17  10 - 44 U/L Final    Anion Gap 05/02/2020 10  8 - 16 mmol/L Final    eGFR if African American 05/02/2020 48.6* >60 mL/min/1.73 m^2 Final    eGFR if non African American 05/02/2020 42.2* >60 mL/min/1.73 m^2 Final    BNP 05/02/2020 534* 0 - 99 pg/mL Final    Troponin I 05/02/2020 <0.030  <=0.040 ng/mL Final    PT 05/02/2020 13.6  10.6 - 14.8 sec Final    INR 05/02/2020 1.1   Final    Magnesium 05/02/2020 2.4  1.6 - 2.6 mg/dL Final    Specimen UA 05/02/2020 Urine, Unspecified   Final    Color, UA 05/02/2020 Yellow  Yellow, Straw, Anila Final    Appearance, UA 05/02/2020 Clear  Clear Final    pH, UA 05/02/2020 7.0  5.0 - 8.0 Final    Specific Gravity, UA 05/02/2020 1.010  1.005 - 1.030 Final    Protein, UA 05/02/2020 Negative  Negative Final    Glucose, UA 05/02/2020 Negative  Negative Final    Ketones, UA 05/02/2020 Negative  Negative Final    Bilirubin (UA) 05/02/2020 Negative  Negative Final    Occult Blood UA 05/02/2020 Negative  Negative Final    Nitrite, UA 05/02/2020 Negative  Negative Final    Urobilinogen, UA 05/02/2020 Negative  Negative EU/dL Final    Leukocytes, UA 05/02/2020 Negative  Negative Final    Troponin I 05/02/2020 0.032  <=0.040 ng/mL Final    TSH 05/02/2020 2.640  0.340 - 5.600 uIU/mL Final    Troponin I 05/02/2020 0.030  <=0.040 ng/mL Final       Past Medical History:   Diagnosis Date    Coronary artery disease     Dermatitis     Dermatitis 12/8/2017    Diabetes mellitus     Diabetes mellitus type II     Leah Madrigal virus infection     Fibromyalgia     GERD (gastroesophageal reflux disease)     Hypertension     MI (myocardial infarction) 09/23/2011    Pure  hypercholesterolemia 2/26/2020     Past Surgical History:   Procedure Laterality Date    adenoids      ANGIOPLASTY  1987    APPENDECTOMY      CARDIAC PACEMAKER PLACEMENT  01/12/2017    CATARACT EXTRACTION, BILATERAL Bilateral 9/2/15, 3/17/15    right eye-9/2/15, left eye-3/17/15    CHOLECYSTECTOMY  1987    CORONARY ARTERY BYPASS GRAFT  2006    quadruple    coronary stents  1999    x2    CYST REMOVAL  04/25/2017    on back    HYSTERECTOMY  1966    OVARY SURGERY  1948    Ovary burst & was repaired    RHIZOTOMY  09/14/2018    TONSILLECTOMY  1940     Family History   Problem Relation Age of Onset    No Known Problems Mother     No Known Problems Father        Marital Status:   Alcohol History:  reports no history of alcohol use.  Tobacco History:  reports that she has never smoked. She has never used smokeless tobacco.  Drug History:  reports no history of drug use.    Review of patient's allergies indicates:   Allergen Reactions    Arthrotec 50 [diclofenac-misoprostol]     Doxycycline     Effexor [venlafaxine]     Estradiol     Flagyl [metronidazole]     Fluconazole     Iodine     Levaquin [levofloxacin]     Nexium [esomeprazole magnesium]     Penicillins     Shellfish containing products     Statins-hmg-coa reductase inhibitors     Sulfa (sulfonamide antibiotics)     Tea tree oil     Tegaserod      ZOLNORM    Trazodone     Yeast, dried        Current Outpatient Medications:     acetaminophen (TYLENOL) 500 MG tablet, Take 500 mg by mouth every 6 (six) hours as needed for Pain., Disp: , Rfl:     ALPRAZolam (XANAX) 0.25 MG tablet, Take 1 tablet (0.25 mg total) by mouth as needed for Anxiety., Disp: 30 tablet, Rfl: 0    ascorbic acid, vitamin C, (VITAMIN C) 1000 MG tablet, Take 1,000 mg by mouth once daily., Disp: , Rfl:     aspirin (ECOTRIN) 81 MG EC tablet, Take 1 tablet (81 mg total) by mouth once daily., Disp: , Rfl: 0    biotin 5,000 mcg Subl, Place 1 each under the tongue  2 (two) times a day., Disp: , Rfl:     cetirizine (ZYRTEC) 10 MG tablet, Take 10 mg by mouth once daily., Disp: , Rfl:     cholecalciferol, vitamin D3, (VITAMIN D3) 100 mcg (4,000 unit) Cap, Take 1 capsule by mouth once daily. , Disp: , Rfl:     clopidogrel (PLAVIX) 75 mg tablet, Take 1 tablet (75 mg total) by mouth once daily., Disp: 90 tablet, Rfl: 0    coenzyme Q10 100 mg capsule, Take 1 capsule (100 mg total) by mouth 2 (two) times daily., Disp: 60 capsule, Rfl: 11    diclofenac sodium (VOLTAREN) 1 % Gel, Apply 2 g topically once daily., Disp: , Rfl:     ECHINACEA ORAL, Take 750 mg by mouth 2 (two) times a day., Disp: , Rfl:     estradioL (ESTRACE) 0.01 % (0.1 mg/gram) vaginal cream, Place 1 g vaginally once a week., Disp: 42.5 g, Rfl: 1    fluticasone propionate (FLONASE) 50 mcg/actuation nasal spray, 1 spray by Each Nostril route once daily., Disp: , Rfl:     folic acid (FOLVITE) 400 MCG tablet, Take 400 mcg by mouth once daily., Disp: , Rfl:     gabapentin (NEURONTIN) 100 MG capsule, Take 1 capsule (100 mg total) by mouth 3 (three) times daily., Disp: 270 capsule, Rfl: 0    isosorbide mononitrate (IMDUR) 60 MG 24 hr tablet, TAKE 1 TABLET BY MOUTH ONCE DAILY (Patient taking differently: Take 60 mg by mouth 2 (two) times daily. DO NOT CRUSH OR CHEW; SWALLOW WHOLE.), Disp: 90 tablet, Rfl: 1    losartan (COZAAR) 25 MG tablet, Take 1 tablet (25 mg total) by mouth 2 (two) times a day., Disp: 180 tablet, Rfl: 2    MAGNESIUM ORAL, Take 500 mg by mouth once daily. , Disp: , Rfl:     metoprolol succinate (TOPROL-XL) 25 MG 24 hr tablet, Take 1 tablet (25 mg total) by mouth once daily., Disp: 90 tablet, Rfl: 1    montelukast (SINGULAIR) 10 mg tablet, Take 1 tablet (10 mg total) by mouth every evening., Disp: 90 tablet, Rfl: 1    multivitamin (THERAGRAN) tablet, Take 1 tablet by mouth 2 (two) times a day. , Disp: , Rfl:     nitroGLYCERIN (NITROSTAT) 0.4 MG SL tablet, Place 0.4 mg under the tongue every 5  (five) minutes as needed for Chest pain. , Disp: , Rfl:     SYNTHROID 88 mcg tablet, TAKE 1 TABLET ONCE DAILY (Patient taking differently: Take 88 mcg by mouth before breakfast. ), Disp: 90 tablet, Rfl: 1    tiZANidine (ZANAFLEX) 4 MG tablet, Take 4 mg by mouth once daily. , Disp: , Rfl:     traMADol (ULTRAM) 50 mg tablet, Take 50 mg by mouth 2 (two) times daily as needed., Disp: , Rfl: 1    TURMERIC ORAL, Take 1,200 mg by mouth once daily., Disp: , Rfl:     UNABLE TO FIND, Bio-Smoothe- premium nerve, Disp: , Rfl:     UNABLE TO FIND, medication name: GI probiotic, Disp: , Rfl:     Review of Systems       Objective:      Vitals:    09/10/20 1648   BP: 132/64   Pulse: 70   Resp: 16   Temp: 98.2 °F (36.8 °C)   SpO2: 98%   Weight: 66.7 kg (147 lb)   Height: 5' (1.524 m)     Physical Exam      Assessment:       1. Cervical paraspinal muscle spasm    2. Type 2 diabetes mellitus with chronic kidney disease, without long-term current use of insulin, unspecified CKD stage    3. Essential hypertension, benign    4. Sciatica of left side         Plan:       Cervical paraspinal muscle spasm  -     gabapentin (NEURONTIN) 100 MG capsule; Take 1 capsule (100 mg total) by mouth 3 (three) times daily.  Dispense: 270 capsule; Refill: 0    Type 2 diabetes mellitus with chronic kidney disease, without long-term current use of insulin, unspecified CKD stage    Essential hypertension, benign    Sciatica of left side        -     Gabapentin 100 tid no 30        -     Pt prefers to continue to see chiropractic      No follow-ups on file.        9/14/2020 Elena Sullivan M.D.

## 2020-09-14 ENCOUNTER — TELEPHONE (OUTPATIENT)
Dept: FAMILY MEDICINE | Facility: CLINIC | Age: 85
End: 2020-09-14

## 2020-09-14 NOTE — TELEPHONE ENCOUNTER
Pt says she had a reaction to the gabapentin and was shaking from it. She took a nerve pill to settle it and will no longer take the gabapentin.

## 2020-10-23 DIAGNOSIS — I49.5 SSS (SICK SINUS SYNDROME): Primary | ICD-10-CM

## 2020-11-08 NOTE — PROGRESS NOTES
SUBJECTIVE:    Patient ID: Karma Chen is a 84 y.o. female.    Chief Complaint: Neck Pain (feels better) and Follow-up    HPI     Patient returns for recheck of neck pain, for which I last saw her in September of 2020-she had been seen at Urgent Care who saw on xrays  arthritis in the neck, neck spasm, and cervical spondylosis-she got gabapentin 100 bid -there was one area of tenderness medial trapezius on the right-She could not turn her head to the right-after chiropractic she could turn her head somewhat to the right-she had some pain going into the right occipital area of the head-she noted some burning mid T spine to the right-she was sleeping on a regular pillow-    Last office visit her gabapentin was refilled to take three times a day and she was to continue chiropractic, with whom she felt more comfortable than physical therapy.She is feeling better-she has better range of motion now-    Feet-doing good now after striking the toe on her walker-got a subungual hematoma which she was told would take a year to resolve-she is wearing an inner-sole -to avoid eversion of the foot-she is wearing easy spirits and no slip-ons    Admission on 07/24/2020, Discharged on 07/25/2020   Component Date Value Ref Range Status    WBC 07/24/2020 7.42  3.90 - 12.70 K/uL Final    RBC 07/24/2020 4.16  4.00 - 5.40 M/uL Final    Hemoglobin 07/24/2020 11.8* 12.0 - 16.0 g/dL Final    Hematocrit 07/24/2020 36.8* 37.0 - 48.5 % Final    MCV 07/24/2020 89  82 - 98 fL Final    MCH 07/24/2020 28.4  27.0 - 31.0 pg Final    MCHC 07/24/2020 32.1  32.0 - 36.0 g/dL Final    RDW 07/24/2020 13.2  11.5 - 14.5 % Final    Platelets 07/24/2020 294  150 - 350 K/uL Final    MPV 07/24/2020 9.7  9.2 - 12.9 fL Final    Immature Granulocytes 07/24/2020 0.4  0.0 - 0.5 % Final    Gran # (ANC) 07/24/2020 3.7  1.8 - 7.7 K/uL Final    Immature Grans (Abs) 07/24/2020 0.03  0.00 - 0.04 K/uL Final    Lymph # 07/24/2020 2.6  1.0 - 4.8 K/uL Final     Mono # 07/24/2020 0.9  0.3 - 1.0 K/uL Final    Eos # 07/24/2020 0.2  0.0 - 0.5 K/uL Final    Baso # 07/24/2020 0.06  0.00 - 0.20 K/uL Final    nRBC 07/24/2020 0  0 /100 WBC Final    Gran % 07/24/2020 50.4  38.0 - 73.0 % Final    Lymph % 07/24/2020 34.5  18.0 - 48.0 % Final    Mono % 07/24/2020 11.9  4.0 - 15.0 % Final    Eosinophil % 07/24/2020 2.0  0.0 - 8.0 % Final    Basophil % 07/24/2020 0.8  0.0 - 1.9 % Final    Differential Method 07/24/2020 Automated   Final    Sodium 07/24/2020 141  136 - 145 mmol/L Final    Potassium 07/24/2020 3.6  3.5 - 5.1 mmol/L Final    Chloride 07/24/2020 102  95 - 110 mmol/L Final    CO2 07/24/2020 25  23 - 29 mmol/L Final    Glucose 07/24/2020 147* 70 - 110 mg/dL Final    BUN 07/24/2020 23  8 - 23 mg/dL Final    Creatinine 07/24/2020 1.1  0.5 - 1.4 mg/dL Final    Calcium 07/24/2020 9.3  8.7 - 10.5 mg/dL Final    Total Protein 07/24/2020 7.5  6.0 - 8.4 g/dL Final    Albumin 07/24/2020 3.9  3.5 - 5.2 g/dL Final    Total Bilirubin 07/24/2020 0.6  0.1 - 1.0 mg/dL Final    Alkaline Phosphatase 07/24/2020 58  55 - 135 U/L Final    AST 07/24/2020 24  10 - 40 U/L Final    ALT 07/24/2020 20  10 - 44 U/L Final    Anion Gap 07/24/2020 14  8 - 16 mmol/L Final    eGFR if  07/24/2020 54.0* >60 mL/min/1.73 m^2 Final    eGFR if non African American 07/24/2020 46.9* >60 mL/min/1.73 m^2 Final    Troponin I 07/24/2020 0.038  <=0.040 ng/mL Final    Troponin I 07/24/2020 0.059* <=0.040 ng/mL Final    BNP 07/24/2020 265* 0 - 99 pg/mL Final    SARS-CoV-2 RNA, Amplification, Qual 07/24/2020 Negative  Negative Final    Hemoglobin A1C 07/25/2020 6.0  4.5 - 6.2 % Final    Estimated Avg Glucose 07/25/2020 126  68 - 131 mg/dL Final    Troponin I 07/25/2020 0.077* <=0.040 ng/mL Final    BSA 07/25/2020 1.67  m2 Final    Right Atrial Pressure (from IVC) 07/25/2020 3  mmHg Final    TDI SEPTAL 07/25/2020 0.05  m/s Final    LV LATERAL E/E' RATIO 07/25/2020  11.83  m/s Final    LV SEPTAL E/E' RATIO 07/25/2020 14.20  m/s Final    AORTIC VALVE CUSP SEPERATION 07/25/2020 1.83  cm Final    TDI LATERAL 07/25/2020 0.06  m/s Final    PV PEAK VELOCITY 07/25/2020 100.22  cm/s Final    LVIDd 07/25/2020 4.17  3.5 - 6.0 cm Final    IVS 07/25/2020 1.10* 0.6 - 1.1 cm Final    Posterior Wall 07/25/2020 1.15* 0.6 - 1.1 cm Final    Ao root annulus 07/25/2020 3.17  cm Final    LVIDs 07/25/2020 2.95  2.1 - 4.0 cm Final    FS 07/25/2020 29  28 - 44 % Final    LV mass 07/25/2020 160.43  g Final    LA size 07/25/2020 3.90  cm Final    RVDD 07/25/2020 234.00  cm Final    Left Ventricle Relative Wall Thick* 07/25/2020 0.55  cm Final    AV mean gradient 07/25/2020 4  mmHg Final    AV valve area 07/25/2020 2.01  cm2 Final    AV Velocity Ratio 07/25/2020 52.90   Final    AV index (prosthetic) 07/25/2020 0.71   Final    E/A ratio 07/25/2020 1.01   Final    Mean e' 07/25/2020 0.06  m/s Final    E wave decelartion time 07/25/2020 186.47  msec Final    IVRT 07/25/2020 96.69  msec Final    LVOT diameter 07/25/2020 1.90  cm Final    LVOT area 07/25/2020 2.8  cm2 Final    LVOT peak dylan 07/25/2020 77.24  m/s Final    LVOT peak VTI 07/25/2020 18.74  cm Final    Ao peak dylan 07/25/2020 1.46  m/s Final    Ao VTI 07/25/2020 26.39  cm Final    LVOT stroke volume 07/25/2020 53.11  cm3 Final    AV peak gradient 07/25/2020 9  mmHg Final    TV rest pulmonary artery pressure 07/25/2020 27  mmHg Final    E/E' ratio 07/25/2020 12.91  m/s Final    MV Peak E Dylan 07/25/2020 0.71  m/s Final    TR Max Dylan 07/25/2020 2.43  m/s Final    MV Peak A Dylan 07/25/2020 0.70  m/s Final    LV Systolic Volume 07/25/2020 32.30  mL Final    LV Systolic Volume Index 07/25/2020 19.8  mL/m2 Final    LV Diastolic Volume 07/25/2020 73.90  mL Final    LV Diastolic Volume Index 07/25/2020 45.26  mL/m2 Final    LV Mass Index 07/25/2020 98  g/m2 Final    Triscuspid Valve Regurgitation Pea* 07/25/2020 24   mmHg Final    Sodium 07/25/2020 143  136 - 145 mmol/L Final    Potassium 07/25/2020 3.8  3.5 - 5.1 mmol/L Final    Chloride 07/25/2020 107  95 - 110 mmol/L Final    CO2 07/25/2020 26  23 - 29 mmol/L Final    Glucose 07/25/2020 88  70 - 110 mg/dL Final    BUN 07/25/2020 22  8 - 23 mg/dL Final    Creatinine 07/25/2020 1.1  0.5 - 1.4 mg/dL Final    Calcium 07/25/2020 9.0  8.7 - 10.5 mg/dL Final    Anion Gap 07/25/2020 10  8 - 16 mmol/L Final    eGFR if African American 07/25/2020 54.0* >60 mL/min/1.73 m^2 Final    eGFR if non African American 07/25/2020 46.9* >60 mL/min/1.73 m^2 Final    POC Glucose 07/25/2020 91  70 - 110 Final    POC Glucose 07/25/2020 98  70 - 110 Final       Past Medical History:   Diagnosis Date    Coronary artery disease     Dermatitis     Dermatitis 12/8/2017    Diabetes mellitus     Diabetes mellitus type II     Leah Madrigal virus infection     Fibromyalgia     GERD (gastroesophageal reflux disease)     Hypertension     MI (myocardial infarction) 09/23/2011    Pure hypercholesterolemia 2/26/2020     Past Surgical History:   Procedure Laterality Date    adenoids      ANGIOPLASTY  1987    APPENDECTOMY      CARDIAC PACEMAKER PLACEMENT  01/12/2017    CATARACT EXTRACTION, BILATERAL Bilateral 9/2/15, 3/17/15    right eye-9/2/15, left eye-3/17/15    CHOLECYSTECTOMY  1987    CORONARY ARTERY BYPASS GRAFT  2006    quadruple    coronary stents  1999    x2    CYST REMOVAL  04/25/2017    on back    HYSTERECTOMY  1966    OVARY SURGERY  1948    Ovary burst & was repaired    RHIZOTOMY  09/14/2018    TONSILLECTOMY  1940     Family History   Problem Relation Age of Onset    No Known Problems Mother     No Known Problems Father        Marital Status:   Alcohol History:  reports no history of alcohol use.  Tobacco History:  reports that she has never smoked. She has never used smokeless tobacco.  Drug History:  reports no history of drug use.    Review of patient's  allergies indicates:   Allergen Reactions    Arthrotec 50 [diclofenac-misoprostol]     Doxycycline     Effexor [venlafaxine]     Estradiol     Flagyl [metronidazole]     Fluconazole     Iodine     Levaquin [levofloxacin]     Nexium [esomeprazole magnesium]     Penicillins     Shellfish containing products     Statins-hmg-coa reductase inhibitors     Sulfa (sulfonamide antibiotics)     Tea tree oil     Tegaserod      ZOLNORM    Trazodone     Yeast, dried        Current Outpatient Medications:     acetaminophen (TYLENOL) 500 MG tablet, Take 500 mg by mouth every 6 (six) hours as needed for Pain., Disp: , Rfl:     ALPRAZolam (XANAX) 0.25 MG tablet, Take 1 tablet (0.25 mg total) by mouth as needed for Anxiety., Disp: 90 tablet, Rfl: 0    aspirin (ECOTRIN) 81 MG EC tablet, Take 1 tablet (81 mg total) by mouth once daily., Disp: , Rfl: 0    cetirizine (ZYRTEC) 10 MG tablet, Take 10 mg by mouth once daily., Disp: , Rfl:     cholecalciferol, vitamin D3, (VITAMIN D3) 100 mcg (4,000 unit) Cap, Take 1 capsule by mouth once daily. , Disp: , Rfl:     clopidogrel (PLAVIX) 75 mg tablet, Take 1 tablet (75 mg total) by mouth once daily., Disp: 90 tablet, Rfl: 0    coenzyme Q10 100 mg capsule, Take 1 capsule (100 mg total) by mouth 2 (two) times daily., Disp: 60 capsule, Rfl: 11    diclofenac sodium (VOLTAREN) 1 % Gel, Apply 2 g topically once daily., Disp: , Rfl:     ECHINACEA ORAL, Take 750 mg by mouth 2 (two) times a day., Disp: , Rfl:     estradioL (ESTRACE) 0.01 % (0.1 mg/gram) vaginal cream, Place 1 g vaginally once a week., Disp: 42.5 g, Rfl: 1    fluticasone propionate (FLONASE) 50 mcg/actuation nasal spray, 1 spray by Each Nostril route once daily., Disp: , Rfl:     folic acid (FOLVITE) 400 MCG tablet, Take 400 mcg by mouth once daily., Disp: , Rfl:     isosorbide mononitrate (IMDUR) 60 MG 24 hr tablet, TAKE 1 TABLET BY MOUTH ONCE DAILY (Patient taking differently: Take 60 mg by mouth 2 (two)  times daily. DO NOT CRUSH OR CHEW; SWALLOW WHOLE.), Disp: 90 tablet, Rfl: 1    losartan (COZAAR) 25 MG tablet, Take 1 tablet (25 mg total) by mouth 2 (two) times a day., Disp: 180 tablet, Rfl: 2    MAGNESIUM ORAL, Take 500 mg by mouth once daily. , Disp: , Rfl:     metoprolol succinate (TOPROL-XL) 25 MG 24 hr tablet, Take 1 tablet (25 mg total) by mouth once daily., Disp: 90 tablet, Rfl: 1    montelukast (SINGULAIR) 10 mg tablet, Take 1 tablet (10 mg total) by mouth every evening., Disp: 90 tablet, Rfl: 1    multivitamin (THERAGRAN) tablet, Take 1 tablet by mouth 2 (two) times a day. , Disp: , Rfl:     nitroGLYCERIN (NITROSTAT) 0.4 MG SL tablet, Place 0.4 mg under the tongue every 5 (five) minutes as needed for Chest pain. , Disp: , Rfl:     SYNTHROID 88 mcg tablet, TAKE 1 TABLET ONCE DAILY (Patient taking differently: Take 88 mcg by mouth before breakfast. ), Disp: 90 tablet, Rfl: 1    tiZANidine (ZANAFLEX) 4 MG tablet, Take 1 tablet (4 mg total) by mouth once daily., Disp: 90 tablet, Rfl: 0    traMADol (ULTRAM) 50 mg tablet, Take 50 mg by mouth 2 (two) times daily as needed., Disp: , Rfl: 1    TURMERIC ORAL, Take 1,200 mg by mouth once daily., Disp: , Rfl:     UNABLE TO FIND, Bio-Smoothe- premium nerve, Disp: , Rfl:     UNABLE TO FIND, medication name: GI probiotic, Disp: , Rfl:     Review of Systems   Constitutional: Negative for activity change, appetite change, chills, fatigue, fever and unexpected weight change.        D3 and Zinc are making her feel better-   HENT: Negative for congestion, ear pain, hearing loss, postnasal drip, sinus pain, sneezing, sore throat, tinnitus and trouble swallowing.    Eyes: Negative for pain, discharge and visual disturbance.   Respiratory: Negative for cough, choking, shortness of breath and wheezing.    Cardiovascular: Negative for chest pain, palpitations and leg swelling.   Gastrointestinal: Positive for constipation (none with magnesium 1000 mg). Negative for  abdominal distention, abdominal pain, blood in stool, diarrhea, nausea and vomiting.        Occasional acid reflux-   Endocrine: Negative for cold intolerance and heat intolerance.   Genitourinary: Negative for difficulty urinating, dysuria, frequency, pelvic pain and urgency.        Sees renal specialist in December and also GYN in December   Musculoskeletal: Negative for back pain, joint swelling and neck pain. Arthralgias: left knee sometimes wont straighten out.   Skin: Negative for pallor and rash.   Allergic/Immunologic: Negative for environmental allergies and food allergies.   Neurological: Negative for dizziness, tremors, weakness, numbness and headaches.   Hematological: Does not bruise/bleed easily.   Psychiatric/Behavioral: Negative for agitation, confusion, dysphoric mood, sleep disturbance and suicidal ideas. The patient is not nervous/anxious.           Objective:      Vitals:    11/09/20 1106   BP: 134/62   Pulse: 70   Resp: 16   Temp: 97.3 °F (36.3 °C)   SpO2: 97%   Weight: 67.6 kg (149 lb)   Height: 5' (1.524 m)     Physical Exam  Constitutional:       Appearance: Normal appearance. She is normal weight.   HENT:      Head: Normocephalic and atraumatic.   Eyes:      General: No scleral icterus.        Right eye: No discharge.         Left eye: No discharge.      Extraocular Movements: Extraocular movements intact.      Conjunctiva/sclera: Conjunctivae normal.      Pupils: Pupils are equal, round, and reactive to light.   Neck:      Musculoskeletal: Normal range of motion and neck supple.   Cardiovascular:      Rate and Rhythm: Normal rate and regular rhythm.      Pulses: Normal pulses.      Heart sounds: Normal heart sounds.   Pulmonary:      Effort: Pulmonary effort is normal.      Breath sounds: Normal breath sounds.   Abdominal:      General: Bowel sounds are normal.      Palpations: Abdomen is soft.   Musculoskeletal:         General: No swelling or tenderness.   Skin:     General: Skin is warm  and dry.   Neurological:      General: No focal deficit present.      Mental Status: She is alert and oriented to person, place, and time.   Psychiatric:         Mood and Affect: Mood normal.         Behavior: Behavior normal.         Thought Content: Thought content normal.         Judgment: Judgment normal.           Assessment:       1. Type 2 diabetes mellitus with chronic kidney disease, without long-term current use of insulin, unspecified CKD stage    2. Stage 3 chronic kidney disease, unspecified whether stage 3a or 3b CKD    3. Essential hypertension, benign    4. Severe anxiety    5. Encounter for screening mammogram for breast cancer    6. Muscle spasm         Plan:       Type 2 diabetes mellitus with chronic kidney disease, without long-term current use of insulin, unspecified CKD stage  -     Hemoglobin A1C; Future; Expected date: 01/09/2021    Stage 3 chronic kidney disease, unspecified whether stage 3a or 3b CKD    Essential hypertension, benign    Severe anxiety  -     ALPRAZolam (XANAX) 0.25 MG tablet; Take 1 tablet (0.25 mg total) by mouth as needed for Anxiety.  Dispense: 90 tablet; Refill: 0    Encounter for screening mammogram for breast cancer  -     Mammo Digital Screening Bilat w/ K[facility]; Future; Expected date: 02/09/2021    Muscle spasm  -     tiZANidine (ZANAFLEX) 4 MG tablet; Take 1 tablet (4 mg total) by mouth once daily.  Dispense: 90 tablet; Refill: 0      No follow-ups on file.        11/9/2020 Elena Sullivan M.D.

## 2020-11-09 ENCOUNTER — OFFICE VISIT (OUTPATIENT)
Dept: FAMILY MEDICINE | Facility: CLINIC | Age: 85
End: 2020-11-09
Payer: MEDICARE

## 2020-11-09 VITALS
BODY MASS INDEX: 29.25 KG/M2 | DIASTOLIC BLOOD PRESSURE: 62 MMHG | WEIGHT: 149 LBS | OXYGEN SATURATION: 97 % | TEMPERATURE: 97 F | HEIGHT: 60 IN | SYSTOLIC BLOOD PRESSURE: 134 MMHG | HEART RATE: 70 BPM | RESPIRATION RATE: 16 BRPM

## 2020-11-09 DIAGNOSIS — N18.30 STAGE 3 CHRONIC KIDNEY DISEASE, UNSPECIFIED WHETHER STAGE 3A OR 3B CKD: ICD-10-CM

## 2020-11-09 DIAGNOSIS — I10 ESSENTIAL HYPERTENSION, BENIGN: ICD-10-CM

## 2020-11-09 DIAGNOSIS — E11.22 TYPE 2 DIABETES MELLITUS WITH CHRONIC KIDNEY DISEASE, WITHOUT LONG-TERM CURRENT USE OF INSULIN, UNSPECIFIED CKD STAGE: Primary | ICD-10-CM

## 2020-11-09 DIAGNOSIS — Z12.31 ENCOUNTER FOR SCREENING MAMMOGRAM FOR BREAST CANCER: ICD-10-CM

## 2020-11-09 DIAGNOSIS — E78.2 MIXED HYPERLIPIDEMIA: ICD-10-CM

## 2020-11-09 DIAGNOSIS — F41.9 SEVERE ANXIETY: ICD-10-CM

## 2020-11-09 DIAGNOSIS — M62.838 MUSCLE SPASM: ICD-10-CM

## 2020-11-09 PROCEDURE — 99214 OFFICE O/P EST MOD 30 MIN: CPT | Mod: S$PBB,,, | Performed by: INTERNAL MEDICINE

## 2020-11-09 PROCEDURE — 99215 OFFICE O/P EST HI 40 MIN: CPT | Performed by: INTERNAL MEDICINE

## 2020-11-09 PROCEDURE — 99214 PR OFFICE/OUTPT VISIT, EST, LEVL IV, 30-39 MIN: ICD-10-PCS | Mod: S$PBB,,, | Performed by: INTERNAL MEDICINE

## 2020-11-09 RX ORDER — ALPRAZOLAM 0.25 MG/1
0.25 TABLET ORAL
Qty: 90 TABLET | Refills: 0 | Status: SHIPPED | OUTPATIENT
Start: 2020-11-09 | End: 2021-10-26 | Stop reason: SDUPTHER

## 2020-11-09 RX ORDER — TIZANIDINE 4 MG/1
4 TABLET ORAL DAILY
Qty: 90 TABLET | Refills: 0 | Status: SHIPPED | OUTPATIENT
Start: 2020-11-09 | End: 2021-01-11 | Stop reason: SDUPTHER

## 2020-11-11 ENCOUNTER — OFFICE VISIT (OUTPATIENT)
Dept: CARDIOLOGY | Facility: CLINIC | Age: 85
End: 2020-11-11
Payer: MEDICARE

## 2020-11-11 VITALS
HEART RATE: 74 BPM | RESPIRATION RATE: 16 BRPM | SYSTOLIC BLOOD PRESSURE: 138 MMHG | OXYGEN SATURATION: 98 % | DIASTOLIC BLOOD PRESSURE: 78 MMHG | WEIGHT: 149 LBS | BODY MASS INDEX: 29.25 KG/M2 | HEIGHT: 60 IN

## 2020-11-11 DIAGNOSIS — I10 ESSENTIAL HYPERTENSION: ICD-10-CM

## 2020-11-11 DIAGNOSIS — E78.00 PURE HYPERCHOLESTEROLEMIA: Primary | ICD-10-CM

## 2020-11-11 DIAGNOSIS — I48.0 PAF (PAROXYSMAL ATRIAL FIBRILLATION): Chronic | ICD-10-CM

## 2020-11-11 DIAGNOSIS — I25.10 CORONARY ARTERY DISEASE INVOLVING NATIVE CORONARY ARTERY OF NATIVE HEART WITHOUT ANGINA PECTORIS: ICD-10-CM

## 2020-11-11 PROCEDURE — 99214 OFFICE O/P EST MOD 30 MIN: CPT | Mod: S$GLB,,, | Performed by: INTERNAL MEDICINE

## 2020-11-11 PROCEDURE — 99214 PR OFFICE/OUTPT VISIT, EST, LEVL IV, 30-39 MIN: ICD-10-PCS | Mod: S$GLB,,, | Performed by: INTERNAL MEDICINE

## 2020-11-12 ENCOUNTER — SPECIALTY PHARMACY (OUTPATIENT)
Dept: PHARMACY | Facility: CLINIC | Age: 85
End: 2020-11-12

## 2020-11-17 ENCOUNTER — LAB VISIT (OUTPATIENT)
Dept: LAB | Facility: HOSPITAL | Age: 85
End: 2020-11-17
Attending: INTERNAL MEDICINE
Payer: MEDICARE

## 2020-11-17 DIAGNOSIS — E11.22 TYPE 2 DIABETES MELLITUS WITH CHRONIC KIDNEY DISEASE, WITHOUT LONG-TERM CURRENT USE OF INSULIN, UNSPECIFIED CKD STAGE: ICD-10-CM

## 2020-11-17 DIAGNOSIS — N18.30 CHRONIC KIDNEY DISEASE, STAGE III (MODERATE): Primary | ICD-10-CM

## 2020-11-17 DIAGNOSIS — I25.10 CORONARY ARTERY DISEASE INVOLVING NATIVE CORONARY ARTERY OF NATIVE HEART WITHOUT ANGINA PECTORIS: ICD-10-CM

## 2020-11-17 DIAGNOSIS — E78.00 PURE HYPERCHOLESTEROLEMIA: ICD-10-CM

## 2020-11-17 LAB
ALBUMIN SERPL BCP-MCNC: 3.9 G/DL (ref 3.5–5.2)
ANION GAP SERPL CALC-SCNC: 9 MMOL/L (ref 8–16)
BACTERIA #/AREA URNS HPF: ABNORMAL /HPF
BUN SERPL-MCNC: 19 MG/DL (ref 8–23)
CALCIUM SERPL-MCNC: 8.9 MG/DL (ref 8.7–10.5)
CHLORIDE SERPL-SCNC: 105 MMOL/L (ref 95–110)
CHOLEST SERPL-MCNC: 230 MG/DL (ref 120–199)
CHOLEST SERPL-MCNC: 230 MG/DL (ref 120–199)
CHOLEST/HDLC SERPL: 5.5 {RATIO} (ref 2–5)
CHOLEST/HDLC SERPL: 5.5 {RATIO} (ref 2–5)
CO2 SERPL-SCNC: 24 MMOL/L (ref 23–29)
CREAT SERPL-MCNC: 1.2 MG/DL (ref 0.5–1.4)
CREAT UR-MCNC: 69 MG/DL (ref 15–325)
EST. GFR  (AFRICAN AMERICAN): 48.6 ML/MIN/1.73 M^2
EST. GFR  (NON AFRICAN AMERICAN): 42.2 ML/MIN/1.73 M^2
ESTIMATED AVG GLUCOSE: 128 MG/DL (ref 68–131)
GLUCOSE SERPL-MCNC: 92 MG/DL (ref 70–110)
HBA1C MFR BLD HPLC: 6.1 % (ref 4.5–6.2)
HDLC SERPL-MCNC: 42 MG/DL (ref 40–75)
HDLC SERPL-MCNC: 42 MG/DL (ref 40–75)
HDLC SERPL: 18.3 % (ref 20–50)
HDLC SERPL: 18.3 % (ref 20–50)
HYALINE CASTS #/AREA URNS LPF: 3 /LPF
LDLC SERPL CALC-MCNC: 152.6 MG/DL (ref 63–159)
LDLC SERPL CALC-MCNC: 152.6 MG/DL (ref 63–159)
MICROSCOPIC COMMENT: ABNORMAL
NONHDLC SERPL-MCNC: 188 MG/DL
NONHDLC SERPL-MCNC: 188 MG/DL
PHOSPHATE SERPL-MCNC: 3.7 MG/DL (ref 2.7–4.5)
POTASSIUM SERPL-SCNC: 4 MMOL/L (ref 3.5–5.1)
PROT UR-MCNC: <6 MG/DL (ref 6–15)
PROT/CREAT UR: NORMAL MG/G{CREAT} (ref 0–0.2)
RBC #/AREA URNS HPF: 1 /HPF (ref 0–4)
SODIUM SERPL-SCNC: 138 MMOL/L (ref 136–145)
SQUAMOUS #/AREA URNS HPF: 2 /HPF
TRIGL SERPL-MCNC: 177 MG/DL (ref 30–150)
TRIGL SERPL-MCNC: 177 MG/DL (ref 30–150)
WBC #/AREA URNS HPF: 1 /HPF (ref 0–5)

## 2020-11-17 PROCEDURE — 36415 COLL VENOUS BLD VENIPUNCTURE: CPT

## 2020-11-17 PROCEDURE — 82570 ASSAY OF URINE CREATININE: CPT

## 2020-11-17 PROCEDURE — 80069 RENAL FUNCTION PANEL: CPT

## 2020-11-17 PROCEDURE — 81001 URINALYSIS AUTO W/SCOPE: CPT

## 2020-11-17 PROCEDURE — 83036 HEMOGLOBIN GLYCOSYLATED A1C: CPT

## 2020-11-17 PROCEDURE — 80061 LIPID PANEL: CPT

## 2020-12-10 DIAGNOSIS — R19.00 PELVIC SWELLING: Primary | ICD-10-CM

## 2020-12-15 ENCOUNTER — HOSPITAL ENCOUNTER (OUTPATIENT)
Dept: RADIOLOGY | Facility: HOSPITAL | Age: 85
Discharge: HOME | End: 2020-12-15
Attending: OBSTETRICS & GYNECOLOGY
Payer: MEDICARE

## 2020-12-15 DIAGNOSIS — R19.00 PELVIC SWELLING: ICD-10-CM

## 2020-12-15 PROCEDURE — 76830 TRANSVAGINAL US NON-OB: CPT | Mod: TC,PO

## 2020-12-29 DIAGNOSIS — I25.10 PRESENCE OF STENT IN CORONARY ARTERY IN PATIENT WITH CORONARY ARTERY DISEASE: ICD-10-CM

## 2020-12-29 DIAGNOSIS — I25.810 CORONARY ARTERY DISEASE INVOLVING AUTOLOGOUS ARTERY CORONARY BYPASS GRAFT WITHOUT ANGINA PECTORIS: ICD-10-CM

## 2020-12-29 DIAGNOSIS — Z95.5 PRESENCE OF STENT IN CORONARY ARTERY IN PATIENT WITH CORONARY ARTERY DISEASE: ICD-10-CM

## 2020-12-29 NOTE — TELEPHONE ENCOUNTER
----- Message from Jay Montenegro sent at 12/29/2020 10:51 AM CST -----  Regarding: refill  Plavix 75mg   [facility] mail order

## 2020-12-31 RX ORDER — CLOPIDOGREL BISULFATE 75 MG/1
75 TABLET ORAL DAILY
Qty: 90 TABLET | Refills: 0 | OUTPATIENT
Start: 2020-12-31 | End: 2021-12-31

## 2021-01-05 DIAGNOSIS — I25.810 CORONARY ARTERY DISEASE INVOLVING AUTOLOGOUS ARTERY CORONARY BYPASS GRAFT WITHOUT ANGINA PECTORIS: ICD-10-CM

## 2021-01-05 DIAGNOSIS — Z95.5 PRESENCE OF STENT IN CORONARY ARTERY IN PATIENT WITH CORONARY ARTERY DISEASE: ICD-10-CM

## 2021-01-05 DIAGNOSIS — I25.10 PRESENCE OF STENT IN CORONARY ARTERY IN PATIENT WITH CORONARY ARTERY DISEASE: ICD-10-CM

## 2021-01-05 RX ORDER — CLOPIDOGREL BISULFATE 75 MG/1
75 TABLET ORAL DAILY
Qty: 90 TABLET | Refills: 2 | Status: SHIPPED | OUTPATIENT
Start: 2021-01-05 | End: 2021-01-11 | Stop reason: SDUPTHER

## 2021-01-05 NOTE — TELEPHONE ENCOUNTER
----- Message from Dory Arreguin sent at 1/5/2021 11:07 AM CST -----  Regarding: refill  Karma Chen calling regarding Refills  (message) for # ELOISA michelle is needing a new prescription sent to them for clopidogreL (PLAVIX) 75 mg tablet patient is out of medication

## 2021-01-11 ENCOUNTER — OFFICE VISIT (OUTPATIENT)
Dept: FAMILY MEDICINE | Facility: CLINIC | Age: 86
End: 2021-01-11
Payer: MEDICARE

## 2021-01-11 VITALS
BODY MASS INDEX: 28.66 KG/M2 | WEIGHT: 146 LBS | TEMPERATURE: 97 F | OXYGEN SATURATION: 99 % | HEART RATE: 72 BPM | HEIGHT: 60 IN | RESPIRATION RATE: 16 BRPM | SYSTOLIC BLOOD PRESSURE: 138 MMHG | DIASTOLIC BLOOD PRESSURE: 66 MMHG

## 2021-01-11 DIAGNOSIS — I25.810 CORONARY ARTERY DISEASE INVOLVING AUTOLOGOUS ARTERY CORONARY BYPASS GRAFT WITHOUT ANGINA PECTORIS: ICD-10-CM

## 2021-01-11 DIAGNOSIS — Z12.11 SCREENING FOR COLORECTAL CANCER: ICD-10-CM

## 2021-01-11 DIAGNOSIS — I10 ESSENTIAL HYPERTENSION, BENIGN: ICD-10-CM

## 2021-01-11 DIAGNOSIS — Z12.12 SCREENING FOR COLORECTAL CANCER: ICD-10-CM

## 2021-01-11 DIAGNOSIS — I25.10 PRESENCE OF STENT IN CORONARY ARTERY IN PATIENT WITH CORONARY ARTERY DISEASE: ICD-10-CM

## 2021-01-11 DIAGNOSIS — E11.9 TYPE 2 DIABETES MELLITUS WITHOUT COMPLICATION, WITHOUT LONG-TERM CURRENT USE OF INSULIN: Primary | ICD-10-CM

## 2021-01-11 DIAGNOSIS — Z95.5 PRESENCE OF STENT IN CORONARY ARTERY IN PATIENT WITH CORONARY ARTERY DISEASE: ICD-10-CM

## 2021-01-11 DIAGNOSIS — E78.00 PURE HYPERCHOLESTEROLEMIA: ICD-10-CM

## 2021-01-11 DIAGNOSIS — B00.9 HERPES SIMPLEX INFECTION: ICD-10-CM

## 2021-01-11 DIAGNOSIS — M62.838 MUSCLE SPASM: ICD-10-CM

## 2021-01-11 DIAGNOSIS — G47.00 INSOMNIA, UNSPECIFIED TYPE: ICD-10-CM

## 2021-01-11 DIAGNOSIS — R07.2 PRECORDIAL PAIN: ICD-10-CM

## 2021-01-11 PROCEDURE — 99215 OFFICE O/P EST HI 40 MIN: CPT | Performed by: INTERNAL MEDICINE

## 2021-01-11 PROCEDURE — 99214 PR OFFICE/OUTPT VISIT, EST, LEVL IV, 30-39 MIN: ICD-10-PCS | Mod: S$PBB,,, | Performed by: INTERNAL MEDICINE

## 2021-01-11 PROCEDURE — 99214 OFFICE O/P EST MOD 30 MIN: CPT | Mod: S$PBB,,, | Performed by: INTERNAL MEDICINE

## 2021-01-11 RX ORDER — VALACYCLOVIR HYDROCHLORIDE 1 G/1
1000 TABLET, FILM COATED ORAL 2 TIMES DAILY
Qty: 10 TABLET | Refills: 0 | Status: SHIPPED | OUTPATIENT
Start: 2021-01-11 | End: 2021-01-12 | Stop reason: SDUPTHER

## 2021-01-11 RX ORDER — TIZANIDINE 4 MG/1
4 TABLET ORAL DAILY
Qty: 90 TABLET | Refills: 0 | Status: SHIPPED | OUTPATIENT
Start: 2021-01-11 | End: 2021-04-12

## 2021-01-11 RX ORDER — LOSARTAN POTASSIUM 25 MG/1
25 TABLET ORAL 2 TIMES DAILY
Qty: 180 TABLET | Refills: 2 | Status: SHIPPED | OUTPATIENT
Start: 2021-01-11 | End: 2021-05-12

## 2021-01-11 RX ORDER — ZINC GLUCONATE 50 MG
50 TABLET ORAL 2 TIMES DAILY
COMMUNITY

## 2021-01-11 RX ORDER — CLOPIDOGREL BISULFATE 75 MG/1
75 TABLET ORAL DAILY
Qty: 90 TABLET | Refills: 2 | Status: SHIPPED | OUTPATIENT
Start: 2021-01-11 | End: 2021-09-28

## 2021-01-11 RX ORDER — METOPROLOL SUCCINATE 25 MG/1
25 TABLET, EXTENDED RELEASE ORAL DAILY
Qty: 90 TABLET | Refills: 1 | Status: SHIPPED | OUTPATIENT
Start: 2021-01-11 | End: 2021-07-14

## 2021-01-11 RX ORDER — ISOSORBIDE MONONITRATE 60 MG/1
60 TABLET, EXTENDED RELEASE ORAL 2 TIMES DAILY
Qty: 180 TABLET | Refills: 1 | Status: SHIPPED | OUTPATIENT
Start: 2021-01-11 | End: 2021-10-27 | Stop reason: SDUPTHER

## 2021-01-12 DIAGNOSIS — B00.9 HERPES SIMPLEX INFECTION: ICD-10-CM

## 2021-01-12 RX ORDER — VALACYCLOVIR HYDROCHLORIDE 1 G/1
1000 TABLET, FILM COATED ORAL 2 TIMES DAILY
Qty: 10 TABLET | Refills: 0 | Status: SHIPPED | OUTPATIENT
Start: 2021-01-12 | End: 2021-02-09

## 2021-01-13 ENCOUNTER — TELEPHONE (OUTPATIENT)
Dept: CARDIOLOGY | Facility: CLINIC | Age: 86
End: 2021-01-13

## 2021-01-15 ENCOUNTER — LAB VISIT (OUTPATIENT)
Dept: LAB | Facility: HOSPITAL | Age: 86
End: 2021-01-15
Attending: INTERNAL MEDICINE
Payer: MEDICARE

## 2021-01-15 DIAGNOSIS — Z12.12 SCREENING FOR COLORECTAL CANCER: ICD-10-CM

## 2021-01-15 DIAGNOSIS — Z12.11 SCREENING FOR COLORECTAL CANCER: ICD-10-CM

## 2021-01-15 LAB — OB PNL STL: NEGATIVE

## 2021-01-15 PROCEDURE — 82270 OCCULT BLOOD FECES: CPT

## 2021-01-19 ENCOUNTER — TELEPHONE (OUTPATIENT)
Dept: CARDIOLOGY | Facility: CLINIC | Age: 86
End: 2021-01-19

## 2021-01-20 DIAGNOSIS — K21.00 GASTROESOPHAGEAL REFLUX DISEASE WITH ESOPHAGITIS, UNSPECIFIED WHETHER HEMORRHAGE: Primary | ICD-10-CM

## 2021-01-20 RX ORDER — OMEPRAZOLE 20 MG/1
20 CAPSULE, DELAYED RELEASE ORAL DAILY
Qty: 30 CAPSULE | Refills: 11 | Status: SHIPPED | OUTPATIENT
Start: 2021-01-20 | End: 2021-03-09

## 2021-02-09 ENCOUNTER — OFFICE VISIT (OUTPATIENT)
Dept: FAMILY MEDICINE | Facility: CLINIC | Age: 86
End: 2021-02-09
Payer: MEDICARE

## 2021-02-09 VITALS
DIASTOLIC BLOOD PRESSURE: 64 MMHG | HEIGHT: 60 IN | WEIGHT: 148 LBS | HEART RATE: 70 BPM | SYSTOLIC BLOOD PRESSURE: 136 MMHG | OXYGEN SATURATION: 99 % | RESPIRATION RATE: 16 BRPM | TEMPERATURE: 98 F | BODY MASS INDEX: 29.06 KG/M2

## 2021-02-09 DIAGNOSIS — I10 ESSENTIAL HYPERTENSION, BENIGN: Primary | ICD-10-CM

## 2021-02-09 DIAGNOSIS — E11.9 TYPE 2 DIABETES MELLITUS WITHOUT COMPLICATION, WITHOUT LONG-TERM CURRENT USE OF INSULIN: ICD-10-CM

## 2021-02-09 PROCEDURE — 99213 OFFICE O/P EST LOW 20 MIN: CPT | Mod: S$PBB,,, | Performed by: INTERNAL MEDICINE

## 2021-02-09 PROCEDURE — 99215 OFFICE O/P EST HI 40 MIN: CPT | Performed by: INTERNAL MEDICINE

## 2021-02-09 PROCEDURE — 99213 PR OFFICE/OUTPT VISIT, EST, LEVL III, 20-29 MIN: ICD-10-PCS | Mod: S$PBB,,, | Performed by: INTERNAL MEDICINE

## 2021-02-09 RX ORDER — TURMERIC 400 MG
1200 CAPSULE ORAL DAILY
COMMUNITY

## 2021-02-25 ENCOUNTER — HOSPITAL ENCOUNTER (OUTPATIENT)
Dept: RADIOLOGY | Facility: HOSPITAL | Age: 86
Discharge: HOME | End: 2021-02-25
Attending: INTERNAL MEDICINE
Payer: MEDICARE

## 2021-02-25 VITALS — HEIGHT: 60 IN | BODY MASS INDEX: 29.04 KG/M2 | WEIGHT: 147.94 LBS

## 2021-02-25 DIAGNOSIS — Z12.31 ENCOUNTER FOR SCREENING MAMMOGRAM FOR BREAST CANCER: ICD-10-CM

## 2021-02-25 PROCEDURE — 77067 SCR MAMMO BI INCL CAD: CPT | Mod: TC,PO

## 2021-03-09 ENCOUNTER — TELEPHONE (OUTPATIENT)
Dept: FAMILY MEDICINE | Facility: CLINIC | Age: 86
End: 2021-03-09

## 2021-03-09 RX ORDER — SUCRALFATE 1 G/10ML
1 SUSPENSION ORAL 4 TIMES DAILY
Qty: 414 ML | Refills: 0 | Status: SHIPPED | OUTPATIENT
Start: 2021-03-09 | End: 2021-03-16

## 2021-03-19 ENCOUNTER — PES CALL (OUTPATIENT)
Dept: ADMINISTRATIVE | Facility: CLINIC | Age: 86
End: 2021-03-19

## 2021-03-25 DIAGNOSIS — K21.00 GASTROESOPHAGEAL REFLUX DISEASE WITH ESOPHAGITIS, UNSPECIFIED WHETHER HEMORRHAGE: Primary | ICD-10-CM

## 2021-03-29 RX ORDER — PANTOPRAZOLE SODIUM 40 MG/1
TABLET, DELAYED RELEASE ORAL
Qty: 37 TABLET | Refills: 0 | Status: SHIPPED | OUTPATIENT
Start: 2021-03-29 | End: 2021-04-26

## 2021-04-07 ENCOUNTER — OFFICE VISIT (OUTPATIENT)
Dept: FAMILY MEDICINE | Facility: CLINIC | Age: 86
End: 2021-04-07
Payer: MEDICARE

## 2021-04-07 VITALS
RESPIRATION RATE: 16 BRPM | HEIGHT: 60 IN | SYSTOLIC BLOOD PRESSURE: 134 MMHG | OXYGEN SATURATION: 96 % | BODY MASS INDEX: 27.88 KG/M2 | HEART RATE: 74 BPM | DIASTOLIC BLOOD PRESSURE: 62 MMHG | WEIGHT: 142 LBS | TEMPERATURE: 98 F

## 2021-04-07 DIAGNOSIS — A08.4 VIRAL GASTROENTERITIS: Primary | ICD-10-CM

## 2021-04-07 PROCEDURE — 99213 PR OFFICE/OUTPT VISIT, EST, LEVL III, 20-29 MIN: ICD-10-PCS | Mod: S$PBB,,, | Performed by: INTERNAL MEDICINE

## 2021-04-07 PROCEDURE — 99215 OFFICE O/P EST HI 40 MIN: CPT | Performed by: INTERNAL MEDICINE

## 2021-04-07 PROCEDURE — 99213 OFFICE O/P EST LOW 20 MIN: CPT | Mod: S$PBB,,, | Performed by: INTERNAL MEDICINE

## 2021-05-06 ENCOUNTER — PATIENT MESSAGE (OUTPATIENT)
Dept: RESEARCH | Facility: HOSPITAL | Age: 86
End: 2021-05-06

## 2021-05-07 ENCOUNTER — LAB VISIT (OUTPATIENT)
Dept: LAB | Facility: HOSPITAL | Age: 86
End: 2021-05-07
Attending: INTERNAL MEDICINE
Payer: MEDICARE

## 2021-05-07 DIAGNOSIS — E11.9 TYPE 2 DIABETES MELLITUS WITHOUT COMPLICATION, WITHOUT LONG-TERM CURRENT USE OF INSULIN: ICD-10-CM

## 2021-05-07 DIAGNOSIS — N18.30 CHRONIC KIDNEY DISEASE, STAGE III (MODERATE): Primary | ICD-10-CM

## 2021-05-07 DIAGNOSIS — G47.00 INSOMNIA, UNSPECIFIED TYPE: ICD-10-CM

## 2021-05-07 DIAGNOSIS — E78.00 PURE HYPERCHOLESTEROLEMIA: ICD-10-CM

## 2021-05-07 LAB
ALBUMIN SERPL BCP-MCNC: 3.9 G/DL (ref 3.5–5.2)
ALBUMIN SERPL BCP-MCNC: 3.9 G/DL (ref 3.5–5.2)
ALBUMIN/CREAT UR: 7.3 UG/MG (ref 0–30)
ALP SERPL-CCNC: 65 U/L (ref 55–135)
ALT SERPL W/O P-5'-P-CCNC: 18 U/L (ref 10–44)
ANION GAP SERPL CALC-SCNC: 12 MMOL/L (ref 8–16)
ANION GAP SERPL CALC-SCNC: 12 MMOL/L (ref 8–16)
AST SERPL-CCNC: 25 U/L (ref 10–40)
BACTERIA #/AREA URNS HPF: NEGATIVE /HPF
BASOPHILS # BLD AUTO: 0.07 K/UL (ref 0–0.2)
BASOPHILS NFR BLD: 0.9 % (ref 0–1.9)
BILIRUB SERPL-MCNC: 0.8 MG/DL (ref 0.1–1)
BUN SERPL-MCNC: 25 MG/DL (ref 8–23)
BUN SERPL-MCNC: 25 MG/DL (ref 8–23)
CALCIUM SERPL-MCNC: 9.1 MG/DL (ref 8.7–10.5)
CALCIUM SERPL-MCNC: 9.1 MG/DL (ref 8.7–10.5)
CHLORIDE SERPL-SCNC: 106 MMOL/L (ref 95–110)
CHLORIDE SERPL-SCNC: 106 MMOL/L (ref 95–110)
CHOLEST SERPL-MCNC: 194 MG/DL (ref 120–199)
CHOLEST/HDLC SERPL: 4.9 {RATIO} (ref 2–5)
CO2 SERPL-SCNC: 22 MMOL/L (ref 23–29)
CO2 SERPL-SCNC: 22 MMOL/L (ref 23–29)
CREAT SERPL-MCNC: 1.3 MG/DL (ref 0.5–1.4)
CREAT SERPL-MCNC: 1.3 MG/DL (ref 0.5–1.4)
CREAT UR-MCNC: 48 MG/DL (ref 15–325)
CREAT UR-MCNC: 48 MG/DL (ref 15–325)
DIFFERENTIAL METHOD: ABNORMAL
EOSINOPHIL # BLD AUTO: 0.3 K/UL (ref 0–0.5)
EOSINOPHIL NFR BLD: 4.3 % (ref 0–8)
ERYTHROCYTE [DISTWIDTH] IN BLOOD BY AUTOMATED COUNT: 13.2 % (ref 11.5–14.5)
EST. GFR  (AFRICAN AMERICAN): 43.8 ML/MIN/1.73 M^2
EST. GFR  (AFRICAN AMERICAN): 43.8 ML/MIN/1.73 M^2
EST. GFR  (NON AFRICAN AMERICAN): 38 ML/MIN/1.73 M^2
EST. GFR  (NON AFRICAN AMERICAN): 38 ML/MIN/1.73 M^2
ESTIMATED AVG GLUCOSE: 137 MG/DL (ref 68–131)
GLUCOSE SERPL-MCNC: 82 MG/DL (ref 70–110)
GLUCOSE SERPL-MCNC: 82 MG/DL (ref 70–110)
HBA1C MFR BLD: 6.4 % (ref 4.5–6.2)
HCT VFR BLD AUTO: 35 % (ref 37–48.5)
HDLC SERPL-MCNC: 40 MG/DL (ref 40–75)
HDLC SERPL: 20.6 % (ref 20–50)
HGB BLD-MCNC: 11.1 G/DL (ref 12–16)
HYALINE CASTS #/AREA URNS LPF: 1 /LPF
IMM GRANULOCYTES # BLD AUTO: 0.01 K/UL (ref 0–0.04)
IMM GRANULOCYTES NFR BLD AUTO: 0.1 % (ref 0–0.5)
LDLC SERPL CALC-MCNC: 122.2 MG/DL (ref 63–159)
LYMPHOCYTES # BLD AUTO: 2.6 K/UL (ref 1–4.8)
LYMPHOCYTES NFR BLD: 35.4 % (ref 18–48)
MCH RBC QN AUTO: 27.8 PG (ref 27–31)
MCHC RBC AUTO-ENTMCNC: 31.7 G/DL (ref 32–36)
MCV RBC AUTO: 88 FL (ref 82–98)
MICROALBUMIN UR DL<=1MG/L-MCNC: 3.5 UG/ML
MICROSCOPIC COMMENT: ABNORMAL
MONOCYTES # BLD AUTO: 0.9 K/UL (ref 0.3–1)
MONOCYTES NFR BLD: 12.4 % (ref 4–15)
NEUTROPHILS # BLD AUTO: 3.5 K/UL (ref 1.8–7.7)
NEUTROPHILS NFR BLD: 46.9 % (ref 38–73)
NONHDLC SERPL-MCNC: 154 MG/DL
NRBC BLD-RTO: 0 /100 WBC
PHOSPHATE SERPL-MCNC: 3.8 MG/DL (ref 2.7–4.5)
PLATELET # BLD AUTO: 294 K/UL (ref 150–450)
PMV BLD AUTO: 10.3 FL (ref 9.2–12.9)
POTASSIUM SERPL-SCNC: 4 MMOL/L (ref 3.5–5.1)
POTASSIUM SERPL-SCNC: 4 MMOL/L (ref 3.5–5.1)
PROT SERPL-MCNC: 7.3 G/DL (ref 6–8.4)
PROT UR-MCNC: <6 MG/DL (ref 6–15)
PROT/CREAT UR: NORMAL MG/G{CREAT} (ref 0–0.2)
RBC # BLD AUTO: 3.99 M/UL (ref 4–5.4)
RBC #/AREA URNS HPF: 0 /HPF (ref 0–4)
SODIUM SERPL-SCNC: 140 MMOL/L (ref 136–145)
SODIUM SERPL-SCNC: 140 MMOL/L (ref 136–145)
SQUAMOUS #/AREA URNS HPF: 2 /HPF
TRIGL SERPL-MCNC: 159 MG/DL (ref 30–150)
WBC # BLD AUTO: 7.41 K/UL (ref 3.9–12.7)
WBC #/AREA URNS HPF: 12 /HPF (ref 0–5)

## 2021-05-07 PROCEDURE — 80053 COMPREHEN METABOLIC PANEL: CPT | Performed by: INTERNAL MEDICINE

## 2021-05-07 PROCEDURE — 83036 HEMOGLOBIN GLYCOSYLATED A1C: CPT | Performed by: INTERNAL MEDICINE

## 2021-05-07 PROCEDURE — 82043 UR ALBUMIN QUANTITATIVE: CPT | Performed by: INTERNAL MEDICINE

## 2021-05-07 PROCEDURE — 84156 ASSAY OF PROTEIN URINE: CPT | Performed by: INTERNAL MEDICINE

## 2021-05-07 PROCEDURE — 80061 LIPID PANEL: CPT | Performed by: INTERNAL MEDICINE

## 2021-05-07 PROCEDURE — 81001 URINALYSIS AUTO W/SCOPE: CPT | Performed by: INTERNAL MEDICINE

## 2021-05-07 PROCEDURE — 82570 ASSAY OF URINE CREATININE: CPT | Performed by: INTERNAL MEDICINE

## 2021-05-07 PROCEDURE — 85025 COMPLETE CBC W/AUTO DIFF WBC: CPT | Performed by: INTERNAL MEDICINE

## 2021-05-07 PROCEDURE — 84100 ASSAY OF PHOSPHORUS: CPT | Performed by: INTERNAL MEDICINE

## 2021-05-07 PROCEDURE — 36415 COLL VENOUS BLD VENIPUNCTURE: CPT | Performed by: INTERNAL MEDICINE

## 2021-05-10 RX ORDER — LINAGLIPTIN 5 MG/1
5 TABLET, FILM COATED ORAL DAILY
Qty: 90 TABLET | Refills: 3 | Status: SHIPPED | OUTPATIENT
Start: 2021-05-10 | End: 2021-08-16

## 2021-05-11 RX ORDER — OMEPRAZOLE 20 MG/1
1 CAPSULE, DELAYED RELEASE ORAL DAILY
COMMUNITY
Start: 2021-05-06 | End: 2021-08-16

## 2021-05-12 ENCOUNTER — OFFICE VISIT (OUTPATIENT)
Dept: FAMILY MEDICINE | Facility: CLINIC | Age: 86
End: 2021-05-12
Payer: MEDICARE

## 2021-05-12 VITALS
DIASTOLIC BLOOD PRESSURE: 62 MMHG | HEIGHT: 60 IN | HEART RATE: 70 BPM | WEIGHT: 139 LBS | BODY MASS INDEX: 27.29 KG/M2 | SYSTOLIC BLOOD PRESSURE: 134 MMHG | OXYGEN SATURATION: 98 % | TEMPERATURE: 98 F | RESPIRATION RATE: 16 BRPM

## 2021-05-12 DIAGNOSIS — N18.30 STAGE 3 CHRONIC KIDNEY DISEASE, UNSPECIFIED WHETHER STAGE 3A OR 3B CKD: ICD-10-CM

## 2021-05-12 DIAGNOSIS — I10 ESSENTIAL HYPERTENSION, BENIGN: Primary | ICD-10-CM

## 2021-05-12 DIAGNOSIS — E11.9 TYPE 2 DIABETES MELLITUS WITHOUT COMPLICATION, WITHOUT LONG-TERM CURRENT USE OF INSULIN: ICD-10-CM

## 2021-05-12 DIAGNOSIS — E78.00 PURE HYPERCHOLESTEROLEMIA: ICD-10-CM

## 2021-05-12 DIAGNOSIS — E03.9 ACQUIRED HYPOTHYROIDISM: ICD-10-CM

## 2021-05-12 PROCEDURE — 99214 OFFICE O/P EST MOD 30 MIN: CPT | Mod: S$PBB,,, | Performed by: INTERNAL MEDICINE

## 2021-05-12 PROCEDURE — 99214 PR OFFICE/OUTPT VISIT, EST, LEVL IV, 30-39 MIN: ICD-10-PCS | Mod: S$PBB,,, | Performed by: INTERNAL MEDICINE

## 2021-05-12 PROCEDURE — 99215 OFFICE O/P EST HI 40 MIN: CPT | Performed by: INTERNAL MEDICINE

## 2021-05-12 RX ORDER — LEVOTHYROXINE SODIUM 88 UG/1
88 TABLET ORAL DAILY
Qty: 90 TABLET | Refills: 1 | Status: SHIPPED | OUTPATIENT
Start: 2021-05-12 | End: 2021-07-03

## 2021-05-18 ENCOUNTER — TELEPHONE (OUTPATIENT)
Dept: FAMILY MEDICINE | Facility: CLINIC | Age: 86
End: 2021-05-18

## 2021-05-25 ENCOUNTER — OFFICE VISIT (OUTPATIENT)
Dept: CARDIOLOGY | Facility: CLINIC | Age: 86
End: 2021-05-25
Payer: MEDICARE

## 2021-05-25 VITALS
HEART RATE: 74 BPM | DIASTOLIC BLOOD PRESSURE: 80 MMHG | SYSTOLIC BLOOD PRESSURE: 120 MMHG | HEIGHT: 60 IN | OXYGEN SATURATION: 99 % | RESPIRATION RATE: 16 BRPM | WEIGHT: 138 LBS | BODY MASS INDEX: 27.09 KG/M2

## 2021-05-25 DIAGNOSIS — E78.00 PURE HYPERCHOLESTEROLEMIA: ICD-10-CM

## 2021-05-25 DIAGNOSIS — I48.0 PAF (PAROXYSMAL ATRIAL FIBRILLATION): Chronic | ICD-10-CM

## 2021-05-25 DIAGNOSIS — I25.10 CORONARY ARTERY DISEASE INVOLVING LEFT MAIN CORONARY ARTERY: ICD-10-CM

## 2021-05-25 DIAGNOSIS — I10 ESSENTIAL HYPERTENSION: ICD-10-CM

## 2021-05-25 DIAGNOSIS — Z95.0 PACEMAKER: Chronic | ICD-10-CM

## 2021-05-25 DIAGNOSIS — R94.31 ABNORMAL ELECTROCARDIOGRAM (ECG) (EKG): ICD-10-CM

## 2021-05-25 DIAGNOSIS — Z78.9 STATIN INTOLERANCE: ICD-10-CM

## 2021-05-25 PROCEDURE — 99214 PR OFFICE/OUTPT VISIT, EST, LEVL IV, 30-39 MIN: ICD-10-PCS | Mod: S$GLB,,, | Performed by: INTERNAL MEDICINE

## 2021-05-25 PROCEDURE — 99214 OFFICE O/P EST MOD 30 MIN: CPT | Mod: S$GLB,,, | Performed by: INTERNAL MEDICINE

## 2021-05-25 RX ORDER — NITROGLYCERIN 0.4 MG/1
0.4 TABLET SUBLINGUAL EVERY 5 MIN PRN
Qty: 30 TABLET | Refills: 3 | Status: SHIPPED | OUTPATIENT
Start: 2021-05-25 | End: 2022-09-15

## 2021-06-10 ENCOUNTER — HOSPITAL ENCOUNTER (OUTPATIENT)
Dept: CARDIOLOGY | Facility: CLINIC | Age: 86
Discharge: HOME | End: 2021-06-10
Attending: INTERNAL MEDICINE
Payer: MEDICARE

## 2021-06-10 ENCOUNTER — HOSPITAL ENCOUNTER (OUTPATIENT)
Dept: RADIOLOGY | Facility: CLINIC | Age: 86
Discharge: HOME | End: 2021-06-10
Attending: INTERNAL MEDICINE
Payer: MEDICARE

## 2021-06-10 VITALS — BODY MASS INDEX: 27.09 KG/M2 | HEIGHT: 60 IN | WEIGHT: 138 LBS

## 2021-06-10 DIAGNOSIS — I10 ESSENTIAL HYPERTENSION: ICD-10-CM

## 2021-06-10 DIAGNOSIS — E78.00 PURE HYPERCHOLESTEROLEMIA: ICD-10-CM

## 2021-06-10 DIAGNOSIS — Z78.9 STATIN INTOLERANCE: ICD-10-CM

## 2021-06-10 DIAGNOSIS — R94.31 ABNORMAL ELECTROCARDIOGRAM (ECG) (EKG): ICD-10-CM

## 2021-06-10 DIAGNOSIS — I25.10 CORONARY ARTERY DISEASE INVOLVING LEFT MAIN CORONARY ARTERY: ICD-10-CM

## 2021-06-10 PROCEDURE — 93306 TTE W/DOPPLER COMPLETE: CPT | Mod: S$GLB,,, | Performed by: SPECIALIST

## 2021-06-10 PROCEDURE — 78452 STRESS TEST WITH MYOCARDIAL PERFUSION (CUPID ONLY): ICD-10-PCS | Mod: S$GLB,,, | Performed by: INTERNAL MEDICINE

## 2021-06-10 PROCEDURE — A9502 STRESS TEST WITH MYOCARDIAL PERFUSION (CUPID ONLY): ICD-10-PCS | Mod: S$GLB,,, | Performed by: INTERNAL MEDICINE

## 2021-06-10 PROCEDURE — A9502 TC99M TETROFOSMIN: HCPCS | Mod: S$GLB,,, | Performed by: INTERNAL MEDICINE

## 2021-06-10 PROCEDURE — 93015 CV STRESS TEST SUPVJ I&R: CPT | Mod: S$GLB,,, | Performed by: INTERNAL MEDICINE

## 2021-06-10 PROCEDURE — 93015 STRESS TEST WITH MYOCARDIAL PERFUSION (CUPID ONLY): ICD-10-PCS | Mod: S$GLB,,, | Performed by: INTERNAL MEDICINE

## 2021-06-10 PROCEDURE — 78452 HT MUSCLE IMAGE SPECT MULT: CPT | Mod: S$GLB,,, | Performed by: INTERNAL MEDICINE

## 2021-06-10 PROCEDURE — 93306 ECHO (CUPID ONLY): ICD-10-PCS | Mod: S$GLB,,, | Performed by: SPECIALIST

## 2021-06-10 RX ORDER — REGADENOSON 0.08 MG/ML
0.4 INJECTION, SOLUTION INTRAVENOUS ONCE
Status: COMPLETED | OUTPATIENT
Start: 2021-06-10 | End: 2021-06-10

## 2021-06-10 RX ADMIN — REGADENOSON 0.4 MG: 0.08 INJECTION, SOLUTION INTRAVENOUS at 09:06

## 2021-06-13 LAB
CV PHARM DOSE: 0.4 MG
CV STRESS BASE HR: 73 BPM
DIASTOLIC BLOOD PRESSURE: 72 MMHG
EJECTION FRACTION- HIGH: 65 %
END DIASTOLIC INDEX-HIGH: 158 ML/M2
END DIASTOLIC INDEX-LOW: 94 ML/M2
END SYSTOLIC INDEX-HIGH: 71 ML/M2
END SYSTOLIC INDEX-LOW: 33 ML/M2
NUC STRESS DIASTOLIC VOLUME INDEX: 68
NUC STRESS EJECTION FRACTION: 51 %
NUC STRESS SYSTOLIC VOLUME INDEX: 33
OHS CV CPX 1 MINUTE RECOVERY HEART RATE: 75 BPM
OHS CV CPX 85 PERCENT MAX PREDICTED HEART RATE MALE: 113
OHS CV CPX MAX PREDICTED HEART RATE: 133
OHS CV CPX PATIENT IS FEMALE: 1
OHS CV CPX PATIENT IS MALE: 0
OHS CV CPX PEAK DIASTOLIC BLOOD PRESSURE: 70 MMHG
OHS CV CPX PEAK HEAR RATE: 75 BPM
OHS CV CPX PEAK RATE PRESSURE PRODUCT: NORMAL
OHS CV CPX PEAK SYSTOLIC BLOOD PRESSURE: 150 MMHG
OHS CV CPX PERCENT MAX PREDICTED HEART RATE ACHIEVED: 56
OHS CV CPX RATE PRESSURE PRODUCT PRESENTING: 8760
RETIRED EF AND QEF - SEE NOTES: 53 %
SYSTOLIC BLOOD PRESSURE: 120 MMHG

## 2021-06-14 ENCOUNTER — LAB VISIT (OUTPATIENT)
Dept: LAB | Facility: HOSPITAL | Age: 86
End: 2021-06-14
Attending: INTERNAL MEDICINE
Payer: MEDICARE

## 2021-06-14 DIAGNOSIS — N18.30 STAGE 3 CHRONIC KIDNEY DISEASE, UNSPECIFIED WHETHER STAGE 3A OR 3B CKD: ICD-10-CM

## 2021-06-14 LAB
ANION GAP SERPL CALC-SCNC: 12 MMOL/L (ref 8–16)
BUN SERPL-MCNC: 24 MG/DL (ref 8–23)
CALCIUM SERPL-MCNC: 9.2 MG/DL (ref 8.7–10.5)
CHLORIDE SERPL-SCNC: 102 MMOL/L (ref 95–110)
CO2 SERPL-SCNC: 26 MMOL/L (ref 23–29)
CREAT SERPL-MCNC: 1.2 MG/DL (ref 0.5–1.4)
EST. GFR  (AFRICAN AMERICAN): 48.3 ML/MIN/1.73 M^2
EST. GFR  (NON AFRICAN AMERICAN): 41.9 ML/MIN/1.73 M^2
GLUCOSE SERPL-MCNC: 83 MG/DL (ref 70–110)
POTASSIUM SERPL-SCNC: 4.2 MMOL/L (ref 3.5–5.1)
SODIUM SERPL-SCNC: 140 MMOL/L (ref 136–145)

## 2021-06-14 PROCEDURE — 80048 BASIC METABOLIC PNL TOTAL CA: CPT | Performed by: INTERNAL MEDICINE

## 2021-06-14 PROCEDURE — 36415 COLL VENOUS BLD VENIPUNCTURE: CPT | Performed by: INTERNAL MEDICINE

## 2021-06-28 ENCOUNTER — TELEPHONE (OUTPATIENT)
Dept: CARDIOLOGY | Facility: CLINIC | Age: 86
End: 2021-06-28

## 2021-07-12 ENCOUNTER — TELEPHONE (OUTPATIENT)
Dept: FAMILY MEDICINE | Facility: CLINIC | Age: 86
End: 2021-07-12

## 2021-07-19 DIAGNOSIS — E03.9 ACQUIRED HYPOTHYROIDISM: ICD-10-CM

## 2021-07-19 DIAGNOSIS — I48.0 PAF (PAROXYSMAL ATRIAL FIBRILLATION): Primary | ICD-10-CM

## 2021-07-19 RX ORDER — LEVOTHYROXINE SODIUM 88 UG/1
88 TABLET ORAL DAILY
Qty: 90 TABLET | Refills: 1 | Status: SHIPPED | OUTPATIENT
Start: 2021-07-19 | End: 2021-07-21 | Stop reason: SDUPTHER

## 2021-07-20 ENCOUNTER — HOSPITAL ENCOUNTER (OUTPATIENT)
Dept: CARDIOLOGY | Facility: CLINIC | Age: 86
Discharge: HOME | End: 2021-07-20
Attending: INTERNAL MEDICINE
Payer: MEDICARE

## 2021-07-20 DIAGNOSIS — I49.5 SSS (SICK SINUS SYNDROME): ICD-10-CM

## 2021-07-20 LAB
AORTIC ROOT ANNULUS: 3 CM
AORTIC VALVE CUSP SEPERATION: 1.6 CM
AV INDEX (PROSTH): 0.63
AV MEAN GRADIENT: 4 MMHG
AV PEAK GRADIENT: 8 MMHG
AV REGURGITATION PRESSURE HALF TIME: 409 MS
AV VALVE AREA: 1.99 CM2
AV VELOCITY RATIO: 0.5
BSA FOR ECHO PROCEDURE: 1.63 M2
CV ECHO LV RWT: 0.52 CM
DOP CALC AO PEAK VEL: 1.41 M/S
DOP CALC AO VTI: 34.8 CM
DOP CALC LVOT AREA: 3.1 CM2
DOP CALC LVOT DIAMETER: 2 CM
DOP CALC LVOT PEAK VEL: 0.7 M/S
DOP CALC LVOT STROKE VOLUME: 69.08 CM3
DOP CALCLVOT PEAK VEL VTI: 22 CM
E WAVE DECELERATION TIME: 174 MS
E/A RATIO: 1.19
E/E' RATIO: 15.82 M/S
ECHO LV POSTERIOR WALL: 1.21 CM (ref 0.6–1.1)
EJECTION FRACTION: 49 %
FRACTIONAL SHORTENING: 25 % (ref 28–44)
INTERVENTRICULAR SEPTUM: 1.26 CM (ref 0.6–1.1)
IVRT: 79 MS
LA MAJOR: 4.8 CM
LEFT ATRIUM SIZE: 4.5 CM
LEFT INTERNAL DIMENSION IN SYSTOLE: 3.46 CM (ref 2.1–4)
LEFT VENTRICLE MASS INDEX: 135 G/M2
LEFT VENTRICULAR INTERNAL DIMENSION IN DIASTOLE: 4.62 CM (ref 3.5–6)
LEFT VENTRICULAR MASS: 215.08 G
LV LATERAL E/E' RATIO: 12.43 M/S
LV SEPTAL E/E' RATIO: 21.75 M/S
MV PEAK A VEL: 0.73 M/S
MV PEAK E VEL: 0.87 M/S
PISA TR MAX VEL: 2.84 M/S
RA PRESSURE: 3 MMHG
RIGHT VENTRICULAR END-DIASTOLIC DIMENSION: 1.69 CM
TDI LATERAL: 0.07 M/S
TDI SEPTAL: 0.04 M/S
TDI: 0.06 M/S
TR MAX PG: 32 MMHG
TV REST PULMONARY ARTERY PRESSURE: 35 MMHG

## 2021-07-20 PROCEDURE — 93280 PM DEVICE PROGR EVAL DUAL: CPT | Mod: S$GLB,,, | Performed by: INTERNAL MEDICINE

## 2021-07-20 PROCEDURE — 93280 CARDIAC DEVICE CHECK - IN CLINIC & HOSPITAL: ICD-10-PCS | Mod: S$GLB,,, | Performed by: INTERNAL MEDICINE

## 2021-07-21 DIAGNOSIS — E03.9 ACQUIRED HYPOTHYROIDISM: ICD-10-CM

## 2021-07-21 RX ORDER — LEVOTHYROXINE SODIUM 88 UG/1
88 TABLET ORAL DAILY
Qty: 90 TABLET | Refills: 1 | Status: SHIPPED | OUTPATIENT
Start: 2021-07-21 | End: 2021-07-26 | Stop reason: SDUPTHER

## 2021-07-23 LAB
BATTERY VOLTAGE (V): 3.01 V
IMPEDANCE RA LEAD (NATIVE): 532 OHMS
IMPEDANCE RA LEAD: 475 OHMS
P/R-WAVE RA LEAD (NATIVE): 11.6 MV
P/R-WAVE RA LEAD: 1.4 MV
THRESHOLD MS RA LEAD (NATIVE): 0.4 MS
THRESHOLD MS RA LEAD: 0.4 MS
THRESHOLD V RA LEAD (NATIVE): 0.5 V
THRESHOLD V RA LEAD: 0.75 V

## 2021-07-26 DIAGNOSIS — E03.9 ACQUIRED HYPOTHYROIDISM: ICD-10-CM

## 2021-07-26 RX ORDER — LEVOTHYROXINE SODIUM 88 UG/1
88 TABLET ORAL
Qty: 10 TABLET | Refills: 0 | Status: SHIPPED | OUTPATIENT
Start: 2021-07-26 | End: 2021-07-28 | Stop reason: CLARIF

## 2021-07-26 RX ORDER — LEVOTHYROXINE SODIUM 88 UG/1
88 TABLET ORAL DAILY
Qty: 90 TABLET | Refills: 1 | Status: SHIPPED | OUTPATIENT
Start: 2021-07-26 | End: 2021-07-27 | Stop reason: SDUPTHER

## 2021-07-27 ENCOUNTER — PATIENT MESSAGE (OUTPATIENT)
Dept: FAMILY MEDICINE | Facility: CLINIC | Age: 86
End: 2021-07-27

## 2021-07-27 RX ORDER — LEVOTHYROXINE SODIUM 88 UG/1
88 TABLET ORAL DAILY
Qty: 90 TABLET | Refills: 1 | Status: SHIPPED | OUTPATIENT
Start: 2021-07-27 | End: 2021-07-28 | Stop reason: CLARIF

## 2021-07-28 DIAGNOSIS — E03.9 ACQUIRED HYPOTHYROIDISM: ICD-10-CM

## 2021-07-28 RX ORDER — LEVOTHYROXINE SODIUM 88 UG/1
88 TABLET ORAL DAILY
Qty: 90 TABLET | Refills: 1 | Status: SHIPPED | OUTPATIENT
Start: 2021-07-28 | End: 2021-08-16 | Stop reason: SDUPTHER

## 2021-07-30 ENCOUNTER — TELEPHONE (OUTPATIENT)
Dept: CARDIOLOGY | Facility: CLINIC | Age: 86
End: 2021-07-30

## 2021-08-16 ENCOUNTER — OFFICE VISIT (OUTPATIENT)
Dept: FAMILY MEDICINE | Facility: CLINIC | Age: 86
End: 2021-08-16
Payer: MEDICARE

## 2021-08-16 VITALS
RESPIRATION RATE: 14 BRPM | HEART RATE: 72 BPM | OXYGEN SATURATION: 99 % | WEIGHT: 139 LBS | BODY MASS INDEX: 27.29 KG/M2 | HEIGHT: 60 IN | TEMPERATURE: 98 F | SYSTOLIC BLOOD PRESSURE: 130 MMHG | DIASTOLIC BLOOD PRESSURE: 62 MMHG

## 2021-08-16 DIAGNOSIS — E03.9 ACQUIRED HYPOTHYROIDISM: ICD-10-CM

## 2021-08-16 DIAGNOSIS — R25.1 TREMOR, UNSPECIFIED: Primary | ICD-10-CM

## 2021-08-16 PROCEDURE — 99214 OFFICE O/P EST MOD 30 MIN: CPT | Mod: S$PBB,,, | Performed by: INTERNAL MEDICINE

## 2021-08-16 PROCEDURE — 99215 OFFICE O/P EST HI 40 MIN: CPT | Performed by: INTERNAL MEDICINE

## 2021-08-16 PROCEDURE — 99214 PR OFFICE/OUTPT VISIT, EST, LEVL IV, 30-39 MIN: ICD-10-PCS | Mod: S$PBB,,, | Performed by: INTERNAL MEDICINE

## 2021-08-16 RX ORDER — DIPHENHYDRAMINE HCL 12.5MG/5ML
12.5 LIQUID (ML) ORAL 4 TIMES DAILY PRN
COMMUNITY

## 2021-08-16 RX ORDER — LEVOTHYROXINE SODIUM 88 UG/1
88 TABLET ORAL DAILY
Qty: 90 TABLET | Refills: 1 | Status: SHIPPED | OUTPATIENT
Start: 2021-08-16 | End: 2021-09-28

## 2021-08-16 RX ORDER — LITHIUM CARBONATE 600 MG/1
1000 CAPSULE ORAL DAILY
COMMUNITY
End: 2021-10-26

## 2021-08-16 RX ORDER — CRANBERRY FRUIT EXTRACT 650 MG
1 CAPSULE ORAL DAILY
COMMUNITY
End: 2022-08-03

## 2021-10-05 DIAGNOSIS — Z95.5 PRESENCE OF STENT IN CORONARY ARTERY IN PATIENT WITH CORONARY ARTERY DISEASE: ICD-10-CM

## 2021-10-05 DIAGNOSIS — I25.10 PRESENCE OF STENT IN CORONARY ARTERY IN PATIENT WITH CORONARY ARTERY DISEASE: ICD-10-CM

## 2021-10-05 DIAGNOSIS — I25.810 CORONARY ARTERY DISEASE INVOLVING AUTOLOGOUS ARTERY CORONARY BYPASS GRAFT WITHOUT ANGINA PECTORIS: ICD-10-CM

## 2021-10-05 DIAGNOSIS — E03.9 ACQUIRED HYPOTHYROIDISM: ICD-10-CM

## 2021-10-05 RX ORDER — LEVOTHYROXINE SODIUM 88 UG/1
88 TABLET ORAL DAILY
Qty: 90 TABLET | Refills: 1 | Status: SHIPPED | OUTPATIENT
Start: 2021-10-05 | End: 2022-02-23

## 2021-10-05 RX ORDER — CLOPIDOGREL BISULFATE 75 MG/1
75 TABLET ORAL DAILY
Qty: 90 TABLET | Refills: 2 | Status: SHIPPED | OUTPATIENT
Start: 2021-10-05 | End: 2022-05-10 | Stop reason: ALTCHOICE

## 2021-10-22 ENCOUNTER — HOSPITAL ENCOUNTER (EMERGENCY)
Facility: HOSPITAL | Age: 86
Discharge: HOME | End: 2021-10-22
Attending: EMERGENCY MEDICINE
Payer: MEDICARE

## 2021-10-22 VITALS
WEIGHT: 138 LBS | BODY MASS INDEX: 27.09 KG/M2 | OXYGEN SATURATION: 100 % | SYSTOLIC BLOOD PRESSURE: 162 MMHG | HEART RATE: 74 BPM | TEMPERATURE: 98 F | HEIGHT: 60 IN | RESPIRATION RATE: 16 BRPM | DIASTOLIC BLOOD PRESSURE: 68 MMHG

## 2021-10-22 DIAGNOSIS — E86.0 DEHYDRATION: ICD-10-CM

## 2021-10-22 DIAGNOSIS — E11.49 OTHER DIABETIC NEUROLOGICAL COMPLICATION ASSOCIATED WITH TYPE 2 DIABETES MELLITUS: Primary | ICD-10-CM

## 2021-10-22 DIAGNOSIS — R53.1 GENERALIZED WEAKNESS: ICD-10-CM

## 2021-10-22 LAB
ALBUMIN SERPL BCP-MCNC: 4.1 G/DL (ref 3.5–5.2)
ALP SERPL-CCNC: 64 U/L (ref 55–135)
ALT SERPL W/O P-5'-P-CCNC: 20 U/L (ref 10–44)
ANION GAP SERPL CALC-SCNC: 6 MMOL/L (ref 8–16)
AST SERPL-CCNC: 23 U/L (ref 10–40)
BASOPHILS # BLD AUTO: 0.04 K/UL (ref 0–0.2)
BASOPHILS NFR BLD: 0.5 % (ref 0–1.9)
BILIRUB SERPL-MCNC: 0.8 MG/DL (ref 0.1–1)
BILIRUB UR QL STRIP: NEGATIVE
BUN SERPL-MCNC: 23 MG/DL (ref 8–23)
CALCIUM SERPL-MCNC: 8.7 MG/DL (ref 8.7–10.5)
CHLORIDE SERPL-SCNC: 104 MMOL/L (ref 95–110)
CK SERPL-CCNC: 53 U/L (ref 20–180)
CLARITY UR: CLEAR
CO2 SERPL-SCNC: 29 MMOL/L (ref 23–29)
COLOR UR: COLORLESS
CREAT SERPL-MCNC: 1.7 MG/DL (ref 0.5–1.4)
DIFFERENTIAL METHOD: ABNORMAL
EOSINOPHIL # BLD AUTO: 0.1 K/UL (ref 0–0.5)
EOSINOPHIL NFR BLD: 1.4 % (ref 0–8)
ERYTHROCYTE [DISTWIDTH] IN BLOOD BY AUTOMATED COUNT: 13 % (ref 11.5–14.5)
EST. GFR  (AFRICAN AMERICAN): 31.7 ML/MIN/1.73 M^2
EST. GFR  (NON AFRICAN AMERICAN): 27.5 ML/MIN/1.73 M^2
GLUCOSE SERPL-MCNC: 137 MG/DL (ref 70–110)
GLUCOSE UR QL STRIP: NEGATIVE
HCT VFR BLD AUTO: 37 % (ref 37–48.5)
HGB BLD-MCNC: 11.7 G/DL (ref 12–16)
HGB UR QL STRIP: NEGATIVE
IMM GRANULOCYTES # BLD AUTO: 0.03 K/UL (ref 0–0.04)
IMM GRANULOCYTES NFR BLD AUTO: 0.4 % (ref 0–0.5)
INR PPP: 1.2
KETONES UR QL STRIP: NEGATIVE
LEUKOCYTE ESTERASE UR QL STRIP: NEGATIVE
LITHIUM SERPL-SCNC: 0 MMOL/L (ref 0.6–1.2)
LYMPHOCYTES # BLD AUTO: 2.3 K/UL (ref 1–4.8)
LYMPHOCYTES NFR BLD: 29.7 % (ref 18–48)
MAGNESIUM SERPL-MCNC: 2.3 MG/DL (ref 1.6–2.6)
MAGNESIUM SERPL-MCNC: 2.3 MG/DL (ref 1.6–2.6)
MCH RBC QN AUTO: 28.1 PG (ref 27–31)
MCHC RBC AUTO-ENTMCNC: 31.6 G/DL (ref 32–36)
MCV RBC AUTO: 89 FL (ref 82–98)
MONOCYTES # BLD AUTO: 0.9 K/UL (ref 0.3–1)
MONOCYTES NFR BLD: 11 % (ref 4–15)
NEUTROPHILS # BLD AUTO: 4.5 K/UL (ref 1.8–7.7)
NEUTROPHILS NFR BLD: 57 % (ref 38–73)
NITRITE UR QL STRIP: NEGATIVE
NRBC BLD-RTO: 0 /100 WBC
PH UR STRIP: 8 [PH] (ref 5–8)
PLATELET # BLD AUTO: 269 K/UL (ref 150–450)
PMV BLD AUTO: 9.6 FL (ref 9.2–12.9)
POTASSIUM SERPL-SCNC: 4 MMOL/L (ref 3.5–5.1)
PROT SERPL-MCNC: 7.4 G/DL (ref 6–8.4)
PROT UR QL STRIP: NEGATIVE
PROTHROMBIN TIME: 13.9 SEC (ref 11.4–13.7)
RBC # BLD AUTO: 4.16 M/UL (ref 4–5.4)
SARS-COV-2 RDRP RESP QL NAA+PROBE: NEGATIVE
SODIUM SERPL-SCNC: 139 MMOL/L (ref 136–145)
SP GR UR STRIP: 1 (ref 1–1.03)
TROPONIN I SERPL DL<=0.01 NG/ML-MCNC: <0.03 NG/ML
TSH SERPL DL<=0.005 MIU/L-ACNC: 1.76 UIU/ML (ref 0.34–5.6)
URN SPEC COLLECT METH UR: ABNORMAL
UROBILINOGEN UR STRIP-ACNC: NEGATIVE EU/DL
WBC # BLD AUTO: 7.89 K/UL (ref 3.9–12.7)

## 2021-10-22 PROCEDURE — 36415 COLL VENOUS BLD VENIPUNCTURE: CPT | Performed by: EMERGENCY MEDICINE

## 2021-10-22 PROCEDURE — 25000003 PHARM REV CODE 250: Performed by: EMERGENCY MEDICINE

## 2021-10-22 PROCEDURE — 93010 EKG 12-LEAD: ICD-10-PCS | Mod: ,,, | Performed by: SPECIALIST

## 2021-10-22 PROCEDURE — 99284 EMERGENCY DEPT VISIT MOD MDM: CPT | Mod: 25

## 2021-10-22 PROCEDURE — 93010 ELECTROCARDIOGRAM REPORT: CPT | Mod: ,,, | Performed by: SPECIALIST

## 2021-10-22 PROCEDURE — 93005 ELECTROCARDIOGRAM TRACING: CPT | Performed by: SPECIALIST

## 2021-10-22 PROCEDURE — 96360 HYDRATION IV INFUSION INIT: CPT

## 2021-10-22 PROCEDURE — 80178 ASSAY OF LITHIUM: CPT | Performed by: EMERGENCY MEDICINE

## 2021-10-22 PROCEDURE — 96361 HYDRATE IV INFUSION ADD-ON: CPT

## 2021-10-22 PROCEDURE — U0002 COVID-19 LAB TEST NON-CDC: HCPCS | Performed by: EMERGENCY MEDICINE

## 2021-10-22 PROCEDURE — 85025 COMPLETE CBC W/AUTO DIFF WBC: CPT | Performed by: EMERGENCY MEDICINE

## 2021-10-22 PROCEDURE — 81003 URINALYSIS AUTO W/O SCOPE: CPT | Performed by: EMERGENCY MEDICINE

## 2021-10-22 PROCEDURE — 80053 COMPREHEN METABOLIC PANEL: CPT | Performed by: EMERGENCY MEDICINE

## 2021-10-22 PROCEDURE — 83735 ASSAY OF MAGNESIUM: CPT | Performed by: EMERGENCY MEDICINE

## 2021-10-22 PROCEDURE — 85610 PROTHROMBIN TIME: CPT | Performed by: EMERGENCY MEDICINE

## 2021-10-22 PROCEDURE — 82550 ASSAY OF CK (CPK): CPT | Performed by: EMERGENCY MEDICINE

## 2021-10-22 PROCEDURE — 84443 ASSAY THYROID STIM HORMONE: CPT | Performed by: EMERGENCY MEDICINE

## 2021-10-22 PROCEDURE — 84484 ASSAY OF TROPONIN QUANT: CPT | Performed by: EMERGENCY MEDICINE

## 2021-10-22 RX ORDER — SODIUM CHLORIDE 9 MG/ML
INJECTION, SOLUTION INTRAVENOUS
Status: COMPLETED | OUTPATIENT
Start: 2021-10-22 | End: 2021-10-22

## 2021-10-22 RX ORDER — SODIUM CHLORIDE 9 MG/ML
INJECTION, SOLUTION INTRAVENOUS
Status: DISCONTINUED | OUTPATIENT
Start: 2021-10-22 | End: 2021-10-22

## 2021-10-22 RX ORDER — OMEPRAZOLE 20 MG/1
20 CAPSULE, DELAYED RELEASE ORAL DAILY
COMMUNITY
End: 2022-04-10

## 2021-10-22 RX ORDER — SODIUM CHLORIDE 0.9 % (FLUSH) 0.9 %
10 SYRINGE (ML) INJECTION
Status: DISCONTINUED | OUTPATIENT
Start: 2021-10-22 | End: 2021-10-22 | Stop reason: HOSPADM

## 2021-10-22 RX ADMIN — SODIUM CHLORIDE: 0.9 INJECTION, SOLUTION INTRAVENOUS at 05:10

## 2021-10-23 ENCOUNTER — PATIENT MESSAGE (OUTPATIENT)
Dept: FAMILY MEDICINE | Facility: CLINIC | Age: 86
End: 2021-10-23
Payer: MEDICARE

## 2021-10-26 ENCOUNTER — OFFICE VISIT (OUTPATIENT)
Dept: FAMILY MEDICINE | Facility: CLINIC | Age: 86
End: 2021-10-26
Payer: MEDICARE

## 2021-10-26 VITALS
HEART RATE: 72 BPM | BODY MASS INDEX: 26.9 KG/M2 | SYSTOLIC BLOOD PRESSURE: 120 MMHG | OXYGEN SATURATION: 100 % | DIASTOLIC BLOOD PRESSURE: 62 MMHG | RESPIRATION RATE: 12 BRPM | WEIGHT: 137 LBS | HEIGHT: 60 IN

## 2021-10-26 DIAGNOSIS — T88.7XXA SIDE EFFECT OF DRUG: ICD-10-CM

## 2021-10-26 DIAGNOSIS — R79.89 ELEVATED SERUM CREATININE: ICD-10-CM

## 2021-10-26 DIAGNOSIS — R29.898 LEG WEAKNESS, BILATERAL: Primary | ICD-10-CM

## 2021-10-26 DIAGNOSIS — F41.9 SEVERE ANXIETY: ICD-10-CM

## 2021-10-26 DIAGNOSIS — M62.838 MUSCLE SPASM: ICD-10-CM

## 2021-10-26 DIAGNOSIS — N18.30 STAGE 3 CHRONIC KIDNEY DISEASE, UNSPECIFIED WHETHER STAGE 3A OR 3B CKD: ICD-10-CM

## 2021-10-26 DIAGNOSIS — Z23 NEEDS FLU SHOT: ICD-10-CM

## 2021-10-26 PROCEDURE — 90686 IIV4 VACC NO PRSV 0.5 ML IM: CPT | Mod: PBBFAC | Performed by: INTERNAL MEDICINE

## 2021-10-26 PROCEDURE — 99215 OFFICE O/P EST HI 40 MIN: CPT | Performed by: INTERNAL MEDICINE

## 2021-10-26 PROCEDURE — 99214 PR OFFICE/OUTPT VISIT, EST, LEVL IV, 30-39 MIN: ICD-10-PCS | Mod: S$PBB,,, | Performed by: INTERNAL MEDICINE

## 2021-10-26 PROCEDURE — 99214 OFFICE O/P EST MOD 30 MIN: CPT | Mod: S$PBB,,, | Performed by: INTERNAL MEDICINE

## 2021-10-26 RX ORDER — ALPRAZOLAM 0.25 MG/1
0.25 TABLET ORAL
Qty: 90 TABLET | Refills: 0 | Status: SHIPPED | OUTPATIENT
Start: 2021-10-26 | End: 2022-03-16 | Stop reason: SDUPTHER

## 2021-10-26 RX ORDER — TIZANIDINE 4 MG/1
4 TABLET ORAL DAILY
Qty: 90 TABLET | Refills: 0 | Status: SHIPPED | OUTPATIENT
Start: 2021-10-26 | End: 2021-12-15 | Stop reason: SDUPTHER

## 2021-10-27 DIAGNOSIS — R07.2 PRECORDIAL PAIN: ICD-10-CM

## 2021-10-27 RX ORDER — ISOSORBIDE MONONITRATE 60 MG/1
60 TABLET, EXTENDED RELEASE ORAL DAILY
Qty: 180 TABLET | Refills: 3 | Status: SHIPPED | OUTPATIENT
Start: 2021-10-27 | End: 2021-11-22 | Stop reason: SDUPTHER

## 2021-11-08 ENCOUNTER — LAB VISIT (OUTPATIENT)
Dept: LAB | Facility: HOSPITAL | Age: 86
End: 2021-11-08
Attending: INTERNAL MEDICINE
Payer: MEDICARE

## 2021-11-08 DIAGNOSIS — E78.00 PURE HYPERCHOLESTEROLEMIA: ICD-10-CM

## 2021-11-08 DIAGNOSIS — N18.30 CHRONIC KIDNEY DISEASE, STAGE III (MODERATE): Primary | ICD-10-CM

## 2021-11-08 DIAGNOSIS — N25.81 SECONDARY HYPERPARATHYROIDISM OF RENAL ORIGIN: ICD-10-CM

## 2021-11-08 DIAGNOSIS — E11.9 TYPE 2 DIABETES MELLITUS WITHOUT COMPLICATION, WITHOUT LONG-TERM CURRENT USE OF INSULIN: ICD-10-CM

## 2021-11-08 DIAGNOSIS — I10 ESSENTIAL HYPERTENSION, BENIGN: ICD-10-CM

## 2021-11-08 DIAGNOSIS — R79.89 ELEVATED SERUM CREATININE: ICD-10-CM

## 2021-11-08 LAB
25(OH)D3+25(OH)D2 SERPL-MCNC: >120 NG/ML (ref 30–96)
ALBUMIN SERPL BCP-MCNC: 3.8 G/DL (ref 3.5–5.2)
ANION GAP SERPL CALC-SCNC: 9 MMOL/L (ref 8–16)
ANION GAP SERPL CALC-SCNC: 9 MMOL/L (ref 8–16)
BACTERIA #/AREA URNS HPF: NEGATIVE /HPF
BASOPHILS # BLD AUTO: 0.06 K/UL (ref 0–0.2)
BASOPHILS NFR BLD: 0.8 % (ref 0–1.9)
BILIRUB UR QL STRIP: NEGATIVE
BUN SERPL-MCNC: 25 MG/DL (ref 8–23)
BUN SERPL-MCNC: 25 MG/DL (ref 8–23)
CALCIUM SERPL-MCNC: 9.5 MG/DL (ref 8.7–10.5)
CALCIUM SERPL-MCNC: 9.5 MG/DL (ref 8.7–10.5)
CHLORIDE SERPL-SCNC: 106 MMOL/L (ref 95–110)
CHLORIDE SERPL-SCNC: 106 MMOL/L (ref 95–110)
CHOLEST SERPL-MCNC: 248 MG/DL (ref 120–199)
CHOLEST/HDLC SERPL: 6 {RATIO} (ref 2–5)
CLARITY UR: CLEAR
CO2 SERPL-SCNC: 29 MMOL/L (ref 23–29)
CO2 SERPL-SCNC: 29 MMOL/L (ref 23–29)
COLOR UR: YELLOW
CREAT SERPL-MCNC: 1.3 MG/DL (ref 0.5–1.4)
CREAT SERPL-MCNC: 1.3 MG/DL (ref 0.5–1.4)
CREAT UR-MCNC: 87 MG/DL (ref 15–325)
DIFFERENTIAL METHOD: ABNORMAL
EOSINOPHIL # BLD AUTO: 0.2 K/UL (ref 0–0.5)
EOSINOPHIL NFR BLD: 3.1 % (ref 0–8)
ERYTHROCYTE [DISTWIDTH] IN BLOOD BY AUTOMATED COUNT: 13.2 % (ref 11.5–14.5)
EST. GFR  (AFRICAN AMERICAN): 43.8 ML/MIN/1.73 M^2
EST. GFR  (AFRICAN AMERICAN): 43.8 ML/MIN/1.73 M^2
EST. GFR  (NON AFRICAN AMERICAN): 38 ML/MIN/1.73 M^2
EST. GFR  (NON AFRICAN AMERICAN): 38 ML/MIN/1.73 M^2
ESTIMATED AVG GLUCOSE: 123 MG/DL (ref 68–131)
GLUCOSE SERPL-MCNC: 88 MG/DL (ref 70–110)
GLUCOSE SERPL-MCNC: 88 MG/DL (ref 70–110)
GLUCOSE UR QL STRIP: NEGATIVE
HBA1C MFR BLD: 5.9 % (ref 4.5–6.2)
HCT VFR BLD AUTO: 38 % (ref 37–48.5)
HDLC SERPL-MCNC: 41 MG/DL (ref 40–75)
HDLC SERPL: 16.5 % (ref 20–50)
HGB BLD-MCNC: 12 G/DL (ref 12–16)
HGB UR QL STRIP: NEGATIVE
HYALINE CASTS #/AREA URNS LPF: 4 /LPF
IMM GRANULOCYTES # BLD AUTO: 0.02 K/UL (ref 0–0.04)
IMM GRANULOCYTES NFR BLD AUTO: 0.3 % (ref 0–0.5)
KETONES UR QL STRIP: NEGATIVE
LDLC SERPL CALC-MCNC: 170 MG/DL (ref 63–159)
LEUKOCYTE ESTERASE UR QL STRIP: ABNORMAL
LYMPHOCYTES # BLD AUTO: 3 K/UL (ref 1–4.8)
LYMPHOCYTES NFR BLD: 40.4 % (ref 18–48)
MCH RBC QN AUTO: 27.9 PG (ref 27–31)
MCHC RBC AUTO-ENTMCNC: 31.6 G/DL (ref 32–36)
MCV RBC AUTO: 88 FL (ref 82–98)
MICROSCOPIC COMMENT: ABNORMAL
MONOCYTES # BLD AUTO: 0.8 K/UL (ref 0.3–1)
MONOCYTES NFR BLD: 10.5 % (ref 4–15)
NEUTROPHILS # BLD AUTO: 3.3 K/UL (ref 1.8–7.7)
NEUTROPHILS NFR BLD: 44.9 % (ref 38–73)
NITRITE UR QL STRIP: NEGATIVE
NONHDLC SERPL-MCNC: 207 MG/DL
NRBC BLD-RTO: 0 /100 WBC
PH UR STRIP: 7 [PH] (ref 5–8)
PHOSPHATE SERPL-MCNC: 4.1 MG/DL (ref 2.7–4.5)
PLATELET # BLD AUTO: 281 K/UL (ref 150–450)
PMV BLD AUTO: 10.2 FL (ref 9.2–12.9)
POTASSIUM SERPL-SCNC: 4 MMOL/L (ref 3.5–5.1)
POTASSIUM SERPL-SCNC: 4 MMOL/L (ref 3.5–5.1)
PROT UR QL STRIP: NEGATIVE
PROT UR-MCNC: 7 MG/DL (ref 6–15)
PROT/CREAT UR: 0.08 MG/G{CREAT} (ref 0–0.2)
PTH-INTACT SERPL-MCNC: 26.1 PG/ML (ref 9–77)
RBC # BLD AUTO: 4.3 M/UL (ref 4–5.4)
RBC #/AREA URNS HPF: 1 /HPF (ref 0–4)
SODIUM SERPL-SCNC: 144 MMOL/L (ref 136–145)
SODIUM SERPL-SCNC: 144 MMOL/L (ref 136–145)
SP GR UR STRIP: 1.02 (ref 1–1.03)
SQUAMOUS #/AREA URNS HPF: 4 /HPF
TRIGL SERPL-MCNC: 185 MG/DL (ref 30–150)
URN SPEC COLLECT METH UR: ABNORMAL
UROBILINOGEN UR STRIP-ACNC: NEGATIVE EU/DL
WBC # BLD AUTO: 7.35 K/UL (ref 3.9–12.7)
WBC #/AREA URNS HPF: 3 /HPF (ref 0–5)

## 2021-11-08 PROCEDURE — 80061 LIPID PANEL: CPT | Performed by: INTERNAL MEDICINE

## 2021-11-08 PROCEDURE — 83970 ASSAY OF PARATHORMONE: CPT | Performed by: INTERNAL MEDICINE

## 2021-11-08 PROCEDURE — 82306 VITAMIN D 25 HYDROXY: CPT | Performed by: INTERNAL MEDICINE

## 2021-11-08 PROCEDURE — 81001 URINALYSIS AUTO W/SCOPE: CPT | Performed by: INTERNAL MEDICINE

## 2021-11-08 PROCEDURE — 82570 ASSAY OF URINE CREATININE: CPT | Performed by: INTERNAL MEDICINE

## 2021-11-08 PROCEDURE — 36415 COLL VENOUS BLD VENIPUNCTURE: CPT | Performed by: INTERNAL MEDICINE

## 2021-11-08 PROCEDURE — 83036 HEMOGLOBIN GLYCOSYLATED A1C: CPT | Performed by: INTERNAL MEDICINE

## 2021-11-08 PROCEDURE — 80069 RENAL FUNCTION PANEL: CPT | Performed by: INTERNAL MEDICINE

## 2021-11-08 PROCEDURE — 85025 COMPLETE CBC W/AUTO DIFF WBC: CPT | Performed by: INTERNAL MEDICINE

## 2021-11-10 ENCOUNTER — TELEPHONE (OUTPATIENT)
Dept: FAMILY MEDICINE | Facility: CLINIC | Age: 86
End: 2021-11-10
Payer: MEDICARE

## 2021-11-10 RX ORDER — TRAMADOL HYDROCHLORIDE 50 MG/1
50 TABLET ORAL 2 TIMES DAILY PRN
Qty: 30 TABLET | Refills: 0 | Status: SHIPPED | OUTPATIENT
Start: 2021-11-10 | End: 2021-12-15 | Stop reason: SDUPTHER

## 2021-11-16 ENCOUNTER — OFFICE VISIT (OUTPATIENT)
Dept: FAMILY MEDICINE | Facility: CLINIC | Age: 86
End: 2021-11-16
Payer: MEDICARE

## 2021-11-16 VITALS
BODY MASS INDEX: 26.9 KG/M2 | DIASTOLIC BLOOD PRESSURE: 60 MMHG | HEART RATE: 72 BPM | HEIGHT: 60 IN | SYSTOLIC BLOOD PRESSURE: 136 MMHG | OXYGEN SATURATION: 100 % | RESPIRATION RATE: 14 BRPM | WEIGHT: 137 LBS | TEMPERATURE: 98 F

## 2021-11-16 DIAGNOSIS — I25.810 CORONARY ARTERY DISEASE INVOLVING AUTOLOGOUS ARTERY CORONARY BYPASS GRAFT WITHOUT ANGINA PECTORIS: ICD-10-CM

## 2021-11-16 DIAGNOSIS — I10 ESSENTIAL HYPERTENSION: Primary | ICD-10-CM

## 2021-11-16 DIAGNOSIS — E11.9 TYPE 2 DIABETES MELLITUS WITHOUT COMPLICATION, WITHOUT LONG-TERM CURRENT USE OF INSULIN: ICD-10-CM

## 2021-11-16 PROCEDURE — 99213 PR OFFICE/OUTPT VISIT, EST, LEVL III, 20-29 MIN: ICD-10-PCS | Mod: S$PBB,,, | Performed by: INTERNAL MEDICINE

## 2021-11-16 PROCEDURE — 99213 OFFICE O/P EST LOW 20 MIN: CPT | Mod: S$PBB,,, | Performed by: INTERNAL MEDICINE

## 2021-11-16 PROCEDURE — 99215 OFFICE O/P EST HI 40 MIN: CPT | Performed by: INTERNAL MEDICINE

## 2021-11-22 DIAGNOSIS — R07.2 PRECORDIAL PAIN: ICD-10-CM

## 2021-11-22 RX ORDER — ISOSORBIDE MONONITRATE 60 MG/1
60 TABLET, EXTENDED RELEASE ORAL 2 TIMES DAILY
Qty: 180 TABLET | Refills: 3 | Status: SHIPPED | OUTPATIENT
Start: 2021-11-22 | End: 2021-12-20 | Stop reason: SDUPTHER

## 2021-12-15 DIAGNOSIS — M62.838 MUSCLE SPASM: ICD-10-CM

## 2021-12-15 RX ORDER — TIZANIDINE 4 MG/1
4 TABLET ORAL DAILY
Qty: 90 TABLET | Refills: 0 | Status: SHIPPED | OUTPATIENT
Start: 2021-12-15 | End: 2022-01-10 | Stop reason: SDUPTHER

## 2021-12-15 RX ORDER — TRAMADOL HYDROCHLORIDE 50 MG/1
50 TABLET ORAL 2 TIMES DAILY PRN
Qty: 30 TABLET | Refills: 0 | Status: SHIPPED | OUTPATIENT
Start: 2021-12-15 | End: 2022-03-07

## 2021-12-20 ENCOUNTER — OFFICE VISIT (OUTPATIENT)
Dept: CARDIOLOGY | Facility: CLINIC | Age: 86
End: 2021-12-20
Payer: MEDICARE

## 2021-12-20 VITALS
BODY MASS INDEX: 26.7 KG/M2 | HEIGHT: 60 IN | RESPIRATION RATE: 16 BRPM | DIASTOLIC BLOOD PRESSURE: 80 MMHG | SYSTOLIC BLOOD PRESSURE: 122 MMHG | OXYGEN SATURATION: 99 % | WEIGHT: 136 LBS | HEART RATE: 78 BPM

## 2021-12-20 DIAGNOSIS — E78.00 PURE HYPERCHOLESTEROLEMIA: ICD-10-CM

## 2021-12-20 DIAGNOSIS — I25.10 CORONARY ARTERY DISEASE INVOLVING LEFT MAIN CORONARY ARTERY: ICD-10-CM

## 2021-12-20 DIAGNOSIS — I10 ESSENTIAL HYPERTENSION: ICD-10-CM

## 2021-12-20 DIAGNOSIS — Z95.0 PACEMAKER: Chronic | ICD-10-CM

## 2021-12-20 DIAGNOSIS — Z78.9 STATIN INTOLERANCE: ICD-10-CM

## 2021-12-20 DIAGNOSIS — R07.2 PRECORDIAL PAIN: ICD-10-CM

## 2021-12-20 DIAGNOSIS — I10 ESSENTIAL HYPERTENSION, BENIGN: ICD-10-CM

## 2021-12-20 DIAGNOSIS — I48.0 PAF (PAROXYSMAL ATRIAL FIBRILLATION): Chronic | ICD-10-CM

## 2021-12-20 PROCEDURE — 99214 OFFICE O/P EST MOD 30 MIN: CPT | Mod: S$GLB,,, | Performed by: INTERNAL MEDICINE

## 2021-12-20 PROCEDURE — 99214 PR OFFICE/OUTPT VISIT, EST, LEVL IV, 30-39 MIN: ICD-10-PCS | Mod: S$GLB,,, | Performed by: INTERNAL MEDICINE

## 2021-12-20 RX ORDER — ISOSORBIDE MONONITRATE 120 MG/1
120 TABLET, EXTENDED RELEASE ORAL DAILY
Qty: 90 TABLET | Refills: 3 | Status: SHIPPED | OUTPATIENT
Start: 2021-12-20 | End: 2022-09-01

## 2021-12-20 RX ORDER — METOPROLOL SUCCINATE 25 MG/1
25 TABLET, EXTENDED RELEASE ORAL DAILY
Qty: 90 TABLET | Refills: 3 | Status: SHIPPED | OUTPATIENT
Start: 2021-12-20 | End: 2023-03-17

## 2022-01-09 NOTE — PROGRESS NOTES
SUBJECTIVE:    Patient ID: Karma Chen is a 86 y.o. female.    Chief Complaint: Rash (On arm)    HPI     Patient has pustule on the right neck and another outbreak not like the neck on the posterior upper right arm-also some on the back of the lower left leg-red raised lesions without pustules-she had another pustule on the right med chest which was removed    Patient says the arm rash got a little bit lighter and the left leg rash--there has been no fever or other infectious or allergic type sx    Recently her BP has been a little low and she decreased her muscle relaxant    Lab Visit on 11/08/2021   Component Date Value Ref Range Status    Hemoglobin A1C 11/08/2021 5.9  4.5 - 6.2 % Final    Estimated Avg Glucose 11/08/2021 123  68 - 131 mg/dL Final    WBC 11/08/2021 7.35  3.90 - 12.70 K/uL Final    RBC 11/08/2021 4.30  4.00 - 5.40 M/uL Final    Hemoglobin 11/08/2021 12.0  12.0 - 16.0 g/dL Final    Hematocrit 11/08/2021 38.0  37.0 - 48.5 % Final    MCV 11/08/2021 88  82 - 98 fL Final    MCH 11/08/2021 27.9  27.0 - 31.0 pg Final    MCHC 11/08/2021 31.6* 32.0 - 36.0 g/dL Final    RDW 11/08/2021 13.2  11.5 - 14.5 % Final    Platelets 11/08/2021 281  150 - 450 K/uL Final    MPV 11/08/2021 10.2  9.2 - 12.9 fL Final    Immature Granulocytes 11/08/2021 0.3  0.0 - 0.5 % Final    Gran # (ANC) 11/08/2021 3.3  1.8 - 7.7 K/uL Final    Immature Grans (Abs) 11/08/2021 0.02  0.00 - 0.04 K/uL Final    Lymph # 11/08/2021 3.0  1.0 - 4.8 K/uL Final    Mono # 11/08/2021 0.8  0.3 - 1.0 K/uL Final    Eos # 11/08/2021 0.2  0.0 - 0.5 K/uL Final    Baso # 11/08/2021 0.06  0.00 - 0.20 K/uL Final    nRBC 11/08/2021 0  0 /100 WBC Final    Gran % 11/08/2021 44.9  38.0 - 73.0 % Final    Lymph % 11/08/2021 40.4  18.0 - 48.0 % Final    Mono % 11/08/2021 10.5  4.0 - 15.0 % Final    Eosinophil % 11/08/2021 3.1  0.0 - 8.0 % Final    Basophil % 11/08/2021 0.8  0.0 - 1.9 % Final    Differential Method 11/08/2021  Automated   Final    Cholesterol 11/08/2021 248* 120 - 199 mg/dL Final    Triglycerides 11/08/2021 185* 30 - 150 mg/dL Final    HDL 11/08/2021 41  40 - 75 mg/dL Final    LDL Cholesterol 11/08/2021 170.0* 63.0 - 159.0 mg/dL Final    HDL/Cholesterol Ratio 11/08/2021 16.5* 20.0 - 50.0 % Final    Total Cholesterol/HDL Ratio 11/08/2021 6.0* 2.0 - 5.0 Final    Non-HDL Cholesterol 11/08/2021 207  mg/dL Final    Sodium 11/08/2021 144  136 - 145 mmol/L Final    Potassium 11/08/2021 4.0  3.5 - 5.1 mmol/L Final    Chloride 11/08/2021 106  95 - 110 mmol/L Final    CO2 11/08/2021 29  23 - 29 mmol/L Final    Glucose 11/08/2021 88  70 - 110 mg/dL Final    BUN 11/08/2021 25* 8 - 23 mg/dL Final    Creatinine 11/08/2021 1.3  0.5 - 1.4 mg/dL Final    Calcium 11/08/2021 9.5  8.7 - 10.5 mg/dL Final    Anion Gap 11/08/2021 9  8 - 16 mmol/L Final    eGFR if  11/08/2021 43.8* >60 mL/min/1.73 m^2 Final    eGFR if non  11/08/2021 38.0* >60 mL/min/1.73 m^2 Final    Glucose 11/08/2021 88  70 - 110 mg/dL Final    Sodium 11/08/2021 144  136 - 145 mmol/L Final    Potassium 11/08/2021 4.0  3.5 - 5.1 mmol/L Final    Chloride 11/08/2021 106  95 - 110 mmol/L Final    CO2 11/08/2021 29  23 - 29 mmol/L Final    BUN 11/08/2021 25* 8 - 23 mg/dL Final    Calcium 11/08/2021 9.5  8.7 - 10.5 mg/dL Final    Creatinine 11/08/2021 1.3  0.5 - 1.4 mg/dL Final    Albumin 11/08/2021 3.8  3.5 - 5.2 g/dL Final    Phosphorus 11/08/2021 4.1  2.7 - 4.5 mg/dL Final    eGFR if  11/08/2021 43.8* >60 mL/min/1.73 m^2 Final    eGFR if non  11/08/2021 38.0* >60 mL/min/1.73 m^2 Final    Anion Gap 11/08/2021 9  8 - 16 mmol/L Final    Vit D, 25-Hydroxy 11/08/2021 >120* 30 - 96 ng/mL Final    Specimen UA 11/08/2021 Urine, Clean Catch   Final    Color, UA 11/08/2021 Yellow  Yellow, Straw, Anila Final    Appearance, UA 11/08/2021 Clear  Clear Final    pH, UA 11/08/2021 7.0  5.0 - 8.0  Final    Specific Gravity, UA 11/08/2021 1.020  1.005 - 1.030 Final    Protein, UA 11/08/2021 Negative  Negative Final    Glucose, UA 11/08/2021 Negative  Negative Final    Ketones, UA 11/08/2021 Negative  Negative Final    Bilirubin (UA) 11/08/2021 Negative  Negative Final    Occult Blood UA 11/08/2021 Negative  Negative Final    Nitrite, UA 11/08/2021 Negative  Negative Final    Urobilinogen, UA 11/08/2021 Negative  Negative EU/dL Final    Leukocytes, UA 11/08/2021 1+* Negative Final    RBC, UA 11/08/2021 1  0 - 4 /hpf Final    WBC, UA 11/08/2021 3  0 - 5 /hpf Final    Bacteria 11/08/2021 Negative  None-Occ /hpf Final    Squam Epithel, UA 11/08/2021 4  /hpf Final    Hyaline Casts, UA 11/08/2021 4* 0-1/lpf /lpf Final    Microscopic Comment 11/08/2021 SEE COMMENT   Final    PTH, Intact 11/08/2021 26.1  9.0 - 77.0 pg/mL Final    Protein, Urine Random 11/08/2021 7  6 - 15 mg/dL Final    Creatinine, Urine 11/08/2021 87.0  15.0 - 325.0 mg/dL Final    Prot/Creat Ratio, Urine 11/08/2021 0.08  0.00 - 0.20 Final   Admission on 10/22/2021, Discharged on 10/22/2021   Component Date Value Ref Range Status    Sodium 10/22/2021 139  136 - 145 mmol/L Final    Potassium 10/22/2021 4.0  3.5 - 5.1 mmol/L Final    Chloride 10/22/2021 104  95 - 110 mmol/L Final    CO2 10/22/2021 29  23 - 29 mmol/L Final    Glucose 10/22/2021 137* 70 - 110 mg/dL Final    BUN 10/22/2021 23  8 - 23 mg/dL Final    Creatinine 10/22/2021 1.7* 0.5 - 1.4 mg/dL Final    Calcium 10/22/2021 8.7  8.7 - 10.5 mg/dL Final    Total Protein 10/22/2021 7.4  6.0 - 8.4 g/dL Final    Albumin 10/22/2021 4.1  3.5 - 5.2 g/dL Final    Total Bilirubin 10/22/2021 0.8  0.1 - 1.0 mg/dL Final    Alkaline Phosphatase 10/22/2021 64  55 - 135 U/L Final    AST 10/22/2021 23  10 - 40 U/L Final    ALT 10/22/2021 20  10 - 44 U/L Final    Anion Gap 10/22/2021 6* 8 - 16 mmol/L Final    eGFR if  10/22/2021 31.7* >60 mL/min/1.73 m^2 Final     eGFR if non African American 10/22/2021 27.5* >60 mL/min/1.73 m^2 Final    Magnesium 10/22/2021 2.3  1.6 - 2.6 mg/dL Final    WBC 10/22/2021 7.89  3.90 - 12.70 K/uL Final    RBC 10/22/2021 4.16  4.00 - 5.40 M/uL Final    Hemoglobin 10/22/2021 11.7* 12.0 - 16.0 g/dL Final    Hematocrit 10/22/2021 37.0  37.0 - 48.5 % Final    MCV 10/22/2021 89  82 - 98 fL Final    MCH 10/22/2021 28.1  27.0 - 31.0 pg Final    MCHC 10/22/2021 31.6* 32.0 - 36.0 g/dL Final    RDW 10/22/2021 13.0  11.5 - 14.5 % Final    Platelets 10/22/2021 269  150 - 450 K/uL Final    MPV 10/22/2021 9.6  9.2 - 12.9 fL Final    Immature Granulocytes 10/22/2021 0.4  0.0 - 0.5 % Final    Gran # (ANC) 10/22/2021 4.5  1.8 - 7.7 K/uL Final    Immature Grans (Abs) 10/22/2021 0.03  0.00 - 0.04 K/uL Final    Lymph # 10/22/2021 2.3  1.0 - 4.8 K/uL Final    Mono # 10/22/2021 0.9  0.3 - 1.0 K/uL Final    Eos # 10/22/2021 0.1  0.0 - 0.5 K/uL Final    Baso # 10/22/2021 0.04  0.00 - 0.20 K/uL Final    nRBC 10/22/2021 0  0 /100 WBC Final    Gran % 10/22/2021 57.0  38.0 - 73.0 % Final    Lymph % 10/22/2021 29.7  18.0 - 48.0 % Final    Mono % 10/22/2021 11.0  4.0 - 15.0 % Final    Eosinophil % 10/22/2021 1.4  0.0 - 8.0 % Final    Basophil % 10/22/2021 0.5  0.0 - 1.9 % Final    Differential Method 10/22/2021 Automated   Final    PT 10/22/2021 13.9* 11.4 - 13.7 sec Final    INR 10/22/2021 1.2   Final    Troponin I 10/22/2021 <0.030  <=0.040 ng/mL Final    Specimen UA 10/22/2021 Urine, Catheterized   Final    Color, UA 10/22/2021 Colorless* Yellow, Straw, Anila Final    Appearance, UA 10/22/2021 Clear  Clear Final    pH, UA 10/22/2021 8.0  5.0 - 8.0 Final    Specific [place], UA 10/22/2021 1.005  1.005 - 1.030 Final    Protein, UA 10/22/2021 Negative  Negative Final    Glucose, UA 10/22/2021 Negative  Negative Final    Ketones, UA 10/22/2021 Negative  Negative Final    Bilirubin (UA) 10/22/2021 Negative  Negative Final    Occult Blood  UA 10/22/2021 Negative  Negative Final    Nitrite, UA 10/22/2021 Negative  Negative Final    Urobilinogen, UA 10/22/2021 Negative  Negative EU/dL Final    Leukocytes, UA 10/22/2021 Negative  Negative Final    Lithium Level 10/22/2021 0.0* 0.6 - 1.2 mmol/L Final    SARS-CoV-2 RNA, Amplification, Qual 10/22/2021 Negative  Negative Final    TSH 10/22/2021 1.760  0.340 - 5.600 uIU/mL Final    Magnesium 10/22/2021 2.3  1.6 - 2.6 mg/dL Final    CPK 10/22/2021 53  20 - 180 U/L Final   Office Visit on 08/13/2021   Component Date Value Ref Range Status    CULTURE, AEROBIC AND ANAEROBIC 08/13/2021    Final   Hospital Outpatient Visit on 07/20/2021   Component Date Value Ref Range Status    Battery Voltage (V) 07/20/2021 3.01  V Final    P/R-wave RA Lead 07/20/2021 1.4  mV Final    P/R-wave RA Lead (native) 07/20/2021 11.6  mV Final    Impedance RA Lead 07/20/2021 475  Ohms Final    Impedance RA Lead (native) 07/20/2021 532  Ohms Final    Threshold V RA Lead 07/20/2021 0.75  V Final    Theshold ms RA Lead 07/20/2021 0.4  ms Final    Threshold V RA Lead (native) 07/20/2021 0.5  V Final    Threshold ms RA Lead (native) 07/20/2021 0.4  ms Final   Lab Visit on 06/14/2021   Component Date Value Ref Range Status    Sodium 06/14/2021 140  136 - 145 mmol/L Final    Potassium 06/14/2021 4.2  3.5 - 5.1 mmol/L Final    Chloride 06/14/2021 102  95 - 110 mmol/L Final    CO2 06/14/2021 26  23 - 29 mmol/L Final    Glucose 06/14/2021 83  70 - 110 mg/dL Final    BUN 06/14/2021 24* 8 - 23 mg/dL Final    Creatinine 06/14/2021 1.2  0.5 - 1.4 mg/dL Final    Calcium 06/14/2021 9.2  8.7 - 10.5 mg/dL Final    Anion Gap 06/14/2021 12  8 - 16 mmol/L Final    eGFR if  06/14/2021 48.3* >60 mL/min/1.73 m^2 Final    eGFR if non  06/14/2021 41.9* >60 mL/min/1.73 m^2 Final   Hospital Outpatient Visit on 06/10/2021   Component Date Value Ref Range Status    IVRT 06/10/2021 79.00  ms Final    IVS  06/10/2021 1.26* 0.6 - 1.1 cm Final    LVIDd 06/10/2021 4.62  3.5 - 6.0 cm Final    LVIDs 06/10/2021 3.46  2.1 - 4.0 cm Final    LVOT diameter 06/10/2021 2.00  cm Final    LVOT peak VTI 06/10/2021 22.00  cm Final    LVOT peak dylan 06/10/2021 0.70  m/s Final    Posterior Wall 06/10/2021 1.21* 0.6 - 1.1 cm Final    AV regurgitation pressure 1/2 time 06/10/2021 409.00  ms Final    Ao peak dylan 06/10/2021 1.41  m/s Final    AV peak gradient 06/10/2021 8  mmHg Final    AORTIC VALVE CUSP SEPERATION 06/10/2021 1.60  cm Final    AV mean gradient 06/10/2021 4  mmHg Final    Ao VTI 06/10/2021 34.80  cm Final    Ao root annulus 06/10/2021 3.00  cm Final    Left Atrium Major Axis 06/10/2021 4.80  cm Final    LA size 06/10/2021 4.50  cm Final    E wave deceleration time 06/10/2021 174.00  ms Final    MV Peak A Dylan 06/10/2021 0.73  m/s Final    MV Peak E Dylan 06/10/2021 0.87  m/s Final    RVDD 06/10/2021 1.69  cm Final    Triscuspid Valve Regurgitation Pea* 06/10/2021 32  mmHg Final    BSA 06/10/2021 1.63  m2 Final    TDI SEPTAL 06/10/2021 0.04  m/s Final    LV LATERAL E/E' RATIO 06/10/2021 12.43  m/s Final    LV SEPTAL E/E' RATIO 06/10/2021 21.75  m/s Final    TDI LATERAL 06/10/2021 0.07  m/s Final    FS 06/10/2021 25  28 - 44 % Final    LV mass 06/10/2021 215.08  g Final    Left Ventricle Relative Wall Thick* 06/10/2021 0.52  cm Final    AV valve area 06/10/2021 1.99  cm2 Final    AV Velocity Ratio 06/10/2021 0.50   Final    AV index (prosthetic) 06/10/2021 0.63   Final    E/A ratio 06/10/2021 1.19   Final    Mean e' 06/10/2021 0.06  m/s Final    LVOT area 06/10/2021 3.1  cm2 Final    LVOT stroke volume 06/10/2021 69.08  cm3 Final    E/E' ratio 06/10/2021 15.82  m/s Final    TR Max Dylan 06/10/2021 2.84  m/s Final    LV Mass Index 06/10/2021 135  g/m2 Final    Right Atrial Pressure (from IVC) 06/10/2021 3  mmHg Final    EF 06/10/2021 49  % Final    TV rest pulmonary artery pressure 06/10/2021  35  mmHg Final   Hospital Outpatient Visit on 06/10/2021   Component Date Value Ref Range Status    85% Max Predicted HR 06/10/2021 113   Final    Max Predicted HR 06/10/2021 133   Final    OHS CV CPX PATIENT IS MALE 06/10/2021 0.0   Final    OHS CV CPX PATIENT IS FEMALE 06/10/2021 1.0   Final    Peak HR 06/10/2021 75.0  bpm Final    % Max HR Achieved 06/10/2021 56   Final    EF + QEF 06/10/2021 53  % Final    Ejection Fraction- High Stress 06/10/2021 65  % Final    End diastolic index (mL/m2) 06/10/2021 94  mL/m2 Final    End diastolic index (mL/m2) 06/10/2021 158  mL/m2 Final    End systolic index (mL/m2) 06/10/2021 33  mL/m2 Final    End systolic index (mL/m2) 06/10/2021 71  mL/m2 Final    Nuc Stress EF 06/10/2021 51  % Final    Nuc Rest Diastolic Volume Index 06/10/2021 68   Final    Nuc Rest Systolic Volume Index 06/10/2021 33   Final    dose 06/10/2021 0.4  mg Final    HR at rest 06/10/2021 73  bpm Final    Systolic blood pressure 06/10/2021 120  mmHg Final    Diastolic blood pressure 06/10/2021 72  mmHg Final    RPP 06/10/2021 8,760   Final    Peak Systolic BP 06/10/2021 150  mmHg Final    Peak Diatolic BP 06/10/2021 70  mmHg Final    Peak RPP 06/10/2021 11,250   Final    1 Minute Recovery HR 06/10/2021 75  bpm Final   Lab Visit on 05/07/2021   Component Date Value Ref Range Status    WBC 05/07/2021 7.41  3.90 - 12.70 K/uL Final    RBC 05/07/2021 3.99* 4.00 - 5.40 M/uL Final    Hemoglobin 05/07/2021 11.1* 12.0 - 16.0 g/dL Final    Hematocrit 05/07/2021 35.0* 37.0 - 48.5 % Final    MCV 05/07/2021 88  82 - 98 fL Final    MCH 05/07/2021 27.8  27.0 - 31.0 pg Final    MCHC 05/07/2021 31.7* 32.0 - 36.0 g/dL Final    RDW 05/07/2021 13.2  11.5 - 14.5 % Final    Platelets 05/07/2021 294  150 - 450 K/uL Final    MPV 05/07/2021 10.3  9.2 - 12.9 fL Final    Immature Granulocytes 05/07/2021 0.1  0.0 - 0.5 % Final    Gran # (ANC) 05/07/2021 3.5  1.8 - 7.7 K/uL Final    Immature Grans  (Abs) 05/07/2021 0.01  0.00 - 0.04 K/uL Final    Lymph # 05/07/2021 2.6  1.0 - 4.8 K/uL Final    Mono # 05/07/2021 0.9  0.3 - 1.0 K/uL Final    Eos # 05/07/2021 0.3  0.0 - 0.5 K/uL Final    Baso # 05/07/2021 0.07  0.00 - 0.20 K/uL Final    nRBC 05/07/2021 0  0 /100 WBC Final    Gran % 05/07/2021 46.9  38.0 - 73.0 % Final    Lymph % 05/07/2021 35.4  18.0 - 48.0 % Final    Mono % 05/07/2021 12.4  4.0 - 15.0 % Final    Eosinophil % 05/07/2021 4.3  0.0 - 8.0 % Final    Basophil % 05/07/2021 0.9  0.0 - 1.9 % Final    Differential Method 05/07/2021 Automated   Final    Sodium 05/07/2021 140  136 - 145 mmol/L Final    Potassium 05/07/2021 4.0  3.5 - 5.1 mmol/L Final    Chloride 05/07/2021 106  95 - 110 mmol/L Final    CO2 05/07/2021 22* 23 - 29 mmol/L Final    Glucose 05/07/2021 82  70 - 110 mg/dL Final    BUN 05/07/2021 25* 8 - 23 mg/dL Final    Creatinine 05/07/2021 1.3  0.5 - 1.4 mg/dL Final    Calcium 05/07/2021 9.1  8.7 - 10.5 mg/dL Final    Total Protein 05/07/2021 7.3  6.0 - 8.4 g/dL Final    Albumin 05/07/2021 3.9  3.5 - 5.2 g/dL Final    Total Bilirubin 05/07/2021 0.8  0.1 - 1.0 mg/dL Final    Alkaline Phosphatase 05/07/2021 65  55 - 135 U/L Final    AST 05/07/2021 25  10 - 40 U/L Final    ALT 05/07/2021 18  10 - 44 U/L Final    Anion Gap 05/07/2021 12  8 - 16 mmol/L Final    eGFR if  05/07/2021 43.8* >60 mL/min/1.73 m^2 Final    eGFR if non  05/07/2021 38.0* >60 mL/min/1.73 m^2 Final    Cholesterol 05/07/2021 194  120 - 199 mg/dL Final    Triglycerides 05/07/2021 159* 30 - 150 mg/dL Final    HDL 05/07/2021 40  40 - 75 mg/dL Final    LDL Cholesterol 05/07/2021 122.2  63.0 - 159.0 mg/dL Final    HDL/Cholesterol Ratio 05/07/2021 20.6  20.0 - 50.0 % Final    Total Cholesterol/HDL Ratio 05/07/2021 4.9  2.0 - 5.0 Final    Non-HDL Cholesterol 05/07/2021 154  mg/dL Final    Hemoglobin A1C 05/07/2021 6.4* 4.5 - 6.2 % Final    Estimated Avg Glucose  05/07/2021 137* 68 - 131 mg/dL Final    Microalbumin, Urine 05/07/2021 3.5  <19.9 ug/mL Final    Creatinine, Urine 05/07/2021 48.0  15.0 - 325.0 mg/dL Final    Microalb/Creat Ratio 05/07/2021 7.3  0.0 - 30.0 ug/mg Final    Glucose 05/07/2021 82  70 - 110 mg/dL Final    Sodium 05/07/2021 140  136 - 145 mmol/L Final    Potassium 05/07/2021 4.0  3.5 - 5.1 mmol/L Final    Chloride 05/07/2021 106  95 - 110 mmol/L Final    CO2 05/07/2021 22* 23 - 29 mmol/L Final    BUN 05/07/2021 25* 8 - 23 mg/dL Final    Calcium 05/07/2021 9.1  8.7 - 10.5 mg/dL Final    Creatinine 05/07/2021 1.3  0.5 - 1.4 mg/dL Final    Albumin 05/07/2021 3.9  3.5 - 5.2 g/dL Final    Phosphorus 05/07/2021 3.8  2.7 - 4.5 mg/dL Final    eGFR if  05/07/2021 43.8* >60 mL/min/1.73 m^2 Final    eGFR if non  05/07/2021 38.0* >60 mL/min/1.73 m^2 Final    Anion Gap 05/07/2021 12  8 - 16 mmol/L Final    RBC, UA 05/07/2021 0  0 - 4 /hpf Final    WBC, UA 05/07/2021 12* 0 - 5 /hpf Final    Bacteria 05/07/2021 Negative  None-Occ /hpf Final    Squam Epithel, UA 05/07/2021 2  /hpf Final    Hyaline Casts, UA 05/07/2021 1  0-1/lpf /lpf Final    Microscopic Comment 05/07/2021 SEE COMMENT   Final    Protein, Urine Random 05/07/2021 <6  6 - 15 mg/dL Final    Creatinine, Urine 05/07/2021 48.0  15.0 - 325.0 mg/dL Final    Prot/Creat Ratio, Urine 05/07/2021 Unable to calculate  0.00 - 0.20 Final   Lab Visit on 01/15/2021   Component Date Value Ref Range Status    Occult Blood 01/15/2021 Negative  Negative Final       Past Medical History:   Diagnosis Date    Coronary artery disease     Dermatitis     Dermatitis 12/8/2017    Diabetes mellitus     Diabetes mellitus type II     Leah Madrigal virus infection     Fibromyalgia     GERD (gastroesophageal reflux disease)     Hypertension     MI (myocardial infarction) 09/23/2011    Pure hypercholesterolemia 2/26/2020     Past Surgical History:   Procedure Laterality  Date    adenoids      ANGIOPLASTY  1987    APPENDECTOMY      CARDIAC PACEMAKER PLACEMENT  01/12/2017    CATARACT EXTRACTION, BILATERAL Bilateral 9/2/15, 3/17/15    right eye-9/2/15, left eye-3/17/15    CHOLECYSTECTOMY  1987    CORONARY ARTERY BYPASS GRAFT  2006    quadruple    coronary stents  1999    x2    CYST REMOVAL  04/25/2017    on back    HYSTERECTOMY  1966    OVARY SURGERY  1948    Ovary burst & was repaired    RHIZOTOMY  09/14/2018    TONSILLECTOMY  1940     Family History   Problem Relation Age of Onset    No Known Problems Mother     No Known Problems Father        Marital Status:   Alcohol History:  reports no history of alcohol use.  Tobacco History:  reports that she has never smoked. She has never used smokeless tobacco.  Drug History:  reports no history of drug use.    Review of patient's allergies indicates:   Allergen Reactions    Arthrotec 50 [diclofenac-misoprostol]     Doxycycline     Effexor [venlafaxine]     Estradiol     Flagyl [metronidazole]     Fluconazole     Iodine     Levaquin [levofloxacin]     Nexium [esomeprazole magnesium]     Penicillins     Shellfish containing products     Statins-hmg-coa reductase inhibitors     Sulfa (sulfonamide antibiotics)     Tea tree oil     Tegaserod      ZOLNORM    Trazodone     Yeast, dried     Adhesive Rash       Current Outpatient Medications:     acetaminophen (TYLENOL) 500 MG tablet, Take 500 mg by mouth every 6 (six) hours as needed for Pain., Disp: , Rfl:     ALPRAZolam (XANAX) 0.25 MG tablet, Take 1 tablet (0.25 mg total) by mouth as needed for Anxiety., Disp: 90 tablet, Rfl: 0    apixaban (ELIQUIS) 2.5 mg Tab, Take 1 tablet (2.5 mg total) by mouth 2 (two) times daily., Disp: 180 tablet, Rfl: 3    aspirin (ECOTRIN) 81 MG EC tablet, Take 1 tablet (81 mg total) by mouth once daily. (Patient taking differently: Take 81 mg by mouth every evening.), Disp: , Rfl: 0    cetirizine (ZYRTEC) 10 MG tablet, Take  10 mg by mouth once daily., Disp: , Rfl:     cholecalciferol, vitamin D3, 100 mcg (4,000 unit) Cap, Take 1 capsule by mouth 2 (two) times daily. , Disp: , Rfl:     clopidogreL (PLAVIX) 75 mg tablet, Take 1 tablet (75 mg total) by mouth once daily., Disp: 90 tablet, Rfl: 2    coenzyme Q10 100 mg capsule, Take 1 capsule (100 mg total) by mouth 2 (two) times daily., Disp: 60 capsule, Rfl: 11    diclofenac sodium (VOLTAREN) 1 % Gel, Apply 2 g topically once daily., Disp: , Rfl:     ECHINACEA ORAL, Take 750 mg by mouth 2 (two) times a day., Disp: , Rfl:     estradioL (ESTRACE) 0.01 % (0.1 mg/gram) vaginal cream, Place 1 g vaginally once a week., Disp: 42.5 g, Rfl: 1    fluocinonide 0.05% (LIDEX) 0.05 % cream, Apply sparingly to rash on chest, neck, legs bid max use one month., Disp: 30 g, Rfl: 0    fluticasone propionate (FLONASE) 50 mcg/actuation nasal spray, 1 spray by Each Nostril route once daily., Disp: , Rfl:     isosorbide mononitrate (IMDUR) 120 MG 24 hr tablet, Take 1 tablet (120 mg total) by mouth once daily. DO NOT CRUSH OR CHEW; SWALLOW WHOLE., Disp: 90 tablet, Rfl: 3    ketoconazole (NIZORAL) 2 % cream, aaa bid, Disp: 60 g, Rfl: 2    ketoconazole (NIZORAL) 2 % shampoo, Use to wash hair every day or every other day for dry itchy scalp (Patient taking differently: Apply 1 application topically daily as needed. Use to wash hair every day or every other day for dry itchy scalp), Disp: 120 mL, Rfl: 5    levothyroxine (SYNTHROID) 88 MCG tablet, Take 1 tablet (88 mcg total) by mouth once daily., Disp: 90 tablet, Rfl: 1    loratadine (CLARITIN) 10 mg tablet, Take 10 mg by mouth once daily., Disp: , Rfl:     losartan (COZAAR) 25 MG tablet, TAKE 1 TABLET BY MOUTH TWICE A DAY (Patient taking differently: Take 25 mg by mouth 2 (two) times daily.), Disp: 180 tablet, Rfl: 2    MAGNESIUM ORAL, Take 500 mg by mouth once daily. , Disp: , Rfl:     metoprolol succinate (TOPROL-XL) 25 MG 24 hr tablet, Take 1  tablet (25 mg total) by mouth once daily., Disp: 90 tablet, Rfl: 3    montelukast (SINGULAIR) 10 mg tablet, Take 1 tablet (10 mg total) by mouth every evening., Disp: 90 tablet, Rfl: 1    multivitamin (THERAGRAN) tablet, Take 1 tablet by mouth 2 (two) times a day. , Disp: , Rfl:     mupirocin (BACTROBAN) 2 % ointment, Apply topically 2 (two) times daily., Disp: 22 g, Rfl: 11    nitroGLYCERIN (NITROSTAT) 0.4 MG SL tablet, Place 1 tablet (0.4 mg total) under the tongue every 5 (five) minutes as needed for Chest pain., Disp: 30 tablet, Rfl: 3    omeprazole (PRILOSEC) 20 MG capsule, Take 20 mg by mouth once daily., Disp: , Rfl:     prasterone, dhea, 25 mg Cap, Take 1 capsule by mouth once daily., Disp: , Rfl:     tiZANidine (ZANAFLEX) 4 MG tablet, Take 1 tablet (4 mg total) by mouth once daily., Disp: 90 tablet, Rfl: 0    traMADoL (ULTRAM) 50 mg tablet, Take 1 tablet (50 mg total) by mouth 2 (two) times daily as needed (pain)., Disp: 30 tablet, Rfl: 0    triamcinolone acetonide 0.1% (KENALOG) 0.1 % cream, aaa bid x 1-2 wks, tk 1 wk off prn rash, Disp: 30 g, Rfl: 2    turmeric 400 mg Cap, Take 1,200 mg by mouth once daily., Disp: , Rfl:     UNABLE TO FIND, Take 1 capsule by mouth. Bio-Smoothe- premium nerve, Disp: , Rfl:     UNABLE TO FIND, Take 1 capsule by mouth once daily. medication name: GI probiotic, Disp: , Rfl:     UNABLE TO FIND, Take 1 capsule by mouth once daily. Joint health, Disp: , Rfl:     UNABLE TO FIND, Take 1 capsule by mouth 2 (two) times a day. Cardio platinum, Disp: , Rfl:     zinc gluconate 50 mg tablet, Take 50 mg by mouth 2 (two) times a day., Disp: , Rfl:     diphenhydrAMINE (BENYLIN) 12.5 mg/5 mL liquid, Take 12.5 mg by mouth 4 (four) times daily as needed for Allergies., Disp: , Rfl:     Current Facility-Administered Medications:     evolocumab PnIj 140 mg, 140 mg, Subcutaneous, Q14 Days, Kenton Ware MD    Review of Systems   Constitutional: Negative for activity change,  appetite change, chills, fatigue, fever and unexpected weight change.   HENT: Negative for congestion, ear pain, hearing loss, postnasal drip, sinus pain, sneezing, sore throat, tinnitus and trouble swallowing.    Eyes: Negative for pain, discharge and visual disturbance.   Respiratory: Negative for cough, choking, shortness of breath and wheezing.    Cardiovascular: Negative for chest pain, palpitations and leg swelling.   Gastrointestinal: Negative for abdominal distention, abdominal pain, blood in stool, constipation, diarrhea, nausea and vomiting.   Endocrine: Negative for cold intolerance and heat intolerance.   Genitourinary: Negative for difficulty urinating, dysuria, frequency, pelvic pain and urgency.   Musculoskeletal: Negative for back pain, joint swelling and neck pain.   Skin: Positive for rash (HPI). Negative for pallor.   Allergic/Immunologic: Negative for environmental allergies and food allergies.   Neurological: Negative for dizziness, tremors, weakness, numbness and headaches.   Hematological: Does not bruise/bleed easily.   Psychiatric/Behavioral: Negative for agitation, confusion, dysphoric mood, sleep disturbance and suicidal ideas. The patient is not nervous/anxious.           Objective:      Vitals:    01/10/22 0924   BP: 132/60   Pulse: 70   Resp: 12   Temp: 98.4 °F (36.9 °C)   SpO2: 100%   Weight: 61.7 kg (136 lb)   Height: 5' (1.524 m)     Physical Exam  Vitals and nursing note reviewed.   Constitutional:       Appearance: Normal appearance.   HENT:      Head: Normocephalic and atraumatic.   Eyes:      Extraocular Movements: Extraocular movements intact.      Pupils: Pupils are equal, round, and reactive to light.   Cardiovascular:      Rate and Rhythm: Normal rate and regular rhythm.      Pulses: Normal pulses.      Heart sounds: Normal heart sounds.   Pulmonary:      Effort: Pulmonary effort is normal.      Breath sounds: Normal breath sounds.   Abdominal:      General: Bowel sounds are  normal.      Palpations: Abdomen is soft.   Musculoskeletal:      Right lower leg: No edema.      Left lower leg: No edema.   Skin:     General: Skin is warm and dry.      Findings: Lesion and rash present.      Comments: small pustule right neck-flat pink outbreak posterior upper right arm and lower left posterior leg slightly more raised and slightly more erythematous   Neurological:      Mental Status: She is alert.   Psychiatric:         Mood and Affect: Mood normal.         Behavior: Behavior normal.           Assessment:       1. Colon cancer screening    2. Muscle spasm         Plan:       Colon cancer screening    Muscle spasm      No follow-ups on file.        1/10/2022 Elena Sullivan M.D.

## 2022-01-10 ENCOUNTER — OFFICE VISIT (OUTPATIENT)
Dept: FAMILY MEDICINE | Facility: CLINIC | Age: 87
End: 2022-01-10
Payer: MEDICARE

## 2022-01-10 VITALS
DIASTOLIC BLOOD PRESSURE: 60 MMHG | TEMPERATURE: 98 F | SYSTOLIC BLOOD PRESSURE: 132 MMHG | WEIGHT: 136 LBS | BODY MASS INDEX: 26.7 KG/M2 | HEIGHT: 60 IN | HEART RATE: 70 BPM | RESPIRATION RATE: 12 BRPM | OXYGEN SATURATION: 100 %

## 2022-01-10 DIAGNOSIS — L30.9 ECZEMA, UNSPECIFIED TYPE: ICD-10-CM

## 2022-01-10 DIAGNOSIS — M62.838 MUSCLE SPASM: ICD-10-CM

## 2022-01-10 DIAGNOSIS — B08.1 MOLLUSCUM CONTAGIOSUM: Primary | ICD-10-CM

## 2022-01-10 DIAGNOSIS — Z12.11 COLON CANCER SCREENING: ICD-10-CM

## 2022-01-10 PROCEDURE — 99215 OFFICE O/P EST HI 40 MIN: CPT | Performed by: INTERNAL MEDICINE

## 2022-01-10 PROCEDURE — 99214 PR OFFICE/OUTPT VISIT, EST, LEVL IV, 30-39 MIN: ICD-10-PCS | Mod: S$PBB,,, | Performed by: INTERNAL MEDICINE

## 2022-01-10 PROCEDURE — 99214 OFFICE O/P EST MOD 30 MIN: CPT | Mod: S$PBB,,, | Performed by: INTERNAL MEDICINE

## 2022-01-10 RX ORDER — BETAMETHASONE DIPROPIONATE 0.5 MG/G
CREAM TOPICAL 2 TIMES DAILY
Qty: 15 G | Refills: 0 | Status: SHIPPED | OUTPATIENT
Start: 2022-01-10 | End: 2022-02-03

## 2022-01-10 RX ORDER — TIZANIDINE 4 MG/1
4 TABLET ORAL DAILY
Qty: 90 TABLET | Refills: 0 | Status: SHIPPED | OUTPATIENT
Start: 2022-01-10 | End: 2022-06-12

## 2022-01-19 ENCOUNTER — TELEPHONE (OUTPATIENT)
Dept: FAMILY MEDICINE | Facility: CLINIC | Age: 87
End: 2022-01-19
Payer: MEDICARE

## 2022-01-25 ENCOUNTER — OFFICE VISIT (OUTPATIENT)
Dept: URGENT CARE | Facility: CLINIC | Age: 87
End: 2022-01-25
Payer: MEDICARE

## 2022-01-25 VITALS
SYSTOLIC BLOOD PRESSURE: 164 MMHG | RESPIRATION RATE: 12 BRPM | HEART RATE: 84 BPM | HEIGHT: 60 IN | BODY MASS INDEX: 26.7 KG/M2 | DIASTOLIC BLOOD PRESSURE: 78 MMHG | WEIGHT: 136 LBS | TEMPERATURE: 98 F | OXYGEN SATURATION: 98 %

## 2022-01-25 DIAGNOSIS — R53.83 FATIGUE, UNSPECIFIED TYPE: Primary | ICD-10-CM

## 2022-01-25 DIAGNOSIS — Z20.822 COVID-19 VIRUS NOT DETECTED: ICD-10-CM

## 2022-01-25 DIAGNOSIS — Z86.19 HISTORY OF EPSTEIN-BARR VIRUS INFECTION: ICD-10-CM

## 2022-01-25 DIAGNOSIS — Z86.69 HISTORY OF CHRONIC FATIGUE SYNDROME: ICD-10-CM

## 2022-01-25 LAB
BILIRUB UR QL STRIP: NEGATIVE
CTP QC/QA: YES
GLUCOSE UR QL STRIP: NEGATIVE
KETONES UR QL STRIP: NEGATIVE
LEUKOCYTE ESTERASE UR QL STRIP: NEGATIVE
PH, POC UA: 6
POC BLOOD, URINE: NEGATIVE
POC NITRATES, URINE: NEGATIVE
PROT UR QL STRIP: NEGATIVE
SARS-COV-2 RDRP RESP QL NAA+PROBE: NEGATIVE
SP GR UR STRIP: 1 (ref 1–1.03)
UROBILINOGEN UR STRIP-ACNC: NORMAL (ref 0.1–1.1)

## 2022-01-25 PROCEDURE — U0002: ICD-10-PCS | Mod: QW,CR,S$GLB, | Performed by: NURSE PRACTITIONER

## 2022-01-25 PROCEDURE — 81003 POCT URINALYSIS, DIPSTICK, AUTOMATED, W/O SCOPE: ICD-10-PCS | Mod: QW,S$GLB,, | Performed by: NURSE PRACTITIONER

## 2022-01-25 PROCEDURE — 99204 PR OFFICE/OUTPT VISIT, NEW, LEVL IV, 45-59 MIN: ICD-10-PCS | Mod: S$GLB,,, | Performed by: NURSE PRACTITIONER

## 2022-01-25 PROCEDURE — 81003 URINALYSIS AUTO W/O SCOPE: CPT | Mod: QW,S$GLB,, | Performed by: NURSE PRACTITIONER

## 2022-01-25 PROCEDURE — 99204 OFFICE O/P NEW MOD 45 MIN: CPT | Mod: S$GLB,,, | Performed by: NURSE PRACTITIONER

## 2022-01-25 PROCEDURE — U0002 COVID-19 LAB TEST NON-CDC: HCPCS | Mod: QW,CR,S$GLB, | Performed by: NURSE PRACTITIONER

## 2022-01-25 NOTE — PROGRESS NOTES
Subjective:       Patient ID: Karma Chen is a 86 y.o. female.    Vitals:  height is 5' (1.524 m) and weight is 61.7 kg (136 lb). Her oral temperature is 98.1 °F (36.7 °C). Her blood pressure is 164/78 (abnormal) and her pulse is 84. Her respiration is 12 and oxygen saturation is 98%.     Chief Complaint: Fatigue    Patient states her temp is down, she is weak and around her hips are aching. Denies coughing and congestion.       Constitution: Positive for fatigue. Negative for appetite change and fever.   HENT: Negative for congestion and sore throat.    Neck: Negative for painful lymph nodes.   Cardiovascular: Negative for chest pain.   Respiratory: Negative for cough and shortness of breath.    Gastrointestinal: Negative for nausea, vomiting and diarrhea.   Genitourinary: Negative for dysuria, frequency, urgency, flank pain and hematuria.   Musculoskeletal: Positive for back pain (lower back, similar to previous back pain) and muscle ache.   Neurological: Negative for headaches.   Hematologic/Lymphatic: Negative for swollen lymph nodes.       Objective:      Physical Exam   Constitutional: She is oriented to person, place, and time. She appears well-developed.  Non-toxic appearance. She does not appear ill. No distress.   HENT:   Head: Normocephalic and atraumatic.   Nose: Nose normal. No rhinorrhea or congestion.   Eyes: Conjunctivae and EOM are normal. Right eye exhibits no discharge. Left eye exhibits no discharge.   Neck: Neck supple. No neck rigidity present.   Cardiovascular: Normal rate, regular rhythm and normal heart sounds.   Pulmonary/Chest: Effort normal and breath sounds normal. No respiratory distress.   Abdominal: Normal appearance.   Musculoskeletal:         General: No swelling, tenderness, deformity or signs of injury.      Cervical back: She exhibits no tenderness.   Lymphadenopathy:     She has no cervical adenopathy.   Neurological: no focal deficit. She is alert and oriented to person,  place, and time.   Skin: Skin is warm, dry and not diaphoretic. Capillary refill takes 2 to 3 seconds. jaundice  Psychiatric: Her behavior is normal. Mood normal.   Nursing note and vitals reviewed.        Assessment:       1. Fatigue, unspecified type    2. History of Leah-Barr virus infection    3. History of chronic fatigue syndrome    4. COVID-19 virus not detected        UA negative    Plan:         Fatigue, unspecified type  -     POCT Urinalysis, Dipstick, Automated, W/O Scope  -     POCT COVID-19 Rapid Screening    History of Leah-Barr virus infection    History of chronic fatigue syndrome    COVID-19 virus not detected    Follow up with PCP for continuation or ER for worsening of symptoms

## 2022-01-26 ENCOUNTER — TELEPHONE (OUTPATIENT)
Dept: CARDIOLOGY | Facility: CLINIC | Age: 87
End: 2022-01-26
Payer: MEDICARE

## 2022-01-26 DIAGNOSIS — I48.0 PAF (PAROXYSMAL ATRIAL FIBRILLATION): Primary | ICD-10-CM

## 2022-01-26 NOTE — TELEPHONE ENCOUNTER
----- Message from Michele Hunt sent at 1/26/2022  3:39 PM CST -----  Contact: Self  Pt is calling as she has some questions about her medication. Please call her back [facility] at [phone] to update and advise and answer her concerns.

## 2022-01-26 NOTE — TELEPHONE ENCOUNTER
Pt states since taking isosorbide 120mg daily in the AM, she has been very fatigued. Pt would like to go back to previous regimine of 60mg AM and 60mg PM.

## 2022-02-01 LAB — NONINV COLON CA DNA+OCC BLD SCRN STL QL: NEGATIVE

## 2022-02-16 NOTE — PROGRESS NOTES
SUBJECTIVE:    Patient ID: Karma Chen is a 86 y.o. female.    Chief Complaint: Hypertension, Peripheral Neuropathy, and Follow-up    HPI     Follow-up BP -controlled today and at home    Diabetes-A1C 5.9    Hyperlipidemia-cardiologist still hasn't put her on the repatha-will email him again    Pt complaining of some swelling of some upper medial legs -has significant varicosities    Office Visit on 01/25/2022   Component Date Value Ref Range Status    POC Blood, Urine 01/25/2022 Negative  Negative Final    POC Bilirubin, Urine 01/25/2022 Negative  Negative Final    POC Urobilinogen, Urine 01/25/2022 norm  0.1 - 1.1 Final    POC Ketones, Urine 01/25/2022 Negative  Negative Final    POC Protein, Urine 01/25/2022 Negative  Negative Final    POC Nitrates, Urine 01/25/2022 Negative  Negative Final    POC Glucose, Urine 01/25/2022 Negative  Negative Final    pH, UA 01/25/2022 6.0   Final    POC Specific Gravity, Urine 01/25/2022 1.005  1.003 - 1.029 Final    POC Leukocytes, Urine 01/25/2022 Negative  Negative Final    POC Rapid COVID 01/25/2022 Negative  Negative Final     Acceptable 01/25/2022 Yes   Final   Hospital Outpatient Visit on 01/18/2022   Component Date Value Ref Range Status    Battery Voltage (V) 01/18/2022 3.00  V In process    P/R-wave RA Lead 01/18/2022 1.5  mV In process    P/R-wave RA Lead (native) 01/18/2022 10.9  mV In process    Impedance RA Lead 01/18/2022 475  Ohms In process    Impedance RA Lead (native) 01/18/2022 532  Ohms In process    Threshold V RA Lead 01/18/2022 0.750  V In process    Theshold ms RA Lead 01/18/2022 0.40  ms In process    Threshold V RA Lead (native) 01/18/2022 0.500  V In process    Threshold ms RA Lead (native) 01/18/2022 0.40  ms In process   Office Visit on 01/10/2022   Component Date Value Ref Range Status    Cologuard Result 01/14/2022 Negative  Negative Final   Lab Visit on 11/08/2021   Component Date Value Ref Range Status     Hemoglobin A1C 11/08/2021 5.9  4.5 - 6.2 % Final    Estimated Avg Glucose 11/08/2021 123  68 - 131 mg/dL Final    WBC 11/08/2021 7.35  3.90 - 12.70 K/uL Final    RBC 11/08/2021 4.30  4.00 - 5.40 M/uL Final    Hemoglobin 11/08/2021 12.0  12.0 - 16.0 g/dL Final    Hematocrit 11/08/2021 38.0  37.0 - 48.5 % Final    MCV 11/08/2021 88  82 - 98 fL Final    MCH 11/08/2021 27.9  27.0 - 31.0 pg Final    MCHC 11/08/2021 31.6* 32.0 - 36.0 g/dL Final    RDW 11/08/2021 13.2  11.5 - 14.5 % Final    Platelets 11/08/2021 281  150 - 450 K/uL Final    MPV 11/08/2021 10.2  9.2 - 12.9 fL Final    Immature Granulocytes 11/08/2021 0.3  0.0 - 0.5 % Final    Gran # (ANC) 11/08/2021 3.3  1.8 - 7.7 K/uL Final    Immature Grans (Abs) 11/08/2021 0.02  0.00 - 0.04 K/uL Final    Lymph # 11/08/2021 3.0  1.0 - 4.8 K/uL Final    Mono # 11/08/2021 0.8  0.3 - 1.0 K/uL Final    Eos # 11/08/2021 0.2  0.0 - 0.5 K/uL Final    Baso # 11/08/2021 0.06  0.00 - 0.20 K/uL Final    nRBC 11/08/2021 0  0 /100 WBC Final    Gran % 11/08/2021 44.9  38.0 - 73.0 % Final    Lymph % 11/08/2021 40.4  18.0 - 48.0 % Final    Mono % 11/08/2021 10.5  4.0 - 15.0 % Final    Eosinophil % 11/08/2021 3.1  0.0 - 8.0 % Final    Basophil % 11/08/2021 0.8  0.0 - 1.9 % Final    Differential Method 11/08/2021 Automated   Final    Cholesterol 11/08/2021 248* 120 - 199 mg/dL Final    Triglycerides 11/08/2021 185* 30 - 150 mg/dL Final    HDL 11/08/2021 41  40 - 75 mg/dL Final    LDL Cholesterol 11/08/2021 170.0* 63.0 - 159.0 mg/dL Final    HDL/Cholesterol Ratio 11/08/2021 16.5* 20.0 - 50.0 % Final    Total Cholesterol/HDL Ratio 11/08/2021 6.0* 2.0 - 5.0 Final    Non-HDL Cholesterol 11/08/2021 207  mg/dL Final    Sodium 11/08/2021 144  136 - 145 mmol/L Final    Potassium 11/08/2021 4.0  3.5 - 5.1 mmol/L Final    Chloride 11/08/2021 106  95 - 110 mmol/L Final    CO2 11/08/2021 29  23 - 29 mmol/L Final    Glucose 11/08/2021 88  70 - 110 mg/dL Final     BUN 11/08/2021 25* 8 - 23 mg/dL Final    Creatinine 11/08/2021 1.3  0.5 - 1.4 mg/dL Final    Calcium 11/08/2021 9.5  8.7 - 10.5 mg/dL Final    Anion Gap 11/08/2021 9  8 - 16 mmol/L Final    eGFR if  11/08/2021 43.8* >60 mL/min/1.73 m^2 Final    eGFR if non  11/08/2021 38.0* >60 mL/min/1.73 m^2 Final    Glucose 11/08/2021 88  70 - 110 mg/dL Final    Sodium 11/08/2021 144  136 - 145 mmol/L Final    Potassium 11/08/2021 4.0  3.5 - 5.1 mmol/L Final    Chloride 11/08/2021 106  95 - 110 mmol/L Final    CO2 11/08/2021 29  23 - 29 mmol/L Final    BUN 11/08/2021 25* 8 - 23 mg/dL Final    Calcium 11/08/2021 9.5  8.7 - 10.5 mg/dL Final    Creatinine 11/08/2021 1.3  0.5 - 1.4 mg/dL Final    Albumin 11/08/2021 3.8  3.5 - 5.2 g/dL Final    Phosphorus 11/08/2021 4.1  2.7 - 4.5 mg/dL Final    eGFR if  11/08/2021 43.8* >60 mL/min/1.73 m^2 Final    eGFR if non  11/08/2021 38.0* >60 mL/min/1.73 m^2 Final    Anion Gap 11/08/2021 9  8 - 16 mmol/L Final    Vit D, 25-Hydroxy 11/08/2021 >120* 30 - 96 ng/mL Final    Specimen UA 11/08/2021 Urine, Clean Catch   Final    Color, UA 11/08/2021 Yellow  Yellow, Straw, Anila Final    Appearance, UA 11/08/2021 Clear  Clear Final    pH, UA 11/08/2021 7.0  5.0 - 8.0 Final    Specific [place], UA 11/08/2021 1.020  1.005 - 1.030 Final    Protein, UA 11/08/2021 Negative  Negative Final    Glucose, UA 11/08/2021 Negative  Negative Final    Ketones, UA 11/08/2021 Negative  Negative Final    Bilirubin (UA) 11/08/2021 Negative  Negative Final    Occult Blood UA 11/08/2021 Negative  Negative Final    Nitrite, UA 11/08/2021 Negative  Negative Final    Urobilinogen, UA 11/08/2021 Negative  Negative EU/dL Final    Leukocytes, UA 11/08/2021 1+* Negative Final    RBC, UA 11/08/2021 1  0 - 4 /hpf Final    WBC, UA 11/08/2021 3  0 - 5 /hpf Final    Bacteria 11/08/2021 Negative  None-Occ /hpf Final    Squam Epithel,  UA 11/08/2021 4  /hpf Final    Hyaline Casts, UA 11/08/2021 4* 0-1/lpf /lpf Final    Microscopic Comment 11/08/2021 SEE COMMENT   Final    PTH, Intact 11/08/2021 26.1  9.0 - 77.0 pg/mL Final    Protein, Urine Random 11/08/2021 7  6 - 15 mg/dL Final    Creatinine, Urine 11/08/2021 87.0  15.0 - 325.0 mg/dL Final    Prot/Creat Ratio, Urine 11/08/2021 0.08  0.00 - 0.20 Final   Admission on 10/22/2021, Discharged on 10/22/2021   Component Date Value Ref Range Status    Sodium 10/22/2021 139  136 - 145 mmol/L Final    Potassium 10/22/2021 4.0  3.5 - 5.1 mmol/L Final    Chloride 10/22/2021 104  95 - 110 mmol/L Final    CO2 10/22/2021 29  23 - 29 mmol/L Final    Glucose 10/22/2021 137* 70 - 110 mg/dL Final    BUN 10/22/2021 23  8 - 23 mg/dL Final    Creatinine 10/22/2021 1.7* 0.5 - 1.4 mg/dL Final    Calcium 10/22/2021 8.7  8.7 - 10.5 mg/dL Final    Total Protein 10/22/2021 7.4  6.0 - 8.4 g/dL Final    Albumin 10/22/2021 4.1  3.5 - 5.2 g/dL Final    Total Bilirubin 10/22/2021 0.8  0.1 - 1.0 mg/dL Final    Alkaline Phosphatase 10/22/2021 64  55 - 135 U/L Final    AST 10/22/2021 23  10 - 40 U/L Final    ALT 10/22/2021 20  10 - 44 U/L Final    Anion Gap 10/22/2021 6* 8 - 16 mmol/L Final    eGFR if  10/22/2021 31.7* >60 mL/min/1.73 m^2 Final    eGFR if non African American 10/22/2021 27.5* >60 mL/min/1.73 m^2 Final    Magnesium 10/22/2021 2.3  1.6 - 2.6 mg/dL Final    WBC 10/22/2021 7.89  3.90 - 12.70 K/uL Final    RBC 10/22/2021 4.16  4.00 - 5.40 M/uL Final    Hemoglobin 10/22/2021 11.7* 12.0 - 16.0 g/dL Final    Hematocrit 10/22/2021 37.0  37.0 - 48.5 % Final    MCV 10/22/2021 89  82 - 98 fL Final    MCH 10/22/2021 28.1  27.0 - 31.0 pg Final    MCHC 10/22/2021 31.6* 32.0 - 36.0 g/dL Final    RDW 10/22/2021 13.0  11.5 - 14.5 % Final    Platelets 10/22/2021 269  150 - 450 K/uL Final    MPV 10/22/2021 9.6  9.2 - 12.9 fL Final    Immature Granulocytes 10/22/2021 0.4  0.0 - 0.5 %  Final    Gran # (ANC) 10/22/2021 4.5  1.8 - 7.7 K/uL Final    Immature Grans (Abs) 10/22/2021 0.03  0.00 - 0.04 K/uL Final    Lymph # 10/22/2021 2.3  1.0 - 4.8 K/uL Final    Mono # 10/22/2021 0.9  0.3 - 1.0 K/uL Final    Eos # 10/22/2021 0.1  0.0 - 0.5 K/uL Final    Baso # 10/22/2021 0.04  0.00 - 0.20 K/uL Final    nRBC 10/22/2021 0  0 /100 WBC Final    Gran % 10/22/2021 57.0  38.0 - 73.0 % Final    Lymph % 10/22/2021 29.7  18.0 - 48.0 % Final    Mono % 10/22/2021 11.0  4.0 - 15.0 % Final    Eosinophil % 10/22/2021 1.4  0.0 - 8.0 % Final    Basophil % 10/22/2021 0.5  0.0 - 1.9 % Final    Differential Method 10/22/2021 Automated   Final    PT 10/22/2021 13.9* 11.4 - 13.7 sec Final    INR 10/22/2021 1.2   Final    Troponin I 10/22/2021 <0.030  <=0.040 ng/mL Final    Specimen UA 10/22/2021 Urine, Catheterized   Final    Color, UA 10/22/2021 Colorless* Yellow, Straw, Anila Final    Appearance, UA 10/22/2021 Clear  Clear Final    pH, UA 10/22/2021 8.0  5.0 - 8.0 Final    Specific [place], UA 10/22/2021 1.005  1.005 - 1.030 Final    Protein, UA 10/22/2021 Negative  Negative Final    Glucose, UA 10/22/2021 Negative  Negative Final    Ketones, UA 10/22/2021 Negative  Negative Final    Bilirubin (UA) 10/22/2021 Negative  Negative Final    Occult Blood UA 10/22/2021 Negative  Negative Final    Nitrite, UA 10/22/2021 Negative  Negative Final    Urobilinogen, UA 10/22/2021 Negative  Negative EU/dL Final    Leukocytes, UA 10/22/2021 Negative  Negative Final    Lithium Level 10/22/2021 0.0* 0.6 - 1.2 mmol/L Final    SARS-CoV-2 RNA, Amplification, Qual 10/22/2021 Negative  Negative Final    TSH 10/22/2021 1.760  0.340 - 5.600 uIU/mL Final    Magnesium 10/22/2021 2.3  1.6 - 2.6 mg/dL Final    CPK 10/22/2021 53  20 - 180 U/L Final   Office Visit on 08/13/2021   Component Date Value Ref Range Status    CULTURE, AEROBIC AND ANAEROBIC 08/13/2021    Final   Hospital Outpatient Visit on 07/20/2021    Component Date Value Ref Range Status    Battery Voltage (V) 07/20/2021 3.01  V Final    P/R-wave RA Lead 07/20/2021 1.4  mV Final    P/R-wave RA Lead (native) 07/20/2021 11.6  mV Final    Impedance RA Lead 07/20/2021 475  Ohms Final    Impedance RA Lead (native) 07/20/2021 532  Ohms Final    Threshold V RA Lead 07/20/2021 0.75  V Final    Theshold ms RA Lead 07/20/2021 0.4  ms Final    Threshold V RA Lead (native) 07/20/2021 0.5  V Final    Threshold ms RA Lead (native) 07/20/2021 0.4  ms Final   Lab Visit on 06/14/2021   Component Date Value Ref Range Status    Sodium 06/14/2021 140  136 - 145 mmol/L Final    Potassium 06/14/2021 4.2  3.5 - 5.1 mmol/L Final    Chloride 06/14/2021 102  95 - 110 mmol/L Final    CO2 06/14/2021 26  23 - 29 mmol/L Final    Glucose 06/14/2021 83  70 - 110 mg/dL Final    BUN 06/14/2021 24* 8 - 23 mg/dL Final    Creatinine 06/14/2021 1.2  0.5 - 1.4 mg/dL Final    Calcium 06/14/2021 9.2  8.7 - 10.5 mg/dL Final    Anion Gap 06/14/2021 12  8 - 16 mmol/L Final    eGFR if  06/14/2021 48.3* >60 mL/min/1.73 m^2 Final    eGFR if non  06/14/2021 41.9* >60 mL/min/1.73 m^2 Final   Hospital Outpatient Visit on 06/10/2021   Component Date Value Ref Range Status    IVRT 06/10/2021 79.00  ms Final    IVS 06/10/2021 1.26* 0.6 - 1.1 cm Final    LVIDd 06/10/2021 4.62  3.5 - 6.0 cm Final    LVIDs 06/10/2021 3.46  2.1 - 4.0 cm Final    LVOT diameter 06/10/2021 2.00  cm Final    LVOT peak VTI 06/10/2021 22.00  cm Final    LVOT peak majo 06/10/2021 0.70  m/s Final    Posterior Wall 06/10/2021 1.21* 0.6 - 1.1 cm Final    AV regurgitation pressure 1/2 time 06/10/2021 409.00  ms Final    Ao peak majo 06/10/2021 1.41  m/s Final    AV peak gradient 06/10/2021 8  mmHg Final    AORTIC VALVE CUSP SEPERATION 06/10/2021 1.60  cm Final    AV mean gradient 06/10/2021 4  mmHg Final    Ao VTI 06/10/2021 34.80  cm Final    Ao root annulus 06/10/2021 3.00  cm  Final    Left Atrium Major Axis 06/10/2021 4.80  cm Final    LA size 06/10/2021 4.50  cm Final    E wave deceleration time 06/10/2021 174.00  ms Final    MV Peak A Dylan 06/10/2021 0.73  m/s Final    MV Peak E Dylan 06/10/2021 0.87  m/s Final    RVDD 06/10/2021 1.69  cm Final    Triscuspid Valve Regurgitation Pea* 06/10/2021 32  mmHg Final    BSA 06/10/2021 1.63  m2 Final    TDI SEPTAL 06/10/2021 0.04  m/s Final    LV LATERAL E/E' RATIO 06/10/2021 12.43  m/s Final    LV SEPTAL E/E' RATIO 06/10/2021 21.75  m/s Final    TDI LATERAL 06/10/2021 0.07  m/s Final    FS 06/10/2021 25  28 - 44 % Final    LV mass 06/10/2021 215.08  g Final    Left Ventricle Relative Wall Thick* 06/10/2021 0.52  cm Final    AV valve area 06/10/2021 1.99  cm2 Final    AV Velocity Ratio 06/10/2021 0.50   Final    AV index (prosthetic) 06/10/2021 0.63   Final    E/A ratio 06/10/2021 1.19   Final    Mean e' 06/10/2021 0.06  m/s Final    LVOT area 06/10/2021 3.1  cm2 Final    LVOT stroke volume 06/10/2021 69.08  cm3 Final    E/E' ratio 06/10/2021 15.82  m/s Final    TR Max Dylan 06/10/2021 2.84  m/s Final    LV Mass Index 06/10/2021 135  g/m2 Final    Right Atrial Pressure (from IVC) 06/10/2021 3  mmHg Final    EF 06/10/2021 49  % Final    TV rest pulmonary artery pressure 06/10/2021 35  mmHg Final   Hospital Outpatient Visit on 06/10/2021   Component Date Value Ref Range Status    85% Max Predicted HR 06/10/2021 113   Final    Max Predicted HR 06/10/2021 133   Final    OHS CV CPX PATIENT IS MALE 06/10/2021 0.0   Final    OHS CV CPX PATIENT IS FEMALE 06/10/2021 1.0   Final    Peak HR 06/10/2021 75.0  bpm Final    % Max HR Achieved 06/10/2021 56   Final    EF + QEF 06/10/2021 53  % Final    Ejection Fraction- High Stress 06/10/2021 65  % Final    End diastolic index (mL/m2) 06/10/2021 94  mL/m2 Final    End diastolic index (mL/m2) 06/10/2021 158  mL/m2 Final    End systolic index (mL/m2) 06/10/2021 33  mL/m2 Final     End systolic index (mL/m2) 06/10/2021 71  mL/m2 Final    Nuc Stress EF 06/10/2021 51  % Final    Nuc Rest Diastolic Volume Index 06/10/2021 68   Final    Nuc Rest Systolic Volume Index 06/10/2021 33   Final    dose 06/10/2021 0.4  mg Final    HR at rest 06/10/2021 73  bpm Final    Systolic blood pressure 06/10/2021 120  mmHg Final    Diastolic blood pressure 06/10/2021 72  mmHg Final    RPP 06/10/2021 8,760   Final    Peak Systolic BP 06/10/2021 150  mmHg Final    Peak Diatolic BP 06/10/2021 70  mmHg Final    Peak RPP 06/10/2021 11,250   Final    1 Minute Recovery HR 06/10/2021 75  bpm Final   There may be more visits with results that are not included.       Past Medical History:   Diagnosis Date    Coronary artery disease     Dermatitis     Dermatitis 12/8/2017    Diabetes mellitus     Diabetes mellitus type II     Leah Madrigal virus infection     Fibromyalgia     GERD (gastroesophageal reflux disease)     Hypertension     MI (myocardial infarction) 09/23/2011    Pure hypercholesterolemia 2/26/2020     Past Surgical History:   Procedure Laterality Date    adenoids      ANGIOPLASTY  1987    APPENDECTOMY      CARDIAC PACEMAKER PLACEMENT  01/12/2017    CATARACT EXTRACTION, BILATERAL Bilateral 9/2/15, 3/17/15    right eye-9/2/15, left eye-3/17/15    CHOLECYSTECTOMY  1987    CORONARY ARTERY BYPASS GRAFT  2006    quadruple    coronary stents  1999    x2    CYST REMOVAL  04/25/2017    on back    HYSTERECTOMY  1966    OVARY SURGERY  1948    Ovary burst & was repaired    RHIZOTOMY  09/14/2018    TONSILLECTOMY  1940     Family History   Problem Relation Age of Onset    No Known Problems Mother     No Known Problems Father        Marital Status:   Alcohol History:  reports no history of alcohol use.  Tobacco History:  reports that she has never smoked. She has never used smokeless tobacco.  Drug History:  reports no history of drug use.    Review of patient's allergies  indicates:   Allergen Reactions    Arthrotec 50 [diclofenac-misoprostol]     Doxycycline     Effexor [venlafaxine]     Estradiol     Flagyl [metronidazole]     Fluconazole     Iodine     Levaquin [levofloxacin]     Nexium [esomeprazole magnesium]     Penicillins     Shellfish containing products     Statins-hmg-coa reductase inhibitors     Sulfa (sulfonamide antibiotics)     Tea tree oil     Tegaserod      ZOLNORM    Trazodone     Yeast, dried     Adhesive Rash       Current Outpatient Medications:     acetaminophen (TYLENOL) 500 MG tablet, Take 500 mg by mouth every 6 (six) hours as needed for Pain., Disp: , Rfl:     ALPRAZolam (XANAX) 0.25 MG tablet, Take 1 tablet (0.25 mg total) by mouth as needed for Anxiety., Disp: 90 tablet, Rfl: 0    apixaban (ELIQUIS) 2.5 mg Tab, Take 1 tablet (2.5 mg total) by mouth 2 (two) times daily., Disp: 180 tablet, Rfl: 3    aspirin (ECOTRIN) 81 MG EC tablet, Take 1 tablet (81 mg total) by mouth once daily. (Patient taking differently: Take 81 mg by mouth every evening.), Disp: , Rfl: 0    betamethasone dipropionate 0.05 % cream, APPLY TOPICALLY TWICE A DAY, Disp: 15 g, Rfl: 0    cetirizine (ZYRTEC) 10 MG tablet, Take 10 mg by mouth once daily., Disp: , Rfl:     cholecalciferol, vitamin D3, 100 mcg (4,000 unit) Cap, Take 1 capsule by mouth 2 (two) times daily. , Disp: , Rfl:     clopidogreL (PLAVIX) 75 mg tablet, Take 1 tablet (75 mg total) by mouth once daily., Disp: 90 tablet, Rfl: 2    coenzyme Q10 100 mg capsule, Take 1 capsule (100 mg total) by mouth 2 (two) times daily., Disp: 60 capsule, Rfl: 11    diclofenac sodium (VOLTAREN) 1 % Gel, Apply 2 g topically once daily., Disp: , Rfl:     diphenhydrAMINE (BENYLIN) 12.5 mg/5 mL liquid, Take 12.5 mg by mouth 4 (four) times daily as needed for Allergies., Disp: , Rfl:     ECHINACEA ORAL, Take 750 mg by mouth 2 (two) times a day., Disp: , Rfl:     estradioL (ESTRACE) 0.01 % (0.1 mg/gram) vaginal cream,  Place 1 g vaginally once a week., Disp: 42.5 g, Rfl: 1    fluocinonide 0.05% (LIDEX) 0.05 % cream, Apply sparingly to rash on chest, neck, legs bid max use one month., Disp: 30 g, Rfl: 0    fluticasone propionate (FLONASE) 50 mcg/actuation nasal spray, 1 spray by Each Nostril route once daily., Disp: , Rfl:     isosorbide mononitrate (IMDUR) 120 MG 24 hr tablet, Take 1 tablet (120 mg total) by mouth once daily. DO NOT CRUSH OR CHEW; SWALLOW WHOLE., Disp: 90 tablet, Rfl: 3    ketoconazole (NIZORAL) 2 % cream, aaa bid, Disp: 60 g, Rfl: 2    ketoconazole (NIZORAL) 2 % shampoo, Use to wash hair every day or every other day for dry itchy scalp (Patient taking differently: Apply 1 application topically daily as needed. Use to wash hair every day or every other day for dry itchy scalp), Disp: 120 mL, Rfl: 5    levothyroxine (SYNTHROID) 88 MCG tablet, Take 1 tablet (88 mcg total) by mouth once daily., Disp: 90 tablet, Rfl: 1    loratadine (CLARITIN) 10 mg tablet, Take 10 mg by mouth once daily., Disp: , Rfl:     losartan (COZAAR) 25 MG tablet, TAKE 1 TABLET BY MOUTH TWICE A DAY (Patient taking differently: Take 25 mg by mouth 2 (two) times daily.), Disp: 180 tablet, Rfl: 2    MAGNESIUM ORAL, Take 500 mg by mouth once daily. , Disp: , Rfl:     metoprolol succinate (TOPROL-XL) 25 MG 24 hr tablet, Take 1 tablet (25 mg total) by mouth once daily., Disp: 90 tablet, Rfl: 3    montelukast (SINGULAIR) 10 mg tablet, Take 1 tablet (10 mg total) by mouth every evening., Disp: 90 tablet, Rfl: 1    multivitamin (THERAGRAN) tablet, Take 1 tablet by mouth 2 (two) times a day. , Disp: , Rfl:     mupirocin (BACTROBAN) 2 % ointment, Apply topically 2 (two) times daily., Disp: 22 g, Rfl: 11    nitroGLYCERIN (NITROSTAT) 0.4 MG SL tablet, Place 1 tablet (0.4 mg total) under the tongue every 5 (five) minutes as needed for Chest pain., Disp: 30 tablet, Rfl: 3    omeprazole (PRILOSEC) 20 MG capsule, Take 20 mg by mouth once daily.,  Disp: , Rfl:     prasterone, dhea, 25 mg Cap, Take 1 capsule by mouth once daily., Disp: , Rfl:     tiZANidine (ZANAFLEX) 4 MG tablet, Take 1 tablet (4 mg total) by mouth once daily., Disp: 90 tablet, Rfl: 0    traMADoL (ULTRAM) 50 mg tablet, Take 1 tablet (50 mg total) by mouth 2 (two) times daily as needed (pain)., Disp: 30 tablet, Rfl: 0    triamcinolone acetonide 0.1% (KENALOG) 0.1 % cream, aaa bid x 1-2 wks, tk 1 wk off prn rash, Disp: 30 g, Rfl: 2    turmeric 400 mg Cap, Take 1,200 mg by mouth once daily., Disp: , Rfl:     UNABLE TO FIND, Take 1 capsule by mouth. Bio-Smoothe- premium nerve, Disp: , Rfl:     UNABLE TO FIND, Take 1 capsule by mouth once daily. medication name: GI probiotic, Disp: , Rfl:     UNABLE TO FIND, Take 1 capsule by mouth once daily. Joint health, Disp: , Rfl:     UNABLE TO FIND, Take 1 capsule by mouth 2 (two) times a day. Cardio platinum, Disp: , Rfl:     zinc gluconate 50 mg tablet, Take 50 mg by mouth 2 (two) times a day., Disp: , Rfl:     Current Facility-Administered Medications:     evolocumab PnIj 140 mg, 140 mg, Subcutaneous, Q14 Days, Kenton Ware MD    Review of Systems   Constitutional: Negative for appetite change, chills, diaphoresis, fatigue, fever and unexpected weight change.   HENT: Negative for congestion, ear pain, hearing loss, nosebleeds, postnasal drip, sinus pressure, sinus pain, sneezing, sore throat, tinnitus, trouble swallowing and voice change.    Eyes: Negative for photophobia, pain, itching and visual disturbance.   Respiratory: Negative for apnea, cough, chest tightness, shortness of breath, wheezing and stridor.    Cardiovascular: Positive for leg swelling. Negative for chest pain and palpitations.        BP has gone down with her muscle relaxant -next night she could not take the entire rx-had to take 1/2   Gastrointestinal: Negative for abdominal distention, abdominal pain, blood in stool, constipation, diarrhea, nausea and vomiting.    Endocrine: Negative for cold intolerance, heat intolerance, polydipsia and polyuria.   Genitourinary: Negative for difficulty urinating, dyspareunia, dysuria, flank pain, frequency, hematuria, menstrual problem, pelvic pain, urgency, vaginal discharge and vaginal pain.   Musculoskeletal: Positive for arthralgias (hips, left buttock). Negative for back pain, joint swelling, myalgias, neck pain and neck stiffness.   Skin: Negative for pallor.   Allergic/Immunologic: Negative for environmental allergies and food allergies.   Neurological: Positive for numbness (neuropathy-). Negative for dizziness, tremors, speech difficulty, weakness and light-headedness.   Hematological: Does not bruise/bleed easily.   Psychiatric/Behavioral: Negative for agitation, confusion, decreased concentration, sleep disturbance and suicidal ideas. The patient is not nervous/anxious.           Objective:      Vitals:    02/17/22 1321   BP: 134/66   Pulse: 72   Resp: 12   Temp: 97.8 °F (36.6 °C)   SpO2: 99%   Weight: 61.7 kg (136 lb)   Height: 5' (1.524 m)     Physical Exam      Assessment:       1. Type 2 diabetes mellitus with stage 3a chronic kidney disease, without long-term current use of insulin    2. Essential hypertension, benign    3. Mixed hyperlipidemia    4. Muscle spasm    5. Menopause    6. Encounter for osteoporosis screening in asymptomatic postmenopausal patient    7. Encounter for screening mammogram for breast cancer         Plan:       Type 2 diabetes mellitus with stage 3a chronic kidney disease, without long-term current use of insulin    Essential hypertension, benign    Mixed hyperlipidemia    Muscle spasm    Menopause    Encounter for osteoporosis screening in asymptomatic postmenopausal patient    Encounter for screening mammogram for breast cancer      No follow-ups on file.        2/17/2022 Elena Sullivan M.D.

## 2022-02-17 ENCOUNTER — OFFICE VISIT (OUTPATIENT)
Dept: FAMILY MEDICINE | Facility: CLINIC | Age: 87
End: 2022-02-17
Payer: MEDICARE

## 2022-02-17 VITALS
DIASTOLIC BLOOD PRESSURE: 66 MMHG | OXYGEN SATURATION: 99 % | WEIGHT: 136 LBS | HEIGHT: 60 IN | BODY MASS INDEX: 26.7 KG/M2 | TEMPERATURE: 98 F | HEART RATE: 72 BPM | SYSTOLIC BLOOD PRESSURE: 134 MMHG | RESPIRATION RATE: 12 BRPM

## 2022-02-17 DIAGNOSIS — Z78.0 MENOPAUSE: ICD-10-CM

## 2022-02-17 DIAGNOSIS — M62.838 MUSCLE SPASM: ICD-10-CM

## 2022-02-17 DIAGNOSIS — Z78.0 ENCOUNTER FOR OSTEOPOROSIS SCREENING IN ASYMPTOMATIC POSTMENOPAUSAL PATIENT: ICD-10-CM

## 2022-02-17 DIAGNOSIS — E78.2 MIXED HYPERLIPIDEMIA: ICD-10-CM

## 2022-02-17 DIAGNOSIS — E11.22 TYPE 2 DIABETES MELLITUS WITH STAGE 3A CHRONIC KIDNEY DISEASE, WITHOUT LONG-TERM CURRENT USE OF INSULIN: Primary | Chronic | ICD-10-CM

## 2022-02-17 DIAGNOSIS — I10 ESSENTIAL HYPERTENSION, BENIGN: ICD-10-CM

## 2022-02-17 DIAGNOSIS — Z12.31 ENCOUNTER FOR SCREENING MAMMOGRAM FOR BREAST CANCER: ICD-10-CM

## 2022-02-17 DIAGNOSIS — N18.31 TYPE 2 DIABETES MELLITUS WITH STAGE 3A CHRONIC KIDNEY DISEASE, WITHOUT LONG-TERM CURRENT USE OF INSULIN: Primary | Chronic | ICD-10-CM

## 2022-02-17 DIAGNOSIS — Z13.820 ENCOUNTER FOR OSTEOPOROSIS SCREENING IN ASYMPTOMATIC POSTMENOPAUSAL PATIENT: ICD-10-CM

## 2022-02-17 PROCEDURE — 99214 OFFICE O/P EST MOD 30 MIN: CPT | Mod: S$PBB,AQ,, | Performed by: INTERNAL MEDICINE

## 2022-02-17 PROCEDURE — 99215 OFFICE O/P EST HI 40 MIN: CPT | Performed by: INTERNAL MEDICINE

## 2022-02-17 PROCEDURE — 99214 PR OFFICE/OUTPT VISIT, EST, LEVL IV, 30-39 MIN: ICD-10-PCS | Mod: S$PBB,AQ,, | Performed by: INTERNAL MEDICINE

## 2022-03-09 DIAGNOSIS — E03.9 ACQUIRED HYPOTHYROIDISM: ICD-10-CM

## 2022-03-09 RX ORDER — LEVOTHYROXINE SODIUM 88 UG/1
88 TABLET ORAL DAILY
Qty: 90 TABLET | Refills: 1 | Status: SHIPPED | OUTPATIENT
Start: 2022-03-09 | End: 2022-06-22 | Stop reason: SDUPTHER

## 2022-03-16 DIAGNOSIS — F41.9 SEVERE ANXIETY: ICD-10-CM

## 2022-03-16 RX ORDER — ALPRAZOLAM 0.25 MG/1
0.25 TABLET ORAL
Qty: 90 TABLET | Refills: 0 | Status: SHIPPED | OUTPATIENT
Start: 2022-03-16 | End: 2022-08-03 | Stop reason: SDUPTHER

## 2022-03-16 NOTE — TELEPHONE ENCOUNTER
Asking for a refill on xanax and tramadol. States she has been in a lot of pain in the am. Xanax was last filled in October but tramadol was filled for #14 on 3-7-2022.

## 2022-03-23 ENCOUNTER — HOSPITAL ENCOUNTER (OUTPATIENT)
Dept: RADIOLOGY | Facility: HOSPITAL | Age: 87
Discharge: HOME | End: 2022-03-23
Attending: INTERNAL MEDICINE
Payer: MEDICARE

## 2022-03-23 DIAGNOSIS — Z13.820 ENCOUNTER FOR OSTEOPOROSIS SCREENING IN ASYMPTOMATIC POSTMENOPAUSAL PATIENT: ICD-10-CM

## 2022-03-23 DIAGNOSIS — Z12.31 ENCOUNTER FOR SCREENING MAMMOGRAM FOR BREAST CANCER: ICD-10-CM

## 2022-03-23 DIAGNOSIS — Z78.0 ENCOUNTER FOR OSTEOPOROSIS SCREENING IN ASYMPTOMATIC POSTMENOPAUSAL PATIENT: ICD-10-CM

## 2022-03-23 DIAGNOSIS — Z78.0 MENOPAUSE: ICD-10-CM

## 2022-03-23 PROCEDURE — 77063 BREAST TOMOSYNTHESIS BI: CPT | Mod: TC,PO

## 2022-03-23 PROCEDURE — 77067 SCR MAMMO BI INCL CAD: CPT | Mod: TC,PO

## 2022-03-23 PROCEDURE — 77080 DXA BONE DENSITY AXIAL: CPT | Mod: TC,PO

## 2022-04-19 ENCOUNTER — TELEPHONE (OUTPATIENT)
Dept: FAMILY MEDICINE | Facility: CLINIC | Age: 87
End: 2022-04-19

## 2022-04-19 RX ORDER — PREDNISONE 10 MG/1
TABLET ORAL
Qty: 9 TABLET | Refills: 0 | Status: SHIPPED | OUTPATIENT
Start: 2022-04-19 | End: 2022-05-09

## 2022-05-09 ENCOUNTER — OFFICE VISIT (OUTPATIENT)
Dept: FAMILY MEDICINE | Facility: CLINIC | Age: 87
End: 2022-05-09
Payer: MEDICARE

## 2022-05-09 VITALS
DIASTOLIC BLOOD PRESSURE: 64 MMHG | RESPIRATION RATE: 16 BRPM | HEIGHT: 60 IN | WEIGHT: 135 LBS | SYSTOLIC BLOOD PRESSURE: 138 MMHG | HEART RATE: 72 BPM | BODY MASS INDEX: 26.5 KG/M2 | OXYGEN SATURATION: 97 %

## 2022-05-09 DIAGNOSIS — R42 DIZZINESS: Primary | ICD-10-CM

## 2022-05-09 DIAGNOSIS — I25.810 CORONARY ARTERY DISEASE INVOLVING AUTOLOGOUS ARTERY CORONARY BYPASS GRAFT WITHOUT ANGINA PECTORIS: ICD-10-CM

## 2022-05-09 DIAGNOSIS — R07.9 CHEST PAIN, UNSPECIFIED TYPE: ICD-10-CM

## 2022-05-09 PROCEDURE — 99215 OFFICE O/P EST HI 40 MIN: CPT | Performed by: NURSE PRACTITIONER

## 2022-05-09 PROCEDURE — 99213 OFFICE O/P EST LOW 20 MIN: CPT | Mod: S$PBB,,, | Performed by: NURSE PRACTITIONER

## 2022-05-09 PROCEDURE — 99213 PR OFFICE/OUTPT VISIT, EST, LEVL III, 20-29 MIN: ICD-10-PCS | Mod: S$PBB,,, | Performed by: NURSE PRACTITIONER

## 2022-05-09 NOTE — PROGRESS NOTES
" Patient ID: Karma Chen is a 86 y.o. female.    Chief Complaint: Hypotension and Fatigue    Mrs. Chen returns today with her  due to having positional dizziness for approximately 2 weeks. She states it last a few minutes at a time but nothing longer and it happens when she changes positions specifically when lying down. This morning it happened and she was able to take her blood pressure and she states it was low. She did 2 consecutive readings with the first being 101/56 followed by 94/54. She did not recall the heart rate at the time. This frightened her and she drank Gatorade and opted for a  Salty snack of [employer]. Her blood pressure in clinic is now normal and she denies dizziness during our initial assessment.     She also states that her energy level has been decreased without cause. She is not doing anything additional in her day to day activities. She has been getting nerve blocks and is being scheduled to have endoscopic rhizotomy done soon. She states they have been beneficial although she does not have complete relief.     Dr. Sullivan prescribed Prednisone several weeks ago for back pain and she states she felt "great" the week she was taking that medication.     She has follow up scheduled with Dr. Sullivan in 2 weeks and Dr. ELIEZER Ware in one month.     She denies any other issues or complaints at this time.     Medication and supplements reviewed.     Dizziness:   Chronicity:  New  Onset:  1 to 4 weeks ago  Progression since onset:  Waxing and waning  Frequency:  Every few days  Severity:  Mild  Duration:  1 minute  Dizziness characteristics:  Off-balance and sensation of movement  Initial Spell Date and Length:  1 minute  Duration of Spells:  1 minute   Associated symptoms: weakness and light-headedness.no fever, no headaches, no nausea, no vomiting, no palpitations and no chest pain.  Aggravated by:  Lying down  Treatments tried:  Rest   PMH includes: cardiac surgery.no environmental " allergies.          Past Medical History:   Diagnosis Date    Coronary artery disease     Dermatitis     Dermatitis 12/8/2017    Diabetes mellitus     Diabetes mellitus type II     Leah Madrigal virus infection     Fibromyalgia     GERD (gastroesophageal reflux disease)     Hypertension     MI (myocardial infarction) 09/23/2011    Pure hypercholesterolemia 2/26/2020     Past Surgical History:   Procedure Laterality Date    adenoids      ANGIOPLASTY  1987    APPENDECTOMY      CARDIAC PACEMAKER PLACEMENT  01/12/2017    CATARACT EXTRACTION, BILATERAL Bilateral 9/2/15, 3/17/15    right eye-9/2/15, left eye-3/17/15    CHOLECYSTECTOMY  1987    CORONARY ARTERY BYPASS GRAFT  2006    quadruple    coronary stents  1999    x2    CYST REMOVAL  04/25/2017    on back    HYSTERECTOMY  1966    OVARY SURGERY  1948    Ovary burst & was repaired    RHIZOTOMY  09/14/2018    TONSILLECTOMY  1940         Tobacco History:  reports that she has never smoked. She has never used smokeless tobacco.      Review of patient's allergies indicates:   Allergen Reactions    Arthrotec 50 [diclofenac-misoprostol]     Doxycycline     Effexor [venlafaxine]     Estradiol     Flagyl [metronidazole]     Fluconazole     Iodine     Levaquin [levofloxacin]     Nexium [esomeprazole magnesium]     Penicillins     Shellfish containing products     Statins-hmg-coa reductase inhibitors     Sulfa (sulfonamide antibiotics)     Tea tree oil     Tegaserod      ZOLNORM    Trazodone     Yeast, dried     Adhesive Rash       Current Outpatient Medications:     acetaminophen (TYLENOL) 500 MG tablet, Take 500 mg by mouth every 6 (six) hours as needed for Pain., Disp: , Rfl:     ALPRAZolam (XANAX) 0.25 MG tablet, Take 1 tablet (0.25 mg total) by mouth as needed for Anxiety., Disp: 90 tablet, Rfl: 0    apixaban (ELIQUIS) 2.5 mg Tab, Take 1 tablet (2.5 mg total) by mouth 2 (two) times daily., Disp: 180 tablet, Rfl: 3    aspirin  (ECOTRIN) 81 MG EC tablet, Take 1 tablet (81 mg total) by mouth once daily. (Patient taking differently: Take 81 mg by mouth every evening.), Disp: , Rfl: 0    betamethasone dipropionate 0.05 % cream, APPLY TOPICALLY TWICE A DAY, Disp: 15 g, Rfl: 0    cetirizine (ZYRTEC) 10 MG tablet, Take 10 mg by mouth once daily., Disp: , Rfl:     cholecalciferol, vitamin D3, 100 mcg (4,000 unit) Cap, Take 1 capsule by mouth 2 (two) times daily. , Disp: , Rfl:     clopidogreL (PLAVIX) 75 mg tablet, Take 1 tablet (75 mg total) by mouth once daily., Disp: 90 tablet, Rfl: 2    coenzyme Q10 100 mg capsule, Take 1 capsule (100 mg total) by mouth 2 (two) times daily., Disp: 60 capsule, Rfl: 11    diclofenac sodium (VOLTAREN) 1 % Gel, Apply 2 g topically once daily., Disp: , Rfl:     diphenhydrAMINE (BENYLIN) 12.5 mg/5 mL liquid, Take 12.5 mg by mouth 4 (four) times daily as needed for Allergies., Disp: , Rfl:     ECHINACEA ORAL, Take 750 mg by mouth 2 (two) times a day., Disp: , Rfl:     estradioL (ESTRACE) 0.01 % (0.1 mg/gram) vaginal cream, Place 1 g vaginally once a week., Disp: 42.5 g, Rfl: 1    fluocinonide 0.05% (LIDEX) 0.05 % cream, Apply sparingly to rash on chest, neck, legs bid max use one month., Disp: 30 g, Rfl: 0    fluticasone propionate (FLONASE) 50 mcg/actuation nasal spray, 1 spray by Each Nostril route once daily., Disp: , Rfl:     isosorbide mononitrate (IMDUR) 120 MG 24 hr tablet, Take 1 tablet (120 mg total) by mouth once daily. DO NOT CRUSH OR CHEW; SWALLOW WHOLE., Disp: 90 tablet, Rfl: 3    ketoconazole (NIZORAL) 2 % cream, aaa bid, Disp: 60 g, Rfl: 2    ketoconazole (NIZORAL) 2 % shampoo, Use to wash hair every day or every other day for dry itchy scalp (Patient taking differently: Apply 1 application topically daily as needed. Use to wash hair every day or every other day for dry itchy scalp), Disp: 120 mL, Rfl: 5    levothyroxine (SYNTHROID) 88 MCG tablet, Take 1 tablet (88 mcg total) by mouth  once daily., Disp: 90 tablet, Rfl: 1    loratadine (CLARITIN) 10 mg tablet, Take 10 mg by mouth once daily., Disp: , Rfl:     losartan (COZAAR) 25 MG tablet, Take 1 tablet (25 mg total) by mouth 2 (two) times daily., Disp: 180 tablet, Rfl: 1    MAGNESIUM ORAL, Take 500 mg by mouth once daily. , Disp: , Rfl:     metoprolol succinate (TOPROL-XL) 25 MG 24 hr tablet, Take 1 tablet (25 mg total) by mouth once daily., Disp: 90 tablet, Rfl: 3    montelukast (SINGULAIR) 10 mg tablet, TAKE 1 TABLET BY MOUTH EVERY DAY IN THE EVENING, Disp: 90 tablet, Rfl: 1    multivitamin (THERAGRAN) tablet, Take 1 tablet by mouth 2 (two) times a day. , Disp: , Rfl:     mupirocin (BACTROBAN) 2 % ointment, Apply topically 2 (two) times daily., Disp: 22 g, Rfl: 11    nitroGLYCERIN (NITROSTAT) 0.4 MG SL tablet, Place 1 tablet (0.4 mg total) under the tongue every 5 (five) minutes as needed for Chest pain., Disp: 30 tablet, Rfl: 3    omeprazole (PRILOSEC) 20 MG capsule, TAKE 1 CAPSULE BY MOUTH EVERY DAY, Disp: 90 capsule, Rfl: 3    prasterone, dhea, 25 mg Cap, Take 1 capsule by mouth once daily., Disp: , Rfl:     tiZANidine (ZANAFLEX) 4 MG tablet, Take 1 tablet (4 mg total) by mouth once daily., Disp: 90 tablet, Rfl: 0    traMADoL (ULTRAM) 50 mg tablet, TAKE 1 TABLET (50 MG TOTAL) BY MOUTH 2 (TWO) TIMES DAILY AS NEEDED (PAIN)., Disp: 14 tablet, Rfl: 0    triamcinolone acetonide 0.1% (KENALOG) 0.1 % cream, aaa bid x 1-2 wks, tk 1 wk off prn rash, Disp: 30 g, Rfl: 2    turmeric 400 mg Cap, Take 1,200 mg by mouth once daily., Disp: , Rfl:     UNABLE TO FIND, Take 1 capsule by mouth. Bio-Smoothe- premium nerve, Disp: , Rfl:     UNABLE TO FIND, Take 1 capsule by mouth once daily. medication name: GI probiotic, Disp: , Rfl:     UNABLE TO FIND, Take 1 capsule by mouth once daily. Joint health, Disp: , Rfl:     UNABLE TO FIND, Take 1 capsule by mouth 2 (two) times a day. Cardio platinum, Disp: , Rfl:     zinc gluconate 50 mg tablet,  Take 50 mg by mouth 2 (two) times a day., Disp: , Rfl:     UNABLE TO FIND, Nerve Jorge, Disp: , Rfl:     Current Facility-Administered Medications:     evolocumab PnIj 140 mg, 140 mg, Subcutaneous, Q14 Days, Kenton Ware MD    Review of Systems   Constitutional: Positive for activity change and fatigue. Negative for appetite change, fever and unexpected weight change.   HENT: Negative for congestion, mouth sores, nosebleeds, postnasal drip, rhinorrhea, sinus pressure, sinus pain, sneezing, sore throat and trouble swallowing.    Eyes: Negative for pain, redness and itching.   Respiratory: Negative for chest tightness and shortness of breath.    Cardiovascular: Negative for chest pain and palpitations.   Gastrointestinal: Negative for abdominal pain, blood in stool, constipation, diarrhea, nausea and vomiting.   Endocrine: Negative for cold intolerance, heat intolerance, polydipsia, polyphagia and polyuria.   Genitourinary: Negative for difficulty urinating, dysuria, flank pain, frequency, genital sores, menstrual problem, urgency, vaginal bleeding and vaginal discharge.   Musculoskeletal: Negative for arthralgias and myalgias.   Skin: Negative for rash and wound.   Allergic/Immunologic: Negative for environmental allergies and food allergies.   Neurological: Positive for dizziness, weakness and light-headedness. Negative for headaches.   Hematological: Negative for adenopathy. Does not bruise/bleed easily.   Psychiatric/Behavioral: Negative for agitation, confusion, hallucinations, self-injury and sleep disturbance. The patient is not nervous/anxious.           Objective:      Vitals:    05/09/22 1453   BP: 138/64   Pulse: 72   Resp: 16   SpO2: 97%   Weight: 61.2 kg (135 lb)   Height: 5' (1.524 m)     Physical Exam  Vitals reviewed.   Constitutional:       General: She is not in acute distress.     Appearance: Normal appearance. She is normal weight. She is not ill-appearing, toxic-appearing or diaphoretic.    HENT:      Head: Normocephalic and atraumatic.      Right Ear: Tympanic membrane and external ear normal. No drainage or swelling. There is no impacted cerumen.      Left Ear: Tympanic membrane and external ear normal. No drainage or swelling. There is no impacted cerumen.      Ears:      Comments: Hearing aids in place     Nose: Nose normal. No congestion or rhinorrhea.      Mouth/Throat:      Mouth: Mucous membranes are moist.      Pharynx: Oropharynx is clear. No oropharyngeal exudate or posterior oropharyngeal erythema.   Eyes:      General: No scleral icterus.        Right eye: No discharge.         Left eye: No discharge.      Extraocular Movements: Extraocular movements intact.      Conjunctiva/sclera: Conjunctivae normal.      Pupils: Pupils are equal, round, and reactive to light.   Neck:      Vascular: No carotid bruit.   Cardiovascular:      Rate and Rhythm: Normal rate and regular rhythm.      Pulses: Normal pulses.      Heart sounds: No murmur heard.    No friction rub. No gallop.   Pulmonary:      Effort: Pulmonary effort is normal. No respiratory distress.      Breath sounds: Normal breath sounds. No stridor. No rales.   Chest:      Chest wall: No tenderness.   Abdominal:      General: Abdomen is flat. Bowel sounds are normal.      Palpations: Abdomen is soft. There is no mass.      Tenderness: There is no abdominal tenderness. There is no guarding.   Musculoskeletal:         General: No swelling or signs of injury. Normal range of motion.      Cervical back: Normal range of motion and neck supple. No rigidity or tenderness.      Right lower leg: No edema.      Left lower leg: No edema.   Skin:     General: Skin is warm and dry.      Capillary Refill: Capillary refill takes less than 2 seconds.      Coloration: Skin is not pale.      Findings: No bruising, lesion or rash.   Neurological:      General: No focal deficit present.      Mental Status: She is alert and oriented to person, place, and time.  Mental status is at baseline.      Motor: No weakness.      Coordination: Coordination normal.   Psychiatric:         Mood and Affect: Mood normal.         Behavior: Behavior normal.         Thought Content: Thought content normal.         Judgment: Judgment normal.         Orthostatic B/P Lying Right Arm Manual 138/72, Sitting 136/74, Standing 130/68. She felt some dizziness when changing positions that lasted for a few seconds each time.     Assessment:       1. Dizziness    2. Chest pain, unspecified type    3. Coronary artery disease involving autologous artery coronary bypass graft without angina pectoris           Plan:       Chest pain, unspecified type  -     EKG 12-lead; Future; Expected date: 05/09/2022  -     Echo; Future; Expected date: 05/16/2022    Coronary artery disease involving autologous artery coronary bypass graft without angina pectoris  -     Echo; Future; Expected date: 05/16/2022    Dizziness  -Patient has been asked to stop taking new Dietary supplement that she is taking for neuropathy. This is the only thing that has changes since she began having dizziness. I also asked if she could continue to remain hydrated as we reviewed position changes and to sit for a few minutes before standing. We reviewed fall  Precautions as well.    I am hesitant to stop or change any medications at this time due to her B/P being normal while in clinic. She is to continue to keep a log of blood pressures.     She will keep all scheduled follow up appointments with Cardiology.       Follow up in about 2 weeks (around 5/23/2022), or if symptoms worsen or fail to improve, for With Dr. Sullivan for B/P, dizziness.        5/9/2022 Thank you for allowing me to participate in your care.        SURENDRA Rutherford

## 2022-05-10 ENCOUNTER — TELEPHONE (OUTPATIENT)
Dept: CARDIOLOGY | Facility: CLINIC | Age: 87
End: 2022-05-10
Payer: MEDICARE

## 2022-05-10 ENCOUNTER — CLINICAL SUPPORT (OUTPATIENT)
Dept: CARDIOLOGY | Facility: CLINIC | Age: 87
End: 2022-05-10
Payer: MEDICARE

## 2022-05-10 ENCOUNTER — LAB VISIT (OUTPATIENT)
Dept: LAB | Facility: HOSPITAL | Age: 87
End: 2022-05-10
Attending: NURSE PRACTITIONER
Payer: MEDICARE

## 2022-05-10 VITALS — SYSTOLIC BLOOD PRESSURE: 120 MMHG | DIASTOLIC BLOOD PRESSURE: 60 MMHG

## 2022-05-10 DIAGNOSIS — R42 DIZZINESS: Primary | ICD-10-CM

## 2022-05-10 DIAGNOSIS — I48.0 PAF (PAROXYSMAL ATRIAL FIBRILLATION): ICD-10-CM

## 2022-05-10 DIAGNOSIS — E03.9 HYPOTHYROIDISM, UNSPECIFIED: ICD-10-CM

## 2022-05-10 DIAGNOSIS — R42 DIZZINESS: ICD-10-CM

## 2022-05-10 LAB
ALBUMIN SERPL BCP-MCNC: 3.9 G/DL (ref 3.5–5.2)
ALP SERPL-CCNC: 68 U/L (ref 55–135)
ALP SERPL-CCNC: 68 U/L (ref 55–135)
ALT SERPL W/O P-5'-P-CCNC: 22 U/L (ref 10–44)
ALT SERPL W/O P-5'-P-CCNC: 22 U/L (ref 10–44)
ANION GAP SERPL CALC-SCNC: 10 MMOL/L (ref 8–16)
ANION GAP SERPL CALC-SCNC: 10 MMOL/L (ref 8–16)
AST SERPL-CCNC: 24 U/L (ref 10–40)
AST SERPL-CCNC: 24 U/L (ref 10–40)
BASOPHILS # BLD AUTO: 0.08 K/UL (ref 0–0.2)
BASOPHILS NFR BLD: 1.1 % (ref 0–1.9)
BILIRUB DIRECT SERPL-MCNC: <0.1 MG/DL (ref 0.1–0.3)
BILIRUB SERPL-MCNC: 0.6 MG/DL (ref 0.1–1)
BILIRUB SERPL-MCNC: 0.6 MG/DL (ref 0.1–1)
BNP SERPL-MCNC: 490 PG/ML (ref 0–99)
BUN SERPL-MCNC: 24 MG/DL (ref 8–23)
BUN SERPL-MCNC: 24 MG/DL (ref 8–23)
CALCIUM SERPL-MCNC: 9 MG/DL (ref 8.7–10.5)
CALCIUM SERPL-MCNC: 9 MG/DL (ref 8.7–10.5)
CHLORIDE SERPL-SCNC: 103 MMOL/L (ref 95–110)
CHLORIDE SERPL-SCNC: 103 MMOL/L (ref 95–110)
CO2 SERPL-SCNC: 27 MMOL/L (ref 23–29)
CO2 SERPL-SCNC: 27 MMOL/L (ref 23–29)
CREAT SERPL-MCNC: 1.4 MG/DL (ref 0.5–1.4)
CREAT SERPL-MCNC: 1.4 MG/DL (ref 0.5–1.4)
DIFFERENTIAL METHOD: ABNORMAL
EOSINOPHIL # BLD AUTO: 0.3 K/UL (ref 0–0.5)
EOSINOPHIL NFR BLD: 3.6 % (ref 0–8)
ERYTHROCYTE [DISTWIDTH] IN BLOOD BY AUTOMATED COUNT: 13.8 % (ref 11.5–14.5)
EST. GFR  (AFRICAN AMERICAN): 39.8 ML/MIN/1.73 M^2
EST. GFR  (AFRICAN AMERICAN): 39.8 ML/MIN/1.73 M^2
EST. GFR  (NON AFRICAN AMERICAN): 34.5 ML/MIN/1.73 M^2
EST. GFR  (NON AFRICAN AMERICAN): 34.5 ML/MIN/1.73 M^2
GLUCOSE SERPL-MCNC: 93 MG/DL (ref 70–110)
GLUCOSE SERPL-MCNC: 93 MG/DL (ref 70–110)
HCT VFR BLD AUTO: 37.2 % (ref 37–48.5)
HGB BLD-MCNC: 11.5 G/DL (ref 12–16)
IMM GRANULOCYTES # BLD AUTO: 0.02 K/UL (ref 0–0.04)
IMM GRANULOCYTES NFR BLD AUTO: 0.3 % (ref 0–0.5)
LYMPHOCYTES # BLD AUTO: 2.5 K/UL (ref 1–4.8)
LYMPHOCYTES NFR BLD: 34.3 % (ref 18–48)
MCH RBC QN AUTO: 27.4 PG (ref 27–31)
MCHC RBC AUTO-ENTMCNC: 30.9 G/DL (ref 32–36)
MCV RBC AUTO: 89 FL (ref 82–98)
MONOCYTES # BLD AUTO: 1.1 K/UL (ref 0.3–1)
MONOCYTES NFR BLD: 14.7 % (ref 4–15)
NEUTROPHILS # BLD AUTO: 3.4 K/UL (ref 1.8–7.7)
NEUTROPHILS NFR BLD: 46 % (ref 38–73)
NRBC BLD-RTO: 0 /100 WBC
PHOSPHATE SERPL-MCNC: 3.7 MG/DL (ref 2.7–4.5)
PLATELET # BLD AUTO: 249 K/UL (ref 150–450)
PMV BLD AUTO: 10 FL (ref 9.2–12.9)
POTASSIUM SERPL-SCNC: 3.9 MMOL/L (ref 3.5–5.1)
POTASSIUM SERPL-SCNC: 3.9 MMOL/L (ref 3.5–5.1)
PROT SERPL-MCNC: 7.2 G/DL (ref 6–8.4)
PROT SERPL-MCNC: 7.2 G/DL (ref 6–8.4)
RBC # BLD AUTO: 4.2 M/UL (ref 4–5.4)
SODIUM SERPL-SCNC: 140 MMOL/L (ref 136–145)
SODIUM SERPL-SCNC: 140 MMOL/L (ref 136–145)
TSH SERPL DL<=0.005 MIU/L-ACNC: 1.28 UIU/ML (ref 0.34–5.6)
WBC # BLD AUTO: 7.41 K/UL (ref 3.9–12.7)

## 2022-05-10 PROCEDURE — 36415 COLL VENOUS BLD VENIPUNCTURE: CPT | Performed by: NURSE PRACTITIONER

## 2022-05-10 PROCEDURE — 84100 ASSAY OF PHOSPHORUS: CPT | Performed by: NURSE PRACTITIONER

## 2022-05-10 PROCEDURE — 93000 EKG 12-LEAD: ICD-10-PCS | Mod: S$GLB,,, | Performed by: INTERNAL MEDICINE

## 2022-05-10 PROCEDURE — 85025 COMPLETE CBC W/AUTO DIFF WBC: CPT | Performed by: NURSE PRACTITIONER

## 2022-05-10 PROCEDURE — 84443 ASSAY THYROID STIM HORMONE: CPT | Performed by: NURSE PRACTITIONER

## 2022-05-10 PROCEDURE — 93000 ELECTROCARDIOGRAM COMPLETE: CPT | Mod: S$GLB,,, | Performed by: INTERNAL MEDICINE

## 2022-05-10 PROCEDURE — 83880 ASSAY OF NATRIURETIC PEPTIDE: CPT | Performed by: NURSE PRACTITIONER

## 2022-05-10 PROCEDURE — 81003 URINALYSIS AUTO W/O SCOPE: CPT | Performed by: NURSE PRACTITIONER

## 2022-05-10 PROCEDURE — 80053 COMPREHEN METABOLIC PANEL: CPT | Performed by: NURSE PRACTITIONER

## 2022-05-10 NOTE — PROGRESS NOTES
Pt presents today c/o dizziness. Pt seen yesterday by PCP, ears clear, no sinus complaints. Orthostatics performed. Labs ordered. Per Bridgett pt to < losartan to 1 tab nightly, wear stockings daily, the higher the better. Pt verbalized understanding.

## 2022-05-10 NOTE — TELEPHONE ENCOUNTER
----- Message from Td Magallanes sent at 5/10/2022 10:05 AM CDT -----  Type:  Sooner Appointment Request    Caller is requesting a sooner appointment.  Caller declined first available appointment listed below.  Caller will not accept being placed on the waitlist and is requesting a message be sent to doctor.    Name of Caller:  Pt  When is the first available appointment?     Symptoms:  low energy feeling weak worried about her heart. Feels like she needs EKG seen PCP yesterday, BP diastaltic has been low in the 50's, back has been bothering her, no chest pains but when she had her heart attack she didn't have chest pains either  Best Call Back Number:  [phone]     Additional Information:  Please advise == Thank you

## 2022-05-16 ENCOUNTER — TELEPHONE (OUTPATIENT)
Dept: FAMILY MEDICINE | Facility: CLINIC | Age: 87
End: 2022-05-16

## 2022-05-16 NOTE — TELEPHONE ENCOUNTER
I cant order any test til I see her and I cant order a CT until shes had pain at least a month-not my rules

## 2022-05-16 NOTE — TELEPHONE ENCOUNTER
Patient called she is complaining of right leg, hip, and pain behind the knee.  She is having difficulty walking She has appointment on 5/23/2022  She is requesting test.

## 2022-05-19 ENCOUNTER — OFFICE VISIT (OUTPATIENT)
Dept: FAMILY MEDICINE | Facility: CLINIC | Age: 87
End: 2022-05-19
Payer: MEDICARE

## 2022-05-19 VITALS
HEART RATE: 68 BPM | WEIGHT: 135 LBS | BODY MASS INDEX: 26.5 KG/M2 | TEMPERATURE: 98 F | RESPIRATION RATE: 14 BRPM | HEIGHT: 60 IN | SYSTOLIC BLOOD PRESSURE: 132 MMHG | OXYGEN SATURATION: 99 % | DIASTOLIC BLOOD PRESSURE: 64 MMHG

## 2022-05-19 DIAGNOSIS — M54.31 SCIATICA, RIGHT SIDE: Primary | ICD-10-CM

## 2022-05-19 DIAGNOSIS — E78.00 PURE HYPERCHOLESTEROLEMIA: ICD-10-CM

## 2022-05-19 DIAGNOSIS — M79.604 RIGHT LEG PAIN: ICD-10-CM

## 2022-05-19 DIAGNOSIS — E11.9 DIABETES MELLITUS TYPE 2, DIET-CONTROLLED: ICD-10-CM

## 2022-05-19 PROCEDURE — 99215 OFFICE O/P EST HI 40 MIN: CPT | Performed by: INTERNAL MEDICINE

## 2022-05-19 PROCEDURE — 99213 OFFICE O/P EST LOW 20 MIN: CPT | Mod: S$PBB,AQ,, | Performed by: INTERNAL MEDICINE

## 2022-05-19 PROCEDURE — 96372 THER/PROPH/DIAG INJ SC/IM: CPT | Mod: PBBFAC | Performed by: INTERNAL MEDICINE

## 2022-05-19 PROCEDURE — 99213 PR OFFICE/OUTPT VISIT, EST, LEVL III, 20-29 MIN: ICD-10-PCS | Mod: S$PBB,AQ,, | Performed by: INTERNAL MEDICINE

## 2022-05-19 RX ORDER — AMITRIPTYLINE HYDROCHLORIDE 10 MG/1
10 TABLET, FILM COATED ORAL NIGHTLY PRN
Qty: 30 TABLET | Refills: 1 | Status: SHIPPED | OUTPATIENT
Start: 2022-05-19 | End: 2022-05-26

## 2022-05-19 RX ORDER — METHYLPREDNISOLONE ACETATE 40 MG/ML
40 INJECTION, SUSPENSION INTRA-ARTICULAR; INTRALESIONAL; INTRAMUSCULAR; SOFT TISSUE
Status: DISCONTINUED | OUTPATIENT
Start: 2022-05-19 | End: 2022-05-19

## 2022-05-19 RX ORDER — METHYLPREDNISOLONE ACETATE 80 MG/ML
40 INJECTION, SUSPENSION INTRA-ARTICULAR; INTRALESIONAL; INTRAMUSCULAR; SOFT TISSUE
Status: COMPLETED | OUTPATIENT
Start: 2022-05-19 | End: 2022-05-19

## 2022-05-19 RX ADMIN — METHYLPREDNISOLONE ACETATE 40 MG: 80 INJECTION, SUSPENSION INTRA-ARTICULAR; INTRALESIONAL; INTRAMUSCULAR; SOFT TISSUE at 01:05

## 2022-05-19 RX ADMIN — METHYLPREDNISOLONE SODIUM SUCCINATE 125 MG: 125 INJECTION, POWDER, FOR SOLUTION INTRAMUSCULAR; INTRAVENOUS at 12:05

## 2022-05-19 NOTE — PROGRESS NOTES
SUBJECTIVE:    Patient ID: Karma Chen is a 86 y.o. female.    Chief Complaint: Knee Pain and Hip Pain    HPI     Patient is having pain in the right buttock that is travelling down the buttock down the postero-lateral aspect of the right leg down to the foot-some increased pain behind the right knee-some pain around the knee-pain is wearing patient out-Chiro suggested she have venous doppler....( !!!) pain can be a 9/10-she is being evaluated for rhizotomies--she cant have MRI's-CTs have been done by MD in [place]-no scan  In 4 years-she takes a muscle relaxer already and cant take gabapentin    Lab Visit on 05/10/2022   Component Date Value Ref Range Status    BNP 05/10/2022 490 (A) 0 - 99 pg/mL Final    TSH 05/10/2022 1.280  0.340 - 5.600 uIU/mL Final    WBC 05/10/2022 7.41  3.90 - 12.70 K/uL Final    RBC 05/10/2022 4.20  4.00 - 5.40 M/uL Final    Hemoglobin 05/10/2022 11.5 (A) 12.0 - 16.0 g/dL Final    Hematocrit 05/10/2022 37.2  37.0 - 48.5 % Final    MCV 05/10/2022 89  82 - 98 fL Final    MCH 05/10/2022 27.4  27.0 - 31.0 pg Final    MCHC 05/10/2022 30.9 (A) 32.0 - 36.0 g/dL Final    RDW 05/10/2022 13.8  11.5 - 14.5 % Final    Platelets 05/10/2022 249  150 - 450 K/uL Final    MPV 05/10/2022 10.0  9.2 - 12.9 fL Final    Immature Granulocytes 05/10/2022 0.3  0.0 - 0.5 % Final    Gran # (ANC) 05/10/2022 3.4  1.8 - 7.7 K/uL Final    Immature Grans (Abs) 05/10/2022 0.02  0.00 - 0.04 K/uL Final    Lymph # 05/10/2022 2.5  1.0 - 4.8 K/uL Final    Mono # 05/10/2022 1.1 (A) 0.3 - 1.0 K/uL Final    Eos # 05/10/2022 0.3  0.0 - 0.5 K/uL Final    Baso # 05/10/2022 0.08  0.00 - 0.20 K/uL Final    nRBC 05/10/2022 0  0 /100 WBC Final    Gran % 05/10/2022 46.0  38.0 - 73.0 % Final    Lymph % 05/10/2022 34.3  18.0 - 48.0 % Final    Mono % 05/10/2022 14.7  4.0 - 15.0 % Final    Eosinophil % 05/10/2022 3.6  0.0 - 8.0 % Final    Basophil % 05/10/2022 1.1  0.0 - 1.9 % Final    Differential Method  05/10/2022 Automated   Final    Sodium 05/10/2022 140  136 - 145 mmol/L Final    Potassium 05/10/2022 3.9  3.5 - 5.1 mmol/L Final    Chloride 05/10/2022 103  95 - 110 mmol/L Final    CO2 05/10/2022 27  23 - 29 mmol/L Final    Glucose 05/10/2022 93  70 - 110 mg/dL Final    BUN 05/10/2022 24 (A) 8 - 23 mg/dL Final    Creatinine 05/10/2022 1.4  0.5 - 1.4 mg/dL Final    Calcium 05/10/2022 9.0  8.7 - 10.5 mg/dL Final    Total Protein 05/10/2022 7.2  6.0 - 8.4 g/dL Final    Albumin 05/10/2022 3.9  3.5 - 5.2 g/dL Final    Total Bilirubin 05/10/2022 0.6  0.1 - 1.0 mg/dL Final    Alkaline Phosphatase 05/10/2022 68  55 - 135 U/L Final    AST 05/10/2022 24  10 - 40 U/L Final    ALT 05/10/2022 22  10 - 44 U/L Final    Anion Gap 05/10/2022 10  8 - 16 mmol/L Final    eGFR if  05/10/2022 39.8 (A) >60 mL/min/1.73 m^2 Final    eGFR if non African American 05/10/2022 34.5 (A) >60 mL/min/1.73 m^2 Final    Specimen UA 05/10/2022 Urine, Clean Catch   Final    Color, UA 05/10/2022 Yellow  Yellow, Straw, Anila Final    Appearance, UA 05/10/2022 Clear  Clear Final    pH, UA 05/10/2022 7.0  5.0 - 8.0 Final    Specific Gravity, UA 05/10/2022 1.005  1.005 - 1.030 Final    Protein, UA 05/10/2022 Negative  Negative Final    Glucose, UA 05/10/2022 Negative  Negative Final    Ketones, UA 05/10/2022 Negative  Negative Final    Bilirubin (UA) 05/10/2022 Negative  Negative Final    Occult Blood UA 05/10/2022 Negative  Negative Final    Nitrite, UA 05/10/2022 Negative  Negative Final    Urobilinogen, UA 05/10/2022 Negative  Negative EU/dL Final    Leukocytes, UA 05/10/2022 Negative  Negative Final    Total Protein 05/10/2022 7.2  6.0 - 8.4 g/dL Final    Albumin 05/10/2022 3.9  3.5 - 5.2 g/dL Final    Total Bilirubin 05/10/2022 0.6  0.1 - 1.0 mg/dL Final    Bilirubin, Direct 05/10/2022 <0.1 (A) 0.1 - 0.3 mg/dL Final    AST 05/10/2022 24  10 - 40 U/L Final    ALT 05/10/2022 22  10 - 44 U/L Final     Alkaline Phosphatase 05/10/2022 68  55 - 135 U/L Final    Glucose 05/10/2022 93  70 - 110 mg/dL Final    Sodium 05/10/2022 140  136 - 145 mmol/L Final    Potassium 05/10/2022 3.9  3.5 - 5.1 mmol/L Final    Chloride 05/10/2022 103  95 - 110 mmol/L Final    CO2 05/10/2022 27  23 - 29 mmol/L Final    BUN 05/10/2022 24 (A) 8 - 23 mg/dL Final    Calcium 05/10/2022 9.0  8.7 - 10.5 mg/dL Final    Creatinine 05/10/2022 1.4  0.5 - 1.4 mg/dL Final    Albumin 05/10/2022 3.9  3.5 - 5.2 g/dL Final    Phosphorus 05/10/2022 3.7  2.7 - 4.5 mg/dL Final    eGFR if  05/10/2022 39.8 (A) >60 mL/min/1.73 m^2 Final    eGFR if non African American 05/10/2022 34.5 (A) >60 mL/min/1.73 m^2 Final    Anion Gap 05/10/2022 10  8 - 16 mmol/L Final   Office Visit on 01/25/2022   Component Date Value Ref Range Status    POC Blood, Urine 01/25/2022 Negative  Negative Final    POC Bilirubin, Urine 01/25/2022 Negative  Negative Final    POC Urobilinogen, Urine 01/25/2022 norm  0.1 - 1.1 Final    POC Ketones, Urine 01/25/2022 Negative  Negative Final    POC Protein, Urine 01/25/2022 Negative  Negative Final    POC Nitrates, Urine 01/25/2022 Negative  Negative Final    POC Glucose, Urine 01/25/2022 Negative  Negative Final    pH, UA 01/25/2022 6.0   Final    POC Specific Gravity, Urine 01/25/2022 1.005  1.003 - 1.029 Final    POC Leukocytes, Urine 01/25/2022 Negative  Negative Final    POC Rapid COVID 01/25/2022 Negative  Negative Final     Acceptable 01/25/2022 Yes   Final   Hospital Outpatient Visit on 01/18/2022   Component Date Value Ref Range Status    Battery Voltage (V) 01/18/2022 3.00  V Final    P/R-wave RA Lead 01/18/2022 1.5  mV Final    P/R-wave RA Lead (native) 01/18/2022 10.9  mV Final    Impedance RA Lead 01/18/2022 475  Ohms Final    Impedance RA Lead (native) 01/18/2022 532  Ohms Final    Threshold V RA Lead 01/18/2022 0.750  V Final    Theshold ms RA Lead 01/18/2022 0.40  ms  Final    Threshold V RA Lead (native) 01/18/2022 0.500  V Final    Threshold ms RA Lead (native) 01/18/2022 0.40  ms Final   Office Visit on 01/10/2022   Component Date Value Ref Range Status    Cologuard Result 01/14/2022 Negative  Negative Final   Lab Visit on 11/08/2021   Component Date Value Ref Range Status    Hemoglobin A1C 11/08/2021 5.9  4.5 - 6.2 % Final    Estimated Avg Glucose 11/08/2021 123  68 - 131 mg/dL Final    WBC 11/08/2021 7.35  3.90 - 12.70 K/uL Final    RBC 11/08/2021 4.30  4.00 - 5.40 M/uL Final    Hemoglobin 11/08/2021 12.0  12.0 - 16.0 g/dL Final    Hematocrit 11/08/2021 38.0  37.0 - 48.5 % Final    MCV 11/08/2021 88  82 - 98 fL Final    MCH 11/08/2021 27.9  27.0 - 31.0 pg Final    MCHC 11/08/2021 31.6 (A) 32.0 - 36.0 g/dL Final    RDW 11/08/2021 13.2  11.5 - 14.5 % Final    Platelets 11/08/2021 281  150 - 450 K/uL Final    MPV 11/08/2021 10.2  9.2 - 12.9 fL Final    Immature Granulocytes 11/08/2021 0.3  0.0 - 0.5 % Final    Gran # (ANC) 11/08/2021 3.3  1.8 - 7.7 K/uL Final    Immature Grans (Abs) 11/08/2021 0.02  0.00 - 0.04 K/uL Final    Lymph # 11/08/2021 3.0  1.0 - 4.8 K/uL Final    Mono # 11/08/2021 0.8  0.3 - 1.0 K/uL Final    Eos # 11/08/2021 0.2  0.0 - 0.5 K/uL Final    Baso # 11/08/2021 0.06  0.00 - 0.20 K/uL Final    nRBC 11/08/2021 0  0 /100 WBC Final    Gran % 11/08/2021 44.9  38.0 - 73.0 % Final    Lymph % 11/08/2021 40.4  18.0 - 48.0 % Final    Mono % 11/08/2021 10.5  4.0 - 15.0 % Final    Eosinophil % 11/08/2021 3.1  0.0 - 8.0 % Final    Basophil % 11/08/2021 0.8  0.0 - 1.9 % Final    Differential Method 11/08/2021 Automated   Final    Cholesterol 11/08/2021 248 (A) 120 - 199 mg/dL Final    Triglycerides 11/08/2021 185 (A) 30 - 150 mg/dL Final    HDL 11/08/2021 41  40 - 75 mg/dL Final    LDL Cholesterol 11/08/2021 170.0 (A) 63.0 - 159.0 mg/dL Final    HDL/Cholesterol Ratio 11/08/2021 16.5 (A) 20.0 - 50.0 % Final    Total Cholesterol/HDL Ratio  11/08/2021 6.0 (A) 2.0 - 5.0 Final    Non-HDL Cholesterol 11/08/2021 207  mg/dL Final    Sodium 11/08/2021 144  136 - 145 mmol/L Final    Potassium 11/08/2021 4.0  3.5 - 5.1 mmol/L Final    Chloride 11/08/2021 106  95 - 110 mmol/L Final    CO2 11/08/2021 29  23 - 29 mmol/L Final    Glucose 11/08/2021 88  70 - 110 mg/dL Final    BUN 11/08/2021 25 (A) 8 - 23 mg/dL Final    Creatinine 11/08/2021 1.3  0.5 - 1.4 mg/dL Final    Calcium 11/08/2021 9.5  8.7 - 10.5 mg/dL Final    Anion Gap 11/08/2021 9  8 - 16 mmol/L Final    eGFR if  11/08/2021 43.8 (A) >60 mL/min/1.73 m^2 Final    eGFR if non  11/08/2021 38.0 (A) >60 mL/min/1.73 m^2 Final    Glucose 11/08/2021 88  70 - 110 mg/dL Final    Sodium 11/08/2021 144  136 - 145 mmol/L Final    Potassium 11/08/2021 4.0  3.5 - 5.1 mmol/L Final    Chloride 11/08/2021 106  95 - 110 mmol/L Final    CO2 11/08/2021 29  23 - 29 mmol/L Final    BUN 11/08/2021 25 (A) 8 - 23 mg/dL Final    Calcium 11/08/2021 9.5  8.7 - 10.5 mg/dL Final    Creatinine 11/08/2021 1.3  0.5 - 1.4 mg/dL Final    Albumin 11/08/2021 3.8  3.5 - 5.2 g/dL Final    Phosphorus 11/08/2021 4.1  2.7 - 4.5 mg/dL Final    eGFR if  11/08/2021 43.8 (A) >60 mL/min/1.73 m^2 Final    eGFR if non  11/08/2021 38.0 (A) >60 mL/min/1.73 m^2 Final    Anion Gap 11/08/2021 9  8 - 16 mmol/L Final    Vit D, 25-Hydroxy 11/08/2021 >120 (A) 30 - 96 ng/mL Final    Specimen UA 11/08/2021 Urine, Clean Catch   Final    Color, UA 11/08/2021 Yellow  Yellow, Straw, Anila Final    Appearance, UA 11/08/2021 Clear  Clear Final    pH, UA 11/08/2021 7.0  5.0 - 8.0 Final    Specific [place], UA 11/08/2021 1.020  1.005 - 1.030 Final    Protein, UA 11/08/2021 Negative  Negative Final    Glucose, UA 11/08/2021 Negative  Negative Final    Ketones, UA 11/08/2021 Negative  Negative Final    Bilirubin (UA) 11/08/2021 Negative  Negative Final    Occult Blood UA  11/08/2021 Negative  Negative Final    Nitrite, UA 11/08/2021 Negative  Negative Final    Urobilinogen, UA 11/08/2021 Negative  Negative EU/dL Final    Leukocytes, UA 11/08/2021 1+ (A) Negative Final    RBC, UA 11/08/2021 1  0 - 4 /hpf Final    WBC, UA 11/08/2021 3  0 - 5 /hpf Final    Bacteria 11/08/2021 Negative  None-Occ /hpf Final    Squam Epithel, UA 11/08/2021 4  /hpf Final    Hyaline Casts, UA 11/08/2021 4 (A) 0-1/lpf /lpf Final    Microscopic Comment 11/08/2021 SEE COMMENT   Final    PTH, Intact 11/08/2021 26.1  9.0 - 77.0 pg/mL Final    Protein, Urine Random 11/08/2021 7  6 - 15 mg/dL Final    Creatinine, Urine 11/08/2021 87.0  15.0 - 325.0 mg/dL Final    Prot/Creat Ratio, Urine 11/08/2021 0.08  0.00 - 0.20 Final   Admission on 10/22/2021, Discharged on 10/22/2021   Component Date Value Ref Range Status    Sodium 10/22/2021 139  136 - 145 mmol/L Final    Potassium 10/22/2021 4.0  3.5 - 5.1 mmol/L Final    Chloride 10/22/2021 104  95 - 110 mmol/L Final    CO2 10/22/2021 29  23 - 29 mmol/L Final    Glucose 10/22/2021 137 (A) 70 - 110 mg/dL Final    BUN 10/22/2021 23  8 - 23 mg/dL Final    Creatinine 10/22/2021 1.7 (A) 0.5 - 1.4 mg/dL Final    Calcium 10/22/2021 8.7  8.7 - 10.5 mg/dL Final    Total Protein 10/22/2021 7.4  6.0 - 8.4 g/dL Final    Albumin 10/22/2021 4.1  3.5 - 5.2 g/dL Final    Total Bilirubin 10/22/2021 0.8  0.1 - 1.0 mg/dL Final    Alkaline Phosphatase 10/22/2021 64  55 - 135 U/L Final    AST 10/22/2021 23  10 - 40 U/L Final    ALT 10/22/2021 20  10 - 44 U/L Final    Anion Gap 10/22/2021 6 (A) 8 - 16 mmol/L Final    eGFR if  10/22/2021 31.7 (A) >60 mL/min/1.73 m^2 Final    eGFR if non African American 10/22/2021 27.5 (A) >60 mL/min/1.73 m^2 Final    Magnesium 10/22/2021 2.3  1.6 - 2.6 mg/dL Final    WBC 10/22/2021 7.89  3.90 - 12.70 K/uL Final    RBC 10/22/2021 4.16  4.00 - 5.40 M/uL Final    Hemoglobin 10/22/2021 11.7 (A) 12.0 - 16.0 g/dL  Final    Hematocrit 10/22/2021 37.0  37.0 - 48.5 % Final    MCV 10/22/2021 89  82 - 98 fL Final    MCH 10/22/2021 28.1  27.0 - 31.0 pg Final    MCHC 10/22/2021 31.6 (A) 32.0 - 36.0 g/dL Final    RDW 10/22/2021 13.0  11.5 - 14.5 % Final    Platelets 10/22/2021 269  150 - 450 K/uL Final    MPV 10/22/2021 9.6  9.2 - 12.9 fL Final    Immature Granulocytes 10/22/2021 0.4  0.0 - 0.5 % Final    Gran # (ANC) 10/22/2021 4.5  1.8 - 7.7 K/uL Final    Immature Grans (Abs) 10/22/2021 0.03  0.00 - 0.04 K/uL Final    Lymph # 10/22/2021 2.3  1.0 - 4.8 K/uL Final    Mono # 10/22/2021 0.9  0.3 - 1.0 K/uL Final    Eos # 10/22/2021 0.1  0.0 - 0.5 K/uL Final    Baso # 10/22/2021 0.04  0.00 - 0.20 K/uL Final    nRBC 10/22/2021 0  0 /100 WBC Final    Gran % 10/22/2021 57.0  38.0 - 73.0 % Final    Lymph % 10/22/2021 29.7  18.0 - 48.0 % Final    Mono % 10/22/2021 11.0  4.0 - 15.0 % Final    Eosinophil % 10/22/2021 1.4  0.0 - 8.0 % Final    Basophil % 10/22/2021 0.5  0.0 - 1.9 % Final    Differential Method 10/22/2021 Automated   Final    PT 10/22/2021 13.9 (A) 11.4 - 13.7 sec Final    INR 10/22/2021 1.2   Final    Troponin I 10/22/2021 <0.030  <=0.040 ng/mL Final    Specimen UA 10/22/2021 Urine, Catheterized   Final    Color, UA 10/22/2021 Colorless (A) Yellow, Straw, Anila Final    Appearance, UA 10/22/2021 Clear  Clear Final    pH, UA 10/22/2021 8.0  5.0 - 8.0 Final    Specific [place], UA 10/22/2021 1.005  1.005 - 1.030 Final    Protein, UA 10/22/2021 Negative  Negative Final    Glucose, UA 10/22/2021 Negative  Negative Final    Ketones, UA 10/22/2021 Negative  Negative Final    Bilirubin (UA) 10/22/2021 Negative  Negative Final    Occult Blood UA 10/22/2021 Negative  Negative Final    Nitrite, UA 10/22/2021 Negative  Negative Final    Urobilinogen, UA 10/22/2021 Negative  Negative EU/dL Final    Leukocytes, UA 10/22/2021 Negative  Negative Final    Lithium Level 10/22/2021 0.0 (A) 0.6 - 1.2 mmol/L  Final    SARS-CoV-2 RNA, Amplification, Qual 10/22/2021 Negative  Negative Final    TSH 10/22/2021 1.760  0.340 - 5.600 uIU/mL Final    Magnesium 10/22/2021 2.3  1.6 - 2.6 mg/dL Final    CPK 10/22/2021 53  20 - 180 U/L Final   Office Visit on 08/13/2021   Component Date Value Ref Range Status    CULTURE, AEROBIC AND ANAEROBIC 08/13/2021    Final   Hospital Outpatient Visit on 07/20/2021   Component Date Value Ref Range Status    Battery Voltage (V) 07/20/2021 3.01  V Final    P/R-wave RA Lead 07/20/2021 1.4  mV Final    P/R-wave RA Lead (native) 07/20/2021 11.6  mV Final    Impedance RA Lead 07/20/2021 475  Ohms Final    Impedance RA Lead (native) 07/20/2021 532  Ohms Final    Threshold V RA Lead 07/20/2021 0.75  V Final    Theshold ms RA Lead 07/20/2021 0.4  ms Final    Threshold V RA Lead (native) 07/20/2021 0.5  V Final    Threshold ms RA Lead (native) 07/20/2021 0.4  ms Final   Lab Visit on 06/14/2021   Component Date Value Ref Range Status    Sodium 06/14/2021 140  136 - 145 mmol/L Final    Potassium 06/14/2021 4.2  3.5 - 5.1 mmol/L Final    Chloride 06/14/2021 102  95 - 110 mmol/L Final    CO2 06/14/2021 26  23 - 29 mmol/L Final    Glucose 06/14/2021 83  70 - 110 mg/dL Final    BUN 06/14/2021 24 (A) 8 - 23 mg/dL Final    Creatinine 06/14/2021 1.2  0.5 - 1.4 mg/dL Final    Calcium 06/14/2021 9.2  8.7 - 10.5 mg/dL Final    Anion Gap 06/14/2021 12  8 - 16 mmol/L Final    eGFR if  06/14/2021 48.3 (A) >60 mL/min/1.73 m^2 Final    eGFR if non African American 06/14/2021 41.9 (A) >60 mL/min/1.73 m^2 Final   Hospital Outpatient Visit on 06/10/2021   Component Date Value Ref Range Status    IVRT 06/10/2021 79.00  ms Final    IVS 06/10/2021 1.26 (A) 0.6 - 1.1 cm Final    LVIDd 06/10/2021 4.62  3.5 - 6.0 cm Final    LVIDs 06/10/2021 3.46  2.1 - 4.0 cm Final    LVOT diameter 06/10/2021 2.00  cm Final    LVOT peak VTI 06/10/2021 22.00  cm Final    LVOT peak majo 06/10/2021  0.70  m/s Final    Posterior Wall 06/10/2021 1.21 (A) 0.6 - 1.1 cm Final    AV regurgitation pressure 1/2 time 06/10/2021 409.00  ms Final    Ao peak dylan 06/10/2021 1.41  m/s Final    AV peak gradient 06/10/2021 8  mmHg Final    AORTIC VALVE CUSP SEPERATION 06/10/2021 1.60  cm Final    AV mean gradient 06/10/2021 4  mmHg Final    Ao VTI 06/10/2021 34.80  cm Final    Ao root annulus 06/10/2021 3.00  cm Final    Left Atrium Major Axis 06/10/2021 4.80  cm Final    LA size 06/10/2021 4.50  cm Final    E wave deceleration time 06/10/2021 174.00  ms Final    MV Peak A Dylan 06/10/2021 0.73  m/s Final    MV Peak E Dylan 06/10/2021 0.87  m/s Final    RVDD 06/10/2021 1.69  cm Final    Triscuspid Valve Regurgitation Pea* 06/10/2021 32  mmHg Final    BSA 06/10/2021 1.63  m2 Final    TDI SEPTAL 06/10/2021 0.04  m/s Final    LV LATERAL E/E' RATIO 06/10/2021 12.43  m/s Final    LV SEPTAL E/E' RATIO 06/10/2021 21.75  m/s Final    TDI LATERAL 06/10/2021 0.07  m/s Final    FS 06/10/2021 25  28 - 44 % Final    LV mass 06/10/2021 215.08  g Final    Left Ventricle Relative Wall Thick* 06/10/2021 0.52  cm Final    AV valve area 06/10/2021 1.99  cm2 Final    AV Velocity Ratio 06/10/2021 0.50   Final    AV index (prosthetic) 06/10/2021 0.63   Final    E/A ratio 06/10/2021 1.19   Final    Mean e' 06/10/2021 0.06  m/s Final    LVOT area 06/10/2021 3.1  cm2 Final    LVOT stroke volume 06/10/2021 69.08  cm3 Final    E/E' ratio 06/10/2021 15.82  m/s Final    TR Max Dylan 06/10/2021 2.84  m/s Final    LV Mass Index 06/10/2021 135  g/m2 Final    Right Atrial Pressure (from IVC) 06/10/2021 3  mmHg Final    EF 06/10/2021 49  % Final    TV rest pulmonary artery pressure 06/10/2021 35  mmHg Final   There may be more visits with results that are not included.       Past Medical History:   Diagnosis Date    Coronary artery disease     Dermatitis     Dermatitis 12/8/2017    Diabetes mellitus     Diabetes mellitus type  II     Leah Madrigal virus infection     Fibromyalgia     GERD (gastroesophageal reflux disease)     Hypertension     MI (myocardial infarction) 09/23/2011    Pure hypercholesterolemia 2/26/2020     Past Surgical History:   Procedure Laterality Date    adenoids      ANGIOPLASTY  1987    APPENDECTOMY      CARDIAC PACEMAKER PLACEMENT  01/12/2017    CATARACT EXTRACTION, BILATERAL Bilateral 9/2/15, 3/17/15    right eye-9/2/15, left eye-3/17/15    CHOLECYSTECTOMY  1987    CORONARY ARTERY BYPASS GRAFT  2006    quadruple    coronary stents  1999    x2    CYST REMOVAL  04/25/2017    on back    HYSTERECTOMY  1966    OVARY SURGERY  1948    Ovary burst & was repaired    RHIZOTOMY  09/14/2018    TONSILLECTOMY  1940     Family History   Problem Relation Age of Onset    No Known Problems Mother     No Known Problems Father        Marital Status:   Alcohol History:  reports no history of alcohol use.  Tobacco History:  reports that she has never smoked. She has never used smokeless tobacco.  Drug History:  reports no history of drug use.    Review of patient's allergies indicates:   Allergen Reactions    Arthrotec 50 [diclofenac-misoprostol]     Doxycycline     Effexor [venlafaxine]     Estradiol     Flagyl [metronidazole]     Fluconazole     Iodine     Levaquin [levofloxacin]     Nexium [esomeprazole magnesium]     Penicillins     Shellfish containing products     Statins-hmg-coa reductase inhibitors     Sulfa (sulfonamide antibiotics)     Tea tree oil     Tegaserod      ZOLNORM    Trazodone     Yeast, dried     Adhesive Rash       Current Outpatient Medications:     acetaminophen (TYLENOL) 500 MG tablet, Take 500 mg by mouth every 6 (six) hours as needed for Pain., Disp: , Rfl:     ALPRAZolam (XANAX) 0.25 MG tablet, Take 1 tablet (0.25 mg total) by mouth as needed for Anxiety., Disp: 90 tablet, Rfl: 0    apixaban (ELIQUIS) 2.5 mg Tab, Take 1 tablet (2.5 mg total) by mouth 2 (two)  times daily., Disp: 180 tablet, Rfl: 3    aspirin (ECOTRIN) 81 MG EC tablet, Take 1 tablet (81 mg total) by mouth once daily. (Patient taking differently: Take 81 mg by mouth every evening.), Disp: , Rfl: 0    betamethasone dipropionate 0.05 % cream, APPLY TOPICALLY TWICE A DAY, Disp: 15 g, Rfl: 0    cetirizine (ZYRTEC) 10 MG tablet, Take 10 mg by mouth once daily., Disp: , Rfl:     cholecalciferol, vitamin D3, 100 mcg (4,000 unit) Cap, Take 1 capsule by mouth 2 (two) times daily. , Disp: , Rfl:     coenzyme Q10 100 mg capsule, Take 1 capsule (100 mg total) by mouth 2 (two) times daily., Disp: 60 capsule, Rfl: 11    diclofenac sodium (VOLTAREN) 1 % Gel, Apply 2 g topically once daily., Disp: , Rfl:     diphenhydrAMINE (BENYLIN) 12.5 mg/5 mL liquid, Take 12.5 mg by mouth 4 (four) times daily as needed for Allergies., Disp: , Rfl:     ECHINACEA ORAL, Take 750 mg by mouth 2 (two) times a day., Disp: , Rfl:     estradioL (ESTRACE) 0.01 % (0.1 mg/gram) vaginal cream, Place 1 g vaginally once a week., Disp: 42.5 g, Rfl: 1    fluocinonide 0.05% (LIDEX) 0.05 % cream, Apply sparingly to rash on chest, neck, legs bid max use one month., Disp: 30 g, Rfl: 0    fluticasone propionate (FLONASE) 50 mcg/actuation nasal spray, 1 spray by Each Nostril route once daily., Disp: , Rfl:     isosorbide mononitrate (IMDUR) 120 MG 24 hr tablet, Take 1 tablet (120 mg total) by mouth once daily. DO NOT CRUSH OR CHEW; SWALLOW WHOLE., Disp: 90 tablet, Rfl: 3    ketoconazole (NIZORAL) 2 % cream, aaa bid, Disp: 60 g, Rfl: 2    ketoconazole (NIZORAL) 2 % shampoo, Use to wash hair every day or every other day for dry itchy scalp (Patient taking differently: Apply 1 application topically daily as needed. Use to wash hair every day or every other day for dry itchy scalp), Disp: 120 mL, Rfl: 5    levothyroxine (SYNTHROID) 88 MCG tablet, Take 1 tablet (88 mcg total) by mouth once daily., Disp: 90 tablet, Rfl: 1    loratadine (CLARITIN)  10 mg tablet, Take 10 mg by mouth once daily., Disp: , Rfl:     losartan (COZAAR) 25 MG tablet, Take 1 tablet (25 mg total) by mouth 2 (two) times daily. (Patient taking differently: Take 25 mg by mouth every evening.), Disp: 180 tablet, Rfl: 1    MAGNESIUM ORAL, Take 500 mg by mouth once daily. , Disp: , Rfl:     metoprolol succinate (TOPROL-XL) 25 MG 24 hr tablet, Take 1 tablet (25 mg total) by mouth once daily., Disp: 90 tablet, Rfl: 3    montelukast (SINGULAIR) 10 mg tablet, TAKE 1 TABLET BY MOUTH EVERY DAY IN THE EVENING, Disp: 90 tablet, Rfl: 1    multivitamin (THERAGRAN) tablet, Take 1 tablet by mouth 2 (two) times a day. , Disp: , Rfl:     mupirocin (BACTROBAN) 2 % ointment, Apply topically 2 (two) times daily., Disp: 22 g, Rfl: 11    nitroGLYCERIN (NITROSTAT) 0.4 MG SL tablet, Place 1 tablet (0.4 mg total) under the tongue every 5 (five) minutes as needed for Chest pain., Disp: 30 tablet, Rfl: 3    omeprazole (PRILOSEC) 20 MG capsule, TAKE 1 CAPSULE BY MOUTH EVERY DAY, Disp: 90 capsule, Rfl: 3    prasterone, dhea, 25 mg Cap, Take 1 capsule by mouth once daily., Disp: , Rfl:     tiZANidine (ZANAFLEX) 4 MG tablet, Take 1 tablet (4 mg total) by mouth once daily., Disp: 90 tablet, Rfl: 0    traMADoL (ULTRAM) 50 mg tablet, TAKE 1 TABLET (50 MG TOTAL) BY MOUTH 2 (TWO) TIMES DAILY AS NEEDED (PAIN)., Disp: 14 tablet, Rfl: 0    triamcinolone acetonide 0.1% (KENALOG) 0.1 % cream, aaa bid x 1-2 wks, tk 1 wk off prn rash, Disp: 30 g, Rfl: 2    turmeric 400 mg Cap, Take 1,200 mg by mouth once daily., Disp: , Rfl:     UNABLE TO FIND, Take 1 capsule by mouth. Bio-Smoothe- premium nerve, Disp: , Rfl:     UNABLE TO FIND, Take 1 capsule by mouth once daily. medication name: GI probiotic, Disp: , Rfl:     UNABLE TO FIND, Take 1 capsule by mouth once daily. Joint health, Disp: , Rfl:     UNABLE TO FIND, Take 1 capsule by mouth 2 (two) times a day. Cardio platinum, Disp: , Rfl:     zinc gluconate 50 mg tablet,  Take 50 mg by mouth 2 (two) times a day., Disp: , Rfl:     UNABLE TO FIND, Nerve Jorge, Disp: , Rfl:     Current Facility-Administered Medications:     evolocumab PnIj 140 mg, 140 mg, Subcutaneous, Q14 Days, Kenton Ware MD    Review of Systems   All other systems reviewed and are negative.         Objective:      Vitals:    05/19/22 1114   BP: 132/64   Pulse: 68   Resp: 14   Temp: 98 °F (36.7 °C)   SpO2: 99%   Weight: 61.2 kg (135 lb)   Height: 5' (1.524 m)     Physical Exam  Constitutional:       Appearance: Normal appearance.   HENT:      Head: Normocephalic and atraumatic.   Eyes:      Extraocular Movements: Extraocular movements intact.      Pupils: Pupils are equal, round, and reactive to light.   Musculoskeletal:         General: Tenderness (medial right knee) present.      Cervical back: No rigidity.      Right lower leg: No edema.      Left lower leg: No edema.      Comments: Varicosities lower extremities   Lymphadenopathy:      Cervical: No cervical adenopathy.   Skin:     General: Skin is warm and dry.   Neurological:      Mental Status: She is alert.      Gait: Gait abnormal.      Comments: Neg straight leg raise   Psychiatric:         Mood and Affect: Mood normal.         Thought Content: Thought content normal.         Judgment: Judgment normal.           Assessment:       1. Pure hypercholesterolemia    2. Diabetes mellitus type 2, diet-controlled    3. Sciatica, right side    4. Right leg pain         Plan:       Pure hypercholesterolemia    Diabetes mellitus type 2, diet-controlled    Sciatica, right side    Right leg pain      No follow-ups on file.        5/19/2022 Elena Sullivan M.D.

## 2022-05-20 ENCOUNTER — LAB VISIT (OUTPATIENT)
Dept: LAB | Facility: HOSPITAL | Age: 87
End: 2022-05-20
Attending: INTERNAL MEDICINE
Payer: MEDICARE

## 2022-05-20 DIAGNOSIS — N18.30 CHRONIC KIDNEY DISEASE, STAGE III (MODERATE): Primary | ICD-10-CM

## 2022-05-20 DIAGNOSIS — E11.9 DIABETES MELLITUS TYPE 2, DIET-CONTROLLED: ICD-10-CM

## 2022-05-20 DIAGNOSIS — E78.00 PURE HYPERCHOLESTEROLEMIA: ICD-10-CM

## 2022-05-20 LAB
ALBUMIN SERPL BCP-MCNC: 4 G/DL (ref 3.5–5.2)
ALBUMIN/CREAT UR: 11.1 UG/MG (ref 0–30)
ANION GAP SERPL CALC-SCNC: 12 MMOL/L (ref 8–16)
BACTERIA #/AREA URNS HPF: NEGATIVE /HPF
BUN SERPL-MCNC: 28 MG/DL (ref 8–23)
CALCIUM SERPL-MCNC: 9.3 MG/DL (ref 8.7–10.5)
CHLORIDE SERPL-SCNC: 103 MMOL/L (ref 95–110)
CHOLEST SERPL-MCNC: 223 MG/DL (ref 120–199)
CHOLEST/HDLC SERPL: 4 {RATIO} (ref 2–5)
CO2 SERPL-SCNC: 24 MMOL/L (ref 23–29)
CREAT SERPL-MCNC: 1.2 MG/DL (ref 0.5–1.4)
CREAT UR-MCNC: 97 MG/DL (ref 15–325)
CREAT UR-MCNC: 97 MG/DL (ref 15–325)
EST. GFR  (AFRICAN AMERICAN): 48 ML/MIN/1.73 M^2
EST. GFR  (NON AFRICAN AMERICAN): 41.6 ML/MIN/1.73 M^2
ESTIMATED AVG GLUCOSE: 111 MG/DL (ref 68–131)
GLUCOSE SERPL-MCNC: 135 MG/DL (ref 70–110)
HBA1C MFR BLD: 5.5 % (ref 4.5–6.2)
HDLC SERPL-MCNC: 56 MG/DL (ref 40–75)
HDLC SERPL: 25.1 % (ref 20–50)
HYALINE CASTS #/AREA URNS LPF: 2 /LPF
LDLC SERPL CALC-MCNC: 145.2 MG/DL (ref 63–159)
MICROALBUMIN UR DL<=1MG/L-MCNC: 10.8 UG/ML
MICROSCOPIC COMMENT: ABNORMAL
NONHDLC SERPL-MCNC: 167 MG/DL
PHOSPHATE SERPL-MCNC: 3.6 MG/DL (ref 2.7–4.5)
POTASSIUM SERPL-SCNC: 4 MMOL/L (ref 3.5–5.1)
PROT UR-MCNC: 6 MG/DL (ref 6–15)
PROT/CREAT UR: 0.06 MG/G{CREAT} (ref 0–0.2)
RBC #/AREA URNS HPF: 1 /HPF (ref 0–4)
SODIUM SERPL-SCNC: 139 MMOL/L (ref 136–145)
SQUAMOUS #/AREA URNS HPF: 1 /HPF
TRIGL SERPL-MCNC: 109 MG/DL (ref 30–150)
WBC #/AREA URNS HPF: 0 /HPF (ref 0–5)

## 2022-05-20 PROCEDURE — 80069 RENAL FUNCTION PANEL: CPT | Performed by: INTERNAL MEDICINE

## 2022-05-20 PROCEDURE — 84156 ASSAY OF PROTEIN URINE: CPT | Performed by: INTERNAL MEDICINE

## 2022-05-20 PROCEDURE — 82570 ASSAY OF URINE CREATININE: CPT | Performed by: INTERNAL MEDICINE

## 2022-05-20 PROCEDURE — 36415 COLL VENOUS BLD VENIPUNCTURE: CPT | Performed by: INTERNAL MEDICINE

## 2022-05-20 PROCEDURE — 82043 UR ALBUMIN QUANTITATIVE: CPT | Performed by: INTERNAL MEDICINE

## 2022-05-20 PROCEDURE — 83036 HEMOGLOBIN GLYCOSYLATED A1C: CPT | Performed by: INTERNAL MEDICINE

## 2022-05-20 PROCEDURE — 80061 LIPID PANEL: CPT | Performed by: INTERNAL MEDICINE

## 2022-05-20 PROCEDURE — 81001 URINALYSIS AUTO W/SCOPE: CPT | Performed by: INTERNAL MEDICINE

## 2022-05-21 ENCOUNTER — HOSPITAL ENCOUNTER (OUTPATIENT)
Dept: RADIOLOGY | Facility: HOSPITAL | Age: 87
Discharge: HOME | End: 2022-05-21
Attending: INTERNAL MEDICINE
Payer: MEDICARE

## 2022-05-21 DIAGNOSIS — M79.604 RIGHT LEG PAIN: ICD-10-CM

## 2022-05-21 PROCEDURE — 93971 EXTREMITY STUDY: CPT | Mod: TC,RT

## 2022-05-24 ENCOUNTER — TELEPHONE (OUTPATIENT)
Dept: CARDIOLOGY | Facility: CLINIC | Age: 87
End: 2022-05-24
Payer: MEDICARE

## 2022-05-24 ENCOUNTER — TELEPHONE (OUTPATIENT)
Dept: FAMILY MEDICINE | Facility: CLINIC | Age: 87
End: 2022-05-24

## 2022-05-24 DIAGNOSIS — I48.0 PAF (PAROXYSMAL ATRIAL FIBRILLATION): Primary | ICD-10-CM

## 2022-05-24 DIAGNOSIS — M79.604 RIGHT LEG PAIN: ICD-10-CM

## 2022-05-24 DIAGNOSIS — M54.31 SCIATICA, RIGHT SIDE: Primary | ICD-10-CM

## 2022-05-24 NOTE — TELEPHONE ENCOUNTER
----- Message from RT Letty sent at 5/22/2022  6:23 PM CDT -----  Regarding: EKG  Please put the EKG order in for the EKG performed on 5/10/22      ,  then attach the order to the EKG appointment or the MD appointment for the date performed.      EKG will not be read until order is received.    Thank you

## 2022-05-25 ENCOUNTER — CLINICAL SUPPORT (OUTPATIENT)
Dept: REHABILITATION | Facility: HOSPITAL | Age: 87
End: 2022-05-25
Payer: MEDICARE

## 2022-05-25 DIAGNOSIS — M54.31 SCIATICA, RIGHT SIDE: ICD-10-CM

## 2022-05-25 DIAGNOSIS — M79.604 RIGHT LEG PAIN: ICD-10-CM

## 2022-05-25 DIAGNOSIS — M54.40 BACK PAIN OF LUMBOSACRAL REGION WITH SCIATICA: ICD-10-CM

## 2022-05-25 PROCEDURE — 97110 THERAPEUTIC EXERCISES: CPT | Mod: PN

## 2022-05-25 PROCEDURE — 97161 PT EVAL LOW COMPLEX 20 MIN: CPT | Mod: PN

## 2022-05-25 NOTE — PLAN OF CARE
[facility]  Physical Therapy Initial Evaluation    Name: Karma Chen  Clinic Number: [id number]    Therapy Diagnosis:   Encounter Diagnoses   Name Primary?    Sciatica, right side     Right leg pain     Back pain of lumbosacral region with sciatica      Physician: Elena Sullivan MD    Physician Orders: PT Eval and Treat   Medical Diagnosis from Referral:   M54.31 (ICD-10-CM) - Sciatica, right side   M79.604 (ICD-10-CM) - Right leg pain   Evaluation Date: 5/25/2022  Authorization Period Expiration: 12/31/2022  Plan of Care Expiration: 8/15/2022 or 12v post eval  Visit # (total) / Visits authorized: 1 / eval (POC 0 / 12)  2nd FOTO visit 5  3rd FOTO visit 12  Progress Note Due: 6/25/2022    Time In: 1125  Time Out: 1215  Total Billable Time: 50 minutes    Precautions: CAD; DM; HTN; Fibromyalgia; MI hx  Insurance: Payor: MEDICARE / Plan: MEDICARE PART A & B / Product Type: Government /     [employer]   Date of onset: 3 weeks ago  History of current condition - Karma reports: she has had Mild pain in her back and legs over the years, but this terrible pain in her right hip and leg just started about 3 weeks ago. Reports MD ruled out DVT. She Went to the [employer] and then [employer]; in the store she had to get a buggy b/c she stopped being able to walk. Reports she has had 3 blocks prior to a planned rhizotomy in July. June 6th will be 4th block. Reports history of cyst pressing on Left sciatic nerve; had a surgery to remove the cyst and has not had pain on the left side since that surgery.     Medical History:   Past Medical History:   Diagnosis Date    Coronary artery disease     Dermatitis     Dermatitis 12/8/2017    Diabetes mellitus     Diabetes mellitus type II     Leah Madrigal virus infection     Fibromyalgia     GERD (gastroesophageal reflux disease)     Hypertension     MI (myocardial infarction) 09/23/2011    Pure hypercholesterolemia 2/26/2020       Surgical  History:   Karma Chen  has a past surgical history that includes Coronary artery bypass graft (2006); Tonsillectomy (1940); adenoids; Appendectomy; Hysterectomy (1966); Cholecystectomy (1987); coronary stents (1999); Ovary surgery (1948); Angioplasty (1987); Cataract extraction, bilateral (Bilateral, 9/2/15, 3/17/15); Cardiac pacemaker placement (01/12/2017); Cyst Removal (04/25/2017); and Rhizotomy (09/14/2018).    Medications:   Karma has a current medication list which includes the following prescription(s): acetaminophen, alprazolam, amitriptyline, apixaban, aspirin, betamethasone dipropionate, cetirizine, cholecalciferol (vitamin d3), coenzyme q10, diclofenac sodium, diphenhydramine, echinacea, estradiol, fluocinonide 0.05%, fluticasone propionate, isosorbide mononitrate, ketoconazole, ketoconazole, levothyroxine, loratadine, losartan, magnesium, metoprolol succinate, montelukast, multivitamin, mupirocin, nitroglycerin, omeprazole, prasterone (dhea), tizanidine, tramadol, triamcinolone acetonide 0.1%, turmeric, UNABLE TO FIND, UNABLE TO FIND, UNABLE TO FIND, UNABLE TO FIND, UNABLE TO FIND, and zinc gluconate, and the following Facility-Administered Medications: evolocumab.    Allergies:   Review of patient's allergies indicates:   Allergen Reactions    Arthrotec 50 [diclofenac-misoprostol]     Doxycycline     Effexor [venlafaxine]     Estradiol     Flagyl [metronidazole]     Fluconazole     Iodine     Levaquin [levofloxacin]     Nexium [esomeprazole magnesium]     Penicillins     Shellfish containing products     Statins-hmg-coa reductase inhibitors     Sulfa (sulfonamide antibiotics)     Tea tree oil     Tegaserod      ZOLNORM    Trazodone     Yeast, dried     Adhesive Rash        Imaging, none in EPIC    Prior Therapy: yes  Social History: live with ; 1-story; 1 step  Current Smoker: no  Recent Weight loss: no  Bowel or Bladder Issues: constipated (able to defecate today)  Falls  in last 6 months: no  Cancer Hx: no  Occupation: retired  Prior Level of Function: arrived in wheelchair; independent prior to leg pain  Current Level of Function: difficult/pain with dressing; chores; driving; standing    Pain:  Current 6/10, worst 8/10, best 2/10   Location: right glute down posterior thigh sometimes into foot  Description: grabbing; sharp; denies n/t down leg but tingling in feet (has had awhile)  Aggravating Factors: difficult/pain with dressing; chores; driving; standing  Easing Factors: ice; rest; heat; pn med    Pts goals: get rid of pain    Objective     Posture / IC / ASIS / PSIS:  Right hip elevated; decreased l/s lordosis; severe t/s kyphosis and forward trunk lean    Palpation: TTP to right glute    Gait With AD.  Device Used -  arrived in w/c   Analysis Unable to analyze today     Lumbar AROM: ROM-   Response to repeated movements   Flexion NT   Extension (15 norm) 10 degrees of forward flexion (repeat extension increased radicular pain)   Right side bending  (20 norm) 15 degrees - increase pn   Left side bending  (20 norm) 15 degrees   Rotation Observation NT     Sensation (light touch): hypersensitive in Right medial gastroc (L3-4)     4/5 all / pn with Right Knee Extension / 3+ KF /   L/E MMT: 5/5 Left Right   Hip Flexion 4/5. 4/5.   Hip Extension 3+/5. 3+/5.        Hip Adduction 4/5. 4/5.             Knee Flexion 3+/5. 3+/5.   Knee Extension 4/5. 4/5. With pn   Ankle DF 5/5. 5/5.   Ankle INV 4+/5. 4+/5.   Ankle EV 4+/5. 4+/5.     Hip & LE Range of Motion:    Left  WNL Right  WNL     Core strength MMT: 2/5 unable to assume test position  LLD (Leg length discrepancy) / Supine to Sit: Right long to long = LLD    Flexibility Left Right   Hamstrings 90/90 (-20 norm) -30 degrees -45 degrees     Flexibility: mild / moderate / severe restriction grading  Quadriceps: severe  Illiopsoas: severe  Paraspinals: severe    Special Tests Left Right   Slump  negative. positive.   SLR negative.  positive.   FADDIR negative. negative.   ALEXANDER negative. negative.   Piriformis negative. positive.     Oswestry: 72.3 intake    CMS Impairment/Limitation/Restriction for FOTO Survey    Therapist reviewed FOTO scores for Karma Chen on 5/25/2022.   FOTO documents entered into EPIC - see Media section.    Limitation Score: 79%  Category: Mobility    Current : CL = least 60% but < 80% impaired, limited or restricted  Goal: CK = at least 40% but < 60% impaired, limited or restricted  Discharge:          TREATMENT   Treatment Time In: 1200  Treatment Time Out: 1215  Total Treatment time separate from Evaluation: 15 minutes    Karma received therapeutic exercises to develop strength, ROM, flexibility and posture for 15 minutes including:    STAND:  Right Tensor Fascia Latae/QL stretch, Right Lower Extremity posterior, [id number]    TABLE:   Supine sciatic nerve glide, Right, 20x (to pt tolerance)  Piriformis stretch, pull across, 3x20 sec Bilateral    Jaxon stretch, [id number] Bilateral     ADD NEXT:  L/s rotations, 10x Bilateral  Abdominal Bracing, 20x 5 sec  Short Arc Quad  Mini bridges   Ab brace with add ball squeeze  Supine Red Theraband clamshells  Upright posture against wall, [id number] hold  Pre-Gait train: 1 min each (airex optional)  1. M-L weight shift   2. Stagger: Affected fleg in front: Heel contact -> Flat foot with KE (mid stance)  3. Stagger: Affected leg in back: Roll through -> push off  4. Ambulate with AD or none  FUTURE visit:  Prone quad stretch with strap  Obliques  Retest possible LLD at 3rd visit, prescribe heel lift if appropriate  Standing Multifidus Lean, 10x10s  Standing FF, hands on table, hip extension  Standing Hip abduction  Mechanical lumbar traction at 5th visit if not improving      Home Exercises and Patient Education Provided    Education provided:   - daily HEP    Written Home Exercises Provided: yes.  Exercises were reviewed and Karma was able to demonstrate them prior to the end of  the session.  Karma demonstrated good  understanding of the education provided.     See EMR under Patient Instructions for exercises provided 5/25/2022    Assessment   Karma is a 86 y.o. female referred to outpatient Physical Therapy with a medical diagnosis of right leg pain and sciatica. Pt presents with pain palpable in right glute radiating in sciatic nerve distribution pathway, decreased lumbar extension, decreased core, hip and Bilateral Lower Extremity strength, decreased hip flexibility, and posture and gait deficits.  Pt prognosis is Good.   Pt will benefit from skilled outpatient Physical Therapy to address the deficits stated above and in the chart below, provide pt/family education, and to maximize pt's level of independence.     Plan of care discussed with patient: Yes  Pt's spiritual, cultural and educational needs considered and patient is agreeable to the plan of care and goals as stated below:     Anticipated Barriers for therapy: co-morbidities    Medical Necessity is demonstrated by the following  History  Co-morbidities and personal factors that may impact the plan of care Co-morbidities:   CAD; DM; HTN; Fibromyalgia; MI hx    Personal Factors:   coping style  lifestyle     moderate   Examination  Body Structures and Functions, activity limitations and participation restrictions that may impact the plan of care Body Regions:   back  lower extremities  trunk    Body Systems:    ROM  strength  balance  gait  transitions  motor control  motor learning  heart rate  blood pressure    Participation Restrictions:   ADLs, chores, community    Activity limitations:   Learning and applying knowledge  no deficits    General Tasks and Commands  no deficits    Communication  no deficits    Mobility  lifting and carrying objects  walking  driving (bike, car, motorcycle)    Self care  dressing    Domestic Life  shopping  cooking  doing house work (cleaning house, washing dishes, laundry)  assisting  others    Interactions/Relationships  no deficits    Life Areas  no deficits    Community and Social Life  community life  recreation and leisure         moderate   Clinical Presentation stable and uncomplicated low   Decision Making/ Complexity Score: low     Goals:  Short Term GOALS: 3 weeks  1. Patient is independent with HEP.   2. Patient demonstrates independence with core activation and postural awareness while sitting and standing.   3. Patient will reduce pn to 2/10 while performing daily activities.  4. Patient will reduce LE radiculopathy frequency and intensity.     Long Term GOALS: 6 weeks.   1. Patient demonstrates increased l/s extension ROM to 0 degrees to improve tolerance to upright standing.  2. Patient demonstrates increased core, hip and BLE strength by 1/2 grade or greater to improve tolerance to home chores.  3. Patient demonstrates independence with body mechanics while performing ADLs.  4. Patient will improve FOTO limitation score to </= 51% to show improvement in condition and functional mobility.   Plan   Plan of care Certification: 5/25/2022 to 8/15/2022.    Outpatient Physical Therapy 2 times weekly for 6 weeks to include the following interventions: Cervical/Lumbar Traction, Electrical Stimulation ,, Gait Training, Manual Therapy, Moist Heat/ Ice, Neuromuscular Re-ed, Orthotic Management and Training, Patient Education, Self Care, Therapeutic Activities and Therapeutic Exercise.     Susan Conley, PT

## 2022-05-25 NOTE — PROGRESS NOTES
[facility]  Physical Therapy Initial Evaluation    Name: Karma Chen  Clinic Number: [id number]    Therapy Diagnosis:   Encounter Diagnoses   Name Primary?    Sciatica, right side     Right leg pain     Back pain of lumbosacral region with sciatica      Physician: Elena Sullivan MD    Physician Orders: PT Eval and Treat   Medical Diagnosis from Referral:   M54.31 (ICD-10-CM) - Sciatica, right side   M79.604 (ICD-10-CM) - Right leg pain   Evaluation Date: 5/25/2022  Authorization Period Expiration: 12/31/2022  Plan of Care Expiration: 8/15/2022 or 12v post eval  Visit # (total) / Visits authorized: 1 / eval (POC 0 / 12)  2nd FOTO visit 5  3rd FOTO visit 12  Progress Note Due: 6/25/2022    Time In: 1125  Time Out: 1215  Total Billable Time: 50 minutes    Precautions: CAD; DM; HTN; Fibromyalgia; MI hx  Insurance: Payor: MEDICARE / Plan: MEDICARE PART A & B / Product Type: Government /     [employer]   Date of onset: 3 weeks ago  History of current condition - Karma reports: she has had Mild pain in her back and legs over the years, but this terrible pain in her right hip and leg just started about 3 weeks ago. Reports MD ruled out DVT. She Went to the [employer] and then [employer]; in the store she had to get a buggy b/c she stopped being able to walk. Reports she has had 3 blocks prior to a planned rhizotomy in July. June 6th will be 4th block. Reports history of cyst pressing on Left sciatic nerve; had a surgery to remove the cyst and has not had pain on the left side since that surgery.     Medical History:   Past Medical History:   Diagnosis Date    Coronary artery disease     Dermatitis     Dermatitis 12/8/2017    Diabetes mellitus     Diabetes mellitus type II     Leah Madrigal virus infection     Fibromyalgia     GERD (gastroesophageal reflux disease)     Hypertension     MI (myocardial infarction) 09/23/2011    Pure hypercholesterolemia 2/26/2020       Surgical  History:   Karma Chen  has a past surgical history that includes Coronary artery bypass graft (2006); Tonsillectomy (1940); adenoids; Appendectomy; Hysterectomy (1966); Cholecystectomy (1987); coronary stents (1999); Ovary surgery (1948); Angioplasty (1987); Cataract extraction, bilateral (Bilateral, 9/2/15, 3/17/15); Cardiac pacemaker placement (01/12/2017); Cyst Removal (04/25/2017); and Rhizotomy (09/14/2018).    Medications:   Karma has a current medication list which includes the following prescription(s): acetaminophen, alprazolam, amitriptyline, apixaban, aspirin, betamethasone dipropionate, cetirizine, cholecalciferol (vitamin d3), coenzyme q10, diclofenac sodium, diphenhydramine, echinacea, estradiol, fluocinonide 0.05%, fluticasone propionate, isosorbide mononitrate, ketoconazole, ketoconazole, levothyroxine, loratadine, losartan, magnesium, metoprolol succinate, montelukast, multivitamin, mupirocin, nitroglycerin, omeprazole, prasterone (dhea), tizanidine, tramadol, triamcinolone acetonide 0.1%, turmeric, UNABLE TO FIND, UNABLE TO FIND, UNABLE TO FIND, UNABLE TO FIND, UNABLE TO FIND, and zinc gluconate, and the following Facility-Administered Medications: evolocumab.    Allergies:   Review of patient's allergies indicates:   Allergen Reactions    Arthrotec 50 [diclofenac-misoprostol]     Doxycycline     Effexor [venlafaxine]     Estradiol     Flagyl [metronidazole]     Fluconazole     Iodine     Levaquin [levofloxacin]     Nexium [esomeprazole magnesium]     Penicillins     Shellfish containing products     Statins-hmg-coa reductase inhibitors     Sulfa (sulfonamide antibiotics)     Tea tree oil     Tegaserod      ZOLNORM    Trazodone     Yeast, dried     Adhesive Rash        Imaging, none in EPIC    Prior Therapy: yes  Social History: live with ; 1-story; 1 step  Current Smoker: no  Recent Weight loss: no  Bowel or Bladder Issues: constipated (able to defecate today)  Falls  in last 6 months: no  Cancer Hx: no  Occupation: retired  Prior Level of Function: arrived in wheelchair; independent prior to leg pain  Current Level of Function: difficult/pain with dressing; chores; driving; standing    Pain:  Current 6/10, worst 8/10, best 2/10   Location: right glute down posterior thigh sometimes into foot  Description: grabbing; sharp; denies n/t down leg but tingling in feet (has had awhile)  Aggravating Factors: difficult/pain with dressing; chores; driving; standing  Easing Factors: ice; rest; heat; pn med    Pts goals: get rid of pain    Objective     Posture / IC / ASIS / PSIS:  Right hip elevated; decreased l/s lordosis; severe t/s kyphosis and forward trunk lean    Palpation: TTP to right glute    Gait With AD.  Device Used -  arrived in w/c   Analysis Unable to analyze today     Lumbar AROM: ROM-   Response to repeated movements   Flexion NT   Extension (15 norm) 10 degrees of forward flexion (repeat extension increased radicular pain)   Right side bending  (20 norm) 15 degrees - increase pn   Left side bending  (20 norm) 15 degrees   Rotation Observation NT     Sensation (light touch): hypersensitive in Right medial gastroc (L3-4)     4/5 all / pn with Right Knee Extension / 3+ KF /   L/E MMT: 5/5 Left Right   Hip Flexion 4/5. 4/5.   Hip Extension 3+/5. 3+/5.        Hip Adduction 4/5. 4/5.             Knee Flexion 3+/5. 3+/5.   Knee Extension 4/5. 4/5. With pn   Ankle DF 5/5. 5/5.   Ankle INV 4+/5. 4+/5.   Ankle EV 4+/5. 4+/5.     Hip & LE Range of Motion:    Left  WNL Right  WNL     Core strength MMT: 2/5 unable to assume test position  LLD (Leg length discrepancy) / Supine to Sit: Right long to long = LLD    Flexibility Left Right   Hamstrings 90/90 (-20 norm) -30 degrees -45 degrees     Flexibility: mild / moderate / severe restriction grading  Quadriceps: severe  Illiopsoas: severe  Paraspinals: severe    Special Tests Left Right   Slump  negative. positive.   SLR negative.  positive.   FADDIR negative. negative.   ALEXANDER negative. negative.   Piriformis negative. positive.     Oswestry: 72.3 intake    CMS Impairment/Limitation/Restriction for FOTO Survey    Therapist reviewed FOTO scores for Karma Chen on 5/25/2022.   FOTO documents entered into EPIC - see Media section.    Limitation Score: 79%  Category: Mobility    Current : CL = least 60% but < 80% impaired, limited or restricted  Goal: CK = at least 40% but < 60% impaired, limited or restricted  Discharge:          TREATMENT   Treatment Time In: 1200  Treatment Time Out: 1215  Total Treatment time separate from Evaluation: 15 minutes    Karma received therapeutic exercises to develop strength, ROM, flexibility and posture for 15 minutes including:    STAND:  Right Tensor Fascia Latae/QL stretch, Right Lower Extremity posterior, [id number]    TABLE:   Supine sciatic nerve glide, Right, 20x (to pt tolerance)  Piriformis stretch, pull across, 3x20 sec Bilateral    Jaxon stretch, [id number] Bilateral     ADD NEXT:  L/s rotations, 10x Bilateral  Abdominal Bracing, 20x 5 sec  Short Arc Quad  Mini bridges   Ab brace with add ball squeeze  Supine Red Theraband clamshells  Upright posture against wall, [id number] hold  Pre-Gait train: 1 min each (airex optional)  1. M-L weight shift   2. Stagger: Affected fleg in front: Heel contact -> Flat foot with KE (mid stance)  3. Stagger: Affected leg in back: Roll through -> push off  4. Ambulate with AD or none  FUTURE visit:  Prone quad stretch with strap  Obliques  Retest possible LLD at 3rd visit, prescribe heel lift if appropriate  Standing Multifidus Lean, 10x10s  Standing FF, hands on table, hip extension  Standing Hip abduction  Mechanical lumbar traction at 5th visit if not improving      Home Exercises and Patient Education Provided    Education provided:   - daily HEP    Written Home Exercises Provided: yes.  Exercises were reviewed and Karma was able to demonstrate them prior to the end of  the session.  Karma demonstrated good  understanding of the education provided.     See EMR under Patient Instructions for exercises provided 5/25/2022    Assessment   Karma is a 86 y.o. female referred to outpatient Physical Therapy with a medical diagnosis of right leg pain and sciatica. Pt presents with pain palpable in right glute radiating in sciatic nerve distribution pathway, decreased lumbar extension, decreased core, hip and Bilateral Lower Extremity strength, decreased hip flexibility, and posture and gait deficits.  Pt prognosis is Good.   Pt will benefit from skilled outpatient Physical Therapy to address the deficits stated above and in the chart below, provide pt/family education, and to maximize pt's level of independence.     Plan of care discussed with patient: Yes  Pt's spiritual, cultural and educational needs considered and patient is agreeable to the plan of care and goals as stated below:     Anticipated Barriers for therapy: co-morbidities    Medical Necessity is demonstrated by the following  History  Co-morbidities and personal factors that may impact the plan of care Co-morbidities:   CAD; DM; HTN; Fibromyalgia; MI hx    Personal Factors:   coping style  lifestyle     moderate   Examination  Body Structures and Functions, activity limitations and participation restrictions that may impact the plan of care Body Regions:   back  lower extremities  trunk    Body Systems:    ROM  strength  balance  gait  transitions  motor control  motor learning  heart rate  blood pressure    Participation Restrictions:   ADLs, chores, community    Activity limitations:   Learning and applying knowledge  no deficits    General Tasks and Commands  no deficits    Communication  no deficits    Mobility  lifting and carrying objects  walking  driving (bike, car, motorcycle)    Self care  dressing    Domestic Life  shopping  cooking  doing house work (cleaning house, washing dishes, laundry)  assisting  others    Interactions/Relationships  no deficits    Life Areas  no deficits    Community and Social Life  community life  recreation and leisure         moderate   Clinical Presentation stable and uncomplicated low   Decision Making/ Complexity Score: low     Goals:  Short Term GOALS: 3 weeks  1. Patient is independent with HEP.   2. Patient demonstrates independence with core activation and postural awareness while sitting and standing.   3. Patient will reduce pn to 2/10 while performing daily activities.  4. Patient will reduce LE radiculopathy frequency and intensity.     Long Term GOALS: 6 weeks.   1. Patient demonstrates increased l/s extension ROM to 0 degrees to improve tolerance to upright standing.  2. Patient demonstrates increased core, hip and BLE strength by 1/2 grade or greater to improve tolerance to home chores.  3. Patient demonstrates independence with body mechanics while performing ADLs.  4. Patient will improve FOTO limitation score to </= 51% to show improvement in condition and functional mobility.   Plan   Plan of care Certification: 5/25/2022 to 8/15/2022.    Outpatient Physical Therapy 2 times weekly for 6 weeks to include the following interventions: Cervical/Lumbar Traction, Electrical Stimulation ,, Gait Training, Manual Therapy, Moist Heat/ Ice, Neuromuscular Re-ed, Orthotic Management and Training, Patient Education, Self Care, Therapeutic Activities and Therapeutic Exercise.     Susan Conley, PT

## 2022-05-25 NOTE — PROGRESS NOTES
SUBJECTIVE:    Patient ID: Karma Chen is a 86 y.o. female.    Chief Complaint: Pain and Follow-up    HPI     Follow-up pain-lumbar radiculopathy-PT gave her some exercises until she starts PT-says the right leg longer than the left-Lyrica made her gain weight-right buttock down to right knee postero-lateral to mid calf posterior-there is no bladder dysfunction -her pain is now bearable and she still needs pain med but takes it sparingly as she does not like to take such rx.We discussed increasing the amitryptiline to 20 mg then refilling the original rx with the 25 mg dose    Lab Visit on 05/20/2022   Component Date Value Ref Range Status    Hemoglobin A1C 05/20/2022 5.5  4.5 - 6.2 % Final    Estimated Avg Glucose 05/20/2022 111  68 - 131 mg/dL Final    Microalbumin, Urine 05/20/2022 10.8  <19.9 ug/mL Final    Creatinine, Urine 05/20/2022 97.0  15.0 - 325.0 mg/dL Final    Microalb/Creat Ratio 05/20/2022 11.1  0.0 - 30.0 ug/mg Final    Cholesterol 05/20/2022 223 (A) 120 - 199 mg/dL Final    Triglycerides 05/20/2022 109  30 - 150 mg/dL Final    HDL 05/20/2022 56  40 - 75 mg/dL Final    LDL Cholesterol 05/20/2022 145.2  63.0 - 159.0 mg/dL Final    HDL/Cholesterol Ratio 05/20/2022 25.1  20.0 - 50.0 % Final    Total Cholesterol/HDL Ratio 05/20/2022 4.0  2.0 - 5.0 Final    Non-HDL Cholesterol 05/20/2022 167  mg/dL Final    Glucose 05/20/2022 135 (A) 70 - 110 mg/dL Final    Sodium 05/20/2022 139  136 - 145 mmol/L Final    Potassium 05/20/2022 4.0  3.5 - 5.1 mmol/L Final    Chloride 05/20/2022 103  95 - 110 mmol/L Final    CO2 05/20/2022 24  23 - 29 mmol/L Final    BUN 05/20/2022 28 (A) 8 - 23 mg/dL Final    Calcium 05/20/2022 9.3  8.7 - 10.5 mg/dL Final    Creatinine 05/20/2022 1.2  0.5 - 1.4 mg/dL Final    Albumin 05/20/2022 4.0  3.5 - 5.2 g/dL Final    Phosphorus 05/20/2022 3.6  2.7 - 4.5 mg/dL Final    eGFR if  05/20/2022 48.0 (A) >60 mL/min/1.73 m^2 Final    eGFR if non   05/20/2022 41.6 (A) >60 mL/min/1.73 m^2 Final    Anion Gap 05/20/2022 12  8 - 16 mmol/L Final    RBC, UA 05/20/2022 1  0 - 4 /hpf Final    WBC, UA 05/20/2022 0  0 - 5 /hpf Final    Bacteria 05/20/2022 Negative  None-Occ /hpf Final    Squam Epithel, UA 05/20/2022 1  /hpf Final    Hyaline Casts, UA 05/20/2022 2 (A) 0-1/lpf /lpf Final    Microscopic Comment 05/20/2022 SEE COMMENT   Final    Protein, Urine Random 05/20/2022 6  6 - 15 mg/dL Final    Creatinine, Urine 05/20/2022 97.0  15.0 - 325.0 mg/dL Final    Prot/Creat Ratio, Urine 05/20/2022 0.06  0.00 - 0.20 Final   Lab Visit on 05/10/2022   Component Date Value Ref Range Status    BNP 05/10/2022 490 (A) 0 - 99 pg/mL Final    TSH 05/10/2022 1.280  0.340 - 5.600 uIU/mL Final    WBC 05/10/2022 7.41  3.90 - 12.70 K/uL Final    RBC 05/10/2022 4.20  4.00 - 5.40 M/uL Final    Hemoglobin 05/10/2022 11.5 (A) 12.0 - 16.0 g/dL Final    Hematocrit 05/10/2022 37.2  37.0 - 48.5 % Final    MCV 05/10/2022 89  82 - 98 fL Final    MCH 05/10/2022 27.4  27.0 - 31.0 pg Final    MCHC 05/10/2022 30.9 (A) 32.0 - 36.0 g/dL Final    RDW 05/10/2022 13.8  11.5 - 14.5 % Final    Platelets 05/10/2022 249  150 - 450 K/uL Final    MPV 05/10/2022 10.0  9.2 - 12.9 fL Final    Immature Granulocytes 05/10/2022 0.3  0.0 - 0.5 % Final    Gran # (ANC) 05/10/2022 3.4  1.8 - 7.7 K/uL Final    Immature Grans (Abs) 05/10/2022 0.02  0.00 - 0.04 K/uL Final    Lymph # 05/10/2022 2.5  1.0 - 4.8 K/uL Final    Mono # 05/10/2022 1.1 (A) 0.3 - 1.0 K/uL Final    Eos # 05/10/2022 0.3  0.0 - 0.5 K/uL Final    Baso # 05/10/2022 0.08  0.00 - 0.20 K/uL Final    nRBC 05/10/2022 0  0 /100 WBC Final    Gran % 05/10/2022 46.0  38.0 - 73.0 % Final    Lymph % 05/10/2022 34.3  18.0 - 48.0 % Final    Mono % 05/10/2022 14.7  4.0 - 15.0 % Final    Eosinophil % 05/10/2022 3.6  0.0 - 8.0 % Final    Basophil % 05/10/2022 1.1  0.0 - 1.9 % Final    Differential Method 05/10/2022  Automated   Final    Sodium 05/10/2022 140  136 - 145 mmol/L Final    Potassium 05/10/2022 3.9  3.5 - 5.1 mmol/L Final    Chloride 05/10/2022 103  95 - 110 mmol/L Final    CO2 05/10/2022 27  23 - 29 mmol/L Final    Glucose 05/10/2022 93  70 - 110 mg/dL Final    BUN 05/10/2022 24 (A) 8 - 23 mg/dL Final    Creatinine 05/10/2022 1.4  0.5 - 1.4 mg/dL Final    Calcium 05/10/2022 9.0  8.7 - 10.5 mg/dL Final    Total Protein 05/10/2022 7.2  6.0 - 8.4 g/dL Final    Albumin 05/10/2022 3.9  3.5 - 5.2 g/dL Final    Total Bilirubin 05/10/2022 0.6  0.1 - 1.0 mg/dL Final    Alkaline Phosphatase 05/10/2022 68  55 - 135 U/L Final    AST 05/10/2022 24  10 - 40 U/L Final    ALT 05/10/2022 22  10 - 44 U/L Final    Anion Gap 05/10/2022 10  8 - 16 mmol/L Final    eGFR if  05/10/2022 39.8 (A) >60 mL/min/1.73 m^2 Final    eGFR if non African American 05/10/2022 34.5 (A) >60 mL/min/1.73 m^2 Final    Specimen UA 05/10/2022 Urine, Clean Catch   Final    Color, UA 05/10/2022 Yellow  Yellow, Straw, Anila Final    Appearance, UA 05/10/2022 Clear  Clear Final    pH, UA 05/10/2022 7.0  5.0 - 8.0 Final    Specific Gravity, UA 05/10/2022 1.005  1.005 - 1.030 Final    Protein, UA 05/10/2022 Negative  Negative Final    Glucose, UA 05/10/2022 Negative  Negative Final    Ketones, UA 05/10/2022 Negative  Negative Final    Bilirubin (UA) 05/10/2022 Negative  Negative Final    Occult Blood UA 05/10/2022 Negative  Negative Final    Nitrite, UA 05/10/2022 Negative  Negative Final    Urobilinogen, UA 05/10/2022 Negative  Negative EU/dL Final    Leukocytes, UA 05/10/2022 Negative  Negative Final    Total Protein 05/10/2022 7.2  6.0 - 8.4 g/dL Final    Albumin 05/10/2022 3.9  3.5 - 5.2 g/dL Final    Total Bilirubin 05/10/2022 0.6  0.1 - 1.0 mg/dL Final    Bilirubin, Direct 05/10/2022 <0.1 (A) 0.1 - 0.3 mg/dL Final    AST 05/10/2022 24  10 - 40 U/L Final    ALT 05/10/2022 22  10 - 44 U/L Final    Alkaline  Phosphatase 05/10/2022 68  55 - 135 U/L Final    Glucose 05/10/2022 93  70 - 110 mg/dL Final    Sodium 05/10/2022 140  136 - 145 mmol/L Final    Potassium 05/10/2022 3.9  3.5 - 5.1 mmol/L Final    Chloride 05/10/2022 103  95 - 110 mmol/L Final    CO2 05/10/2022 27  23 - 29 mmol/L Final    BUN 05/10/2022 24 (A) 8 - 23 mg/dL Final    Calcium 05/10/2022 9.0  8.7 - 10.5 mg/dL Final    Creatinine 05/10/2022 1.4  0.5 - 1.4 mg/dL Final    Albumin 05/10/2022 3.9  3.5 - 5.2 g/dL Final    Phosphorus 05/10/2022 3.7  2.7 - 4.5 mg/dL Final    eGFR if  05/10/2022 39.8 (A) >60 mL/min/1.73 m^2 Final    eGFR if non African American 05/10/2022 34.5 (A) >60 mL/min/1.73 m^2 Final    Anion Gap 05/10/2022 10  8 - 16 mmol/L Final   Office Visit on 01/25/2022   Component Date Value Ref Range Status    POC Blood, Urine 01/25/2022 Negative  Negative Final    POC Bilirubin, Urine 01/25/2022 Negative  Negative Final    POC Urobilinogen, Urine 01/25/2022 norm  0.1 - 1.1 Final    POC Ketones, Urine 01/25/2022 Negative  Negative Final    POC Protein, Urine 01/25/2022 Negative  Negative Final    POC Nitrates, Urine 01/25/2022 Negative  Negative Final    POC Glucose, Urine 01/25/2022 Negative  Negative Final    pH, UA 01/25/2022 6.0   Final    POC Specific Gravity, Urine 01/25/2022 1.005  1.003 - 1.029 Final    POC Leukocytes, Urine 01/25/2022 Negative  Negative Final    POC Rapid COVID 01/25/2022 Negative  Negative Final     Acceptable 01/25/2022 Yes   Final   Hospital Outpatient Visit on 01/18/2022   Component Date Value Ref Range Status    Battery Voltage (V) 01/18/2022 3.00  V Final    P/R-wave RA Lead 01/18/2022 1.5  mV Final    P/R-wave RA Lead (native) 01/18/2022 10.9  mV Final    Impedance RA Lead 01/18/2022 475  Ohms Final    Impedance RA Lead (native) 01/18/2022 532  Ohms Final    Threshold V RA Lead 01/18/2022 0.750  V Final    Theshold ms RA Lead 01/18/2022 0.40  ms Final     Threshold V RA Lead (native) 01/18/2022 0.500  V Final    Threshold ms RA Lead (native) 01/18/2022 0.40  ms Final   Office Visit on 01/10/2022   Component Date Value Ref Range Status    Cologuard Result 01/14/2022 Negative  Negative Final   Lab Visit on 11/08/2021   Component Date Value Ref Range Status    Hemoglobin A1C 11/08/2021 5.9  4.5 - 6.2 % Final    Estimated Avg Glucose 11/08/2021 123  68 - 131 mg/dL Final    WBC 11/08/2021 7.35  3.90 - 12.70 K/uL Final    RBC 11/08/2021 4.30  4.00 - 5.40 M/uL Final    Hemoglobin 11/08/2021 12.0  12.0 - 16.0 g/dL Final    Hematocrit 11/08/2021 38.0  37.0 - 48.5 % Final    MCV 11/08/2021 88  82 - 98 fL Final    MCH 11/08/2021 27.9  27.0 - 31.0 pg Final    MCHC 11/08/2021 31.6 (A) 32.0 - 36.0 g/dL Final    RDW 11/08/2021 13.2  11.5 - 14.5 % Final    Platelets 11/08/2021 281  150 - 450 K/uL Final    MPV 11/08/2021 10.2  9.2 - 12.9 fL Final    Immature Granulocytes 11/08/2021 0.3  0.0 - 0.5 % Final    Gran # (ANC) 11/08/2021 3.3  1.8 - 7.7 K/uL Final    Immature Grans (Abs) 11/08/2021 0.02  0.00 - 0.04 K/uL Final    Lymph # 11/08/2021 3.0  1.0 - 4.8 K/uL Final    Mono # 11/08/2021 0.8  0.3 - 1.0 K/uL Final    Eos # 11/08/2021 0.2  0.0 - 0.5 K/uL Final    Baso # 11/08/2021 0.06  0.00 - 0.20 K/uL Final    nRBC 11/08/2021 0  0 /100 WBC Final    Gran % 11/08/2021 44.9  38.0 - 73.0 % Final    Lymph % 11/08/2021 40.4  18.0 - 48.0 % Final    Mono % 11/08/2021 10.5  4.0 - 15.0 % Final    Eosinophil % 11/08/2021 3.1  0.0 - 8.0 % Final    Basophil % 11/08/2021 0.8  0.0 - 1.9 % Final    Differential Method 11/08/2021 Automated   Final    Cholesterol 11/08/2021 248 (A) 120 - 199 mg/dL Final    Triglycerides 11/08/2021 185 (A) 30 - 150 mg/dL Final    HDL 11/08/2021 41  40 - 75 mg/dL Final    LDL Cholesterol 11/08/2021 170.0 (A) 63.0 - 159.0 mg/dL Final    HDL/Cholesterol Ratio 11/08/2021 16.5 (A) 20.0 - 50.0 % Final    Total Cholesterol/HDL Ratio  11/08/2021 6.0 (A) 2.0 - 5.0 Final    Non-HDL Cholesterol 11/08/2021 207  mg/dL Final    Sodium 11/08/2021 144  136 - 145 mmol/L Final    Potassium 11/08/2021 4.0  3.5 - 5.1 mmol/L Final    Chloride 11/08/2021 106  95 - 110 mmol/L Final    CO2 11/08/2021 29  23 - 29 mmol/L Final    Glucose 11/08/2021 88  70 - 110 mg/dL Final    BUN 11/08/2021 25 (A) 8 - 23 mg/dL Final    Creatinine 11/08/2021 1.3  0.5 - 1.4 mg/dL Final    Calcium 11/08/2021 9.5  8.7 - 10.5 mg/dL Final    Anion Gap 11/08/2021 9  8 - 16 mmol/L Final    eGFR if  11/08/2021 43.8 (A) >60 mL/min/1.73 m^2 Final    eGFR if non  11/08/2021 38.0 (A) >60 mL/min/1.73 m^2 Final    Glucose 11/08/2021 88  70 - 110 mg/dL Final    Sodium 11/08/2021 144  136 - 145 mmol/L Final    Potassium 11/08/2021 4.0  3.5 - 5.1 mmol/L Final    Chloride 11/08/2021 106  95 - 110 mmol/L Final    CO2 11/08/2021 29  23 - 29 mmol/L Final    BUN 11/08/2021 25 (A) 8 - 23 mg/dL Final    Calcium 11/08/2021 9.5  8.7 - 10.5 mg/dL Final    Creatinine 11/08/2021 1.3  0.5 - 1.4 mg/dL Final    Albumin 11/08/2021 3.8  3.5 - 5.2 g/dL Final    Phosphorus 11/08/2021 4.1  2.7 - 4.5 mg/dL Final    eGFR if  11/08/2021 43.8 (A) >60 mL/min/1.73 m^2 Final    eGFR if non  11/08/2021 38.0 (A) >60 mL/min/1.73 m^2 Final    Anion Gap 11/08/2021 9  8 - 16 mmol/L Final    Vit D, 25-Hydroxy 11/08/2021 >120 (A) 30 - 96 ng/mL Final    Specimen UA 11/08/2021 Urine, Clean Catch   Final    Color, UA 11/08/2021 Yellow  Yellow, Straw, Anila Final    Appearance, UA 11/08/2021 Clear  Clear Final    pH, UA 11/08/2021 7.0  5.0 - 8.0 Final    Specific [place], UA 11/08/2021 1.020  1.005 - 1.030 Final    Protein, UA 11/08/2021 Negative  Negative Final    Glucose, UA 11/08/2021 Negative  Negative Final    Ketones, UA 11/08/2021 Negative  Negative Final    Bilirubin (UA) 11/08/2021 Negative  Negative Final    Occult Blood UA  11/08/2021 Negative  Negative Final    Nitrite, UA 11/08/2021 Negative  Negative Final    Urobilinogen, UA 11/08/2021 Negative  Negative EU/dL Final    Leukocytes, UA 11/08/2021 1+ (A) Negative Final    RBC, UA 11/08/2021 1  0 - 4 /hpf Final    WBC, UA 11/08/2021 3  0 - 5 /hpf Final    Bacteria 11/08/2021 Negative  None-Occ /hpf Final    Squam Epithel, UA 11/08/2021 4  /hpf Final    Hyaline Casts, UA 11/08/2021 4 (A) 0-1/lpf /lpf Final    Microscopic Comment 11/08/2021 SEE COMMENT   Final    PTH, Intact 11/08/2021 26.1  9.0 - 77.0 pg/mL Final    Protein, Urine Random 11/08/2021 7  6 - 15 mg/dL Final    Creatinine, Urine 11/08/2021 87.0  15.0 - 325.0 mg/dL Final    Prot/Creat Ratio, Urine 11/08/2021 0.08  0.00 - 0.20 Final   Admission on 10/22/2021, Discharged on 10/22/2021   Component Date Value Ref Range Status    Sodium 10/22/2021 139  136 - 145 mmol/L Final    Potassium 10/22/2021 4.0  3.5 - 5.1 mmol/L Final    Chloride 10/22/2021 104  95 - 110 mmol/L Final    CO2 10/22/2021 29  23 - 29 mmol/L Final    Glucose 10/22/2021 137 (A) 70 - 110 mg/dL Final    BUN 10/22/2021 23  8 - 23 mg/dL Final    Creatinine 10/22/2021 1.7 (A) 0.5 - 1.4 mg/dL Final    Calcium 10/22/2021 8.7  8.7 - 10.5 mg/dL Final    Total Protein 10/22/2021 7.4  6.0 - 8.4 g/dL Final    Albumin 10/22/2021 4.1  3.5 - 5.2 g/dL Final    Total Bilirubin 10/22/2021 0.8  0.1 - 1.0 mg/dL Final    Alkaline Phosphatase 10/22/2021 64  55 - 135 U/L Final    AST 10/22/2021 23  10 - 40 U/L Final    ALT 10/22/2021 20  10 - 44 U/L Final    Anion Gap 10/22/2021 6 (A) 8 - 16 mmol/L Final    eGFR if  10/22/2021 31.7 (A) >60 mL/min/1.73 m^2 Final    eGFR if non African American 10/22/2021 27.5 (A) >60 mL/min/1.73 m^2 Final    Magnesium 10/22/2021 2.3  1.6 - 2.6 mg/dL Final    WBC 10/22/2021 7.89  3.90 - 12.70 K/uL Final    RBC 10/22/2021 4.16  4.00 - 5.40 M/uL Final    Hemoglobin 10/22/2021 11.7 (A) 12.0 - 16.0 g/dL  Final    Hematocrit 10/22/2021 37.0  37.0 - 48.5 % Final    MCV 10/22/2021 89  82 - 98 fL Final    MCH 10/22/2021 28.1  27.0 - 31.0 pg Final    MCHC 10/22/2021 31.6 (A) 32.0 - 36.0 g/dL Final    RDW 10/22/2021 13.0  11.5 - 14.5 % Final    Platelets 10/22/2021 269  150 - 450 K/uL Final    MPV 10/22/2021 9.6  9.2 - 12.9 fL Final    Immature Granulocytes 10/22/2021 0.4  0.0 - 0.5 % Final    Gran # (ANC) 10/22/2021 4.5  1.8 - 7.7 K/uL Final    Immature Grans (Abs) 10/22/2021 0.03  0.00 - 0.04 K/uL Final    Lymph # 10/22/2021 2.3  1.0 - 4.8 K/uL Final    Mono # 10/22/2021 0.9  0.3 - 1.0 K/uL Final    Eos # 10/22/2021 0.1  0.0 - 0.5 K/uL Final    Baso # 10/22/2021 0.04  0.00 - 0.20 K/uL Final    nRBC 10/22/2021 0  0 /100 WBC Final    Gran % 10/22/2021 57.0  38.0 - 73.0 % Final    Lymph % 10/22/2021 29.7  18.0 - 48.0 % Final    Mono % 10/22/2021 11.0  4.0 - 15.0 % Final    Eosinophil % 10/22/2021 1.4  0.0 - 8.0 % Final    Basophil % 10/22/2021 0.5  0.0 - 1.9 % Final    Differential Method 10/22/2021 Automated   Final    PT 10/22/2021 13.9 (A) 11.4 - 13.7 sec Final    INR 10/22/2021 1.2   Final    Troponin I 10/22/2021 <0.030  <=0.040 ng/mL Final    Specimen UA 10/22/2021 Urine, Catheterized   Final    Color, UA 10/22/2021 Colorless (A) Yellow, Straw, Anila Final    Appearance, UA 10/22/2021 Clear  Clear Final    pH, UA 10/22/2021 8.0  5.0 - 8.0 Final    Specific [place], UA 10/22/2021 1.005  1.005 - 1.030 Final    Protein, UA 10/22/2021 Negative  Negative Final    Glucose, UA 10/22/2021 Negative  Negative Final    Ketones, UA 10/22/2021 Negative  Negative Final    Bilirubin (UA) 10/22/2021 Negative  Negative Final    Occult Blood UA 10/22/2021 Negative  Negative Final    Nitrite, UA 10/22/2021 Negative  Negative Final    Urobilinogen, UA 10/22/2021 Negative  Negative EU/dL Final    Leukocytes, UA 10/22/2021 Negative  Negative Final    Lithium Level 10/22/2021 0.0 (A) 0.6 - 1.2 mmol/L  Final    SARS-CoV-2 RNA, Amplification, Qual 10/22/2021 Negative  Negative Final    TSH 10/22/2021 1.760  0.340 - 5.600 uIU/mL Final    Magnesium 10/22/2021 2.3  1.6 - 2.6 mg/dL Final    CPK 10/22/2021 53  20 - 180 U/L Final   Office Visit on 08/13/2021   Component Date Value Ref Range Status    CULTURE, AEROBIC AND ANAEROBIC 08/13/2021    Final   Hospital Outpatient Visit on 07/20/2021   Component Date Value Ref Range Status    Battery Voltage (V) 07/20/2021 3.01  V Final    P/R-wave RA Lead 07/20/2021 1.4  mV Final    P/R-wave RA Lead (native) 07/20/2021 11.6  mV Final    Impedance RA Lead 07/20/2021 475  Ohms Final    Impedance RA Lead (native) 07/20/2021 532  Ohms Final    Threshold V RA Lead 07/20/2021 0.75  V Final    Theshold ms RA Lead 07/20/2021 0.4  ms Final    Threshold V RA Lead (native) 07/20/2021 0.5  V Final    Threshold ms RA Lead (native) 07/20/2021 0.4  ms Final   Lab Visit on 06/14/2021   Component Date Value Ref Range Status    Sodium 06/14/2021 140  136 - 145 mmol/L Final    Potassium 06/14/2021 4.2  3.5 - 5.1 mmol/L Final    Chloride 06/14/2021 102  95 - 110 mmol/L Final    CO2 06/14/2021 26  23 - 29 mmol/L Final    Glucose 06/14/2021 83  70 - 110 mg/dL Final    BUN 06/14/2021 24 (A) 8 - 23 mg/dL Final    Creatinine 06/14/2021 1.2  0.5 - 1.4 mg/dL Final    Calcium 06/14/2021 9.2  8.7 - 10.5 mg/dL Final    Anion Gap 06/14/2021 12  8 - 16 mmol/L Final    eGFR if  06/14/2021 48.3 (A) >60 mL/min/1.73 m^2 Final    eGFR if non African American 06/14/2021 41.9 (A) >60 mL/min/1.73 m^2 Final   There may be more visits with results that are not included.       Past Medical History:   Diagnosis Date    Coronary artery disease     Dermatitis     Dermatitis 12/8/2017    Diabetes mellitus     Diabetes mellitus type II     Leah Madrigal virus infection     Fibromyalgia     GERD (gastroesophageal reflux disease)     Hypertension     MI (myocardial infarction)  09/23/2011    Pure hypercholesterolemia 2/26/2020     Past Surgical History:   Procedure Laterality Date    adenoids      ANGIOPLASTY  1987    APPENDECTOMY      CARDIAC PACEMAKER PLACEMENT  01/12/2017    CATARACT EXTRACTION, BILATERAL Bilateral 9/2/15, 3/17/15    right eye-9/2/15, left eye-3/17/15    CHOLECYSTECTOMY  1987    CORONARY ARTERY BYPASS GRAFT  2006    quadruple    coronary stents  1999    x2    CYST REMOVAL  04/25/2017    on back    HYSTERECTOMY  1966    OVARY SURGERY  1948    Ovary burst & was repaired    RHIZOTOMY  09/14/2018    TONSILLECTOMY  1940     Family History   Problem Relation Age of Onset    No Known Problems Mother     No Known Problems Father        Marital Status:   Alcohol History:  reports no history of alcohol use.  Tobacco History:  reports that she has never smoked. She has never used smokeless tobacco.  Drug History:  reports no history of drug use.    Review of patient's allergies indicates:   Allergen Reactions    Arthrotec 50 [diclofenac-misoprostol]     Doxycycline     Effexor [venlafaxine]     Estradiol     Flagyl [metronidazole]     Fluconazole     Gabapentin     Iodine     Levaquin [levofloxacin]     Nexium [esomeprazole magnesium]     Penicillins     Red yeast rice (monascus purpureus)     Shellfish containing products     Statins-hmg-coa reductase inhibitors     Sulfa (sulfonamide antibiotics)     Tea tree oil     Tegaserod      ZOLNORM    Tegaserod hydrogen maleate     Trazodone     Yeast, dried     Adhesive Rash     Band-aids       Current Outpatient Medications:     acetaminophen (TYLENOL) 500 MG tablet, Take 500 mg by mouth every 6 (six) hours as needed for Pain., Disp: , Rfl:     ALPRAZolam (XANAX) 0.25 MG tablet, Take 1 tablet (0.25 mg total) by mouth as needed for Anxiety., Disp: 90 tablet, Rfl: 0    aspirin (ECOTRIN) 81 MG EC tablet, Take 1 tablet (81 mg total) by mouth once daily. (Patient taking differently: Take 81 mg  by mouth every evening.), Disp: , Rfl: 0    betamethasone dipropionate 0.05 % cream, APPLY TOPICALLY TWICE A DAY, Disp: 15 g, Rfl: 0    cetirizine (ZYRTEC) 10 MG tablet, Take 10 mg by mouth once daily., Disp: , Rfl:     cholecalciferol, vitamin D3, 100 mcg (4,000 unit) Cap, Take 1 capsule by mouth 2 (two) times daily. , Disp: , Rfl:     coenzyme Q10 100 mg capsule, Take 1 capsule (100 mg total) by mouth 2 (two) times daily., Disp: 60 capsule, Rfl: 11    diclofenac sodium (VOLTAREN) 1 % Gel, Apply 2 g topically once daily., Disp: , Rfl:     diphenhydrAMINE (BENYLIN) 12.5 mg/5 mL liquid, Take 12.5 mg by mouth 4 (four) times daily as needed for Allergies., Disp: , Rfl:     ECHINACEA ORAL, Take 750 mg by mouth 2 (two) times a day., Disp: , Rfl:     estradioL (ESTRACE) 0.01 % (0.1 mg/gram) vaginal cream, Place 1 g vaginally once a week., Disp: 42.5 g, Rfl: 1    fluocinonide 0.05% (LIDEX) 0.05 % cream, Apply sparingly to rash on chest, neck, legs bid max use one month., Disp: 30 g, Rfl: 0    fluticasone propionate (FLONASE) 50 mcg/actuation nasal spray, 1 spray by Each Nostril route once daily., Disp: , Rfl:     isosorbide mononitrate (IMDUR) 120 MG 24 hr tablet, Take 1 tablet (120 mg total) by mouth once daily. DO NOT CRUSH OR CHEW; SWALLOW WHOLE., Disp: 90 tablet, Rfl: 3    ketoconazole (NIZORAL) 2 % cream, aaa bid, Disp: 60 g, Rfl: 2    ketoconazole (NIZORAL) 2 % shampoo, Use to wash hair every day or every other day for dry itchy scalp (Patient taking differently: Apply 1 application topically daily as needed. Use to wash hair every day or every other day for dry itchy scalp), Disp: 120 mL, Rfl: 5    levothyroxine (SYNTHROID) 88 MCG tablet, Take 1 tablet (88 mcg total) by mouth once daily., Disp: 90 tablet, Rfl: 1    loratadine (CLARITIN) 10 mg tablet, Take 10 mg by mouth once daily., Disp: , Rfl:     losartan (COZAAR) 25 MG tablet, Take 1 tablet (25 mg total) by mouth 2 (two) times daily. (Patient  taking differently: Take 25 mg by mouth every evening.), Disp: 180 tablet, Rfl: 1    MAGNESIUM ORAL, Take 500 mg by mouth once daily. , Disp: , Rfl:     metoprolol succinate (TOPROL-XL) 25 MG 24 hr tablet, Take 1 tablet (25 mg total) by mouth once daily., Disp: 90 tablet, Rfl: 3    montelukast (SINGULAIR) 10 mg tablet, TAKE 1 TABLET BY MOUTH EVERY DAY IN THE EVENING, Disp: 90 tablet, Rfl: 1    multivitamin (THERAGRAN) tablet, Take 1 tablet by mouth 2 (two) times a day. , Disp: , Rfl:     mupirocin (BACTROBAN) 2 % ointment, Apply topically 2 (two) times daily., Disp: 22 g, Rfl: 11    nitroGLYCERIN (NITROSTAT) 0.4 MG SL tablet, Place 1 tablet (0.4 mg total) under the tongue every 5 (five) minutes as needed for Chest pain., Disp: 30 tablet, Rfl: 3    omeprazole (PRILOSEC) 20 MG capsule, TAKE 1 CAPSULE BY MOUTH EVERY DAY, Disp: 90 capsule, Rfl: 3    prasterone, dhea, 25 mg Cap, Take 1 capsule by mouth once daily., Disp: , Rfl:     tiZANidine (ZANAFLEX) 4 MG tablet, Take 1 tablet (4 mg total) by mouth once daily., Disp: 90 tablet, Rfl: 0    traMADoL (ULTRAM) 50 mg tablet, TAKE 1 TABLET (50 MG TOTAL) BY MOUTH 2 (TWO) TIMES DAILY AS NEEDED (PAIN)., Disp: 14 tablet, Rfl: 0    triamcinolone acetonide 0.1% (KENALOG) 0.1 % cream, aaa bid x 1-2 wks, tk 1 wk off prn rash, Disp: 30 g, Rfl: 2    turmeric 400 mg Cap, Take 1,200 mg by mouth once daily., Disp: , Rfl:     UNABLE TO FIND, Take 1 capsule by mouth. Bio-Smoothe- premium nerve, Disp: , Rfl:     UNABLE TO FIND, Take 1 capsule by mouth once daily. medication name: GI probiotic, Disp: , Rfl:     UNABLE TO FIND, Take 1 capsule by mouth once daily. Joint health, Disp: , Rfl:     UNABLE TO FIND, Take 1 capsule by mouth 2 (two) times a day. Cardio platinum, Disp: , Rfl:     UNABLE TO FIND, Nerve Jorge, Disp: , Rfl:     zinc gluconate 50 mg tablet, Take 50 mg by mouth 2 (two) times a day., Disp: , Rfl:     amitriptyline (ELAVIL) 25 MG tablet, One 25 mg dose  daily, Disp: 30 tablet, Rfl: 1    apixaban (ELIQUIS) 2.5 mg Tab, Take 1 tablet (2.5 mg total) by mouth 2 (two) times daily., Disp: 180 tablet, Rfl: 1    Current Facility-Administered Medications:     evolocumab PnIj 140 mg, 140 mg, Subcutaneous, Q14 Days, Kenton Ware MD    Review of Systems   All other systems reviewed and are negative.         Objective:      Vitals:    05/26/22 1126   BP: 124/62   Pulse: 74   Resp: 14   Temp: 98.3 °F (36.8 °C)   SpO2: 98%   Weight: 60.3 kg (133 lb)   Height: 5' (1.524 m)     Physical Exam  Constitutional:       Appearance: Normal appearance.   HENT:      Head: Normocephalic and atraumatic.   Eyes:      Extraocular Movements: Extraocular movements intact.      Pupils: Pupils are equal, round, and reactive to light.   Neck:      Vascular: No carotid bruit.   Musculoskeletal:      Right lower leg: No edema.      Left lower leg: No edema.   Lymphadenopathy:      Cervical: No cervical adenopathy.   Neurological:      General: No focal deficit present.      Mental Status: She is alert.      Gait: Gait abnormal.      Comments: Positive SLR   Psychiatric:         Mood and Affect: Mood normal.         Thought Content: Thought content normal.         Judgment: Judgment normal.           Assessment:       1. Sciatica, right side    2. Right leg pain         Plan:       Sciatica, right side  -     amitriptyline (ELAVIL) 25 MG tablet; One 25 mg dose daily  Dispense: 30 tablet; Refill: 1    Right leg pain  -     amitriptyline (ELAVIL) 25 MG tablet; One 25 mg dose daily  Dispense: 30 tablet; Refill: 1    Other orders  -     apixaban (ELIQUIS) 2.5 mg Tab; Take 1 tablet (2.5 mg total) by mouth 2 (two) times daily.  Dispense: 180 tablet; Refill: 1      Follow up in about 6 weeks (around 7/7/2022) for FOLLOW UP LABS, FOLLOW-UP STATUS, FOLLOW UP MEDICATIONS.        5/26/2022 Elena Sullivan M.D.

## 2022-05-26 ENCOUNTER — OFFICE VISIT (OUTPATIENT)
Dept: FAMILY MEDICINE | Facility: CLINIC | Age: 87
End: 2022-05-26
Payer: MEDICARE

## 2022-05-26 VITALS
DIASTOLIC BLOOD PRESSURE: 62 MMHG | OXYGEN SATURATION: 98 % | TEMPERATURE: 98 F | HEIGHT: 60 IN | HEART RATE: 74 BPM | SYSTOLIC BLOOD PRESSURE: 124 MMHG | BODY MASS INDEX: 26.11 KG/M2 | RESPIRATION RATE: 14 BRPM | WEIGHT: 133 LBS

## 2022-05-26 DIAGNOSIS — M79.604 RIGHT LEG PAIN: ICD-10-CM

## 2022-05-26 DIAGNOSIS — M54.31 SCIATICA, RIGHT SIDE: ICD-10-CM

## 2022-05-26 PROCEDURE — 99213 PR OFFICE/OUTPT VISIT, EST, LEVL III, 20-29 MIN: ICD-10-PCS | Mod: S$PBB,AQ,, | Performed by: INTERNAL MEDICINE

## 2022-05-26 PROCEDURE — 99213 OFFICE O/P EST LOW 20 MIN: CPT | Mod: S$PBB,AQ,, | Performed by: INTERNAL MEDICINE

## 2022-05-26 PROCEDURE — 99215 OFFICE O/P EST HI 40 MIN: CPT | Performed by: INTERNAL MEDICINE

## 2022-05-26 RX ORDER — AMITRIPTYLINE HYDROCHLORIDE 25 MG/1
TABLET, FILM COATED ORAL
Qty: 30 TABLET | Refills: 1 | Status: SHIPPED | OUTPATIENT
Start: 2022-05-26 | End: 2022-06-21

## 2022-06-01 ENCOUNTER — CLINICAL SUPPORT (OUTPATIENT)
Dept: REHABILITATION | Facility: HOSPITAL | Age: 87
End: 2022-06-01
Payer: MEDICARE

## 2022-06-01 DIAGNOSIS — M54.40 BACK PAIN OF LUMBOSACRAL REGION WITH SCIATICA: Primary | ICD-10-CM

## 2022-06-01 PROCEDURE — 97110 THERAPEUTIC EXERCISES: CPT | Mod: PN,CQ

## 2022-06-01 PROCEDURE — 97112 NEUROMUSCULAR REEDUCATION: CPT | Mod: PN,CQ

## 2022-06-01 NOTE — PROGRESS NOTES
Physical Therapy Daily Treatment Note     Name: Karma Chen  Clinic Number: [id number]    Therapy Diagnosis:   Encounter Diagnosis   Name Primary?    Back pain of lumbosacral region with sciatica Yes     Physician: Elena Sullivan MD    Visit Date: 6/1/2022    Physician Orders: PT Eval and Treat   Medical Diagnosis from Referral:   M54.31 (ICD-10-CM) - Sciatica, right side   M79.604 (ICD-10-CM) - Right leg pain   Evaluation Date: 5/25/2022  Authorization Period Expiration: 12/31/2022  Plan of Care Expiration: 8/15/2022 or 12v post eval  Visit # (total) / Visits authorized: 2 / 20 + eval (POC 1 / 12)  2nd FOTO visit 5  3rd FOTO visit 12  Progress Note Due: 6/25/2022      Time In: 1031  Time Out: 1114  Total Billable Time: 43 minutes    Precautions: CAD, DM, HTN, Fibromyalgia, MI tx    Subjective     Pt reports: pain in buttocks and knees  She was compliant with home exercise program.  Response to previous treatment: first visit after eval  Functional change: decreased pain today    Pain: 4/10  Location: bilateral LB      Objective     Karma received therapeutic exercises to develop strength, flexibility and posture for 33 minutes including:  NMRE x 10 minutes to improve balance, proprioception:    STAND:  Right Tensor Fascia Latae/QL stretch, Right Lower Extremity posterior, [id number] NOT PERFORMED time   Upright posture against wall, [id number] hold NOT PERFORMED time   Standing Hip abduction x10 B  Standing hip extension x10 B  Pre-Gait train: 1 min each (airex optional)    NMRE  1. M-L weight shift   2. Stagger: Affected leg in front: Heel contact -> Flat foot with KE (mid stance)  3. Stagger: Affected leg in back: Roll through -> push off  4. Ambulate with AD or none  NOT PERFORMED time     TABLE:   Supine sciatic nerve glide, Right, 20x (to pt tolerance)  Piriformis stretch, pull across, 3x20 sec Bilateral    Jaxon stretch, [id number] Bilateral   LTR x10 B  Abdominal Bracing, 20x 5 sec  Short Arc Quad 2 x 10  Bilateral  Mini bridges 2 x 10  Ab brace with add ball squeeze x20  Supine Red Theraband clamshells 2 x 10      FUTURE visit:  Prone quad stretch with strap  Obliques  Retest possible LLD at 3rd visit, prescribe heel lift if appropriate  Standing Multifidus Lean, 10x10s  Standing FF, hands on table, hip extension  Mechanical lumbar traction at 5th visit if not improving         Home Exercises Provided and Patient Education Provided     Education provided:   - cont HEP    Written Home Exercises Provided: Patient instructed to cont prior HEP.  Exercises were reviewed and Karma was able to demonstrate them prior to the end of the session.  Karma demonstrated good  understanding of the education provided.     See EMR under Patient Instructions for exercises provided prior visit.    Assessment     Karma presents with pain in her B low back, buttocks, and knees, along with rounded shoulder posture and decreased heel strike during ambulation, which increases her fall risk. She arrived to her session today ambulating without an AD; she arrived in a wheelchair at her previous visit. She experienced dizziness going from supine<>sitting and required a short rest break before continuing. She reported cramping in her R quad while performing standing hip extension, but was able to complete the exercise. She reported muscle fatigue at the conclusion of her treatment session. She will benefit from continued LE, core, and low back strengthening, balance and gait training to decrease fall risk and improve ADL's and functional activities.    Karma Is progressing well towards her goals.   Pt prognosis is Good.     Pt will continue to benefit from skilled outpatient physical therapy to address the deficits listed in the problem list box on initial evaluation, provide pt/family education and to maximize pt's level of independence in the home and community environment.     Pt's spiritual, cultural and educational needs considered and pt  agreeable to plan of care and goals.    Anticipated barriers to physical therapy: co-morbidies     Goals:  Short Term GOALS: 3 weeks  1. Patient is independent with HEP.   progressing  2. Patient demonstrates independence with core activation and postural awareness while sitting and standing.   progressing  3. Patient will reduce pn to 2/10 while performing daily activities.   progressing  4. Patient will reduce LE radiculopathy frequency and intensity.    progressing     Long Term GOALS: 6 weeks.   1. Patient demonstrates increased l/s extension ROM to 0 degrees to improve tolerance to upright standing.   progressing  2. Patient demonstrates increased core, hip and BLE strength by 1/2 grade or greater to improve tolerance to home chores.   progressing  3. Patient demonstrates independence with body mechanics while performing ADLs.   progressing  4. Patient will improve FOTO limitation score to </= 51% to show improvement in condition and functional mobility.    progressing      Plan     Cont per POC, posture, balance, LE strengthening     I certify that I was present in the room directing the student in service delivery and guiding them using my skilled judgment. As the co-signing therapist I have reviewed the students documentation and am responsible for the treatment, assessment, and plan.     Julio Cesar Mullins, SPTA    Fabienne Epperson, PTA

## 2022-06-08 ENCOUNTER — CLINICAL SUPPORT (OUTPATIENT)
Dept: REHABILITATION | Facility: HOSPITAL | Age: 87
End: 2022-06-08
Payer: MEDICARE

## 2022-06-08 DIAGNOSIS — M54.40 BACK PAIN OF LUMBOSACRAL REGION WITH SCIATICA: Primary | ICD-10-CM

## 2022-06-08 PROCEDURE — 97112 NEUROMUSCULAR REEDUCATION: CPT | Mod: PN,CQ

## 2022-06-08 PROCEDURE — 97110 THERAPEUTIC EXERCISES: CPT | Mod: PN,CQ

## 2022-06-08 NOTE — PROGRESS NOTES
Physical Therapy Daily Treatment Note     Name: Karma Chen  Clinic Number: [id number]    Therapy Diagnosis:   Encounter Diagnosis   Name Primary?    Back pain of lumbosacral region with sciatica Yes     Physician: Elena Sullivan MD    Visit Date: 6/8/2022    Physician Orders: PT Eval and Treat   Medical Diagnosis from Referral:   M54.31 (ICD-10-CM) - Sciatica, right side   M79.604 (ICD-10-CM) - Right leg pain   Evaluation Date: 5/25/2022  Authorization Period Expiration: 12/31/2022  Plan of Care Expiration: 8/15/2022 or 12v post eval  Visit # (total) / Visits authorized: 3 / 20 + eval (POC 2 / 12)  2nd FOTO visit 5  3rd FOTO visit 12  Progress Note Due: 6/25/2022      Time In: 1115  Time Out: 1154  Total Billable Time: 39 minutes    Precautions: CAD, DM, HTN, Fibromyalgia, MI tx    Subjective     Pt reports: tired today  She was compliant with home exercise program.  Response to previous treatment: knee pain x 2 hours  Functional change: none today    Pain: 0/10  Location: bilateral LB      Objective     Karma received therapeutic exercises to develop strength, flexibility and posture for 29 minutes including:  NMRE x 10 minutes to improve balance, proprioception:    STAND:  Right Tensor Fascia Latae/QL stretch, Right Lower Extremity posterior, [id number] NOT PERFORMED time   Upright posture against wall, [id number] hold NOT PERFORMED time   Standing Hip abduction 2 x10 B  Standing hip extension 2 x10 B  HR/TR x 20 each B  Marches x 15 B  Pre-Gait train: 1 min each (airex optional)    NMRE   NMRE  1. M-L weight shift   2. Stagger: Affected leg in front: Heel contact -> Flat foot with KE (mid stance)  3. Stagger: Affected leg in back: Roll through -> push off  4. Ambulate with AD or none  NOT PERFORMED time     TABLE:   Supine sciatic nerve glide, Right, 20x (to pt tolerance)  NOT PERFORMED time   Piriformis stretch, pull across, 2x30 sec Bilateral    Jaxon stretch, [id number] R  LTR x10 B  Abdominal Bracing, 20x 5  sec   NOT PERFORMED time   Short Arc Quad 2 x 10 Bilateral  Mini bridges 2 x 10  Ab brace with add ball squeeze x20  Supine Red Theraband clamshells 2 x 10       FUTURE visit:  Prone quad stretch with strap  Obliques  Retest possible LLD at 3rd visit, prescribe heel lift if appropriate  Standing Multifidus Lean, 10x10s  Standing FF, hands on table, hip extension  Mechanical lumbar traction at 5th visit if not improving         Home Exercises Provided and Patient Education Provided     Education provided:   - cont HEP    Written Home Exercises Provided: Patient instructed to cont prior HEP.  Exercises were reviewed and Karma was able to demonstrate them prior to the end of the session.  Karma demonstrated good  understanding of the education provided.     See EMR under Patient Instructions for exercises provided prior visit.    Assessment     Karma presents with decreased B hip and core strength, short, shuffling gait pattern with decreased arm swing.  She reported fatigue through out most PRE's, and pt requested short rest breaks.  Continued strength, flexibility, balance, and gait to improve quality of life, decrease fall risk.    Karma Is progressing well towards her goals.   Pt prognosis is Good.     Pt will continue to benefit from skilled outpatient physical therapy to address the deficits listed in the problem list box on initial evaluation, provide pt/family education and to maximize pt's level of independence in the home and community environment.     Pt's spiritual, cultural and educational needs considered and pt agreeable to plan of care and goals.    Anticipated barriers to physical therapy: co-morbidies     Goals:  Short Term GOALS: 3 weeks  1. Patient is independent with HEP.   progressing  2. Patient demonstrates independence with core activation and postural awareness while sitting and standing.   progressing  3. Patient will reduce pn to 2/10 while performing daily activities.   progressing  4.  Patient will reduce LE radiculopathy frequency and intensity.    progressing     Long Term GOALS: 6 weeks.   1. Patient demonstrates increased l/s extension ROM to 0 degrees to improve tolerance to upright standing.   progressing  2. Patient demonstrates increased core, hip and BLE strength by 1/2 grade or greater to improve tolerance to home chores.   progressing  3. Patient demonstrates independence with body mechanics while performing ADLs.   progressing  4. Patient will improve FOTO limitation score to </= 51% to show improvement in condition and functional mobility.    progressing      Plan     Cont per POC, posture, balance, LE strengthening       Fabienne Epperson, PTA

## 2022-06-09 ENCOUNTER — DOCUMENTATION ONLY (OUTPATIENT)
Dept: REHABILITATION | Facility: HOSPITAL | Age: 87
End: 2022-06-09
Payer: MEDICARE

## 2022-06-09 NOTE — PROGRESS NOTES
30 day visit PT-PTA face-face discussion with MARY BETH Conley re: patient status, POC, and plan for progression done 6/9/22.  Fabienne Epperson, PTA

## 2022-06-10 ENCOUNTER — CLINICAL SUPPORT (OUTPATIENT)
Dept: REHABILITATION | Facility: HOSPITAL | Age: 87
End: 2022-06-10
Payer: MEDICARE

## 2022-06-10 DIAGNOSIS — M54.40 BACK PAIN OF LUMBOSACRAL REGION WITH SCIATICA: Primary | ICD-10-CM

## 2022-06-10 PROCEDURE — 97110 THERAPEUTIC EXERCISES: CPT | Mod: PN,CQ

## 2022-06-10 PROCEDURE — 97112 NEUROMUSCULAR REEDUCATION: CPT | Mod: PN,CQ

## 2022-06-10 NOTE — PROGRESS NOTES
Physical Therapy Daily Treatment Note     Name: Karma Chen  Clinic Number: [id number]    Therapy Diagnosis:   Encounter Diagnosis   Name Primary?    Back pain of lumbosacral region with sciatica Yes     Physician: Elena Sullivan MD    Visit Date: 6/10/2022    Physician Orders: PT Eval and Treat   Medical Diagnosis from Referral:   M54.31 (ICD-10-CM) - Sciatica, right side   M79.604 (ICD-10-CM) - Right leg pain   Evaluation Date: 5/25/2022  Authorization Period Expiration: 12/31/2022  Plan of Care Expiration: 8/15/2022 or 12v post eval  Visit # (total) / Visits authorized: 4 / 20 + eval (POC 3 / 12)  2nd FOTO visit 5  3rd FOTO visit 12  Progress Note Due: 6/25/2022      Time In: 1005  Time Out: 1045  Total Billable Time: 40 minutes    Precautions: CAD, DM, HTN, Fibromyalgia, MI tx    Subjective     Pt reports: she is tired today  She was compliant with home exercise program.  Response to previous treatment: no complaints  Functional change: none today    Pain: 0/10  Location: bilateral LB      Objective     Karma received therapeutic exercises to develop strength, flexibility and posture for 30 minutes including:  NMRE x 10 minutes to improve balance, proprioception:    STAND:  Right Tensor Fascia Latae/QL stretch, Right Lower Extremity posterior, [id number] - seated w/peanut   Upright posture against wall, [id number] hold NOT PERFORMED time   Standing Hip abduction 2 x10 B  Standing hip extension 2 x10 B  HR/TR x 20 each B  Marches x 20 B  Pre-Gait train: 1 min each (airex optional)    NMRE     1. M-L weight shift   2. Stagger: Affected leg in front: Heel contact -> Flat foot with KE (mid stance)  3. Stagger: Affected leg in back: Roll through -> push off  4. Ambulate with AD or none  NOT PERFORMED time     TABLE:   Supine sciatic nerve glide, Right, 20x (to pt tolerance)    Piriformis stretch, pull across, 2x30 sec Bilateral    Jaxon stretch, [id number] R NOT PERFORMED time   Hamstring stretch 3 x 30 sec  B -  manual  LTR x10 B  Abdominal Bracing, 20x 5 sec   NOT PERFORMED time   Short Arc Quad 1# 2 x 10 Bilateral  Mini bridges 2 x 10  Ab brace with add ball squeeze x20  Supine Red Theraband clamshells 2 x 10  NOT PERFORMED time       FUTURE visit:  Prone quad stretch with strap  Obliques  Retest possible LLD at 3rd visit, prescribe heel lift if appropriate-even today  Standing Multifidus Lean, 10x10s  Standing FF, hands on table, hip extension  Mechanical lumbar traction at 5th visit if not improving         Home Exercises Provided and Patient Education Provided     Education provided:   - cont HEP    Written Home Exercises Provided: Patient instructed to cont prior HEP.  Exercises were reviewed and Karma was able to demonstrate them prior to the end of the session.  Karma demonstrated good  understanding of the education provided.     See EMR under Patient Instructions for exercises provided prior visit.    Assessment     Karma demonstrated R sided LB and hip pain, which has decreased since initial evaluation.  She fatigues easily, and needs rest breaks.  LLD was even today.  Continued core, LB, and hip strengthening to decrease pain levels, decrease fall risk, improve functional activities.     Karma Is progressing well towards her goals.   Pt prognosis is Good.     Pt will continue to benefit from skilled outpatient physical therapy to address the deficits listed in the problem list box on initial evaluation, provide pt/family education and to maximize pt's level of independence in the home and community environment.     Pt's spiritual, cultural and educational needs considered and pt agreeable to plan of care and goals.    Anticipated barriers to physical therapy: co-morbidies     Goals:  Short Term GOALS: 3 weeks  1. Patient is independent with HEP.   progressing  2. Patient demonstrates independence with core activation and postural awareness while sitting and standing.   progressing  3. Patient will reduce pn to  2/10 while performing daily activities.   progressing  4. Patient will reduce LE radiculopathy frequency and intensity.    progressing     Long Term GOALS: 6 weeks.   1. Patient demonstrates increased l/s extension ROM to 0 degrees to improve tolerance to upright standing.   progressing  2. Patient demonstrates increased core, hip and BLE strength by 1/2 grade or greater to improve tolerance to home chores.   progressing  3. Patient demonstrates independence with body mechanics while performing ADLs.   progressing  4. Patient will improve FOTO limitation score to </= 51% to show improvement in condition and functional mobility.    progressing      Plan     Cont per POC, posture, balance, LE strengthening       Fabienne Epperson, PTA

## 2022-06-16 ENCOUNTER — CLINICAL SUPPORT (OUTPATIENT)
Dept: REHABILITATION | Facility: HOSPITAL | Age: 87
End: 2022-06-16
Payer: MEDICARE

## 2022-06-16 DIAGNOSIS — M54.40 BACK PAIN OF LUMBOSACRAL REGION WITH SCIATICA: Primary | ICD-10-CM

## 2022-06-16 PROCEDURE — 97110 THERAPEUTIC EXERCISES: CPT | Mod: PN

## 2022-06-16 PROCEDURE — 97112 NEUROMUSCULAR REEDUCATION: CPT | Mod: PN

## 2022-06-16 NOTE — PROGRESS NOTES
Physical Therapy Daily Treatment Note     Name: Karma Chen  Clinic Number: [id number]    Therapy Diagnosis:   Encounter Diagnosis   Name Primary?    Back pain of lumbosacral region with sciatica Yes     Physician: Elena Sullivan MD    Visit Date: 6/16/2022    Physician Orders: PT Eval and Treat   Medical Diagnosis from Referral:   M54.31 (ICD-10-CM) - Sciatica, right side   M79.604 (ICD-10-CM) - Right leg pain   Evaluation Date: 5/25/2022  Authorization Period Expiration: 12/31/2022  Plan of Care Expiration: 8/15/2022 or 12v post eval  Visit # (total) / Visits authorized: 4 / 20 + eval (POC 3 / 12)  2nd FOTO visit 5  3rd FOTO visit 12  Progress Note Due: 6/25/2022      Time In: 1045  Time Out: 1129  Total Billable Time: 44 minutes    Precautions: CAD, DM, HTN, Fibromyalgia, MI tx    Subjective     Pt reports: she is having first rhizotomy on Monday. Second one in July.     She was compliant with home exercise program.  Response to previous treatment: no complaints  Functional change: none today    Pain: 4/10  Location: bilateral LB; Location: right glute down posterior thigh sometimes into foot      Objective     Karma received therapeutic exercises to develop strength, flexibility and posture for 34 minutes including:  NMRE x 10 minutes to improve balance, proprioception:    STAND:  Right Tensor Fascia Latae/QL stretch, Right Lower Extremity posterior, [id number]   +Upright posture with Bilateral ER against wall, [id number] hold   +Standing Hip flexion, 2x10 Bilateral   Standing Hip abduction 2 x10 B  Standing hip extension 2 x10 B  Pre-Gait train: airex   NMRE     1. M-L weight shift, 1 min (MARCHES without leaning)  2. Stagger: Affected leg in front: Heel contact -> Flat foot with KE (mid stance), 30 sec  3. Stagger: Affected leg in back: Roll through -> push off, 30 sec  4. Ambulate with AD or none      TABLE:   LTR x10 Bilateral  Short Arc Quad, 2 x 10 Bilateral  Ab brace with add ball squeeze x20  Mini  bridges 2 x 10  Supine Red Theraband clamshells 2 x 10     Supine sciatic nerve glide, BILATERAL, 20x ankle pump (utilize as Hamstring stretch)    Piriformis stretch, pull across, 2x30 sec Bilateral    Jaxon stretch, [id number] RIGHT       FUTURE visit:  Obliques  Retest possible LLD at 3rd visit, prescribe heel lift if appropriate-even today  Standing Multifidus Lean, 10x10s  Mechanical lumbar traction at 5th visit if not improving  Sidelying manual tx      Home Exercises Provided and Patient Education Provided     Education provided:   - cont HEP    Written Home Exercises Provided: Patient instructed to cont prior HEP.  Exercises were reviewed and Karma was able to demonstrate them prior to the end of the session.  Karma demonstrated good  understanding of the education provided.     See EMR under Patient Instructions for exercises provided prior visit.    Assessment     Karma continues to demonstrate low back and glute pain, which has decreased since initial evaluation. Pt has improved mobility and gait since initial evaluation. Pt able to perform all therex with minimal pn provocation.  Continued core, LB, and hip strengthening to decrease pain levels, decrease fall risk, improve functional activities.     Karma Is progressing well towards her goals.   Pt prognosis is Good.     Pt will continue to benefit from skilled outpatient physical therapy to address the deficits listed in the problem list box on initial evaluation, provide pt/family education and to maximize pt's level of independence in the home and community environment.     Pt's spiritual, cultural and educational needs considered and pt agreeable to plan of care and goals.    Anticipated barriers to physical therapy: co-morbidies     Goals:  Short Term GOALS: 3 weeks  1. Patient is independent with HEP.   progressing  2. Patient demonstrates independence with core activation and postural awareness while sitting and standing.   progressing  3. Patient  will reduce pn to 2/10 while performing daily activities.   progressing  4. Patient will reduce LE radiculopathy frequency and intensity.    progressing     Long Term GOALS: 6 weeks.   1. Patient demonstrates increased l/s extension ROM to 0 degrees to improve tolerance to upright standing.   progressing  2. Patient demonstrates increased core, hip and BLE strength by 1/2 grade or greater to improve tolerance to home chores.   progressing  3. Patient demonstrates independence with body mechanics while performing ADLs.   progressing  4. Patient will improve FOTO limitation score to </= 51% to show improvement in condition and functional mobility.    progressing      Plan     Cont per POC, posture, balance, LE strengthening       Susan Conley, PT

## 2022-06-21 ENCOUNTER — OFFICE VISIT (OUTPATIENT)
Dept: CARDIOLOGY | Facility: CLINIC | Age: 87
End: 2022-06-21
Payer: MEDICARE

## 2022-06-21 VITALS
HEART RATE: 71 BPM | SYSTOLIC BLOOD PRESSURE: 126 MMHG | DIASTOLIC BLOOD PRESSURE: 64 MMHG | OXYGEN SATURATION: 99 % | HEIGHT: 60 IN | WEIGHT: 134 LBS | RESPIRATION RATE: 16 BRPM | BODY MASS INDEX: 26.31 KG/M2

## 2022-06-21 DIAGNOSIS — E78.00 PURE HYPERCHOLESTEROLEMIA: ICD-10-CM

## 2022-06-21 DIAGNOSIS — I25.10 CORONARY ARTERY DISEASE INVOLVING NATIVE CORONARY ARTERY OF NATIVE HEART WITHOUT ANGINA PECTORIS: ICD-10-CM

## 2022-06-21 DIAGNOSIS — I10 ESSENTIAL HYPERTENSION: Primary | ICD-10-CM

## 2022-06-21 DIAGNOSIS — I48.0 PAF (PAROXYSMAL ATRIAL FIBRILLATION): Chronic | ICD-10-CM

## 2022-06-21 DIAGNOSIS — M54.40 BACK PAIN OF LUMBOSACRAL REGION WITH SCIATICA: ICD-10-CM

## 2022-06-21 DIAGNOSIS — Z78.9 STATIN INTOLERANCE: ICD-10-CM

## 2022-06-21 PROCEDURE — 99214 PR OFFICE/OUTPT VISIT, EST, LEVL IV, 30-39 MIN: ICD-10-PCS | Mod: S$GLB,,, | Performed by: INTERNAL MEDICINE

## 2022-06-21 PROCEDURE — 99214 OFFICE O/P EST MOD 30 MIN: CPT | Mod: S$GLB,,, | Performed by: INTERNAL MEDICINE

## 2022-06-21 NOTE — ASSESSMENT & PLAN NOTE
Are she has statin intolerance.  Unable to get any parenteral injectable are lipid reducing agents.

## 2022-06-21 NOTE — PROGRESS NOTES
Subjective:    Patient ID:  Karma Chen is a 86 y.o. female patient here for evaluation Coronary Artery Disease, Atrial Fibrillation, Hyperlipidemia, Hypertension, and Results (Labs in epic)      History of Present Illness:   Is here for follow-up evaluation seem to be doing reasonably well she did have a successful rhizotomy are in her low back up with some improvement of her symptoms.  She has some soreness still persisting she is due to have another are rhizotomy in 3 weeks duration are.  She has multiple epidural injections in the past and now with recurrent pain  From a cardiac perspective no occurrence of any angina PND orthopnea noted no significant palpitations.  She has neuropathic pains in both lower extremities.  Sometimes is worse at night          Review of patient's allergies indicates:   Allergen Reactions    Arthrotec 50 [diclofenac-misoprostol]     Doxycycline     Effexor [venlafaxine]     Estradiol     Flagyl [metronidazole]     Fluconazole     Gabapentin     Iodine     Levaquin [levofloxacin]     Nexium [esomeprazole magnesium]     Penicillins     Red yeast rice (monascus purpureus)     Shellfish containing products     Statins-hmg-coa reductase inhibitors     Sulfa (sulfonamide antibiotics)     Tea tree oil     Tegaserod      ZOLNORM    Tegaserod hydrogen maleate     Trazodone     Yeast, dried     Adhesive Rash     Band-aids       Past Medical History:   Diagnosis Date    Coronary artery disease     Dermatitis     Dermatitis 12/8/2017    Diabetes mellitus     Diabetes mellitus type II     Leah Madrigal virus infection     Fibromyalgia     GERD (gastroesophageal reflux disease)     Hypertension     MI (myocardial infarction) 09/23/2011    Pure hypercholesterolemia 2/26/2020     Past Surgical History:   Procedure Laterality Date    adenoids      ANGIOPLASTY  1987    APPENDECTOMY      CARDIAC PACEMAKER PLACEMENT  01/12/2017    CATARACT EXTRACTION, BILATERAL  Bilateral 9/2/15, 3/17/15    right eye-9/2/15, left eye-3/17/15    CHOLECYSTECTOMY  1987    CORONARY ARTERY BYPASS GRAFT  2006    quadruple    coronary stents  1999    x2    CYST REMOVAL  04/25/2017    on back    HYSTERECTOMY  1966    OVARY SURGERY  1948    Ovary burst & was repaired    RHIZOTOMY  09/14/2018    TONSILLECTOMY  1940     Social History     Tobacco Use    Smoking status: Never Smoker    Smokeless tobacco: Never Used   Substance Use Topics    Alcohol use: No    Drug use: No        Review of Systems:    As noted in HPI in addition      REVIEW OF SYSTEMS  CARDIOVASCULAR: No recent chest pain, palpitations, arm, neck, or jaw pain  RESPIRATORY: No recent fever, cough chills, SOB or congestion  : No blood in the urine  GI: No Nausea, vomiting, constipation, diarrhea, blood, or reflux.  MUSCULOSKELETAL: No myalgias  NEURO: No lightheadedness or dizziness, bilateral neuropathic pains are noted.  Sometimes wakes up from sleep she was just started on amitriptyline with some relief.  EYES: No Double vision, blurry, vision or headache              Objective        Vitals:    06/21/22 1329   BP: 126/64   Pulse: 71   Resp: 16       LIPIDS - LAST 2   Lab Results   Component Value Date    CHOL 223 (H) 05/20/2022    CHOL 248 (H) 11/08/2021    HDL 56 05/20/2022    HDL 41 11/08/2021    LDLCALC 145.2 05/20/2022    LDLCALC 170.0 (H) 11/08/2021    TRIG 109 05/20/2022    TRIG 185 (H) 11/08/2021    CHOLHDL 25.1 05/20/2022    CHOLHDL 16.5 (L) 11/08/2021       CBC - LAST 2  Lab Results   Component Value Date    WBC 7.41 05/10/2022    WBC 7.35 11/08/2021    RBC 4.20 05/10/2022    RBC 4.30 11/08/2021    HGB 11.5 (L) 05/10/2022    HGB 12.0 11/08/2021    HCT 37.2 05/10/2022    HCT 38.0 11/08/2021    MCV 89 05/10/2022    MCV 88 11/08/2021    MCH 27.4 05/10/2022    MCH 27.9 11/08/2021    MCHC 30.9 (L) 05/10/2022    MCHC 31.6 (L) 11/08/2021    RDW 13.8 05/10/2022    RDW 13.2 11/08/2021     05/10/2022      11/08/2021    MPV 10.0 05/10/2022    MPV 10.2 11/08/2021    GRAN 3.4 05/10/2022    GRAN 46.0 05/10/2022    LYMPH 2.5 05/10/2022    LYMPH 34.3 05/10/2022    MONO 1.1 (H) 05/10/2022    MONO 14.7 05/10/2022    BASO 0.08 05/10/2022    BASO 0.06 11/08/2021    NRBC 0 05/10/2022    NRBC 0 11/08/2021       CHEMISTRY & LIVER FUNCTION - LAST 2  Lab Results   Component Value Date     05/20/2022     05/10/2022     05/10/2022    K 4.0 05/20/2022    K 3.9 05/10/2022    K 3.9 05/10/2022     05/20/2022     05/10/2022     05/10/2022    CO2 24 05/20/2022    CO2 27 05/10/2022    CO2 27 05/10/2022    ANIONGAP 12 05/20/2022    ANIONGAP 10 05/10/2022    ANIONGAP 10 05/10/2022    BUN 28 (H) 05/20/2022    BUN 24 (H) 05/10/2022    BUN 24 (H) 05/10/2022    CREATININE 1.2 05/20/2022    CREATININE 1.4 05/10/2022    CREATININE 1.4 05/10/2022     (H) 05/20/2022    GLU 93 05/10/2022    GLU 93 05/10/2022    CALCIUM 9.3 05/20/2022    CALCIUM 9.0 05/10/2022    CALCIUM 9.0 05/10/2022    MG 2.3 10/22/2021    MG 2.3 10/22/2021    ALBUMIN 4.0 05/20/2022    ALBUMIN 3.9 05/10/2022    ALBUMIN 3.9 05/10/2022    ALBUMIN 3.9 05/10/2022    PROT 7.2 05/10/2022    PROT 7.2 05/10/2022    ALKPHOS 68 05/10/2022    ALKPHOS 68 05/10/2022    ALT 22 05/10/2022    ALT 22 05/10/2022    AST 24 05/10/2022    AST 24 05/10/2022    BILITOT 0.6 05/10/2022    BILITOT 0.6 05/10/2022        CARDIAC PROFILE - LAST 2  Lab Results   Component Value Date     (H) 05/10/2022     (H) 07/24/2020    CPK 53 10/22/2021    CPKMB 1.57 02/20/2012    TROPONINI <0.030 10/22/2021    TROPONINI 0.077 (HH) 07/25/2020        COAGULATION - LAST 2  Lab Results   Component Value Date    LABPT 13.9 (H) 10/22/2021    LABPT 13.6 05/02/2020    INR 1.2 10/22/2021    INR 1.1 05/02/2020    APTT 26.9 12/10/2017    APTT 26.6 02/20/2012       ENDOCRINE & PSA - LAST 2  Lab Results   Component Value Date    HGBA1C 5.5 05/20/2022    HGBA1C 5.9 11/08/2021     TSH 1.280 05/10/2022    TSH 1.760 10/22/2021        ECHOCARDIOGRAM RESULTS  Results for orders placed during the hospital encounter of 06/10/21    Echo    Interpretation Summary  · The left ventricle is mildly enlarged with mild concentric hypertrophy  · The estimated ejection fraction is 49%. Which is decreased  · Grade II left ventricular diastolic dysfunction.  · There is mild left ventricular global hypokinesis.  · Atrial fibrillation not observed.  · Normal right ventricular size with normal right ventricular systolic function.  · Moderate left atrial enlargement.  · Mild-to-moderate aortic regurgitation.  · There is mild aortic valve stenosis.  · Aortic valve area is 1.99 cm2; peak velocity is 1.41 m/s; mean gradient is 4 mmHg.  · Mild mitral regurgitation.  · Mild to moderate tricuspid regurgitation.  · Mild pulmonic regurgitation.  · Normal central venous pressure (3 mmHg).  · The estimated PA systolic pressure is 35 mmHg. Mild pulmonary hypertension  · Pacemaker lead is present      CURRENT/PREVIOUS VISIT EKG  Results for orders placed or performed in visit on 05/10/22   IN OFFICE EKG 12-LEAD (to [employer])    Collection Time: 05/10/22 11:06 AM    Narrative    Test Reason : I48.0,    Vent. Rate : 078 BPM     Atrial Rate : 079 BPM     P-R Int : 350 ms          QRS Dur : 120 ms      QT Int : 392 ms       P-R-T Axes : 000 -20 153 degrees     QTc Int : 446 ms    Atrial-paced rhythm with prolonged AV conduction  Anteroseptal infarct (cited on or before 11-DEC-2017)  ST and T wave abnormality, consider lateral ischemia  Abnormal ECG  When compared with ECG of 22-OCT-2021 16:20,  Questionable change in initial forces of Anterior leads  T wave inversion more evident in Lateral leads  Confirmed by Kenton Ware MD ([id number]) on 6/1/2022 9:08:27 AM    Referred By:             Confirmed By:Kenton Ware MD     No valid procedures specified.   Results for orders placed during the hospital encounter of 06/10/21    Nuclear  Stress - Cardiology Interpreted    Interpretation Summary    Normal myocardial perfusion scan. There is no evidence of myocardial ischemia or infarction.    The gated perfusion images showed an ejection fraction of 51% post stress. Normal ejection fraction is greater than 53%.    There is normal wall motion post stress.    LV cavity size is  and normal at stress.    The EKG portion of this study is abnormal but not diagnostic.    The patient reported no chest pain during the stress test.    There were no arrhythmias during stress.    No valid procedures specified.    PHYSICAL EXAM  CONSTITUTIONAL: Well built, well nourished in no apparent distress  NECK: no carotid bruit, no JVD  LUNGS: CTA  CHEST WALL: no tenderness  HEART: regular rate and rhythm, S1, S2 normal, no murmur, click, rub or gallop   ABDOMEN: soft, non-tender; bowel sounds normal; no masses,  no organomegaly  EXTREMITIES: Extremities normal, no edema, no calf tenderness noted  NEURO: AAO X 3    I HAVE REVIEWED :    The vital signs, nurses notes, and all the pertinent radiology and labs.        Current Outpatient Medications   Medication Instructions    acetaminophen (TYLENOL) 500 mg, Oral, Every 6 hours PRN    ALPRAZolam (XANAX) 0.25 mg, Oral, As needed (PRN)    amitriptyline (ELAVIL) 10 MG tablet TAKE 1 TABLET BY MOUTH NIGHTLY AS NEEDED FOR INSOMNIA.    amitriptyline (ELAVIL) 25 MG tablet TAKE 1 TABLET DAILY    apixaban (ELIQUIS) 2.5 mg, Oral, 2 times daily    aspirin (ECOTRIN) 81 mg, Oral, Daily    betamethasone dipropionate 0.05 % cream APPLY TOPICALLY TWICE A DAY    cetirizine (ZYRTEC) 10 mg, Oral, Daily    cholecalciferol, vitamin D3, 100 mcg (4,000 unit) Cap 1 capsule, Oral, 2 times daily    coenzyme Q10 100 mg, Oral, 2 times daily    diclofenac sodium (VOLTAREN) 2 g, Topical (Top), Daily    diphenhydrAMINE (BENYLIN) 12.5 mg, Oral, 4 times daily PRN    ECHINACEA ORAL 750 mg, Oral, 2 times daily    estradioL (ESTRACE) 1 g,  Vaginal, Weekly    fluocinonide 0.05% (LIDEX) 0.05 % cream Apply sparingly to rash on chest, neck, legs bid max use one month.    fluticasone propionate (FLONASE) 50 mcg/actuation nasal spray 1 spray, Each Nostril, Daily    isosorbide mononitrate (IMDUR) 120 mg, Oral, Daily, DO NOT CRUSH OR CHEW; SWALLOW WHOLE.    ketoconazole (NIZORAL) 2 % cream aaa bid    ketoconazole (NIZORAL) 2 % shampoo Use to wash hair every day or every other day for dry itchy scalp    levothyroxine (SYNTHROID) 88 mcg, Oral, Daily    loratadine (CLARITIN) 10 mg, Oral, Daily    losartan (COZAAR) 25 mg, Oral, 2 times daily    MAGNESIUM ORAL 500 mg, Oral, Daily    metoprolol succinate (TOPROL-XL) 25 mg, Oral, Daily    montelukast (SINGULAIR) 10 mg tablet TAKE 1 TABLET BY MOUTH EVERY DAY IN THE EVENING    multivitamin (THERAGRAN) tablet 1 tablet, Oral, 2 times daily    mupirocin (BACTROBAN) 2 % ointment Topical (Top), 2 times daily    nitroGLYCERIN (NITROSTAT) 0.4 mg, Sublingual, Every 5 min PRN    omeprazole (PRILOSEC) 20 MG capsule TAKE 1 CAPSULE BY MOUTH EVERY DAY    prasterone, dhea, 25 mg Cap 1 capsule, Oral, Daily    tiZANidine (ZANAFLEX) 4 MG tablet TAKE 1 TABLET BY MOUTH ONCE DAILY.    traMADoL (ULTRAM) 50 mg, Oral, 2 times daily PRN    triamcinolone acetonide 0.1% (KENALOG) 0.1 % cream aaa bid x 1-2 wks, tk 1 wk off prn rash    turmeric 1,200 mg, Oral, Daily    UNABLE TO FIND 1 capsule, Oral, Bio-[employer]he- premium nerve     UNABLE TO FIND 1 capsule, Oral, Daily, medication name: GI probiotic     UNABLE TO FIND 1 capsule, Oral, Daily, Joint health     UNABLE TO FIND 1 capsule, Oral, 2 times daily, Cardio platinum    UNABLE TO FIND Nerve Jorge    zinc gluconate 50 mg, Oral, 2 times daily          Assessment & Plan     Essential hypertension, not well controlled      Coronary artery disease involving native coronary artery of native heart without angina pectoris  Continue on medical therapy.  She is on baby aspirin  at 81 mg daily isosorbide mononitrate 120 mg a day metoprolol succinate 25 mg daily and encouraged    PAF (paroxysmal atrial fibrillation)  Continue on Eliquis 2.5 mg twice a day along with enteric-coated aspirin in addition to that she is on arm metoprolol succinate 25 mg once daily rate and rhythm well controlled.    Pure hypercholesterolemia  Are she has statin intolerance.  Unable to get any parenteral injectable are lipid reducing agents.    Statin intolerance  Continue on low-fat low-cholesterol diet.    Back pain of lumbosacral region with sciatica  Currently being managed with rhizotomy and she has completed 1 for strong          Follow up in about 6 months (around 12/21/2022).

## 2022-06-21 NOTE — ASSESSMENT & PLAN NOTE
Continue on Eliquis 2.5 mg twice a day along with enteric-coated aspirin in addition to that she is on arm metoprolol succinate 25 mg once daily rate and rhythm well controlled.

## 2022-06-21 NOTE — ASSESSMENT & PLAN NOTE
Continue on medical therapy.  She is on baby aspirin at 81 mg daily isosorbide mononitrate 120 mg a day metoprolol succinate 25 mg daily and encouraged

## 2022-06-22 DIAGNOSIS — E03.9 ACQUIRED HYPOTHYROIDISM: ICD-10-CM

## 2022-06-22 RX ORDER — LEVOTHYROXINE SODIUM 88 UG/1
88 TABLET ORAL DAILY
Qty: 90 TABLET | Refills: 1 | Status: SHIPPED | OUTPATIENT
Start: 2022-06-22 | End: 2023-03-07

## 2022-06-23 ENCOUNTER — CLINICAL SUPPORT (OUTPATIENT)
Dept: REHABILITATION | Facility: HOSPITAL | Age: 87
End: 2022-06-23
Payer: MEDICARE

## 2022-06-23 DIAGNOSIS — M54.40 BACK PAIN OF LUMBOSACRAL REGION WITH SCIATICA: Primary | ICD-10-CM

## 2022-06-23 PROCEDURE — 97110 THERAPEUTIC EXERCISES: CPT | Mod: PN

## 2022-06-23 PROCEDURE — 97112 NEUROMUSCULAR REEDUCATION: CPT | Mod: PN

## 2022-06-23 NOTE — PROGRESS NOTES
Physical Therapy Daily Treatment Note     Name: Karma Chen  Clinic Number: [id number]    Therapy Diagnosis:   Encounter Diagnosis   Name Primary?    Back pain of lumbosacral region with sciatica Yes     Physician: Elena Sullivan MD    Visit Date: 6/23/2022    Physician Orders: PT Eval and Treat   Medical Diagnosis from Referral:   M54.31 (ICD-10-CM) - Sciatica, right side   M79.604 (ICD-10-CM) - Right leg pain   Evaluation Date: 5/25/2022  Authorization Period Expiration: 12/31/2022  Plan of Care Expiration: 8/15/2022 or 12v post eval  Visit # (total) / Visits authorized: 6 / 20 + eval (POC 5 / 12)  2nd FOTO visit 6 - 6/23/2022  3rd FOTO visit 12  Progress Note Due: 6/25/2022      Time In: 1000  Time Out: 1043  Total Billable Time: 43 minutes    Precautions: CAD, DM, HTN, Fibromyalgia, MI tx    Subjective     Pt reports: had rhizotomy on Monday; pain shot up that day but has gone down now.      She was compliant with home exercise program.  Response to previous treatment: no complaints  Functional change: none today    Pain: 1/10  Location: bilateral LB; Location: right glute down posterior thigh sometimes into foot      Objective     Karma received therapeutic exercises to develop strength, flexibility and posture for 33 minutes including:  NMRE x 10 minutes to improve balance, proprioception:    TABLE:   LTR x10 Bilateral  Short Arc Quad, 2 x 10 Bilateral  Ab brace with add ball squeeze x20  Mini bridges 2 x 10  Supine sciatic nerve glide, BILATERAL, 20x ankle pump (utilize as Hamstring stretch)   Supine Red Theraband clamshells 2 x 10 (30 soon)    Piriformis stretch, pull across, 2x30 sec Bilateral    Jaxon stretch, [id number] RIGHT     STAND:  Right Tensor Fascia Latae/QL stretch, Right Lower Extremity posterior, [id number]   Upright posture with Bilateral ER against wall, 02x5lip hold   Standing Hip flexion, 2x10 Bilateral   Standing Hip abduction 2 x10 B  Standing hip extension 2 x10 Bilateral, with slight  forward lumbar flexion  Pre-Gait train: airex   NMRE     1. M-L weight shift, 1 min (MARCHES without leaning)  2. Stagger: Affected leg in front: Heel contact -> Flat foot with KE (mid stance), 30 sec  3. Stagger: Affected leg in back: Roll through -> push off, 30 sec  4. Ambulate with AD or none            FUTURE visit:  Obliques  Retest possible LLD at 3rd visit, prescribe heel lift if appropriate-even today  Standing Multifidus Lean, 10x10s  Mechanical lumbar traction at 5th visit if not improving  Sidelying manual tx      Home Exercises Provided and Patient Education Provided     Education provided:   - cont HEP    Written Home Exercises Provided: Patient instructed to cont prior HEP.  Exercises were reviewed and Karma was able to demonstrate them prior to the end of the session.  Karma demonstrated good  understanding of the education provided.     See EMR under Patient Instructions for exercises provided prior visit.    Assessment     Karma demonstrates low levels of low back and glute pain. Pt has improved mobility and gait since initial evaluation. Pt able to perform all therex with minimal pn provocation.  Requires minimal cueing with correct form. Continued core, LB, and hip strengthening to decrease pain levels, decrease fall risk, improve functional activities.     Karma Is progressing well towards her goals.   Pt prognosis is Good.     Pt will continue to benefit from skilled outpatient physical therapy to address the deficits listed in the problem list box on initial evaluation, provide pt/family education and to maximize pt's level of independence in the home and community environment.     Pt's spiritual, cultural and educational needs considered and pt agreeable to plan of care and goals.    Anticipated barriers to physical therapy: co-morbidies     Goals:  Short Term GOALS: 3 weeks  1. Patient is independent with HEP.   Progressing 6/23/2022  2. Patient demonstrates independence with core activation  and postural awareness while sitting and standing.   Progressing 6/23/2022  3. Patient will reduce pn to 2/10 while performing daily activities.   Progressing 6/23/2022  4. Patient will reduce LE radiculopathy frequency and intensity.    Progressing 6/23/2022     Long Term GOALS: 6 weeks.   1. Patient demonstrates increased l/s extension ROM to 0 degrees to improve tolerance to upright standing.   progressing  2. Patient demonstrates increased core, hip and BLE strength by 1/2 grade or greater to improve tolerance to home chores.   progressing  3. Patient demonstrates independence with body mechanics while performing ADLs.   progressing  4. Patient will improve FOTO limitation score to </= 51% to show improvement in condition and functional mobility.    progressing      Plan     Cont per POC, core strengthening, posture, balance, LE strengthening       Susan Conley, PT

## 2022-06-28 ENCOUNTER — CLINICAL SUPPORT (OUTPATIENT)
Dept: REHABILITATION | Facility: HOSPITAL | Age: 87
End: 2022-06-28
Payer: MEDICARE

## 2022-06-28 DIAGNOSIS — M54.40 BACK PAIN OF LUMBOSACRAL REGION WITH SCIATICA: Primary | ICD-10-CM

## 2022-06-28 PROCEDURE — 97110 THERAPEUTIC EXERCISES: CPT | Mod: PN,CQ

## 2022-06-28 PROCEDURE — 97112 NEUROMUSCULAR REEDUCATION: CPT | Mod: PN,CQ

## 2022-06-28 NOTE — PROGRESS NOTES
Physical Therapy Daily Treatment Note     Name: Karma Chen  Clinic Number: [id number]    Therapy Diagnosis:   Encounter Diagnosis   Name Primary?    Back pain of lumbosacral region with sciatica Yes     Physician: Elena Sullivan MD    Visit Date: 6/28/2022    Physician Orders: PT Eval and Treat   Medical Diagnosis from Referral:   M54.31 (ICD-10-CM) - Sciatica, right side   M79.604 (ICD-10-CM) - Right leg pain   Evaluation Date: 5/25/2022  Authorization Period Expiration: 12/31/2022  Plan of Care Expiration: 8/15/2022 or 12v post eval  Visit # (total) / Visits authorized: 7 / 20 + eval (POC 6 / 12)  2nd FOTO visit 6 - 6/23/2022  3rd FOTO visit 12  Progress Note Due: 6/25/2022      Time In: 1045  Time Out: 1128  Total Billable Time: 43 minutes    Precautions: CAD, DM, HTN, Fibromyalgia, MI tx    Subjective     Pt reports: she had a little pain last night, doing well today  She was compliant with home exercise program.  Response to previous treatment: no complaints  Functional change: none today    Pain: 0/10  Location: bilateral LB; Location: right glute down posterior thigh sometimes into foot      Objective     Karma received therapeutic exercises to develop strength, flexibility and posture for 30 minutes including:  NMRE x 13 minutes to improve balance, proprioception:    TABLE:   LTR x20 Bilateral  Short Arc Quad, 2 x 10 Bilateral  Ab brace with add ball squeeze x20  Mini bridges 2 x 10  Supine sciatic nerve glide, BILATERAL, 20x ankle pump (utilize as Hamstring stretch)   Supine Red Theraband clamshells 2 x 10 (30 soon)    Piriformis stretch, pull across, 2x30 sec Bilateral    Jaxon stretch, [id number] RIGHT       STAND:  Right Tensor Fascia Latae/QL stretch, Right Lower Extremity posterior, [id number]   Upright posture with Bilateral ER against wall, [id number] hold NOT PERFORMED time   Standing Hip flexion, 2x10 B  Standing Hip abduction 2 x10 B  Standing hip extension 2 x10 B  Pre-Gait train: airex   NMRE      1. M-L weight shift, 1 min (MARCHES without leaning)  2. Stagger: Affected leg in front: Heel contact -> Flat foot with KE (mid stance), 30 sec    3. Stagger: Affected leg in back: Roll through -> push off, 30 sec    4. Ambulate with AD or none   NOT PERFORMED time     Manual therapy: x 2 minutes  [place] Distraction R LE     FUTURE visit:  Obliques  Retest possible LLD at 3rd visit, prescribe heel lift if appropriate-even today  Standing Multifidus Lean, 10x10s  Mechanical lumbar traction at 5th visit if not improving  Sidelying manual tx      Home Exercises Provided and Patient Education Provided     Education provided:   - cont HEP    Written Home Exercises Provided: Patient instructed to cont prior HEP.  Exercises were reviewed and Karma was able to demonstrate them prior to the end of the session.  Karma demonstrated good  understanding of the education provided.     See EMR under Patient Instructions for exercises provided prior visit.    Assessment     Karma presents with overall decreased pain, improved gait pattern.  She continues with decreased piriformis flexibility, which contributes to her Low back pain and dysfunction.  Continued core, LB, and hip strengthening to improve her functional activities, cooking, ADL's, decrease her fall risk.    Karma Is progressing well towards her goals.   Pt prognosis is Good.     Pt will continue to benefit from skilled outpatient physical therapy to address the deficits listed in the problem list box on initial evaluation, provide pt/family education and to maximize pt's level of independence in the home and community environment.     Pt's spiritual, cultural and educational needs considered and pt agreeable to plan of care and goals.    Anticipated barriers to physical therapy: co-morbidies     Goals:  Short Term GOALS: 3 weeks  1. Patient is independent with HEP.   Progressing 6/28/2022  2. Patient demonstrates independence with core activation and postural  awareness while sitting and standing.   Progressing 6/28/2022  3. Patient will reduce pn to 2/10 while performing daily activities.   Progressing 6/28/2022  4. Patient will reduce LE radiculopathy frequency and intensity.    Progressing 6/28/2022     Long Term GOALS: 6 weeks.   1. Patient demonstrates increased l/s extension ROM to 0 degrees to improve tolerance to upright standing.   progressing  2. Patient demonstrates increased core, hip and BLE strength by 1/2 grade or greater to improve tolerance to home chores.   progressing  3. Patient demonstrates independence with body mechanics while performing ADLs.   progressing  4. Patient will improve FOTO limitation score to </= 51% to show improvement in condition and functional mobility.    progressing      Plan     Cont per POC, core strengthening, posture, balance, LE strengthening       Fabienne Epperson, PTA

## 2022-06-30 ENCOUNTER — CLINICAL SUPPORT (OUTPATIENT)
Dept: REHABILITATION | Facility: HOSPITAL | Age: 87
End: 2022-06-30
Payer: MEDICARE

## 2022-06-30 DIAGNOSIS — M54.40 BACK PAIN OF LUMBOSACRAL REGION WITH SCIATICA: Primary | ICD-10-CM

## 2022-06-30 PROCEDURE — 97110 THERAPEUTIC EXERCISES: CPT | Mod: PN

## 2022-06-30 PROCEDURE — 97140 MANUAL THERAPY 1/> REGIONS: CPT | Mod: PN

## 2022-06-30 NOTE — PROGRESS NOTES
Physical Therapy Daily Treatment Note     Name: Karma Chen  Clinic Number: [id number]    Therapy Diagnosis:   Encounter Diagnosis   Name Primary?    Back pain of lumbosacral region with sciatica Yes     Physician: Elena Sullivan MD    Visit Date: 6/30/2022    Physician Orders: PT Eval and Treat   Medical Diagnosis from Referral:   M54.31 (ICD-10-CM) - Sciatica, right side   M79.604 (ICD-10-CM) - Right leg pain   Evaluation Date: 5/25/2022  Authorization Period Expiration: 12/31/2022  Plan of Care Expiration: 8/15/2022 or 12v post eval  Visit # (total) / Visits authorized: 8 / 20 + eval (POC 7 / 12)  2nd FOTO visit 6 - 6/23/2022  3rd FOTO visit 12  Progress Note Due: 6/25/2022      Time In: 1045  Time Out: 1128  Total Billable Time: 43 minutes    Precautions: CAD, DM, HTN, Fibromyalgia, MI tx    Subjective     Pt reports: she had a little pain last night down her left leg, doing better today.    She was compliant with home exercise program.  Response to previous treatment: no complaints  Functional change: none today    Pain: 0/10  Location: bilateral LB; Location: right glute down posterior thigh sometimes into foot      Objective     Karma received therapeutic exercises to develop strength, flexibility and posture for 30 minutes including:  NMRE x 0 minutes to improve balance, proprioception:    TABLE:   LTR x20 Bilateral  Short Arc Quad, 2 x 10 Bilateral  Ab brace with add ball squeeze x20 (hep)  Mini bridges 2 x 10 (hep)  Supine sciatic nerve glide, BILATERAL, 20x ankle pump (utilize as Hamstring stretch)   Supine Red Theraband clamshells 2 x 10 (30 next)    Piriformis stretch, pull across, 2x30 sec Bilateral    Jaxon stretch, [id number] RIGHT       STAND: NOT PERFORMED time  Right Tensor Fascia Latae/QL stretch, Right Lower Extremity posterior, [id number]   Upright posture with Bilateral ER against wall, [id number] hold NOT PERFORMED time   Standing Hip flexion, 2x10 B  Standing Hip abduction 2 x10 B  Standing  hip extension 2 x10 B  Pre-Gait train: airex   STEFANOE     1. M-L weight shift, 1 min (MARCHES without leaning)  2. Stagger: Affected leg in front: Heel contact -> Flat foot with KE (mid stance), 30 sec    3. Stagger: Affected leg in back: Roll through -> push off, 30 sec    4. Ambulate with AD or none   NOT PERFORMED time     Manual therapy: x 8 minutes  Long [place] Distraction R LE - NOT PERFORMED   Myofascial Release/deep pressure left Quadratus Lumborum, Glute, Tensor Fascia Latae, Iliotibial Band  Massage roller to left ITB    FUTURE visit:  Obliques  Retest possible LLD at 3rd visit, prescribe heel lift if appropriate-even today  Standing Multifidus Lean, 10x10s  Mechanical lumbar traction at 5th visit if not improving  Sidelying manual tx      Home Exercises Provided and Patient Education Provided     Education provided:   - cont HEP    Written Home Exercises Provided: Patient instructed to cont prior HEP.  Exercises were reviewed and Karma was able to demonstrate them prior to the end of the session.  Karma demonstrated good  understanding of the education provided.     See EMR under Patient Instructions for exercises provided prior visit.    Assessment     Karma presents with increased pain in left hip and leg; palpable in Tensor Fascia Latae and Iliotibial Band regions. Utilized manual tx to reduce trigger points and pain. She continues with decreased piriformis and hip flexor flexibility, which contributes to her Low back pain and dysfunction.  Continued core, LB, and hip strengthening to improve her functional activities, cooking, ADL's, decrease her fall risk.    Karma Is progressing well towards her goals.   Pt prognosis is Good.     Pt will continue to benefit from skilled outpatient physical therapy to address the deficits listed in the problem list box on initial evaluation, provide pt/family education and to maximize pt's level of independence in the home and community environment.     Pt's spiritual,  cultural and educational needs considered and pt agreeable to plan of care and goals.    Anticipated barriers to physical therapy: co-morbidies     Goals:  Short Term GOALS: 3 weeks  1. Patient is independent with HEP.   Progressing 6/30/2022  2. Patient demonstrates independence with core activation and postural awareness while sitting and standing.   Progressing 6/30/2022  3. Patient will reduce pn to 2/10 while performing daily activities.   Progressing 6/30/2022  4. Patient will reduce LE radiculopathy frequency and intensity.    Progressing 6/30/2022     Long Term GOALS: 6 weeks.   1. Patient demonstrates increased l/s extension ROM to 0 degrees to improve tolerance to upright standing.   progressing  2. Patient demonstrates increased core, hip and BLE strength by 1/2 grade or greater to improve tolerance to home chores.   progressing  3. Patient demonstrates independence with body mechanics while performing ADLs.   progressing  4. Patient will improve FOTO limitation score to </= 51% to show improvement in condition and functional mobility.    progressing      Plan     Cont per POC, core strengthening, posture, balance, LE strengthening       Susan Conley, PT

## 2022-07-06 ENCOUNTER — TELEPHONE (OUTPATIENT)
Dept: CARDIOLOGY | Facility: CLINIC | Age: 87
End: 2022-07-06
Payer: COMMERCIAL

## 2022-07-06 NOTE — TELEPHONE ENCOUNTER
----- Message from Steven Puentes sent at 7/6/2022  2:07 PM CDT -----  Regarding: medical advice  Contact: patient  Patient need to speak with a nurse regarding blood pressure being high yesterday and beinf dizzy, patient feeling a little weak possible meds have her feeling this way, please call back at [phone] (home)     Case number [id number]

## 2022-07-07 ENCOUNTER — OFFICE VISIT (OUTPATIENT)
Dept: CARDIOLOGY | Facility: CLINIC | Age: 87
End: 2022-07-07
Payer: MEDICARE

## 2022-07-07 ENCOUNTER — CLINICAL SUPPORT (OUTPATIENT)
Dept: REHABILITATION | Facility: HOSPITAL | Age: 87
End: 2022-07-07
Payer: MEDICARE

## 2022-07-07 VITALS
RESPIRATION RATE: 16 BRPM | OXYGEN SATURATION: 98 % | HEART RATE: 84 BPM | SYSTOLIC BLOOD PRESSURE: 130 MMHG | HEIGHT: 60 IN | DIASTOLIC BLOOD PRESSURE: 70 MMHG | WEIGHT: 134 LBS | BODY MASS INDEX: 26.31 KG/M2

## 2022-07-07 DIAGNOSIS — I48.0 PAF (PAROXYSMAL ATRIAL FIBRILLATION): Chronic | ICD-10-CM

## 2022-07-07 DIAGNOSIS — I95.9 HYPOTENSION, UNSPECIFIED HYPOTENSION TYPE: ICD-10-CM

## 2022-07-07 DIAGNOSIS — Z95.0 PACEMAKER: Chronic | ICD-10-CM

## 2022-07-07 DIAGNOSIS — I25.10 CORONARY ARTERY DISEASE INVOLVING LEFT MAIN CORONARY ARTERY: ICD-10-CM

## 2022-07-07 DIAGNOSIS — M54.40 BACK PAIN OF LUMBOSACRAL REGION WITH SCIATICA: Primary | ICD-10-CM

## 2022-07-07 DIAGNOSIS — I10 ESSENTIAL (PRIMARY) HYPERTENSION: ICD-10-CM

## 2022-07-07 DIAGNOSIS — R55 NEAR SYNCOPE: Primary | ICD-10-CM

## 2022-07-07 PROCEDURE — 97112 NEUROMUSCULAR REEDUCATION: CPT | Mod: PN

## 2022-07-07 PROCEDURE — 99214 PR OFFICE/OUTPT VISIT, EST, LEVL IV, 30-39 MIN: ICD-10-PCS | Mod: S$GLB,,, | Performed by: INTERNAL MEDICINE

## 2022-07-07 PROCEDURE — 97110 THERAPEUTIC EXERCISES: CPT | Mod: PN

## 2022-07-07 PROCEDURE — 93000 ELECTROCARDIOGRAM COMPLETE: CPT | Mod: S$GLB,,, | Performed by: INTERNAL MEDICINE

## 2022-07-07 PROCEDURE — 99214 OFFICE O/P EST MOD 30 MIN: CPT | Mod: S$GLB,,, | Performed by: INTERNAL MEDICINE

## 2022-07-07 PROCEDURE — 93000 EKG 12-LEAD: ICD-10-PCS | Mod: S$GLB,,, | Performed by: INTERNAL MEDICINE

## 2022-07-07 RX ORDER — LOSARTAN POTASSIUM 25 MG/1
12.5 TABLET ORAL NIGHTLY
Qty: 45 TABLET | Refills: 3 | Status: SHIPPED | OUTPATIENT
Start: 2022-07-07 | End: 2023-03-29 | Stop reason: SDUPTHER

## 2022-07-07 NOTE — PROGRESS NOTES
Physical Therapy Daily Treatment Note     Name: Karma Chen  Clinic Number: [id number]    Therapy Diagnosis:   Encounter Diagnosis   Name Primary?    Back pain of lumbosacral region with sciatica Yes     Physician: Elena Sullivan MD    Visit Date: 7/7/2022    Physician Orders: PT Eval and Treat   Medical Diagnosis from Referral:   M54.31 (ICD-10-CM) - Sciatica, right side   M79.604 (ICD-10-CM) - Right leg pain   Evaluation Date: 5/25/2022  Authorization Period Expiration: 12/31/2022  Plan of Care Expiration: 8/15/2022 or 12v post eval  Visit # (total) / Visits authorized: 9 / 20 + eval (POC 8 / 12)  2nd FOTO visit 6 - 6/23/2022  3rd FOTO visit 12  Progress Note Due: 6/25/2022      Time In: 1002  Time Out: 1045  Total Billable Time: 43 minutes    Precautions: CAD, DM, HTN, Fibromyalgia, MI tx    Subjective     Pt reports: low back pain this morning. Sharp pain in left foot.     She was compliant with home exercise program.  Response to previous treatment: no complaints  Functional change: none today    Pain: 2/10  Location: bilateral LB; Location: right glute down posterior thigh sometimes into foot      Objective     Karma received therapeutic exercises to develop strength, flexibility and posture for 30 minutes including:  TABLE: incline  LTR x20 Bilateral  Short Arc Quad, 2 x 10 Bilateral  Ab brace with add ball squeeze x20 (hep)  Mini bridges 2 x 10 (hep)  Supine sciatic nerve glide, BILATERAL, 20x ankle pump (utilize as Hamstring stretch)   Supine Red Theraband clamshells 30x    Piriformis stretch, pull across, 2x30 sec Bilateral    Jaxon stretch, [id number] Bilateral  +Self massage with tennis ball, 1 minute each glute          NMRE x 13 minutes to improve balance, proprioception:  STAND:   Bilateral Tensor Fascia Latae/ITB/QL stretch, [id number]   Upright posture with Bilateral ER against wall, 20x  Standing Hip flexion, 2x10 B  Standing Hip abduction with slight External Rotation  2 x10 B  Standing hip  extension 2 x10 B  Pre-Gait train: airex        1. M-L weight shift, 1 min (MARCHES without leaning)  2. Stagger: Affected leg in front: Heel contact -> Flat foot with KE (mid stance), 30 sec    3. Stagger: Affected leg in back: Roll through -> push off, 30 sec    4. Ambulate with AD or none   NOT PERFORMED time       Manual therapy: x 0 minutes  Long [place] Distraction R LE - NOT PERFORMED   Myofascial Release/deep pressure left Quadratus Lumborum, Glute, Tensor Fascia Latae, Iliotibial Band  Massage roller to left ITB    FUTURE visit:  Obliques  Retest possible LLD at 3rd visit, prescribe heel lift if appropriate-even today  Standing Multifidus Lean, 10x10s  Mechanical lumbar traction at 5th visit if not improving  Sidelying manual tx      Home Exercises Provided and Patient Education Provided     Education provided:   - cont HEP    Written Home Exercises Provided: Patient instructed to cont prior HEP.  Exercises were reviewed and Karma was able to demonstrate them prior to the end of the session.  Karma demonstrated good  understanding of the education provided.     See EMR under Patient Instructions for exercises provided prior visit.    Assessment     Karma presents with low pain in her low back; no radiculopathy. Pt was able to perform all progressive therex with minimal pn provocation. She continues with decreased piriformis and hip flexor flexibility, which contributes to her Low back pain and dysfunction.  Continued core, LB, and hip strengthening to improve her functional activities, cooking, ADL's, decrease her fall risk.    Karma Is progressing well towards her goals.   Pt prognosis is Good.     Pt will continue to benefit from skilled outpatient physical therapy to address the deficits listed in the problem list box on initial evaluation, provide pt/family education and to maximize pt's level of independence in the home and community environment.     Pt's spiritual, cultural and educational needs  considered and pt agreeable to plan of care and goals.    Anticipated barriers to physical therapy: co-morbidies     Goals:  Short Term GOALS: 3 weeks  1. Patient is independent with HEP.   Progressing 7/7/2022  2. Patient demonstrates independence with core activation and postural awareness while sitting and standing.   Progressing 7/7/2022  3. Patient will reduce pn to 2/10 while performing daily activities.   Progressing 7/7/2022  4. Patient will reduce LE radiculopathy frequency and intensity.    Progressing 7/7/2022     Long Term GOALS: 6 weeks.   1. Patient demonstrates increased l/s extension ROM to 0 degrees to improve tolerance to upright standing.   progressing  2. Patient demonstrates increased core, hip and BLE strength by 1/2 grade or greater to improve tolerance to home chores.   progressing  3. Patient demonstrates independence with body mechanics while performing ADLs.   progressing  4. Patient will improve FOTO limitation score to </= 51% to show improvement in condition and functional mobility.    progressing      Plan     Cont per POC, core strengthening, posture, balance, LE strengthening       Susan Conley, PT

## 2022-07-07 NOTE — PROGRESS NOTES
Subjective:    Patient ID:  Karma Chen is a 86 y.o. female   Chief Complaint   Patient presents with    Hypotension    Dizziness       HPI:  Patient presents today with complaints of feeling lightheaded and dizziness which occurred the day before yesterday. She noticed her BP was low in the low 100s systolic. She decreased her losartan to 12.5 mg po at bedtime. Her BP has been much more stable.     Review of patient's allergies indicates:   Allergen Reactions    Arthrotec 50 [diclofenac-misoprostol]     Doxycycline     Effexor [venlafaxine]     Estradiol     Flagyl [metronidazole]     Fluconazole     Gabapentin     Iodine     Levaquin [levofloxacin]     Nexium [esomeprazole magnesium]     Penicillins     Red yeast rice (monascus purpureus)     Shellfish containing products     Statins-hmg-coa reductase inhibitors     Sulfa (sulfonamide antibiotics)     Tea tree oil     Tegaserod      ZOLNORM    Tegaserod hydrogen maleate     Trazodone     Yeast, dried     Adhesive Rash     Band-aids       Past Medical History:   Diagnosis Date    Coronary artery disease     Dermatitis     Dermatitis 12/8/2017    Diabetes mellitus     Diabetes mellitus type II     Leah Madrigal virus infection     Fibromyalgia     GERD (gastroesophageal reflux disease)     Hypertension     MI (myocardial infarction) 09/23/2011    Pure hypercholesterolemia 2/26/2020     Past Surgical History:   Procedure Laterality Date    adenoids      ANGIOPLASTY  1987    APPENDECTOMY      CARDIAC PACEMAKER PLACEMENT  01/12/2017    CATARACT EXTRACTION, BILATERAL Bilateral 9/2/15, 3/17/15    right eye-9/2/15, left eye-3/17/15    CHOLECYSTECTOMY  1987    CORONARY ARTERY BYPASS GRAFT  2006    quadruple    coronary stents  1999    x2    CYST REMOVAL  04/25/2017    on back    HYSTERECTOMY  1966    OVARY SURGERY  1948    Ovary burst & was repaired    RHIZOTOMY  09/14/2018    TONSILLECTOMY  1940     Social History      Tobacco Use    Smoking status: Never Smoker    Smokeless tobacco: Never Used   Substance Use Topics    Alcohol use: No    Drug use: No     Family History   Problem Relation Age of Onset    No Known Problems Mother     No Known Problems Father         Review of Systems:   Constitution: Negative for diaphoresis and fever.   HEENT: Negative for nosebleeds.    Cardiovascular: Negative for chest pain       No dyspnea on exertion       No leg swelling        No palpitations  Respiratory: Negative for shortness of breath and wheezing.    Hematologic/Lymphatic: Negative for bleeding problem. Does not bruise/bleed easily.   Skin: Negative for color change and rash.   Musculoskeletal: Negative for falls and myalgias.   Gastrointestinal: Negative for hematemesis and hematochezia.   Genitourinary: Negative for hematuria.   Neurological: + for dizziness and light-headedness.   Psychiatric/Behavioral: Negative for altered mental status and memory loss.          Objective:        Vitals:    07/07/22 1506   BP: 130/70   Pulse: 84   Resp: 16       Lab Results   Component Value Date    WBC 7.41 05/10/2022    HGB 11.5 (L) 05/10/2022    HCT 37.2 05/10/2022     05/10/2022    CHOL 223 (H) 05/20/2022    TRIG 109 05/20/2022    HDL 56 05/20/2022    ALT 22 05/10/2022    ALT 22 05/10/2022    AST 24 05/10/2022    AST 24 05/10/2022     05/20/2022    K 4.0 05/20/2022     05/20/2022    CREATININE 1.2 05/20/2022    BUN 28 (H) 05/20/2022    CO2 24 05/20/2022    TSH 1.280 05/10/2022    INR 1.2 10/22/2021    HGBA1C 5.5 05/20/2022        ECHOCARDIOGRAM RESULTS  Results for orders placed during the hospital encounter of 06/10/21    Echo    Interpretation Summary  · The left ventricle is mildly enlarged with mild concentric hypertrophy  · The estimated ejection fraction is 49%. Which is decreased  · Grade II left ventricular diastolic dysfunction.  · There is mild left ventricular global hypokinesis.  · Atrial fibrillation  not observed.  · Normal right ventricular size with normal right ventricular systolic function.  · Moderate left atrial enlargement.  · Mild-to-moderate aortic regurgitation.  · There is mild aortic valve stenosis.  · Aortic valve area is 1.99 cm2; peak velocity is 1.41 m/s; mean gradient is 4 mmHg.  · Mild mitral regurgitation.  · Mild to moderate tricuspid regurgitation.  · Mild pulmonic regurgitation.  · Normal central venous pressure (3 mmHg).  · The estimated PA systolic pressure is 35 mmHg. Mild pulmonary hypertension  · Pacemaker lead is present        CURRENT/PREVIOUS VISIT EKG  Results for orders placed or performed in visit on 05/10/22   IN OFFICE EKG 12-LEAD (to [facility])    Collection Time: 05/10/22 11:06 AM    Narrative    Test Reason : I48.0,    Vent. Rate : 078 BPM     Atrial Rate : 079 BPM     P-R Int : 350 ms          QRS Dur : 120 ms      QT Int : 392 ms       P-R-T Axes : 000 -20 153 degrees     QTc Int : 446 ms    Atrial-paced rhythm with prolonged AV conduction  Anteroseptal infarct (cited on or before 11-DEC-2017)  ST and T wave abnormality, consider lateral ischemia  Abnormal ECG  When compared with ECG of 22-OCT-2021 16:20,  Questionable change in initial forces of Anterior leads  T wave inversion more evident in Lateral leads  Confirmed by Kenton Ware MD ([id number]) on 6/1/2022 9:08:27 AM    Referred By:             Confirmed By:Kenton Ware MD     No valid procedures specified.   Results for orders placed during the hospital encounter of 06/10/21    Nuclear Stress - Cardiology Interpreted    Interpretation Summary    Normal myocardial perfusion scan. There is no evidence of myocardial ischemia or infarction.    The gated perfusion images showed an ejection fraction of 51% post stress. Normal ejection fraction is greater than 53%.    There is normal wall motion post stress.    LV cavity size is  and normal at stress.    The EKG portion of this study is abnormal but not diagnostic.     The patient reported no chest pain during the stress test.    There were no arrhythmias during stress.      Physical Exam:  CONSTITUTIONAL: No fever, no chills  HEENT: Normocephalic, atraumatic,pupils reactive to light                 NECK:  No JVD no carotid bruit  CVS: S1S2+, RRR   LUNGS: Clear  ABDOMEN: Soft, NT, BS+  EXTREMITIES: No cyanosis, edema  : No mccarthy catheter  NEURO: AAO X 3  PSY: Normal affect      Medication List with Changes/Refills   Current Medications    ACETAMINOPHEN (TYLENOL) 500 MG TABLET    Take 500 mg by mouth every 6 (six) hours as needed for Pain.    ALPRAZOLAM (XANAX) 0.25 MG TABLET    Take 1 tablet (0.25 mg total) by mouth as needed for Anxiety.    AMITRIPTYLINE (ELAVIL) 10 MG TABLET    TAKE 1 TABLET BY MOUTH NIGHTLY AS NEEDED FOR INSOMNIA.    AMITRIPTYLINE (ELAVIL) 25 MG TABLET    TAKE 1 TABLET DAILY    APIXABAN (ELIQUIS) 2.5 MG TAB    Take 1 tablet (2.5 mg total) by mouth 2 (two) times daily.    ASPIRIN (ECOTRIN) 81 MG EC TABLET    Take 1 tablet (81 mg total) by mouth once daily.    BETAMETHASONE DIPROPIONATE 0.05 % CREAM    APPLY TOPICALLY TWICE A DAY    CETIRIZINE (ZYRTEC) 10 MG TABLET    Take 10 mg by mouth once daily.    CHOLECALCIFEROL, VITAMIN D3, 100 MCG (4,000 UNIT) CAP    Take 1 capsule by mouth 2 (two) times daily.     COENZYME Q10 100 MG CAPSULE    Take 1 capsule (100 mg total) by mouth 2 (two) times daily.    DICLOFENAC SODIUM (VOLTAREN) 1 % GEL    Apply 2 g topically once daily.    DIPHENHYDRAMINE (BENYLIN) 12.5 MG/5 ML LIQUID    Take 12.5 mg by mouth 4 (four) times daily as needed for Allergies.    ECHINACEA ORAL    Take 750 mg by mouth 2 (two) times a day.    ESTRADIOL (ESTRACE) 0.01 % (0.1 MG/GRAM) VAGINAL CREAM    Place 1 g vaginally once a week.    FLUOCINONIDE 0.05% (LIDEX) 0.05 % CREAM    Apply sparingly to rash on chest, neck, legs bid max use one month.    FLUTICASONE PROPIONATE (FLONASE) 50 MCG/ACTUATION NASAL SPRAY    1 spray by Each Nostril route once daily.     ISOSORBIDE MONONITRATE (IMDUR) 120 MG 24 HR TABLET    Take 1 tablet (120 mg total) by mouth once daily. DO NOT CRUSH OR CHEW; SWALLOW WHOLE.    KETOCONAZOLE (NIZORAL) 2 % CREAM    aaa bid    KETOCONAZOLE (NIZORAL) 2 % SHAMPOO    Use to wash hair every day or every other day for dry itchy scalp    LEVOTHYROXINE (SYNTHROID) 88 MCG TABLET    Take 1 tablet (88 mcg total) by mouth once daily.    LORATADINE (CLARITIN) 10 MG TABLET    Take 10 mg by mouth once daily.    MAGNESIUM ORAL    Take 500 mg by mouth once daily.     METOPROLOL SUCCINATE (TOPROL-XL) 25 MG 24 HR TABLET    Take 1 tablet (25 mg total) by mouth once daily.    MONTELUKAST (SINGULAIR) 10 MG TABLET    TAKE 1 TABLET BY MOUTH EVERY DAY IN THE EVENING    MULTIVITAMIN (THERAGRAN) TABLET    Take 1 tablet by mouth 2 (two) times a day.     MUPIROCIN (BACTROBAN) 2 % OINTMENT    Apply topically 2 (two) times daily.    NITROGLYCERIN (NITROSTAT) 0.4 MG SL TABLET    Place 1 tablet (0.4 mg total) under the tongue every 5 (five) minutes as needed for Chest pain.    OMEPRAZOLE (PRILOSEC) 20 MG CAPSULE    TAKE 1 CAPSULE BY MOUTH EVERY DAY    PRASTERONE, DHEA, 25 MG CAP    Take 1 capsule by mouth once daily.    TIZANIDINE (ZANAFLEX) 4 MG TABLET    TAKE 1 TABLET BY MOUTH ONCE DAILY.    TRAMADOL (ULTRAM) 50 MG TABLET    TAKE 1 TABLET (50 MG TOTAL) BY MOUTH 2 (TWO) TIMES DAILY AS NEEDED (PAIN).    TRIAMCINOLONE ACETONIDE 0.1% (KENALOG) 0.1 % CREAM    aaa bid x 1-2 wks, tk 1 wk off prn rash    TURMERIC 400 MG CAP    Take 1,200 mg by mouth once daily.    UNABLE TO FIND    Take 1 capsule by mouth. Bio-Smoothe- premium nerve    UNABLE TO FIND    Take 1 capsule by mouth once daily. medication name: GI probiotic    UNABLE TO FIND    Take 1 capsule by mouth once daily. Joint health    UNABLE TO FIND    Take 1 capsule by mouth 2 (two) times a day. Cardio platinum    UNABLE TO FIND    Nerve Jorge    ZINC GLUCONATE 50 MG TABLET    Take 50 mg by mouth 2 (two) times a day.   Changed  and/or Refilled Medications    Modified Medication Previous Medication    LOSARTAN (COZAAR) 25 MG TABLET losartan (COZAAR) 25 MG tablet       Take 0.5 tablets (12.5 mg total) by mouth every evening.    Take 1 tablet (25 mg total) by mouth 2 (two) times daily.             Assessment:       1. Near syncope    2. Hypotension, unspecified hypotension type    3. Coronary artery disease involving left main coronary artery    4. Pacemaker in place    5. PAF (paroxysmal atrial fibrillation)    6. Essential (primary) hypertension         Plan:     1. Will decrease losartan 12.5 mg po at bedtime.   Advised her to take the other half in the morning if her SBP is >150.   2. Continue metoprolol.   3. EKG shows atrial paced rhythm with non specific ST-t wave abnormalities which appears stable from previous EKGs.  4. Follow up in about 3 months. May call or return sooner if problems arise.     Problem List Items Addressed This Visit        Unprioritized    Pacemaker in place (Chronic)    PAF (paroxysmal atrial fibrillation) (Chronic)    Coronary artery disease involving left main coronary artery    Near syncope - Primary    Hypotension      Other Visit Diagnoses     Essential (primary) hypertension        Relevant Medications    losartan (COZAAR) 25 MG tablet          Follow up in about 3 months (around 10/7/2022).

## 2022-07-14 ENCOUNTER — DOCUMENTATION ONLY (OUTPATIENT)
Dept: REHABILITATION | Facility: HOSPITAL | Age: 87
End: 2022-07-14

## 2022-07-14 ENCOUNTER — CLINICAL SUPPORT (OUTPATIENT)
Dept: REHABILITATION | Facility: HOSPITAL | Age: 87
End: 2022-07-14
Payer: MEDICARE

## 2022-07-14 DIAGNOSIS — M54.40 BACK PAIN OF LUMBOSACRAL REGION WITH SCIATICA: Primary | ICD-10-CM

## 2022-07-14 PROCEDURE — 97110 THERAPEUTIC EXERCISES: CPT | Mod: PN,CQ

## 2022-07-14 NOTE — PROGRESS NOTES
"    Physical Therapy Daily Treatment Note     Name: Karma Chen  Clinic Number: [id number]    Therapy Diagnosis:   Encounter Diagnosis   Name Primary?    Back pain of lumbosacral region with sciatica Yes     Physician: Elena Sullivan MD    Visit Date: 7/14/2022    Physician Orders: PT Eval and Treat   Medical Diagnosis from Referral:   M54.31 (ICD-10-CM) - Sciatica, right side   M79.604 (ICD-10-CM) - Right leg pain   Evaluation Date: 5/25/2022  Authorization Period Expiration: 12/31/2022  Plan of Care Expiration: 8/15/2022 or 12v post eval  Visit # (total) / Visits authorized: 10 / 20 + eval (POC 9 / 12)  2nd FOTO visit 6 - 6/23/2022  3rd FOTO visit 12  Progress Note Due: 6/25/2022      Time In: 1002  Time Out: 1056  Total Billable Time: 54 minutes    Precautions: CAD, DM, HTN, Fibromyalgia, MI tx    Subjective     Pt reports: pain in LLB today, "It comes and goes"    She was compliant with home exercise program.  Response to previous treatment: no complaints  Functional change: none today    Pain: 2/10  Location: bilateral LB; Location: right glute down posterior thigh sometimes into foot      Objective     Karma received therapeutic exercises to develop strength, flexibility and posture for 56 minutes including:    TABLE: incline  LTR x20 Bilateral  Short Arc Quad, 1# 2 x 10 Bilateral  Ab brace with add ball squeeze x20 (hep)  Mini bridges 2 x 10 (hep)  Supine sciatic nerve glide, BILATERAL,2 x  20 ankle pump (utilize as Hamstring stretch)   Supine Red Theraband clamshells 30x    Piriformis stretch, pull across, 3x30 sec Bilateral    Jaxon stretch, [id number] Bilateral  Self massage with tennis ball, 1-2 minutes each glute          NMRE x 13 minutes to improve balance, proprioception:  STAND:   Bilateral Tensor Fascia Latae/ITB/QL stretch, [id number]   Upright posture with Bilateral ER against wall, Yellow theraband 20x  Standing Hip flexion, 2x10 B    Standing Hip abduction with slight External Rotation  2 x10 B  " NOT PERFORMED time   Standing hip extension 2 x10 B  NOT PERFORMED time   Pre-Gait train: airex      NOT PERFORMED time   1. M-L weight shift, 1 min (MARCHES without leaning)  2. Stagger: Affected leg in front: Heel contact -> Flat foot with KE (mid stance), 30 sec    3. Stagger: Affected leg in back: Roll through -> push off, 30 sec    4. Ambulate with AD or none   NOT PERFORMED time       Manual therapy: x 0 minutes  Long [place] Distraction R LE   Myofascial Release/deep pressure left Quadratus Lumborum, Glute, Tensor Fascia Latae, Iliotibial Band  Massage roller to left ITB    FUTURE visit:  Obliques  Retest possible LLD at 3rd visit, prescribe heel lift if appropriate-even today  Standing Multifidus Lean, 10x10s  Mechanical lumbar traction at 5th visit if not improving  Sidelying manual tx      Home Exercises Provided and Patient Education Provided     Education provided:   - cont HEP    Written Home Exercises Provided: Patient instructed to cont prior HEP.  Exercises were reviewed and Karma was able to demonstrate them prior to the end of the session.  Karma demonstrated good  understanding of the education provided.     See EMR under Patient Instructions for exercises provided prior visit.    Assessment     Karma appeared tired today.  Positional changes caused dizziness, pt required rest for approx one minute to recover.  Decreased arm swing and shortened step length increases her fall risk.  Continued LE flexibility, core, LB, and hip strength to improve balance, ADL's, community outings.    Karma Is progressing well towards her goals.   Pt prognosis is Good.     Pt will continue to benefit from skilled outpatient physical therapy to address the deficits listed in the problem list box on initial evaluation, provide pt/family education and to maximize pt's level of independence in the home and community environment.     Pt's spiritual, cultural and educational needs considered and pt agreeable to plan of care  and goals.    Anticipated barriers to physical therapy: co-morbidies     Goals:  Short Term GOALS: 3 weeks  1. Patient is independent with HEP.   Progressing 7/14/2022  2. Patient demonstrates independence with core activation and postural awareness while sitting and standing.   Progressing 7/14/2022  3. Patient will reduce pn to 2/10 while performing daily activities.   Progressing 7/14/2022  4. Patient will reduce LE radiculopathy frequency and intensity.    Progressing 7/14/2022     Long Term GOALS: 6 weeks.   1. Patient demonstrates increased l/s extension ROM to 0 degrees to improve tolerance to upright standing.   progressing  2. Patient demonstrates increased core, hip and BLE strength by 1/2 grade or greater to improve tolerance to home chores.   progressing  3. Patient demonstrates independence with body mechanics while performing ADLs.   progressing  4. Patient will improve FOTO limitation score to </= 51% to show improvement in condition and functional mobility.    progressing      Plan     Cont per POC, core strengthening, posture, balance, LE strengthening       Fabienne Epperson, PTA

## 2022-07-14 NOTE — PROGRESS NOTES
30 day visit PT-PTA face-face discussion with MARY BETH Conley re: patient status, POC, and plan for progression done 7/14/22.  Fabienne Epperson, PTA

## 2022-07-21 ENCOUNTER — CLINICAL SUPPORT (OUTPATIENT)
Dept: REHABILITATION | Facility: HOSPITAL | Age: 87
End: 2022-07-21
Payer: MEDICARE

## 2022-07-21 DIAGNOSIS — M54.40 BACK PAIN OF LUMBOSACRAL REGION WITH SCIATICA: Primary | ICD-10-CM

## 2022-07-21 PROCEDURE — 97112 NEUROMUSCULAR REEDUCATION: CPT | Mod: PN

## 2022-07-21 PROCEDURE — 97110 THERAPEUTIC EXERCISES: CPT | Mod: PN

## 2022-07-21 NOTE — PROGRESS NOTES
"    Physical Therapy Daily Treatment Note     Name: Karma Chen  Clinic Number: [id number]    Therapy Diagnosis:   Encounter Diagnosis   Name Primary?    Back pain of lumbosacral region with sciatica Yes     Physician: Elena Sullivan MD    Visit Date: 7/21/2022    Physician Orders: PT Eval and Treat   Medical Diagnosis from Referral:   M54.31 (ICD-10-CM) - Sciatica, right side   M79.604 (ICD-10-CM) - Right leg pain   Evaluation Date: 5/25/2022  Authorization Period Expiration: 12/31/2022  Plan of Care Expiration: 8/15/2022 or 12v post eval  Visit # (total) / Visits authorized: 11 / 20 + eval (POC 10 / 12)  2nd FOTO visit 6 - 6/23/2022  3rd FOTO visit 12  Progress Note Due: 6/25/2022      Time In: 130  Time Out: 228  Total Billable Time: 58 minutes    Precautions: CAD, DM, HTN, Fibromyalgia, MI tx    Subjective     Pt reports:  "pain comes and goes"    She was compliant with home exercise program.  Response to previous treatment: no complaints  Functional change: none today    Pain: 2/10  Location: bilateral LB; Location: right glute down posterior thigh sometimes into foot      Objective     Right long to short = Right anterior innominate rotation    Karma received therapeutic exercises to develop strength, flexibility and posture for 43 minutes including:    TABLE: incline  LTR x20 Bilateral  Short Arc Quad, 1# 2 x 10 Bilateral  Ab brace with add ball squeeze x20 (hep)  Mini bridges 2 x 10 (hep)  Supine sciatic nerve glide, BILATERAL, 2 x  20 ankle pump (utilize as Hamstring stretch)   Supine Red Theraband clamshells 30x    Piriformis stretch, pull across, 2x30 sec Bilateral    Jaxon stretch, [id number] Bilateral  Self massage with tennis ball, 1-2 minutes each glute        SEATED:  +MET to correct Right anterior innominate rotation; Right Hip Extension / Left Hip Flexion, 5x 8sec hold, then abd/add 5 sec ea      NMRE x 15  minutes to improve balance, proprioception:  STAND:   Bilateral Tensor Fascia Latae/ITB/QL " stretch, [id number]   Upright posture with Bilateral ER against wall, Yellow theraband 20x  Standing Hip flexion, 2x10 B    Standing Hip abduction with slight External Rotation  2 x10 B    Standing hip extension 2 x10 B     M-L weight shift, Airex 1 min (MARCHES without leaning)  Single Leg Balance, airex, [id number] Bilateral, 1 hand support    Manual therapy: x 0 minutes  Long [place] Distraction R LE   Myofascial Release/deep pressure left Quadratus Lumborum, Glute, Tensor Fascia Latae, Iliotibial Band  Massage roller to left ITB    FUTURE visit:  Obliques  Standing Multifidus Lean, 10x10s        Home Exercises Provided and Patient Education Provided     Education provided:   - cont HEP    Written Home Exercises Provided: Patient instructed to cont prior HEP.  Exercises were reviewed and Karma was able to demonstrate them prior to the end of the session.  Karma demonstrated good  understanding of the education provided.     See EMR under Patient Instructions for exercises provided prior visit.    Assessment     Karma demonstrates variable low levels of pain. She did appear tired again today.  Positional changes caused dizziness, pt required rest for approx one minute to recover. Increased balance training today with minimal pn provocation. Utilized MET to correct asymmetrical innominates today. Continued LE flexibility, core, LB, and hip strength to improve balance, ADL's, community outings.    Karma Is progressing well towards her goals.   Pt prognosis is Good.     Pt will continue to benefit from skilled outpatient physical therapy to address the deficits listed in the problem list box on initial evaluation, provide pt/family education and to maximize pt's level of independence in the home and community environment.     Pt's spiritual, cultural and educational needs considered and pt agreeable to plan of care and goals.    Anticipated barriers to physical therapy: co-morbidies     Goals:  Short Term GOALS: 3 weeks  1.  Patient is independent with HEP.   Progressing 7/21/2022  2. Patient demonstrates independence with core activation and postural awareness while sitting and standing.   Progressing 7/21/2022  3. Patient will reduce pn to 2/10 while performing daily activities.   Progressing 7/21/2022  4. Patient will reduce LE radiculopathy frequency and intensity.    Progressing 7/21/2022     Long Term GOALS: 6 weeks.   1. Patient demonstrates increased l/s extension ROM to 0 degrees to improve tolerance to upright standing.   progressing  2. Patient demonstrates increased core, hip and BLE strength by 1/2 grade or greater to improve tolerance to home chores.   progressing  3. Patient demonstrates independence with body mechanics while performing ADLs.   progressing  4. Patient will improve FOTO limitation score to </= 51% to show improvement in condition and functional mobility.    progressing      Plan     Cont per POC, core strengthening, posture, balance, LE strengthening       Susan Conley, PT

## 2022-07-25 ENCOUNTER — CLINICAL SUPPORT (OUTPATIENT)
Dept: REHABILITATION | Facility: HOSPITAL | Age: 87
End: 2022-07-25
Payer: MEDICARE

## 2022-07-25 DIAGNOSIS — M54.40 BACK PAIN OF LUMBOSACRAL REGION WITH SCIATICA: Primary | ICD-10-CM

## 2022-07-25 PROCEDURE — 97110 THERAPEUTIC EXERCISES: CPT | Mod: PN,CQ

## 2022-07-25 PROCEDURE — 97112 NEUROMUSCULAR REEDUCATION: CPT | Mod: PN,CQ

## 2022-07-25 NOTE — PROGRESS NOTES
Physical Therapy Daily Treatment Note     Name: Karma Chen  Clinic Number: [id number]    Therapy Diagnosis:   Encounter Diagnosis   Name Primary?    Back pain of lumbosacral region with sciatica Yes     Physician: Elena Sullivan MD    Visit Date: 7/25/2022    Physician Orders: PT Eval and Treat   Medical Diagnosis from Referral:   M54.31 (ICD-10-CM) - Sciatica, right side   M79.604 (ICD-10-CM) - Right leg pain   Evaluation Date: 5/25/2022  Authorization Period Expiration: 12/31/2022  Plan of Care Expiration: 8/15/2022 or 12v post eval  Visit # (total) / Visits authorized: 12 / 20 + eval (POC 11 / 12)  2nd FOTO visit 6 - 6/23/2022  3rd FOTO visit 12  Progress Note Due: 6/25/2022      Time In: 1158  Time Out: 1258p  Total Billable Time: 30 minutes    Precautions: CAD, DM, HTN, Fibromyalgia, MI tx    Subjective     Pt reports:  No significant changes    She was compliant with home exercise program.  Response to previous treatment: no complaints  Functional change: none today    Pain: 2/10  Location: bilateral LB; Location: right glute down posterior thigh sometimes into foot      Objective     Right long to short = Right anterior innominate rotation    Karma received therapeutic exercises to develop strength, flexibility and posture for 43 minutes including:    TABLE: incline  LTR x20 Bilateral  Short Arc Quad, 1# 2 x 10 Bilateral  Ab brace with add ball squeeze x20 (hep)  Mini bridges 3 x 10 (hep)  Supine sciatic nerve glide, BILATERAL, 2 x  20 ankle pump (utilize as Hamstring stretch)   Supine Red Theraband clamshells 30x    Piriformis stretch, pull across, 2x30 sec Bilateral    Jaxon stretch, [id number] Bilateral  Self massage with tennis ball, 1-2 minutes each glute        SEATED:  +MET to correct Right anterior innominate rotation; Right Hip Extension / Left Hip Flexion, 5x 8sec hold, then abd/add 5 sec ea-NOT PERFORMED even today      NMRE x 15  minutes to improve balance, proprioception:  STAND:    Bilateral Tensor Fascia Latae/ITB/QL stretch, [id number]  NOT PERFORMED time    Upright posture with Bilateral ER against wall, Yellow theraband 20x  Standing Hip flexion, 2x10 B    Standing Hip abduction with slight External Rotation  2 x10 B    Standing hip extension 2 x10 B     M-L weight shift, Airex 1 min (MARCHES without leaning)  Single Leg Balance, airex, 3k90mmx Bilateral, 1 hand support    Manual therapy: x 0 minutes  Long [place] Distraction R LE   Myofascial Release/deep pressure left Quadratus Lumborum, Glute, Tensor Fascia Latae, Iliotibial Band  Massage roller to left ITB    FUTURE visit:  Obliques  Standing Multifidus Lean, 10x10s        Home Exercises Provided and Patient Education Provided     Education provided:   - cont HEP    Written Home Exercises Provided: Patient instructed to cont prior HEP.  Exercises were reviewed and Karma was able to demonstrate them prior to the end of the session.  Karma demonstrated good  understanding of the education provided.     See EMR under Patient Instructions for exercises provided prior visit.    Assessment     Karma presents with low pain levels.  She reported discomfort in her L hip with SLS.  Overall she has improved, but symptoms have not completely subsided.  Continued core, LB strengthening to decrease pain levels, improve functional activities.   Karma Is progressing well towards her goals.   Pt prognosis is Good.     Pt will continue to benefit from skilled outpatient physical therapy to address the deficits listed in the problem list box on initial evaluation, provide pt/family education and to maximize pt's level of independence in the home and community environment.     Pt's spiritual, cultural and educational needs considered and pt agreeable to plan of care and goals.    Anticipated barriers to physical therapy: co-morbidies     Goals:  Short Term GOALS: 3 weeks  1. Patient is independent with HEP.   Progressing 7/25/2022  2. Patient demonstrates  independence with core activation and postural awareness while sitting and standing.   Progressing 7/25/2022  3. Patient will reduce pn to 2/10 while performing daily activities.   Progressing 7/25/2022  4. Patient will reduce LE radiculopathy frequency and intensity.    Progressing 7/25/2022     Long Term GOALS: 6 weeks.   1. Patient demonstrates increased l/s extension ROM to 0 degrees to improve tolerance to upright standing.   progressing  2. Patient demonstrates increased core, hip and BLE strength by 1/2 grade or greater to improve tolerance to home chores.   progressing  3. Patient demonstrates independence with body mechanics while performing ADLs.   progressing  4. Patient will improve FOTO limitation score to </= 51% to show improvement in condition and functional mobility.    progressing      Plan     Cont per POC, core strengthening, posture, balance, LE strengthening       Fabienne Epperson, PTA

## 2022-07-29 ENCOUNTER — CLINICAL SUPPORT (OUTPATIENT)
Dept: REHABILITATION | Facility: HOSPITAL | Age: 87
End: 2022-07-29
Payer: MEDICARE

## 2022-07-29 DIAGNOSIS — M54.40 BACK PAIN OF LUMBOSACRAL REGION WITH SCIATICA: Primary | ICD-10-CM

## 2022-07-29 PROCEDURE — 97112 NEUROMUSCULAR REEDUCATION: CPT | Mod: PN

## 2022-07-29 PROCEDURE — 97530 THERAPEUTIC ACTIVITIES: CPT | Mod: PN

## 2022-07-29 NOTE — PLAN OF CARE
Physical Therapy Daily Treatment Note & Outpatient Discharge Summary     Name: Karma Chen  Clinic Number: [id number]    Therapy Diagnosis:   Encounter Diagnosis   Name Primary?    Back pain of lumbosacral region with sciatica Yes     Physician: Elena Sullivan MD    Visit Date: 7/29/2022    Physician Orders: PT Eval and Treat   Medical Diagnosis from Referral:   M54.31 (ICD-10-CM) - Sciatica, right side   M79.604 (ICD-10-CM) - Right leg pain   Evaluation Date: 5/25/2022  Authorization Period Expiration: 12/31/2022  Plan of Care Expiration: 8/15/2022 or 12v post eval  Visit # (total) / Visits authorized: 13 / 20 + eval (POC 12 / 12)  2nd FOTO visit 6 - 6/23/2022  3rd FOTO visit 12  Progress Note Due: 6/25/2022      Time In: 1030  Time Out: 1130  Total Billable Time: 30 minutes    Precautions: CAD, DM, HTN, Fibromyalgia, MI tx    Subjective     Pt reports: having pain in her knee and her back. Her right leg and back feel better but her left side is bothering her more. Pain levels seem low. Feels she got a lot better since evaluation. Reports she can continue her exercises at home.    She was compliant with home exercise program.  Response to previous treatment: no complaints  Functional change: none today    Pain: 2/10  Location: bilateral LB; Location: right glute down posterior thigh sometimes into foot      Objective     Therapeutic Activity: Updated Measurements, 8 minutes    Lumbar AROM: ROM-   Response to repeated movements   Flexion NT   Extension (15 norm) 10 degrees of forward flexion (repeat extension increased radicular pain)->0 to 15 norm   Right side bending  (20 norm) 15 degrees - increase pn->20   Left side bending  (20 norm) 15 degrees->20   Rotation Observation NT      Sensation (light touch): hypersensitive in Right medial gastroc (L3-4)      L/E MMT: 5/5 Left Right   Hip Flexion 4/5. 4/5.   Hip Extension 3+/5->same 3+/5->           Hip Adduction 4/5. 4/5.                   Knee Flexion  3+/5->4 3+/5->4   Knee Extension 4/5. 4/5. With pn->no pn   Ankle DF 5/5. 5/5.   Ankle INV 4+/5. 4+/5.   Ankle EV 4+/5. 4+/5.       Special Tests Left Right   Slump  negative. Positive->neg   SLR negative. positive.   FADDIR negative. negative.   ALEXANDER negative. negative.   Piriformis negative. positive.      Oswestry: 72.3 intake; 20.9 discharge     CMS Impairment/Limitation/Restriction for FOTO Survey     Therapist reviewed FOTO scores for Karma Chen on 7/29/2022  FOTO documents entered into [employer] - see Media section.     Limitation Score: 79%->42%  Category: Mobility     Current : CL = least 60% but < 80% impaired, limited or restricted  Goal: CK = at least 40% but < 60% impaired, limited or restricted  Discharge: CK - At least 40 percent but less than 60 percent             Karma received therapeutic exercises to develop strength, flexibility and posture for 35 minutes including:    TABLE: incline  LTR x20 Bilateral  Short Arc Quad, 1# 2 x 10 Bilateral  Ab brace with add ball squeeze x20 (hep)  Mini bridges 3 x 10 (hep)  Supine sciatic nerve glide, BILATERAL, 2 x  20 ankle pump (utilize as Hamstring stretch)   Supine Red Theraband clamshells 30x    Piriformis stretch, pull across, 2x30 sec Bilateral    Jaxon stretch, [id number] Bilateral  Self massage with tennis ball, 1-2 minutes each glute        SEATED:  +MET to correct Right anterior innominate rotation; Right Hip Extension / Left Hip Flexion, 5x 8sec hold, then abd/add 5 sec ea-NOT PERFORMED even today      NMRE x 15  minutes to improve balance, proprioception:  STAND:   Bilateral Tensor Fascia [place]atae/ITB/QL stretch, [id number]  NOT PERFORMED time    Upright posture with Bilateral ER against wall, Yellow theraband 20x  Standing Hip flexion, 2x10 B    Standing Hip abduction with slight External Rotation  2 x10 B    Standing hip extension 2 x10 B     M-L weight shift, Airex 1 min (MARCHES without leaning)  Single Leg Balance, airex, [id number] Bilateral, 1 hand  support    Manual therapy: x 0 minutes  Long [place] Distraction R LE   Myofascial Release/deep pressure left Quadratus Lumborum, Glute, Tensor Fascia Latae, Iliotibial Band  Massage roller to left ITB    FUTURE visit:  Obliques  Standing Multifidus Lean, 10x10s        Home Exercises Provided and Patient Education Provided     Education provided:   - cont HEP    Written Home Exercises Provided: Patient instructed to cont prior HEP.  Exercises were reviewed and Karma was able to demonstrate them prior to the end of the session.  Karma demonstrated good  understanding of the education provided.     See EMR under Patient Instructions for exercises provided prior visit.    Assessment     Karma presents with low pain levels that are not really changing but high pain has ceased.  Pt has made clinical gains in Lumbar Range of Motion and hip and leg strength. Pt made great progress since initial evaluation but remains with low pain. Pt able to perform HEP independently at home. Therefore, pt has reached maximum rehab potential for this time and will be discharged today.    Kamra Is progressing well towards her goals.   Pt prognosis is Good.     Pt's spiritual, cultural and educational needs considered and pt agreeable to plan of care and goals.    Anticipated barriers to physical therapy: co-morbidies     Goals:  Short Term GOALS: 3 weeks  1. Patient is independent with HEP.   Progressing 7/29/2022  2. Patient demonstrates independence with core activation and postural awareness while sitting and standing.   Progressing 7/29/2022  3. Patient will reduce pn to 2/10 while performing daily activities.   Progressing 7/29/2022  4. Patient will reduce LE radiculopathy frequency and intensity.    Progressing 7/29/2022     Long Term GOALS: 6 weeks.   1. Patient demonstrates increased l/s extension ROM to 0 degrees to improve tolerance to upright standing.   MET  2. Patient demonstrates increased core, hip and BLE strength by 1/2 grade  or greater to improve tolerance to home chores.   progressing  3. Patient demonstrates independence with body mechanics while performing ADLs.   progressing  4. Patient will improve FOTO limitation score to </= 51% to show improvement in condition and functional mobility.    MET      Plan     Cont per POC, core strengthening, posture, balance, LE strengthening       Susan Conley, PT

## 2022-07-29 NOTE — PROGRESS NOTES
Physical Therapy Daily Treatment Note & Outpatient Discharge Summary     Name: Karma Cehn  Clinic Number: [id number]    Therapy Diagnosis:   Encounter Diagnosis   Name Primary?    Back pain of lumbosacral region with sciatica Yes     Physician: Elena Sullivan MD    Visit Date: 7/29/2022    Physician Orders: PT Eval and Treat   Medical Diagnosis from Referral:   M54.31 (ICD-10-CM) - Sciatica, right side   M79.604 (ICD-10-CM) - Right leg pain   Evaluation Date: 5/25/2022  Authorization Period Expiration: 12/31/2022  Plan of Care Expiration: 8/15/2022 or 12v post eval  Visit # (total) / Visits authorized: 13 / 20 + eval (POC 12 / 12)  2nd FOTO visit 6 - 6/23/2022  3rd FOTO visit 12  Progress Note Due: 6/25/2022      Time In: 1030  Time Out: 1130  Total Billable Time: 30 minutes    Precautions: CAD, DM, HTN, Fibromyalgia, MI tx    Subjective     Pt reports: having pain in her knee and her back. Her right leg and back feel better but her left side is bothering her more. Pain levels seem low. Feels she got a lot better since evaluation. Reports she can continue her exercises at home.    She was compliant with home exercise program.  Response to previous treatment: no complaints  Functional change: none today    Pain: 2/10  Location: bilateral LB; Location: right glute down posterior thigh sometimes into foot      Objective     Therapeutic Activity: Updated Measurements, 8 minutes    Lumbar AROM: ROM-   Response to repeated movements   Flexion NT   Extension (15 norm) 10 degrees of forward flexion (repeat extension increased radicular pain)->0 to 15 norm   Right side bending  (20 norm) 15 degrees - increase pn->20   Left side bending  (20 norm) 15 degrees->20   Rotation Observation NT      Sensation (light touch): hypersensitive in Right medial gastroc (L3-4)      L/E MMT: 5/5 Left Right   Hip Flexion 4/5. 4/5.   Hip Extension 3+/5->same 3+/5->           Hip Adduction 4/5. 4/5.                   Knee Flexion  3+/5->4 3+/5->4   Knee Extension 4/5. 4/5. With pn->no pn   Ankle DF 5/5. 5/5.   Ankle INV 4+/5. 4+/5.   Ankle EV 4+/5. 4+/5.       Special Tests Left Right   Slump  negative. Positive->neg   SLR negative. positive.   FADDIR negative. negative.   ALEXANDER negative. negative.   Piriformis negative. positive.      Oswestry: 72.3 intake; 20.9 discharge     CMS Impairment/Limitation/Restriction for FOTO Survey     Therapist reviewed FOTO scores for Karma Chen on 7/29/2022  FOTO documents entered into [facility] - see Media section.     Limitation Score: 79%->42%  Category: Mobility     Current : CL = least 60% but < 80% impaired, limited or restricted  Goal: CK = at least 40% but < 60% impaired, limited or restricted  Discharge: CK - At least 40 percent but less than 60 percent             Karma received therapeutic exercises to develop strength, flexibility and posture for 35 minutes including:    TABLE: incline  LTR x20 Bilateral  Short Arc Quad, 1# 2 x 10 Bilateral  Ab brace with add ball squeeze x20 (hep)  Mini bridges 3 x 10 (hep)  Supine sciatic nerve glide, BILATERAL, 2 x  20 ankle pump (utilize as Hamstring stretch)   Supine Red Theraband clamshells 30x    Piriformis stretch, pull across, 2x30 sec Bilateral    Jaxon stretch, [id number] Bilateral  Self massage with tennis ball, 1-2 minutes each glute        SEATED:  +MET to correct Right anterior innominate rotation; Right Hip Extension / Left Hip Flexion, 5x 8sec hold, then abd/add 5 sec ea-NOT PERFORMED even today      NMRE x 15  minutes to improve balance, proprioception:  STAND:   Bilateral Tensor Fascia Latae/ITB/QL stretch, [id number]  NOT PERFORMED time    Upright posture with Bilateral ER against wall, Yellow theraband 20x  Standing Hip flexion, 2x10 B    Standing Hip abduction with slight External Rotation  2 x10 B    Standing hip extension 2 x10 B     M-L weight shift, Airex 1 min (MARCHES without leaning)  Single Leg Balance, airex, [id number] Bilateral, 1 hand  support    Manual therapy: x 0 minutes  [place] Distraction R LE   Myofascial Release/deep pressure left Quadratus Lumborum, Glute, Tensor Fascia Latae, Iliotibial Band  Massage roller to left ITB    FUTURE visit:  Obliques  Standing Multifidus Lean, 10x10s        Home Exercises Provided and Patient Education Provided     Education provided:   - cont HEP    Written Home Exercises Provided: Patient instructed to cont prior HEP.  Exercises were reviewed and Karma was able to demonstrate them prior to the end of the session.  Karma demonstrated good  understanding of the education provided.     See EMR under Patient Instructions for exercises provided prior visit.    Assessment     Karma presents with low pain levels that are not really changing but high pain has ceased.  Pt has made clinical gains in Lumbar Range of Motion and hip and leg strength. Pt made great progress since initial evaluation but remains with low pain. Pt able to perform HEP independently at home. Therefore, pt has reached maximum rehab potential for this time and will be discharged today.    Karma Is progressing well towards her goals.   Pt prognosis is Good.     Pt's spiritual, cultural and educational needs considered and pt agreeable to plan of care and goals.    Anticipated barriers to physical therapy: co-morbidies     Goals:  Short Term GOALS: 3 weeks  1. Patient is independent with HEP.   Progressing 7/29/2022  2. Patient demonstrates independence with core activation and postural awareness while sitting and standing.   Progressing 7/29/2022  3. Patient will reduce pn to 2/10 while performing daily activities.   Progressing 7/29/2022  4. Patient will reduce LE radiculopathy frequency and intensity.    Progressing 7/29/2022     Long Term GOALS: 6 weeks.   1. Patient demonstrates increased l/s extension ROM to 0 degrees to improve tolerance to upright standing.   MET  2. Patient demonstrates increased core, hip and BLE strength by 1/2 grade  or greater to improve tolerance to home chores.   progressing  3. Patient demonstrates independence with body mechanics while performing ADLs.   progressing  4. Patient will improve FOTO limitation score to </= 51% to show improvement in condition and functional mobility.    MET      Plan     Cont per POC, core strengthening, posture, balance, LE strengthening       Susan Conley, PT

## 2022-08-02 ENCOUNTER — TELEPHONE (OUTPATIENT)
Dept: CARDIOLOGY | Facility: CLINIC | Age: 87
End: 2022-08-02
Payer: COMMERCIAL

## 2022-08-02 DIAGNOSIS — I48.0 PAF (PAROXYSMAL ATRIAL FIBRILLATION): Primary | ICD-10-CM

## 2022-08-02 NOTE — PROGRESS NOTES
SUBJECTIVE:    Patient ID: Karma Chen is a 86 y.o. female.    Chief Complaint: Knee Pain (Left knee)    HPI     Follow-up back-back seems to be doing well-she has completed her physical therapy-she loses her balance at times    Left knee pain-no trauma-lateral knee-it sometimes feels less stable -she does have a [place] [employer] knee support-there is no recent xray-with the instability she needs to see [facility]      Hospital Outpatient Visit on 08/02/2022   Component Date Value Ref Range Status    P/R-wave RA Lead 08/02/2022 3.3  mV Final    P/R-wave RA Lead (native) 08/02/2022 10.6  mV Final    Impedance RA Lead 08/02/2022 494  Ohms Final    Impedance RA Lead (native) 08/02/2022 570  Ohms Final    Threshold V RA Lead 08/02/2022 0.875  V Final    Theshold ms RA Lead 08/02/2022 0.4  ms Final    Threshold V RA Lead (native) 08/02/2022 0.5  V Final    Threshold ms RA Lead (native) 08/02/2022 0.4  ms Final   Lab Visit on 05/20/2022   Component Date Value Ref Range Status    Hemoglobin A1C 05/20/2022 5.5  4.5 - 6.2 % Final    Estimated Avg Glucose 05/20/2022 111  68 - 131 mg/dL Final    Microalbumin, Urine 05/20/2022 10.8  <19.9 ug/mL Final    Creatinine, Urine 05/20/2022 97.0  15.0 - 325.0 mg/dL Final    Microalb/Creat Ratio 05/20/2022 11.1  0.0 - 30.0 ug/mg Final    Cholesterol 05/20/2022 223 (A) 120 - 199 mg/dL Final    Triglycerides 05/20/2022 109  30 - 150 mg/dL Final    HDL 05/20/2022 56  40 - 75 mg/dL Final    LDL Cholesterol 05/20/2022 145.2  63.0 - 159.0 mg/dL Final    HDL/Cholesterol Ratio 05/20/2022 25.1  20.0 - 50.0 % Final    Total Cholesterol/HDL Ratio 05/20/2022 4.0  2.0 - 5.0 Final    Non-HDL Cholesterol 05/20/2022 167  mg/dL Final    Glucose 05/20/2022 135 (A) 70 - 110 mg/dL Final    Sodium 05/20/2022 139  136 - 145 mmol/L Final    Potassium 05/20/2022 4.0  3.5 - 5.1 mmol/L Final    Chloride 05/20/2022 103  95 - 110 mmol/L Final    CO2 05/20/2022 24  23 - 29 mmol/L Final    BUN  05/20/2022 28 (A) 8 - 23 mg/dL Final    Calcium 05/20/2022 9.3  8.7 - 10.5 mg/dL Final    Creatinine 05/20/2022 1.2  0.5 - 1.4 mg/dL Final    Albumin 05/20/2022 4.0  3.5 - 5.2 g/dL Final    Phosphorus 05/20/2022 3.6  2.7 - 4.5 mg/dL Final    eGFR if  05/20/2022 48.0 (A) >60 mL/min/1.73 m^2 Final    eGFR if non African American 05/20/2022 41.6 (A) >60 mL/min/1.73 m^2 Final    Anion Gap 05/20/2022 12  8 - 16 mmol/L Final    RBC, UA 05/20/2022 1  0 - 4 /hpf Final    WBC, UA 05/20/2022 0  0 - 5 /hpf Final    Bacteria 05/20/2022 Negative  None-Occ /hpf Final    Squam Epithel, UA 05/20/2022 1  /hpf Final    Hyaline Casts, UA 05/20/2022 2 (A) 0-1/lpf /lpf Final    Microscopic Comment 05/20/2022 SEE COMMENT   Final    Protein, Urine Random 05/20/2022 6  6 - 15 mg/dL Final    Creatinine, Urine 05/20/2022 97.0  15.0 - 325.0 mg/dL Final    Prot/Creat Ratio, Urine 05/20/2022 0.06  0.00 - 0.20 Final   Lab Visit on 05/10/2022   Component Date Value Ref Range Status    BNP 05/10/2022 490 (A) 0 - 99 pg/mL Final    TSH 05/10/2022 1.280  0.340 - 5.600 uIU/mL Final    WBC 05/10/2022 7.41  3.90 - 12.70 K/uL Final    RBC 05/10/2022 4.20  4.00 - 5.40 M/uL Final    Hemoglobin 05/10/2022 11.5 (A) 12.0 - 16.0 g/dL Final    Hematocrit 05/10/2022 37.2  37.0 - 48.5 % Final    MCV 05/10/2022 89  82 - 98 fL Final    MCH 05/10/2022 27.4  27.0 - 31.0 pg Final    MCHC 05/10/2022 30.9 (A) 32.0 - 36.0 g/dL Final    RDW 05/10/2022 13.8  11.5 - 14.5 % Final    Platelets 05/10/2022 249  150 - 450 K/uL Final    MPV 05/10/2022 10.0  9.2 - 12.9 fL Final    Immature Granulocytes 05/10/2022 0.3  0.0 - 0.5 % Final    Gran # (ANC) 05/10/2022 3.4  1.8 - 7.7 K/uL Final    Immature Grans (Abs) 05/10/2022 0.02  0.00 - 0.04 K/uL Final    Lymph # 05/10/2022 2.5  1.0 - 4.8 K/uL Final    Mono # 05/10/2022 1.1 (A) 0.3 - 1.0 K/uL Final    Eos # 05/10/2022 0.3  0.0 - 0.5 K/uL Final    Baso # 05/10/2022 0.08  0.00 - 0.20  K/uL Final    nRBC 05/10/2022 0  0 /100 WBC Final    Gran % 05/10/2022 46.0  38.0 - 73.0 % Final    Lymph % 05/10/2022 34.3  18.0 - 48.0 % Final    Mono % 05/10/2022 14.7  4.0 - 15.0 % Final    Eosinophil % 05/10/2022 3.6  0.0 - 8.0 % Final    Basophil % 05/10/2022 1.1  0.0 - 1.9 % Final    Differential Method 05/10/2022 Automated   Final    Sodium 05/10/2022 140  136 - 145 mmol/L Final    Potassium 05/10/2022 3.9  3.5 - 5.1 mmol/L Final    Chloride 05/10/2022 103  95 - 110 mmol/L Final    CO2 05/10/2022 27  23 - 29 mmol/L Final    Glucose 05/10/2022 93  70 - 110 mg/dL Final    BUN 05/10/2022 24 (A) 8 - 23 mg/dL Final    Creatinine 05/10/2022 1.4  0.5 - 1.4 mg/dL Final    Calcium 05/10/2022 9.0  8.7 - 10.5 mg/dL Final    Total Protein 05/10/2022 7.2  6.0 - 8.4 g/dL Final    Albumin 05/10/2022 3.9  3.5 - 5.2 g/dL Final    Total Bilirubin 05/10/2022 0.6  0.1 - 1.0 mg/dL Final    Alkaline Phosphatase 05/10/2022 68  55 - 135 U/L Final    AST 05/10/2022 24  10 - 40 U/L Final    ALT 05/10/2022 22  10 - 44 U/L Final    Anion Gap 05/10/2022 10  8 - 16 mmol/L Final    eGFR if  05/10/2022 39.8 (A) >60 mL/min/1.73 m^2 Final    eGFR if non African American 05/10/2022 34.5 (A) >60 mL/min/1.73 m^2 Final    Total Protein 05/10/2022 7.2  6.0 - 8.4 g/dL Final    Albumin 05/10/2022 3.9  3.5 - 5.2 g/dL Final    Total Bilirubin 05/10/2022 0.6  0.1 - 1.0 mg/dL Final    Bilirubin, Direct 05/10/2022 <0.1 (A) 0.1 - 0.3 mg/dL Final    AST 05/10/2022 24  10 - 40 U/L Final    ALT 05/10/2022 22  10 - 44 U/L Final    Alkaline Phosphatase 05/10/2022 68  55 - 135 U/L Final    Glucose 05/10/2022 93  70 - 110 mg/dL Final    Sodium 05/10/2022 140  136 - 145 mmol/L Final    Potassium 05/10/2022 3.9  3.5 - 5.1 mmol/L Final    Chloride 05/10/2022 103  95 - 110 mmol/L Final    CO2 05/10/2022 27  23 - 29 mmol/L Final    BUN 05/10/2022 24 (A) 8 - 23 mg/dL Final    Calcium 05/10/2022 9.0  8.7 - 10.5  mg/dL Final    Creatinine 05/10/2022 1.4  0.5 - 1.4 mg/dL Final    Albumin 05/10/2022 3.9  3.5 - 5.2 g/dL Final    Phosphorus 05/10/2022 3.7  2.7 - 4.5 mg/dL Final    eGFR if  05/10/2022 39.8 (A) >60 mL/min/1.73 m^2 Final    eGFR if non African American 05/10/2022 34.5 (A) >60 mL/min/1.73 m^2 Final    Anion Gap 05/10/2022 10  8 - 16 mmol/L Final   Office Visit on 01/25/2022   Component Date Value Ref Range Status    POC Blood, Urine 01/25/2022 Negative  Negative Final    POC Bilirubin, Urine 01/25/2022 Negative  Negative Final    POC Urobilinogen, Urine 01/25/2022 norm  0.1 - 1.1 Final    POC Ketones, Urine 01/25/2022 Negative  Negative Final    POC Protein, Urine 01/25/2022 Negative  Negative Final    POC Nitrates, Urine 01/25/2022 Negative  Negative Final    POC Glucose, Urine 01/25/2022 Negative  Negative Final    pH, UA 01/25/2022 6.0   Final    POC Specific Gravity, Urine 01/25/2022 1.005  1.003 - 1.029 Final    POC Leukocytes, Urine 01/25/2022 Negative  Negative Final    POC Rapid COVID 01/25/2022 Negative  Negative Final     Acceptable 01/25/2022 Yes   Final   Hospital Outpatient Visit on 01/18/2022   Component Date Value Ref Range Status    Battery Voltage (V) 01/18/2022 3.00  V Final    P/R-wave RA Lead 01/18/2022 1.5  mV Final    P/R-wave RA Lead (native) 01/18/2022 10.9  mV Final    Impedance RA Lead 01/18/2022 475  Ohms Final    Impedance RA Lead (native) 01/18/2022 532  Ohms Final    Threshold V RA Lead 01/18/2022 0.750  V Final    Theshold ms RA Lead 01/18/2022 0.40  ms Final    Threshold V RA Lead (native) 01/18/2022 0.500  V Final    Threshold ms RA Lead (native) 01/18/2022 0.40  ms Final   Office Visit on 01/10/2022   Component Date Value Ref Range Status    Cologuard Result 01/14/2022 Negative  Negative Final   Lab Visit on 11/08/2021   Component Date Value Ref Range Status    Hemoglobin A1C 11/08/2021 5.9  4.5 - 6.2 % Final    Estimated  Avg Glucose 11/08/2021 123  68 - 131 mg/dL Final    WBC 11/08/2021 7.35  3.90 - 12.70 K/uL Final    RBC 11/08/2021 4.30  4.00 - 5.40 M/uL Final    Hemoglobin 11/08/2021 12.0  12.0 - 16.0 g/dL Final    Hematocrit 11/08/2021 38.0  37.0 - 48.5 % Final    MCV 11/08/2021 88  82 - 98 fL Final    MCH 11/08/2021 27.9  27.0 - 31.0 pg Final    MCHC 11/08/2021 31.6 (A) 32.0 - 36.0 g/dL Final    RDW 11/08/2021 13.2  11.5 - 14.5 % Final    Platelets 11/08/2021 281  150 - 450 K/uL Final    MPV 11/08/2021 10.2  9.2 - 12.9 fL Final    Immature Granulocytes 11/08/2021 0.3  0.0 - 0.5 % Final    Gran # (ANC) 11/08/2021 3.3  1.8 - 7.7 K/uL Final    Immature Grans (Abs) 11/08/2021 0.02  0.00 - 0.04 K/uL Final    Lymph # 11/08/2021 3.0  1.0 - 4.8 K/uL Final    Mono # 11/08/2021 0.8  0.3 - 1.0 K/uL Final    Eos # 11/08/2021 0.2  0.0 - 0.5 K/uL Final    Baso # 11/08/2021 0.06  0.00 - 0.20 K/uL Final    nRBC 11/08/2021 0  0 /100 WBC Final    Gran % 11/08/2021 44.9  38.0 - 73.0 % Final    Lymph % 11/08/2021 40.4  18.0 - 48.0 % Final    Mono % 11/08/2021 10.5  4.0 - 15.0 % Final    Eosinophil % 11/08/2021 3.1  0.0 - 8.0 % Final    Basophil % 11/08/2021 0.8  0.0 - 1.9 % Final    Differential Method 11/08/2021 Automated   Final    Cholesterol 11/08/2021 248 (A) 120 - 199 mg/dL Final    Triglycerides 11/08/2021 185 (A) 30 - 150 mg/dL Final    HDL 11/08/2021 41  40 - 75 mg/dL Final    LDL Cholesterol 11/08/2021 170.0 (A) 63.0 - 159.0 mg/dL Final    HDL/Cholesterol Ratio 11/08/2021 16.5 (A) 20.0 - 50.0 % Final    Total Cholesterol/HDL Ratio 11/08/2021 6.0 (A) 2.0 - 5.0 Final    Non-HDL Cholesterol 11/08/2021 207  mg/dL Final    Sodium 11/08/2021 144  136 - 145 mmol/L Final    Potassium 11/08/2021 4.0  3.5 - 5.1 mmol/L Final    Chloride 11/08/2021 106  95 - 110 mmol/L Final    CO2 11/08/2021 29  23 - 29 mmol/L Final    Glucose 11/08/2021 88  70 - 110 mg/dL Final    BUN 11/08/2021 25 (A) 8 - 23 mg/dL Final     Creatinine 11/08/2021 1.3  0.5 - 1.4 mg/dL Final    Calcium 11/08/2021 9.5  8.7 - 10.5 mg/dL Final    Anion Gap 11/08/2021 9  8 - 16 mmol/L Final    eGFR if  11/08/2021 43.8 (A) >60 mL/min/1.73 m^2 Final    eGFR if non  11/08/2021 38.0 (A) >60 mL/min/1.73 m^2 Final    Glucose 11/08/2021 88  70 - 110 mg/dL Final    Sodium 11/08/2021 144  136 - 145 mmol/L Final    Potassium 11/08/2021 4.0  3.5 - 5.1 mmol/L Final    Chloride 11/08/2021 106  95 - 110 mmol/L Final    CO2 11/08/2021 29  23 - 29 mmol/L Final    BUN 11/08/2021 25 (A) 8 - 23 mg/dL Final    Calcium 11/08/2021 9.5  8.7 - 10.5 mg/dL Final    Creatinine 11/08/2021 1.3  0.5 - 1.4 mg/dL Final    Albumin 11/08/2021 3.8  3.5 - 5.2 g/dL Final    Phosphorus 11/08/2021 4.1  2.7 - 4.5 mg/dL Final    eGFR if  11/08/2021 43.8 (A) >60 mL/min/1.73 m^2 Final    eGFR if non  11/08/2021 38.0 (A) >60 mL/min/1.73 m^2 Final    Anion Gap 11/08/2021 9  8 - 16 mmol/L Final    Vit D, 25-Hydroxy 11/08/2021 >120 (A) 30 - 96 ng/mL Final    Specimen UA 11/08/2021 Urine, Clean Catch   Final    Color, UA 11/08/2021 Yellow  Yellow, Straw, Anila Final    Appearance, UA 11/08/2021 Clear  Clear Final    pH, UA 11/08/2021 7.0  5.0 - 8.0 Final    Specific [place], UA 11/08/2021 1.020  1.005 - 1.030 Final    Protein, UA 11/08/2021 Negative  Negative Final    Glucose, UA 11/08/2021 Negative  Negative Final    Ketones, UA 11/08/2021 Negative  Negative Final    Bilirubin (UA) 11/08/2021 Negative  Negative Final    Occult Blood UA 11/08/2021 Negative  Negative Final    Nitrite, UA 11/08/2021 Negative  Negative Final    Urobilinogen, UA 11/08/2021 Negative  Negative EU/dL Final    Leukocytes, UA 11/08/2021 1+ (A) Negative Final    RBC, UA 11/08/2021 1  0 - 4 /hpf Final    WBC, UA 11/08/2021 3  0 - 5 /hpf Final    Bacteria 11/08/2021 Negative  None-Occ /hpf Final    Squam Epithel, UA 11/08/2021 4  /hpf  Final    Hyaline Casts, UA 11/08/2021 4 (A) 0-1/lpf /lpf Final    Microscopic Comment 11/08/2021 SEE COMMENT   Final    PTH, Intact 11/08/2021 26.1  9.0 - 77.0 pg/mL Final    Protein, Urine Random 11/08/2021 7  6 - 15 mg/dL Final    Creatinine, Urine 11/08/2021 87.0  15.0 - 325.0 mg/dL Final    Prot/Creat Ratio, Urine 11/08/2021 0.08  0.00 - 0.20 Final   Admission on 10/22/2021, Discharged on 10/22/2021   Component Date Value Ref Range Status    Sodium 10/22/2021 139  136 - 145 mmol/L Final    Potassium 10/22/2021 4.0  3.5 - 5.1 mmol/L Final    Chloride 10/22/2021 104  95 - 110 mmol/L Final    CO2 10/22/2021 29  23 - 29 mmol/L Final    Glucose 10/22/2021 137 (A) 70 - 110 mg/dL Final    BUN 10/22/2021 23  8 - 23 mg/dL Final    Creatinine 10/22/2021 1.7 (A) 0.5 - 1.4 mg/dL Final    Calcium 10/22/2021 8.7  8.7 - 10.5 mg/dL Final    Total Protein 10/22/2021 7.4  6.0 - 8.4 g/dL Final    Albumin 10/22/2021 4.1  3.5 - 5.2 g/dL Final    Total Bilirubin 10/22/2021 0.8  0.1 - 1.0 mg/dL Final    Alkaline Phosphatase 10/22/2021 64  55 - 135 U/L Final    AST 10/22/2021 23  10 - 40 U/L Final    ALT 10/22/2021 20  10 - 44 U/L Final    Anion Gap 10/22/2021 6 (A) 8 - 16 mmol/L Final    eGFR if  10/22/2021 31.7 (A) >60 mL/min/1.73 m^2 Final    eGFR if non African American 10/22/2021 27.5 (A) >60 mL/min/1.73 m^2 Final    Magnesium 10/22/2021 2.3  1.6 - 2.6 mg/dL Final    WBC 10/22/2021 7.89  3.90 - 12.70 K/uL Final    RBC 10/22/2021 4.16  4.00 - 5.40 M/uL Final    Hemoglobin 10/22/2021 11.7 (A) 12.0 - 16.0 g/dL Final    Hematocrit 10/22/2021 37.0  37.0 - 48.5 % Final    MCV 10/22/2021 89  82 - 98 fL Final    MCH 10/22/2021 28.1  27.0 - 31.0 pg Final    MCHC 10/22/2021 31.6 (A) 32.0 - 36.0 g/dL Final    RDW 10/22/2021 13.0  11.5 - 14.5 % Final    Platelets 10/22/2021 269  150 - 450 K/uL Final    MPV 10/22/2021 9.6  9.2 - 12.9 fL Final    Immature Granulocytes 10/22/2021 0.4  0.0 - 0.5  % Final    Gran # (ANC) 10/22/2021 4.5  1.8 - 7.7 K/uL Final    Immature Grans (Abs) 10/22/2021 0.03  0.00 - 0.04 K/uL Final    Lymph # 10/22/2021 2.3  1.0 - 4.8 K/uL Final    Mono # 10/22/2021 0.9  0.3 - 1.0 K/uL Final    Eos # 10/22/2021 0.1  0.0 - 0.5 K/uL Final    Baso # 10/22/2021 0.04  0.00 - 0.20 K/uL Final    nRBC 10/22/2021 0  0 /100 WBC Final    Gran % 10/22/2021 57.0  38.0 - 73.0 % Final    Lymph % 10/22/2021 29.7  18.0 - 48.0 % Final    Mono % 10/22/2021 11.0  4.0 - 15.0 % Final    Eosinophil % 10/22/2021 1.4  0.0 - 8.0 % Final    Basophil % 10/22/2021 0.5  0.0 - 1.9 % Final    Differential Method 10/22/2021 Automated   Final    PT 10/22/2021 13.9 (A) 11.4 - 13.7 sec Final    INR 10/22/2021 1.2   Final    Troponin I 10/22/2021 <0.030  <=0.040 ng/mL Final    Specimen UA 10/22/2021 Urine, Catheterized   Final    Color, UA 10/22/2021 Colorless (A) Yellow, Straw, Anila Final    Appearance, UA 10/22/2021 Clear  Clear Final    pH, UA 10/22/2021 8.0  5.0 - 8.0 Final    Specific [place], UA 10/22/2021 1.005  1.005 - 1.030 Final    Protein, UA 10/22/2021 Negative  Negative Final    Glucose, UA 10/22/2021 Negative  Negative Final    Ketones, UA 10/22/2021 Negative  Negative Final    Bilirubin (UA) 10/22/2021 Negative  Negative Final    Occult Blood UA 10/22/2021 Negative  Negative Final    Nitrite, UA 10/22/2021 Negative  Negative Final    Urobilinogen, UA 10/22/2021 Negative  Negative EU/dL Final    Leukocytes, UA 10/22/2021 Negative  Negative Final    Lithium Level 10/22/2021 0.0 (A) 0.6 - 1.2 mmol/L Final    SARS-CoV-2 RNA, Amplification, Qual 10/22/2021 Negative  Negative Final    TSH 10/22/2021 1.760  0.340 - 5.600 uIU/mL Final    Magnesium 10/22/2021 2.3  1.6 - 2.6 mg/dL Final    CPK 10/22/2021 53  20 - 180 U/L Final   Office Visit on 08/13/2021   Component Date Value Ref Range Status    CULTURE, AEROBIC AND ANAEROBIC 08/13/2021    Final       Past Medical History:    Diagnosis Date    Coronary artery disease     Dermatitis     Dermatitis 12/8/2017    Diabetes mellitus     Diabetes mellitus type II     Leah Madrigal virus infection     Fibromyalgia     GERD (gastroesophageal reflux disease)     Hypertension     MI (myocardial infarction) 09/23/2011    Pure hypercholesterolemia 2/26/2020     Past Surgical History:   Procedure Laterality Date    adenoids      ANGIOPLASTY  1987    APPENDECTOMY      CARDIAC PACEMAKER PLACEMENT  01/12/2017    CATARACT EXTRACTION, BILATERAL Bilateral 9/2/15, 3/17/15    right eye-9/2/15, left eye-3/17/15    CHOLECYSTECTOMY  1987    CORONARY ARTERY BYPASS GRAFT  2006    quadruple    coronary stents  1999    x2    CYST REMOVAL  04/25/2017    on back    HYSTERECTOMY  1966    OVARY SURGERY  1948    Ovary burst & was repaired    RHIZOTOMY  09/14/2018    TONSILLECTOMY  1940     Family History   Problem Relation Age of Onset    No Known Problems Mother     No Known Problems Father        Marital Status:   Alcohol History:  reports no history of alcohol use.  Tobacco History:  reports that she has never smoked. She has never used smokeless tobacco.  Drug History:  reports no history of drug use.    Review of patient's allergies indicates:   Allergen Reactions    Arthrotec 50 [diclofenac-misoprostol]     Doxycycline     Effexor [venlafaxine]     Estradiol     Flagyl [metronidazole]     Fluconazole     Gabapentin     Iodine     Levaquin [levofloxacin]     Nexium [esomeprazole magnesium]     Penicillins     Red yeast rice (monascus purpureus)     Shellfish containing products     Statins-hmg-coa reductase inhibitors     Sulfa (sulfonamide antibiotics)     Tea tree oil     Tegaserod      ZOLNORM    Tegaserod hydrogen maleate     Trazodone     Yeast, dried     Adhesive Rash     Band-aids       Current Outpatient Medications:     acetaminophen (TYLENOL) 500 MG tablet, Take 500 mg by mouth every 6 (six) hours as  needed for Pain., Disp: , Rfl:     amitriptyline (ELAVIL) 25 MG tablet, TAKE 1 TABLET DAILY, Disp: 30 tablet, Rfl: 1    apixaban (ELIQUIS) 2.5 mg Tab, Take 1 tablet (2.5 mg total) by mouth 2 (two) times daily., Disp: 180 tablet, Rfl: 1    aspirin (ECOTRIN) 81 MG EC tablet, Take 1 tablet (81 mg total) by mouth once daily. (Patient taking differently: Take 81 mg by mouth every evening.), Disp: , Rfl: 0    cholecalciferol, vitamin D3, 100 mcg (4,000 unit) Cap, Take 1 capsule by mouth 2 (two) times daily. , Disp: , Rfl:     coenzyme Q10 100 mg capsule, Take 1 capsule (100 mg total) by mouth 2 (two) times daily., Disp: 60 capsule, Rfl: 11    diclofenac sodium (VOLTAREN) 1 % Gel, Apply 2 g topically once daily., Disp: , Rfl:     diphenhydrAMINE (BENYLIN) 12.5 mg/5 mL liquid, Take 12.5 mg by mouth 4 (four) times daily as needed for Allergies., Disp: , Rfl:     ECHINACEA ORAL, Take 750 mg by mouth 2 (two) times a day., Disp: , Rfl:     estradioL (ESTRACE) 0.01 % (0.1 mg/gram) vaginal cream, Place 1 g vaginally once a week., Disp: 42.5 g, Rfl: 1    fluocinonide 0.05% (LIDEX) 0.05 % cream, Apply sparingly to rash on chest, neck, legs bid max use one month., Disp: 30 g, Rfl: 0    isosorbide mononitrate (IMDUR) 120 MG 24 hr tablet, Take 1 tablet (120 mg total) by mouth once daily. DO NOT CRUSH OR CHEW; SWALLOW WHOLE., Disp: 90 tablet, Rfl: 3    ketoconazole (NIZORAL) 2 % cream, aaa bid, Disp: 60 g, Rfl: 2    ketoconazole (NIZORAL) 2 % shampoo, Use to wash hair every day or every other day for dry itchy scalp (Patient taking differently: Apply 1 application topically daily as needed. Use to wash hair every day or every other day for dry itchy scalp), Disp: 120 mL, Rfl: 5    levothyroxine (SYNTHROID) 88 MCG tablet, Take 1 tablet (88 mcg total) by mouth once daily., Disp: 90 tablet, Rfl: 1    loratadine (CLARITIN) 10 mg tablet, Take 10 mg by mouth once daily., Disp: , Rfl:     losartan (COZAAR) 25 MG tablet, Take  0.5 tablets (12.5 mg total) by mouth every evening., Disp: 45 tablet, Rfl: 3    MAGNESIUM ORAL, Take 500 mg by mouth once daily. , Disp: , Rfl:     metoprolol succinate (TOPROL-XL) 25 MG 24 hr tablet, Take 1 tablet (25 mg total) by mouth once daily., Disp: 90 tablet, Rfl: 3    multivitamin (THERAGRAN) tablet, Take 1 tablet by mouth 2 (two) times a day. , Disp: , Rfl:     nitroGLYCERIN (NITROSTAT) 0.4 MG SL tablet, Place 1 tablet (0.4 mg total) under the tongue every 5 (five) minutes as needed for Chest pain., Disp: 30 tablet, Rfl: 3    tiZANidine (ZANAFLEX) 4 MG tablet, TAKE 1 TABLET BY MOUTH ONCE DAILY., Disp: 90 tablet, Rfl: 0    traMADoL (ULTRAM) 50 mg tablet, TAKE 1 TABLET (50 MG TOTAL) BY MOUTH 2 (TWO) TIMES DAILY AS NEEDED (PAIN)., Disp: 14 tablet, Rfl: 0    triamcinolone acetonide 0.1% (KENALOG) 0.1 % cream, aaa bid x 1-2 wks, tk 1 wk off prn rash, Disp: 30 g, Rfl: 2    turmeric 400 mg Cap, Take 1,200 mg by mouth once daily., Disp: , Rfl:     UNABLE TO FIND, Take 1 capsule by mouth. Bio-Smoothe- premium nerve, Disp: , Rfl:     UNABLE TO FIND, Take 1 capsule by mouth once daily. medication name: GI probiotic, Disp: , Rfl:     UNABLE TO FIND, Take 1 capsule by mouth once daily. Joint health, Disp: , Rfl:     UNABLE TO FIND, Take 1 capsule by mouth 2 (two) times a day. Cardio platinum, Disp: , Rfl:     UNABLE TO FIND, Nerve Jorge, Disp: , Rfl:     zinc gluconate 50 mg tablet, Take 50 mg by mouth 2 (two) times a day., Disp: , Rfl:     ALPRAZolam (XANAX) 0.25 MG tablet, Take 1 tablet (0.25 mg total) by mouth as needed for Anxiety., Disp: 90 tablet, Rfl: 0    montelukast (SINGULAIR) 10 mg tablet, Take 1 tablet (10 mg total) by mouth every evening., Disp: 90 tablet, Rfl: 1    mupirocin (BACTROBAN) 2 % ointment, Apply topically 2 (two) times daily. (Patient not taking: Reported on 8/3/2022), Disp: 22 g, Rfl: 11    Current Facility-Administered Medications:     evolocumab PnIj 140 mg, 140 mg,  Subcutaneous, Q14 Days, Kenton Ware MD    Review of Systems   Constitutional: Negative for appetite change, chills, diaphoresis, fatigue, fever and unexpected weight change.   HENT: Negative for congestion, ear pain, hearing loss, nosebleeds, postnasal drip, sinus pressure, sinus pain, sneezing, sore throat, tinnitus, trouble swallowing and voice change.         Hoarseness   Eyes: Negative for photophobia, pain, itching and visual disturbance.   Respiratory: Negative for apnea, cough, chest tightness, shortness of breath, wheezing and stridor.    Cardiovascular: Negative for chest pain, palpitations and leg swelling.        Pacer checked yesterday   Gastrointestinal: Negative for abdominal distention, abdominal pain, blood in stool, constipation, diarrhea, nausea and vomiting.   Endocrine: Negative for cold intolerance, heat intolerance, polydipsia and polyuria.   Genitourinary: Negative for difficulty urinating, dyspareunia, dysuria, flank pain, frequency, hematuria, menstrual problem, pelvic pain, urgency, vaginal discharge and vaginal pain.   Musculoskeletal: Positive for arthralgias (left knee). Negative for back pain, joint swelling, myalgias, neck pain and neck stiffness.   Skin: Negative for pallor.   Allergic/Immunologic: Negative for environmental allergies and food allergies.   Neurological: Negative for dizziness, tremors, speech difficulty, weakness, light-headedness and numbness.   Hematological: Does not bruise/bleed easily.   Psychiatric/Behavioral: Negative for agitation, confusion, decreased concentration, sleep disturbance and suicidal ideas. The patient is not nervous/anxious.           Objective:      Last 3 sets of Vitals    Vitals - 1 value per visit 7/7/2022 8/3/2022 8/3/2022   SYSTOLIC 130 - 118   DIASTOLIC 70 - 60   Pulse 84 - 70   Temp - - 98   Resp 16 - 14   SPO2 98 - 100   Weight (lb) 134 - 133   Weight (kg) 60.782 - 60.328   Height 60 - 60   BMI (Calculated) 26.2 - 26   VISIT REPORT  - - -   Pain Score  - 2 -   Some recent data might be hidden       Physical Exam  Constitutional:       Appearance: She is not ill-appearing.   HENT:      Head: Normocephalic and atraumatic.      Mouth/Throat:      Comments: hoarse  Eyes:      Extraocular Movements: Extraocular movements intact.      Pupils: Pupils are equal, round, and reactive to light.   Neck:      Vascular: No carotid bruit.   Cardiovascular:      Rate and Rhythm: Normal rate and regular rhythm.      Pulses: Normal pulses.      Heart sounds: Normal heart sounds.   Pulmonary:      Effort: Pulmonary effort is normal.      Breath sounds: Normal breath sounds.   Abdominal:      General: Bowel sounds are normal.      Palpations: Abdomen is soft.   Musculoskeletal:      Right lower leg: No edema.      Left lower leg: No edema.        Feet:    Lymphadenopathy:      Cervical: No cervical adenopathy.   Skin:     General: Skin is warm and dry.          Neurological:      General: No focal deficit present.      Mental Status: She is alert and oriented to person, place, and time.   Psychiatric:         Mood and Affect: Mood normal.         Thought Content: Thought content normal.         Judgment: Judgment normal.         Protective Sensation (w/ 10 gram monofilament):  Right: Intact  Left: Intact    Visual Inspection:  Normal -  Bilateral good except onychomycosis right great toenail    Pedal Pulses:   Right: Present  Left: Present    Posterior tibialis:   Right:Present  Left: Present    Assessment:       1. Left knee pain, unspecified chronicity    2. Onychomycosis    3. Severe anxiety    4. Environmental allergies         Plan:       Left knee pain, unspecified chronicity  -     Ambulatory referral/consult to Orthopedics; Future; Expected date: 08/10/2022    Onychomycosis    Severe anxiety  -     ALPRAZolam (XANAX) 0.25 MG tablet; Take 1 tablet (0.25 mg total) by mouth as needed for Anxiety.  Dispense: 90 tablet; Refill: 0    Environmental allergies  -      montelukast (SINGULAIR) 10 mg tablet; Take 1 tablet (10 mg total) by mouth every evening.  Dispense: 90 tablet; Refill: 1      Follow up in about 3 months (around 11/3/2022) for FOLLOW UP LABS, FOLLOW UP MEDICATIONS, FOLLOW-UP STATUS.        8/3/2022 Elena Sullivan M.D.

## 2022-08-03 ENCOUNTER — OFFICE VISIT (OUTPATIENT)
Dept: FAMILY MEDICINE | Facility: CLINIC | Age: 87
End: 2022-08-03
Payer: MEDICARE

## 2022-08-03 VITALS
OXYGEN SATURATION: 100 % | HEIGHT: 60 IN | SYSTOLIC BLOOD PRESSURE: 118 MMHG | WEIGHT: 133 LBS | HEART RATE: 70 BPM | RESPIRATION RATE: 14 BRPM | DIASTOLIC BLOOD PRESSURE: 60 MMHG | TEMPERATURE: 98 F | BODY MASS INDEX: 26.11 KG/M2

## 2022-08-03 DIAGNOSIS — Z91.09 ENVIRONMENTAL ALLERGIES: ICD-10-CM

## 2022-08-03 DIAGNOSIS — B35.1 ONYCHOMYCOSIS: ICD-10-CM

## 2022-08-03 DIAGNOSIS — M25.562 LEFT KNEE PAIN, UNSPECIFIED CHRONICITY: Primary | ICD-10-CM

## 2022-08-03 DIAGNOSIS — F41.9 SEVERE ANXIETY: ICD-10-CM

## 2022-08-03 PROCEDURE — 99214 PR OFFICE/OUTPT VISIT, EST, LEVL IV, 30-39 MIN: ICD-10-PCS | Mod: S$PBB,AQ,, | Performed by: INTERNAL MEDICINE

## 2022-08-03 PROCEDURE — 99215 OFFICE O/P EST HI 40 MIN: CPT | Performed by: INTERNAL MEDICINE

## 2022-08-03 PROCEDURE — 99214 OFFICE O/P EST MOD 30 MIN: CPT | Mod: S$PBB,AQ,, | Performed by: INTERNAL MEDICINE

## 2022-08-03 RX ORDER — MONTELUKAST SODIUM 10 MG/1
10 TABLET ORAL NIGHTLY
Qty: 90 TABLET | Refills: 1 | Status: SHIPPED | OUTPATIENT
Start: 2022-08-03 | End: 2022-12-22 | Stop reason: SDUPTHER

## 2022-08-03 RX ORDER — ALPRAZOLAM 0.25 MG/1
0.25 TABLET ORAL
Qty: 90 TABLET | Refills: 0 | Status: SHIPPED | OUTPATIENT
Start: 2022-08-03 | End: 2022-08-04

## 2022-08-11 ENCOUNTER — OFFICE VISIT (OUTPATIENT)
Dept: ORTHOPEDICS | Facility: CLINIC | Age: 87
End: 2022-08-11
Payer: MEDICARE

## 2022-08-11 VITALS — HEIGHT: 60 IN | WEIGHT: 133 LBS | BODY MASS INDEX: 26.11 KG/M2

## 2022-08-11 DIAGNOSIS — M17.12 PRIMARY OSTEOARTHRITIS OF LEFT KNEE: Primary | ICD-10-CM

## 2022-08-11 PROCEDURE — 20610 LARGE JOINT ASPIRATION/INJECTION: L KNEE: ICD-10-PCS | Mod: LT,S$GLB,, | Performed by: ORTHOPAEDIC SURGERY

## 2022-08-11 PROCEDURE — 20610 DRAIN/INJ JOINT/BURSA W/O US: CPT | Mod: LT,S$GLB,, | Performed by: ORTHOPAEDIC SURGERY

## 2022-08-11 PROCEDURE — 99204 OFFICE O/P NEW MOD 45 MIN: CPT | Mod: 25,S$GLB,, | Performed by: ORTHOPAEDIC SURGERY

## 2022-08-11 PROCEDURE — 99204 PR OFFICE/OUTPT VISIT, NEW, LEVL IV, 45-59 MIN: ICD-10-PCS | Mod: 25,S$GLB,, | Performed by: ORTHOPAEDIC SURGERY

## 2022-08-11 RX ADMIN — TRIAMCINOLONE ACETONIDE 40 MG: 40 INJECTION, SUSPENSION INTRA-ARTICULAR; INTRAMUSCULAR at 12:08

## 2022-08-11 NOTE — PROCEDURES
Large Joint Aspiration/Injection: L knee    Date/Time: 8/11/2022 12:30 PM  Performed by: Calin Hansen MD  Authorized by: Calin Hansen MD     Consent Done?:  Yes (Verbal)  Indications:  Pain  Site marked: the procedure site was marked    Timeout: prior to procedure the correct patient, procedure, and site was verified    Prep: patient was prepped and draped in usual sterile fashion      Local anesthesia used?: Yes    Local anesthetic:  Lidocaine 1% without epinephrine    Details:  Needle Size:  25 G  Ultrasonic Guidance for needle placement?: No    Location:  Knee  Site:  L knee  Medications:  40 mg triamcinolone acetonide 40 mg/mL  Patient tolerance:  Patient tolerated the procedure well with no immediate complications

## 2022-08-11 NOTE — PROGRESS NOTES
[facility]    Subjective:     Chief Complaint:   Chief Complaint   Patient presents with    Left Knee - Pain     Pt is here with complaints of Chronic left knee pain, states she is in PT for her whole body.        Past Medical History:   Diagnosis Date    Coronary artery disease     Dermatitis     Dermatitis 12/8/2017    Diabetes mellitus     Diabetes mellitus type II     Leah Barr virus infection     Fibromyalgia     GERD (gastroesophageal reflux disease)     Hypertension     MI (myocardial infarction) 09/23/2011    Pure hypercholesterolemia 2/26/2020       Past Surgical History:   Procedure Laterality Date    adenoids      ANGIOPLASTY  1987    APPENDECTOMY      CARDIAC PACEMAKER PLACEMENT  01/12/2017    CATARACT EXTRACTION, BILATERAL Bilateral 9/2/15, 3/17/15    right eye-9/2/15, left eye-3/17/15    CHOLECYSTECTOMY  1987    CORONARY ARTERY BYPASS GRAFT  2006    quadruple    coronary stents  1999    x2    CYST REMOVAL  04/25/2017    on back    HYSTERECTOMY  1966    OVARY SURGERY  1948    Ovary burst & was repaired    RHIZOTOMY  09/14/2018    TONSILLECTOMY  1940       Current Outpatient Medications   Medication Sig    acetaminophen (TYLENOL) 500 MG tablet Take 500 mg by mouth every 6 (six) hours as needed for Pain.    ALPRAZolam (XANAX) 0.25 MG tablet TAKE 1 TABLET BY MOUTH AS NEEDED FOR ANXIETY.    amitriptyline (ELAVIL) 25 MG tablet TAKE 1 TABLET DAILY    apixaban (ELIQUIS) 2.5 mg Tab Take 1 tablet (2.5 mg total) by mouth 2 (two) times daily.    aspirin (ECOTRIN) 81 MG EC tablet Take 1 tablet (81 mg total) by mouth once daily. (Patient taking differently: Take 81 mg by mouth every evening.)    cholecalciferol, vitamin D3, 100 mcg (4,000 unit) Cap Take 1 capsule by mouth 2 (two) times daily.     coenzyme Q10 100 mg capsule Take 1 capsule (100 mg total) by mouth 2 (two) times daily.    diclofenac sodium (VOLTAREN) 1 % Gel Apply 2 g topically once daily.    diphenhydrAMINE (BENYLIN) 12.5 mg/5 mL liquid  Take 12.5 mg by mouth 4 (four) times daily as needed for Allergies.    ECHINACEA ORAL Take 750 mg by mouth 2 (two) times a day.    estradioL (ESTRACE) 0.01 % (0.1 mg/gram) vaginal cream Place 1 g vaginally once a week.    fluocinonide 0.05% (LIDEX) 0.05 % cream Apply sparingly to rash on chest, neck, legs bid max use one month.    isosorbide mononitrate (IMDUR) 120 MG 24 hr tablet Take 1 tablet (120 mg total) by mouth once daily. DO NOT CRUSH OR CHEW; SWALLOW WHOLE.    ketoconazole (NIZORAL) 2 % cream aaa bid    ketoconazole (NIZORAL) 2 % shampoo Use to wash hair every day or every other day for dry itchy scalp (Patient taking differently: Apply 1 application topically daily as needed. Use to wash hair every day or every other day for dry itchy scalp)    levothyroxine (SYNTHROID) 88 MCG tablet Take 1 tablet (88 mcg total) by mouth once daily.    loratadine (CLARITIN) 10 mg tablet Take 10 mg by mouth once daily.    losartan (COZAAR) 25 MG tablet Take 0.5 tablets (12.5 mg total) by mouth every evening.    MAGNESIUM ORAL Take 500 mg by mouth once daily.     metoprolol succinate (TOPROL-XL) 25 MG 24 hr tablet Take 1 tablet (25 mg total) by mouth once daily.    montelukast (SINGULAIR) 10 mg tablet Take 1 tablet (10 mg total) by mouth every evening.    multivitamin (THERAGRAN) tablet Take 1 tablet by mouth 2 (two) times a day.     mupirocin (BACTROBAN) 2 % ointment Apply topically 2 (two) times daily.    nitroGLYCERIN (NITROSTAT) 0.4 MG SL tablet Place 1 tablet (0.4 mg total) under the tongue every 5 (five) minutes as needed for Chest pain.    tiZANidine (ZANAFLEX) 4 MG tablet TAKE 1 TABLET BY MOUTH ONCE DAILY.    traMADoL (ULTRAM) 50 mg tablet TAKE 1 TABLET (50 MG TOTAL) BY MOUTH 2 (TWO) TIMES DAILY AS NEEDED (PAIN).    triamcinolone acetonide 0.1% (KENALOG) 0.1 % cream aaa bid x 1-2 wks, tk 1 wk off prn rash    turmeric 400 mg Cap Take 1,200 mg by mouth once daily.    UNABLE TO FIND Take 1 capsule by mouth. Bio-Smoothe-  premium nerve    UNABLE TO FIND Take 1 capsule by mouth once daily. medication name: GI probiotic    UNABLE TO FIND Take 1 capsule by mouth once daily. Joint health    UNABLE TO FIND Take 1 capsule by mouth 2 (two) times a day. Cardio platinum    UNABLE TO FIND Nerve [place]    zinc gluconate 50 mg tablet Take 50 mg by mouth 2 (two) times a day.     Current Facility-Administered Medications   Medication    evolocumab PnIj 140 mg       Review of patient's allergies indicates:   Allergen Reactions    Arthrotec 50 [diclofenac-misoprostol]     Doxycycline     Effexor [venlafaxine]     Estradiol     Flagyl [metronidazole]     Fluconazole     Gabapentin     Iodine     Levaquin [levofloxacin]     Nexium [esomeprazole magnesium]     Penicillins     Red yeast rice (monascus purpureus)     Shellfish containing products     Statins-hmg-coa reductase inhibitors     Sulfa (sulfonamide antibiotics)     Tea tree oil     Tegaserod      ZOLNORM    Tegaserod hydrogen maleate     Trazodone     Yeast, dried     Adhesive Rash     Band-aids       Family History   Problem Relation Age of Onset    No Known Problems Mother     No Known Problems Father        Social History     Socioeconomic History    Marital status:    Tobacco Use    Smoking status: Never Smoker    Smokeless tobacco: Never Used   Substance and Sexual Activity    Alcohol use: No    Drug use: No       History of present illness: Patient comes in today for the left knee, she has constant pain in the left knee, this has been ongoing for years      Review of Systems:    Constitution: Negative for chills, fever, and sweats.  Negative for unexplained weight loss.    HENT:  Negative for headaches and blurry vision.    Cardiovascular:Negative for chest pain or irregular heart beat. Negative for hypertension.    Respiratory:  Negative for cough and shortness of breath.    Gastrointestinal: Negative for abdominal pain, heartburn, melena, nausea, and vomitting.    Genitourinary:   Negative bladder incontinence and dysuria.    Musculoskeletal:  See HPI for details.     Neurological: Negative for numbness.    Psychiatric/Behavioral: Negative for depression.  The patient is not nervous/anxious.      Endocrine: Negative for polyuria    Hematologic/Lymphatic: Negative for bleeding problem.  Does not bruise/bleed easily.    Skin: Negative for poor would healing and rash    Objective:      Physical Examination:    Vital Signs:  There were no vitals filed for this visit.    Body mass index is 25.97 kg/m².    This a well-developed, well nourished patient in no acute distress.  They are alert and oriented and cooperative to examination.        She has crepitus with motion of both knees.  She has 1+ effusion on the right none on the left.  The knee is stable to varus valgus stresses bilaterally.  Pertinent New Results:    XRAY Report / Interpretation:   Three views of the bilateral knees demonstrate advanced arthritis of her bilateral knees worse on the right with loss of the medial compartment and 3 compartment spurring.  No fractures or subluxations  Assessment/Plan:    Osteoarthritis left knee. I injected the left knee with Kenalog and Lidocaine, she will follow up in a month      This note was created using Dragon voice recognition software that occasionally misinterpreted phrases or words.

## 2022-08-22 ENCOUNTER — OFFICE VISIT (OUTPATIENT)
Dept: ORTHOPEDICS | Facility: CLINIC | Age: 87
End: 2022-08-22
Payer: MEDICARE

## 2022-08-22 VITALS — BODY MASS INDEX: 25.91 KG/M2 | HEIGHT: 60 IN | WEIGHT: 132 LBS

## 2022-08-22 DIAGNOSIS — M50.30 DEGENERATIVE DISC DISEASE, CERVICAL: ICD-10-CM

## 2022-08-22 DIAGNOSIS — S00.511A: ICD-10-CM

## 2022-08-22 DIAGNOSIS — M47.812 FACET ARTHRITIS, DEGENERATIVE, CERVICAL SPINE: ICD-10-CM
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
## 2022-08-22 DIAGNOSIS — S16.1XXA CERVICAL MYOFASCIAL STRAIN, INITIAL ENCOUNTER: ICD-10-CM

## 2022-08-22 DIAGNOSIS — M51.34 THORACIC DEGENERATIVE DISC DISEASE: ICD-10-CM

## 2022-08-22 PROCEDURE — 99213 PR OFFICE/OUTPT VISIT, EST, LEVL III, 20-29 MIN: ICD-10-PCS | Mod: S$GLB,,, | Performed by: ORTHOPAEDIC SURGERY

## 2022-08-22 PROCEDURE — 99213 OFFICE O/P EST LOW 20 MIN: CPT | Mod: S$GLB,,, | Performed by: ORTHOPAEDIC SURGERY

## 2022-08-22 RX ORDER — TRAMADOL HYDROCHLORIDE 50 MG/1
50 TABLET ORAL EVERY 8 HOURS PRN
Qty: 21 TABLET | Refills: 0 | Status: SHIPPED | OUTPATIENT
Start: 2022-08-22 | End: 2022-08-29

## 2022-08-22 NOTE — PROGRESS NOTES
Subjective:       Patient ID: Karma Chen is a 86 y.o. female.    Chief Complaint: Pain of the Neck (Cervical, fell yesterday at home, no leg or arm symptoms, pain is constant with activity, has pain in back with looking to left side. )      History of Present Illness    Prior to meeting with the patient I reviewed the medical chart in [facility]. This included reviewing the previous progress notes from our office, review of the patient's last appointment with their primary care provider, review of any visits to the emergency room, and review of any pain management appointments or procedures.     86-year-old female who suffered a mechanical, ground level fall yesterday evening while at home.  This was after taking her dog back from a walk. They have a tile floor in their foyer and she believes her result she uses stuck on the floor causing her to fall forward.  She struck her face she has some bruising and swelling noted to the forehead, nasal bridge the lip.  She complains of pain in the cervical thoracic junction and the upper back.  Denies any weakness, numbness tingling, changes in the upper extremity.    Current Medications  Current Outpatient Medications   Medication Sig Dispense Refill    acetaminophen (TYLENOL) 500 MG tablet Take 500 mg by mouth every 6 (six) hours as needed for Pain.      ALPRAZolam (XANAX) 0.25 MG tablet TAKE 1 TABLET BY MOUTH AS NEEDED FOR ANXIETY. 90 tablet 0    amitriptyline (ELAVIL) 25 MG tablet TAKE 1 TABLET BY MOUTH EVERY DAY 30 tablet 1    apixaban (ELIQUIS) 2.5 mg Tab Take 1 tablet (2.5 mg total) by mouth 2 (two) times daily. 180 tablet 1    aspirin (ECOTRIN) 81 MG EC tablet Take 1 tablet (81 mg total) by mouth once daily. (Patient taking differently: Take 81 mg by mouth every evening.)  0    cholecalciferol, vitamin D3, 100 mcg (4,000 unit) Cap Take 1 capsule by mouth 2 (two) times daily.       coenzyme Q10 100 mg capsule Take 1 capsule (100 mg total) by mouth 2 (two) times  daily. 60 capsule 11    diclofenac sodium (VOLTAREN) 1 % Gel Apply 2 g topically once daily.      diphenhydrAMINE (BENYLIN) 12.5 mg/5 mL liquid Take 12.5 mg by mouth 4 (four) times daily as needed for Allergies.      ECHINACEA ORAL Take 750 mg by mouth 2 (two) times a day.      estradioL (ESTRACE) 0.01 % (0.1 mg/gram) vaginal cream Place 1 g vaginally once a week. 42.5 g 1    fluocinonide 0.05% (LIDEX) 0.05 % cream Apply sparingly to rash on chest, neck, legs bid max use one month. 30 g 0    isosorbide mononitrate (IMDUR) 120 MG 24 hr tablet Take 1 tablet (120 mg total) by mouth once daily. DO NOT CRUSH OR CHEW; SWALLOW WHOLE. 90 tablet 3    ketoconazole (NIZORAL) 2 % cream aaa bid 60 g 2    ketoconazole (NIZORAL) 2 % shampoo Use to wash hair every day or every other day for dry itchy scalp (Patient taking differently: Apply 1 application topically daily as needed. Use to wash hair every day or every other day for dry itchy scalp) 120 mL 5    levothyroxine (SYNTHROID) 88 MCG tablet Take 1 tablet (88 mcg total) by mouth once daily. 90 tablet 1    loratadine (CLARITIN) 10 mg tablet Take 10 mg by mouth once daily.      losartan (COZAAR) 25 MG tablet Take 0.5 tablets (12.5 mg total) by mouth every evening. 45 tablet 3    MAGNESIUM ORAL Take 500 mg by mouth once daily.       metoprolol succinate (TOPROL-XL) 25 MG 24 hr tablet Take 1 tablet (25 mg total) by mouth once daily. 90 tablet 3    montelukast (SINGULAIR) 10 mg tablet Take 1 tablet (10 mg total) by mouth every evening. 90 tablet 1    multivitamin (THERAGRAN) tablet Take 1 tablet by mouth 2 (two) times a day.       mupirocin (BACTROBAN) 2 % ointment Apply topically 2 (two) times daily. 22 g 11    nitroGLYCERIN (NITROSTAT) 0.4 MG SL tablet Place 1 tablet (0.4 mg total) under the tongue every 5 (five) minutes as needed for Chest pain. 30 tablet 3    tiZANidine (ZANAFLEX) 4 MG tablet TAKE 1 TABLET BY MOUTH ONCE DAILY. 90 tablet 0    traMADoL  (ULTRAM) 50 mg tablet TAKE 1 TABLET (50 MG TOTAL) BY MOUTH 2 (TWO) TIMES DAILY AS NEEDED (PAIN). 14 tablet 0    triamcinolone acetonide 0.1% (KENALOG) 0.1 % cream aaa bid x 1-2 wks, tk 1 wk off prn rash 30 g 2    turmeric 400 mg Cap Take 1,200 mg by mouth once daily.      UNABLE TO FIND Take 1 capsule by mouth. Bio-Smoothe- premium nerve      UNABLE TO FIND Take 1 capsule by mouth once daily. medication name: GI probiotic      UNABLE TO FIND Take 1 capsule by mouth once daily. Joint health      UNABLE TO FIND Take 1 capsule by mouth 2 (two) times a day. Cardio platinum      UNABLE TO FIND Nerve [place]      zinc gluconate 50 mg tablet Take 50 mg by mouth 2 (two) times a day.      traMADoL (ULTRAM) 50 mg tablet Take 1 tablet (50 mg total) by mouth every 8 (eight) hours as needed. 21 tablet 0     Current Facility-Administered Medications   Medication Dose Route Frequency Provider Last Rate Last Admin    evolocumab PnIj 140 mg  140 mg Subcutaneous Q14 Days Ketnon Ware MD           Allergies  Review of patient's allergies indicates:   Allergen Reactions    Arthrotec 50 [diclofenac-misoprostol]     Doxycycline     Effexor [venlafaxine]     Estradiol     Flagyl [metronidazole]     Fluconazole     Gabapentin     Iodine     Levaquin [levofloxacin]     Nexium [esomeprazole magnesium]     Penicillins     Red yeast rice (monascus purpureus)     Shellfish containing products     Statins-hmg-coa reductase inhibitors     Sulfa (sulfonamide antibiotics)     Tea tree oil     Tegaserod      ZOLNORM    Tegaserod hydrogen maleate     Trazodone     Yeast, dried     Adhesive Rash     Band-aids       Past Medical History  Past Medical History:   Diagnosis Date    Coronary artery disease     Dermatitis     Dermatitis 12/8/2017    Diabetes mellitus     Diabetes mellitus type II     Leah Madrigal virus infection     Fibromyalgia     GERD (gastroesophageal reflux disease)     Hypertension     MI  (myocardial infarction) 09/23/2011    Pure hypercholesterolemia 2/26/2020       Surgical History  Past Surgical History:   Procedure Laterality Date    adenoids      ANGIOPLASTY  1987    APPENDECTOMY      CARDIAC PACEMAKER PLACEMENT  01/12/2017    CATARACT EXTRACTION, BILATERAL Bilateral 9/2/15, 3/17/15    right eye-9/2/15, left eye-3/17/15    CHOLECYSTECTOMY  1987    CORONARY ARTERY BYPASS GRAFT  2006    quadruple    coronary stents  1999    x2    CYST REMOVAL  04/25/2017    on back    HYSTERECTOMY  1966    OVARY SURGERY  1948    Ovary burst & was repaired    RHIZOTOMY  09/14/2018    TONSILLECTOMY  1940       Family History:   Family History   Problem Relation Age of Onset    No Known Problems Mother     No Known Problems Father        Social History:   Social History     Socioeconomic History    Marital status:    Tobacco Use    Smoking status: Never Smoker    Smokeless tobacco: Never Used   Substance and Sexual Activity    Alcohol use: No    Drug use: No       Hospitalization/Major Diagnostic Procedure:     Review of Systems     General/Constitutional:  Chills denies. Fatigue denies. Fever denies. Weight gain denies. Weight loss denies.    Respiratory:  Shortness of breath denies.    Cardiovascular:  Chest pain denies.    Gastrointestinal:  Constipation denies. Diarrhea denies. Nausea denies. Vomiting denies.     Hematology:  Easy bruising denies. Prolonged bleeding denies.     Genitourinary:  Frequent urination denies. Pain in lower back denies. Painful urination denies.     Musculoskeletal:  See HPI for details    Skin:  Rash denies.    Neurologic:  Dizziness denies. Gait abnormalities denies. Seizures denies. Tingling/Numbess denies.    Psychiatric:  Anxiety denies. Depressed mood denies.     Objective:   Vital Signs: There were no vitals filed for this visit.     Physical Exam      General Examination:     Constitutional: The patient is alert and oriented to lace person and time.  Mood is pleasant.     Head/Face: Normal facial features normal eyebrows    Eyes: Normal extraocular motion bilaterally    Lungs: Respirations are equal and unlabored    Gait is coordinated.    Cardiovascular: There are no swelling or varicosities present.    Lymphatic: Negative for adenopathy    Skin: Normal    Neurological: Level of consciousness normal. Oriented to place person and time and situation    Psychiatric: Oriented to time place person and situation    Examination of the cervical spine, the patient has near normal range of motion with rotation.  She has limited flexion and extension.  She is kyphotic, she has an exaggerated cervical lordosis.  She has vertebral tenderness T1 through T5.  She has normal muscle strength which is symmetrical in the bilateral upper extremities.  , wrist extension flexion biceps triceps all normal.    XRAY Report/ Interpretation :     AP lateral views of the cervical spine taken today in the office demonstrate a kyphotic upper thoracic spine and a exaggerated cervical lordosis.  She has multilevel degenerative changes with significant facet arthritis.  C4-6 appear to be nearly fused posteriorly.  She also has multi level bridging osteophytes anteriorly at C5-6 C6-7 C7-8.    AP and lateral views of thoracic spine taken today in the office demonstrate multilevel degenerative changes.  But no acute thoracic compression fractures appreciated.      Assessment:       1. Fall as cause of accidental injury in home as place of occurrence, initial encounter    2. Facet arthritis, degenerative, cervical spine    3. Degenerative disc disease, cervical    4. Thoracic degenerative disc disease    5. Cervical myofascial strain, initial encounter    6. Abrasion of vermilion border of upper lip, initial encounter        Plan:       Karma was seen today for pain.    Diagnoses and all orders for this visit:    Fall as cause of accidental injury in home as place of occurrence, initial  encounter  -     X-Ray Cervical Spine AP And Lateral  -     X-Ray Thoracic Spine AP Lateral  -     traMADoL (ULTRAM) 50 mg tablet; Take 1 tablet (50 mg total) by mouth every 8 (eight) hours as needed.    Facet arthritis, degenerative, cervical spine  -     traMADoL (ULTRAM) 50 mg tablet; Take 1 tablet (50 mg total) by mouth every 8 (eight) hours as needed.    Degenerative disc disease, cervical  -     traMADoL (ULTRAM) 50 mg tablet; Take 1 tablet (50 mg total) by mouth every 8 (eight) hours as needed.    Thoracic degenerative disc disease  -     traMADoL (ULTRAM) 50 mg tablet; Take 1 tablet (50 mg total) by mouth every 8 (eight) hours as needed.    Cervical myofascial strain, initial encounter    Abrasion of vermilion border of upper lip, initial encounter    Other orders  -     Cancel: X-Ray Lumbar Spine Ap And Lateral  -     Cancel: X-Ray Pelvis Routine AP         Follow up if symptoms worsen or fail to improve.  This is to attest that the physician's assistant Tyson Dietz served in the capacity as a scribe for this patient encounter.  This is also to verify that I have reviewed the patient's history and helped formulate the treatment plan and discussed it with the physician's assistant .  I have actively participated and evaluation and treatment plan for this patient visit.  The treatment plan and medical decision-making is outlined below:  86-year-old female who had an acute fall yesterday at home.  Had some superficial injuries about the head and face but no significant complaints.  She denied any dizziness or loss of consciousness.  Pain at the cervical thoracic junction.  Some mild pain with rotation to the left and the cervical spine.  X-rays of the cervical and thoracic spines without acute compression fracture or other emergent abnormality.  Diffuse osteoarthritic changes.  Will treat conservatively.  Refill her tramadol.  Over-the-counter medications as needed.  Follow up p.r.n..    Treatment  options were discussed with regards to the nature of the medical condition. Conservative pain intervention and surgical options were discussed in detail. The probability of success of each separate treatment option was discussed. The patient expressed a clear understanding of the treatment options. With regards to surgery, the procedure risk, benefits, complications, and outcomes were discussed. No guarantees were given with regards to surgical outcome.   The risk of complications, morbidity, and mortality of patient management decisions have been made at the time of this visit. These are associated with the patient's problems, diagnostic procedures and treatment options. This includes the possible management options selected and those considered but not selected by the patient after shared medical decision making we discussed with the patient.   This note was created using Dragon voice recognition software that occasionally misinterpreted phrases or words.

## 2022-08-30 ENCOUNTER — TELEPHONE (OUTPATIENT)
Dept: FAMILY MEDICINE | Facility: CLINIC | Age: 87
End: 2022-08-30

## 2022-08-30 DIAGNOSIS — R26.89 IMBALANCE: Primary | ICD-10-CM

## 2022-08-30 NOTE — TELEPHONE ENCOUNTER
Patient left message requesting for 8 more visit with PT    Unable to leave message  Voice mail full

## 2022-08-31 ENCOUNTER — HOSPITAL ENCOUNTER (EMERGENCY)
Facility: HOSPITAL | Age: 87
Discharge: HOME | End: 2022-08-31
Attending: EMERGENCY MEDICINE
Payer: MEDICARE

## 2022-08-31 ENCOUNTER — TELEPHONE (OUTPATIENT)
Dept: NEUROLOGY | Facility: CLINIC | Age: 87
End: 2022-08-31
Payer: COMMERCIAL

## 2022-08-31 ENCOUNTER — TELEPHONE (OUTPATIENT)
Dept: FAMILY MEDICINE | Facility: CLINIC | Age: 87
End: 2022-08-31

## 2022-08-31 VITALS
SYSTOLIC BLOOD PRESSURE: 161 MMHG | HEIGHT: 60 IN | WEIGHT: 133 LBS | TEMPERATURE: 98 F | BODY MASS INDEX: 26.11 KG/M2 | OXYGEN SATURATION: 97 % | DIASTOLIC BLOOD PRESSURE: 70 MMHG | RESPIRATION RATE: 20 BRPM | HEART RATE: 74 BPM

## 2022-08-31 DIAGNOSIS — R29.6 MULTIPLE FALLS: Primary | ICD-10-CM

## 2022-08-31 DIAGNOSIS — S01.81XA LACERATION OF FOREHEAD, INITIAL ENCOUNTER: Primary | ICD-10-CM

## 2022-08-31 DIAGNOSIS — R29.6 FALLS FREQUENTLY: ICD-10-CM

## 2022-08-31 DIAGNOSIS — R26.89 IMBALANCE: Primary | ICD-10-CM
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
## 2022-08-31 PROCEDURE — 25000003 PHARM REV CODE 250: Performed by: EMERGENCY MEDICINE

## 2022-08-31 PROCEDURE — 99284 EMERGENCY DEPT VISIT MOD MDM: CPT | Mod: 25

## 2022-08-31 PROCEDURE — 12011 RPR F/E/E/N/L/M 2.5 CM/<: CPT

## 2022-08-31 RX ORDER — MUPIROCIN 20 MG/G
1 OINTMENT TOPICAL
Status: CANCELLED | OUTPATIENT
Start: 2022-08-31 | End: 2022-08-31

## 2022-08-31 RX ORDER — LIDOCAINE HYDROCHLORIDE 10 MG/ML
5 INJECTION, SOLUTION EPIDURAL; INFILTRATION; INTRACAUDAL; PERINEURAL
Status: COMPLETED | OUTPATIENT
Start: 2022-08-31 | End: 2022-08-31

## 2022-08-31 RX ORDER — MUPIROCIN 20 MG/G
OINTMENT TOPICAL DAILY
Status: DISCONTINUED | OUTPATIENT
Start: 2022-08-31 | End: 2022-08-31 | Stop reason: HOSPADM

## 2022-08-31 RX ADMIN — MUPIROCIN: 20 OINTMENT TOPICAL at 02:08

## 2022-08-31 RX ADMIN — LIDOCAINE HYDROCHLORIDE 50 MG: 10 INJECTION, SOLUTION EPIDURAL; INFILTRATION; INTRACAUDAL; PERINEURAL at 02:08

## 2022-08-31 NOTE — ED PROVIDER NOTES
Encounter Date: 8/31/2022       History     Chief Complaint   Patient presents with    Fall     Slip and fall while walking with walker to bathroom. Hit head on cabinet. No LOC. Hematoma and lac to right forehead. Bleeding controlled. On eliquis    Knee Pain     Bilat knee pain and bruising    Head Laceration     Chief complaint is right forehead laceration.  The patient fell striking her head on the floor while walking to the bathroom.  She normally uses a walker but has to release the walker walk to the door.  She somehow fell at that point.  No LOC.  She is on Eliquis.  She has a history of coronary artery disease diabetes.  She is awake alert cooperative complaining of mild bilateral knee pain and the laceration.      Review of patient's allergies indicates:   Allergen Reactions    Arthrotec 50 [diclofenac-misoprostol]     Doxycycline     Effexor [venlafaxine]     Estradiol     Flagyl [metronidazole]     Fluconazole     Gabapentin     Iodine     Levaquin [levofloxacin]     Nexium [esomeprazole magnesium]     Penicillins     Red yeast rice (monascus purpureus)     Shellfish containing products     Statins-hmg-coa reductase inhibitors     Sulfa (sulfonamide antibiotics)     Tea tree oil     Tegaserod      ZOLNORM    Tegaserod hydrogen maleate     Trazodone     Yeast, dried     Adhesive Rash     Band-aids     Past Medical History:   Diagnosis Date    Coronary artery disease     Dermatitis     Dermatitis 12/8/2017    Diabetes mellitus     Diabetes mellitus type II     Leah Barr virus infection     Fibromyalgia     GERD (gastroesophageal reflux disease)     Hypertension     MI (myocardial infarction) 09/23/2011    Pure hypercholesterolemia 2/26/2020     Past Surgical History:   Procedure Laterality Date    adenoids      ANGIOPLASTY  1987    APPENDECTOMY      CARDIAC PACEMAKER PLACEMENT  01/12/2017    CATARACT EXTRACTION, BILATERAL Bilateral 9/2/15, 3/17/15    right eye-9/2/15, left eye-3/17/15    CHOLECYSTECTOMY   1987    CORONARY ARTERY BYPASS GRAFT  2006    quadruple    coronary stents  1999    x2    CYST REMOVAL  04/25/2017    on back    HYSTERECTOMY  1966    OVARY SURGERY  1948    Ovary burst & was repaired    RHIZOTOMY  09/14/2018    TONSILLECTOMY  1940     Family History   Problem Relation Age of Onset    No Known Problems Mother     No Known Problems Father      Social History     Tobacco Use    Smoking status: Never    Smokeless tobacco: Never   Substance Use Topics    Alcohol use: No    Drug use: No     Review of Systems   Constitutional:  Negative for chills and fever.   HENT:  Negative for ear pain, rhinorrhea and sore throat.    Eyes:  Negative for pain and visual disturbance.   Respiratory:  Negative for cough and shortness of breath.    Cardiovascular:  Negative for chest pain and palpitations.   Gastrointestinal:  Negative for abdominal pain, constipation, diarrhea, nausea and vomiting.   Genitourinary:  Negative for dysuria, frequency, hematuria and urgency.   Musculoskeletal:  Negative for back pain, joint swelling and myalgias.   Skin:  Negative for rash.        Laceration right forehead bruising both knees   Neurological:  Negative for dizziness, seizures, weakness and headaches.   Psychiatric/Behavioral:  Negative for dysphoric mood. The patient is not nervous/anxious.      Physical Exam     Initial Vitals [08/31/22 0052]   BP Pulse Resp Temp SpO2   (!) 142/62 70 16 97.9 °F (36.6 °C) 100 %      MAP       --         Physical Exam    Nursing note and vitals reviewed.  Constitutional: She appears well-developed and well-nourished.   HENT:   Head: Normocephalic and atraumatic.   Eyes: Conjunctivae, EOM and lids are normal. Pupils are equal, round, and reactive to light.   Neck: Trachea normal. Neck supple. No thyroid mass and no thyromegaly present.   Normal range of motion.  Cardiovascular:  Normal rate, regular rhythm and normal heart sounds.           Pulmonary/Chest: Effort normal and breath sounds normal.    Abdominal: Abdomen is soft. Bowel sounds are normal. There is no abdominal tenderness.   Musculoskeletal:         General: Normal range of motion.      Cervical back: Normal range of motion and neck supple.     Neurological: She is alert and oriented to person, place, and time. She has normal strength and normal reflexes. No cranial nerve deficit or sensory deficit.   Skin: Skin is warm and dry.   1/2 inch laceration right forehead bruises old an you to right left knee full range of motion of both days neurovascular function intact   Psychiatric: She has a normal mood and affect. Her speech is normal and behavior is normal. Judgment and thought content normal.       ED Course   Lac Repair    Date/Time: 8/31/2022 2:33 AM  Performed by: Deb Colon MD  Authorized by: Deb Colon MD     Comments:      Patient has a one inch laceration to the right forehead.  It is superficial in depth.  Wound cleaned with dilute Betadine.  Lidocaine 1% without given as a local anesthetic.  Patient was given 2 cc of the lidocaine.  Patient received 4 interrupted sutures 5 0 Ethilon sutures.  Patient tolerated the procedure well pressure dressing applied after I applied pressure dressing for at least 3-4 minutes.  CT head negative.  Labs Reviewed - No data to display       Imaging Results              X-Ray Knee Complete 4 or more Views Bilat (In process)  Result time 08/31/22 01:17:25   Procedure changed from X-Ray Knee 3 View Left                    CT Cervical Spine Without Contrast (Final result)  Result time 08/31/22 01:45:00      Final result by Francisco Muñoz MD (08/31/22 01:45:00)                   Narrative:    EXAM DESCRIPTION: CT CERVICAL SPINE WITHOUT CONTRAST 8/31/2022 1:42 AM CDT    CLINICAL HISTORY: 86 years, Female, fall    COMPARISON: None    PROCEDURE:  Multiple axial CT images through the cervical spine were obtained at 1 mm slice thickness at 1 mm interval reconstruction. In addition 2-D multiplanar  reformats and the sagittal coronal plane were performed and reviewed.  This exam was performed according to our departmental dose-optimization protocol, which includes automated exposure control, adjustment of the mA and/or kV according to patient size and/or use of iterative reconstruction technique.    FINDINGS:  The alignment of the vertebral bodies are normal.  There is no evidence of fracture or subluxation. There is degenerative disc disease with decreased intervertebral disc height, anterior spondylosis and posterior osteophyte complex at C2-T1. There is no evidence for significant spinal canal narrowing and/or neural foramina abnormalities. There are uncovertebral degenerative changes throughout the cervical spine. There is no prevertebral soft tissue swelling. The lung apices demonstrate to be within normal limits. Sagittal coronal reformatted images demonstrate no subluxation or bony abnormalities.    IMPRESSION:  No acute fracture or subluxation of the cervical spine.    Multilevel degenerative disc disease and uncovertebral degenerative changes.    Electronically signed by:  Francisco Muñoz MD  8/31/2022 1:45 AM CDT Workstation: [id number]                                     CT Head Without Contrast (Final result)  Result time 08/31/22 01:42:33      Final result by Francisco Muñoz MD (08/31/22 01:42:33)                   Narrative:    EXAM DESCRIPTION: CT HEAD WITHOUT CONTRAST 8/31/2022 1:41 AM CDT    CLINICAL HISTORY: 86 years, Female, fall    COMPARISON: None.    FINDINGS:    Multiple transaxial tomograms of the brain were obtained from the base of the skull to the vertex without contrast. 2-D multiplanar reformats and the coronal and sagittal plane were performed and reviewed.  This exam was performed according to our departmental dose-optimization protocol, which includes automated exposure control, adjustment of the mA and/or kV according to patient size and/or use of iterative reconstruction  technique.    Brain parenchyma demonstrate mild prominence of the sulci and gyri are corresponding to mild cerebral and cerebellar atrophy. There is very minimal periventricular white matter changes of microvascular ischemia. There is no midline shift and/or mass effect. There is no evidence for acute intracranial hemorrhage.  Lateral ventricles and cisterns displace normal appearance.  No intra or extra axial fluid collections were seen. The calvarium is intact with no evidence for fracture. The visualized portions of the paranasal sinuses and orbits demonstrate to be clear. There is very minimal soft tissue thickening within the right frontal area perhaps related to site of injury.    IMPRESSION:  No acute intracranial hemorrhage identified.    Mild brain atrophy with very minimal periventricular white matter changes of microvascular ischemia.    Very minimal soft tissue thickening within the right frontal area perhaps related to site of injury.    Electronically signed by:  Francisco Muñoz MD  8/31/2022 1:42 AM CDT Workstation: [id number]                                     Medications   LIDOcaine (PF) 10 mg/ml (1%) injection 50 mg (has no administration in time range)   mupirocin 2 % ointment (has no administration in time range)                          Clinical Impression:   Final diagnoses:  [W19.XXXA] Fall             Deb Colon MD  08/31/22 0234

## 2022-08-31 NOTE — TELEPHONE ENCOUNTER
Spoke with patient She fell again during the night.   She went to ED. She had a laceration to the forehead.  She needs to have the suture removed.   She was discharged without being told when to schedule appointment for suture removal.  This is her 3rd fall in 10 days.

## 2022-08-31 NOTE — TELEPHONE ENCOUNTER
----- Message from Dian Akhtar sent at 8/31/2022  3:43 PM CDT -----  Regarding: returning call  Contact: patient  Type:  Patient Returning Call    Who Called:  patient  Who Left Message for Patient:  Madalyn  Does the patient know what this is regarding?:  appointment  Best Call Back Number:  [phone] (home)   Additional Information:  Patient was referred to neurology. Please call patient to advise.Thanks!

## 2022-09-01 ENCOUNTER — OFFICE VISIT (OUTPATIENT)
Dept: ORTHOPEDICS | Facility: CLINIC | Age: 87
End: 2022-09-01
Payer: MEDICARE

## 2022-09-01 VITALS
WEIGHT: 133 LBS | HEART RATE: 80 BPM | DIASTOLIC BLOOD PRESSURE: 60 MMHG | BODY MASS INDEX: 25.97 KG/M2 | RESPIRATION RATE: 19 BRPM | SYSTOLIC BLOOD PRESSURE: 134 MMHG

## 2022-09-01 DIAGNOSIS — M17.12 PRIMARY OSTEOARTHRITIS OF LEFT KNEE: ICD-10-CM

## 2022-09-01 DIAGNOSIS — M17.11 PRIMARY OSTEOARTHRITIS OF RIGHT KNEE: Primary | ICD-10-CM

## 2022-09-01 PROCEDURE — 99213 OFFICE O/P EST LOW 20 MIN: CPT | Mod: S$GLB,,, | Performed by: ORTHOPAEDIC SURGERY

## 2022-09-01 PROCEDURE — 99213 PR OFFICE/OUTPT VISIT, EST, LEVL III, 20-29 MIN: ICD-10-PCS | Mod: S$GLB,,, | Performed by: ORTHOPAEDIC SURGERY

## 2022-09-01 NOTE — PROGRESS NOTES
[facility]    Subjective:     Chief Complaint:   Chief Complaint   Patient presents with    Left Knee - Injury, Pain, Swelling    Right Knee - Injury, Pain, Swelling       Past Medical History:   Diagnosis Date    Coronary artery disease     Dermatitis     Dermatitis 12/8/2017    Diabetes mellitus     Diabetes mellitus type II     Leah Barr virus infection     Fibromyalgia     GERD (gastroesophageal reflux disease)     Hypertension     MI (myocardial infarction) 09/23/2011    Pure hypercholesterolemia 2/26/2020       Past Surgical History:   Procedure Laterality Date    adenoids      ANGIOPLASTY  1987    APPENDECTOMY      CARDIAC PACEMAKER PLACEMENT  01/12/2017    CATARACT EXTRACTION, BILATERAL Bilateral 9/2/15, 3/17/15    right eye-9/2/15, left eye-3/17/15    CHOLECYSTECTOMY  1987    CORONARY ARTERY BYPASS GRAFT  2006    quadruple    coronary stents  1999    x2    CYST REMOVAL  04/25/2017    on back    HYSTERECTOMY  1966    OVARY SURGERY  1948    Ovary burst & was repaired    RHIZOTOMY  09/14/2018    TONSILLECTOMY  1940       Current Outpatient Medications   Medication Sig    acetaminophen (TYLENOL) 500 MG tablet Take 500 mg by mouth every 6 (six) hours as needed for Pain.    ALPRAZolam (XANAX) 0.25 MG tablet TAKE 1 TABLET BY MOUTH AS NEEDED FOR ANXIETY.    amitriptyline (ELAVIL) 25 MG tablet TAKE 1 TABLET BY MOUTH EVERY DAY    apixaban (ELIQUIS) 2.5 mg Tab Take 1 tablet (2.5 mg total) by mouth 2 (two) times daily.    aspirin (ECOTRIN) 81 MG EC tablet Take 1 tablet (81 mg total) by mouth once daily. (Patient taking differently: Take 81 mg by mouth every evening.)    cholecalciferol, vitamin D3, 100 mcg (4,000 unit) Cap Take 1 capsule by mouth 2 (two) times daily.     coenzyme Q10 100 mg capsule Take 1 capsule (100 mg total) by mouth 2 (two) times daily.    diclofenac sodium (VOLTAREN) 1 % Gel Apply 2 g topically once daily.    diphenhydrAMINE (BENYLIN) 12.5 mg/5 mL liquid Take 12.5 mg by mouth 4  (four) times daily as needed for Allergies.    ECHINACEA ORAL Take 750 mg by mouth 2 (two) times a day.    fluocinonide 0.05% (LIDEX) 0.05 % cream Apply sparingly to rash on chest, neck, legs bid max use one month.    ketoconazole (NIZORAL) 2 % cream aaa bid    ketoconazole (NIZORAL) 2 % shampoo Use to wash hair every day or every other day for dry itchy scalp (Patient taking differently: Apply 1 application topically daily as needed. Use to wash hair every day or every other day for dry itchy scalp)    levothyroxine (SYNTHROID) 88 MCG tablet Take 1 tablet (88 mcg total) by mouth once daily.    loratadine (CLARITIN) 10 mg tablet Take 10 mg by mouth once daily.    losartan (COZAAR) 25 MG tablet Take 0.5 tablets (12.5 mg total) by mouth every evening.    MAGNESIUM ORAL Take 500 mg by mouth once daily.     metoprolol succinate (TOPROL-XL) 25 MG 24 hr tablet Take 1 tablet (25 mg total) by mouth once daily.    montelukast (SINGULAIR) 10 mg tablet Take 1 tablet (10 mg total) by mouth every evening.    multivitamin (THERAGRAN) tablet Take 1 tablet by mouth 2 (two) times a day.     mupirocin (BACTROBAN) 2 % ointment Apply topically 2 (two) times daily.    nitroGLYCERIN (NITROSTAT) 0.4 MG SL tablet Place 1 tablet (0.4 mg total) under the tongue every 5 (five) minutes as needed for Chest pain.    tiZANidine (ZANAFLEX) 4 MG tablet TAKE 1 TABLET BY MOUTH ONCE DAILY.    traMADoL (ULTRAM) 50 mg tablet TAKE 1 TABLET (50 MG TOTAL) BY MOUTH 2 (TWO) TIMES DAILY AS NEEDED (PAIN).    triamcinolone acetonide 0.1% (KENALOG) 0.1 % cream aaa bid x 1-2 wks, tk 1 wk off prn rash    turmeric 400 mg Cap Take 1,200 mg by mouth once daily.    UNABLE TO FIND Take 1 capsule by mouth. Bio-Smoothe- premium nerve    UNABLE TO FIND Take 1 capsule by mouth once daily. medication name: GI probiotic    UNABLE TO FIND Take 1 capsule by mouth once daily. Joint health    UNABLE TO FIND Take 1 capsule by mouth 2 (two) times a day. Cardio platinum    UNABLE  TO FIND Nerve Jorge    zinc gluconate 50 mg tablet Take 50 mg by mouth 2 (two) times a day.    estradioL (ESTRACE) 0.01 % (0.1 mg/gram) vaginal cream Place 1 g vaginally once a week. (Patient not taking: Reported on 9/1/2022)     Current Facility-Administered Medications   Medication    evolocumab PnIj 140 mg       Review of patient's allergies indicates:   Allergen Reactions    Arthrotec 50 [diclofenac-misoprostol]     Doxycycline     Effexor [venlafaxine]     Estradiol     Flagyl [metronidazole]     Fluconazole     Gabapentin     Iodine     Levaquin [levofloxacin]     Nexium [esomeprazole magnesium]     Penicillins     Red yeast rice (monascus purpureus)     Shellfish containing products     Statins-hmg-coa reductase inhibitors     Sulfa (sulfonamide antibiotics)     Tea tree oil     Tegaserod      ZOLNORM    Tegaserod hydrogen maleate     Trazodone     Yeast, dried     Adhesive Rash     Band-aids       Family History   Problem Relation Age of Onset    No Known Problems Mother     No Known Problems Father        Social History     Socioeconomic History    Marital status:    Tobacco Use    Smoking status: Never    Smokeless tobacco: Never   Substance and Sexual Activity    Alcohol use: No    Drug use: No       History of present illness:  Patient comes in today because she had a bad fall.  She fell while moving away from her walker to get into her bathroom      Review of Systems:    Constitution: Negative for chills, fever, and sweats.  Negative for unexplained weight loss.    HENT:  Negative for headaches and blurry vision.    Cardiovascular:Negative for chest pain or irregular heart beat. Negative for hypertension.    Respiratory:  Negative for cough and shortness of breath.    Gastrointestinal: Negative for abdominal pain, heartburn, melena, nausea, and vomitting.    Genitourinary:  Negative bladder incontinence and dysuria.    Musculoskeletal:  See HPI for details.     Neurological: Negative for  numbness.    Psychiatric/Behavioral: Negative for depression.  The patient is not nervous/anxious.      Endocrine: Negative for polyuria    Hematologic/Lymphatic: Negative for bleeding problem.  Does not bruise/bleed easily.    Skin: Negative for poor would healing and rash    Objective:      Physical Examination:    Vital Signs:    Vitals:    09/01/22 1019   BP: 134/60   Pulse: 80   Resp: 19       Body mass index is 25.97 kg/m².    This a well-developed, well nourished patient in no acute distress.  They are alert and oriented and cooperative to examination.        Patient has ecchymosis around the right and left knees.  She has crepitus with motion of both knees.  She has 1+ effusion on the right none on the left.  The knee is stable to varus valgus stresses bilaterally.  Pertinent New Results:    XRAY Report / Interpretation:   Three views of the bilateral knees demonstrate advanced arthritis of her bilateral knees worse on the right with loss of the medial compartment and 3 compartment spurring.  No fractures or subluxations    Assessment/Plan:      Osteoarthritis bilateral knees.  Contusion ecchymosis to the bilateral knees.  At this point there is no evidence of fracture.  The patient is encouraged to use her walker.  No surgical intervention at this time.  I am going to see her back in a month and at that time hopefully I can inject her knees.  I do want to do it today because there was simply too much hematoma      This note was created using Dragon voice recognition software that occasionally misinterpreted phrases or words.

## 2022-09-05 NOTE — PROGRESS NOTES
SUBJECTIVE:    Patient ID: Karma Chen is a 86 y.o. female.    Chief Complaint: Fall and Suture / Staple Removal    HPI    Patient comes for follow-up fall for suture removal-the first fall resulted in nasal and frontal contusion-the second fall included her foot getting caught in dog leash-third fall from getting out of bed and she fell the tile floor, resulting in head laceration and now contusion spreading down the lateral right face from the area of the laceration-her knees are her major pain-the left knee is swelled ant and posteriorally-there is tight edema and erythema over the patella-she is stephanie to bear weight on the knees but the left is extremely tender    Hospital Outpatient Visit on 08/02/2022   Component Date Value Ref Range Status    P/R-wave RA Lead 08/02/2022 3.3  mV Final    P/R-wave RA Lead (native) 08/02/2022 10.6  mV Final    Impedance RA Lead 08/02/2022 494  Ohms Final    Impedance RA Lead (native) 08/02/2022 570  Ohms Final    Threshold V RA Lead 08/02/2022 0.875  V Final    Theshold ms RA Lead 08/02/2022 0.4  ms Final    Threshold V RA Lead (native) 08/02/2022 0.5  V Final    Threshold ms RA Lead (native) 08/02/2022 0.4  ms Final   Lab Visit on 05/20/2022   Component Date Value Ref Range Status    Hemoglobin A1C 05/20/2022 5.5  4.5 - 6.2 % Final    Estimated Avg Glucose 05/20/2022 111  68 - 131 mg/dL Final    Microalbumin, Urine 05/20/2022 10.8  <19.9 ug/mL Final    Creatinine, Urine 05/20/2022 97.0  15.0 - 325.0 mg/dL Final    Microalb/Creat Ratio 05/20/2022 11.1  0.0 - 30.0 ug/mg Final    Cholesterol 05/20/2022 223 (H)  120 - 199 mg/dL Final    Triglycerides 05/20/2022 109  30 - 150 mg/dL Final    HDL 05/20/2022 56  40 - 75 mg/dL Final    LDL Cholesterol 05/20/2022 145.2  63.0 - 159.0 mg/dL Final    HDL/Cholesterol Ratio 05/20/2022 25.1  20.0 - 50.0 % Final    Total Cholesterol/HDL Ratio 05/20/2022 4.0  2.0 - 5.0 Final    Non-HDL Cholesterol 05/20/2022 167  mg/dL  Final    Glucose 05/20/2022 135 (H)  70 - 110 mg/dL Final    Sodium 05/20/2022 139  136 - 145 mmol/L Final    Potassium 05/20/2022 4.0  3.5 - 5.1 mmol/L Final    Chloride 05/20/2022 103  95 - 110 mmol/L Final    CO2 05/20/2022 24  23 - 29 mmol/L Final    BUN 05/20/2022 28 (H)  8 - 23 mg/dL Final    Calcium 05/20/2022 9.3  8.7 - 10.5 mg/dL Final    Creatinine 05/20/2022 1.2  0.5 - 1.4 mg/dL Final    Albumin 05/20/2022 4.0  3.5 - 5.2 g/dL Final    Phosphorus 05/20/2022 3.6  2.7 - 4.5 mg/dL Final    eGFR if  05/20/2022 48.0 (A)  >60 mL/min/1.73 m^2 Final    eGFR if non African American 05/20/2022 41.6 (A)  >60 mL/min/1.73 m^2 Final    Anion Gap 05/20/2022 12  8 - 16 mmol/L Final    RBC, UA 05/20/2022 1  0 - 4 /hpf Final    WBC, UA 05/20/2022 0  0 - 5 /hpf Final    Bacteria 05/20/2022 Negative  None-Occ /hpf Final    Squam Epithel, UA 05/20/2022 1  /hpf Final    Hyaline Casts, UA 05/20/2022 2 (A)  0-1/lpf /lpf Final    Microscopic Comment 05/20/2022 SEE COMMENT   Final    Protein, Urine Random 05/20/2022 6  6 - 15 mg/dL Final    Creatinine, Urine 05/20/2022 97.0  15.0 - 325.0 mg/dL Final    Prot/Creat Ratio, Urine 05/20/2022 0.06  0.00 - 0.20 Final   Lab Visit on 05/10/2022   Component Date Value Ref Range Status    BNP 05/10/2022 490 (H)  0 - 99 pg/mL Final    TSH 05/10/2022 1.280  0.340 - 5.600 uIU/mL Final    WBC 05/10/2022 7.41  3.90 - 12.70 K/uL Final    RBC 05/10/2022 4.20  4.00 - 5.40 M/uL Final    Hemoglobin 05/10/2022 11.5 (L)  12.0 - 16.0 g/dL Final    Hematocrit 05/10/2022 37.2  37.0 - 48.5 % Final    MCV 05/10/2022 89  82 - 98 fL Final    MCH 05/10/2022 27.4  27.0 - 31.0 pg Final    MCHC 05/10/2022 30.9 (L)  32.0 - 36.0 g/dL Final    RDW 05/10/2022 13.8  11.5 - 14.5 % Final    Platelets 05/10/2022 249  150 - 450 K/uL Final    MPV 05/10/2022 10.0  9.2 - 12.9 fL Final    Immature Granulocytes 05/10/2022 0.3  0.0 - 0.5 % Final    Gran # (ANC) 05/10/2022 3.4   1.8 - 7.7 K/uL Final    Immature Grans (Abs) 05/10/2022 0.02  0.00 - 0.04 K/uL Final    Lymph # 05/10/2022 2.5  1.0 - 4.8 K/uL Final    Mono # 05/10/2022 1.1 (H)  0.3 - 1.0 K/uL Final    Eos # 05/10/2022 0.3  0.0 - 0.5 K/uL Final    Baso # 05/10/2022 0.08  0.00 - 0.20 K/uL Final    nRBC 05/10/2022 0  0 /100 WBC Final    Gran % 05/10/2022 46.0  38.0 - 73.0 % Final    Lymph % 05/10/2022 34.3  18.0 - 48.0 % Final    Mono % 05/10/2022 14.7  4.0 - 15.0 % Final    Eosinophil % 05/10/2022 3.6  0.0 - 8.0 % Final    Basophil % 05/10/2022 1.1  0.0 - 1.9 % Final    Differential Method 05/10/2022 Automated   Final    Sodium 05/10/2022 140  136 - 145 mmol/L Final    Potassium 05/10/2022 3.9  3.5 - 5.1 mmol/L Final    Chloride 05/10/2022 103  95 - 110 mmol/L Final    CO2 05/10/2022 27  23 - 29 mmol/L Final    Glucose 05/10/2022 93  70 - 110 mg/dL Final    BUN 05/10/2022 24 (H)  8 - 23 mg/dL Final    Creatinine 05/10/2022 1.4  0.5 - 1.4 mg/dL Final    Calcium 05/10/2022 9.0  8.7 - 10.5 mg/dL Final    Total Protein 05/10/2022 7.2  6.0 - 8.4 g/dL Final    Albumin 05/10/2022 3.9  3.5 - 5.2 g/dL Final    Total Bilirubin 05/10/2022 0.6  0.1 - 1.0 mg/dL Final    Alkaline Phosphatase 05/10/2022 68  55 - 135 U/L Final    AST 05/10/2022 24  10 - 40 U/L Final    ALT 05/10/2022 22  10 - 44 U/L Final    Anion Gap 05/10/2022 10  8 - 16 mmol/L Final    eGFR if  05/10/2022 39.8 (A)  >60 mL/min/1.73 m^2 Final    eGFR if non African American 05/10/2022 34.5 (A)  >60 mL/min/1.73 m^2 Final    Total Protein 05/10/2022 7.2  6.0 - 8.4 g/dL Final    Albumin 05/10/2022 3.9  3.5 - 5.2 g/dL Final    Total Bilirubin 05/10/2022 0.6  0.1 - 1.0 mg/dL Final    Bilirubin, Direct 05/10/2022 <0.1 (A)  0.1 - 0.3 mg/dL Final    AST 05/10/2022 24  10 - 40 U/L Final    ALT 05/10/2022 22  10 - 44 U/L Final    Alkaline Phosphatase 05/10/2022 68  55 - 135 U/L Final    Glucose 05/10/2022 93  70 - 110 mg/dL Final     Sodium 05/10/2022 140  136 - 145 mmol/L Final    Potassium 05/10/2022 3.9  3.5 - 5.1 mmol/L Final    Chloride 05/10/2022 103  95 - 110 mmol/L Final    CO2 05/10/2022 27  23 - 29 mmol/L Final    BUN 05/10/2022 24 (H)  8 - 23 mg/dL Final    Calcium 05/10/2022 9.0  8.7 - 10.5 mg/dL Final    Creatinine 05/10/2022 1.4  0.5 - 1.4 mg/dL Final    Albumin 05/10/2022 3.9  3.5 - 5.2 g/dL Final    Phosphorus 05/10/2022 3.7  2.7 - 4.5 mg/dL Final    eGFR if  05/10/2022 39.8 (A)  >60 mL/min/1.73 m^2 Final    eGFR if non African American 05/10/2022 34.5 (A)  >60 mL/min/1.73 m^2 Final    Anion Gap 05/10/2022 10  8 - 16 mmol/L Final   Office Visit on 01/25/2022   Component Date Value Ref Range Status    POC Blood, Urine 01/25/2022 Negative  Negative Final    POC Bilirubin, Urine 01/25/2022 Negative  Negative Final    POC Urobilinogen, Urine 01/25/2022 norm  0.1 - 1.1 Final    POC Ketones, Urine 01/25/2022 Negative  Negative Final    POC Protein, Urine 01/25/2022 Negative  Negative Final    POC Nitrates, Urine 01/25/2022 Negative  Negative Final    POC Glucose, Urine 01/25/2022 Negative  Negative Final    pH, UA 01/25/2022 6.0   Final    POC Specific Gravity, Urine 01/25/2022 1.005  1.003 - 1.029 Final    POC Leukocytes, Urine 01/25/2022 Negative  Negative Final    POC Rapid COVID 01/25/2022 Negative  Negative Final     Acceptable 01/25/2022 Yes   Final   Hospital Outpatient Visit on 01/18/2022   Component Date Value Ref Range Status    Battery Voltage (V) 01/18/2022 3.00  V Final    P/R-wave RA Lead 01/18/2022 1.5  mV Final    P/R-wave RA Lead (native) 01/18/2022 10.9  mV Final    Impedance RA Lead 01/18/2022 475  Ohms Final    Impedance RA Lead (native) 01/18/2022 532  Ohms Final    Threshold V RA Lead 01/18/2022 0.750  V Final    Theshold ms RA Lead 01/18/2022 0.40  ms Final    Threshold V RA Lead (native) 01/18/2022 0.500  V Final    Threshold ms RA Lead (native)  01/18/2022 0.40  ms Final   Office Visit on 01/10/2022   Component Date Value Ref Range Status    Cologuard Result 01/14/2022 Negative  Negative Final   Lab Visit on 11/08/2021   Component Date Value Ref Range Status    Hemoglobin A1C 11/08/2021 5.9  4.5 - 6.2 % Final    Estimated Avg Glucose 11/08/2021 123  68 - 131 mg/dL Final    WBC 11/08/2021 7.35  3.90 - 12.70 K/uL Final    RBC 11/08/2021 4.30  4.00 - 5.40 M/uL Final    Hemoglobin 11/08/2021 12.0  12.0 - 16.0 g/dL Final    Hematocrit 11/08/2021 38.0  37.0 - 48.5 % Final    MCV 11/08/2021 88  82 - 98 fL Final    MCH 11/08/2021 27.9  27.0 - 31.0 pg Final    MCHC 11/08/2021 31.6 (L)  32.0 - 36.0 g/dL Final    RDW 11/08/2021 13.2  11.5 - 14.5 % Final    Platelets 11/08/2021 281  150 - 450 K/uL Final    MPV 11/08/2021 10.2  9.2 - 12.9 fL Final    Immature Granulocytes 11/08/2021 0.3  0.0 - 0.5 % Final    Gran # (ANC) 11/08/2021 3.3  1.8 - 7.7 K/uL Final    Immature Grans (Abs) 11/08/2021 0.02  0.00 - 0.04 K/uL Final    Lymph # 11/08/2021 3.0  1.0 - 4.8 K/uL Final    Mono # 11/08/2021 0.8  0.3 - 1.0 K/uL Final    Eos # 11/08/2021 0.2  0.0 - 0.5 K/uL Final    Baso # 11/08/2021 0.06  0.00 - 0.20 K/uL Final    nRBC 11/08/2021 0  0 /100 WBC Final    Gran % 11/08/2021 44.9  38.0 - 73.0 % Final    Lymph % 11/08/2021 40.4  18.0 - 48.0 % Final    Mono % 11/08/2021 10.5  4.0 - 15.0 % Final    Eosinophil % 11/08/2021 3.1  0.0 - 8.0 % Final    Basophil % 11/08/2021 0.8  0.0 - 1.9 % Final    Differential Method 11/08/2021 Automated   Final    Cholesterol 11/08/2021 248 (H)  120 - 199 mg/dL Final    Triglycerides 11/08/2021 185 (H)  30 - 150 mg/dL Final    HDL 11/08/2021 41  40 - 75 mg/dL Final    LDL Cholesterol 11/08/2021 170.0 (H)  63.0 - 159.0 mg/dL Final    HDL/Cholesterol Ratio 11/08/2021 16.5 (L)  20.0 - 50.0 % Final    Total Cholesterol/HDL Ratio 11/08/2021 6.0 (H)  2.0 - 5.0 Final    Non-HDL Cholesterol 11/08/2021 207  mg/dL Final     Sodium 11/08/2021 144  136 - 145 mmol/L Final    Potassium 11/08/2021 4.0  3.5 - 5.1 mmol/L Final    Chloride 11/08/2021 106  95 - 110 mmol/L Final    CO2 11/08/2021 29  23 - 29 mmol/L Final    Glucose 11/08/2021 88  70 - 110 mg/dL Final    BUN 11/08/2021 25 (H)  8 - 23 mg/dL Final    Creatinine 11/08/2021 1.3  0.5 - 1.4 mg/dL Final    Calcium 11/08/2021 9.5  8.7 - 10.5 mg/dL Final    Anion Gap 11/08/2021 9  8 - 16 mmol/L Final    eGFR if  11/08/2021 43.8 (A)  >60 mL/min/1.73 m^2 Final    eGFR if non  11/08/2021 38.0 (A)  >60 mL/min/1.73 m^2 Final    Glucose 11/08/2021 88  70 - 110 mg/dL Final    Sodium 11/08/2021 144  136 - 145 mmol/L Final    Potassium 11/08/2021 4.0  3.5 - 5.1 mmol/L Final    Chloride 11/08/2021 106  95 - 110 mmol/L Final    CO2 11/08/2021 29  23 - 29 mmol/L Final    BUN 11/08/2021 25 (H)  8 - 23 mg/dL Final    Calcium 11/08/2021 9.5  8.7 - 10.5 mg/dL Final    Creatinine 11/08/2021 1.3  0.5 - 1.4 mg/dL Final    Albumin 11/08/2021 3.8  3.5 - 5.2 g/dL Final    Phosphorus 11/08/2021 4.1  2.7 - 4.5 mg/dL Final    eGFR if  11/08/2021 43.8 (A)  >60 mL/min/1.73 m^2 Final    eGFR if non  11/08/2021 38.0 (A)  >60 mL/min/1.73 m^2 Final    Anion Gap 11/08/2021 9  8 - 16 mmol/L Final    Vit D, 25-Hydroxy 11/08/2021 >120 (H)  30 - 96 ng/mL Final    Specimen UA 11/08/2021 Urine, Clean Catch   Final    Color, UA 11/08/2021 Yellow  Yellow, Straw, Anila Final    Appearance, UA 11/08/2021 Clear  Clear Final    pH, UA 11/08/2021 7.0  5.0 - 8.0 Final    Specific [place], UA 11/08/2021 1.020  1.005 - 1.030 Final    Protein, UA 11/08/2021 Negative  Negative Final    Glucose, UA 11/08/2021 Negative  Negative Final    Ketones, UA 11/08/2021 Negative  Negative Final    Bilirubin (UA) 11/08/2021 Negative  Negative Final    Occult Blood UA 11/08/2021 Negative  Negative Final    Nitrite, UA 11/08/2021 Negative  Negative Final     Urobilinogen, UA 11/08/2021 Negative  Negative EU/dL Final    Leukocytes, UA 11/08/2021 1+ (A)  Negative Final    RBC, UA 11/08/2021 1  0 - 4 /hpf Final    WBC, UA 11/08/2021 3  0 - 5 /hpf Final    Bacteria 11/08/2021 Negative  None-Occ /hpf Final    Squam Epithel, UA 11/08/2021 4  /hpf Final    Hyaline Casts, UA 11/08/2021 4 (A)  0-1/lpf /lpf Final    Microscopic Comment 11/08/2021 SEE COMMENT   Final    PTH, Intact 11/08/2021 26.1  9.0 - 77.0 pg/mL Final    Protein, Urine Random 11/08/2021 7  6 - 15 mg/dL Final    Creatinine, Urine 11/08/2021 87.0  15.0 - 325.0 mg/dL Final    Prot/Creat Ratio, Urine 11/08/2021 0.08  0.00 - 0.20 Final   Admission on 10/22/2021, Discharged on 10/22/2021   Component Date Value Ref Range Status    Sodium 10/22/2021 139  136 - 145 mmol/L Final    Potassium 10/22/2021 4.0  3.5 - 5.1 mmol/L Final    Chloride 10/22/2021 104  95 - 110 mmol/L Final    CO2 10/22/2021 29  23 - 29 mmol/L Final    Glucose 10/22/2021 137 (H)  70 - 110 mg/dL Final    BUN 10/22/2021 23  8 - 23 mg/dL Final    Creatinine 10/22/2021 1.7 (H)  0.5 - 1.4 mg/dL Final    Calcium 10/22/2021 8.7  8.7 - 10.5 mg/dL Final    Total Protein 10/22/2021 7.4  6.0 - 8.4 g/dL Final    Albumin 10/22/2021 4.1  3.5 - 5.2 g/dL Final    Total Bilirubin 10/22/2021 0.8  0.1 - 1.0 mg/dL Final    Alkaline Phosphatase 10/22/2021 64  55 - 135 U/L Final    AST 10/22/2021 23  10 - 40 U/L Final    ALT 10/22/2021 20  10 - 44 U/L Final    Anion Gap 10/22/2021 6 (L)  8 - 16 mmol/L Final    eGFR if  10/22/2021 31.7 (A)  >60 mL/min/1.73 m^2 Final    eGFR if non African American 10/22/2021 27.5 (A)  >60 mL/min/1.73 m^2 Final    Magnesium 10/22/2021 2.3  1.6 - 2.6 mg/dL Final    WBC 10/22/2021 7.89  3.90 - 12.70 K/uL Final    RBC 10/22/2021 4.16  4.00 - 5.40 M/uL Final    Hemoglobin 10/22/2021 11.7 (L)  12.0 - 16.0 g/dL Final    Hematocrit 10/22/2021 37.0  37.0 - 48.5 % Final    MCV 10/22/2021 89  82  - 98 fL Final    MCH 10/22/2021 28.1  27.0 - 31.0 pg Final    MCHC 10/22/2021 31.6 (L)  32.0 - 36.0 g/dL Final    RDW 10/22/2021 13.0  11.5 - 14.5 % Final    Platelets 10/22/2021 269  150 - 450 K/uL Final    MPV 10/22/2021 9.6  9.2 - 12.9 fL Final    Immature Granulocytes 10/22/2021 0.4  0.0 - 0.5 % Final    Gran # (ANC) 10/22/2021 4.5  1.8 - 7.7 K/uL Final    Immature Grans (Abs) 10/22/2021 0.03  0.00 - 0.04 K/uL Final    Lymph # 10/22/2021 2.3  1.0 - 4.8 K/uL Final    Mono # 10/22/2021 0.9  0.3 - 1.0 K/uL Final    Eos # 10/22/2021 0.1  0.0 - 0.5 K/uL Final    Baso # 10/22/2021 0.04  0.00 - 0.20 K/uL Final    nRBC 10/22/2021 0  0 /100 WBC Final    Gran % 10/22/2021 57.0  38.0 - 73.0 % Final    Lymph % 10/22/2021 29.7  18.0 - 48.0 % Final    Mono % 10/22/2021 11.0  4.0 - 15.0 % Final    Eosinophil % 10/22/2021 1.4  0.0 - 8.0 % Final    Basophil % 10/22/2021 0.5  0.0 - 1.9 % Final    Differential Method 10/22/2021 Automated   Final    PT 10/22/2021 13.9 (H)  11.4 - 13.7 sec Final    INR 10/22/2021 1.2   Final    Troponin I 10/22/2021 <0.030  <=0.040 ng/mL Final    Specimen UA 10/22/2021 Urine, Catheterized   Final    Color, UA 10/22/2021 Colorless (A)  Yellow, Straw, Anila Final    Appearance, UA 10/22/2021 Clear  Clear Final    pH, UA 10/22/2021 8.0  5.0 - 8.0 Final    Specific [place], UA 10/22/2021 1.005  1.005 - 1.030 Final    Protein, UA 10/22/2021 Negative  Negative Final    Glucose, UA 10/22/2021 Negative  Negative Final    Ketones, UA 10/22/2021 Negative  Negative Final    Bilirubin (UA) 10/22/2021 Negative  Negative Final    Occult Blood UA 10/22/2021 Negative  Negative Final    Nitrite, UA 10/22/2021 Negative  Negative Final    Urobilinogen, UA 10/22/2021 Negative  Negative EU/dL Final    Leukocytes, UA 10/22/2021 Negative  Negative Final    Lithium Level 10/22/2021 0.0 (LL)  0.6 - 1.2 mmol/L Final    SARS-CoV-2 RNA, Amplification, Qual 10/22/2021 Negative  Negative  Final    TSH 10/22/2021 1.760  0.340 - 5.600 uIU/mL Final    Magnesium 10/22/2021 2.3  1.6 - 2.6 mg/dL Final    CPK 10/22/2021 53  20 - 180 U/L Final       Past Medical History:   Diagnosis Date    Coronary artery disease     Dermatitis     Dermatitis 12/8/2017    Diabetes mellitus     Diabetes mellitus type II     Leah Madrigal virus infection     Fibromyalgia     GERD (gastroesophageal reflux disease)     Hypertension     MI (myocardial infarction) 09/23/2011    Pure hypercholesterolemia 2/26/2020     Past Surgical History:   Procedure Laterality Date    adenoids      ANGIOPLASTY  1987    APPENDECTOMY      CARDIAC PACEMAKER PLACEMENT  01/12/2017    CATARACT EXTRACTION, BILATERAL Bilateral 9/2/15, 3/17/15    right eye-9/2/15, left eye-3/17/15    CHOLECYSTECTOMY  1987    CORONARY ARTERY BYPASS GRAFT  2006    quadruple    coronary stents  1999    x2    CYST REMOVAL  04/25/2017    on back    HYSTERECTOMY  1966    OVARY SURGERY  1948    Ovary burst & was repaired    RHIZOTOMY  09/14/2018    TONSILLECTOMY  1940     Family History   Problem Relation Age of Onset    No Known Problems Mother     No Known Problems Father        Marital Status:   Alcohol History:  reports no history of alcohol use.  Tobacco History:  reports that she has never smoked. She has never used smokeless tobacco.  Drug History:  reports no history of drug use.    Review of patient's allergies indicates:   Allergen Reactions    Arthrotec 50 [diclofenac-misoprostol]     Doxycycline     Effexor [venlafaxine]     Estradiol     Flagyl [metronidazole]     Fluconazole     Gabapentin     Iodine     Levaquin [levofloxacin]     Nexium [esomeprazole magnesium]     Penicillins     Red yeast rice (monascus purpureus)     Shellfish containing products     Statins-hmg-coa reductase inhibitors     Sulfa (sulfonamide antibiotics)     Tea tree oil     Tegaserod      ZOLNORM    Tegaserod hydrogen maleate      Trazodone     Yeast, dried     Adhesive Rash     Band-aids       Current Outpatient Medications:     acetaminophen (TYLENOL) 500 MG tablet, Take 500 mg by mouth every 6 (six) hours as needed for Pain., Disp: , Rfl:     ALPRAZolam (XANAX) 0.25 MG tablet, TAKE 1 TABLET BY MOUTH AS NEEDED FOR ANXIETY., Disp: 90 tablet, Rfl: 0    amitriptyline (ELAVIL) 25 MG tablet, TAKE 1 TABLET BY MOUTH EVERY DAY, Disp: 30 tablet, Rfl: 1    apixaban (ELIQUIS) 2.5 mg Tab, Take 1 tablet (2.5 mg total) by mouth 2 (two) times daily., Disp: 180 tablet, Rfl: 1    aspirin (ECOTRIN) 81 MG EC tablet, Take 1 tablet (81 mg total) by mouth once daily. (Patient taking differently: Take 81 mg by mouth every evening.), Disp: , Rfl: 0    cholecalciferol, vitamin D3, 100 mcg (4,000 unit) Cap, Take 1 capsule by mouth 2 (two) times daily. , Disp: , Rfl:     coenzyme Q10 100 mg capsule, Take 1 capsule (100 mg total) by mouth 2 (two) times daily., Disp: 60 capsule, Rfl: 11    diclofenac sodium (VOLTAREN) 1 % Gel, Apply 2 g topically once daily., Disp: , Rfl:     diphenhydrAMINE (BENYLIN) 12.5 mg/5 mL liquid, Take 12.5 mg by mouth 4 (four) times daily as needed for Allergies., Disp: , Rfl:     ECHINACEA ORAL, Take 750 mg by mouth 2 (two) times a day., Disp: , Rfl:     estradioL (ESTRACE) 0.01 % (0.1 mg/gram) vaginal cream, Place 1 g vaginally once a week., Disp: 42.5 g, Rfl: 1    fluocinonide 0.05% (LIDEX) 0.05 % cream, Apply sparingly to rash on chest, neck, legs bid max use one month., Disp: 30 g, Rfl: 0    ketoconazole (NIZORAL) 2 % cream, aaa bid, Disp: 60 g, Rfl: 2    ketoconazole (NIZORAL) 2 % shampoo, Use to wash hair every day or every other day for dry itchy scalp (Patient taking differently: Apply 1 application topically daily as needed. Use to wash hair every day or every other day for dry itchy scalp), Disp: 120 mL, Rfl: 5    levothyroxine (SYNTHROID) 88 MCG tablet, Take 1 tablet (88 mcg total) by mouth once daily., Disp: 90  tablet, Rfl: 1    loratadine (CLARITIN) 10 mg tablet, Take 10 mg by mouth once daily., Disp: , Rfl:     losartan (COZAAR) 25 MG tablet, Take 0.5 tablets (12.5 mg total) by mouth every evening., Disp: 45 tablet, Rfl: 3    MAGNESIUM ORAL, Take 500 mg by mouth once daily. , Disp: , Rfl:     metoprolol succinate (TOPROL-XL) 25 MG 24 hr tablet, Take 1 tablet (25 mg total) by mouth once daily., Disp: 90 tablet, Rfl: 3    montelukast (SINGULAIR) 10 mg tablet, Take 1 tablet (10 mg total) by mouth every evening., Disp: 90 tablet, Rfl: 1    multivitamin (THERAGRAN) tablet, Take 1 tablet by mouth 2 (two) times a day. , Disp: , Rfl:     mupirocin (BACTROBAN) 2 % ointment, Apply topically 2 (two) times daily., Disp: 22 g, Rfl: 11    nitroGLYCERIN (NITROSTAT) 0.4 MG SL tablet, Place 1 tablet (0.4 mg total) under the tongue every 5 (five) minutes as needed for Chest pain., Disp: 30 tablet, Rfl: 3    tiZANidine (ZANAFLEX) 4 MG tablet, TAKE 1 TABLET BY MOUTH ONCE DAILY., Disp: 90 tablet, Rfl: 0    traMADoL (ULTRAM) 50 mg tablet, TAKE 1 TABLET (50 MG TOTAL) BY MOUTH 2 (TWO) TIMES DAILY AS NEEDED (PAIN)., Disp: 14 tablet, Rfl: 0    triamcinolone acetonide 0.1% (KENALOG) 0.1 % cream, aaa bid x 1-2 wks, tk 1 wk off prn rash, Disp: 30 g, Rfl: 2    turmeric 400 mg Cap, Take 1,200 mg by mouth once daily., Disp: , Rfl:     UNABLE TO FIND, Take 1 capsule by mouth. Bio-Smoothe- premium nerve, Disp: , Rfl:     UNABLE TO FIND, Take 1 capsule by mouth once daily. medication name: GI probiotic, Disp: , Rfl:     UNABLE TO FIND, Take 1 capsule by mouth once daily. Joint health, Disp: , Rfl:     UNABLE TO FIND, Take 1 capsule by mouth 2 (two) times a day. Cardio platinum, Disp: , Rfl:     UNABLE TO FIND, Nerve Jorge, Disp: , Rfl:     zinc gluconate 50 mg tablet, Take 50 mg by mouth 2 (two) times a day., Disp: , Rfl:     Current Facility-Administered Medications:     evolocumab PnIj 140 mg, 140 mg, Subcutaneous, Q14 Days, [place]  MD Chaz    Review of Systems   Constitutional:  Negative for activity change, appetite change, chills, fatigue, fever and unexpected weight change.   HENT:  Negative for congestion, ear pain, hearing loss, postnasal drip, sinus pain, sneezing, sore throat, tinnitus and trouble swallowing.    Eyes:  Negative for pain, discharge and visual disturbance.   Respiratory:  Negative for cough, choking, shortness of breath and wheezing.    Cardiovascular:  Negative for chest pain, palpitations and leg swelling.   Gastrointestinal:  Negative for abdominal distention, abdominal pain, blood in stool, constipation, diarrhea, nausea and vomiting.   Endocrine: Negative for cold intolerance and heat intolerance.   Genitourinary:  Negative for difficulty urinating, dysuria, frequency, pelvic pain and urgency.   Musculoskeletal:  Positive for arthralgias (both knees left greater than right). Negative for back pain, joint swelling and neck pain.   Skin:  Negative for pallor and rash.   Allergic/Immunologic: Negative for environmental allergies and food allergies.   Neurological:  Negative for dizziness, tremors, weakness, numbness and headaches.   Hematological:  Does not bruise/bleed easily.   Psychiatric/Behavioral:  Negative for agitation, confusion, dysphoric mood, sleep disturbance and suicidal ideas. The patient is not nervous/anxious.         Objective:      Vitals    Vitals - 1 value per visit 8/31/2022 9/1/2022 9/1/2022 9/8/2022 9/8/2022   SYSTOLIC 161 - 134 - 110   DIASTOLIC 70 - 60 - 62   Pulse 74 - 80 - 70   Temp - - - - 97.9   Resp 20 - 19 - 12   SPO2 97 - - - 98   Weight (lb) - - 133 - 134   Weight (kg) - - 60.328 - 60.782   Height - - - - 60   BMI (Calculated) - - 26 - 26.2   VISIT REPORT - - - - -   Pain Score  - 5 - 4 -   Some recent data might be hidden       Physical Exam  Vitals and nursing note reviewed.   Constitutional:       General: She is not in acute distress.     Appearance: Normal appearance. She is  not ill-appearing.   HENT:      Head: Normocephalic and atraumatic.   Eyes:      Extraocular Movements: Extraocular movements intact.      Conjunctiva/sclera: Conjunctivae normal.      Pupils: Pupils are equal, round, and reactive to light.   Neck:      Vascular: No carotid bruit.   Cardiovascular:      Rate and Rhythm: Normal rate.      Pulses: Normal pulses.      Heart sounds: Normal heart sounds.   Pulmonary:      Effort: Pulmonary effort is normal.      Breath sounds: Normal breath sounds.   Musculoskeletal:      Cervical back: No rigidity.      Right lower leg: No edema.      Left lower leg: No edema.   Lymphadenopathy:      Cervical: No cervical adenopathy.   Skin:     Findings: Bruising (right lateral frontal scalp and temple and spreading down lateral cheek-large hematoma under lateral aspect of right infraorbital space) present.   Neurological:      General: No focal deficit present.      Mental Status: She is alert and oriented to person, place, and time.   Psychiatric:         Mood and Affect: Mood normal.         Behavior: Behavior normal.         Thought Content: Thought content normal.         Assessment:       1. Sciatica, right side    2. Right leg pain         Plan:       Sciatica, right side    Right leg pain    No follow-ups on file.        9/8/2022 Elena Sullivan M.D.

## 2022-09-08 ENCOUNTER — OFFICE VISIT (OUTPATIENT)
Dept: FAMILY MEDICINE | Facility: CLINIC | Age: 87
End: 2022-09-08
Payer: MEDICARE

## 2022-09-08 VITALS
HEART RATE: 70 BPM | WEIGHT: 134 LBS | DIASTOLIC BLOOD PRESSURE: 62 MMHG | BODY MASS INDEX: 26.31 KG/M2 | SYSTOLIC BLOOD PRESSURE: 110 MMHG | OXYGEN SATURATION: 98 % | RESPIRATION RATE: 12 BRPM | HEIGHT: 60 IN | TEMPERATURE: 98 F

## 2022-09-08 DIAGNOSIS — S01.81XD FACIAL LACERATION, SUBSEQUENT ENCOUNTER: Primary | ICD-10-CM

## 2022-09-08 DIAGNOSIS — S80.01XD CONTUSION OF RIGHT KNEE, SUBSEQUENT ENCOUNTER: ICD-10-CM

## 2022-09-08 DIAGNOSIS — M54.31 SCIATICA, RIGHT SIDE: ICD-10-CM

## 2022-09-08 DIAGNOSIS — S00.83XD CONTUSION OF FACE, SUBSEQUENT ENCOUNTER: ICD-10-CM

## 2022-09-08 DIAGNOSIS — M79.604 RIGHT LEG PAIN: ICD-10-CM

## 2022-09-08 DIAGNOSIS — S80.02XD CONTUSION OF LEFT KNEE, SUBSEQUENT ENCOUNTER: ICD-10-CM

## 2022-09-08 PROCEDURE — 99214 PR OFFICE/OUTPT VISIT, EST, LEVL IV, 30-39 MIN: ICD-10-PCS | Mod: S$PBB,AQ,, | Performed by: INTERNAL MEDICINE

## 2022-09-08 PROCEDURE — 99215 OFFICE O/P EST HI 40 MIN: CPT | Performed by: INTERNAL MEDICINE

## 2022-09-08 PROCEDURE — 99214 OFFICE O/P EST MOD 30 MIN: CPT | Mod: S$PBB,AQ,, | Performed by: INTERNAL MEDICINE

## 2022-09-08 RX ORDER — TRAMADOL HYDROCHLORIDE 50 MG/1
50 TABLET ORAL 2 TIMES DAILY PRN
Qty: 14 TABLET | Refills: 0 | Status: SHIPPED | OUTPATIENT
Start: 2022-09-08 | End: 2022-12-07 | Stop reason: SDUPTHER

## 2022-09-08 RX ORDER — AMITRIPTYLINE HYDROCHLORIDE 25 MG/1
TABLET, FILM COATED ORAL
Qty: 30 TABLET | Refills: 1 | Status: SHIPPED | OUTPATIENT
Start: 2022-09-08 | End: 2022-09-12

## 2022-09-08 RX ORDER — DICLOFENAC SODIUM 10 MG/G
2 GEL TOPICAL DAILY
Qty: 100 G | Refills: 1 | Status: SHIPPED | OUTPATIENT
Start: 2022-09-08 | End: 2022-09-15

## 2022-09-15 ENCOUNTER — OFFICE VISIT (OUTPATIENT)
Dept: NEUROLOGY | Facility: CLINIC | Age: 87
End: 2022-09-15
Payer: MEDICARE

## 2022-09-15 ENCOUNTER — TELEPHONE (OUTPATIENT)
Dept: NEUROLOGY | Facility: CLINIC | Age: 87
End: 2022-09-15

## 2022-09-15 ENCOUNTER — LAB VISIT (OUTPATIENT)
Dept: LAB | Facility: HOSPITAL | Age: 87
End: 2022-09-15
Attending: NURSE PRACTITIONER
Payer: MEDICARE

## 2022-09-15 VITALS
HEIGHT: 60 IN | RESPIRATION RATE: 16 BRPM | DIASTOLIC BLOOD PRESSURE: 62 MMHG | HEART RATE: 73 BPM | SYSTOLIC BLOOD PRESSURE: 140 MMHG | WEIGHT: 135.38 LBS | BODY MASS INDEX: 26.58 KG/M2

## 2022-09-15 DIAGNOSIS — E03.9 ACQUIRED HYPOTHYROIDISM: ICD-10-CM

## 2022-09-15 DIAGNOSIS — I10 ESSENTIAL HYPERTENSION: ICD-10-CM

## 2022-09-15 DIAGNOSIS — I48.0 PAF (PAROXYSMAL ATRIAL FIBRILLATION): Chronic | ICD-10-CM

## 2022-09-15 DIAGNOSIS — G60.3 IDIOPATHIC PROGRESSIVE NEUROPATHY: ICD-10-CM

## 2022-09-15 DIAGNOSIS — R55 SYNCOPE AND COLLAPSE: ICD-10-CM

## 2022-09-15 DIAGNOSIS — R42 DIZZINESS: ICD-10-CM

## 2022-09-15 DIAGNOSIS — G62.9 PERIPHERAL POLYNEUROPATHY: ICD-10-CM

## 2022-09-15 DIAGNOSIS — G60.9 HEREDITARY AND IDIOPATHIC NEUROPATHY, UNSPECIFIED: ICD-10-CM

## 2022-09-15 DIAGNOSIS — E11.9 DIABETES MELLITUS TYPE 2, DIET-CONTROLLED: ICD-10-CM

## 2022-09-15 DIAGNOSIS — R55 NEAR SYNCOPE: ICD-10-CM

## 2022-09-15 DIAGNOSIS — R26.89 IMBALANCE: ICD-10-CM

## 2022-09-15 DIAGNOSIS — R42 DIZZINESS: Primary | ICD-10-CM

## 2022-09-15 DIAGNOSIS — G62.9 PERIPHERAL POLYNEUROPATHY: Primary | ICD-10-CM

## 2022-09-15 DIAGNOSIS — R29.6 FALLS FREQUENTLY: ICD-10-CM

## 2022-09-15 DIAGNOSIS — Z79.899 POLYPHARMACY: ICD-10-CM

## 2022-09-15 DIAGNOSIS — I67.81 ACUTE CEREBROVASCULAR INSUFFICIENCY: ICD-10-CM

## 2022-09-15 PROBLEM — I20.0 UNSTABLE ANGINA: Status: RESOLVED | Noted: 2017-12-08 | Resolved: 2022-09-15

## 2022-09-15 PROBLEM — I16.0 HYPERTENSIVE URGENCY: Status: RESOLVED | Noted: 2020-05-02 | Resolved: 2022-09-15

## 2022-09-15 PROBLEM — J00 ACUTE NASOPHARYNGITIS: Status: RESOLVED | Noted: 2018-12-27 | Resolved: 2022-09-15

## 2022-09-15 LAB
CRP SERPL-MCNC: 0.74 MG/DL
ERYTHROCYTE [SEDIMENTATION RATE] IN BLOOD BY WESTERGREN METHOD: 38 MM/HR (ref 0–20)
VIT B12 SERPL-MCNC: 1092 PG/ML (ref 210–950)

## 2022-09-15 PROCEDURE — 99999 PR PBB SHADOW E&M-EST. PATIENT-LVL IV: ICD-10-PCS | Mod: PBBFAC,,, | Performed by: NURSE PRACTITIONER

## 2022-09-15 PROCEDURE — 82784 ASSAY IGA/IGD/IGG/IGM EACH: CPT | Mod: 91 | Performed by: NURSE PRACTITIONER

## 2022-09-15 PROCEDURE — 85651 RBC SED RATE NONAUTOMATED: CPT | Performed by: NURSE PRACTITIONER

## 2022-09-15 PROCEDURE — 84165 PROTEIN E-PHORESIS SERUM: CPT | Performed by: NURSE PRACTITIONER

## 2022-09-15 PROCEDURE — 99999 PR PBB SHADOW E&M-EST. PATIENT-LVL IV: CPT | Mod: PBBFAC,,, | Performed by: NURSE PRACTITIONER

## 2022-09-15 PROCEDURE — 99215 OFFICE O/P EST HI 40 MIN: CPT | Mod: S$PBB,,, | Performed by: NURSE PRACTITIONER

## 2022-09-15 PROCEDURE — 99214 OFFICE O/P EST MOD 30 MIN: CPT | Mod: PBBFAC,PO | Performed by: NURSE PRACTITIONER

## 2022-09-15 PROCEDURE — 86038 ANTINUCLEAR ANTIBODIES: CPT | Performed by: NURSE PRACTITIONER

## 2022-09-15 PROCEDURE — 84207 ASSAY OF VITAMIN B-6: CPT | Performed by: NURSE PRACTITIONER

## 2022-09-15 PROCEDURE — 86140 C-REACTIVE PROTEIN: CPT | Performed by: NURSE PRACTITIONER

## 2022-09-15 PROCEDURE — 83921 ORGANIC ACID SINGLE QUANT: CPT | Performed by: NURSE PRACTITIONER

## 2022-09-15 PROCEDURE — 84425 ASSAY OF VITAMIN B-1: CPT | Performed by: NURSE PRACTITIONER

## 2022-09-15 PROCEDURE — 82607 VITAMIN B-12: CPT | Performed by: NURSE PRACTITIONER

## 2022-09-15 PROCEDURE — 99215 PR OFFICE/OUTPT VISIT, EST, LEVL V, 40-54 MIN: ICD-10-PCS | Mod: S$PBB,,, | Performed by: NURSE PRACTITIONER

## 2022-09-15 NOTE — TELEPHONE ENCOUNTER
----- Message from Lashell Tolliver sent at 9/15/2022 12:48 PM CDT -----  Regarding: incorrect US  Test Ordered:    CV Ultrasound Bilateral Doppler Carotid    Status: not able to schedule, incorrect order.     I see that this PT was ordered a CV Ultrasound Bilateral Doppler Carotid, however [facility] uses a different order panel than [facility]. [facility] needs it ordered as US CAROTID BILATERAL [id number].       Thank you

## 2022-09-15 NOTE — ASSESSMENT & PLAN NOTE
Seems to have been isolated event in July associated with hypotension  Dizziness described sounds most like orthostatic dizziness, but orthostatic VS negative today. Nonetheless, she is at risk for this given the neuropathy and also the polypharmacy  Will check CUS to evaluate for vascular contribution -- cannot obtain CTA due to renal function, cannot obtain MRA due to PPM

## 2022-09-15 NOTE — PROGRESS NOTES
NEUROLOGY  Outpatient Consultation Visit     [facility]  [address] [facility] [address], [place] [address]  [phone] (office) / [phone] (fax)    Patient Name:  Karma Cehn  :  1935  MR #:  [id number]  Acct #:  [id number]    Date of  Visit: 09/15/2022    Other Physicians:  Elena Sullivan MD (Primary Care Physician)      CHIEF COMPLAINT: Fall (Referred per pcp)      HISTORY OF PRESENT ILLNESS:  Karma Chen is a 86 y.o. R-handed female seen in consultation for frequent falls and imbalance per Elena Sullivan MD    Medical history is significant for CAD s/p CABG, DM2, fibromyalgia, GERD, HTN, HLD, s/p PPM implantation, pAfib, lumbar DDD, CKD4, thyroid disease, PVD    Here today with her .     She has had several recent falls. She fell 3 times in the course of 1 month. Most recent fall involved a laceration to the head that required stitches. She had gotten OOB during the night and fell trying to get into the bathroom (walker doesn't fit through doorway). Another time, she fell after coming in from walking her dog, when the leash caught her foot.     She has dizziness that is described as a feeling in the back of her head telling her not to move. Not a spinning sensation. Not a headache. Not like she is going to pass out. She notes it when she is laying in bed and goes to sit up. She doesn't have much dizziness going from sit to stand. It lasts for less than a minute. She takes her time and it won't happen.      questions if it is medication related. She can't relate the dizziness to her recent falls, didn't feel dizzy when she fell.     Takes Xanax HS nightly, states that this is for pain. Also takes amitriptyline 25 mg about 2 hours before bed. Not sure what this is for, has been on it for about 1 month. Then, will take a muscle relaxant right at bedtime.     Benadryl on med list, but only PRN for allergies, not for sleep.     Recently had dose of losartan decreased per  cardiology.     Established with pain management. She is prescribed Tramadol for pain and uses it as needed. Can't say how often. Recently had 2 rhizotomies, which has helped radicular and LBP a lot.     Since last fall, has noted that she has to rest due to feeling tired during the day.     She does have numbness in the bottoms of her feet. Denies weakness in her legs or feet. The numbness is not a painful sensation, just numb.     She is currently doing PT to help with her balance.     Allergies:  Review of patient's allergies indicates:   Allergen Reactions    Arthrotec 50 [diclofenac-misoprostol]     Doxycycline     Effexor [venlafaxine]     Estradiol     Flagyl [metronidazole]     Fluconazole     Gabapentin     Iodine     Levaquin [levofloxacin]     Nexium [esomeprazole magnesium]     Penicillins     Red yeast rice (monascus purpureus)     Shellfish containing products     Statins-hmg-coa reductase inhibitors     Sulfa (sulfonamide antibiotics)     Tea tree oil     Tegaserod      ZOLNORM    Tegaserod hydrogen maleate     Trazodone     Yeast, dried     Adhesive Rash     Band-aids       Current Medications:  Current Outpatient Medications   Medication Sig Dispense Refill    acetaminophen (TYLENOL) 500 MG tablet Take 500 mg by mouth every 6 (six) hours as needed for Pain.      ALPRAZolam (XANAX) 0.25 MG tablet TAKE 1 TABLET BY MOUTH AS NEEDED FOR ANXIETY. 90 tablet 0    amitriptyline (ELAVIL) 25 MG tablet TAKE 1 TABLET BY MOUTH EVERY DAY 30 tablet 1    apixaban (ELIQUIS) 2.5 mg Tab Take 1 tablet (2.5 mg total) by mouth 2 (two) times daily. 180 tablet 1    aspirin (ECOTRIN) 81 MG EC tablet Take 1 tablet (81 mg total) by mouth once daily. (Patient taking differently: Take 81 mg by mouth every evening.)  0    cholecalciferol, vitamin D3, 100 mcg (4,000 unit) Cap Take 1 capsule by mouth 2 (two) times daily.       coenzyme Q10 100 mg capsule Take 1 capsule (100 mg total) by mouth 2 (two) times daily. 60 capsule 11     ECHINACEA ORAL Take 750 mg by mouth 2 (two) times a day.      ISOSORBIDE MONONITRATE ORAL Take 120 mg by mouth.      levothyroxine (SYNTHROID) 88 MCG tablet Take 1 tablet (88 mcg total) by mouth once daily. 90 tablet 1    loratadine (CLARITIN) 10 mg tablet Take 10 mg by mouth once daily.      losartan (COZAAR) 25 MG tablet Take 0.5 tablets (12.5 mg total) by mouth every evening. 45 tablet 3    MAGNESIUM ORAL Take 500 mg by mouth once daily.       metoprolol succinate (TOPROL-XL) 25 MG 24 hr tablet Take 1 tablet (25 mg total) by mouth once daily. 90 tablet 3    montelukast (SINGULAIR) 10 mg tablet Take 1 tablet (10 mg total) by mouth every evening. 90 tablet 1    multivitamin (THERAGRAN) tablet Take 1 tablet by mouth 2 (two) times a day.       tiZANidine (ZANAFLEX) 4 MG tablet TAKE 1 TABLET BY MOUTH ONCE DAILY. 90 tablet 0    traMADoL (ULTRAM) 50 mg tablet Take 1 tablet (50 mg total) by mouth 2 (two) times daily as needed (pain). 14 tablet 0    triamcinolone acetonide 0.1% (KENALOG) 0.1 % cream aaa bid x 1-2 wks, tk 1 wk off prn rash 30 g 2    turmeric 400 mg Cap Take 1,200 mg by mouth once daily.      UNABLE TO FIND Take 1 capsule by mouth 2 (two) times a day. Cardio platinum      UNABLE TO FIND Nerve Jorge      zinc gluconate 50 mg tablet Take 50 mg by mouth 2 (two) times a day.      diphenhydrAMINE (BENYLIN) 12.5 mg/5 mL liquid Take 12.5 mg by mouth 4 (four) times daily as needed for Allergies.      UNABLE TO FIND Take 1 capsule by mouth. Bio-Smoothe- premium nerve      UNABLE TO FIND Take 1 capsule by mouth once daily. medication name: GI probiotic      UNABLE TO FIND Take 1 capsule by mouth once daily. [facility]       Current Facility-Administered Medications   Medication Dose Route Frequency Provider Last Rate Last Admin    evolocumab PnIj 140 mg  140 mg Subcutaneous Q14 Days Kenton Ware MD           Past Medical History:  Past Medical History:   Diagnosis Date    Coronary artery disease     Dermatitis      Dermatitis 12/8/2017    Diabetes mellitus     Diabetes mellitus type II     Leah Barr virus infection     Fibromyalgia     GERD (gastroesophageal reflux disease)     Hypertension     MI (myocardial infarction) 09/23/2011    Pure hypercholesterolemia 2/26/2020       Past Surgical History:  Past Surgical History:   Procedure Laterality Date    adenoids      ANGIOPLASTY  1987    APPENDECTOMY      CARDIAC PACEMAKER PLACEMENT  01/12/2017    CATARACT EXTRACTION, BILATERAL Bilateral 9/2/15, 3/17/15    right eye-9/2/15, left eye-3/17/15    CHOLECYSTECTOMY  1987    CORONARY ARTERY BYPASS GRAFT  2006    quadruple    coronary stents  1999    x2    CYST REMOVAL  04/25/2017    on back    HYSTERECTOMY  1966    OVARY SURGERY  1948    Ovary burst & was repaired    RHIZOTOMY  09/14/2018    TONSILLECTOMY  1940       Family History:  family history includes No Known Problems in her father and mother.    Social History:   reports that she has never smoked. She has never used smokeless tobacco. She reports that she does not drink alcohol and does not use drugs.      REVIEW OF SYSTEMS:  As per HPI    PHYSICAL EXAM:  BP (!) 140/62 (BP Location: Left arm, Patient Position: Sitting, BP Method: Medium (Automatic))   Pulse 73   Resp 16   Ht 5' (1.524 m)   Wt 61.4 kg (135 lb 5.8 oz)   LMP  (LMP Unknown)   BMI 26.44 kg/m²     Orthostatics:  129/68 80  140/62 73  130/65 75    General: Well groomed. No acute distress.  Pulmonary: Normal effort and rate.   Musculoskeletal: No obvious joint deformities, moves all extremities well.  Extremities: No clubbing, cyanosis or edema.   HEENT: R lateral forehead steri strips in place, R periorbital ecchymosis    Neurological exam:  Mental status: Awake and alert.  Oriented to person, place, time and situation.   Speech/Language: Fluent and appropriate. No dysarthria or aphasia on conversation. Able to follow complex commands.   Cranial nerves (II-XII): Visual fields full. Pupils equal round and  reactive to light, extraocular movements intact, no ptosis, no nystagmus. Facial sensation and symmetry intact bilaterally. Hearing grossly intact. Palate deferred. Shoulder shrug normal bilaterally. Normal tongue protrusion.   Motor: 5 out of 5 strength throughout the upper and lower extremities bilaterally. Normal bulk and tone. No drift.   Sensation: Decreased vibration to the knees bilaterally. Abnormal proprioception.   DTR: 2+ at the knees and biceps bilaterally.  Coordination: Finger-nose-finger testing intact bilaterally. No tremor. Romberg absent.   Gait: mechanical features (knock kneed) but also with overall wide base. Mild shuffling, normal arm swing. Unable to tandem.     DIAGNOSTIC DATA:  I have personally reviewed provider notes, labs and imaging made available to me today.     Pt evaluated by cardiology in July for presyncope associated with hypotension.     Majority of other visits are related to c/o aches and pains.     Imaging:  [facility] 8/31/22:  IMPRESSION:  No acute intracranial hemorrhage identified.  Mild brain atrophy with very minimal periventricular white matter changes of microvascular ischemia.     MRI L spine 2013:  Impression  Mild lumbar spondylosis with disk desiccation, diffuse disk bulges, and mild facet arthropathy, resulting in mild neuroforaminal narrowing at L5-S1  bilaterally.     Cardiac:  Cardiac device check 8/2/22:  1. Normal device function.  2. No new events.  3. RTC: One year    EKG 7/2022:  Atrial-paced rhythm with prolonged AV conduction   Nonspecific intraventricular block     Labs:  CBC:   Lab Results   Component Value Date    WBC 7.41 05/10/2022    HGB 11.5 (L) 05/10/2022    HCT 37.2 05/10/2022     05/10/2022    MCV 89 05/10/2022    RDW 13.8 05/10/2022     BMP:   Lab Results   Component Value Date     05/20/2022    K 4.0 05/20/2022     05/20/2022    CO2 24 05/20/2022    BUN 28 (H) 05/20/2022    CREATININE 1.2 05/20/2022     (H) 05/20/2022     CALCIUM 9.3 05/20/2022    MG 2.3 10/22/2021    MG 2.3 10/22/2021    PHOS 3.6 05/20/2022     LFTS;   Lab Results   Component Value Date    PROT 7.2 05/10/2022    PROT 7.2 05/10/2022    ALBUMIN 4.0 05/20/2022    BILITOT 0.6 05/10/2022    BILITOT 0.6 05/10/2022    AST 24 05/10/2022    AST 24 05/10/2022    ALKPHOS 68 05/10/2022    ALKPHOS 68 05/10/2022    ALT 22 05/10/2022    ALT 22 05/10/2022     COAGS:   Lab Results   Component Value Date    INR 1.2 10/22/2021     FLP:   Lab Results   Component Value Date    CHOL 223 (H) 05/20/2022    HDL 56 05/20/2022    LDLCALC 145.2 05/20/2022    TRIG 109 05/20/2022    CHOLHDL 25.1 05/20/2022     Lab Results   Component Value Date    HGBA1C 5.5 05/20/2022     Lab Results   Component Value Date    TSH 1.280 05/10/2022       ASSESSMENT & PLAN:  Karma Chen is a 86 y.o. R-handed female seen in consultation for recurrent falls and imbalance.     Problem List Items Addressed This Visit          Neuro    Peripheral polyneuropathy - Primary    Current Assessment & Plan     DM2 very well controlled over last 2 years, highest A1C 6.4 -- could still cause peripheral neuropathy, but wouldn't expect this degree of neuropathy with her glycemic trends  She mainly has vibration and proprioceptive loss -- dorsal column pathology (B12 def, spinal cord pathology, etc). Check serologies for other potential etiologies, although neuropathy is most commonly idiopathic at her age  Discussed with her that there is no medication for non-painful numbness in her feet   Safety discussed at length - foot checks, well lit areas, etc.             Cardiac/Vascular    PAF (paroxysmal atrial fibrillation) (Chronic)    Essential hypertension, not well controlled       Endocrine    Diabetes mellitus type 2, diet-controlled    Acquired hypothyroidism       Palliative Care    Polypharmacy    Current Assessment & Plan     Recommend she d/c amitriptyline given risk for sedation, cognitive SE, interaction/compounding  effects with other Rx             Other    Near syncope    Current Assessment & Plan     Seems to have been isolated event in July associated with hypotension  Dizziness described sounds most like orthostatic dizziness, but orthostatic VS negative today. Nonetheless, she is at risk for this given the neuropathy and also the polypharmacy  Will check CUS to evaluate for vascular contribution -- cannot obtain CTA due to renal function, cannot obtain MRA due to PPM            Falls frequently    Current Assessment & Plan     Falls likely multifactorial -- mechanical features of gait, peripheral neuropathy, polypharmacy  Continue PT  Stressed that she should use her walker at all times          Other Visit Diagnoses       Imbalance        Idiopathic progressive neuropathy        Hereditary and idiopathic neuropathy, unspecified        Dizziness        Syncope and collapse                Follow up: ~ 4 months    I spent a total of 65 minutes on the day of the visit.    This includes face to face time with the patient, as well as non-face to face time preparing for and completing the visit (review of prior diagnostic testing and clinical notes, obtaining or reviewing history, documenting clinical information in the EMR, independently interpreting and communicating results to the patient/family and coordinating ongoing care).       I appreciate the opportunity to participate in the care of this patient. Please feel free to contact me with any concerns or questions.       Alis Hodge, ACNPC-AG  [facility]  [address] [facility] [address] [place] [id number]

## 2022-09-15 NOTE — PATIENT INSTRUCTIONS
Your blood pressure was stable today when we checked it with position changes. I do suspect, however, that some of the dizziness that you have when you sit up is related to changes in your blood pressure with sudden movements. Take your time when you are getting up.     Another issue is that you have significant neuropathy, which is also causing you to have imbalance. Do not barefoot. Do not walk in the dark without a nightlight. Use your walker at all times. Continue therapy to help with balance. We will check some labs to evaluate for any possible causes of neuropathy, but it is often idiopathic, meaning that we do not know what causes it.     Another issue is that you are on a lot of different medications with significant side effects, including Xanax, Tramadol, Elavil, muscle relaxants and benadryl.     First, I recommend that you stop taking Elavil. This has significant cognitive side effects at your age and also causes significant sedation, particularly when combined with pain medication and Xanax.     We will get a scan of the blood vessel in your neck to ensure no blockages there that could account for dizziness.

## 2022-09-15 NOTE — ASSESSMENT & PLAN NOTE
Falls likely multifactorial -- mechanical features of gait, peripheral neuropathy, polypharmacy  Continue PT  Stressed that she should use her walker at all times

## 2022-09-15 NOTE — ASSESSMENT & PLAN NOTE
Recommend she d/c amitriptyline given risk for sedation, cognitive SE, interaction/compounding effects with other Rx

## 2022-09-15 NOTE — ASSESSMENT & PLAN NOTE
DM2 very well controlled over last 2 years, highest A1C 6.4 -- could still cause peripheral neuropathy, but wouldn't expect this degree of neuropathy with her glycemic trends  She mainly has vibration and proprioceptive loss -- dorsal column pathology (B12 def, spinal cord pathology, etc). Check serologies for other potential etiologies, although neuropathy is most commonly idiopathic at her age  Discussed with her that there is no medication for non-painful numbness in her feet   Safety discussed at length - foot checks, well lit areas, etc.

## 2022-09-16 LAB
ANA TITR SER IF: NEGATIVE {TITER}
IGA SERPL-MCNC: 305 MG/DL (ref 64–422)
IGG SERPL-MCNC: 739 MG/DL (ref 586–1602)
IGM SERPL-MCNC: 36 MG/DL (ref 26–217)
PROT PATTERN SERPL IFE-IMP: NORMAL

## 2022-09-17 LAB
ALBUMIN SERPL ELPH-MCNC: 3.2 G/DL (ref 2.9–4.4)
ALBUMIN/GLOB SERPL: 1 {RATIO} (ref 0.7–1.7)
ALPHA1 GLOB SERPL ELPH-MCNC: 0.2 G/DL (ref 0–0.4)
ALPHA2 GLOB SERPL ELPH-MCNC: 0.9 G/DL (ref 0.4–1)
B-GLOBULIN SERPL ELPH-MCNC: 1.2 G/DL (ref 0.7–1.3)
GAMMA GLOB SERPL ELPH-MCNC: 0.7 G/DL (ref 0.4–1.8)
GLOBULIN SER CALC-MCNC: 3.1 G/DL (ref 2.2–3.9)
LABORATORY COMMENT REPORT: NORMAL
M PROTEIN SERPL ELPH-MCNC: NORMAL G/DL
PROT SERPL-MCNC: 6.3 G/DL (ref 6–8.5)

## 2022-09-18 ENCOUNTER — OFFICE VISIT (OUTPATIENT)
Dept: URGENT CARE | Facility: CLINIC | Age: 87
End: 2022-09-18
Payer: MEDICARE

## 2022-09-18 VITALS
DIASTOLIC BLOOD PRESSURE: 67 MMHG | HEIGHT: 64 IN | BODY MASS INDEX: 23.15 KG/M2 | HEART RATE: 86 BPM | RESPIRATION RATE: 18 BRPM | WEIGHT: 135.63 LBS | SYSTOLIC BLOOD PRESSURE: 148 MMHG | TEMPERATURE: 97 F | OXYGEN SATURATION: 98 %

## 2022-09-18 DIAGNOSIS — Z20.822 COVID-19 VIRUS NOT DETECTED: ICD-10-CM

## 2022-09-18 DIAGNOSIS — Z11.52 ENCOUNTER FOR SCREENING FOR COVID-19: Primary | ICD-10-CM

## 2022-09-18 LAB
CTP QC/QA: YES
SARS-COV-2 AG RESP QL IA.RAPID: NEGATIVE

## 2022-09-18 PROCEDURE — 87811 SARS-COV-2 COVID19 W/OPTIC: CPT | Mod: QW,CR,S$GLB, | Performed by: NURSE PRACTITIONER

## 2022-09-18 PROCEDURE — 87811 SARS CORONAVIRUS 2 ANTIGEN POCT, MANUAL READ: ICD-10-PCS | Mod: QW,CR,S$GLB, | Performed by: NURSE PRACTITIONER

## 2022-09-18 PROCEDURE — 99213 OFFICE O/P EST LOW 20 MIN: CPT | Mod: S$GLB,,, | Performed by: NURSE PRACTITIONER

## 2022-09-18 PROCEDURE — 99213 PR OFFICE/OUTPT VISIT, EST, LEVL III, 20-29 MIN: ICD-10-PCS | Mod: S$GLB,,, | Performed by: NURSE PRACTITIONER

## 2022-09-18 NOTE — PROGRESS NOTES
"Subjective:       Patient ID: Karma Chen is a 86 y.o. female.    Vitals:  height is 5' 4" (1.626 m) and weight is 61.5 kg (135 lb 9.6 oz). Her temperature is 97.1 °F (36.2 °C). Her blood pressure is 148/67 (abnormal) and her pulse is 86. Her respiration is 18 and oxygen saturation is 98%.     Chief Complaint: COVID-19 Concerns    Covid 19 Screening for doctors appointment tomorrow     Constitution: Negative.   HENT: Negative.     Neck: neck negative.   Cardiovascular: Negative.    Eyes: Negative.    Respiratory: Negative.     Gastrointestinal: Negative.    Endocrine: negative.   Genitourinary: Negative.    Musculoskeletal: Negative.    Skin: Negative.    Allergic/Immunologic: Negative.    Neurological: Negative.    Hematologic/Lymphatic: Negative.    Psychiatric/Behavioral: Negative.       Objective:      Physical Exam   Constitutional: She is oriented to person, place, and time. She appears well-developed. She is cooperative.  Non-toxic appearance. She does not appear ill. No distress.   HENT:   Head: Normocephalic and atraumatic.   Ears:   Right Ear: External ear normal.   Left Ear: External ear normal.   Nose: Nose normal.   Mouth/Throat: Oropharynx is clear and moist and mucous membranes are normal. Mucous membranes are moist. Oropharynx is clear.   Eyes: Conjunctivae and lids are normal. No scleral icterus.   Neck: Trachea normal and phonation normal. Neck supple.   Cardiovascular: Normal rate, regular rhythm, normal heart sounds and normal pulses.   Pulmonary/Chest: Effort normal and breath sounds normal.   Abdominal: Normal appearance.   Musculoskeletal:         General: No deformity.   Neurological: She is alert and oriented to person, place, and time. She has normal strength and normal reflexes. No sensory deficit.   Skin: Skin is warm, dry, intact and not diaphoretic. Capillary refill takes 2 to 3 seconds.   Psychiatric: Her speech is normal and behavior is normal. Judgment and thought content normal. "   Nursing note and vitals reviewed.      Assessment:       1. Encounter for screening for COVID-19    2. COVID-19 virus not detected          Plan:         Encounter for screening for COVID-19  -     SARS Coronavirus 2 Antigen, POCT Manual Read    COVID-19 virus not detected          I have discussed the test results and physical exam findings with the patient. We discussed the need to continue to monitor for symptoms and return to clinic or follow up for the development of any new symptoms. She verbalized understanding and agreement.

## 2022-09-19 LAB — VIT B6 SERPL-MCNC: 86 UG/L (ref 3.4–65.2)

## 2022-09-20 LAB — METHLYMALONIC ACID: 216 NMOL/L (ref 0–378)

## 2022-09-20 NOTE — PROGRESS NOTES
SUBJECTIVE:    Patient ID: Karam Chen is a 86 y.o. female.    Chief Complaint: Fall and Follow-up (2 weeks)    HPI    Recheck post fall-she has had no more falls-she says she went to see ENT due to positional vertigo-says the back of the head feels like it is getting bigger-she had her crystals checked and he performed a maneuver ( [place]) and she has been fine since-she denies blurred vision-mild headache yesterday am    She has had no more falls since that last one    Cardiac-no chest pain     Arthritis-no significant pain at this time-    Office Visit on 09/18/2022   Component Date Value Ref Range Status    SARS Coronavirus 2 Antigen 09/18/2022 Negative  Negative Final     Acceptable 09/18/2022 Yes   Final   Lab Visit on 09/15/2022   Component Date Value Ref Range Status    Immunofix Interp. 09/15/2022 Comment   Final    IgA 09/15/2022 305  64 - 422 mg/dL Final    IgG 09/15/2022 739  586 - 1602 mg/dL Final    IgM 09/15/2022 36  26 - 217 mg/dL Final    Total Protein 09/15/2022 6.3  6.0 - 8.5 g/dL Final    Albumin 09/15/2022 3.2  2.9 - 4.4 g/dL Final    Alpha-1 09/15/2022 0.2  0.0 - 0.4 g/dL Final    Alpha-2 09/15/2022 0.9  0.4 - 1.0 g/dL Final    Beta 09/15/2022 1.2  0.7 - 1.3 g/dL Final    Gamma 09/15/2022 0.7  0.4 - 1.8 g/dL Final    M-SPIKE, PROT ELECTRO 09/15/2022 Not Observed  Not Observed g/dL Final    Globulin, Total 09/15/2022 3.1  2.2 - 3.9 g/dL Final    A/G Ratio 09/15/2022 1.0  0.7 - 1.7 Final    Please Note: 09/15/2022 Comment   Final    Methlymalonic Acid 09/15/2022 216  0 - 378 nmol/L Final    Thiamine 09/15/2022 494.7 (H)  66.5 - 200.0 nmol/L Final    Vitamin B-12 09/15/2022 1092 (H)  210 - 950 pg/mL Final    Vitamin B6 09/15/2022 86.0 (H)  3.4 - 65.2 ug/L Final    SINGH 09/15/2022 Negative   Final    Sed Rate 09/15/2022 38 (H)  0 - 20 mm/Hr Final    CRP 09/15/2022 0.74  <0.76 mg/dL Final   Hospital Outpatient Visit on 08/02/2022   Component Date Value Ref Range Status     P/R-wave RA Lead 08/02/2022 3.3  mV Final    P/R-wave RA Lead (native) 08/02/2022 10.6  mV Final    Impedance RA Lead 08/02/2022 494  Ohms Final    Impedance RA Lead (native) 08/02/2022 570  Ohms Final    Threshold V RA Lead 08/02/2022 0.875  V Final    Theshold ms RA Lead 08/02/2022 0.4  ms Final    Threshold V RA Lead (native) 08/02/2022 0.5  V Final    Threshold ms RA Lead (native) 08/02/2022 0.4  ms Final   Lab Visit on 05/20/2022   Component Date Value Ref Range Status    Hemoglobin A1C 05/20/2022 5.5  4.5 - 6.2 % Final    Estimated Avg Glucose 05/20/2022 111  68 - 131 mg/dL Final    Microalbumin, Urine 05/20/2022 10.8  <19.9 ug/mL Final    Creatinine, Urine 05/20/2022 97.0  15.0 - 325.0 mg/dL Final    Microalb/Creat Ratio 05/20/2022 11.1  0.0 - 30.0 ug/mg Final    Cholesterol 05/20/2022 223 (H)  120 - 199 mg/dL Final    Triglycerides 05/20/2022 109  30 - 150 mg/dL Final    HDL 05/20/2022 56  40 - 75 mg/dL Final    LDL Cholesterol 05/20/2022 145.2  63.0 - 159.0 mg/dL Final    HDL/Cholesterol Ratio 05/20/2022 25.1  20.0 - 50.0 % Final    Total Cholesterol/HDL Ratio 05/20/2022 4.0  2.0 - 5.0 Final    Non-HDL Cholesterol 05/20/2022 167  mg/dL Final    Glucose 05/20/2022 135 (H)  70 - 110 mg/dL Final    Sodium 05/20/2022 139  136 - 145 mmol/L Final    Potassium 05/20/2022 4.0  3.5 - 5.1 mmol/L Final    Chloride 05/20/2022 103  95 - 110 mmol/L Final    CO2 05/20/2022 24  23 - 29 mmol/L Final    BUN 05/20/2022 28 (H)  8 - 23 mg/dL Final    Calcium 05/20/2022 9.3  8.7 - 10.5 mg/dL Final    Creatinine 05/20/2022 1.2  0.5 - 1.4 mg/dL Final    Albumin 05/20/2022 4.0  3.5 - 5.2 g/dL Final    Phosphorus 05/20/2022 3.6  2.7 - 4.5 mg/dL Final    eGFR if  05/20/2022 48.0 (A)  >60 mL/min/1.73 m^2 Final    eGFR if non African American 05/20/2022 41.6 (A)  >60 mL/min/1.73 m^2 Final    Anion Gap 05/20/2022 12  8 - 16 mmol/L Final    RBC, UA 05/20/2022 1  0 - 4 /hpf Final    WBC, UA 05/20/2022 0  0 - 5 /hpf Final     Bacteria 05/20/2022 Negative  None-Occ /hpf Final    Squam Epithel, UA 05/20/2022 1  /hpf Final    Hyaline Casts, UA 05/20/2022 2 (A)  0-1/lpf /lpf Final    Microscopic Comment 05/20/2022 SEE COMMENT   Final    Protein, Urine Random 05/20/2022 6  6 - 15 mg/dL Final    Creatinine, Urine 05/20/2022 97.0  15.0 - 325.0 mg/dL Final    Prot/Creat Ratio, Urine 05/20/2022 0.06  0.00 - 0.20 Final   Lab Visit on 05/10/2022   Component Date Value Ref Range Status    BNP 05/10/2022 490 (H)  0 - 99 pg/mL Final    TSH 05/10/2022 1.280  0.340 - 5.600 uIU/mL Final    WBC 05/10/2022 7.41  3.90 - 12.70 K/uL Final    RBC 05/10/2022 4.20  4.00 - 5.40 M/uL Final    Hemoglobin 05/10/2022 11.5 (L)  12.0 - 16.0 g/dL Final    Hematocrit 05/10/2022 37.2  37.0 - 48.5 % Final    MCV 05/10/2022 89  82 - 98 fL Final    MCH 05/10/2022 27.4  27.0 - 31.0 pg Final    MCHC 05/10/2022 30.9 (L)  32.0 - 36.0 g/dL Final    RDW 05/10/2022 13.8  11.5 - 14.5 % Final    Platelets 05/10/2022 249  150 - 450 K/uL Final    MPV 05/10/2022 10.0  9.2 - 12.9 fL Final    Immature Granulocytes 05/10/2022 0.3  0.0 - 0.5 % Final    Gran # (ANC) 05/10/2022 3.4  1.8 - 7.7 K/uL Final    Immature Grans (Abs) 05/10/2022 0.02  0.00 - 0.04 K/uL Final    Lymph # 05/10/2022 2.5  1.0 - 4.8 K/uL Final    Mono # 05/10/2022 1.1 (H)  0.3 - 1.0 K/uL Final    Eos # 05/10/2022 0.3  0.0 - 0.5 K/uL Final    Baso # 05/10/2022 0.08  0.00 - 0.20 K/uL Final    nRBC 05/10/2022 0  0 /100 WBC Final    Gran % 05/10/2022 46.0  38.0 - 73.0 % Final    Lymph % 05/10/2022 34.3  18.0 - 48.0 % Final    Mono % 05/10/2022 14.7  4.0 - 15.0 % Final    Eosinophil % 05/10/2022 3.6  0.0 - 8.0 % Final    Basophil % 05/10/2022 1.1  0.0 - 1.9 % Final    Differential Method 05/10/2022 Automated   Final    Sodium 05/10/2022 140  136 - 145 mmol/L Final    Potassium 05/10/2022 3.9  3.5 - 5.1 mmol/L Final    Chloride 05/10/2022 103  95 - 110 mmol/L Final    CO2 05/10/2022 27  23 - 29 mmol/L Final    Glucose  05/10/2022 93  70 - 110 mg/dL Final    BUN 05/10/2022 24 (H)  8 - 23 mg/dL Final    Creatinine 05/10/2022 1.4  0.5 - 1.4 mg/dL Final    Calcium 05/10/2022 9.0  8.7 - 10.5 mg/dL Final    Total Protein 05/10/2022 7.2  6.0 - 8.4 g/dL Final    Albumin 05/10/2022 3.9  3.5 - 5.2 g/dL Final    Total Bilirubin 05/10/2022 0.6  0.1 - 1.0 mg/dL Final    Alkaline Phosphatase 05/10/2022 68  55 - 135 U/L Final    AST 05/10/2022 24  10 - 40 U/L Final    ALT 05/10/2022 22  10 - 44 U/L Final    Anion Gap 05/10/2022 10  8 - 16 mmol/L Final    eGFR if  05/10/2022 39.8 (A)  >60 mL/min/1.73 m^2 Final    eGFR if non African American 05/10/2022 34.5 (A)  >60 mL/min/1.73 m^2 Final    Total Protein 05/10/2022 7.2  6.0 - 8.4 g/dL Final    Albumin 05/10/2022 3.9  3.5 - 5.2 g/dL Final    Total Bilirubin 05/10/2022 0.6  0.1 - 1.0 mg/dL Final    Bilirubin, Direct 05/10/2022 <0.1 (A)  0.1 - 0.3 mg/dL Final    AST 05/10/2022 24  10 - 40 U/L Final    ALT 05/10/2022 22  10 - 44 U/L Final    Alkaline Phosphatase 05/10/2022 68  55 - 135 U/L Final    Glucose 05/10/2022 93  70 - 110 mg/dL Final    Sodium 05/10/2022 140  136 - 145 mmol/L Final    Potassium 05/10/2022 3.9  3.5 - 5.1 mmol/L Final    Chloride 05/10/2022 103  95 - 110 mmol/L Final    CO2 05/10/2022 27  23 - 29 mmol/L Final    BUN 05/10/2022 24 (H)  8 - 23 mg/dL Final    Calcium 05/10/2022 9.0  8.7 - 10.5 mg/dL Final    Creatinine 05/10/2022 1.4  0.5 - 1.4 mg/dL Final    Albumin 05/10/2022 3.9  3.5 - 5.2 g/dL Final    Phosphorus 05/10/2022 3.7  2.7 - 4.5 mg/dL Final    eGFR if  05/10/2022 39.8 (A)  >60 mL/min/1.73 m^2 Final    eGFR if non African American 05/10/2022 34.5 (A)  >60 mL/min/1.73 m^2 Final    Anion Gap 05/10/2022 10  8 - 16 mmol/L Final   Office Visit on 01/25/2022   Component Date Value Ref Range Status    POC Blood, Urine 01/25/2022 Negative  Negative Final    POC Bilirubin, Urine 01/25/2022 Negative  Negative Final    POC Urobilinogen, Urine  01/25/2022 norm  0.1 - 1.1 Final    POC Ketones, Urine 01/25/2022 Negative  Negative Final    POC Protein, Urine 01/25/2022 Negative  Negative Final    POC Nitrates, Urine 01/25/2022 Negative  Negative Final    POC Glucose, Urine 01/25/2022 Negative  Negative Final    pH, UA 01/25/2022 6.0   Final    POC Specific Gravity, Urine 01/25/2022 1.005  1.003 - 1.029 Final    POC Leukocytes, Urine 01/25/2022 Negative  Negative Final    POC Rapid COVID 01/25/2022 Negative  Negative Final     Acceptable 01/25/2022 Yes   Final   Hospital Outpatient Visit on 01/18/2022   Component Date Value Ref Range Status    Battery Voltage (V) 01/18/2022 3.00  V Final    P/R-wave RA Lead 01/18/2022 1.5  mV Final    P/R-wave RA Lead (native) 01/18/2022 10.9  mV Final    Impedance RA Lead 01/18/2022 475  Ohms Final    Impedance RA Lead (native) 01/18/2022 532  Ohms Final    Threshold V RA Lead 01/18/2022 0.750  V Final    Theshold ms RA Lead 01/18/2022 0.40  ms Final    Threshold V RA Lead (native) 01/18/2022 0.500  V Final    Threshold ms RA Lead (native) 01/18/2022 0.40  ms Final   Office Visit on 01/10/2022   Component Date Value Ref Range Status    Cologuard Result 01/14/2022 Negative  Negative Final   Lab Visit on 11/08/2021   Component Date Value Ref Range Status    Hemoglobin A1C 11/08/2021 5.9  4.5 - 6.2 % Final    Estimated Avg Glucose 11/08/2021 123  68 - 131 mg/dL Final    WBC 11/08/2021 7.35  3.90 - 12.70 K/uL Final    RBC 11/08/2021 4.30  4.00 - 5.40 M/uL Final    Hemoglobin 11/08/2021 12.0  12.0 - 16.0 g/dL Final    Hematocrit 11/08/2021 38.0  37.0 - 48.5 % Final    MCV 11/08/2021 88  82 - 98 fL Final    MCH 11/08/2021 27.9  27.0 - 31.0 pg Final    MCHC 11/08/2021 31.6 (L)  32.0 - 36.0 g/dL Final    RDW 11/08/2021 13.2  11.5 - 14.5 % Final    Platelets 11/08/2021 281  150 - 450 K/uL Final    MPV 11/08/2021 10.2  9.2 - 12.9 fL Final    Immature Granulocytes 11/08/2021 0.3  0.0 - 0.5 % Final    Gran # (ANC)  11/08/2021 3.3  1.8 - 7.7 K/uL Final    Immature Grans (Abs) 11/08/2021 0.02  0.00 - 0.04 K/uL Final    Lymph # 11/08/2021 3.0  1.0 - 4.8 K/uL Final    Mono # 11/08/2021 0.8  0.3 - 1.0 K/uL Final    Eos # 11/08/2021 0.2  0.0 - 0.5 K/uL Final    Baso # 11/08/2021 0.06  0.00 - 0.20 K/uL Final    nRBC 11/08/2021 0  0 /100 WBC Final    Gran % 11/08/2021 44.9  38.0 - 73.0 % Final    Lymph % 11/08/2021 40.4  18.0 - 48.0 % Final    Mono % 11/08/2021 10.5  4.0 - 15.0 % Final    Eosinophil % 11/08/2021 3.1  0.0 - 8.0 % Final    Basophil % 11/08/2021 0.8  0.0 - 1.9 % Final    Differential Method 11/08/2021 Automated   Final    Cholesterol 11/08/2021 248 (H)  120 - 199 mg/dL Final    Triglycerides 11/08/2021 185 (H)  30 - 150 mg/dL Final    HDL 11/08/2021 41  40 - 75 mg/dL Final    LDL Cholesterol 11/08/2021 170.0 (H)  63.0 - 159.0 mg/dL Final    HDL/Cholesterol Ratio 11/08/2021 16.5 (L)  20.0 - 50.0 % Final    Total Cholesterol/HDL Ratio 11/08/2021 6.0 (H)  2.0 - 5.0 Final    Non-HDL Cholesterol 11/08/2021 207  mg/dL Final    Sodium 11/08/2021 144  136 - 145 mmol/L Final    Potassium 11/08/2021 4.0  3.5 - 5.1 mmol/L Final    Chloride 11/08/2021 106  95 - 110 mmol/L Final    CO2 11/08/2021 29  23 - 29 mmol/L Final    Glucose 11/08/2021 88  70 - 110 mg/dL Final    BUN 11/08/2021 25 (H)  8 - 23 mg/dL Final    Creatinine 11/08/2021 1.3  0.5 - 1.4 mg/dL Final    Calcium 11/08/2021 9.5  8.7 - 10.5 mg/dL Final    Anion Gap 11/08/2021 9  8 - 16 mmol/L Final    eGFR if  11/08/2021 43.8 (A)  >60 mL/min/1.73 m^2 Final    eGFR if non  11/08/2021 38.0 (A)  >60 mL/min/1.73 m^2 Final    Glucose 11/08/2021 88  70 - 110 mg/dL Final    Sodium 11/08/2021 144  136 - 145 mmol/L Final    Potassium 11/08/2021 4.0  3.5 - 5.1 mmol/L Final    Chloride 11/08/2021 106  95 - 110 mmol/L Final    CO2 11/08/2021 29  23 - 29 mmol/L Final    BUN 11/08/2021 25 (H)  8 - 23 mg/dL Final    Calcium 11/08/2021 9.5  8.7 - 10.5 mg/dL  Final    Creatinine 11/08/2021 1.3  0.5 - 1.4 mg/dL Final    Albumin 11/08/2021 3.8  3.5 - 5.2 g/dL Final    Phosphorus 11/08/2021 4.1  2.7 - 4.5 mg/dL Final    eGFR if  11/08/2021 43.8 (A)  >60 mL/min/1.73 m^2 Final    eGFR if non  11/08/2021 38.0 (A)  >60 mL/min/1.73 m^2 Final    Anion Gap 11/08/2021 9  8 - 16 mmol/L Final    Vit D, 25-Hydroxy 11/08/2021 >120 (H)  30 - 96 ng/mL Final    Specimen UA 11/08/2021 Urine, Clean Catch   Final    Color, UA 11/08/2021 Yellow  Yellow, Straw, Anila Final    Appearance, UA 11/08/2021 Clear  Clear Final    pH, UA 11/08/2021 7.0  5.0 - 8.0 Final    Specific Gravity, UA 11/08/2021 1.020  1.005 - 1.030 Final    Protein, UA 11/08/2021 Negative  Negative Final    Glucose, UA 11/08/2021 Negative  Negative Final    Ketones, UA 11/08/2021 Negative  Negative Final    Bilirubin (UA) 11/08/2021 Negative  Negative Final    Occult Blood UA 11/08/2021 Negative  Negative Final    Nitrite, UA 11/08/2021 Negative  Negative Final    Urobilinogen, UA 11/08/2021 Negative  Negative EU/dL Final    Leukocytes, UA 11/08/2021 1+ (A)  Negative Final    RBC, UA 11/08/2021 1  0 - 4 /hpf Final    WBC, UA 11/08/2021 3  0 - 5 /hpf Final    Bacteria 11/08/2021 Negative  None-Occ /hpf Final    Squam Epithel, UA 11/08/2021 4  /hpf Final    Hyaline Casts, UA 11/08/2021 4 (A)  0-1/lpf /lpf Final    Microscopic Comment 11/08/2021 SEE COMMENT   Final    PTH, Intact 11/08/2021 26.1  9.0 - 77.0 pg/mL Final    Protein, Urine Random 11/08/2021 7  6 - 15 mg/dL Final    Creatinine, Urine 11/08/2021 87.0  15.0 - 325.0 mg/dL Final    Prot/Creat Ratio, Urine 11/08/2021 0.08  0.00 - 0.20 Final   Admission on 10/22/2021, Discharged on 10/22/2021   Component Date Value Ref Range Status    Sodium 10/22/2021 139  136 - 145 mmol/L Final    Potassium 10/22/2021 4.0  3.5 - 5.1 mmol/L Final    Chloride 10/22/2021 104  95 - 110 mmol/L Final    CO2 10/22/2021 29  23 - 29 mmol/L Final    Glucose  10/22/2021 137 (H)  70 - 110 mg/dL Final    BUN 10/22/2021 23  8 - 23 mg/dL Final    Creatinine 10/22/2021 1.7 (H)  0.5 - 1.4 mg/dL Final    Calcium 10/22/2021 8.7  8.7 - 10.5 mg/dL Final    Total Protein 10/22/2021 7.4  6.0 - 8.4 g/dL Final    Albumin 10/22/2021 4.1  3.5 - 5.2 g/dL Final    Total Bilirubin 10/22/2021 0.8  0.1 - 1.0 mg/dL Final    Alkaline Phosphatase 10/22/2021 64  55 - 135 U/L Final    AST 10/22/2021 23  10 - 40 U/L Final    ALT 10/22/2021 20  10 - 44 U/L Final    Anion Gap 10/22/2021 6 (L)  8 - 16 mmol/L Final    eGFR if  10/22/2021 31.7 (A)  >60 mL/min/1.73 m^2 Final    eGFR if non African American 10/22/2021 27.5 (A)  >60 mL/min/1.73 m^2 Final    Magnesium 10/22/2021 2.3  1.6 - 2.6 mg/dL Final    WBC 10/22/2021 7.89  3.90 - 12.70 K/uL Final    RBC 10/22/2021 4.16  4.00 - 5.40 M/uL Final    Hemoglobin 10/22/2021 11.7 (L)  12.0 - 16.0 g/dL Final    Hematocrit 10/22/2021 37.0  37.0 - 48.5 % Final    MCV 10/22/2021 89  82 - 98 fL Final    MCH 10/22/2021 28.1  27.0 - 31.0 pg Final    MCHC 10/22/2021 31.6 (L)  32.0 - 36.0 g/dL Final    RDW 10/22/2021 13.0  11.5 - 14.5 % Final    Platelets 10/22/2021 269  150 - 450 K/uL Final    MPV 10/22/2021 9.6  9.2 - 12.9 fL Final    Immature Granulocytes 10/22/2021 0.4  0.0 - 0.5 % Final    Gran # (ANC) 10/22/2021 4.5  1.8 - 7.7 K/uL Final    Immature Grans (Abs) 10/22/2021 0.03  0.00 - 0.04 K/uL Final    Lymph # 10/22/2021 2.3  1.0 - 4.8 K/uL Final    Mono # 10/22/2021 0.9  0.3 - 1.0 K/uL Final    Eos # 10/22/2021 0.1  0.0 - 0.5 K/uL Final    Baso # 10/22/2021 0.04  0.00 - 0.20 K/uL Final    nRBC 10/22/2021 0  0 /100 WBC Final    Gran % 10/22/2021 57.0  38.0 - 73.0 % Final    Lymph % 10/22/2021 29.7  18.0 - 48.0 % Final    Mono % 10/22/2021 11.0  4.0 - 15.0 % Final    Eosinophil % 10/22/2021 1.4  0.0 - 8.0 % Final    Basophil % 10/22/2021 0.5  0.0 - 1.9 % Final    Differential Method 10/22/2021 Automated   Final    PT 10/22/2021 13.9 (H)  11.4 -  13.7 sec Final    INR 10/22/2021 1.2   Final    Troponin I 10/22/2021 <0.030  <=0.040 ng/mL Final    Specimen UA 10/22/2021 Urine, Catheterized   Final    Color, UA 10/22/2021 Colorless (A)  Yellow, Straw, Anila Final    Appearance, UA 10/22/2021 Clear  Clear Final    pH, UA 10/22/2021 8.0  5.0 - 8.0 Final    Specific Gravity, UA 10/22/2021 1.005  1.005 - 1.030 Final    Protein, UA 10/22/2021 Negative  Negative Final    Glucose, UA 10/22/2021 Negative  Negative Final    Ketones, UA 10/22/2021 Negative  Negative Final    Bilirubin (UA) 10/22/2021 Negative  Negative Final    Occult Blood UA 10/22/2021 Negative  Negative Final    Nitrite, UA 10/22/2021 Negative  Negative Final    Urobilinogen, UA 10/22/2021 Negative  Negative EU/dL Final    Leukocytes, UA 10/22/2021 Negative  Negative Final    Lithium Level 10/22/2021 0.0 (LL)  0.6 - 1.2 mmol/L Final    SARS-CoV-2 RNA, Amplification, Qual 10/22/2021 Negative  Negative Final    TSH 10/22/2021 1.760  0.340 - 5.600 uIU/mL Final    Magnesium 10/22/2021 2.3  1.6 - 2.6 mg/dL Final    CPK 10/22/2021 53  20 - 180 U/L Final   There may be more visits with results that are not included.       Past Medical History:   Diagnosis Date    Coronary artery disease     Dermatitis     Dermatitis 12/8/2017    Diabetes mellitus     Diabetes mellitus type II     Leah Barr virus infection     Fibromyalgia     GERD (gastroesophageal reflux disease)     Hypertension     MI (myocardial infarction) 09/23/2011    Pure hypercholesterolemia 2/26/2020     Past Surgical History:   Procedure Laterality Date    adenoids      ANGIOPLASTY  1987    APPENDECTOMY      CARDIAC PACEMAKER PLACEMENT  01/12/2017    CATARACT EXTRACTION, BILATERAL Bilateral 9/2/15, 3/17/15    right eye-9/2/15, left eye-3/17/15    CHOLECYSTECTOMY  1987    CORONARY ARTERY BYPASS GRAFT  2006    quadruple    coronary stents  1999    x2    CYST REMOVAL  04/25/2017    on back    HYSTERECTOMY  1966    OVARY SURGERY  1948    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  Tobacco History:  reports that she has never smoked. She has never used smokeless tobacco.  Drug History:  reports no history of drug use.    Review of patient's allergies indicates:   Allergen Reactions    Arthrotec 50 [diclofenac-misoprostol]     Doxycycline     Effexor [venlafaxine]     Estradiol     Flagyl [metronidazole]     Fluconazole     Gabapentin     Iodine     Levaquin [levofloxacin]     Nexium [esomeprazole magnesium]     Penicillins     Red yeast rice (monascus purpureus)     Shellfish containing products     Statins-hmg-coa reductase inhibitors     Sulfa (sulfonamide antibiotics)     Tea tree oil     Tegaserod      ZOLNORM    Tegaserod hydrogen maleate     Trazodone     Yeast, dried     Adhesive Rash     Band-aids       Current Outpatient Medications:     acetaminophen (TYLENOL) 500 MG tablet, Take 500 mg by mouth every 6 (six) hours as needed for Pain., Disp: , Rfl:     ALPRAZolam (XANAX) 0.25 MG tablet, TAKE 1 TABLET BY MOUTH AS NEEDED FOR ANXIETY., Disp: 90 tablet, Rfl: 0    amitriptyline (ELAVIL) 25 MG tablet, TAKE 1 TABLET BY MOUTH EVERY DAY, Disp: 30 tablet, Rfl: 1    apixaban (ELIQUIS) 2.5 mg Tab, Take 1 tablet (2.5 mg total) by mouth 2 (two) times daily., Disp: 180 tablet, Rfl: 1    aspirin (ECOTRIN) 81 MG EC tablet, Take 1 tablet (81 mg total) by mouth once daily. (Patient taking differently: Take 81 mg by mouth every evening.), Disp: , Rfl: 0    cholecalciferol, vitamin D3, 100 mcg (4,000 unit) Cap, Take 1 capsule by mouth 2 (two) times daily. , Disp: , Rfl:     coenzyme Q10 100 mg capsule, Take 1 capsule (100 mg total) by mouth 2 (two) times daily., Disp: 60 capsule, Rfl: 11    diphenhydrAMINE (BENYLIN) 12.5 mg/5 mL liquid, Take 12.5  mg by mouth 4 (four) times daily as needed for Allergies., Disp: , Rfl:     ECHINACEA ORAL, Take 750 mg by mouth 2 (two) times a day., Disp: , Rfl:     levothyroxine (SYNTHROID) 88 MCG tablet, Take 1 tablet (88 mcg total) by mouth once daily., Disp: 90 tablet, Rfl: 1    loratadine (CLARITIN) 10 mg tablet, Take 10 mg by mouth once daily., Disp: , Rfl:     losartan (COZAAR) 25 MG tablet, Take 0.5 tablets (12.5 mg total) by mouth every evening., Disp: 45 tablet, Rfl: 3    MAGNESIUM ORAL, Take 500 mg by mouth once daily. , Disp: , Rfl:     metoprolol succinate (TOPROL-XL) 25 MG 24 hr tablet, Take 1 tablet (25 mg total) by mouth once daily., Disp: 90 tablet, Rfl: 3    montelukast (SINGULAIR) 10 mg tablet, Take 1 tablet (10 mg total) by mouth every evening., Disp: 90 tablet, Rfl: 1    multivitamin (THERAGRAN) tablet, Take 1 tablet by mouth 2 (two) times a day. , Disp: , Rfl:     nitroGLYCERIN (NITROSTAT) 0.4 MG SL tablet, Place 0.4 mg under the tongue every 5 (five) minutes as needed for Chest pain., Disp: , Rfl:     tiZANidine (ZANAFLEX) 4 MG tablet, TAKE 1 TABLET BY MOUTH ONCE DAILY., Disp: 90 tablet, Rfl: 0    traMADoL (ULTRAM) 50 mg tablet, Take 1 tablet (50 mg total) by mouth 2 (two) times daily as needed (pain)., Disp: 14 tablet, Rfl: 0    UNABLE TO FIND, Take 1 capsule by mouth once daily. Joint health, Disp: , Rfl:     UNABLE TO FIND, Take 1 capsule by mouth 2 (two) times a day. Cardio platinum, Disp: , Rfl:     UNABLE TO FIND, Nerve [place], Disp: , Rfl:     zinc gluconate 50 mg tablet, Take 50 mg by mouth 2 (two) times a day., Disp: , Rfl:     isosorbide mononitrate (IMDUR) 120 MG 24 hr tablet, Take 120 mg by mouth once daily., Disp: , Rfl:     triamcinolone acetonide 0.1% (KENALOG) 0.1 % cream, aaa bid x 1-2 wks, tk 1 wk off prn rash (Patient not taking: Reported on 9/22/2022), Disp: 30 g, Rfl: 2    turmeric 400 mg Cap, Take 1,200 mg by mouth once daily., Disp: , Rfl:     UNABLE TO FIND, Take 1 capsule by mouth.  Bio-Smoothe- premium nerve, Disp: , Rfl:     UNABLE TO FIND, Take 1 capsule by mouth once daily. medication name: GI probiotic, Disp: , Rfl:     Current Facility-Administered Medications:     evolocumab PnIj 140 mg, 140 mg, Subcutaneous, Q14 Days, Kenton Ware MD    Review of Systems   Constitutional:  Negative for activity change, appetite change, chills, fatigue, fever and unexpected weight change.   HENT:  Negative for congestion, ear pain, hearing loss, postnasal drip, sinus pain, sneezing, sore throat, tinnitus and trouble swallowing.    Eyes:  Negative for pain, discharge and visual disturbance.   Respiratory:  Negative for cough, choking, shortness of breath and wheezing.    Cardiovascular:  Negative for chest pain, palpitations and leg swelling.   Gastrointestinal:  Negative for abdominal distention, abdominal pain, blood in stool, constipation, diarrhea, nausea and vomiting.   Endocrine: Negative for cold intolerance and heat intolerance.   Genitourinary:  Negative for difficulty urinating, dysuria, frequency, pelvic pain and urgency.   Musculoskeletal:  Negative for back pain, joint swelling and neck pain.   Skin:  Negative for pallor and rash.   Allergic/Immunologic: Negative for environmental allergies and food allergies.   Neurological:  Negative for dizziness, tremors, weakness, numbness and headaches.   Hematological:  Does not bruise/bleed easily.   Psychiatric/Behavioral:  Negative for agitation, confusion, dysphoric mood, sleep disturbance and suicidal ideas. The patient is not nervous/anxious.         Objective:      Vitals    Vitals - 1 value per visit 9/15/2022 9/15/2022 9/18/2022 9/22/2022 9/22/2022   SYSTOLIC 129 140 148 - 116   DIASTOLIC 68 62 67 - 60   Pulse 80 73 86 - 70   Temp - - 97.1 - 98   Resp - - 18 - 12   SPO2 - - 98 - 100   Weight (lb) - - 135.6 - 134   Weight (kg) - - 61.508 - 60.782   Height - - 64 - 64   BMI (Calculated) - - 23.3 - 23   VISIT REPORT - - - - -   Pain Score  -  - - 0 -   Some recent data might be hidden       Physical Exam  Constitutional:       General: She is not in acute distress.     Appearance: Normal appearance. She is not ill-appearing.   HENT:      Head: Normocephalic and atraumatic.   Eyes:      Extraocular Movements: Extraocular movements intact.      Pupils: Pupils are equal, round, and reactive to light.   Cardiovascular:      Rate and Rhythm: Normal rate and regular rhythm.      Pulses: Normal pulses.      Heart sounds: Normal heart sounds.   Pulmonary:      Effort: Pulmonary effort is normal.      Breath sounds: Normal breath sounds.   Abdominal:      General: Bowel sounds are normal.      Palpations: Abdomen is soft.   Musculoskeletal:      Cervical back: Normal range of motion and neck supple.      Right lower leg: No edema.      Left lower leg: No edema.   Skin:     General: Skin is warm and dry.      Comments: Fading contusions right face and laceration along upper lateral frontal scalp is healing well   Neurological:      General: No focal deficit present.      Mental Status: She is alert and oriented to person, place, and time.   Psychiatric:         Mood and Affect: Mood normal.         Thought Content: Thought content normal.         Assessment:       1. Contusion of face, subsequent encounter    2. Stable angina    3. Inflammation, joint, sacroiliac         Plan:       Contusion of face, subsequent encounter    Stable angina    Inflammation, joint, sacroiliac    No follow-ups on file.        9/22/2022 Elena Sullivan M.D.

## 2022-09-21 LAB — VIT B1 BLD-SCNC: 494.7 NMOL/L (ref 66.5–200)

## 2022-09-22 ENCOUNTER — OFFICE VISIT (OUTPATIENT)
Dept: FAMILY MEDICINE | Facility: CLINIC | Age: 87
End: 2022-09-22
Payer: MEDICARE

## 2022-09-22 VITALS
HEIGHT: 64 IN | WEIGHT: 134 LBS | DIASTOLIC BLOOD PRESSURE: 60 MMHG | RESPIRATION RATE: 12 BRPM | SYSTOLIC BLOOD PRESSURE: 116 MMHG | BODY MASS INDEX: 22.88 KG/M2 | OXYGEN SATURATION: 100 % | HEART RATE: 70 BPM | TEMPERATURE: 98 F

## 2022-09-22 DIAGNOSIS — I20.89 STABLE ANGINA: ICD-10-CM

## 2022-09-22 DIAGNOSIS — S00.83XD CONTUSION OF FACE, SUBSEQUENT ENCOUNTER: Primary | ICD-10-CM

## 2022-09-22 DIAGNOSIS — M46.1 INFLAMMATION, JOINT, SACROILIAC: ICD-10-CM

## 2022-09-22 PROCEDURE — 99213 PR OFFICE/OUTPT VISIT, EST, LEVL III, 20-29 MIN: ICD-10-PCS | Mod: S$PBB,AQ,, | Performed by: INTERNAL MEDICINE

## 2022-09-22 PROCEDURE — 99213 OFFICE O/P EST LOW 20 MIN: CPT | Mod: S$PBB,AQ,, | Performed by: INTERNAL MEDICINE

## 2022-09-22 PROCEDURE — 99214 OFFICE O/P EST MOD 30 MIN: CPT | Performed by: INTERNAL MEDICINE

## 2022-09-22 RX ORDER — ISOSORBIDE MONONITRATE 120 MG/1
120 TABLET, EXTENDED RELEASE ORAL DAILY
COMMUNITY
Start: 2022-09-21 | End: 2023-03-03

## 2022-09-22 RX ORDER — NITROGLYCERIN 0.4 MG/1
0.4 TABLET SUBLINGUAL EVERY 5 MIN PRN
COMMUNITY
End: 2024-01-03 | Stop reason: SDUPTHER

## 2022-09-23 ENCOUNTER — TELEPHONE (OUTPATIENT)
Dept: NEUROLOGY | Facility: CLINIC | Age: 87
End: 2022-09-23
Payer: COMMERCIAL

## 2022-09-23 NOTE — TELEPHONE ENCOUNTER
----- Message from Miguel Angel Huynh, Patient Care Assistant sent at 9/23/2022 11:27 AM CDT -----  Contact: Pt  Type: Test Results    Who CalPt   Name of Test:  (labs, xray etc..) labs  Date of Test: 09/15/2022  Ordering Provider: Naveen  Where the test performed: [facility]  Best Call Back Number: [phone]  Additional Info-Please Advise-Thank you~

## 2022-09-23 NOTE — TELEPHONE ENCOUNTER
Spoke with the pt, informed that Alis was out this week and will send a message for results of the blood work.  Pt v/u.  Call the pt back after 1200 on Monday.

## 2022-09-26 NOTE — PROGRESS NOTES
Your labs didn't show anything worrisome. Your B vitamin levels are elevated (thiamine, B6 and B12). This is likely from over supplementing. If you are taking supplements, you can decrease to every other day.      Pt wanted you to be aware of the results. Pt notified and verbalized understanding.

## 2022-10-12 ENCOUNTER — CLINICAL SUPPORT (OUTPATIENT)
Dept: REHABILITATION | Facility: HOSPITAL | Age: 87
End: 2022-10-12
Payer: MEDICARE

## 2022-10-12 DIAGNOSIS — R29.898 LEFT LEG WEAKNESS: ICD-10-CM

## 2022-10-12 DIAGNOSIS — R26.89 IMBALANCE: Primary | ICD-10-CM

## 2022-10-12 PROCEDURE — 97161 PT EVAL LOW COMPLEX 20 MIN: CPT | Mod: PN

## 2022-10-12 PROCEDURE — 97112 NEUROMUSCULAR REEDUCATION: CPT | Mod: PN

## 2022-10-12 NOTE — PLAN OF CARE
"[facility]  Physical Therapy Neurological Rehabilitation Initial Evaluation    Name: Karma Chen  Clinic Number: [id number]    Therapy Diagnosis:   Encounter Diagnoses   Name Primary?    Imbalance Yes    Left leg weakness      Physician: Elena Sullivan MD    Physician Orders: PT Eval and Treat   Medical Diagnosis from Referral: imbalance  Evaluation Date: 10/12/2022  Authorization Period Expiration: 12/31/2022  Plan of Care Expiration: 11/26/22  Visit # / Visits authorized: 1/ 1    Time In: 1115  Time Out: 1210  Total Billable Time: 55 minutes    Precautions: Diabetes, Fall, and pacemaker      Subjective     Date of onset: 08/30/22  History of Current Symptoms, Karma reports: three recent falls with injury over a 10-day time span - does not recall origin of her falls; injuries sustained include nasal and frontal contusions as well as increased left knee pain; patient endorses was having episodic bouts of vertigo around the same time as her falls - has since seen ENT who performed canalith repositioning; states has not had vertigo sensations over the past few weeks but still reports that her head feels "funny".     History of migraines: no     Medical History:   Past Medical History:   Diagnosis Date    Coronary artery disease     Dermatitis     Dermatitis 12/8/2017    Diabetes mellitus     Diabetes mellitus type II     Leah Barr virus infection     Fibromyalgia     GERD (gastroesophageal reflux disease)     Hypertension     MI (myocardial infarction) 09/23/2011    Pure hypercholesterolemia 2/26/2020     Surgical History:   Karma Chen  has a past surgical history that includes Coronary artery bypass graft (2006); Tonsillectomy (1940); adenoids; Appendectomy; Hysterectomy (1966); Cholecystectomy (1987); coronary stents (1999); Ovary surgery (1948); Angioplasty (1987); Cataract extraction, bilateral (Bilateral, 9/2/15, 3/17/15); Cardiac pacemaker placement (01/12/2017); Cyst " "Removal (04/25/2017); and Rhizotomy (09/14/2018).    Medications:   Karma has a current medication list which includes the following prescription(s): acetaminophen, alprazolam, amitriptyline, apixaban, aspirin, cholecalciferol (vitamin d3), coenzyme q10, diphenhydramine, echinacea, isosorbide mononitrate, levothyroxine, loratadine, losartan, magnesium, metoprolol succinate, montelukast, multivitamin, nitroglycerin, tizanidine, tramadol, triamcinolone acetonide 0.1%, turmeric, UNABLE TO FIND, UNABLE TO FIND, UNABLE TO FIND, UNABLE TO FIND, UNABLE TO FIND, and zinc gluconate, and the following Facility-Administered Medications: evolocumab.    Allergies:   Review of patient's allergies indicates:   Allergen Reactions    Arthrotec 50 [diclofenac-misoprostol]     Doxycycline     Effexor [venlafaxine]     Estradiol     Flagyl [metronidazole]     Fluconazole     Gabapentin     Iodine     Levaquin [levofloxacin]     Nexium [esomeprazole magnesium]     Penicillins     Red yeast rice (monascus purpureus)     Shellfish containing products     Statins-hmg-coa reductase inhibitors     Sulfa (sulfonamide antibiotics)     Tea tree oil     Tegaserod      ZOLNORM    Tegaserod hydrogen maleate     Trazodone     Yeast, dried     Adhesive Rash     Band-aids      Imaging (CT of Head on 08/31/22):     No acute intracranial hemorrhage identified.     Mild brain atrophy with very minimal periventricular white matter changes of microvascular ischemia.     Very minimal soft tissue thickening within the right frontal area perhaps related to site of injury.      Prior Therapy: for general strengthening program  Social History: lives with their spouse in 1-story home (no steps)  Falls: see "History of Current Symptoms"  DME: [employer] (intermittent use at home)   Occupation: retired  Prior Level of Function: supervision needed  Current Level of Function: minimal difficulty with activities of daily living     Pain:  Current 1/10, worst 6/10, best " "0/10   Location: left knee   Description: Aching and Dull  Aggravating Factors: Standing and Walking  Easing Factors: rest    Pts goals: improve stability during functional ambulation      Objective     - Follows commands: 100% of time   - Speech: no deficits     Functional Mobility & ADLs   Sit to stand: stand by assist     Mental status: alert, oriented to person, place, and time, normal mood, behavior, speech, dress, motor activity, and thought processes  Appearance: Casually dressed  Behavior:  calm and cooperative  Attention Span and Concentration:  Normal    Posture Alignment in sitting:   Head: forward head     Sensation: Light Touch: Impaired: 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          Proprioception: Intact, Kinesthesia Intact         Visual/Auditory: denies changes   Tracking/Smooth Pursuits:Impaired: reports "funny" feeling in head  Saccades: Impaired: reports "funny" feeling in head  VOR: Impaired: reports "funny" feeling in head    Coordination:   - fine motor: within functional limits   - UE coordination: within functional limits    - LE coordination:  within functional limits    ROM:   CERVICAL SPINE  Flexion 30 degrees (80-90 deg)  Extension 40 degrees (70-80 deg)  L side bend 15 degrees, R side bend 15 degrees (20-45 degrees)  L rotation 40 degrees, R rotation 40 degrees (70-90 degrees)  Are concurrent symptoms present with any of these movements = no    Modified VAS (Vertebral Artery Screen), in sitting (rotation, then extension):  R: NT  L: NT      RANGE OF MOTION--LOWER EXTREMITIES  (R) LE Hip: normal   Knee: normal   Ankle: normal    (L) LE: Hip: normal   Knee: normal   Ankle: normal    Strength: manual muscle test grades below     Lower Extremity Strength  Right LE  Left LE    Hip flexion:  4/5 Hip flexion: 4-/5   Knee extension: 4/5 Knee extension: 4-/5   Ankle dorsiflexion:  4/5 Ankle dorsiflexion: 4/5       Gait Assessment:(if indicated)  - AD used: none  - Assistance: stand by assist   - " Distance: 120 feet     GAIT DEVIATIONS:  Karma displays the following deviations with ambulation: unequal step length, discontinuity between steps, mild path deviation     Impairments contributing to deviations: varus deformity left knee, left leg weakness, imbalance    Endurance Deficit: mild fatigue       POSITIONAL CANAL TESTING  Looking for nystagmus (slow phase followed by quick phase to the affected side for BPPV)    [place] Hallpike (posterior / CL anterior)   Right : n/a   Left: n/a  Horizontal Canals   Right: n/a   Leftn/a  Treatment Performed: n/a        CMS Impairment/Limitation/Restriction for FOTO  Balance Survey    Therapist reviewed FOTO scores for Karma Chen on 10/12/2022.   FOTO documents entered into [employer] - see Media section.    Limitation Score: 39%    Category: Mobility         TREATMENT     Treatment Time In: 1145  Treatment Time Out: 1210  Total Treatment time separate from Evaluation: 25 minutes    Karma participated in neuromuscular re-education activities to assess: Balance and Vestibular function for 25 minutes. The following activities were included:    X 5 each seated smooth pursuits = side to side, up and down  X 5 each seated saccades = side to side, up and down  X 5 each seated VOR1 = side to side, up and down  X 10 static standing holds  X 15 each standing balance therapeutic exercise = mini squats and heel raises/ toe raises   X 15 feet each balance gait = side stepping, backwards gait  X 1 minute stand on Air[employer]       Home Exercises and Patient Education Provided    Education provided:   - proper therapeutic exercise technique  - treatment plan     Written Home Exercises Provided:  to be provided at future appointment .      Assessment     Karma is a 86 y.o. female referred to outpatient Physical Therapy with a medical diagnosis of imbalance. Pt presents to PT with the following impairments leading to her functional decline: left lower extremity weakness, imbalance.     Pt prognosis  is Fair.   Pt will benefit from skilled outpatient Physical Therapy to address the deficits stated above and in the chart below, provide pt/family education, and to maximize pt's level of independence.     Plan of care discussed with patient: Yes  Pt's spiritual, cultural and educational needs considered and patient is agreeable to the plan of care and goals as stated below:     Anticipated Barriers for therapy: severity of symptoms    Medical Necessity is demonstrated by the following  History  Co-morbidities and personal factors that may impact the plan of care Co-morbidities:   CAD, diabetes, HTN, and prior abdominal surgery    Personal Factors:   no deficits     high   Examination  Body Structures and Functions, activity limitations and participation restrictions that may impact the plan of care Body Regions:   head  lower extremities    Body Systems:    strength  balance  gait  transfers    Participation Restrictions:   none    Activity limitations:   Learning and applying knowledge  no deficits    General Tasks and Commands  no deficits    Communication  no deficits    Mobility  lifting and carrying objects  walking  driving (bike, car, motorcycle)    Self care  no deficits    Domestic Life  shopping  cooking  doing house work (cleaning house, washing dishes, laundry)    Interactions/Relationships  no deficits    Life Areas  no deficits    Community and Social Life  no deficits         high   Clinical Presentation stable and uncomplicated low   Decision Making/ Complexity Score: low     Goals:    Short Term Goals (3 Weeks):   Decrease patient's complaints of pain to 0/10 during performance of activities of daily living for independence of self care activities.  Patient to tolerate use of 2# weights during lower extremity therapeutic exercise to improve overall strength.  Patient to perform x 45 seconds full Romberg stance, eyes closed to improve upright tolerance.  Patient to tolerate x 10 repetitions sit to  "stand so that she may rise with single upper extremity support.    Long Term Goals (6 Weeks):   Patient to demonstrate competence with home exercise program to maintain therapeutic gains.  Patient to improve left knee MMT 1/2 grade to demo strength gains from therapeutic intervention.  Patient to ambulate 200 feet with stand by assist and improved dustin/symmetry.  Patient to demonstrate improvement in her Tinetti score from "high fall risk" to "moderate fall risk."      Plan     Plan of care Certification: 10/12/2022 to 11/26/2022.    Outpatient Physical Therapy evaluation, plus 2 times weekly for 6 weeks to include the following interventions (starting week of 10/17/22): Gait Training, Manual Therapy, Moist Heat/ Ice, Neuromuscular Re-ed, Patient Education, Self Care, Therapeutic Activities, Therapeutic Exercise, and HEP .     Eren Garcia, PT   "

## 2022-10-17 ENCOUNTER — OFFICE VISIT (OUTPATIENT)
Dept: FAMILY MEDICINE | Facility: CLINIC | Age: 87
End: 2022-10-17
Payer: MEDICARE

## 2022-10-17 VITALS
BODY MASS INDEX: 22.53 KG/M2 | HEART RATE: 70 BPM | RESPIRATION RATE: 14 BRPM | TEMPERATURE: 98 F | WEIGHT: 132 LBS | SYSTOLIC BLOOD PRESSURE: 126 MMHG | OXYGEN SATURATION: 98 % | HEIGHT: 64 IN | DIASTOLIC BLOOD PRESSURE: 60 MMHG

## 2022-10-17 DIAGNOSIS — L23.2 ALLERGIC CONTACT DERMATITIS DUE TO COSMETICS: Primary | ICD-10-CM

## 2022-10-17 PROCEDURE — 99213 PR OFFICE/OUTPT VISIT, EST, LEVL III, 20-29 MIN: ICD-10-PCS | Mod: S$PBB,AQ,, | Performed by: INTERNAL MEDICINE

## 2022-10-17 PROCEDURE — 99213 OFFICE O/P EST LOW 20 MIN: CPT | Mod: S$PBB,AQ,, | Performed by: INTERNAL MEDICINE

## 2022-10-17 PROCEDURE — 99214 OFFICE O/P EST MOD 30 MIN: CPT | Performed by: INTERNAL MEDICINE

## 2022-10-17 RX ORDER — HYDROXYZINE HYDROCHLORIDE 25 MG/1
1 TABLET, FILM COATED ORAL DAILY PRN
COMMUNITY
Start: 2022-10-10 | End: 2023-03-29

## 2022-10-17 RX ORDER — MINERAL OIL
180 ENEMA (ML) RECTAL DAILY
Qty: 15 TABLET | Refills: 0 | Status: SHIPPED | OUTPATIENT
Start: 2022-10-17 | End: 2022-10-18 | Stop reason: SDUPTHER

## 2022-10-17 NOTE — PROGRESS NOTES
SUBJECTIVE:    Patient ID: Karma Chen is a 86 y.o. female.    Chief Complaint: Rash    Rash  Pertinent negatives include no congestion, cough, diarrhea, eye pain, fatigue, fever, shortness of breath, sore throat or vomiting.     October 4-had rash back of neck-    October 10-given prednisone 5 plus hydroxyzine and pred 5 mg for 5 days and cortisone injection-no improvement-last night she had such itching of left arm and hand she could barely stand it-no new perfumes    She is using no new agents-changed beauticians only-last shampood Saturday-    Office Visit on 09/18/2022   Component Date Value Ref Range Status    SARS Coronavirus 2 Antigen 09/18/2022 Negative  Negative Final     Acceptable 09/18/2022 Yes   Final   Lab Visit on 09/15/2022   Component Date Value Ref Range Status    Immunofix Interp. 09/15/2022 Comment   Final    IgA 09/15/2022 305  64 - 422 mg/dL Final    IgG 09/15/2022 739  586 - 1602 mg/dL Final    IgM 09/15/2022 36  26 - 217 mg/dL Final    Total Protein 09/15/2022 6.3  6.0 - 8.5 g/dL Final    Albumin 09/15/2022 3.2  2.9 - 4.4 g/dL Final    Alpha-1 09/15/2022 0.2  0.0 - 0.4 g/dL Final    Alpha-2 09/15/2022 0.9  0.4 - 1.0 g/dL Final    Beta 09/15/2022 1.2  0.7 - 1.3 g/dL Final    Gamma 09/15/2022 0.7  0.4 - 1.8 g/dL Final    M-SPIKE, PROT ELECTRO 09/15/2022 Not Observed  Not Observed g/dL Final    Globulin, Total 09/15/2022 3.1  2.2 - 3.9 g/dL Final    A/G Ratio 09/15/2022 1.0  0.7 - 1.7 Final    Please Note: 09/15/2022 Comment   Final    Methlymalonic Acid 09/15/2022 216  0 - 378 nmol/L Final    Thiamine 09/15/2022 494.7 (H)  66.5 - 200.0 nmol/L Final    Vitamin B-12 09/15/2022 1092 (H)  210 - 950 pg/mL Final    Vitamin B6 09/15/2022 86.0 (H)  3.4 - 65.2 ug/L Final    SINGH 09/15/2022 Negative   Final    Sed Rate 09/15/2022 38 (H)  0 - 20 mm/Hr Final    CRP 09/15/2022 0.74  <0.76 mg/dL Final   Hospital Outpatient Visit on 08/02/2022   Component Date Value Ref Range Status     P/R-wave RA Lead 08/02/2022 3.3  mV Final    P/R-wave RA Lead (native) 08/02/2022 10.6  mV Final    Impedance RA Lead 08/02/2022 494  Ohms Final    Impedance RA Lead (native) 08/02/2022 570  Ohms Final    Threshold V RA Lead 08/02/2022 0.875  V Final    Theshold ms RA Lead 08/02/2022 0.4  ms Final    Threshold V RA Lead (native) 08/02/2022 0.5  V Final    Threshold ms RA Lead (native) 08/02/2022 0.4  ms Final   Lab Visit on 05/20/2022   Component Date Value Ref Range Status    Hemoglobin A1C 05/20/2022 5.5  4.5 - 6.2 % Final    Estimated Avg Glucose 05/20/2022 111  68 - 131 mg/dL Final    Microalbumin, Urine 05/20/2022 10.8  <19.9 ug/mL Final    Creatinine, Urine 05/20/2022 97.0  15.0 - 325.0 mg/dL Final    Microalb/Creat Ratio 05/20/2022 11.1  0.0 - 30.0 ug/mg Final    Cholesterol 05/20/2022 223 (H)  120 - 199 mg/dL Final    Triglycerides 05/20/2022 109  30 - 150 mg/dL Final    HDL 05/20/2022 56  40 - 75 mg/dL Final    LDL Cholesterol 05/20/2022 145.2  63.0 - 159.0 mg/dL Final    HDL/Cholesterol Ratio 05/20/2022 25.1  20.0 - 50.0 % Final    Total Cholesterol/HDL Ratio 05/20/2022 4.0  2.0 - 5.0 Final    Non-HDL Cholesterol 05/20/2022 167  mg/dL Final    Glucose 05/20/2022 135 (H)  70 - 110 mg/dL Final    Sodium 05/20/2022 139  136 - 145 mmol/L Final    Potassium 05/20/2022 4.0  3.5 - 5.1 mmol/L Final    Chloride 05/20/2022 103  95 - 110 mmol/L Final    CO2 05/20/2022 24  23 - 29 mmol/L Final    BUN 05/20/2022 28 (H)  8 - 23 mg/dL Final    Calcium 05/20/2022 9.3  8.7 - 10.5 mg/dL Final    Creatinine 05/20/2022 1.2  0.5 - 1.4 mg/dL Final    Albumin 05/20/2022 4.0  3.5 - 5.2 g/dL Final    Phosphorus 05/20/2022 3.6  2.7 - 4.5 mg/dL Final    eGFR if  05/20/2022 48.0 (A)  >60 mL/min/1.73 m^2 Final    eGFR if non African American 05/20/2022 41.6 (A)  >60 mL/min/1.73 m^2 Final    Anion Gap 05/20/2022 12  8 - 16 mmol/L Final    RBC, UA 05/20/2022 1  0 - 4 /hpf Final    WBC, UA 05/20/2022 0  0 - 5 /hpf Final     Bacteria 05/20/2022 Negative  None-Occ /hpf Final    Squam Epithel, UA 05/20/2022 1  /hpf Final    Hyaline Casts, UA 05/20/2022 2 (A)  0-1/lpf /lpf Final    Microscopic Comment 05/20/2022 SEE COMMENT   Final    Protein, Urine Random 05/20/2022 6  6 - 15 mg/dL Final    Creatinine, Urine 05/20/2022 97.0  15.0 - 325.0 mg/dL Final    Prot/Creat Ratio, Urine 05/20/2022 0.06  0.00 - 0.20 Final   Lab Visit on 05/10/2022   Component Date Value Ref Range Status    BNP 05/10/2022 490 (H)  0 - 99 pg/mL Final    TSH 05/10/2022 1.280  0.340 - 5.600 uIU/mL Final    WBC 05/10/2022 7.41  3.90 - 12.70 K/uL Final    RBC 05/10/2022 4.20  4.00 - 5.40 M/uL Final    Hemoglobin 05/10/2022 11.5 (L)  12.0 - 16.0 g/dL Final    Hematocrit 05/10/2022 37.2  37.0 - 48.5 % Final    MCV 05/10/2022 89  82 - 98 fL Final    MCH 05/10/2022 27.4  27.0 - 31.0 pg Final    MCHC 05/10/2022 30.9 (L)  32.0 - 36.0 g/dL Final    RDW 05/10/2022 13.8  11.5 - 14.5 % Final    Platelets 05/10/2022 249  150 - 450 K/uL Final    MPV 05/10/2022 10.0  9.2 - 12.9 fL Final    Immature Granulocytes 05/10/2022 0.3  0.0 - 0.5 % Final    Gran # (ANC) 05/10/2022 3.4  1.8 - 7.7 K/uL Final    Immature Grans (Abs) 05/10/2022 0.02  0.00 - 0.04 K/uL Final    Lymph # 05/10/2022 2.5  1.0 - 4.8 K/uL Final    Mono # 05/10/2022 1.1 (H)  0.3 - 1.0 K/uL Final    Eos # 05/10/2022 0.3  0.0 - 0.5 K/uL Final    Baso # 05/10/2022 0.08  0.00 - 0.20 K/uL Final    nRBC 05/10/2022 0  0 /100 WBC Final    Gran % 05/10/2022 46.0  38.0 - 73.0 % Final    Lymph % 05/10/2022 34.3  18.0 - 48.0 % Final    Mono % 05/10/2022 14.7  4.0 - 15.0 % Final    Eosinophil % 05/10/2022 3.6  0.0 - 8.0 % Final    Basophil % 05/10/2022 1.1  0.0 - 1.9 % Final    Differential Method 05/10/2022 Automated   Final    Sodium 05/10/2022 140  136 - 145 mmol/L Final    Potassium 05/10/2022 3.9  3.5 - 5.1 mmol/L Final    Chloride 05/10/2022 103  95 - 110 mmol/L Final    CO2 05/10/2022 27  23 - 29 mmol/L Final    Glucose  05/10/2022 93  70 - 110 mg/dL Final    BUN 05/10/2022 24 (H)  8 - 23 mg/dL Final    Creatinine 05/10/2022 1.4  0.5 - 1.4 mg/dL Final    Calcium 05/10/2022 9.0  8.7 - 10.5 mg/dL Final    Total Protein 05/10/2022 7.2  6.0 - 8.4 g/dL Final    Albumin 05/10/2022 3.9  3.5 - 5.2 g/dL Final    Total Bilirubin 05/10/2022 0.6  0.1 - 1.0 mg/dL Final    Alkaline Phosphatase 05/10/2022 68  55 - 135 U/L Final    AST 05/10/2022 24  10 - 40 U/L Final    ALT 05/10/2022 22  10 - 44 U/L Final    Anion Gap 05/10/2022 10  8 - 16 mmol/L Final    eGFR if  05/10/2022 39.8 (A)  >60 mL/min/1.73 m^2 Final    eGFR if non African American 05/10/2022 34.5 (A)  >60 mL/min/1.73 m^2 Final    Total Protein 05/10/2022 7.2  6.0 - 8.4 g/dL Final    Albumin 05/10/2022 3.9  3.5 - 5.2 g/dL Final    Total Bilirubin 05/10/2022 0.6  0.1 - 1.0 mg/dL Final    Bilirubin, Direct 05/10/2022 <0.1 (A)  0.1 - 0.3 mg/dL Final    AST 05/10/2022 24  10 - 40 U/L Final    ALT 05/10/2022 22  10 - 44 U/L Final    Alkaline Phosphatase 05/10/2022 68  55 - 135 U/L Final    Glucose 05/10/2022 93  70 - 110 mg/dL Final    Sodium 05/10/2022 140  136 - 145 mmol/L Final    Potassium 05/10/2022 3.9  3.5 - 5.1 mmol/L Final    Chloride 05/10/2022 103  95 - 110 mmol/L Final    CO2 05/10/2022 27  23 - 29 mmol/L Final    BUN 05/10/2022 24 (H)  8 - 23 mg/dL Final    Calcium 05/10/2022 9.0  8.7 - 10.5 mg/dL Final    Creatinine 05/10/2022 1.4  0.5 - 1.4 mg/dL Final    Albumin 05/10/2022 3.9  3.5 - 5.2 g/dL Final    Phosphorus 05/10/2022 3.7  2.7 - 4.5 mg/dL Final    eGFR if  05/10/2022 39.8 (A)  >60 mL/min/1.73 m^2 Final    eGFR if non African American 05/10/2022 34.5 (A)  >60 mL/min/1.73 m^2 Final    Anion Gap 05/10/2022 10  8 - 16 mmol/L Final   Office Visit on 01/25/2022   Component Date Value Ref Range Status    POC Blood, Urine 01/25/2022 Negative  Negative Final    POC Bilirubin, Urine 01/25/2022 Negative  Negative Final    POC Urobilinogen, Urine  01/25/2022 norm  0.1 - 1.1 Final    POC Ketones, Urine 01/25/2022 Negative  Negative Final    POC Protein, Urine 01/25/2022 Negative  Negative Final    POC Nitrates, Urine 01/25/2022 Negative  Negative Final    POC Glucose, Urine 01/25/2022 Negative  Negative Final    pH, UA 01/25/2022 6.0   Final    POC Specific Gravity, Urine 01/25/2022 1.005  1.003 - 1.029 Final    POC Leukocytes, Urine 01/25/2022 Negative  Negative Final    POC Rapid COVID 01/25/2022 Negative  Negative Final     Acceptable 01/25/2022 Yes   Final   Hospital Outpatient Visit on 01/18/2022   Component Date Value Ref Range Status    Battery Voltage (V) 01/18/2022 3.00  V Final    P/R-wave RA Lead 01/18/2022 1.5  mV Final    P/R-wave RA Lead (native) 01/18/2022 10.9  mV Final    Impedance RA Lead 01/18/2022 475  Ohms Final    Impedance RA Lead (native) 01/18/2022 532  Ohms Final    Threshold V RA Lead 01/18/2022 0.750  V Final    Theshold ms RA Lead 01/18/2022 0.40  ms Final    Threshold V RA Lead (native) 01/18/2022 0.500  V Final    Threshold ms RA Lead (native) 01/18/2022 0.40  ms Final   Office Visit on 01/10/2022   Component Date Value Ref Range Status    Cologuard Result 01/14/2022 Negative  Negative Final   Lab Visit on 11/08/2021   Component Date Value Ref Range Status    Hemoglobin A1C 11/08/2021 5.9  4.5 - 6.2 % Final    Estimated Avg Glucose 11/08/2021 123  68 - 131 mg/dL Final    WBC 11/08/2021 7.35  3.90 - 12.70 K/uL Final    RBC 11/08/2021 4.30  4.00 - 5.40 M/uL Final    Hemoglobin 11/08/2021 12.0  12.0 - 16.0 g/dL Final    Hematocrit 11/08/2021 38.0  37.0 - 48.5 % Final    MCV 11/08/2021 88  82 - 98 fL Final    MCH 11/08/2021 27.9  27.0 - 31.0 pg Final    MCHC 11/08/2021 31.6 (L)  32.0 - 36.0 g/dL Final    RDW 11/08/2021 13.2  11.5 - 14.5 % Final    Platelets 11/08/2021 281  150 - 450 K/uL Final    MPV 11/08/2021 10.2  9.2 - 12.9 fL Final    Immature Granulocytes 11/08/2021 0.3  0.0 - 0.5 % Final    Gran # (ANC)  11/08/2021 3.3  1.8 - 7.7 K/uL Final    Immature Grans (Abs) 11/08/2021 0.02  0.00 - 0.04 K/uL Final    Lymph # 11/08/2021 3.0  1.0 - 4.8 K/uL Final    Mono # 11/08/2021 0.8  0.3 - 1.0 K/uL Final    Eos # 11/08/2021 0.2  0.0 - 0.5 K/uL Final    Baso # 11/08/2021 0.06  0.00 - 0.20 K/uL Final    nRBC 11/08/2021 0  0 /100 WBC Final    Gran % 11/08/2021 44.9  38.0 - 73.0 % Final    Lymph % 11/08/2021 40.4  18.0 - 48.0 % Final    Mono % 11/08/2021 10.5  4.0 - 15.0 % Final    Eosinophil % 11/08/2021 3.1  0.0 - 8.0 % Final    Basophil % 11/08/2021 0.8  0.0 - 1.9 % Final    Differential Method 11/08/2021 Automated   Final    Cholesterol 11/08/2021 248 (H)  120 - 199 mg/dL Final    Triglycerides 11/08/2021 185 (H)  30 - 150 mg/dL Final    HDL 11/08/2021 41  40 - 75 mg/dL Final    LDL Cholesterol 11/08/2021 170.0 (H)  63.0 - 159.0 mg/dL Final    HDL/Cholesterol Ratio 11/08/2021 16.5 (L)  20.0 - 50.0 % Final    Total Cholesterol/HDL Ratio 11/08/2021 6.0 (H)  2.0 - 5.0 Final    Non-HDL Cholesterol 11/08/2021 207  mg/dL Final    Sodium 11/08/2021 144  136 - 145 mmol/L Final    Potassium 11/08/2021 4.0  3.5 - 5.1 mmol/L Final    Chloride 11/08/2021 106  95 - 110 mmol/L Final    CO2 11/08/2021 29  23 - 29 mmol/L Final    Glucose 11/08/2021 88  70 - 110 mg/dL Final    BUN 11/08/2021 25 (H)  8 - 23 mg/dL Final    Creatinine 11/08/2021 1.3  0.5 - 1.4 mg/dL Final    Calcium 11/08/2021 9.5  8.7 - 10.5 mg/dL Final    Anion Gap 11/08/2021 9  8 - 16 mmol/L Final    eGFR if  11/08/2021 43.8 (A)  >60 mL/min/1.73 m^2 Final    eGFR if non  11/08/2021 38.0 (A)  >60 mL/min/1.73 m^2 Final    Glucose 11/08/2021 88  70 - 110 mg/dL Final    Sodium 11/08/2021 144  136 - 145 mmol/L Final    Potassium 11/08/2021 4.0  3.5 - 5.1 mmol/L Final    Chloride 11/08/2021 106  95 - 110 mmol/L Final    CO2 11/08/2021 29  23 - 29 mmol/L Final    BUN 11/08/2021 25 (H)  8 - 23 mg/dL Final    Calcium 11/08/2021 9.5  8.7 - 10.5 mg/dL  Final    Creatinine 11/08/2021 1.3  0.5 - 1.4 mg/dL Final    Albumin 11/08/2021 3.8  3.5 - 5.2 g/dL Final    Phosphorus 11/08/2021 4.1  2.7 - 4.5 mg/dL Final    eGFR if  11/08/2021 43.8 (A)  >60 mL/min/1.73 m^2 Final    eGFR if non  11/08/2021 38.0 (A)  >60 mL/min/1.73 m^2 Final    Anion Gap 11/08/2021 9  8 - 16 mmol/L Final    Vit D, 25-Hydroxy 11/08/2021 >120 (H)  30 - 96 ng/mL Final    Specimen UA 11/08/2021 Urine, Clean Catch   Final    Color, UA 11/08/2021 Yellow  Yellow, Straw, Anila Final    Appearance, UA 11/08/2021 Clear  Clear Final    pH, UA 11/08/2021 7.0  5.0 - 8.0 Final    Specific Gravity, UA 11/08/2021 1.020  1.005 - 1.030 Final    Protein, UA 11/08/2021 Negative  Negative Final    Glucose, UA 11/08/2021 Negative  Negative Final    Ketones, UA 11/08/2021 Negative  Negative Final    Bilirubin (UA) 11/08/2021 Negative  Negative Final    Occult Blood UA 11/08/2021 Negative  Negative Final    Nitrite, UA 11/08/2021 Negative  Negative Final    Urobilinogen, UA 11/08/2021 Negative  Negative EU/dL Final    Leukocytes, UA 11/08/2021 1+ (A)  Negative Final    RBC, UA 11/08/2021 1  0 - 4 /hpf Final    WBC, UA 11/08/2021 3  0 - 5 /hpf Final    Bacteria 11/08/2021 Negative  None-Occ /hpf Final    Squam Epithel, UA 11/08/2021 4  /hpf Final    Hyaline Casts, UA 11/08/2021 4 (A)  0-1/lpf /lpf Final    Microscopic Comment 11/08/2021 SEE COMMENT   Final    PTH, Intact 11/08/2021 26.1  9.0 - 77.0 pg/mL Final    Protein, Urine Random 11/08/2021 7  6 - 15 mg/dL Final    Creatinine, Urine 11/08/2021 87.0  15.0 - 325.0 mg/dL Final    Prot/Creat Ratio, Urine 11/08/2021 0.08  0.00 - 0.20 Final   Admission on 10/22/2021, Discharged on 10/22/2021   Component Date Value Ref Range Status    Sodium 10/22/2021 139  136 - 145 mmol/L Final    Potassium 10/22/2021 4.0  3.5 - 5.1 mmol/L Final    Chloride 10/22/2021 104  95 - 110 mmol/L Final    CO2 10/22/2021 29  23 - 29 mmol/L Final    Glucose  10/22/2021 137 (H)  70 - 110 mg/dL Final    BUN 10/22/2021 23  8 - 23 mg/dL Final    Creatinine 10/22/2021 1.7 (H)  0.5 - 1.4 mg/dL Final    Calcium 10/22/2021 8.7  8.7 - 10.5 mg/dL Final    Total Protein 10/22/2021 7.4  6.0 - 8.4 g/dL Final    Albumin 10/22/2021 4.1  3.5 - 5.2 g/dL Final    Total Bilirubin 10/22/2021 0.8  0.1 - 1.0 mg/dL Final    Alkaline Phosphatase 10/22/2021 64  55 - 135 U/L Final    AST 10/22/2021 23  10 - 40 U/L Final    ALT 10/22/2021 20  10 - 44 U/L Final    Anion Gap 10/22/2021 6 (L)  8 - 16 mmol/L Final    eGFR if  10/22/2021 31.7 (A)  >60 mL/min/1.73 m^2 Final    eGFR if non African American 10/22/2021 27.5 (A)  >60 mL/min/1.73 m^2 Final    Magnesium 10/22/2021 2.3  1.6 - 2.6 mg/dL Final    WBC 10/22/2021 7.89  3.90 - 12.70 K/uL Final    RBC 10/22/2021 4.16  4.00 - 5.40 M/uL Final    Hemoglobin 10/22/2021 11.7 (L)  12.0 - 16.0 g/dL Final    Hematocrit 10/22/2021 37.0  37.0 - 48.5 % Final    MCV 10/22/2021 89  82 - 98 fL Final    MCH 10/22/2021 28.1  27.0 - 31.0 pg Final    MCHC 10/22/2021 31.6 (L)  32.0 - 36.0 g/dL Final    RDW 10/22/2021 13.0  11.5 - 14.5 % Final    Platelets 10/22/2021 269  150 - 450 K/uL Final    MPV 10/22/2021 9.6  9.2 - 12.9 fL Final    Immature Granulocytes 10/22/2021 0.4  0.0 - 0.5 % Final    Gran # (ANC) 10/22/2021 4.5  1.8 - 7.7 K/uL Final    Immature Grans (Abs) 10/22/2021 0.03  0.00 - 0.04 K/uL Final    Lymph # 10/22/2021 2.3  1.0 - 4.8 K/uL Final    Mono # 10/22/2021 0.9  0.3 - 1.0 K/uL Final    Eos # 10/22/2021 0.1  0.0 - 0.5 K/uL Final    Baso # 10/22/2021 0.04  0.00 - 0.20 K/uL Final    nRBC 10/22/2021 0  0 /100 WBC Final    Gran % 10/22/2021 57.0  38.0 - 73.0 % Final    Lymph % 10/22/2021 29.7  18.0 - 48.0 % Final    Mono % 10/22/2021 11.0  4.0 - 15.0 % Final    Eosinophil % 10/22/2021 1.4  0.0 - 8.0 % Final    Basophil % 10/22/2021 0.5  0.0 - 1.9 % Final    Differential Method 10/22/2021 Automated   Final    PT 10/22/2021 13.9 (H)  11.4 -  13.7 sec Final    INR 10/22/2021 1.2   Final    Troponin I 10/22/2021 <0.030  <=0.040 ng/mL Final    Specimen UA 10/22/2021 Urine, Catheterized   Final    Color, UA 10/22/2021 Colorless (A)  Yellow, Straw, Anila Final    Appearance, UA 10/22/2021 Clear  Clear Final    pH, UA 10/22/2021 8.0  5.0 - 8.0 Final    Specific Gravity, UA 10/22/2021 1.005  1.005 - 1.030 Final    Protein, UA 10/22/2021 Negative  Negative Final    Glucose, UA 10/22/2021 Negative  Negative Final    Ketones, UA 10/22/2021 Negative  Negative Final    Bilirubin (UA) 10/22/2021 Negative  Negative Final    Occult Blood UA 10/22/2021 Negative  Negative Final    Nitrite, UA 10/22/2021 Negative  Negative Final    Urobilinogen, UA 10/22/2021 Negative  Negative EU/dL Final    Leukocytes, UA 10/22/2021 Negative  Negative Final    Lithium Level 10/22/2021 0.0 (LL)  0.6 - 1.2 mmol/L Final    SARS-CoV-2 RNA, Amplification, Qual 10/22/2021 Negative  Negative Final    TSH 10/22/2021 1.760  0.340 - 5.600 uIU/mL Final    Magnesium 10/22/2021 2.3  1.6 - 2.6 mg/dL Final    CPK 10/22/2021 53  20 - 180 U/L Final   There may be more visits with results that are not included.       Past Medical History:   Diagnosis Date    Coronary artery disease     Dermatitis     Dermatitis 12/8/2017    Diabetes mellitus     Diabetes mellitus type II     Leah Barr virus infection     Fibromyalgia     GERD (gastroesophageal reflux disease)     Hypertension     MI (myocardial infarction) 09/23/2011    Pure hypercholesterolemia 2/26/2020     Past Surgical History:   Procedure Laterality Date    adenoids      ANGIOPLASTY  1987    APPENDECTOMY      CARDIAC PACEMAKER PLACEMENT  01/12/2017    CATARACT EXTRACTION, BILATERAL Bilateral 9/2/15, 3/17/15    right eye-9/2/15, left eye-3/17/15    CHOLECYSTECTOMY  1987    CORONARY ARTERY BYPASS GRAFT  2006    quadruple    coronary stents  1999    x2    CYST REMOVAL  04/25/2017    on back    HYSTERECTOMY  1966    OVARY SURGERY  1948    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  Tobacco History:  reports that she has never smoked. She has never used smokeless tobacco.  Drug History:  reports no history of drug use.    Review of patient's allergies indicates:   Allergen Reactions    Arthrotec 50 [diclofenac-misoprostol]     Doxycycline     Effexor [venlafaxine]     Estradiol     Flagyl [metronidazole]     Fluconazole     Gabapentin     Iodine     Levaquin [levofloxacin]     Nexium [esomeprazole magnesium]     Penicillins     Red yeast rice (monascus purpureus)     Shellfish containing products     Statins-hmg-coa reductase inhibitors     Sulfa (sulfonamide antibiotics)     Tea tree oil     Tegaserod      ZOLNORM    Tegaserod hydrogen maleate     Trazodone     Yeast, dried     Adhesive Rash     Band-aids       Current Outpatient Medications:     acetaminophen (TYLENOL) 500 MG tablet, Take 500 mg by mouth every 6 (six) hours as needed for Pain., Disp: , Rfl:     ALPRAZolam (XANAX) 0.25 MG tablet, TAKE 1 TABLET BY MOUTH AS NEEDED FOR ANXIETY., Disp: 90 tablet, Rfl: 0    amitriptyline (ELAVIL) 25 MG tablet, TAKE 1 TABLET BY MOUTH EVERY DAY, Disp: 90 tablet, Rfl: 1    apixaban (ELIQUIS) 2.5 mg Tab, Take 1 tablet (2.5 mg total) by mouth 2 (two) times daily., Disp: 180 tablet, Rfl: 1    aspirin (ECOTRIN) 81 MG EC tablet, Take 1 tablet (81 mg total) by mouth once daily. (Patient taking differently: Take 81 mg by mouth every evening.), Disp: , Rfl: 0    cholecalciferol, vitamin D3, 100 mcg (4,000 unit) Cap, Take 1 capsule by mouth 2 (two) times daily. , Disp: , Rfl:     diphenhydrAMINE (BENYLIN) 12.5 mg/5 mL liquid, Take 12.5 mg by mouth 4 (four) times daily as needed for Allergies., Disp: , Rfl:     hydrOXYzine HCL (ATARAX) 25 MG tablet,  Take 1 tablet by mouth daily as needed., Disp: , Rfl:     isosorbide mononitrate (IMDUR) 120 MG 24 hr tablet, Take 120 mg by mouth once daily., Disp: , Rfl:     levothyroxine (SYNTHROID) 88 MCG tablet, Take 1 tablet (88 mcg total) by mouth once daily., Disp: 90 tablet, Rfl: 1    losartan (COZAAR) 25 MG tablet, Take 0.5 tablets (12.5 mg total) by mouth every evening., Disp: 45 tablet, Rfl: 3    MAGNESIUM ORAL, Take 500 mg by mouth once daily. , Disp: , Rfl:     metoprolol succinate (TOPROL-XL) 25 MG 24 hr tablet, Take 1 tablet (25 mg total) by mouth once daily., Disp: 90 tablet, Rfl: 3    montelukast (SINGULAIR) 10 mg tablet, Take 1 tablet (10 mg total) by mouth every evening., Disp: 90 tablet, Rfl: 1    multivitamin (THERAGRAN) tablet, Take 1 tablet by mouth 2 (two) times a day. , Disp: , Rfl:     nitroGLYCERIN (NITROSTAT) 0.4 MG SL tablet, Place 0.4 mg under the tongue every 5 (five) minutes as needed for Chest pain., Disp: , Rfl:     tiZANidine (ZANAFLEX) 4 MG tablet, TAKE 1 TABLET BY MOUTH ONCE DAILY., Disp: 90 tablet, Rfl: 0    traMADoL (ULTRAM) 50 mg tablet, Take 1 tablet (50 mg total) by mouth 2 (two) times daily as needed (pain)., Disp: 14 tablet, Rfl: 0    triamcinolone acetonide 0.1% (KENALOG) 0.1 % cream, aaa bid x 1-2 wks, tk 1 wk off prn rash, Disp: 30 g, Rfl: 2    UNABLE TO FIND, Take 1 capsule by mouth once daily. Joint health, Disp: , Rfl:     UNABLE TO FIND, Take 1 capsule by mouth 2 (two) times a day. Cardio platinum, Disp: , Rfl:     coenzyme Q10 100 mg capsule, Take 1 capsule (100 mg total) by mouth 2 (two) times daily. (Patient not taking: Reported on 10/17/2022), Disp: 60 capsule, Rfl: 11    ECHINACEA ORAL, Take 750 mg by mouth 2 (two) times a day., Disp: , Rfl:     fexofenadine (ALLEGRA) 180 MG tablet, Take 1 tablet (180 mg total) by mouth once daily., Disp: 15 tablet, Rfl: 0    turmeric 400 mg Cap, Take 1,200 mg by mouth once daily., Disp: , Rfl:     UNABLE TO FIND, Take 1 capsule by mouth.  Bio-Smoothe- premium nerve, Disp: , Rfl:     UNABLE TO FIND, Take 1 capsule by mouth once daily. medication name: GI probiotic, Disp: , Rfl:     UNABLE TO FIND, Nerve Jorge, Disp: , Rfl:     zinc gluconate 50 mg tablet, Take 50 mg by mouth 2 (two) times a day., Disp: , Rfl:     Current Facility-Administered Medications:     evolocumab PnIj 140 mg, 140 mg, Subcutaneous, Q14 Days, Kenton Ware MD    Review of Systems   Constitutional:  Negative for activity change, appetite change, chills, fatigue, fever and unexpected weight change.   HENT:  Positive for tinnitus. Negative for congestion, ear pain, hearing loss, postnasal drip, sinus pain, sneezing, sore throat and trouble swallowing.    Eyes:  Negative for pain, discharge and visual disturbance.   Respiratory:  Negative for cough, choking, shortness of breath and wheezing.    Cardiovascular:  Negative for chest pain, palpitations and leg swelling.   Gastrointestinal:  Negative for abdominal distention, abdominal pain, blood in stool, constipation, diarrhea, nausea and vomiting.   Endocrine: Negative for cold intolerance and heat intolerance.   Genitourinary:  Negative for difficulty urinating, dysuria, frequency, pelvic pain and urgency.   Musculoskeletal:  Negative for back pain, joint swelling and neck pain.   Skin:  Positive for rash (fading maculopapular eruption neckline and back of neck and a fes on arms). Negative for pallor.   Allergic/Immunologic: Negative for environmental allergies and food allergies.   Neurological:  Negative for dizziness, tremors, weakness, numbness and headaches.   Hematological:  Does not bruise/bleed easily.   Psychiatric/Behavioral:  Negative for agitation, confusion, dysphoric mood, sleep disturbance and suicidal ideas. The patient is not nervous/anxious.         Objective:      Vitals    Vitals - 1 value per visit 9/18/2022 9/22/2022 9/22/2022 10/17/2022 10/17/2022   SYSTOLIC 148 - 116 - 126   DIASTOLIC 67 - 60 - 60   Pulse  86 - 70 - 70   Temp 97.1 - 98 - 98.1   Resp 18 - 12 - 14   SPO2 98 - 100 - 98   Weight (lb) 135.6 - 134 - 132   Weight (kg) 61.508 - 60.782 - 59.875   Height 64 - 64 - 64   BMI (Calculated) 23.3 - 23 - 22.6   VISIT REPORT - - - - -   Pain Score  - 0 - 0 -   Some recent data might be hidden       Physical Exam      Assessment:       1. Allergic contact dermatitis due to cosmetics         Plan:       Allergic contact dermatitis due to cosmetics    Other orders  -     fexofenadine (ALLEGRA) 180 MG tablet; Take 1 tablet (180 mg total) by mouth once daily.  Dispense: 15 tablet; Refill: 0    No follow-ups on file.        10/17/2022 Elena Sullivan M.D.

## 2022-10-18 ENCOUNTER — OFFICE VISIT (OUTPATIENT)
Dept: ALLERGY | Facility: CLINIC | Age: 87
End: 2022-10-18
Payer: MEDICARE

## 2022-10-18 VITALS
HEIGHT: 64 IN | BODY MASS INDEX: 22.66 KG/M2 | DIASTOLIC BLOOD PRESSURE: 81 MMHG | TEMPERATURE: 97 F | HEART RATE: 95 BPM | OXYGEN SATURATION: 99 % | SYSTOLIC BLOOD PRESSURE: 150 MMHG
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
## 2022-10-18 DIAGNOSIS — R21 RASH AND NONSPECIFIC SKIN ERUPTION: Primary | ICD-10-CM

## 2022-10-18 DIAGNOSIS — L29.9 PRURITUS: ICD-10-CM

## 2022-10-18 PROCEDURE — 99203 OFFICE O/P NEW LOW 30 MIN: CPT | Mod: S$GLB,,, | Performed by: ALLERGY & IMMUNOLOGY

## 2022-10-18 PROCEDURE — 99203 PR OFFICE/OUTPT VISIT, NEW, LEVL III, 30-44 MIN: ICD-10-PCS | Mod: S$GLB,,, | Performed by: ALLERGY & IMMUNOLOGY

## 2022-10-18 RX ORDER — MINERAL OIL
180 ENEMA (ML) RECTAL DAILY
Qty: 60 TABLET | Refills: 3 | Status: SHIPPED | OUTPATIENT
Start: 2022-10-18 | End: 2023-03-29

## 2022-10-18 RX ORDER — HYDROCORTISONE 25 MG/G
OINTMENT TOPICAL 2 TIMES DAILY
Qty: 28 G | Refills: 3 | Status: SHIPPED | OUTPATIENT
Start: 2022-10-18 | End: 2022-11-03 | Stop reason: SDUPTHER

## 2022-10-18 RX ORDER — CAMPHOR AND MENTHOL 5; 5 MG/ML; MG/ML
LOTION TOPICAL
Qty: 222 ML | Refills: 3 | Status: SHIPPED | OUTPATIENT
Start: 2022-10-18 | End: 2023-03-29

## 2022-10-18 NOTE — LETTER
October 18, 2022        Elena Sullivan MD  [address]  [place] [address]             [facility] Allergy  [address]  [address]  [place] [address]  Phone: [phone]  Fax: [phone]   Patient: Karma Chen   MR Number: [id number]   YOB: 1935   Date of Visit: 10/18/2022       Dear Dr. Sullivan:    Thank you for referring Karma Chen to me for evaluation. Below are the relevant portions of my assessment and plan of care.            If you have questions, please do not hesitate to call me. I look forward to following Karma along with you.    Sincerely,      Amaya Arnold MD           CC  No Recipients

## 2022-10-18 NOTE — PROGRESS NOTES
"Subjective:       Patient ID: Karma Chen is a 86 y.o. female.    Chief Complaint: Rash (Has been having a rash on the back of her neck and spots on her arm. Noticed she had bed bugs this morning)    HPI    Pt presents   As a new patient   For rash     Pt states she saw bed bugs this morning.   Her brother had bed bugs  She has had bed bugs before   She saw dermatology prior and dx as seborrheic keratosis.     Pt started taking allegra and this helped the rash on her arms.     Has hydrocortisone cream.   Still itching on her neck.     Review of Systems    General: neg unexpected weight changes, fevers, chills, night sweats, malaise  HEENT: see hpi, Neg eye pain, vision changes, ear drainage, nose bleeds, throat tightness, sores in the mouth  CV: Neg chest pain, palpitations, swelling  Resp: see hpi, neg shortness of breath, hemoptysis, cough  GI: see hpi, neg dysphagia, night abdominal pain, reflux, chronic diarrhea, chronic constipation  Derm: See Hpi, neg flushing  Mu/sk: Neg joint pain, joint swelling   Psych: Neg anxiety  neuro: neg chronic headaches, muscle weakness  Endo: neg heat/cold intolerance, chronic fatigue    Objective:     Vitals:    10/18/22 1320   BP: (!) 150/81   Pulse: 95   Temp: 97 °F (36.1 °C)   SpO2: 99%   Height: 5' 4" (1.626 m)        Physical Exam    General: no acute distress, well developed well nourished   Skin: non specific erythematous papules on her neck. More macular areas on her bilateral arms     Assessment:       1. Rash and nonspecific skin eruption    2. Bedbug bite, initial encounter    3. Pruritus        Plan:       Rash and nonspecific skin eruption  -     fexofenadine (ALLEGRA) 180 MG tablet; Take 1 tablet (180 mg total) by mouth once daily.  Dispense: 60 tablet; Refill: 3  -     hydrocortisone 2.5 % ointment; Apply topically 2 (two) times daily.  Dispense: 28 g; Refill: 3  -     camphor-menthol 0.5-0.5% (SARNA ORIGINAL) lotion; Apply topically as needed for Itching.  " Dispense: 222 mL; Refill: 3    Bedbug bite, initial encounter  -     fexofenadine (ALLEGRA) 180 MG tablet; Take 1 tablet (180 mg total) by mouth once daily.  Dispense: 60 tablet; Refill: 3  -     hydrocortisone 2.5 % ointment; Apply topically 2 (two) times daily.  Dispense: 28 g; Refill: 3  -     camphor-menthol 0.5-0.5% (SARNA ORIGINAL) lotion; Apply topically as needed for Itching.  Dispense: 222 mL; Refill: 3    Pruritus  -     fexofenadine (ALLEGRA) 180 MG tablet; Take 1 tablet (180 mg total) by mouth once daily.  Dispense: 60 tablet; Refill: 3  -     hydrocortisone 2.5 % ointment; Apply topically 2 (two) times daily.  Dispense: 28 g; Refill: 3  -     camphor-menthol 0.5-0.5% (SARNA ORIGINAL) lotion; Apply topically as needed for Itching.  Dispense: 222 mL; Refill: 3          Rash and pruritus felt to be due to bed bugs  10/22:   Allegra twice per day 180 mg   Increase hydrocrtisone 2.5 % bid   Sarna prn      Follow up as needed       Amaya Arnold M.D.  Allergy/Immunology  [facility]   [phone] phone  [phone] fax

## 2022-10-18 NOTE — PATIENT INSTRUCTIONS
Attempted to contact fabiola for patient update. No answer at this time message was left.    Skin:  Hydrocortisone 2.5 % twice per day or as often as needed for itching.     Take allegra 180 mg 1 tablet twice per day, may use 4 tablets daily.     Hydroxyzine may create a fall risk and make you sleepy.     You have to call an  for the bed bugs     Harper as needed for itching.     Follow up as needed.

## 2022-10-19 ENCOUNTER — CLINICAL SUPPORT (OUTPATIENT)
Dept: REHABILITATION | Facility: HOSPITAL | Age: 87
End: 2022-10-19
Payer: MEDICARE

## 2022-10-19 ENCOUNTER — TELEPHONE (OUTPATIENT)
Dept: FAMILY MEDICINE | Facility: CLINIC | Age: 87
End: 2022-10-19

## 2022-10-19 DIAGNOSIS — R26.89 IMBALANCE: Primary | ICD-10-CM

## 2022-10-19 DIAGNOSIS — R29.898 LEFT LEG WEAKNESS: ICD-10-CM

## 2022-10-19 PROCEDURE — 97110 THERAPEUTIC EXERCISES: CPT | Mod: PN

## 2022-10-19 PROCEDURE — 97112 NEUROMUSCULAR REEDUCATION: CPT | Mod: PN

## 2022-10-19 NOTE — TELEPHONE ENCOUNTER
Patient left a message FYI   She said Dr Arnold states she had bed bugs. That is what is causing her rash.   She treated her bed room.   Her rash is clearing.

## 2022-10-19 NOTE — PROGRESS NOTES
[facility]   Physical Therapy Treatment Note     Name: Karma Chen  Clinic Number: [id number]    Therapy Diagnosis:   Encounter Diagnoses   Name Primary?    Imbalance Yes    Left leg weakness      Physician: Elena Sullivan MD    Visit Date: 10/19/2022    Physician Orders: PT Eval and Treat   Medical Diagnosis from Referral: imbalance  Evaluation Date: 10/12/2022  Authorization Period Expiration: 12/31/2022  Plan of Care Expiration: 11/26/22  Visit # / Visits authorized: 1/ 40     Time In: 1545  Time Out: 1630  Total Billable Time: 45 minutes     Precautions: Diabetes, Fall, and pacemaker      SUBJECTIVE     Pt reports: feeling fatigued due to running multiple errands today.    She  does not have a home exercise program.  Response to previous treatment: no changes  Functional change: none    Pain: 3/10  Location: left knee        OBJECTIVE     Objective Measures updated at progress report unless specified.     Treatment     Karma received the treatments listed below:      therapeutic exercises to develop strength, endurance, and flexibility for 18 minutes including:    X 10 minutes on SciFit (level 1) to promote flexibility prior to strength/balance/mobility training  X 15 each seated bilateral lower extremity therapeutic exercise (1#) = marching, long arc quad, hip abduction (red theraband), ball squeeze   X 5 sit to stand without upper extremity support      neuromuscular re-education activities to assess: Balance and Vestibular function for 27 minutes. The following activities were included:     X 15 each standing balance therapeutic exercise = mini squats and heel raises/ toe raises   X 15 feet each balance gait = side stepping, backwards gait  X 1 minute stand on AirEx    X 15 alternating toe taps on 3-inch stool   X 30 seconds each full Romberg stance = eyes open, eyes closed      Patient Education and Home Exercises     Home Exercises Provided and Patient Education Provided  "    Education provided:   - proper therapeutic exercise technique    Written Home Exercises Provided:  to be provided at future appointment .       ASSESSMENT     Patient needed bilateral upper extremity support during balance therapeutic exercise and balance gait; no loss of balance while standing on AirEx and Romberg training.    Karma Is not progressing well towards her goals.   Pt prognosis is Fair.     Pt will continue to benefit from skilled outpatient physical therapy to address the deficits listed in the problem list box on initial evaluation, provide pt/family education and to maximize pt's level of independence in the home and community environment.     Pt's spiritual, cultural and educational needs considered and pt agreeable to plan of care and goals.     Anticipated barriers to physical therapy: severity of pain and imbalance    Goals:     Short Term Goals (3 Weeks):   Decrease patient's complaints of pain to 0/10 during performance of activities of daily living for independence of self care activities. (NOT MET)  Patient to tolerate use of 2# weights during lower extremity therapeutic exercise to improve overall strength. (NOT MET)  Patient to perform x 45 seconds full Romberg stance, eyes closed to improve upright tolerance. (NOT MET)  Patient to tolerate x 10 repetitions sit to stand so that she may rise with single upper extremity support. (NOT MET)     Long Term Goals (6 Weeks):   Patient to demonstrate competence with home exercise program to maintain therapeutic gains. (NOT MET)  Patient to improve left knee MMT 1/2 grade to demo strength gains from therapeutic intervention. (NOT MET)  Patient to ambulate 200 feet with stand by assist and improved dustin/symmetry. (NOT MET)  Patient to demonstrate improvement in her Tinetti score from "high fall risk" to "moderate fall risk." (NOT MET)      PLAN     Continue to advance strength/balance/mobility training to patient's tolerance.      Eren Garcia, " PT

## 2022-11-01 ENCOUNTER — CLINICAL SUPPORT (OUTPATIENT)
Dept: REHABILITATION | Facility: HOSPITAL | Age: 87
End: 2022-11-01
Payer: MEDICARE

## 2022-11-01 DIAGNOSIS — R29.898 LEFT LEG WEAKNESS: ICD-10-CM

## 2022-11-01 DIAGNOSIS — R26.89 IMBALANCE: Primary | ICD-10-CM

## 2022-11-01 PROCEDURE — 97110 THERAPEUTIC EXERCISES: CPT | Mod: KX,PN

## 2022-11-01 PROCEDURE — 97112 NEUROMUSCULAR REEDUCATION: CPT | Mod: KX,PN

## 2022-11-01 NOTE — PROGRESS NOTES
[facility]   Physical Therapy Treatment Note     Name: Karma Chen  Clinic Number: [id number]    Therapy Diagnosis:   Encounter Diagnoses   Name Primary?    Imbalance Yes    Left leg weakness      Physician: Elena Sullivan MD    Visit Date: 11/1/2022    Physician Orders: PT Eval and Treat   Medical Diagnosis from Referral: imbalance  Evaluation Date: 10/12/2022  Authorization Period Expiration: 12/31/2022  Plan of Care Expiration: 11/26/22  Visit # / Visits authorized: 2/ 40     Time In: 1332  Time Out: 1413  Total Billable Time: 41 minutes     Precautions: Diabetes, Fall, and pacemaker      SUBJECTIVE     Pt reports: feeling fatigued from washing clothes - possible bed bug infestation at her home.    She  does not have a home exercise program.  Response to previous treatment: no changes  Functional change: none    Pain: 5/10  Location: left knee        OBJECTIVE     Objective Measures updated at progress report unless specified.     Treatment     Karma received the treatments listed below:      therapeutic exercises to develop strength, endurance, and flexibility for 17 minutes including:    X 10 minutes on SciFit (level 2) to promote flexibility prior to strength/balance/mobility training  X 20 each seated bilateral lower extremity therapeutic exercise (1#) = marching, long arc quad, hip abduction (red theraband), ball squeeze   X 6 sit to stand with bilateral upper extremity support      neuromuscular re-education activities to assess: Balance and Vestibular function for 24 minutes. The following activities were included:     X 15 each standing balance therapeutic exercise = mini squats and heel raises/ toe raises   X 15 feet each balance gait = side stepping, backwards gait  X 1 minute stand on AirEx    X 15 alternating toe taps on 4-inch cones   X 45 seconds each full Romberg stance = eyes open, eyes closed      Patient Education and Home Exercises     Home Exercises Provided and  Patient Education Provided     Education provided:   - proper therapeutic exercise technique    Written Home Exercises Provided:  to be provided at future appointment .       ASSESSMENT     Patient was able to tolerate increased resistance on the SciFit; increased repetitions performed during sit to stand and seated lower extremity therapeutic exercise; bilateral upper extremity support needed during sit to stand; single upper extremity support needed during balance therapeutic exercise and balance gait; able to use 4-inch cones during alternating toe taps but bilateral upper extremity support needed; increased stance time performed during Romberg training; no loss of balance while standing on AirEx and during Romberg training.    Karma Is progressing fairly well towards her goals.   Pt prognosis is Fair.     Pt will continue to benefit from skilled outpatient physical therapy to address the deficits listed in the problem list box on initial evaluation, provide pt/family education and to maximize pt's level of independence in the home and community environment.     Pt's spiritual, cultural and educational needs considered and pt agreeable to plan of care and goals.     Anticipated barriers to physical therapy: severity of pain and imbalance    Goals:     Short Term Goals (3 Weeks):   Decrease patient's complaints of pain to 0/10 during performance of activities of daily living for independence of self care activities. (NOT MET)  Patient to tolerate use of 2# weights during lower extremity therapeutic exercise to improve overall strength. (NOT MET)  Patient to perform x 45 seconds full Romberg stance, eyes closed to improve upright tolerance. (PART MET)  Patient to tolerate x 10 repetitions sit to stand so that she may rise with single upper extremity support. (NOT MET)     Long Term Goals (6 Weeks):   Patient to demonstrate competence with home exercise program to maintain therapeutic gains. (NOT MET)  Patient to  "improve left knee MMT 1/2 grade to demo strength gains from therapeutic intervention. (NOT MET)  Patient to ambulate 200 feet with stand by assist and improved dustin/symmetry. (NOT MET)  Patient to demonstrate improvement in her Tinetti score from "high fall risk" to "moderate fall risk." (NOT MET)      PLAN     Continue to advance strength/balance/mobility training to patient's tolerance.      Eren Garcia, PT        "

## 2022-11-02 RX ORDER — TRIAMCINOLONE ACETONIDE 40 MG/ML
40 INJECTION, SUSPENSION INTRA-ARTICULAR; INTRAMUSCULAR
Status: DISCONTINUED | OUTPATIENT
Start: 2022-08-11 | End: 2022-11-02 | Stop reason: HOSPADM

## 2022-11-03 ENCOUNTER — OFFICE VISIT (OUTPATIENT)
Dept: FAMILY MEDICINE | Facility: CLINIC | Age: 87
End: 2022-11-03
Payer: MEDICARE

## 2022-11-03 VITALS
HEART RATE: 70 BPM | WEIGHT: 135 LBS | HEIGHT: 64 IN | OXYGEN SATURATION: 100 % | BODY MASS INDEX: 23.05 KG/M2 | SYSTOLIC BLOOD PRESSURE: 132 MMHG | RESPIRATION RATE: 14 BRPM | TEMPERATURE: 98 F | DIASTOLIC BLOOD PRESSURE: 66 MMHG

## 2022-11-03 DIAGNOSIS — R21 RASH AND NONSPECIFIC SKIN ERUPTION: ICD-10-CM

## 2022-11-03 DIAGNOSIS — S00.83XD CONTUSION OF FACE, SUBSEQUENT ENCOUNTER: Primary | ICD-10-CM
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
## 2022-11-03 DIAGNOSIS — L29.9 PRURITUS: ICD-10-CM

## 2022-11-03 PROCEDURE — 99214 OFFICE O/P EST MOD 30 MIN: CPT | Mod: S$PBB,AQ,, | Performed by: INTERNAL MEDICINE

## 2022-11-03 PROCEDURE — 99215 OFFICE O/P EST HI 40 MIN: CPT | Performed by: INTERNAL MEDICINE

## 2022-11-03 PROCEDURE — 99214 PR OFFICE/OUTPT VISIT, EST, LEVL IV, 30-39 MIN: ICD-10-PCS | Mod: S$PBB,AQ,, | Performed by: INTERNAL MEDICINE

## 2022-11-03 RX ORDER — HYDROCORTISONE 25 MG/G
OINTMENT TOPICAL 2 TIMES DAILY
Qty: 28 G | Refills: 3 | Status: SHIPPED | OUTPATIENT
Start: 2022-11-03 | End: 2024-03-25

## 2022-11-03 NOTE — PROGRESS NOTES
SUBJECTIVE:    Patient ID: Karma Chen is a 86 y.o. female.    Chief Complaint: Medication Refill and Follow-up    HPI    Follow-up meds--she found out her lesions were related to bedbugs brought to the house by her brother-she actually saw a bug on her blouse-the allergist gave her the same med I gave her-the majority of her rash is gone    She definitely needs to have her anxiety med refilled-    Office Visit on 09/18/2022   Component Date Value Ref Range Status    SARS Coronavirus 2 Antigen 09/18/2022 Negative  Negative Final     Acceptable 09/18/2022 Yes   Final   Lab Visit on 09/15/2022   Component Date Value Ref Range Status    Immunofix Interp. 09/15/2022 Comment   Final    IgA 09/15/2022 305  64 - 422 mg/dL Final    IgG 09/15/2022 739  586 - 1602 mg/dL Final    IgM 09/15/2022 36  26 - 217 mg/dL Final    Total Protein 09/15/2022 6.3  6.0 - 8.5 g/dL Final    Albumin 09/15/2022 3.2  2.9 - 4.4 g/dL Final    Alpha-1 09/15/2022 0.2  0.0 - 0.4 g/dL Final    Alpha-2 09/15/2022 0.9  0.4 - 1.0 g/dL Final    Beta 09/15/2022 1.2  0.7 - 1.3 g/dL Final    Gamma 09/15/2022 0.7  0.4 - 1.8 g/dL Final    M-SPIKE, PROT ELECTRO 09/15/2022 Not Observed  Not Observed g/dL Final    Globulin, Total 09/15/2022 3.1  2.2 - 3.9 g/dL Final    A/G Ratio 09/15/2022 1.0  0.7 - 1.7 Final    Please Note: 09/15/2022 Comment   Final    Methlymalonic Acid 09/15/2022 216  0 - 378 nmol/L Final    Thiamine 09/15/2022 494.7 (H)  66.5 - 200.0 nmol/L Final    Vitamin B-12 09/15/2022 1092 (H)  210 - 950 pg/mL Final    Vitamin B6 09/15/2022 86.0 (H)  3.4 - 65.2 ug/L Final    SINGH 09/15/2022 Negative   Final    Sed Rate 09/15/2022 38 (H)  0 - 20 mm/Hr Final    CRP 09/15/2022 0.74  <0.76 mg/dL Final   Hospital Outpatient Visit on 08/02/2022   Component Date Value Ref Range Status    P/R-wave RA Lead 08/02/2022 3.3  mV Final    P/R-wave RA Lead (native) 08/02/2022 10.6  mV Final    Impedance RA Lead 08/02/2022 494  Ohms Final     Impedance RA Lead (native) 08/02/2022 570  Ohms Final    Threshold V RA Lead 08/02/2022 0.875  V Final    Theshold ms RA Lead 08/02/2022 0.4  ms Final    Threshold V RA Lead (native) 08/02/2022 0.5  V Final    Threshold ms RA Lead (native) 08/02/2022 0.4  ms Final   Lab Visit on 05/20/2022   Component Date Value Ref Range Status    Hemoglobin A1C 05/20/2022 5.5  4.5 - 6.2 % Final    Estimated Avg Glucose 05/20/2022 111  68 - 131 mg/dL Final    Microalbumin, Urine 05/20/2022 10.8  <19.9 ug/mL Final    Creatinine, Urine 05/20/2022 97.0  15.0 - 325.0 mg/dL Final    Microalb/Creat Ratio 05/20/2022 11.1  0.0 - 30.0 ug/mg Final    Cholesterol 05/20/2022 223 (H)  120 - 199 mg/dL Final    Triglycerides 05/20/2022 109  30 - 150 mg/dL Final    HDL 05/20/2022 56  40 - 75 mg/dL Final    LDL Cholesterol 05/20/2022 145.2  63.0 - 159.0 mg/dL Final    HDL/Cholesterol Ratio 05/20/2022 25.1  20.0 - 50.0 % Final    Total Cholesterol/HDL Ratio 05/20/2022 4.0  2.0 - 5.0 Final    Non-HDL Cholesterol 05/20/2022 167  mg/dL Final    Glucose 05/20/2022 135 (H)  70 - 110 mg/dL Final    Sodium 05/20/2022 139  136 - 145 mmol/L Final    Potassium 05/20/2022 4.0  3.5 - 5.1 mmol/L Final    Chloride 05/20/2022 103  95 - 110 mmol/L Final    CO2 05/20/2022 24  23 - 29 mmol/L Final    BUN 05/20/2022 28 (H)  8 - 23 mg/dL Final    Calcium 05/20/2022 9.3  8.7 - 10.5 mg/dL Final    Creatinine 05/20/2022 1.2  0.5 - 1.4 mg/dL Final    Albumin 05/20/2022 4.0  3.5 - 5.2 g/dL Final    Phosphorus 05/20/2022 3.6  2.7 - 4.5 mg/dL Final    eGFR if  05/20/2022 48.0 (A)  >60 mL/min/1.73 m^2 Final    eGFR if non African American 05/20/2022 41.6 (A)  >60 mL/min/1.73 m^2 Final    Anion Gap 05/20/2022 12  8 - 16 mmol/L Final    RBC, UA 05/20/2022 1  0 - 4 /hpf Final    WBC, UA 05/20/2022 0  0 - 5 /hpf Final    Bacteria 05/20/2022 Negative  None-Occ /hpf Final    Squam Epithel, UA 05/20/2022 1  /hpf Final    Hyaline Casts, UA 05/20/2022 2 (A)  0-1/lpf  /lpf Final    Microscopic Comment 05/20/2022 SEE COMMENT   Final    Protein, Urine Random 05/20/2022 6  6 - 15 mg/dL Final    Creatinine, Urine 05/20/2022 97.0  15.0 - 325.0 mg/dL Final    Prot/Creat Ratio, Urine 05/20/2022 0.06  0.00 - 0.20 Final   Lab Visit on 05/10/2022   Component Date Value Ref Range Status    BNP 05/10/2022 490 (H)  0 - 99 pg/mL Final    TSH 05/10/2022 1.280  0.340 - 5.600 uIU/mL Final    WBC 05/10/2022 7.41  3.90 - 12.70 K/uL Final    RBC 05/10/2022 4.20  4.00 - 5.40 M/uL Final    Hemoglobin 05/10/2022 11.5 (L)  12.0 - 16.0 g/dL Final    Hematocrit 05/10/2022 37.2  37.0 - 48.5 % Final    MCV 05/10/2022 89  82 - 98 fL Final    MCH 05/10/2022 27.4  27.0 - 31.0 pg Final    MCHC 05/10/2022 30.9 (L)  32.0 - 36.0 g/dL Final    RDW 05/10/2022 13.8  11.5 - 14.5 % Final    Platelets 05/10/2022 249  150 - 450 K/uL Final    MPV 05/10/2022 10.0  9.2 - 12.9 fL Final    Immature Granulocytes 05/10/2022 0.3  0.0 - 0.5 % Final    Gran # (ANC) 05/10/2022 3.4  1.8 - 7.7 K/uL Final    Immature Grans (Abs) 05/10/2022 0.02  0.00 - 0.04 K/uL Final    Lymph # 05/10/2022 2.5  1.0 - 4.8 K/uL Final    Mono # 05/10/2022 1.1 (H)  0.3 - 1.0 K/uL Final    Eos # 05/10/2022 0.3  0.0 - 0.5 K/uL Final    Baso # 05/10/2022 0.08  0.00 - 0.20 K/uL Final    nRBC 05/10/2022 0  0 /100 WBC Final    Gran % 05/10/2022 46.0  38.0 - 73.0 % Final    Lymph % 05/10/2022 34.3  18.0 - 48.0 % Final    Mono % 05/10/2022 14.7  4.0 - 15.0 % Final    Eosinophil % 05/10/2022 3.6  0.0 - 8.0 % Final    Basophil % 05/10/2022 1.1  0.0 - 1.9 % Final    Differential Method 05/10/2022 Automated   Final    Sodium 05/10/2022 140  136 - 145 mmol/L Final    Potassium 05/10/2022 3.9  3.5 - 5.1 mmol/L Final    Chloride 05/10/2022 103  95 - 110 mmol/L Final    CO2 05/10/2022 27  23 - 29 mmol/L Final    Glucose 05/10/2022 93  70 - 110 mg/dL Final    BUN 05/10/2022 24 (H)  8 - 23 mg/dL Final    Creatinine 05/10/2022 1.4  0.5 - 1.4 mg/dL Final    Calcium  05/10/2022 9.0  8.7 - 10.5 mg/dL Final    Total Protein 05/10/2022 7.2  6.0 - 8.4 g/dL Final    Albumin 05/10/2022 3.9  3.5 - 5.2 g/dL Final    Total Bilirubin 05/10/2022 0.6  0.1 - 1.0 mg/dL Final    Alkaline Phosphatase 05/10/2022 68  55 - 135 U/L Final    AST 05/10/2022 24  10 - 40 U/L Final    ALT 05/10/2022 22  10 - 44 U/L Final    Anion Gap 05/10/2022 10  8 - 16 mmol/L Final    eGFR if  05/10/2022 39.8 (A)  >60 mL/min/1.73 m^2 Final    eGFR if non African American 05/10/2022 34.5 (A)  >60 mL/min/1.73 m^2 Final    Total Protein 05/10/2022 7.2  6.0 - 8.4 g/dL Final    Albumin 05/10/2022 3.9  3.5 - 5.2 g/dL Final    Total Bilirubin 05/10/2022 0.6  0.1 - 1.0 mg/dL Final    Bilirubin, Direct 05/10/2022 <0.1 (A)  0.1 - 0.3 mg/dL Final    AST 05/10/2022 24  10 - 40 U/L Final    ALT 05/10/2022 22  10 - 44 U/L Final    Alkaline Phosphatase 05/10/2022 68  55 - 135 U/L Final    Glucose 05/10/2022 93  70 - 110 mg/dL Final    Sodium 05/10/2022 140  136 - 145 mmol/L Final    Potassium 05/10/2022 3.9  3.5 - 5.1 mmol/L Final    Chloride 05/10/2022 103  95 - 110 mmol/L Final    CO2 05/10/2022 27  23 - 29 mmol/L Final    BUN 05/10/2022 24 (H)  8 - 23 mg/dL Final    Calcium 05/10/2022 9.0  8.7 - 10.5 mg/dL Final    Creatinine 05/10/2022 1.4  0.5 - 1.4 mg/dL Final    Albumin 05/10/2022 3.9  3.5 - 5.2 g/dL Final    Phosphorus 05/10/2022 3.7  2.7 - 4.5 mg/dL Final    eGFR if  05/10/2022 39.8 (A)  >60 mL/min/1.73 m^2 Final    eGFR if non African American 05/10/2022 34.5 (A)  >60 mL/min/1.73 m^2 Final    Anion Gap 05/10/2022 10  8 - 16 mmol/L Final   Office Visit on 01/25/2022   Component Date Value Ref Range Status    POC Blood, Urine 01/25/2022 Negative  Negative Final    POC Bilirubin, Urine 01/25/2022 Negative  Negative Final    POC Urobilinogen, Urine 01/25/2022 norm  0.1 - 1.1 Final    POC Ketones, Urine 01/25/2022 Negative  Negative Final    POC Protein, Urine 01/25/2022 Negative  Negative Final     POC Nitrates, Urine 01/25/2022 Negative  Negative Final    POC Glucose, Urine 01/25/2022 Negative  Negative Final    pH, UA 01/25/2022 6.0   Final    POC Specific Gravity, Urine 01/25/2022 1.005  1.003 - 1.029 Final    POC Leukocytes, Urine 01/25/2022 Negative  Negative Final    POC Rapid COVID 01/25/2022 Negative  Negative Final     Acceptable 01/25/2022 Yes   Final   Hospital Outpatient Visit on 01/18/2022   Component Date Value Ref Range Status    Battery Voltage (V) 01/18/2022 3.00  V Final    P/R-wave RA Lead 01/18/2022 1.5  mV Final    P/R-wave RA Lead (native) 01/18/2022 10.9  mV Final    Impedance RA Lead 01/18/2022 475  Ohms Final    Impedance RA Lead (native) 01/18/2022 532  Ohms Final    Threshold V RA Lead 01/18/2022 0.750  V Final    Theshold ms RA Lead 01/18/2022 0.40  ms Final    Threshold V RA Lead (native) 01/18/2022 0.500  V Final    Threshold ms RA Lead (native) 01/18/2022 0.40  ms Final   Office Visit on 01/10/2022   Component Date Value Ref Range Status    Cologuard Result 01/14/2022 Negative  Negative Final   Lab Visit on 11/08/2021   Component Date Value Ref Range Status    Hemoglobin A1C 11/08/2021 5.9  4.5 - 6.2 % Final    Estimated Avg Glucose 11/08/2021 123  68 - 131 mg/dL Final    WBC 11/08/2021 7.35  3.90 - 12.70 K/uL Final    RBC 11/08/2021 4.30  4.00 - 5.40 M/uL Final    Hemoglobin 11/08/2021 12.0  12.0 - 16.0 g/dL Final    Hematocrit 11/08/2021 38.0  37.0 - 48.5 % Final    MCV 11/08/2021 88  82 - 98 fL Final    MCH 11/08/2021 27.9  27.0 - 31.0 pg Final    MCHC 11/08/2021 31.6 (L)  32.0 - 36.0 g/dL Final    RDW 11/08/2021 13.2  11.5 - 14.5 % Final    Platelets 11/08/2021 281  150 - 450 K/uL Final    MPV 11/08/2021 10.2  9.2 - 12.9 fL Final    Immature Granulocytes 11/08/2021 0.3  0.0 - 0.5 % Final    Gran # (ANC) 11/08/2021 3.3  1.8 - 7.7 K/uL Final    Immature Grans (Abs) 11/08/2021 0.02  0.00 - 0.04 K/uL Final    Lymph # 11/08/2021 3.0  1.0 - 4.8 K/uL Final    Mono  # 11/08/2021 0.8  0.3 - 1.0 K/uL Final    Eos # 11/08/2021 0.2  0.0 - 0.5 K/uL Final    Baso # 11/08/2021 0.06  0.00 - 0.20 K/uL Final    nRBC 11/08/2021 0  0 /100 WBC Final    Gran % 11/08/2021 44.9  38.0 - 73.0 % Final    Lymph % 11/08/2021 40.4  18.0 - 48.0 % Final    Mono % 11/08/2021 10.5  4.0 - 15.0 % Final    Eosinophil % 11/08/2021 3.1  0.0 - 8.0 % Final    Basophil % 11/08/2021 0.8  0.0 - 1.9 % Final    Differential Method 11/08/2021 Automated   Final    Cholesterol 11/08/2021 248 (H)  120 - 199 mg/dL Final    Triglycerides 11/08/2021 185 (H)  30 - 150 mg/dL Final    HDL 11/08/2021 41  40 - 75 mg/dL Final    LDL Cholesterol 11/08/2021 170.0 (H)  63.0 - 159.0 mg/dL Final    HDL/Cholesterol Ratio 11/08/2021 16.5 (L)  20.0 - 50.0 % Final    Total Cholesterol/HDL Ratio 11/08/2021 6.0 (H)  2.0 - 5.0 Final    Non-HDL Cholesterol 11/08/2021 207  mg/dL Final    Sodium 11/08/2021 144  136 - 145 mmol/L Final    Potassium 11/08/2021 4.0  3.5 - 5.1 mmol/L Final    Chloride 11/08/2021 106  95 - 110 mmol/L Final    CO2 11/08/2021 29  23 - 29 mmol/L Final    Glucose 11/08/2021 88  70 - 110 mg/dL Final    BUN 11/08/2021 25 (H)  8 - 23 mg/dL Final    Creatinine 11/08/2021 1.3  0.5 - 1.4 mg/dL Final    Calcium 11/08/2021 9.5  8.7 - 10.5 mg/dL Final    Anion Gap 11/08/2021 9  8 - 16 mmol/L Final    eGFR if  11/08/2021 43.8 (A)  >60 mL/min/1.73 m^2 Final    eGFR if non  11/08/2021 38.0 (A)  >60 mL/min/1.73 m^2 Final    Glucose 11/08/2021 88  70 - 110 mg/dL Final    Sodium 11/08/2021 144  136 - 145 mmol/L Final    Potassium 11/08/2021 4.0  3.5 - 5.1 mmol/L Final    Chloride 11/08/2021 106  95 - 110 mmol/L Final    CO2 11/08/2021 29  23 - 29 mmol/L Final    BUN 11/08/2021 25 (H)  8 - 23 mg/dL Final    Calcium 11/08/2021 9.5  8.7 - 10.5 mg/dL Final    Creatinine 11/08/2021 1.3  0.5 - 1.4 mg/dL Final    Albumin 11/08/2021 3.8  3.5 - 5.2 g/dL Final    Phosphorus 11/08/2021 4.1  2.7 - 4.5 mg/dL Final     eGFR if  11/08/2021 43.8 (A)  >60 mL/min/1.73 m^2 Final    eGFR if non  11/08/2021 38.0 (A)  >60 mL/min/1.73 m^2 Final    Anion Gap 11/08/2021 9  8 - 16 mmol/L Final    Vit D, 25-Hydroxy 11/08/2021 >120 (H)  30 - 96 ng/mL Final    Specimen UA 11/08/2021 Urine, Clean Catch   Final    Color, UA 11/08/2021 Yellow  Yellow, Straw, Anila Final    Appearance, UA 11/08/2021 Clear  Clear Final    pH, UA 11/08/2021 7.0  5.0 - 8.0 Final    Specific Gravity, UA 11/08/2021 1.020  1.005 - 1.030 Final    Protein, UA 11/08/2021 Negative  Negative Final    Glucose, UA 11/08/2021 Negative  Negative Final    Ketones, UA 11/08/2021 Negative  Negative Final    Bilirubin (UA) 11/08/2021 Negative  Negative Final    Occult Blood UA 11/08/2021 Negative  Negative Final    Nitrite, UA 11/08/2021 Negative  Negative Final    Urobilinogen, UA 11/08/2021 Negative  Negative EU/dL Final    Leukocytes, UA 11/08/2021 1+ (A)  Negative Final    RBC, UA 11/08/2021 1  0 - 4 /hpf Final    WBC, UA 11/08/2021 3  0 - 5 /hpf Final    Bacteria 11/08/2021 Negative  None-Occ /hpf Final    Squam Epithel, UA 11/08/2021 4  /hpf Final    Hyaline Casts, UA 11/08/2021 4 (A)  0-1/lpf /lpf Final    Microscopic Comment 11/08/2021 SEE COMMENT   Final    PTH, Intact 11/08/2021 26.1  9.0 - 77.0 pg/mL Final    Protein, Urine Random 11/08/2021 7  6 - 15 mg/dL Final    Creatinine, Urine 11/08/2021 87.0  15.0 - 325.0 mg/dL Final    Prot/Creat Ratio, Urine 11/08/2021 0.08  0.00 - 0.20 Final       Past Medical History:   Diagnosis Date    Coronary artery disease     Dermatitis     Dermatitis 12/8/2017    Diabetes mellitus     Diabetes mellitus type II     Leah Barr virus infection     Fibromyalgia     GERD (gastroesophageal reflux disease)     Hypertension     MI (myocardial infarction) 09/23/2011    Pure hypercholesterolemia 2/26/2020     Past Surgical History:   Procedure Laterality Date    adenoids      ANGIOPLASTY  1987    APPENDECTOMY       CARDIAC PACEMAKER PLACEMENT  01/12/2017    CATARACT EXTRACTION, BILATERAL Bilateral 9/2/15, 3/17/15    right eye-9/2/15, left eye-3/17/15    CHOLECYSTECTOMY  1987    CORONARY ARTERY BYPASS GRAFT  2006    quadruple    coronary stents  1999    x2    CYST REMOVAL  04/25/2017    on back    HYSTERECTOMY  1966    OVARY SURGERY  1948    Ovary burst & was repaired    RHIZOTOMY  09/14/2018    TONSILLECTOMY  1940     Family History   Problem Relation Age of Onset    No Known Problems Mother     No Known Problems Father        Marital Status:   Alcohol History:  reports no history of alcohol use.  Tobacco History:  reports that she has never smoked. She has never used smokeless tobacco.  Drug History:  reports no history of drug use.    Review of patient's allergies indicates:   Allergen Reactions    Arthrotec 50 [diclofenac-misoprostol]     Doxycycline     Effexor [venlafaxine]     Estradiol     Flagyl [metronidazole]     Fluconazole     Gabapentin     Iodine     Levaquin [levofloxacin]     Nexium [esomeprazole magnesium]     Penicillins     Red yeast rice (monascus purpureus)     Shellfish containing products     Statins-hmg-coa reductase inhibitors     Sulfa (sulfonamide antibiotics)     Tea tree oil     Tegaserod      ZOLNORM    Tegaserod hydrogen maleate     Trazodone     Yeast, dried     Adhesive Rash     Band-aids       Current Outpatient Medications:     acetaminophen (TYLENOL) 500 MG tablet, Take 500 mg by mouth every 6 (six) hours as needed for Pain., Disp: , Rfl:     ALPRAZolam (XANAX) 0.25 MG tablet, TAKE 1 TABLET BY MOUTH AS NEEDED FOR ANXIETY., Disp: 90 tablet, Rfl: 0    amitriptyline (ELAVIL) 25 MG tablet, TAKE 1 TABLET BY MOUTH EVERY DAY, Disp: 90 tablet, Rfl: 1    apixaban (ELIQUIS) 2.5 mg Tab, Take 1 tablet (2.5 mg total) by mouth 2 (two) times daily., Disp: 180 tablet, Rfl: 1    aspirin (ECOTRIN) 81 MG EC tablet, Take 1 tablet (81 mg total) by mouth once daily. (Patient taking differently: Take 81  mg by mouth every evening.), Disp: , Rfl: 0    camphor-menthol 0.5-0.5% (SARNA ORIGINAL) lotion, Apply topically as needed for Itching., Disp: 222 mL, Rfl: 3    cholecalciferol, vitamin D3, 100 mcg (4,000 unit) Cap, Take 1 capsule by mouth 2 (two) times daily. , Disp: , Rfl:     coenzyme Q10 100 mg capsule, Take 1 capsule (100 mg total) by mouth 2 (two) times daily., Disp: 60 capsule, Rfl: 11    diphenhydrAMINE (BENYLIN) 12.5 mg/5 mL liquid, Take 12.5 mg by mouth 4 (four) times daily as needed for Allergies., Disp: , Rfl:     ECHINACEA ORAL, Take 750 mg by mouth 2 (two) times a day., Disp: , Rfl:     fexofenadine (ALLEGRA) 180 MG tablet, Take 1 tablet (180 mg total) by mouth once daily., Disp: 60 tablet, Rfl: 3    hydrOXYzine HCL (ATARAX) 25 MG tablet, Take 1 tablet by mouth daily as needed., Disp: , Rfl:     isosorbide mononitrate (IMDUR) 120 MG 24 hr tablet, Take 120 mg by mouth once daily., Disp: , Rfl:     levothyroxine (SYNTHROID) 88 MCG tablet, Take 1 tablet (88 mcg total) by mouth once daily., Disp: 90 tablet, Rfl: 1    losartan (COZAAR) 25 MG tablet, Take 0.5 tablets (12.5 mg total) by mouth every evening., Disp: 45 tablet, Rfl: 3    MAGNESIUM ORAL, Take 500 mg by mouth once daily. , Disp: , Rfl:     metoprolol succinate (TOPROL-XL) 25 MG 24 hr tablet, Take 1 tablet (25 mg total) by mouth once daily., Disp: 90 tablet, Rfl: 3    montelukast (SINGULAIR) 10 mg tablet, Take 1 tablet (10 mg total) by mouth every evening., Disp: 90 tablet, Rfl: 1    multivitamin (THERAGRAN) tablet, Take 1 tablet by mouth 2 (two) times a day. , Disp: , Rfl:     nitroGLYCERIN (NITROSTAT) 0.4 MG SL tablet, Place 0.4 mg under the tongue every 5 (five) minutes as needed for Chest pain., Disp: , Rfl:     tiZANidine (ZANAFLEX) 4 MG tablet, TAKE 1 TABLET BY MOUTH ONCE DAILY., Disp: 90 tablet, Rfl: 0    traMADoL (ULTRAM) 50 mg tablet, Take 1 tablet (50 mg total) by mouth 2 (two) times daily as needed (pain)., Disp: 14 tablet, Rfl: 0     triamcinolone acetonide 0.1% (KENALOG) 0.1 % cream, aaa bid x 1-2 wks, tk 1 wk off prn rash, Disp: 30 g, Rfl: 2    turmeric 400 mg Cap, Take 1,200 mg by mouth once daily., Disp: , Rfl:     UNABLE TO FIND, Take 1 capsule by mouth. Bio-Smoothe- premium nerve, Disp: , Rfl:     UNABLE TO FIND, Take 1 capsule by mouth once daily. medication name: GI probiotic, Disp: , Rfl:     UNABLE TO FIND, Take 1 capsule by mouth once daily. Joint health, Disp: , Rfl:     UNABLE TO FIND, Take 1 capsule by mouth 2 (two) times a day. Cardio platinum, Disp: , Rfl:     UNABLE TO FIND, Nerve Jorge, Disp: , Rfl:     zinc gluconate 50 mg tablet, Take 50 mg by mouth 2 (two) times a day., Disp: , Rfl:     hydrocortisone 2.5 % ointment, Apply topically 2 (two) times daily., Disp: 28 g, Rfl: 3    Current Facility-Administered Medications:     evolocumab PnIj 140 mg, 140 mg, Subcutaneous, Q14 Days, Kenton MD Chaz    Review of Systems   Constitutional:  Negative for activity change, appetite change, chills, fatigue, fever and unexpected weight change.   HENT:  Negative for congestion, ear pain, hearing loss, postnasal drip, sinus pain, sneezing, sore throat, tinnitus and trouble swallowing.    Eyes:  Negative for pain, discharge and visual disturbance.   Respiratory:  Negative for cough, choking, shortness of breath and wheezing.    Cardiovascular:  Negative for chest pain, palpitations and leg swelling.   Gastrointestinal:  Negative for abdominal distention, abdominal pain, blood in stool, constipation, diarrhea, nausea and vomiting.   Endocrine: Negative for cold intolerance and heat intolerance.   Genitourinary:  Negative for difficulty urinating, dysuria, frequency, pelvic pain and urgency.   Musculoskeletal:  Negative for back pain, joint swelling and neck pain.   Skin:  Negative for pallor and rash.   Allergic/Immunologic: Negative for environmental allergies and food allergies.   Neurological:  Negative for dizziness, tremors,  weakness, numbness and headaches.        In PT for balance therapy   Hematological:  Does not bruise/bleed easily.   Psychiatric/Behavioral:  Negative for agitation, confusion, dysphoric mood, sleep disturbance and suicidal ideas. The patient is not nervous/anxious.    All other systems reviewed and are negative.       Objective:      Vitals    Vitals - 1 value per visit 10/17/2022 10/17/2022 10/18/2022 11/3/2022 11/3/2022   SYSTOLIC - 126 150 - 132   DIASTOLIC - 60 81 - 66   Pulse - 70 95 - 70   Temp - 98.1 97 - 98.4   Resp - 14 - - 14   SPO2 - 98 99 - 100   Weight (lb) - 132 - - 135   Weight (kg) - 59.875 - - 61.236   Height - 64 64 - 64   BMI (Calculated) - 22.6 - - 23.2   VISIT REPORT - - - - -   Pain Score  0 - - 0 -   Some recent data might be hidden       Physical Exam  Vitals and nursing note reviewed.   Constitutional:       General: She is not in acute distress.     Appearance: She is not ill-appearing.   HENT:      Head: Normocephalic and atraumatic.   Eyes:      Extraocular Movements: Extraocular movements intact.      Conjunctiva/sclera: Conjunctivae normal.      Pupils: Pupils are equal, round, and reactive to light.   Neck:      Vascular: No carotid bruit.   Cardiovascular:      Rate and Rhythm: Normal rate and regular rhythm.      Pulses: Normal pulses.      Heart sounds: Normal heart sounds.   Pulmonary:      Effort: Pulmonary effort is normal.      Breath sounds: Normal breath sounds.   Abdominal:      General: Bowel sounds are normal.      Palpations: Abdomen is soft.   Musculoskeletal:      Right lower leg: No edema.      Left lower leg: No edema.      Comments: Some superficial varicosities   Lymphadenopathy:      Cervical: No cervical adenopathy.   Skin:     General: Skin is warm and dry.      Findings: No lesion or rash.   Neurological:      General: No focal deficit present.      Mental Status: She is alert and oriented to person, place, and time.   Psychiatric:         Mood and Affect: Mood  normal.         Behavior: Behavior normal.         Thought Content: Thought content normal.         Assessment:       1. Contusion of face, subsequent encounter    2. Rash and nonspecific skin eruption    3. Bedbug bite, initial encounter    4. Pruritus         Plan:       Contusion of face, subsequent encounter        -     resolved    Rash and nonspecific skin eruption  -     hydrocortisone 2.5 % ointment; Apply topically 2 (two) times daily.  Dispense: 28 g; Refill: 3    Bedbug bite, initial encounter  -     hydrocortisone 2.5 % ointment; Apply topically 2 (two) times daily.  Dispense: 28 g; Refill: 3    Pruritus  -     hydrocortisone 2.5 % ointment; Apply topically 2 (two) times daily.  Dispense: 28 g; Refill: 3    No follow-ups on file.        11/3/2022 Elena Sullivan M.D.

## 2022-11-04 ENCOUNTER — CLINICAL SUPPORT (OUTPATIENT)
Dept: REHABILITATION | Facility: HOSPITAL | Age: 87
End: 2022-11-04
Payer: MEDICARE

## 2022-11-04 DIAGNOSIS — R29.898 LEFT LEG WEAKNESS: ICD-10-CM

## 2022-11-04 DIAGNOSIS — R26.89 IMBALANCE: Primary | ICD-10-CM

## 2022-11-04 PROCEDURE — 97530 THERAPEUTIC ACTIVITIES: CPT | Mod: KX,PN

## 2022-11-04 PROCEDURE — 97110 THERAPEUTIC EXERCISES: CPT | Mod: KX,PN

## 2022-11-04 NOTE — PROGRESS NOTES
"[facility]   Physical Therapy Treatment Note     Name: Karma Chen  Clinic Number: [id number]    Therapy Diagnosis:   Encounter Diagnoses   Name Primary?    Imbalance Yes    Left leg weakness        Physician: Elena Sullivan MD    Visit Date: 11/4/2022    Physician Orders: PT Eval and Treat   Medical Diagnosis from Referral: imbalance  Evaluation Date: 10/12/2022  Authorization Period Expiration: 12/31/2022  Plan of Care Expiration: 11/26/22  Visit # / Visits authorized: 3/ 40     Time In: 1425 (late arrival)  Time Out: 1458  Total Billable Time: 33 minutes     Precautions: Diabetes, Fall, and pacemaker      SUBJECTIVE     Pt reports: "I found out we had bed bugs." Says she has not had any falls our "near-falls" recently.    She  does not have a home exercise program.  Response to previous treatment: no changes  Functional change: none    Pain: 4/10  Location: neck      OBJECTIVE     Objective Measures updated at progress report unless specified.     Treatment     Karma received the treatments listed below:      therapeutic exercises to develop strength, endurance, and flexibility for 23 minutes including:  NuStep at level 2 for 10 minutes  X 30 each seated bilateral lower extremity therapeutic exercise (1#)  Marching  long arc quad  hip abduction (red theraband)  ball squeeze     therapeutic activities to improve functional performance for 10  minutes, including:  10 x Sit-to-stands from high-low mat to flat surface with bilateral upper extremity support with initial cues for hand and foot placement  2 x 5 Sit-to-stands from high-low mat to flat surface with Supervision or Set-up Assistance with light green theraband to cue hip abduction    Patient Education and Home Exercises     Home Exercises Provided and Patient Education Provided     Education provided:   - proper therapeutic exercise technique    Written Home Exercises Provided:  to be provided at future appointment . " "      ASSESSMENT     Patient tolerated treatment with minimal complaints. She showed increased endurance with her tolerance of sit-to-stand training with minimal breaks. She required tactile and verbal cues to prevent knees valgus, but performed sit-to-stands well with the light-green thera-band cueing. Patient requires continued intervention to improve endurance, strength, and functional mobility.    Karma Is progressing fairly well towards her goals.   Pt prognosis is Fair.     Pt will continue to benefit from skilled outpatient physical therapy to address the deficits listed in the problem list box on initial evaluation, provide pt/family education and to maximize pt's level of independence in the home and community environment.     Pt's spiritual, cultural and educational needs considered and pt agreeable to plan of care and goals.     Anticipated barriers to physical therapy: severity of pain and imbalance    Goals:     Short Term Goals (3 Weeks):   Decrease patient's complaints of pain to 0/10 during performance of activities of daily living for independence of self care activities. (NOT MET)  Patient to tolerate use of 2# weights during lower extremity therapeutic exercise to improve overall strength. (NOT MET)  Patient to perform x 45 seconds full Romberg stance, eyes closed to improve upright tolerance. (PART MET)  Patient to tolerate x 10 repetitions sit to stand so that she may rise with single upper extremity support. (NOT MET)     Long Term Goals (6 Weeks):   Patient to demonstrate competence with home exercise program to maintain therapeutic gains. (NOT MET)  Patient to improve left knee MMT 1/2 grade to demo strength gains from therapeutic intervention. (NOT MET)  Patient to ambulate 200 feet with stand by assist and improved dustin/symmetry. (NOT MET)  Patient to demonstrate improvement in her Tinetti score from "high fall risk" to "moderate fall risk." (NOT MET)      PLAN     Continue to advance " strength/balance/mobility training to patient's tolerance.      Kesha Issa, SPT    11/4/2022

## 2022-11-11 ENCOUNTER — CLINICAL SUPPORT (OUTPATIENT)
Dept: REHABILITATION | Facility: HOSPITAL | Age: 87
End: 2022-11-11
Payer: MEDICARE

## 2022-11-11 DIAGNOSIS — R29.898 LEFT LEG WEAKNESS: Primary | ICD-10-CM

## 2022-11-11 DIAGNOSIS — R26.89 IMBALANCE: ICD-10-CM

## 2022-11-11 PROCEDURE — 97112 NEUROMUSCULAR REEDUCATION: CPT | Mod: KX,PN,CQ

## 2022-11-11 PROCEDURE — 97110 THERAPEUTIC EXERCISES: CPT | Mod: KX,PN,CQ

## 2022-11-11 NOTE — PROGRESS NOTES
[facility]   Physical Therapy Treatment Note     Name: Karma Chen  Clinic Number: [id number]    Therapy Diagnosis:   Encounter Diagnoses   Name Primary?    Left leg weakness Yes    Imbalance        Physician: Elena Sullivan MD    Visit Date: 11/11/2022    Physician Orders: PT Eval and Treat   Medical Diagnosis from Referral: imbalance  Evaluation Date: 10/12/2022  Authorization Period Expiration: 12/31/2022  Plan of Care Expiration: 11/26/22  Visit # / Visits authorized: 4/ 40     Time In: 130  Time Out: 213  Total Billable Time: 43 minutes     Precautions: Diabetes, Fall, and pacemaker      SUBJECTIVE     Pt reports: been a rough few days.   She  does not have a home exercise program.  Response to previous treatment: no complaints   Functional change: none    Pain: 4/10  Location: neck      OBJECTIVE     Objective Measures updated at progress report unless specified.     Treatment     Karma received the treatments listed below:      therapeutic exercises to develop strength, endurance, and flexibility for 20 minutes including:    NuStep at level 2 for 10 minutes    X 20 each seated bilateral lower extremity therapeutic exercise (1#)  Marching  long arc quad  hip abduction (lime green theraband)  ball squeeze     neuromuscular re-education activities to assess: Balance and Vestibular function for 23 minutes.The following activities were included:     X 15 each standing balance therapeutic exercise = mini squats and heel raises/ toe raises   X 1 minute stand on AirEx               X 15 alternating toe taps on 4-inch cones              X 45 seconds each full Romberg stance = eyes open, eyes closed   Gait with SPC x 80ft in right hand    Patient Education and Home Exercises     Home Exercises Provided and Patient Education Provided     Education provided:   - proper therapeutic exercise technique    Written Home Exercises Provided:  to be provided at future appointment .  "      ASSESSMENT     Patient tolerated session well with focus on Bilateral lower extremity strength and balance. Challenged appropriately and performed gait training with SPC today with no loss of balance. Progress as tolerated to improve gait and functional mobility.     Karma Is progressing fairly well towards her goals.   Pt prognosis is Fair.     Pt will continue to benefit from skilled outpatient physical therapy to address the deficits listed in the problem list box on initial evaluation, provide pt/family education and to maximize pt's level of independence in the home and community environment.     Pt's spiritual, cultural and educational needs considered and pt agreeable to plan of care and goals.     Anticipated barriers to physical therapy: severity of pain and imbalance    Goals:     Short Term Goals (3 Weeks):   Decrease patient's complaints of pain to 0/10 during performance of activities of daily living for independence of self care activities. (NOT MET)  Patient to tolerate use of 2# weights during lower extremity therapeutic exercise to improve overall strength. (NOT MET)  Patient to perform x 45 seconds full Romberg stance, eyes closed to improve upright tolerance. (PART MET)  Patient to tolerate x 10 repetitions sit to stand so that she may rise with single upper extremity support. (NOT MET)     Long Term Goals (6 Weeks):   Patient to demonstrate competence with home exercise program to maintain therapeutic gains. (NOT MET)  Patient to improve left knee MMT 1/2 grade to demo strength gains from therapeutic intervention. (NOT MET)  Patient to ambulate 200 feet with stand by assist and improved dustin/symmetry. (NOT MET)  Patient to demonstrate improvement in her Tinetti score from "high fall risk" to "moderate fall risk." (NOT MET)      PLAN     Continue to advance strength/balance/mobility training to patient's tolerance.      Racheal Pang, PTA    11/11/2022            "

## 2022-11-15 ENCOUNTER — CLINICAL SUPPORT (OUTPATIENT)
Dept: REHABILITATION | Facility: HOSPITAL | Age: 87
End: 2022-11-15
Payer: MEDICARE

## 2022-11-15 DIAGNOSIS — R26.89 IMBALANCE: Primary | ICD-10-CM

## 2022-11-15 DIAGNOSIS — R29.898 LEFT LEG WEAKNESS: ICD-10-CM

## 2022-11-15 PROCEDURE — 97110 THERAPEUTIC EXERCISES: CPT | Mod: KX,PN

## 2022-11-15 PROCEDURE — 97112 NEUROMUSCULAR REEDUCATION: CPT | Mod: KX,PN

## 2022-11-15 NOTE — PROGRESS NOTES
[facility]   Physical Therapy Progress Note     Name: Karma Chen  Clinic Number: [id number]    Therapy Diagnosis:   Encounter Diagnoses   Name Primary?    Imbalance Yes    Left leg weakness      Physician: Elena Sullivan MD    Visit Date: 11/15/2022    Physician Orders: PT Eval and Treat   Medical Diagnosis from Referral: imbalance  Evaluation Date: 10/12/2022  Authorization Period Expiration: 12/31/2022  Plan of Care Expiration: 11/26/22  Visit # / Visits authorized: 5/ 40     Time In: 1332  Time Out: 1412  Total Billable Time: 40 minutes     Precautions: Diabetes, Fall, and pacemaker      SUBJECTIVE     Pt reports: taking a pain pill earlier for her left knee pain - mild elevation.    She  does not have a home exercise program.  Response to previous treatment: mild increase in pain  Functional change: none    Pain: 5/10  Location: left knee        OBJECTIVE     Lower Extremity Strength  Right LE   Left LE     Hip flexion:  4/5 Hip flexion: 4-/5   Knee extension: 4/5 Knee extension: 4-/5   Ankle dorsiflexion:  4/5 Ankle dorsiflexion: 4/5         Gait Assessment:(if indicated)  - AD used: none  - Assistance: stand by assist   - Distance: 2 x 50 feet       Treatment     Karma received the treatments listed below:      therapeutic exercises to develop strength, endurance, and flexibility for 17 minutes including:    X 10 minutes on SciFit (level 2.5) to promote flexibility prior to strength/balance/mobility training  X 15 each seated bilateral lower extremity therapeutic exercise (2#) = marching, long arc quad, hip abduction (red theraband), ball squeeze   X 8 sit to stand with bilateral upper extremity support      neuromuscular re-education activities to assess: Balance and Vestibular function for 23 minutes. The following activities were included:     X 15 each standing balance therapeutic exercise = mini squats and heel raises/ toe raises   X 20 feet each balance gait = side  stepping, backwards gait  X 1 minute stand on AirEx    X 15 alternating toe taps on 5-inch stool   X 30 seconds each 50% Romberg stance = eyes open, eyes closed      Patient Education and Home Exercises     Home Exercises Provided and Patient Education Provided     Education provided:   - proper therapeutic exercise technique    Written Home Exercises Provided:  to be provided at future appointment .       ASSESSMENT     Patient was able to tolerate increased resistance on the SciFit and during seated lower extremity therapeutic exercise; increased repetitions performed during sit to stand; bilateral upper extremity support needed during sit to stand; single upper extremity support needed during balance therapeutic exercise and balance gait; increased distance performed during balance gait; able to use 5-inch stool during alternating toe taps; performed 50% Romberg training without much difficulty; no loss of balance while standing on AirEx.    Karma Is progressing fairly well towards her goals.   Pt prognosis is Fair.     Pt will continue to benefit from skilled outpatient physical therapy to address the deficits listed in the problem list box on initial evaluation, provide pt/family education and to maximize pt's level of independence in the home and community environment.     Pt's spiritual, cultural and educational needs considered and pt agreeable to plan of care and goals.     Anticipated barriers to physical therapy: severity of pain and imbalance    Goals:     Short Term Goals (3 Weeks):   Decrease patient's complaints of pain to 0/10 during performance of activities of daily living for independence of self care activities. (NOT MET)  Patient to tolerate use of 2# weights during lower extremity therapeutic exercise to improve overall strength. (PART MET)  Patient to perform x 45 seconds full Romberg stance, eyes closed to improve upright tolerance. (PART MET)  Patient to tolerate x 10 repetitions sit to  "stand so that she may rise with single upper extremity support. (NOT MET)     Long Term Goals (6 Weeks):   Patient to demonstrate competence with home exercise program to maintain therapeutic gains. (NOT MET)  Patient to improve left knee MMT 1/2 grade to demo strength gains from therapeutic intervention. (NOT MET)  Patient to ambulate 200 feet with stand by assist and improved dustin/symmetry. (NOT MET)  Patient to demonstrate improvement in her Tinetti score from "high fall risk" to "moderate fall risk." (NOT MET)      PLAN     Continue to advance strength/balance/mobility training to patient's tolerance.      Eren Garcia, PT          "

## 2022-11-17 ENCOUNTER — CLINICAL SUPPORT (OUTPATIENT)
Dept: REHABILITATION | Facility: HOSPITAL | Age: 87
End: 2022-11-17
Payer: MEDICARE

## 2022-11-17 DIAGNOSIS — R29.898 LEFT LEG WEAKNESS: ICD-10-CM

## 2022-11-17 DIAGNOSIS — R26.89 IMBALANCE: Primary | ICD-10-CM

## 2022-11-17 PROCEDURE — 97112 NEUROMUSCULAR REEDUCATION: CPT | Mod: KX,PN

## 2022-11-17 PROCEDURE — 97110 THERAPEUTIC EXERCISES: CPT | Mod: KX,PN

## 2022-11-17 NOTE — PROGRESS NOTES
[facility]   Physical Therapy Treatment Note     Name: Karma Chen  Clinic Number: [id number]    Therapy Diagnosis:   Encounter Diagnoses   Name Primary?    Imbalance Yes    Left leg weakness      Physician: Elena Sullivan MD    Visit Date: 11/17/2022    Physician Orders: PT Eval and Treat   Medical Diagnosis from Referral: imbalance  Evaluation Date: 10/12/2022  Authorization Period Expiration: 12/31/2022  Plan of Care Expiration: 11/26/22  Visit # / Visits authorized: 6/ 40     Time In: 1202  Time Out: 1245  Total Billable Time: 43 minutes     Precautions: Diabetes, Fall, and pacemaker      SUBJECTIVE     Pt reports: taking a pain pill earlier for her left knee pain - has settled down since then.    She  does not have a home exercise program.  Response to previous treatment: minimal left knee pain  Functional change: none    Pain: 1/10  Location: left knee        OBJECTIVE      n/a    Treatment     Karma received the treatments listed below:      therapeutic exercises to develop strength, endurance, and flexibility for 23 minutes including:    X 10 minutes on SciFit (level 2.5) to promote flexibility prior to strength/balance/mobility training  X 20 each seated bilateral lower extremity therapeutic exercise (2#) = marching, long arc quad, hip abduction (red theraband), ball squeeze   X 10 sit to stand with bilateral upper extremity support      neuromuscular re-education activities to assess: Balance and Vestibular function for 16 minutes. The following activities were included:     X 15 each standing balance therapeutic exercise = mini squats and heel raises/ toe raises   X 25 feet each balance gait = side stepping, backwards gait   2 x 15 feet floor ladder high stepping with minimal assist = forwards only      Patient Education and Home Exercises     Home Exercises Provided and Patient Education Provided     Education provided:   - proper therapeutic exercise technique    Written  "Home Exercises Provided:  to be provided at future appointment .       ASSESSMENT     Patient was able to tolerate increased repetitions during sit to stand and seated lower extremity therapeutic exercise; single upper extremity support needed during balance gait and balance therapeutic exercise; minimal assist needed during floor ladder high stepping due to fear of falling.    Karma Is progressing fairly well towards her goals.   Pt prognosis is Fair.     Pt will continue to benefit from skilled outpatient physical therapy to address the deficits listed in the problem list box on initial evaluation, provide pt/family education and to maximize pt's level of independence in the home and community environment.     Pt's spiritual, cultural and educational needs considered and pt agreeable to plan of care and goals.     Anticipated barriers to physical therapy: severity of pain and imbalance    Goals:     Short Term Goals (3 Weeks):   Decrease patient's complaints of pain to 0/10 during performance of activities of daily living for independence of self care activities. (NOT MET)  Patient to tolerate use of 2# weights during lower extremity therapeutic exercise to improve overall strength. (PART MET)  Patient to perform x 45 seconds full Romberg stance, eyes closed to improve upright tolerance. (PART MET)  Patient to tolerate x 10 repetitions sit to stand so that she may rise with single upper extremity support. (PART MET)     Long Term Goals (6 Weeks):   Patient to demonstrate competence with home exercise program to maintain therapeutic gains. (NOT MET)  Patient to improve left knee MMT 1/2 grade to demo strength gains from therapeutic intervention. (NOT MET)  Patient to ambulate 200 feet with stand by assist and improved dustin/symmetry. (NOT MET)  Patient to demonstrate improvement in her Tinetti score from "high fall risk" to "moderate fall risk." (NOT MET)      PLAN     Continue to advance " strength/balance/mobility training to patient's tolerance.      Eren Garcia, PT

## 2022-11-21 ENCOUNTER — CLINICAL SUPPORT (OUTPATIENT)
Dept: REHABILITATION | Facility: HOSPITAL | Age: 87
End: 2022-11-21
Payer: MEDICARE

## 2022-11-21 DIAGNOSIS — R26.89 IMBALANCE: Primary | ICD-10-CM

## 2022-11-21 DIAGNOSIS — R29.898 LEFT LEG WEAKNESS: ICD-10-CM

## 2022-11-21 PROCEDURE — 97110 THERAPEUTIC EXERCISES: CPT | Mod: KX,PN

## 2022-11-21 PROCEDURE — 97112 NEUROMUSCULAR REEDUCATION: CPT | Mod: KX,PN

## 2022-11-21 NOTE — PROGRESS NOTES
[facility]   Physical Therapy Treatment Note     Name: Karma Chen  Clinic Number: [id number]    Therapy Diagnosis:   Encounter Diagnoses   Name Primary?    Imbalance Yes    Left leg weakness      Physician: Elena Sullivan MD    Visit Date: 11/21/2022    Physician Orders: PT Eval and Treat   Medical Diagnosis from Referral: imbalance  Evaluation Date: 10/12/2022  Authorization Period Expiration: 12/31/2022  Plan of Care Expiration: 11/26/22  Visit # / Visits authorized: 7/ 40     Time In: 1118  Time Out: 1200  Total Billable Time: 42 minutes     Precautions: Diabetes, Fall, and pacemaker      SUBJECTIVE     Pt reports: elevated pain levels in her right hip today.    She  does not have a home exercise program.  Response to previous treatment: minimal left knee pain  Functional change: none    Pain: 4/10  Location: right hip        OBJECTIVE      n/a    Treatment     Karma received the treatments listed below:      therapeutic exercises to develop strength, endurance, and flexibility for 27 minutes including:    X 5 minutes on NuStep (level 2) to promote flexibility prior to strength/balance/mobility training  X 20 each seated bilateral lower extremity therapeutic exercise (2#) = marching, long arc quad   3x30 seconds standing bilateral gastroc stretch   2x30 seconds standing alternating hamstring stretch using stool      neuromuscular re-education activities to assess: Balance and Vestibular function for 15 minutes. The following activities were included:     2X30 seconds standing balance therapeutic exercise = tandem stance alternating lead foot, standing on Airex without upper extremity support  X 25 feet each balance gait = side stepping with green Theraband , backwards gait      Patient Education and Home Exercises     Home Exercises Provided and Patient Education Provided     Education provided:   - proper therapeutic exercise technique  - continue home exercise program      Written Home Exercises Provided: yes. Exercises were reviewed and Karma was able to demonstrate them prior to the end of the session.  Karma demonstrated fair understanding of the education provided. See EMR under Patient Instructions for exercises provided during therapy sessions.      ASSESSMENT     Patient tolerated therapeutic exercises well at this time with reports of minimal to no increase in hip pain. Balance continues to improve with decreased reliance on upper extremities for balance support during balance training. Patient will benefit from continued functional training.     Karma Is progressing fairly well towards her goals.   Pt prognosis is Fair.     Pt will continue to benefit from skilled outpatient physical therapy to address the deficits listed in the problem list box on initial evaluation, provide pt/family education and to maximize pt's level of independence in the home and community environment.     Pt's spiritual, cultural and educational needs considered and pt agreeable to plan of care and goals.     Anticipated barriers to physical therapy: severity of pain and imbalance    Goals:     Short Term Goals (3 Weeks):   Decrease patient's complaints of pain to 0/10 during performance of activities of daily living for independence of self care activities. (NOT MET)  Patient to tolerate use of 2# weights during lower extremity therapeutic exercise to improve overall strength. (MET)  Patient to perform x 45 seconds full Romberg stance, eyes closed to improve upright tolerance. (PART MET)  Patient to tolerate x 10 repetitions sit to stand so that she may rise with single upper extremity support. (PART MET)     Long Term Goals (6 Weeks):   Patient to demonstrate competence with home exercise program to maintain therapeutic gains. (NOT MET)  Patient to improve left knee MMT 1/2 grade to demo strength gains from therapeutic intervention. (NOT MET)  Patient to ambulate 200 feet with stand by assist  "and improved dustin/symmetry. (NOT MET)  Patient to demonstrate improvement in her Tinetti score from "high fall risk" to "moderate fall risk." (NOT MET)      PLAN     Continue to advance strength/balance/mobility training to patient's tolerance.      Eren Garcia, PT              "

## 2022-11-22 NOTE — TELEPHONE ENCOUNTER
[facility] pharmacist Bridgett called ,  Rx omeprazole contraindicated with plavix Rx by Dr Fitzgerald    Can change to pantoprazole ?    normal...

## 2022-11-23 ENCOUNTER — CLINICAL SUPPORT (OUTPATIENT)
Dept: REHABILITATION | Facility: HOSPITAL | Age: 87
End: 2022-11-23
Payer: MEDICARE

## 2022-11-23 DIAGNOSIS — R26.89 IMBALANCE: Primary | ICD-10-CM

## 2022-11-23 DIAGNOSIS — R29.898 LEFT LEG WEAKNESS: ICD-10-CM

## 2022-11-23 PROCEDURE — 97112 NEUROMUSCULAR REEDUCATION: CPT | Mod: KX,PN

## 2022-11-23 PROCEDURE — 97110 THERAPEUTIC EXERCISES: CPT | Mod: KX,PN

## 2022-11-23 NOTE — PROGRESS NOTES
[facility]   Physical Therapy Discharge Note     Name: Karma Chen  Clinic Number: [id number]    Therapy Diagnosis:   Encounter Diagnoses   Name Primary?    Imbalance Yes    Left leg weakness      Physician: Elena Sullivan MD    Visit Date: 11/23/2022    Physician Orders: PT Eval and Treat   Medical Diagnosis from Referral: imbalance  Evaluation Date: 10/12/2022    Date of Last visit: 11/23/22  Total Visits Received: 9     Time In: 1125 (check in time of 1123 for 1115 appointment)  Time Out: 1211  Total Billable Time: 46 minutes     Precautions: Diabetes, Fall, and pacemaker      SUBJECTIVE     Pt reports: decreased pain levels in her right knee today; states has been performing the home exercise program on her own.    She was compliant with home exercise program.  Response to previous treatment: minimal left knee pain  Functional change: none    Pain: 2/10  Location: right knee       OBJECTIVE     Lower Extremity Strength  Right LE   Left LE     Hip flexion:  4/5 Hip flexion: 4-/5   Knee extension: 4/5 Knee extension: 4/5   Ankle dorsiflexion:  4/5 Ankle dorsiflexion: 4/5         Gait Assessment:(if indicated)  - AD used: none  - Assistance: stand by assist   - Distance: 200 feet       Other: Tinetti = 20/28 (moderate fall risk)      Treatment     Karma received the treatments listed below:      therapeutic exercises to develop strength, endurance, and flexibility for 25 minutes including:     X 9 minutes on NuStep (level 2) to promote flexibility prior to strength/balance/mobility training  X 10 sit to stand with right upper extremity upper extremity support  Functional ambulation 200 feet and 2 x 120 feet with stand by assist   Up/down 60 feet ramp with stand by assist   Reviewed home exercise program - instructed to perform twice a day         neuromuscular re-education activities to assess: Balance and Vestibular function for 16 minutes. The following activities were included:      X 15 each standing balance therapeutic exercise = mini squats and heel raises/ toe raises   X 30 feet each balance gait = side stepping, backwards gait  X 1 minute stand on AirEx               X 15 alternating toe taps on 5-inch stool      Patient Education and Home Exercises     Home Exercises Provided and Patient Education Provided     Education provided:   - proper therapeutic exercise technique  - continue home exercise program     Written Home Exercises Provided: Patient instructed to cont prior HEP.       ASSESSMENT     Patient was able to demonstrate increased distance during balance gait; stand by assist needed during general mobility.    Karma Is progressing fairly well towards her goals.   Pt prognosis is Fair.     Pt will continue to benefit from skilled outpatient physical therapy to address the deficits listed in the problem list box on initial evaluation, provide pt/family education and to maximize pt's level of independence in the home and community environment.     Pt's spiritual, cultural and educational needs considered and pt agreeable to plan of care and goals.     Anticipated barriers to physical therapy: severity of pain and imbalance    Goals:     Short Term Goals (3 Weeks):   Decrease patient's complaints of pain to 0/10 during performance of activities of daily living for independence of self care activities. (NOT MET)  Patient to tolerate use of 2# weights during lower extremity therapeutic exercise to improve overall strength. (MET)  Patient to perform x 45 seconds full Romberg stance, eyes closed to improve upright tolerance. (MET)  Patient to tolerate x 10 repetitions sit to stand so that she may rise with single upper extremity support. (MET)     Long Term Goals (6 Weeks):   Patient to demonstrate competence with home exercise program to maintain therapeutic gains. (MET)  Patient to improve left knee MMT 1/2 grade to demo strength gains from therapeutic intervention. (MET)  Patient to  "ambulate 200 feet with stand by assist and improved dustin/symmetry. (MET)  Patient to demonstrate improvement in her Tinetti score from "high fall risk" to "moderate fall risk." (MET)    Discharge reason: Patient has reached the maximum rehab potential for the present time.    Discharge FOTO Score: 52/77      PLAN     This patient is discharged from Physical Therapy.      Eren Garcia, PT            "

## 2022-11-29 ENCOUNTER — LAB VISIT (OUTPATIENT)
Dept: LAB | Facility: HOSPITAL | Age: 87
End: 2022-11-29
Attending: INTERNAL MEDICINE
Payer: MEDICARE

## 2022-11-29 DIAGNOSIS — N25.81 SECONDARY HYPERPARATHYROIDISM OF RENAL ORIGIN: Primary | ICD-10-CM

## 2022-11-29 DIAGNOSIS — N18.30 CHRONIC KIDNEY DISEASE, STAGE III (MODERATE): ICD-10-CM

## 2022-11-29 LAB
25(OH)D3+25(OH)D2 SERPL-MCNC: 82 NG/ML (ref 30–96)
ALBUMIN SERPL BCP-MCNC: 3.8 G/DL (ref 3.5–5.2)
ANION GAP SERPL CALC-SCNC: 8 MMOL/L (ref 8–16)
BACTERIA #/AREA URNS HPF: NEGATIVE /HPF
BASOPHILS # BLD AUTO: 0.06 K/UL (ref 0–0.2)
BASOPHILS NFR BLD: 0.9 % (ref 0–1.9)
BUN SERPL-MCNC: 21 MG/DL (ref 8–23)
CALCIUM SERPL-MCNC: 9.2 MG/DL (ref 8.7–10.5)
CHLORIDE SERPL-SCNC: 104 MMOL/L (ref 95–110)
CO2 SERPL-SCNC: 27 MMOL/L (ref 23–29)
CREAT SERPL-MCNC: 1.2 MG/DL (ref 0.5–1.4)
CREAT UR-MCNC: 40 MG/DL (ref 15–325)
DIFFERENTIAL METHOD: ABNORMAL
EOSINOPHIL # BLD AUTO: 0.2 K/UL (ref 0–0.5)
EOSINOPHIL NFR BLD: 3.5 % (ref 0–8)
ERYTHROCYTE [DISTWIDTH] IN BLOOD BY AUTOMATED COUNT: 12.7 % (ref 11.5–14.5)
EST. GFR  (NO RACE VARIABLE): 44.6 ML/MIN/1.73 M^2
GLUCOSE SERPL-MCNC: 83 MG/DL (ref 70–110)
HCT VFR BLD AUTO: 37.7 % (ref 37–48.5)
HGB BLD-MCNC: 11.5 G/DL (ref 12–16)
HYALINE CASTS #/AREA URNS LPF: 1 /LPF
IMM GRANULOCYTES # BLD AUTO: 0.01 K/UL (ref 0–0.04)
IMM GRANULOCYTES NFR BLD AUTO: 0.2 % (ref 0–0.5)
LYMPHOCYTES # BLD AUTO: 2.8 K/UL (ref 1–4.8)
LYMPHOCYTES NFR BLD: 44 % (ref 18–48)
MCH RBC QN AUTO: 28 PG (ref 27–31)
MCHC RBC AUTO-ENTMCNC: 30.5 G/DL (ref 32–36)
MCV RBC AUTO: 92 FL (ref 82–98)
MICROSCOPIC COMMENT: NORMAL
MONOCYTES # BLD AUTO: 0.9 K/UL (ref 0.3–1)
MONOCYTES NFR BLD: 13.7 % (ref 4–15)
NEUTROPHILS # BLD AUTO: 2.4 K/UL (ref 1.8–7.7)
NEUTROPHILS NFR BLD: 37.7 % (ref 38–73)
NRBC BLD-RTO: 0 /100 WBC
PHOSPHATE SERPL-MCNC: 4.1 MG/DL (ref 2.7–4.5)
PLATELET # BLD AUTO: 249 K/UL (ref 150–450)
PMV BLD AUTO: 10.6 FL (ref 9.2–12.9)
POTASSIUM SERPL-SCNC: 3.8 MMOL/L (ref 3.5–5.1)
PROT UR-MCNC: <6 MG/DL (ref 6–15)
PROT/CREAT UR: NORMAL MG/G{CREAT} (ref 0–0.2)
PTH-INTACT SERPL-MCNC: 23.2 PG/ML (ref 9–77)
RBC # BLD AUTO: 4.11 M/UL (ref 4–5.4)
RBC #/AREA URNS HPF: 0 /HPF (ref 0–4)
SODIUM SERPL-SCNC: 139 MMOL/L (ref 136–145)
SQUAMOUS #/AREA URNS HPF: 1 /HPF
WBC # BLD AUTO: 6.36 K/UL (ref 3.9–12.7)
WBC #/AREA URNS HPF: 5 /HPF (ref 0–5)

## 2022-11-29 PROCEDURE — 36415 COLL VENOUS BLD VENIPUNCTURE: CPT | Performed by: INTERNAL MEDICINE

## 2022-11-29 PROCEDURE — 82570 ASSAY OF URINE CREATININE: CPT | Performed by: INTERNAL MEDICINE

## 2022-11-29 PROCEDURE — 82306 VITAMIN D 25 HYDROXY: CPT | Performed by: INTERNAL MEDICINE

## 2022-11-29 PROCEDURE — 81001 URINALYSIS AUTO W/SCOPE: CPT | Performed by: INTERNAL MEDICINE

## 2022-11-29 PROCEDURE — 80069 RENAL FUNCTION PANEL: CPT | Performed by: INTERNAL MEDICINE

## 2022-11-29 PROCEDURE — 85025 COMPLETE CBC W/AUTO DIFF WBC: CPT | Performed by: INTERNAL MEDICINE

## 2022-11-29 PROCEDURE — 83970 ASSAY OF PARATHORMONE: CPT | Performed by: INTERNAL MEDICINE

## 2022-12-07 ENCOUNTER — TELEPHONE (OUTPATIENT)
Dept: FAMILY MEDICINE | Facility: CLINIC | Age: 87
End: 2022-12-07

## 2022-12-07 DIAGNOSIS — M54.31 SCIATICA, RIGHT SIDE: ICD-10-CM

## 2022-12-07 DIAGNOSIS — M79.604 RIGHT LEG PAIN: ICD-10-CM

## 2022-12-07 RX ORDER — TRAMADOL HYDROCHLORIDE 50 MG/1
50 TABLET ORAL 2 TIMES DAILY PRN
Qty: 14 TABLET | Refills: 0 | Status: SHIPPED | OUTPATIENT
Start: 2022-12-07 | End: 2022-12-22 | Stop reason: SDUPTHER

## 2022-12-07 NOTE — TELEPHONE ENCOUNTER
Spoke with patient.    She is complaining of feeling very tired. No energy.  She states her blood sugar and B/P are in normal range.   She is asking recommendations

## 2022-12-21 NOTE — PROGRESS NOTES
SUBJECTIVE:    Patient ID: Karma Chen is a 87 y.o. female.    Chief Complaint: Hand Pain and Follow-up    HPI    Neuropathy in hands-states the hands turn red or blue-and she reports the toes go numb-she has some natural herbs which she says are helping her feet, whereas the amitryptiline dried her up too much    Lab Visit on 11/29/2022   Component Date Value Ref Range Status    Glucose 11/29/2022 83  70 - 110 mg/dL Final    Sodium 11/29/2022 139  136 - 145 mmol/L Final    Potassium 11/29/2022 3.8  3.5 - 5.1 mmol/L Final    Chloride 11/29/2022 104  95 - 110 mmol/L Final    CO2 11/29/2022 27  23 - 29 mmol/L Final    BUN 11/29/2022 21  8 - 23 mg/dL Final    Calcium 11/29/2022 9.2  8.7 - 10.5 mg/dL Final    Creatinine 11/29/2022 1.2  0.5 - 1.4 mg/dL Final    Albumin 11/29/2022 3.8  3.5 - 5.2 g/dL Final    Phosphorus 11/29/2022 4.1  2.7 - 4.5 mg/dL Final    eGFR 11/29/2022 44.6 (A)  >60 mL/min/1.73 m^2 Final    Anion Gap 11/29/2022 8  8 - 16 mmol/L Final    WBC 11/29/2022 6.36  3.90 - 12.70 K/uL Final    RBC 11/29/2022 4.11  4.00 - 5.40 M/uL Final    Hemoglobin 11/29/2022 11.5 (L)  12.0 - 16.0 g/dL Final    Hematocrit 11/29/2022 37.7  37.0 - 48.5 % Final    MCV 11/29/2022 92  82 - 98 fL Final    MCH 11/29/2022 28.0  27.0 - 31.0 pg Final    MCHC 11/29/2022 30.5 (L)  32.0 - 36.0 g/dL Final    RDW 11/29/2022 12.7  11.5 - 14.5 % Final    Platelets 11/29/2022 249  150 - 450 K/uL Final    MPV 11/29/2022 10.6  9.2 - 12.9 fL Final    Immature Granulocytes 11/29/2022 0.2  0.0 - 0.5 % Final    Gran # (ANC) 11/29/2022 2.4  1.8 - 7.7 K/uL Final    Immature Grans (Abs) 11/29/2022 0.01  0.00 - 0.04 K/uL Final    Lymph # 11/29/2022 2.8  1.0 - 4.8 K/uL Final    Mono # 11/29/2022 0.9  0.3 - 1.0 K/uL Final    Eos # 11/29/2022 0.2  0.0 - 0.5 K/uL Final    Baso # 11/29/2022 0.06  0.00 - 0.20 K/uL Final    nRBC 11/29/2022 0  0 /100 WBC Final    Gran % 11/29/2022 37.7 (L)  38.0 - 73.0 % Final    Lymph %  11/29/2022 44.0  18.0 - 48.0 % Final    Mono % 11/29/2022 13.7  4.0 - 15.0 % Final    Eosinophil % 11/29/2022 3.5  0.0 - 8.0 % Final    Basophil % 11/29/2022 0.9  0.0 - 1.9 % Final    Differential Method 11/29/2022 Automated   Final    Vit D, 25-Hydroxy 11/29/2022 82  30 - 96 ng/mL Final    RBC, UA 11/29/2022 0  0 - 4 /hpf Final    WBC, UA 11/29/2022 5  0 - 5 /hpf Final    Bacteria 11/29/2022 Negative  None-Occ /hpf Final    Squam Epithel, UA 11/29/2022 1  /hpf Final    Hyaline Casts, UA 11/29/2022 1  0-1/lpf /lpf Final    Microscopic Comment 11/29/2022 SEE COMMENT   Final    PTH, Intact 11/29/2022 23.2  9.0 - 77.0 pg/mL Final    Protein, Urine Random 11/29/2022 <6  6 - 15 mg/dL Final    Creatinine, Urine 11/29/2022 40.0  15.0 - 325.0 mg/dL Final    Prot/Creat Ratio, Urine 11/29/2022 Unable to calculate  0.00 - 0.20 Final   Hospital Outpatient Visit on 11/01/2022   Component Date Value Ref Range Status    Battery Voltage (V) 11/01/2022 2.99  V Final    P/R-wave RA Lead 11/01/2022 1.5  mV Final    P/R-wave RA Lead (native) 11/01/2022 9.5  mV Final    Impedance RA Lead 11/01/2022 475  Ohms Final    Impedance RA Lead (native) 11/01/2022 513  Ohms Final    Threshold V RA Lead (native) 11/01/2022 0.500  V Final    Threshold ms RA Lead (native) 11/01/2022 0.40  ms Final   Office Visit on 09/18/2022   Component Date Value Ref Range Status    SARS Coronavirus 2 Antigen 09/18/2022 Negative  Negative Final     Acceptable 09/18/2022 Yes   Final   Lab Visit on 09/15/2022   Component Date Value Ref Range Status    Immunofix Interp. 09/15/2022 Comment   Final    IgA 09/15/2022 305  64 - 422 mg/dL Final    IgG 09/15/2022 739  586 - 1602 mg/dL Final    IgM 09/15/2022 36  26 - 217 mg/dL Final    Total Protein 09/15/2022 6.3  6.0 - 8.5 g/dL Final    Albumin 09/15/2022 3.2  2.9 - 4.4 g/dL Final    Alpha-1 09/15/2022 0.2  0.0 - 0.4 g/dL Final    Alpha-2 09/15/2022 0.9  0.4 - 1.0 g/dL Final     Beta 09/15/2022 1.2  0.7 - 1.3 g/dL Final    Gamma 09/15/2022 0.7  0.4 - 1.8 g/dL Final    M-SPIKE, PROT ELECTRO 09/15/2022 Not Observed  Not Observed g/dL Final    Globulin, Total 09/15/2022 3.1  2.2 - 3.9 g/dL Final    A/G Ratio 09/15/2022 1.0  0.7 - 1.7 Final    Please Note: 09/15/2022 Comment   Final    Methlymalonic Acid 09/15/2022 216  0 - 378 nmol/L Final    Thiamine 09/15/2022 494.7 (H)  66.5 - 200.0 nmol/L Final    Vitamin B-12 09/15/2022 1092 (H)  210 - 950 pg/mL Final    Vitamin B6 09/15/2022 86.0 (H)  3.4 - 65.2 ug/L Final    SINGH 09/15/2022 Negative   Final    Sed Rate 09/15/2022 38 (H)  0 - 20 mm/Hr Final    CRP 09/15/2022 0.74  <0.76 mg/dL Final   Hospital Outpatient Visit on 08/02/2022   Component Date Value Ref Range Status    P/R-wave RA Lead 08/02/2022 3.3  mV Final    P/R-wave RA Lead (native) 08/02/2022 10.6  mV Final    Impedance RA Lead 08/02/2022 494  Ohms Final    Impedance RA Lead (native) 08/02/2022 570  Ohms Final    Threshold V RA Lead 08/02/2022 0.875  V Final    Theshold ms RA Lead 08/02/2022 0.4  ms Final    Threshold V RA Lead (native) 08/02/2022 0.5  V Final    Threshold ms RA Lead (native) 08/02/2022 0.4  ms Final   Lab Visit on 05/20/2022   Component Date Value Ref Range Status    Hemoglobin A1C 05/20/2022 5.5  4.5 - 6.2 % Final    Estimated Avg Glucose 05/20/2022 111  68 - 131 mg/dL Final    Microalbumin, Urine 05/20/2022 10.8  <19.9 ug/mL Final    Creatinine, Urine 05/20/2022 97.0  15.0 - 325.0 mg/dL Final    Microalb/Creat Ratio 05/20/2022 11.1  0.0 - 30.0 ug/mg Final    Cholesterol 05/20/2022 223 (H)  120 - 199 mg/dL Final    Triglycerides 05/20/2022 109  30 - 150 mg/dL Final    HDL 05/20/2022 56  40 - 75 mg/dL Final    LDL Cholesterol 05/20/2022 145.2  63.0 - 159.0 mg/dL Final    HDL/Cholesterol Ratio 05/20/2022 25.1  20.0 - 50.0 % Final    Total Cholesterol/HDL Ratio 05/20/2022 4.0  2.0 - 5.0 Final    Non-HDL Cholesterol 05/20/2022 167   mg/dL Final    Glucose 05/20/2022 135 (H)  70 - 110 mg/dL Final    Sodium 05/20/2022 139  136 - 145 mmol/L Final    Potassium 05/20/2022 4.0  3.5 - 5.1 mmol/L Final    Chloride 05/20/2022 103  95 - 110 mmol/L Final    CO2 05/20/2022 24  23 - 29 mmol/L Final    BUN 05/20/2022 28 (H)  8 - 23 mg/dL Final    Calcium 05/20/2022 9.3  8.7 - 10.5 mg/dL Final    Creatinine 05/20/2022 1.2  0.5 - 1.4 mg/dL Final    Albumin 05/20/2022 4.0  3.5 - 5.2 g/dL Final    Phosphorus 05/20/2022 3.6  2.7 - 4.5 mg/dL Final    eGFR if  05/20/2022 48.0 (A)  >60 mL/min/1.73 m^2 Final    eGFR if non African American 05/20/2022 41.6 (A)  >60 mL/min/1.73 m^2 Final    Anion Gap 05/20/2022 12  8 - 16 mmol/L Final    RBC, UA 05/20/2022 1  0 - 4 /hpf Final    WBC, UA 05/20/2022 0  0 - 5 /hpf Final    Bacteria 05/20/2022 Negative  None-Occ /hpf Final    Squam Epithel, UA 05/20/2022 1  /hpf Final    Hyaline Casts, UA 05/20/2022 2 (A)  0-1/lpf /lpf Final    Microscopic Comment 05/20/2022 SEE COMMENT   Final    Protein, Urine Random 05/20/2022 6  6 - 15 mg/dL Final    Creatinine, Urine 05/20/2022 97.0  15.0 - 325.0 mg/dL Final    Prot/Creat Ratio, Urine 05/20/2022 0.06  0.00 - 0.20 Final   Lab Visit on 05/10/2022   Component Date Value Ref Range Status    BNP 05/10/2022 490 (H)  0 - 99 pg/mL Final    TSH 05/10/2022 1.280  0.340 - 5.600 uIU/mL Final    WBC 05/10/2022 7.41  3.90 - 12.70 K/uL Final    RBC 05/10/2022 4.20  4.00 - 5.40 M/uL Final    Hemoglobin 05/10/2022 11.5 (L)  12.0 - 16.0 g/dL Final    Hematocrit 05/10/2022 37.2  37.0 - 48.5 % Final    MCV 05/10/2022 89  82 - 98 fL Final    MCH 05/10/2022 27.4  27.0 - 31.0 pg Final    MCHC 05/10/2022 30.9 (L)  32.0 - 36.0 g/dL Final    RDW 05/10/2022 13.8  11.5 - 14.5 % Final    Platelets 05/10/2022 249  150 - 450 K/uL Final    MPV 05/10/2022 10.0  9.2 - 12.9 fL Final    Immature Granulocytes 05/10/2022 0.3  0.0 - 0.5 % Final    Gran # (ANC) 05/10/2022  3.4  1.8 - 7.7 K/uL Final    Immature Grans (Abs) 05/10/2022 0.02  0.00 - 0.04 K/uL Final    Lymph # 05/10/2022 2.5  1.0 - 4.8 K/uL Final    Mono # 05/10/2022 1.1 (H)  0.3 - 1.0 K/uL Final    Eos # 05/10/2022 0.3  0.0 - 0.5 K/uL Final    Baso # 05/10/2022 0.08  0.00 - 0.20 K/uL Final    nRBC 05/10/2022 0  0 /100 WBC Final    Gran % 05/10/2022 46.0  38.0 - 73.0 % Final    Lymph % 05/10/2022 34.3  18.0 - 48.0 % Final    Mono % 05/10/2022 14.7  4.0 - 15.0 % Final    Eosinophil % 05/10/2022 3.6  0.0 - 8.0 % Final    Basophil % 05/10/2022 1.1  0.0 - 1.9 % Final    Differential Method 05/10/2022 Automated   Final    Sodium 05/10/2022 140  136 - 145 mmol/L Final    Potassium 05/10/2022 3.9  3.5 - 5.1 mmol/L Final    Chloride 05/10/2022 103  95 - 110 mmol/L Final    CO2 05/10/2022 27  23 - 29 mmol/L Final    Glucose 05/10/2022 93  70 - 110 mg/dL Final    BUN 05/10/2022 24 (H)  8 - 23 mg/dL Final    Creatinine 05/10/2022 1.4  0.5 - 1.4 mg/dL Final    Calcium 05/10/2022 9.0  8.7 - 10.5 mg/dL Final    Total Protein 05/10/2022 7.2  6.0 - 8.4 g/dL Final    Albumin 05/10/2022 3.9  3.5 - 5.2 g/dL Final    Total Bilirubin 05/10/2022 0.6  0.1 - 1.0 mg/dL Final    Alkaline Phosphatase 05/10/2022 68  55 - 135 U/L Final    AST 05/10/2022 24  10 - 40 U/L Final    ALT 05/10/2022 22  10 - 44 U/L Final    Anion Gap 05/10/2022 10  8 - 16 mmol/L Final    eGFR if  05/10/2022 39.8 (A)  >60 mL/min/1.73 m^2 Final    eGFR if non African American 05/10/2022 34.5 (A)  >60 mL/min/1.73 m^2 Final    Total Protein 05/10/2022 7.2  6.0 - 8.4 g/dL Final    Albumin 05/10/2022 3.9  3.5 - 5.2 g/dL Final    Total Bilirubin 05/10/2022 0.6  0.1 - 1.0 mg/dL Final    Bilirubin, Direct 05/10/2022 <0.1 (A)  0.1 - 0.3 mg/dL Final    AST 05/10/2022 24  10 - 40 U/L Final    ALT 05/10/2022 22  10 - 44 U/L Final    Alkaline Phosphatase 05/10/2022 68  55 - 135 U/L Final    Glucose 05/10/2022 93  70 - 110 mg/dL Final     Sodium 05/10/2022 140  136 - 145 mmol/L Final    Potassium 05/10/2022 3.9  3.5 - 5.1 mmol/L Final    Chloride 05/10/2022 103  95 - 110 mmol/L Final    CO2 05/10/2022 27  23 - 29 mmol/L Final    BUN 05/10/2022 24 (H)  8 - 23 mg/dL Final    Calcium 05/10/2022 9.0  8.7 - 10.5 mg/dL Final    Creatinine 05/10/2022 1.4  0.5 - 1.4 mg/dL Final    Albumin 05/10/2022 3.9  3.5 - 5.2 g/dL Final    Phosphorus 05/10/2022 3.7  2.7 - 4.5 mg/dL Final    eGFR if  05/10/2022 39.8 (A)  >60 mL/min/1.73 m^2 Final    eGFR if non African American 05/10/2022 34.5 (A)  >60 mL/min/1.73 m^2 Final    Anion Gap 05/10/2022 10  8 - 16 mmol/L Final   Office Visit on 01/25/2022   Component Date Value Ref Range Status    POC Blood, Urine 01/25/2022 Negative  Negative Final    POC Bilirubin, Urine 01/25/2022 Negative  Negative Final    POC Urobilinogen, Urine 01/25/2022 norm  0.1 - 1.1 Final    POC Ketones, Urine 01/25/2022 Negative  Negative Final    POC Protein, Urine 01/25/2022 Negative  Negative Final    POC Nitrates, Urine 01/25/2022 Negative  Negative Final    POC Glucose, Urine 01/25/2022 Negative  Negative Final    pH, UA 01/25/2022 6.0   Final    POC Specific Gravity, Urine 01/25/2022 1.005  1.003 - 1.029 Final    POC Leukocytes, Urine 01/25/2022 Negative  Negative Final    POC Rapid COVID 01/25/2022 Negative  Negative Final     Acceptable 01/25/2022 Yes   Final   Hospital Outpatient Visit on 01/18/2022   Component Date Value Ref Range Status    Battery Voltage (V) 01/18/2022 3.00  V Final    P/R-wave RA Lead 01/18/2022 1.5  mV Final    P/R-wave RA Lead (native) 01/18/2022 10.9  mV Final    Impedance RA Lead 01/18/2022 475  Ohms Final    Impedance RA Lead (native) 01/18/2022 532  Ohms Final    Threshold V RA Lead 01/18/2022 0.750  V Final    Theshold ms RA Lead 01/18/2022 0.40  ms Final    Threshold V RA Lead (native) 01/18/2022 0.500  V Final    Threshold ms RA Lead (native)  01/18/2022 0.40  ms Final   Office Visit on 01/10/2022   Component Date Value Ref Range Status    Cologuard Result 01/14/2022 Negative  Negative Final   There may be more visits with results that are not included.       Past Medical History:   Diagnosis Date    Coronary artery disease     Dermatitis     Dermatitis 12/8/2017    Diabetes mellitus     Diabetes mellitus type II     Leah Madrigal virus infection     Fibromyalgia     GERD (gastroesophageal reflux disease)     Hypertension     MI (myocardial infarction) 09/23/2011    Pure hypercholesterolemia 2/26/2020     Past Surgical History:   Procedure Laterality Date    adenoids      ANGIOPLASTY  1987    APPENDECTOMY      CARDIAC PACEMAKER PLACEMENT  01/12/2017    CATARACT EXTRACTION, BILATERAL Bilateral 9/2/15, 3/17/15    right eye-9/2/15, left eye-3/17/15    CHOLECYSTECTOMY  1987    CORONARY ARTERY BYPASS GRAFT  2006    quadruple    coronary stents  1999    x2    CYST REMOVAL  04/25/2017    on back    HYSTERECTOMY  1966    OVARY SURGERY  1948    Ovary burst & was repaired    RHIZOTOMY  09/14/2018    TONSILLECTOMY  1940     Family History   Problem Relation Age of Onset    No Known Problems Mother     No Known Problems Father        Marital Status:   Alcohol History:  reports no history of alcohol use.  Tobacco History:  reports that she has never smoked. She has never used smokeless tobacco.  Drug History:  reports no history of drug use.    Review of patient's allergies indicates:   Allergen Reactions    Arthrotec 50 [diclofenac-misoprostol]     Doxycycline     Effexor [venlafaxine]     Estradiol     Flagyl [metronidazole]     Fluconazole     Gabapentin     Iodine     Levaquin [levofloxacin]     Nexium [esomeprazole magnesium]     Penicillins     Red yeast rice (monascus purpureus)     Shellfish containing products     Statins-hmg-coa reductase inhibitors     Sulfa (sulfonamide antibiotics)     Tea tree oil      Tegaserod      ZOLNORM    Tegaserod hydrogen maleate     Trazodone     Yeast, dried     Adhesive Rash     Band-aids       Current Outpatient Medications:     acetaminophen (TYLENOL) 500 MG tablet, Take 500 mg by mouth every 6 (six) hours as needed for Pain., Disp: , Rfl:     ALPRAZolam (XANAX) 0.25 MG tablet, TAKE 1 TABLET BY MOUTH AS NEEDED FOR ANXIETY., Disp: 90 tablet, Rfl: 0    amitriptyline (ELAVIL) 25 MG tablet, TAKE 1 TABLET BY MOUTH EVERY DAY, Disp: 90 tablet, Rfl: 1    aspirin (ECOTRIN) 81 MG EC tablet, Take 1 tablet (81 mg total) by mouth once daily. (Patient taking differently: Take 81 mg by mouth every evening.), Disp: , Rfl: 0    camphor-menthol 0.5-0.5% (SARNA ORIGINAL) lotion, Apply topically as needed for Itching., Disp: 222 mL, Rfl: 3    cholecalciferol, vitamin D3, 100 mcg (4,000 unit) Cap, Take 1 capsule by mouth 2 (two) times daily. , Disp: , Rfl:     coenzyme Q10 100 mg capsule, Take 1 capsule (100 mg total) by mouth 2 (two) times daily., Disp: 60 capsule, Rfl: 11    diphenhydrAMINE (BENYLIN) 12.5 mg/5 mL liquid, Take 12.5 mg by mouth 4 (four) times daily as needed for Allergies., Disp: , Rfl:     ECHINACEA ORAL, Take 750 mg by mouth 2 (two) times a day., Disp: , Rfl:     ELIQUIS 2.5 mg Tab, TAKE 1 TABLET BY MOUTH TWICE A DAY, Disp: 60 tablet, Rfl: 5    fexofenadine (ALLEGRA) 180 MG tablet, Take 1 tablet (180 mg total) by mouth once daily., Disp: 60 tablet, Rfl: 3    hydrocortisone 2.5 % ointment, Apply topically 2 (two) times daily., Disp: 28 g, Rfl: 3    hydrOXYzine HCL (ATARAX) 25 MG tablet, Take 1 tablet by mouth daily as needed., Disp: , Rfl:     isosorbide mononitrate (IMDUR) 120 MG 24 hr tablet, Take 120 mg by mouth once daily., Disp: , Rfl:     levothyroxine (SYNTHROID) 88 MCG tablet, Take 1 tablet (88 mcg total) by mouth once daily., Disp: 90 tablet, Rfl: 1    losartan (COZAAR) 25 MG tablet, Take 0.5 tablets (12.5 mg total) by mouth every evening., Disp: 45 tablet,  Rfl: 3    MAGNESIUM ORAL, Take 500 mg by mouth once daily. , Disp: , Rfl:     metoprolol succinate (TOPROL-XL) 25 MG 24 hr tablet, Take 1 tablet (25 mg total) by mouth once daily., Disp: 90 tablet, Rfl: 3    montelukast (SINGULAIR) 10 mg tablet, Take 1 tablet (10 mg total) by mouth every evening., Disp: 90 tablet, Rfl: 1    multivitamin (THERAGRAN) tablet, Take 1 tablet by mouth 2 (two) times a day. , Disp: , Rfl:     nitroGLYCERIN (NITROSTAT) 0.4 MG SL tablet, Place 0.4 mg under the tongue every 5 (five) minutes as needed for Chest pain., Disp: , Rfl:     tiZANidine (ZANAFLEX) 4 MG tablet, TAKE 1 TABLET BY MOUTH EVERY DAY, Disp: 90 tablet, Rfl: 0    traMADoL (ULTRAM) 50 mg tablet, Take 1 tablet (50 mg total) by mouth 2 (two) times daily as needed (pain)., Disp: 14 tablet, Rfl: 0    triamcinolone acetonide 0.1% (KENALOG) 0.1 % cream, aaa bid x 1-2 wks, tk 1 wk off prn rash, Disp: 30 g, Rfl: 2    turmeric 400 mg Cap, Take 1,200 mg by mouth once daily., Disp: , Rfl:     UNABLE TO FIND, Take 1 capsule by mouth. Bio-Smoothe- premium nerve, Disp: , Rfl:     UNABLE TO FIND, Take 1 capsule by mouth once daily. medication name: GI probiotic, Disp: , Rfl:     UNABLE TO FIND, Take 1 capsule by mouth once daily. Joint health, Disp: , Rfl:     UNABLE TO FIND, Take 1 capsule by mouth 2 (two) times a day. Cardio platinum, Disp: , Rfl:     UNABLE TO FIND, Nerve Lawrence, Disp: , Rfl:     zinc gluconate 50 mg tablet, Take 50 mg by mouth 2 (two) times a day., Disp: , Rfl:     Current Facility-Administered Medications:     evolocumab PnIj 140 mg, 140 mg, Subcutaneous, Q14 Days, Kenton Ware MD    Review of Systems   Constitutional:  Negative for activity change, appetite change, chills, fatigue, fever and unexpected weight change.   HENT:  Negative for congestion, ear pain, hearing loss, postnasal drip, sinus pain, sneezing, sore throat, tinnitus and trouble swallowing.    Eyes:  Negative for pain, discharge and  visual disturbance.   Respiratory:  Negative for cough, choking, shortness of breath and wheezing.    Cardiovascular:  Negative for chest pain, palpitations and leg swelling.   Gastrointestinal:  Negative for abdominal distention, abdominal pain, blood in stool, constipation, diarrhea, nausea and vomiting.   Endocrine: Negative for cold intolerance and heat intolerance.   Genitourinary:  Negative for difficulty urinating, dysuria, frequency, pelvic pain and urgency.   Musculoskeletal:  Negative for back pain, joint swelling and neck pain.   Skin:  Positive for rash (bed bugs). Negative for pallor.   Allergic/Immunologic: Negative for environmental allergies and food allergies.   Neurological:  Negative for dizziness, tremors, weakness, numbness and headaches.        HPI   Hematological:  Does not bruise/bleed easily.   Psychiatric/Behavioral:  Negative for agitation, confusion, dysphoric mood, sleep disturbance and suicidal ideas. The patient is not nervous/anxious.         Objective:      Vitals    Vitals - 1 value per visit 10/18/2022 11/3/2022 11/3/2022 12/22/2022 12/22/2022   SYSTOLIC 150 - 132 - 128   DIASTOLIC 81 - 66 - 66   Pulse 95 - 70 - 74   Temp 97 - 98.4 - 97.9   Resp - - 14 - 12   SPO2 99 - 100 - 99   Weight (lb) - - 135 - 135   Weight (kg) - - 61.236 - 61.236   Height 64 - 64 - 64   BMI (Calculated) - - 23.2 - 23.2   VISIT REPORT - - - - -   Pain Score  - 0 - 0 -   Some recent data might be hidden       Physical Exam      Assessment:       1. Gastroesophageal reflux disease with esophagitis without hemorrhage    2. Sciatica, right side    3. Right leg pain    4. Environmental allergies         Plan:       Gastroesophageal reflux disease with esophagitis without hemorrhage    Sciatica, right side    Right leg pain    Environmental allergies      No follow-ups on file.        12/22/2022 Elena Sullivan M.D.

## 2022-12-22 ENCOUNTER — OFFICE VISIT (OUTPATIENT)
Dept: FAMILY MEDICINE | Facility: CLINIC | Age: 87
End: 2022-12-22
Payer: MEDICARE

## 2022-12-22 VITALS
TEMPERATURE: 98 F | HEART RATE: 74 BPM | WEIGHT: 135 LBS | OXYGEN SATURATION: 99 % | DIASTOLIC BLOOD PRESSURE: 66 MMHG | RESPIRATION RATE: 12 BRPM | BODY MASS INDEX: 23.05 KG/M2 | HEIGHT: 64 IN | SYSTOLIC BLOOD PRESSURE: 128 MMHG

## 2022-12-22 DIAGNOSIS — M79.604 RIGHT LEG PAIN: ICD-10-CM

## 2022-12-22 DIAGNOSIS — M54.31 SCIATICA, RIGHT SIDE: ICD-10-CM

## 2022-12-22 DIAGNOSIS — Z91.09 ENVIRONMENTAL ALLERGIES: ICD-10-CM

## 2022-12-22 DIAGNOSIS — K21.00 GASTROESOPHAGEAL REFLUX DISEASE WITH ESOPHAGITIS WITHOUT HEMORRHAGE: Primary | ICD-10-CM

## 2022-12-22 PROCEDURE — 99213 OFFICE O/P EST LOW 20 MIN: CPT | Mod: S$PBB,AQ,, | Performed by: INTERNAL MEDICINE

## 2022-12-22 PROCEDURE — 99215 OFFICE O/P EST HI 40 MIN: CPT | Performed by: INTERNAL MEDICINE

## 2022-12-22 PROCEDURE — 99213 PR OFFICE/OUTPT VISIT, EST, LEVL III, 20-29 MIN: ICD-10-PCS | Mod: S$PBB,AQ,, | Performed by: INTERNAL MEDICINE

## 2022-12-22 RX ORDER — OMEPRAZOLE 20 MG/1
20 CAPSULE, DELAYED RELEASE ORAL DAILY
Qty: 90 CAPSULE | Refills: 3 | Status: SHIPPED | OUTPATIENT
Start: 2022-12-22 | End: 2024-03-25 | Stop reason: SDUPTHER

## 2022-12-22 RX ORDER — MONTELUKAST SODIUM 10 MG/1
10 TABLET ORAL NIGHTLY
Qty: 90 TABLET | Refills: 1 | Status: SHIPPED | OUTPATIENT
Start: 2022-12-22 | End: 2023-06-01

## 2022-12-22 RX ORDER — TRAMADOL HYDROCHLORIDE 50 MG/1
50 TABLET ORAL 2 TIMES DAILY PRN
Qty: 14 TABLET | Refills: 0 | Status: SHIPPED | OUTPATIENT
Start: 2022-12-22 | End: 2023-05-25

## 2023-01-03 ENCOUNTER — TELEPHONE (OUTPATIENT)
Dept: CARDIOLOGY | Facility: CLINIC | Age: 88
End: 2023-01-03
Payer: MEDICARE

## 2023-01-03 NOTE — TELEPHONE ENCOUNTER
----- Message from Diego Boudreaux sent at 1/3/2023 11:01 AM CST -----  Type: Needs Medical Advice  Who Called:  pt  Symptoms (please be specific):  pt said she need blood work orders before she come see the dr on the 12th--please call and advise  Best Call Back Number: [phone] (home)     Additional Information: thank you

## 2023-01-04 NOTE — TELEPHONE ENCOUNTER
Spoke with pt, wanted to know if labs were needed for upcoming appointment. No labs were ordered, pt verbalized understanding.

## 2023-01-12 ENCOUNTER — OFFICE VISIT (OUTPATIENT)
Dept: CARDIOLOGY | Facility: CLINIC | Age: 88
End: 2023-01-12
Payer: MEDICARE

## 2023-01-12 VITALS
HEIGHT: 64 IN | OXYGEN SATURATION: 98 % | DIASTOLIC BLOOD PRESSURE: 80 MMHG | SYSTOLIC BLOOD PRESSURE: 124 MMHG | BODY MASS INDEX: 22.2 KG/M2 | HEART RATE: 77 BPM | WEIGHT: 130 LBS | RESPIRATION RATE: 16 BRPM

## 2023-01-12 DIAGNOSIS — N18.31 TYPE 2 DIABETES MELLITUS WITH STAGE 3A CHRONIC KIDNEY DISEASE, WITHOUT LONG-TERM CURRENT USE OF INSULIN: Chronic | ICD-10-CM

## 2023-01-12 DIAGNOSIS — I25.10 CORONARY ARTERY DISEASE INVOLVING LEFT MAIN CORONARY ARTERY: ICD-10-CM

## 2023-01-12 DIAGNOSIS — E11.22 TYPE 2 DIABETES MELLITUS WITH STAGE 3A CHRONIC KIDNEY DISEASE, WITHOUT LONG-TERM CURRENT USE OF INSULIN: Chronic | ICD-10-CM

## 2023-01-12 DIAGNOSIS — E03.9 ACQUIRED HYPOTHYROIDISM: ICD-10-CM

## 2023-01-12 DIAGNOSIS — I10 ACCELERATED HYPERTENSION: ICD-10-CM

## 2023-01-12 DIAGNOSIS — I48.0 PAF (PAROXYSMAL ATRIAL FIBRILLATION): Chronic | ICD-10-CM

## 2023-01-12 PROCEDURE — 99215 OFFICE O/P EST HI 40 MIN: CPT | Mod: PBBFAC,PN | Performed by: INTERNAL MEDICINE

## 2023-01-12 PROCEDURE — 99999 PR PBB SHADOW E&M-EST. PATIENT-LVL V: CPT | Mod: PBBFAC,,, | Performed by: INTERNAL MEDICINE

## 2023-01-12 PROCEDURE — 99999 PR PBB SHADOW E&M-EST. PATIENT-LVL V: ICD-10-PCS | Mod: PBBFAC,,, | Performed by: INTERNAL MEDICINE

## 2023-01-12 PROCEDURE — 99214 PR OFFICE/OUTPT VISIT, EST, LEVL IV, 30-39 MIN: ICD-10-PCS | Mod: S$PBB,,, | Performed by: INTERNAL MEDICINE

## 2023-01-12 PROCEDURE — 99214 OFFICE O/P EST MOD 30 MIN: CPT | Mod: S$PBB,,, | Performed by: INTERNAL MEDICINE

## 2023-01-12 NOTE — PROGRESS NOTES
Subjective:    Patient ID:  Karma Chen is a 87 y.o. female patient here for evaluation Hypertension (Tuesday she had noticed her bp dropped below 100, has not happened since)      History of Present Illness:   Patient is 87-year-old with history of arterial hypertension coronary artery disease, previous quadruple bypass surgery in 2006 had been doing reasonably well.  Her main symptoms have been neuropathic manifestation in both hands and feet.  Otherwise from a cardiac perspective no occurrence of any angina no arm neck or jaw pain is noted.  She tends to have some insomnia the last 3 days apparently.  Otherwise there is no swelling in the lower extremities no palpitations no dizziness lightheadedness noted.          Review of patient's allergies indicates:   Allergen Reactions    Arthrotec 50 [diclofenac-misoprostol]     Doxycycline     Effexor [venlafaxine]     Estradiol     Flagyl [metronidazole]     Fluconazole     Gabapentin     Iodine     Levaquin [levofloxacin]     Nexium [esomeprazole magnesium]     Penicillins     Red yeast rice (monascus purpureus)     Shellfish containing products     Statins-hmg-coa reductase inhibitors     Sulfa (sulfonamide antibiotics)     Tea tree oil     Tegaserod      ZOLNORM    Tegaserod hydrogen maleate     Trazodone     Yeast, dried     Adhesive Rash     Band-aids       Past Medical History:   Diagnosis Date    Coronary artery disease     Dermatitis     Dermatitis 12/8/2017    Diabetes mellitus     Diabetes mellitus type II     Leah Barr virus infection     Fibromyalgia     GERD (gastroesophageal reflux disease)     Hypertension     MI (myocardial infarction) 09/23/2011    Pure hypercholesterolemia 2/26/2020     Past Surgical History:   Procedure Laterality Date    adenoids      ANGIOPLASTY  1987    APPENDECTOMY      CARDIAC PACEMAKER PLACEMENT  01/12/2017    CATARACT EXTRACTION, BILATERAL Bilateral 9/2/15, 3/17/15    right eye-9/2/15, left eye-3/17/15     CHOLECYSTECTOMY  1987    CORONARY ARTERY BYPASS GRAFT  2006    quadruple    coronary stents  1999    x2    CYST REMOVAL  04/25/2017    on back    HYSTERECTOMY  1966    OVARY SURGERY  1948    Ovary burst & was repaired    RHIZOTOMY  09/14/2018    TONSILLECTOMY  1940     Social History     Tobacco Use    Smoking status: Never    Smokeless tobacco: Never   Substance Use Topics    Alcohol use: No    Drug use: No        Review of Systems:    As noted in HPI in addition      REVIEW OF SYSTEMS  CARDIOVASCULAR: No recent chest pain, palpitations, arm, neck, or jaw pain  RESPIRATORY: No recent fever, cough chills, SOB or congestion  : No blood in the urine  GI: No Nausea, vomiting, constipation, diarrhea, blood, or reflux.  MUSCULOSKELETAL: No myalgias  NEURO: No lightheadedness or dizziness  EYES: No Double vision, blurry, vision or headache   Occasional transient dizziness is noted.  She has seen ENT before with adjustments of the crystals and got some relief in the past.           Objective        Vitals:    01/12/23 1011   BP: 124/80   Pulse: 77   Resp: 16       LIPIDS - LAST 2   Lab Results   Component Value Date    CHOL 223 (H) 05/20/2022    CHOL 248 (H) 11/08/2021    HDL 56 05/20/2022    HDL 41 11/08/2021    LDLCALC 145.2 05/20/2022    LDLCALC 170.0 (H) 11/08/2021    TRIG 109 05/20/2022    TRIG 185 (H) 11/08/2021    CHOLHDL 25.1 05/20/2022    CHOLHDL 16.5 (L) 11/08/2021       CBC - LAST 2  Lab Results   Component Value Date    WBC 6.36 11/29/2022    WBC 7.41 05/10/2022    RBC 4.11 11/29/2022    RBC 4.20 05/10/2022    HGB 11.5 (L) 11/29/2022    HGB 11.5 (L) 05/10/2022    HCT 37.7 11/29/2022    HCT 37.2 05/10/2022    MCV 92 11/29/2022    MCV 89 05/10/2022    MCH 28.0 11/29/2022    MCH 27.4 05/10/2022    MCHC 30.5 (L) 11/29/2022    MCHC 30.9 (L) 05/10/2022    RDW 12.7 11/29/2022    RDW 13.8 05/10/2022     11/29/2022     05/10/2022    MPV 10.6 11/29/2022    MPV 10.0 05/10/2022    GRAN 2.4 11/29/2022     GRAN 37.7 (L) 11/29/2022    LYMPH 2.8 11/29/2022    LYMPH 44.0 11/29/2022    MONO 0.9 11/29/2022    MONO 13.7 11/29/2022    BASO 0.06 11/29/2022    BASO 0.08 05/10/2022    NRBC 0 11/29/2022    NRBC 0 05/10/2022       CHEMISTRY & LIVER FUNCTION - LAST 2  Lab Results   Component Value Date     11/29/2022     05/20/2022    K 3.8 11/29/2022    K 4.0 05/20/2022     11/29/2022     05/20/2022    CO2 27 11/29/2022    CO2 24 05/20/2022    ANIONGAP 8 11/29/2022    ANIONGAP 12 05/20/2022    BUN 21 11/29/2022    BUN 28 (H) 05/20/2022    CREATININE 1.2 11/29/2022    CREATININE 1.2 05/20/2022    GLU 83 11/29/2022     (H) 05/20/2022    CALCIUM 9.2 11/29/2022    CALCIUM 9.3 05/20/2022    MG 2.3 10/22/2021    MG 2.3 10/22/2021    ALBUMIN 3.8 11/29/2022    ALBUMIN 4.0 05/20/2022    PROT 6.3 09/15/2022    PROT 7.2 05/10/2022    PROT 7.2 05/10/2022    ALKPHOS 68 05/10/2022    ALKPHOS 68 05/10/2022    ALT 22 05/10/2022    ALT 22 05/10/2022    AST 24 05/10/2022    AST 24 05/10/2022    BILITOT 0.6 05/10/2022    BILITOT 0.6 05/10/2022        CARDIAC PROFILE - LAST 2  Lab Results   Component Value Date     (H) 05/10/2022     (H) 07/24/2020    CPK 53 10/22/2021    CPKMB 1.57 02/20/2012    TROPONINI <0.030 10/22/2021    TROPONINI 0.077 (HH) 07/25/2020        COAGULATION - LAST 2  Lab Results   Component Value Date    LABPT 13.9 (H) 10/22/2021    LABPT 13.6 05/02/2020    INR 1.2 10/22/2021    INR 1.1 05/02/2020    APTT 26.9 12/10/2017    APTT 26.6 02/20/2012       ENDOCRINE & PSA - LAST 2  Lab Results   Component Value Date    HGBA1C 5.5 05/20/2022    HGBA1C 5.9 11/08/2021    TSH 1.280 05/10/2022    TSH 1.760 10/22/2021        ECHOCARDIOGRAM RESULTS  Results for orders placed during the hospital encounter of 06/10/21    Echo    Interpretation Summary  · The left ventricle is mildly enlarged with mild concentric hypertrophy  · The estimated ejection fraction is 49%. Which is decreased  · Grade II  left ventricular diastolic dysfunction.  · There is mild left ventricular global hypokinesis.  · Atrial fibrillation not observed.  · Normal right ventricular size with normal right ventricular systolic function.  · Moderate left atrial enlargement.  · Mild-to-moderate aortic regurgitation.  · There is mild aortic valve stenosis.  · Aortic valve area is 1.99 cm2; peak velocity is 1.41 m/s; mean gradient is 4 mmHg.  · Mild mitral regurgitation.  · Mild to moderate tricuspid regurgitation.  · Mild pulmonic regurgitation.  · Normal central venous pressure (3 mmHg).  · The estimated PA systolic pressure is 35 mmHg. Mild pulmonary hypertension  · Pacemaker lead is present      CURRENT/PREVIOUS VISIT EKG  Results for orders placed or performed in visit on 07/07/22   IN OFFICE EKG 12-LEAD (to [facility])    Collection Time: 07/07/22  3:30 PM    Narrative    Test Reason : R55,    Vent. Rate : 076 BPM     Atrial Rate : 076 BPM     P-R Int : 260 ms          QRS Dur : 128 ms      QT Int : 404 ms       P-R-T Axes : 073 026 146 degrees     QTc Int : 454 ms    Atrial-paced rhythm with prolonged AV conduction  Nonspecific intraventricular block  Cannot rule out Septal infarct (cited on or before 11-DEC-2017)  T wave abnormality, consider lateral ischemia  Abnormal ECG  When compared with ECG of 10-MAY-2022 11:06,  Questionable change in initial forces of Anterior leads  T wave inversion less evident in Lateral leads  Confirmed by Lorenzo Ware MD ([id number]) on 7/22/2022 4:47:30 PM    Referred By:  JONEL           Confirmed By:Lorenzo Ware MD     No valid procedures specified.   Results for orders placed during the hospital encounter of 06/10/21    Nuclear Stress - Cardiology Interpreted    Interpretation Summary    Normal myocardial perfusion scan. There is no evidence of myocardial ischemia or infarction.    The gated perfusion images showed an ejection fraction of 51% post stress. Normal ejection fraction is greater than 53%.    There is  normal wall motion post stress.    LV cavity size is  and normal at stress.    The EKG portion of this study is abnormal but not diagnostic.    The patient reported no chest pain during the stress test.    There were no arrhythmias during stress.    No valid procedures specified.    PHYSICAL EXAM  CONSTITUTIONAL: Well built, well nourished in no apparent distress  NECK: no carotid bruit, no JVD  LUNGS: CTA  CHEST WALL: no tenderness  HEART: regular rate and rhythm, S1, S2 normal, no murmur, click, rub or gallop   ABDOMEN: soft, non-tender; bowel sounds normal; no masses,  no organomegaly  EXTREMITIES: Extremities normal, no edema, no calf tenderness noted  NEURO: AAO X 3    I HAVE REVIEWED :    The vital signs, nurses notes, and all the pertinent radiology and labs.        Current Outpatient Medications   Medication Instructions    acetaminophen (TYLENOL) 500 mg, Oral, Every 6 hours PRN    ALPRAZolam (XANAX) 0.25 MG tablet TAKE 1 TABLET BY MOUTH AS NEEDED FOR ANXIETY.    amitriptyline (ELAVIL) 25 MG tablet TAKE 1 TABLET BY MOUTH EVERY DAY    aspirin (ECOTRIN) 81 mg, Oral, Daily    camphor-menthol 0.5-0.5% (SARNA ORIGINAL) lotion Topical (Top), As needed (PRN)    cholecalciferol, vitamin D3, 100 mcg (4,000 unit) Cap 1 capsule, Oral, 2 times daily    coenzyme Q10 100 mg, Oral, 2 times daily    diphenhydrAMINE (BENYLIN) 12.5 mg, Oral, 4 times daily PRN    ECHINACEA ORAL 750 mg, Oral, 2 times daily    ELIQUIS 2.5 mg Tab TAKE 1 TABLET BY MOUTH TWICE A DAY    fexofenadine (ALLEGRA) 180 mg, Oral, Daily    hydrocortisone 2.5 % ointment Topical (Top), 2 times daily    hydrOXYzine HCL (ATARAX) 25 MG tablet 1 tablet, Oral, Daily PRN    isosorbide mononitrate (IMDUR) 120 mg, Oral, Daily    levothyroxine (SYNTHROID) 88 mcg, Oral, Daily    losartan (COZAAR) 12.5 mg, Oral, Nightly    MAGNESIUM ORAL 500 mg, Oral, Daily    metoprolol succinate (TOPROL-XL) 25 mg, Oral, Daily    montelukast (SINGULAIR) 10 mg, Oral, Nightly     multivitamin (THERAGRAN) tablet 1 tablet, Oral, 2 times daily    nitroGLYCERIN (NITROSTAT) 0.4 mg, Sublingual, Every 5 min PRN    omeprazole (PRILOSEC) 20 mg, Oral, Daily    tiZANidine (ZANAFLEX) 4 MG tablet TAKE 1 TABLET BY MOUTH EVERY DAY    traMADoL (ULTRAM) 50 mg, Oral, 2 times daily PRN    triamcinolone acetonide 0.1% (KENALOG) 0.1 % cream aaa bid x 1-2 wks, tk 1 wk off prn rash    turmeric 1,200 mg, Oral, Daily    UNABLE TO FIND 1 capsule, Oral, Bio-[employer]- premium nerve     UNABLE TO FIND 1 capsule, Oral, Daily, medication name: GI probiotic     UNABLE TO FIND 1 capsule, Oral, Daily, Joint health     UNABLE TO FIND 1 capsule, Oral, 2 times daily, Cardio platinum    UNABLE TO FIND Nerve Jorge    zinc gluconate 50 mg, Oral, 2 times daily          Assessment & Plan     Accelerated hypertension  Blood pressure at home recording has been reasonable.  On some occasions it got below 100 and she felt weak but this spontaneously resolved on its own.  Continue on present medications this should include losartan 1212.5 mg once daily she is on isosorbide mononitrate 120 mg once a day and continue on Toprol-XL at 25 mg daily.  Maintain the same regimen    Coronary artery disease involving left main coronary artery  No episodes of angina noted.  Continue on isosorbide mononitrate at 120 mg daily as well as Toprol-XL 25 mg daily and she is on low-dose aspirin 81 mg daily.    PAF (paroxysmal atrial fibrillation)  Her rate is controlled some and regular rhythm control at this time.  Continue on low-dose of Eliquis at 2.5 mg twice a day along with baby aspirin continue on Toprol-XL are 25 mg daily.  Appears reasonably controlled on this as well as magnesium oxide    Acquired hypothyroidism  Maintain on levothyroxine at 88 mcg daily maintain on same regimen    Type 2 diabetes mellitus, without long-term current use of insulin  Her blood sugars are very well controlled at home on present regimen.  Maintain the  same          Follow up in about 6 months (around 7/12/2023).

## 2023-01-12 NOTE — ASSESSMENT & PLAN NOTE
Blood pressure at home recording has been reasonable.  On some occasions it got below 100 and she felt weak but this spontaneously resolved on its own.  Continue on present medications this should include losartan 1212.5 mg once daily she is on isosorbide mononitrate 120 mg once a day and continue on Toprol-XL at 25 mg daily.  Maintain the same regimen

## 2023-01-12 NOTE — ASSESSMENT & PLAN NOTE
No episodes of angina noted.  Continue on isosorbide mononitrate at 120 mg daily as well as Toprol-XL 25 mg daily and she is on low-dose aspirin 81 mg daily.

## 2023-01-12 NOTE — ASSESSMENT & PLAN NOTE
Her rate is controlled some and regular rhythm control at this time.  Continue on low-dose of Eliquis at 2.5 mg twice a day along with baby aspirin continue on Toprol-XL are 25 mg daily.  Appears reasonably controlled on this as well as magnesium oxide

## 2023-03-06 ENCOUNTER — TELEPHONE (OUTPATIENT)
Dept: CARDIOLOGY | Facility: CLINIC | Age: 88
End: 2023-03-06
Payer: MEDICARE

## 2023-03-06 DIAGNOSIS — Z95.0 PACEMAKER: Primary | Chronic | ICD-10-CM

## 2023-03-06 NOTE — TELEPHONE ENCOUNTER
----- Message from Jaxon Paz sent at 3/6/2023 10:16 AM CST -----  Regarding: device check / orders  Type: Needs Medical Advice    Who Called:  Karma    Symptoms (please be specific):  fluctuation in BP    How long has patient had these symptoms:  ongoing    Pharmacy name and phone #:    [id number] - [place] - [address] [facility]  [address]  [place] [address]  Phone: [phone] Fax: [phone]    Best Call Back Number: [phone]    Additional Information: pt had appt with pacemaker clinic tomorrow. Would like to get nurse visit around same time to discuss BP numbers and get measured for new BP Cuff/machine b/c pt has lost weight.

## 2023-03-07 ENCOUNTER — CLINICAL SUPPORT (OUTPATIENT)
Dept: CARDIOLOGY | Facility: CLINIC | Age: 88
End: 2023-03-07
Payer: MEDICARE

## 2023-03-07 VITALS — HEART RATE: 76 BPM | OXYGEN SATURATION: 98 % | DIASTOLIC BLOOD PRESSURE: 70 MMHG | SYSTOLIC BLOOD PRESSURE: 136 MMHG

## 2023-03-07 PROCEDURE — 99211 PR OFFICE/OUTPT VISIT, EST, LEVL I: ICD-10-PCS | Mod: S$PBB,,, | Performed by: NURSE PRACTITIONER

## 2023-03-07 PROCEDURE — 99999 PR PBB SHADOW E&M-EST. PATIENT-LVL IV: CPT | Mod: PBBFAC,,,

## 2023-03-07 PROCEDURE — 99999 PR PBB SHADOW E&M-EST. PATIENT-LVL IV: ICD-10-PCS | Mod: PBBFAC,,,

## 2023-03-07 PROCEDURE — 99211 OFF/OP EST MAY X REQ PHY/QHP: CPT | Mod: S$PBB,,, | Performed by: NURSE PRACTITIONER

## 2023-03-07 PROCEDURE — 99214 OFFICE O/P EST MOD 30 MIN: CPT | Mod: PBBFAC,PN

## 2023-03-07 NOTE — PROGRESS NOTES
Patient came in for a bp check. Patient said her bp is up and down. Patient is taking losartan 25mg 1/2 tab and 1 tab depending on her reading at home. Patient is taking metoprolol 25mg daily. Spoke with Amaya Dickens NP she wants patient to take 1/2 tab of Losartan at night and split the metoprolol in 1/2 to twice daily. Patient is to keep bp log and check readings 3x a day and send us reading. Patient understood.      normal

## 2023-03-08 ENCOUNTER — HOSPITAL ENCOUNTER (EMERGENCY)
Facility: HOSPITAL | Age: 88
Discharge: HOME | End: 2023-03-09
Attending: EMERGENCY MEDICINE
Payer: MEDICARE

## 2023-03-08 DIAGNOSIS — R07.9 CHEST PAIN: ICD-10-CM

## 2023-03-08 DIAGNOSIS — N39.0 URINARY TRACT INFECTION WITHOUT HEMATURIA, SITE UNSPECIFIED: Primary | ICD-10-CM

## 2023-03-08 DIAGNOSIS — I10 HYPERTENSION, UNSPECIFIED TYPE: ICD-10-CM

## 2023-03-08 LAB
ALBUMIN SERPL BCP-MCNC: 4.3 G/DL (ref 3.5–5.2)
ALP SERPL-CCNC: 65 U/L (ref 55–135)
ALT SERPL W/O P-5'-P-CCNC: 18 U/L (ref 10–44)
ANION GAP SERPL CALC-SCNC: 8 MMOL/L (ref 8–16)
AST SERPL-CCNC: 27 U/L (ref 10–40)
BACTERIA #/AREA URNS HPF: NEGATIVE /HPF
BASOPHILS # BLD AUTO: 0.07 K/UL (ref 0–0.2)
BASOPHILS NFR BLD: 0.8 % (ref 0–1.9)
BILIRUB SERPL-MCNC: 0.4 MG/DL (ref 0.1–1)
BILIRUB UR QL STRIP: NEGATIVE
BNP SERPL-MCNC: 403 PG/ML (ref 0–99)
BUN SERPL-MCNC: 20 MG/DL (ref 8–23)
CALCIUM SERPL-MCNC: 9.3 MG/DL (ref 8.7–10.5)
CHLORIDE SERPL-SCNC: 105 MMOL/L (ref 95–110)
CLARITY UR: CLEAR
CO2 SERPL-SCNC: 28 MMOL/L (ref 23–29)
COLOR UR: YELLOW
CREAT SERPL-MCNC: 1.2 MG/DL (ref 0.5–1.4)
DIFFERENTIAL METHOD: ABNORMAL
EOSINOPHIL # BLD AUTO: 0.3 K/UL (ref 0–0.5)
EOSINOPHIL NFR BLD: 3 % (ref 0–8)
ERYTHROCYTE [DISTWIDTH] IN BLOOD BY AUTOMATED COUNT: 14.3 % (ref 11.5–14.5)
EST. GFR  (NO RACE VARIABLE): 44.4 ML/MIN/1.73 M^2
GLUCOSE SERPL-MCNC: 128 MG/DL (ref 70–110)
GLUCOSE UR QL STRIP: NEGATIVE
HCT VFR BLD AUTO: 38.5 % (ref 37–48.5)
HGB BLD-MCNC: 12.3 G/DL (ref 12–16)
HGB UR QL STRIP: NEGATIVE
HYALINE CASTS #/AREA URNS LPF: 0 /LPF
IMM GRANULOCYTES # BLD AUTO: 0.03 K/UL (ref 0–0.04)
IMM GRANULOCYTES NFR BLD AUTO: 0.4 % (ref 0–0.5)
INFLUENZA A, MOLECULAR: NEGATIVE
INFLUENZA B, MOLECULAR: NEGATIVE
KETONES UR QL STRIP: NEGATIVE
LEUKOCYTE ESTERASE UR QL STRIP: ABNORMAL
LYMPHOCYTES # BLD AUTO: 3.3 K/UL (ref 1–4.8)
LYMPHOCYTES NFR BLD: 39.3 % (ref 18–48)
MAGNESIUM SERPL-MCNC: 2.3 MG/DL (ref 1.6–2.6)
MCH RBC QN AUTO: 27.8 PG (ref 27–31)
MCHC RBC AUTO-ENTMCNC: 31.9 G/DL (ref 32–36)
MCV RBC AUTO: 87 FL (ref 82–98)
MICROSCOPIC COMMENT: ABNORMAL
MONOCYTES # BLD AUTO: 1.1 K/UL (ref 0.3–1)
MONOCYTES NFR BLD: 12.7 % (ref 4–15)
NEUTROPHILS # BLD AUTO: 3.7 K/UL (ref 1.8–7.7)
NEUTROPHILS NFR BLD: 43.8 % (ref 38–73)
NITRITE UR QL STRIP: NEGATIVE
NRBC BLD-RTO: 0 /100 WBC
PH UR STRIP: 7 [PH] (ref 5–8)
PLATELET # BLD AUTO: 264 K/UL (ref 150–450)
PMV BLD AUTO: 10 FL (ref 9.2–12.9)
POTASSIUM SERPL-SCNC: 3.9 MMOL/L (ref 3.5–5.1)
PROT SERPL-MCNC: 7.7 G/DL (ref 6–8.4)
PROT UR QL STRIP: NEGATIVE
RBC # BLD AUTO: 4.42 M/UL (ref 4–5.4)
RBC #/AREA URNS HPF: 0 /HPF (ref 0–4)
SODIUM SERPL-SCNC: 141 MMOL/L (ref 136–145)
SP GR UR STRIP: 1 (ref 1–1.03)
SPECIMEN SOURCE: NORMAL
SQUAMOUS #/AREA URNS HPF: 1 /HPF
TROPONIN I SERPL HS-MCNC: 16.8 PG/ML (ref 0–14.9)
TROPONIN I SERPL HS-MCNC: 19 PG/ML (ref 0–14.9)
TSH SERPL DL<=0.005 MIU/L-ACNC: 0.96 UIU/ML (ref 0.34–5.6)
URN SPEC COLLECT METH UR: ABNORMAL
UROBILINOGEN UR STRIP-ACNC: NEGATIVE EU/DL
WBC # BLD AUTO: 8.37 K/UL (ref 3.9–12.7)
WBC #/AREA URNS HPF: 14 /HPF (ref 0–5)

## 2023-03-08 PROCEDURE — 84443 ASSAY THYROID STIM HORMONE: CPT | Performed by: EMERGENCY MEDICINE

## 2023-03-08 PROCEDURE — 83880 ASSAY OF NATRIURETIC PEPTIDE: CPT | Performed by: EMERGENCY MEDICINE

## 2023-03-08 PROCEDURE — 93005 ELECTROCARDIOGRAM TRACING: CPT | Performed by: SPECIALIST

## 2023-03-08 PROCEDURE — 96365 THER/PROPH/DIAG IV INF INIT: CPT

## 2023-03-08 PROCEDURE — 84484 ASSAY OF TROPONIN QUANT: CPT | Performed by: EMERGENCY MEDICINE

## 2023-03-08 PROCEDURE — 87086 URINE CULTURE/COLONY COUNT: CPT | Performed by: EMERGENCY MEDICINE

## 2023-03-08 PROCEDURE — 96375 TX/PRO/DX INJ NEW DRUG ADDON: CPT

## 2023-03-08 PROCEDURE — 85025 COMPLETE CBC W/AUTO DIFF WBC: CPT | Performed by: EMERGENCY MEDICINE

## 2023-03-08 PROCEDURE — 83735 ASSAY OF MAGNESIUM: CPT | Performed by: EMERGENCY MEDICINE

## 2023-03-08 PROCEDURE — 99284 EMERGENCY DEPT VISIT MOD MDM: CPT | Mod: 25

## 2023-03-08 PROCEDURE — 25000242 PHARM REV CODE 250 ALT 637 W/ HCPCS: Performed by: EMERGENCY MEDICINE

## 2023-03-08 PROCEDURE — 93010 ELECTROCARDIOGRAM REPORT: CPT | Mod: ,,, | Performed by: SPECIALIST

## 2023-03-08 PROCEDURE — 80053 COMPREHEN METABOLIC PANEL: CPT | Performed by: EMERGENCY MEDICINE

## 2023-03-08 PROCEDURE — 81001 URINALYSIS AUTO W/SCOPE: CPT | Performed by: EMERGENCY MEDICINE

## 2023-03-08 PROCEDURE — 87502 INFLUENZA DNA AMP PROBE: CPT | Performed by: EMERGENCY MEDICINE

## 2023-03-08 PROCEDURE — 93010 EKG 12-LEAD: ICD-10-PCS | Mod: ,,, | Performed by: SPECIALIST

## 2023-03-08 PROCEDURE — 63600175 PHARM REV CODE 636 W HCPCS: Performed by: EMERGENCY MEDICINE

## 2023-03-08 PROCEDURE — 25000003 PHARM REV CODE 250: Performed by: EMERGENCY MEDICINE

## 2023-03-08 RX ORDER — ACETAMINOPHEN 500 MG
1000 TABLET ORAL
Status: COMPLETED | OUTPATIENT
Start: 2023-03-08 | End: 2023-03-08

## 2023-03-08 RX ORDER — HYDRALAZINE HYDROCHLORIDE 20 MG/ML
10 INJECTION INTRAMUSCULAR; INTRAVENOUS
Status: COMPLETED | OUTPATIENT
Start: 2023-03-08 | End: 2023-03-08

## 2023-03-08 RX ORDER — CEPHALEXIN 500 MG/1
500 CAPSULE ORAL 4 TIMES DAILY
Qty: 20 CAPSULE | Refills: 0 | Status: SHIPPED | OUTPATIENT
Start: 2023-03-08 | End: 2023-03-13

## 2023-03-08 RX ORDER — NITROGLYCERIN 0.4 MG/1
0.4 TABLET SUBLINGUAL EVERY 5 MIN PRN
Status: DISCONTINUED | OUTPATIENT
Start: 2023-03-08 | End: 2023-03-09 | Stop reason: HOSPADM

## 2023-03-08 RX ADMIN — CEFTRIAXONE 1 G: 1 INJECTION, SOLUTION INTRAVENOUS at 09:03

## 2023-03-08 RX ADMIN — ACETAMINOPHEN 1000 MG: 500 TABLET ORAL at 10:03

## 2023-03-08 RX ADMIN — NITROGLYCERIN 0.4 MG: 0.4 TABLET SUBLINGUAL at 10:03

## 2023-03-08 RX ADMIN — HYDRALAZINE HYDROCHLORIDE 10 MG: 20 INJECTION INTRAMUSCULAR; INTRAVENOUS at 09:03

## 2023-03-09 VITALS
BODY MASS INDEX: 26.11 KG/M2 | TEMPERATURE: 99 F | DIASTOLIC BLOOD PRESSURE: 79 MMHG | RESPIRATION RATE: 19 BRPM | HEIGHT: 60 IN | SYSTOLIC BLOOD PRESSURE: 160 MMHG | HEART RATE: 70 BPM | WEIGHT: 133 LBS | OXYGEN SATURATION: 94 %

## 2023-03-09 PROCEDURE — 25000003 PHARM REV CODE 250: Performed by: EMERGENCY MEDICINE

## 2023-03-09 RX ORDER — LOSARTAN POTASSIUM 25 MG/1
25 TABLET ORAL DAILY
Status: DISCONTINUED | OUTPATIENT
Start: 2023-03-09 | End: 2023-03-09 | Stop reason: HOSPADM

## 2023-03-09 RX ORDER — ALPRAZOLAM 0.5 MG/1
0.5 TABLET ORAL
Status: COMPLETED | OUTPATIENT
Start: 2023-03-09 | End: 2023-03-09

## 2023-03-09 RX ORDER — LOSARTAN POTASSIUM 25 MG/1
25 TABLET ORAL DAILY
Status: DISCONTINUED | OUTPATIENT
Start: 2023-03-09 | End: 2023-03-09

## 2023-03-09 RX ORDER — ALPRAZOLAM 0.25 MG/1
0.25 TABLET ORAL
Status: DISCONTINUED | OUTPATIENT
Start: 2023-03-09 | End: 2023-03-09

## 2023-03-09 RX ADMIN — ALPRAZOLAM 0.5 MG: 0.5 TABLET ORAL at 12:03

## 2023-03-09 RX ADMIN — LOSARTAN POTASSIUM 25 MG: 25 TABLET, FILM COATED ORAL at 12:03

## 2023-03-09 NOTE — ED NOTES
Discharge instructions, diagnosis, medications, and follow up discussed with patient. Patient verbalized understanding. All questions and concerns answered. No needs expressed at the time. Pt is awake, alert and oriented with no acute distress noted. Respirations even and unlabored. Pt discharged out of ER via wheelchair with .

## 2023-03-09 NOTE — ED PROVIDER NOTES
Encounter Date: 3/8/2023       History     Chief Complaint   Patient presents with    hypertensive and tired     Patient complains of fatigue, and being hypertensive.       [age] year female with past medical history significant of CAD, diabetes, hypertension, GERD and mi presents secondary to hypertension.  Patient states she has felt mildly weak and took her blood pressure was elevated.  She also noted elevation in her heart rate at that time decided to come to emergency room for further assessment.  She denies any chest pain, shortness breath, nausea vomiting associated.  Patient is otherwise stable and has no other complaints.    Review of patient's allergies indicates:   Allergen Reactions    Arthrotec 50 [diclofenac-misoprostol]     Doxycycline     Effexor [venlafaxine]     Estradiol     Flagyl [metronidazole]     Fluconazole     Gabapentin     Iodine     Levaquin [levofloxacin]     Nexium [esomeprazole magnesium]     Penicillins     Red yeast rice (monascus purpureus)     Shellfish containing products     Statins-hmg-coa reductase inhibitors     Sulfa (sulfonamide antibiotics)     Tea tree oil     Tegaserod      ZOLNORM    Tegaserod hydrogen maleate     Trazodone     Yeast, dried     Adhesive Rash     Band-aids     Past Medical History:   Diagnosis Date    Coronary artery disease     Dermatitis     Dermatitis 12/8/2017    Diabetes mellitus     Diabetes mellitus type II     Leah Barr virus infection     Fibromyalgia     GERD (gastroesophageal reflux disease)     Hypertension     MI (myocardial infarction) 09/23/2011    Pure hypercholesterolemia 2/26/2020     Past Surgical History:   Procedure Laterality Date    adenoids      ANGIOPLASTY  1987    APPENDECTOMY      CARDIAC PACEMAKER PLACEMENT  01/12/2017    CATARACT EXTRACTION, BILATERAL Bilateral 9/2/15, 3/17/15    right eye-9/2/15, left eye-3/17/15    CHOLECYSTECTOMY  1987    CORONARY ARTERY BYPASS GRAFT  2006    quadruple    coronary stents  1999     x2    CYST REMOVAL  04/25/2017    on back    HYSTERECTOMY  1966    OVARY SURGERY  1948    Ovary burst & was repaired    RHIZOTOMY  09/14/2018    TONSILLECTOMY  1940     Family History   Problem Relation Age of Onset    No Known Problems Mother     No Known Problems Father      Social History     Tobacco Use    Smoking status: Never    Smokeless tobacco: Never   Substance Use Topics    Alcohol use: No    Drug use: No     Review of Systems   Constitutional:  Negative for fever.   HENT:  Negative for sore throat.    Respiratory:  Negative for shortness of breath.    Cardiovascular:  Negative for chest pain.   Gastrointestinal:  Negative for nausea.   Genitourinary:  Negative for dysuria.   Musculoskeletal:  Negative for back pain.   Skin:  Negative for rash.   Neurological:  Negative for weakness.   Hematological:  Does not bruise/bleed easily.   All other systems reviewed and are negative.    Physical Exam     Initial Vitals [03/08/23 1857]   BP Pulse Resp Temp SpO2   (!) 185/77 70 18 98.6 °F (37 °C) 99 %      MAP       --         Physical Exam    Nursing note and vitals reviewed.  Constitutional: She appears well-developed and well-nourished. No distress.   HENT:   Head: Normocephalic and atraumatic.   Mouth/Throat: Oropharynx is clear and moist.   Eyes: Conjunctivae and EOM are normal. Pupils are equal, round, and reactive to light.   Neck: No tracheal deviation present. No JVD present.   Normal range of motion.  Cardiovascular:  Normal rate, regular rhythm, normal heart sounds and intact distal pulses.     Exam reveals no gallop and no friction rub.       No murmur heard.  Pulmonary/Chest: Breath sounds normal. No respiratory distress. She has no wheezes. She exhibits no tenderness.   Abdominal: Abdomen is soft. Bowel sounds are normal. She exhibits no distension. There is no abdominal tenderness. There is no rebound and no guarding.   Musculoskeletal:         General: No tenderness or edema. Normal range of  motion.      Cervical back: Normal range of motion.     Neurological: She is alert and oriented to person, place, and time. She has normal strength. No cranial nerve deficit or sensory deficit.   Skin: Skin is warm and dry. Capillary refill takes less than 2 seconds. No erythema.   Psychiatric: She has a normal mood and affect.       ED Course   Procedures  Labs Reviewed   CBC W/ AUTO DIFFERENTIAL - Abnormal; Notable for the following components:       Result Value    MCHC 31.9 (*)     Mono # 1.1 (*)     All other components within normal limits   COMPREHENSIVE METABOLIC PANEL - Abnormal; Notable for the following components:    Glucose 128 (*)     eGFR 44.4 (*)     All other components within normal limits   TROPONIN I HIGH SENSITIVITY - Abnormal; Notable for the following components:    Troponin I High Sensitivity 16.8 (*)     All other components within normal limits   TROPONIN I HIGH SENSITIVITY - Abnormal; Notable for the following components:    Troponin I High Sensitivity 19.0 (*)     All other components within normal limits   B-TYPE NATRIURETIC PEPTIDE - Abnormal; Notable for the following components:     (*)     All other components within normal limits   URINALYSIS, REFLEX TO URINE CULTURE - Abnormal; Notable for the following components:    Leukocytes, UA 2+ (*)     All other components within normal limits    Narrative:     Specimen Source->Urine   URINALYSIS MICROSCOPIC - Abnormal; Notable for the following components:    WBC, UA 14 (*)     All other components within normal limits    Narrative:     Specimen Source->Urine   CULTURE, URINE   MAGNESIUM   TSH   INFLUENZA A AND B ANTIGEN    Narrative:     Specimen Source->Nasopharyngeal Swab          Imaging Results              X-Ray Chest AP Portable (In process)                      Medications   nitroGLYCERIN SL tablet 0.4 mg (0.4 mg Sublingual Given 3/8/23 2258)   cefTRIAXone (ROCEPHIN) 1 g/50 mL D5W IVPB (0 g Intravenous Stopped 3/8/23 2255)    hydrALAZINE injection 10 mg (10 mg Intravenous Given 3/8/23 2139)   acetaminophen tablet 1,000 mg (1,000 mg Oral Given 3/8/23 8879)     Medical Decision Making:   Initial Assessment:   87-year-old female on initial assessment in no acute distress but concerned about her blood pressure.  She is alert oriented x3, neurologically and neurovascular intact no focal deficits.  She is nontoxic appearing vitals stable at this time.  Differential Diagnosis:   Hypertensive emergency, BETTYE, anxiety  Independently Interpreted Test(s):   I have ordered and independently interpreted X-rays - see prior notes.  I have ordered and independently interpreted EKG Reading(s) - see prior notes  Clinical Tests:   Lab Tests: Ordered and Reviewed  The following lab test(s) were unremarkable: CBC, CMP, Urinalysis, Troponin and BNP  Radiological Study: Ordered and Reviewed  Medical Tests: Ordered and Reviewed  ED Management:  Patient has been reassessed noted to have no acute changes in her condition.  She does have urinary tract infection treated Rocephin while in the ED and she will continue to take Keflex on outpatient basis.  Patient is concerned about her blood pressure being high and she was given 10 hydralazine IV as well as p.o. sublingual nitro with mild relief.  She remains asymptomatic and will follow up with primary care physician and cardiology team for medication adjustments.  Patient has remained stable while in the ED and discharged home stable condition with follow-up as discussed.  Ms. Chen and  are aware of the plan and in agreement with discharge.    Pt's plan and diagnosis was discussed. All questions were answered and patient was comfortable with the plan. This patient was personally seen and personally examined by me and I personally performed the services described in this documentation.   Complexity of the visit is established by the note or I have spent at least the amount of time discussing findings, exam  and/or radiographs or imaging studies.     MD uses EPIC and voice recognition software prone to occasional and minor errors that may persist in the medical record.                          Clinical Impression:   Final diagnoses:  [R07.9] Chest pain  [N39.0] Urinary tract infection without hematuria, site unspecified (Primary)  [I10] Hypertension, unspecified type        ED Disposition Condition    Discharge Stable          ED Prescriptions       Medication Sig Dispense Start Date End Date Auth. Provider    cephALEXin (KEFLEX) 500 MG capsule Take 1 capsule (500 mg total) by mouth 4 (four) times daily. for 5 days 20 capsule 3/8/2023 3/13/2023 Donald Chan MD          Follow-up Information       Follow up With Specialties Details Why Contact Info Additional Information    [facility] - Emergency Dept Emergency Medicine  As needed, If symptoms worsen [address] [facility] [address]  [phone] 1st floor    Elena Sullivan MD Internal Medicine Schedule an appointment as soon as possible for a visit  As needed [address] [facility] [address]  [phone]                Donald Chan MD  03/08/23 1142

## 2023-03-11 LAB — BACTERIA UR CULT: NO GROWTH

## 2023-03-14 ENCOUNTER — TELEPHONE (OUTPATIENT)
Dept: CARDIOLOGY | Facility: CLINIC | Age: 88
End: 2023-03-14
Payer: MEDICARE

## 2023-03-14 NOTE — TELEPHONE ENCOUNTER
She has an appointment with Dr. Sullivan on 3/16/23. She will follow up with her. She has been checking her bp. She got a new pressure machine.

## 2023-03-14 NOTE — TELEPHONE ENCOUNTER
----- Message from Carmella Chan sent at 3/14/2023 11:46 AM CDT -----  Regarding: advice  Contact: Patient  Type: Needs Medical Advice  Who Called:  Patient   Symptoms (please be specific):    How long has patient had these symptoms:    Pharmacy name and phone #:    Best Call Back Number: [phone] (home)      Additional Information: Patient stated that she had to go to the hospital on last wednesday. Patient stated that she has been keeping up with blood pressure and heart rate went up. Please contact patient to advise. Thanks!

## 2023-03-29 ENCOUNTER — OFFICE VISIT (OUTPATIENT)
Dept: FAMILY MEDICINE | Facility: CLINIC | Age: 88
End: 2023-03-29
Payer: MEDICARE

## 2023-03-29 VITALS
DIASTOLIC BLOOD PRESSURE: 78 MMHG | HEART RATE: 72 BPM | SYSTOLIC BLOOD PRESSURE: 142 MMHG | HEIGHT: 60 IN | BODY MASS INDEX: 25.91 KG/M2 | TEMPERATURE: 98 F | WEIGHT: 132 LBS | RESPIRATION RATE: 12 BRPM | OXYGEN SATURATION: 99 %

## 2023-03-29 DIAGNOSIS — I20.89 STABLE ANGINA: ICD-10-CM

## 2023-03-29 DIAGNOSIS — I10 UNCONTROLLED HYPERTENSION: ICD-10-CM

## 2023-03-29 DIAGNOSIS — Z12.31 ENCOUNTER FOR SCREENING MAMMOGRAM FOR BREAST CANCER: ICD-10-CM

## 2023-03-29 DIAGNOSIS — N18.4 CHRONIC KIDNEY DISEASE, STAGE 4 (SEVERE): ICD-10-CM

## 2023-03-29 DIAGNOSIS — R09.89 LABILE HYPERTENSION: ICD-10-CM

## 2023-03-29 DIAGNOSIS — M62.838 MUSCLE SPASM: ICD-10-CM

## 2023-03-29 DIAGNOSIS — E11.9 DIABETES MELLITUS TYPE 2, DIET-CONTROLLED: Primary | ICD-10-CM

## 2023-03-29 PROCEDURE — 99215 OFFICE O/P EST HI 40 MIN: CPT | Performed by: INTERNAL MEDICINE

## 2023-03-29 PROCEDURE — 99214 PR OFFICE/OUTPT VISIT, EST, LEVL IV, 30-39 MIN: ICD-10-PCS | Mod: S$PBB,AQ,, | Performed by: INTERNAL MEDICINE

## 2023-03-29 PROCEDURE — 99214 OFFICE O/P EST MOD 30 MIN: CPT | Mod: S$PBB,AQ,, | Performed by: INTERNAL MEDICINE

## 2023-03-29 RX ORDER — WITCH HAZEL 50 %
2000 PADS, MEDICATED (EA) TOPICAL DAILY
COMMUNITY
End: 2023-11-01

## 2023-03-29 RX ORDER — TIZANIDINE 4 MG/1
4 TABLET ORAL DAILY
Qty: 90 TABLET | Refills: 0 | Status: SHIPPED | OUTPATIENT
Start: 2023-03-29 | End: 2023-09-12 | Stop reason: SDUPTHER

## 2023-03-29 RX ORDER — LOSARTAN POTASSIUM 25 MG/1
12.5 TABLET ORAL NIGHTLY
Qty: 45 TABLET | Refills: 3 | Status: SHIPPED | OUTPATIENT
Start: 2023-03-29 | End: 2024-03-25 | Stop reason: SDUPTHER

## 2023-03-29 NOTE — PROGRESS NOTES
SUBJECTIVE:    Patient ID: Karma Chen is a 87 y.o. female.    Chief Complaint: Medication Refill, Peripheral Neuropathy, and Follow-up    Medication Refill    Follow-up    3-8-23-to ER had hypertension and tachycardia- never examined but there is an inclusion of a physical exam-plus she left with /77... she had positive BNP and normal xray chest-never received and diuretic-    Admission on 03/08/2023, Discharged on 03/09/2023   Component Date Value Ref Range Status    Magnesium 03/08/2023 2.3  1.6 - 2.6 mg/dL Final    WBC 03/08/2023 8.37  3.90 - 12.70 K/uL Final    RBC 03/08/2023 4.42  4.00 - 5.40 M/uL Final    Hemoglobin 03/08/2023 12.3  12.0 - 16.0 g/dL Final    Hematocrit 03/08/2023 38.5  37.0 - 48.5 % Final    MCV 03/08/2023 87  82 - 98 fL Final    MCH 03/08/2023 27.8  27.0 - 31.0 pg Final    MCHC 03/08/2023 31.9 (L)  32.0 - 36.0 g/dL Final    RDW 03/08/2023 14.3  11.5 - 14.5 % Final    Platelets 03/08/2023 264  150 - 450 K/uL Final    MPV 03/08/2023 10.0  9.2 - 12.9 fL Final    Immature Granulocytes 03/08/2023 0.4  0.0 - 0.5 % Final    Gran # (ANC) 03/08/2023 3.7  1.8 - 7.7 K/uL Final    Immature Grans (Abs) 03/08/2023 0.03  0.00 - 0.04 K/uL Final    Lymph # 03/08/2023 3.3  1.0 - 4.8 K/uL Final    Mono # 03/08/2023 1.1 (H)  0.3 - 1.0 K/uL Final    Eos # 03/08/2023 0.3  0.0 - 0.5 K/uL Final    Baso # 03/08/2023 0.07  0.00 - 0.20 K/uL Final    nRBC 03/08/2023 0  0 /100 WBC Final    Gran % 03/08/2023 43.8  38.0 - 73.0 % Final    Lymph % 03/08/2023 39.3  18.0 - 48.0 % Final    Mono % 03/08/2023 12.7  4.0 - 15.0 % Final    Eosinophil % 03/08/2023 3.0  0.0 - 8.0 % Final    Basophil % 03/08/2023 0.8  0.0 - 1.9 % Final    Differential Method 03/08/2023 Automated   Final    Sodium 03/08/2023 141  136 - 145 mmol/L Final    Potassium 03/08/2023 3.9  3.5 - 5.1 mmol/L Final    Chloride 03/08/2023 105  95 - 110 mmol/L Final    CO2 03/08/2023 28  23 - 29 mmol/L Final    Glucose 03/08/2023 128 (H)  70 - 110 mg/dL  Final    BUN 03/08/2023 20  8 - 23 mg/dL Final    Creatinine 03/08/2023 1.2  0.5 - 1.4 mg/dL Final    Calcium 03/08/2023 9.3  8.7 - 10.5 mg/dL Final    Total Protein 03/08/2023 7.7  6.0 - 8.4 g/dL Final    Albumin 03/08/2023 4.3  3.5 - 5.2 g/dL Final    Total Bilirubin 03/08/2023 0.4  0.1 - 1.0 mg/dL Final    Alkaline Phosphatase 03/08/2023 65  55 - 135 U/L Final    AST 03/08/2023 27  10 - 40 U/L Final    ALT 03/08/2023 18  10 - 44 U/L Final    Anion Gap 03/08/2023 8  8 - 16 mmol/L Final    eGFR 03/08/2023 44.4 (A)  >60 mL/min/1.73 m^2 Final    Troponin I High Sensitivity 03/08/2023 16.8 (H)  0.0 - 14.9 pg/mL Final    Troponin I High Sensitivity 03/08/2023 19.0 (H)  0.0 - 14.9 pg/mL Final    BNP 03/08/2023 403 (H)  0 - 99 pg/mL Final    TSH 03/08/2023 0.960  0.340 - 5.600 uIU/mL Final    Specimen UA 03/08/2023 Urine, Clean Catch   Final    Color, UA 03/08/2023 Yellow  Yellow, Straw, Anila Final    Appearance, UA 03/08/2023 Clear  Clear Final    pH, UA 03/08/2023 7.0  5.0 - 8.0 Final    Specific Gravity, UA 03/08/2023 1.005  1.005 - 1.030 Final    Protein, UA 03/08/2023 Negative  Negative Final    Glucose, UA 03/08/2023 Negative  Negative Final    Ketones, UA 03/08/2023 Negative  Negative Final    Bilirubin (UA) 03/08/2023 Negative  Negative Final    Occult Blood UA 03/08/2023 Negative  Negative Final    Nitrite, UA 03/08/2023 Negative  Negative Final    Urobilinogen, UA 03/08/2023 Negative  Negative EU/dL Final    Leukocytes, UA 03/08/2023 2+ (A)  Negative Final    Influenza A, Molecular 03/08/2023 Negative  Negative Final    Influenza B, Molecular 03/08/2023 Negative  Negative Final    Flu A & B Source 03/08/2023 NP   Final    RBC, UA 03/08/2023 0  0 - 4 /hpf Final    WBC, UA 03/08/2023 14 (H)  0 - 5 /hpf Final    Bacteria 03/08/2023 Negative  None-Occ /hpf Final    Squam Epithel, UA 03/08/2023 1  /hpf Final    Hyaline Casts, UA 03/08/2023 0  0-1/lpf /lpf Final    Microscopic Comment 03/08/2023 SEE COMMENT   Final     Urine Culture, Routine 03/08/2023 No growth   Final   Hospital Outpatient Visit on 03/07/2023   Component Date Value Ref Range Status    Battery Voltage (V) 03/07/2023 2.99  V Final    P/R-wave RA Lead 03/07/2023 1.8  mV Final    P/R-wave RA Lead (native) 03/07/2023 11  mV Final    Impedance RA Lead 03/07/2023 475  Ohms Final    Impedance RA Lead (native) 03/07/2023 494  Ohms Final    Threshold V RA Lead 03/07/2023 0.7  V Final    Theshold ms RA Lead 03/07/2023 0.4  ms Final    Threshold V RA Lead (native) 03/07/2023 0.5  V Final    Threshold ms RA Lead (native) 03/07/2023 0.4  ms Final   Lab Visit on 11/29/2022   Component Date Value Ref Range Status    Glucose 11/29/2022 83  70 - 110 mg/dL Final    Sodium 11/29/2022 139  136 - 145 mmol/L Final    Potassium 11/29/2022 3.8  3.5 - 5.1 mmol/L Final    Chloride 11/29/2022 104  95 - 110 mmol/L Final    CO2 11/29/2022 27  23 - 29 mmol/L Final    BUN 11/29/2022 21  8 - 23 mg/dL Final    Calcium 11/29/2022 9.2  8.7 - 10.5 mg/dL Final    Creatinine 11/29/2022 1.2  0.5 - 1.4 mg/dL Final    Albumin 11/29/2022 3.8  3.5 - 5.2 g/dL Final    Phosphorus 11/29/2022 4.1  2.7 - 4.5 mg/dL Final    eGFR 11/29/2022 44.6 (A)  >60 mL/min/1.73 m^2 Final    Anion Gap 11/29/2022 8  8 - 16 mmol/L Final    WBC 11/29/2022 6.36  3.90 - 12.70 K/uL Final    RBC 11/29/2022 4.11  4.00 - 5.40 M/uL Final    Hemoglobin 11/29/2022 11.5 (L)  12.0 - 16.0 g/dL Final    Hematocrit 11/29/2022 37.7  37.0 - 48.5 % Final    MCV 11/29/2022 92  82 - 98 fL Final    MCH 11/29/2022 28.0  27.0 - 31.0 pg Final    MCHC 11/29/2022 30.5 (L)  32.0 - 36.0 g/dL Final    RDW 11/29/2022 12.7  11.5 - 14.5 % Final    Platelets 11/29/2022 249  150 - 450 K/uL Final    MPV 11/29/2022 10.6  9.2 - 12.9 fL Final    Immature Granulocytes 11/29/2022 0.2  0.0 - 0.5 % Final    Gran # (ANC) 11/29/2022 2.4  1.8 - 7.7 K/uL Final    Immature Grans (Abs) 11/29/2022 0.01  0.00 - 0.04 K/uL Final    Lymph # 11/29/2022 2.8  1.0 - 4.8 K/uL  Final    Mono # 11/29/2022 0.9  0.3 - 1.0 K/uL Final    Eos # 11/29/2022 0.2  0.0 - 0.5 K/uL Final    Baso # 11/29/2022 0.06  0.00 - 0.20 K/uL Final    nRBC 11/29/2022 0  0 /100 WBC Final    Gran % 11/29/2022 37.7 (L)  38.0 - 73.0 % Final    Lymph % 11/29/2022 44.0  18.0 - 48.0 % Final    Mono % 11/29/2022 13.7  4.0 - 15.0 % Final    Eosinophil % 11/29/2022 3.5  0.0 - 8.0 % Final    Basophil % 11/29/2022 0.9  0.0 - 1.9 % Final    Differential Method 11/29/2022 Automated   Final    Vit D, 25-Hydroxy 11/29/2022 82  30 - 96 ng/mL Final    RBC, UA 11/29/2022 0  0 - 4 /hpf Final    WBC, UA 11/29/2022 5  0 - 5 /hpf Final    Bacteria 11/29/2022 Negative  None-Occ /hpf Final    Squam Epithel, UA 11/29/2022 1  /hpf Final    Hyaline Casts, UA 11/29/2022 1  0-1/lpf /lpf Final    Microscopic Comment 11/29/2022 SEE COMMENT   Final    PTH, Intact 11/29/2022 23.2  9.0 - 77.0 pg/mL Final    Protein, Urine Random 11/29/2022 <6  6 - 15 mg/dL Final    Creatinine, Urine 11/29/2022 40.0  15.0 - 325.0 mg/dL Final    Prot/Creat Ratio, Urine 11/29/2022 Unable to calculate  0.00 - 0.20 Final   Hospital Outpatient Visit on 11/01/2022   Component Date Value Ref Range Status    Battery Voltage (V) 11/01/2022 2.99  V Final    P/R-wave RA Lead 11/01/2022 1.5  mV Final    P/R-wave RA Lead (native) 11/01/2022 9.5  mV Final    Impedance RA Lead 11/01/2022 475  Ohms Final    Impedance RA Lead (native) 11/01/2022 513  Ohms Final    Threshold V RA Lead (native) 11/01/2022 0.500  V Final    Threshold ms RA Lead (native) 11/01/2022 0.40  ms Final   Office Visit on 09/18/2022   Component Date Value Ref Range Status    SARS Coronavirus 2 Antigen 09/18/2022 Negative  Negative Final     Acceptable 09/18/2022 Yes   Final   Lab Visit on 09/15/2022   Component Date Value Ref Range Status    Immunofix Interp. 09/15/2022 Comment   Final    IgA 09/15/2022 305  64 - 422 mg/dL Final    IgG 09/15/2022 739  586 - 1602 mg/dL Final    IgM 09/15/2022 36   26 - 217 mg/dL Final    Total Protein 09/15/2022 6.3  6.0 - 8.5 g/dL Final    Albumin 09/15/2022 3.2  2.9 - 4.4 g/dL Final    Alpha-1 09/15/2022 0.2  0.0 - 0.4 g/dL Final    Alpha-2 09/15/2022 0.9  0.4 - 1.0 g/dL Final    Beta 09/15/2022 1.2  0.7 - 1.3 g/dL Final    Gamma 09/15/2022 0.7  0.4 - 1.8 g/dL Final    M-SPIKE, PROT ELECTRO 09/15/2022 Not Observed  Not Observed g/dL Final    Globulin, Total 09/15/2022 3.1  2.2 - 3.9 g/dL Final    A/G Ratio 09/15/2022 1.0  0.7 - 1.7 Final    Please Note: 09/15/2022 Comment   Final    Methlymalonic Acid 09/15/2022 216  0 - 378 nmol/L Final    Thiamine 09/15/2022 494.7 (H)  66.5 - 200.0 nmol/L Final    Vitamin B-12 09/15/2022 1092 (H)  210 - 950 pg/mL Final    Vitamin B6 09/15/2022 86.0 (H)  3.4 - 65.2 ug/L Final    SINGH 09/15/2022 Negative   Final    Sed Rate 09/15/2022 38 (H)  0 - 20 mm/Hr Final    CRP 09/15/2022 0.74  <0.76 mg/dL Final   Hospital Outpatient Visit on 08/02/2022   Component Date Value Ref Range Status    P/R-wave RA Lead 08/02/2022 3.3  mV Final    P/R-wave RA Lead (native) 08/02/2022 10.6  mV Final    Impedance RA Lead 08/02/2022 494  Ohms Final    Impedance RA Lead (native) 08/02/2022 570  Ohms Final    Threshold V RA Lead 08/02/2022 0.875  V Final    Theshold ms RA Lead 08/02/2022 0.4  ms Final    Threshold V RA Lead (native) 08/02/2022 0.5  V Final    Threshold ms RA Lead (native) 08/02/2022 0.4  ms Final   Lab Visit on 05/20/2022   Component Date Value Ref Range Status    Hemoglobin A1C 05/20/2022 5.5  4.5 - 6.2 % Final    Estimated Avg Glucose 05/20/2022 111  68 - 131 mg/dL Final    Microalbumin, Urine 05/20/2022 10.8  <19.9 ug/mL Final    Creatinine, Urine 05/20/2022 97.0  15.0 - 325.0 mg/dL Final    Microalb/Creat Ratio 05/20/2022 11.1  0.0 - 30.0 ug/mg Final    Cholesterol 05/20/2022 223 (H)  120 - 199 mg/dL Final    Triglycerides 05/20/2022 109  30 - 150 mg/dL Final    HDL 05/20/2022 56  40 - 75 mg/dL Final    LDL Cholesterol 05/20/2022 145.2  63.0 -  159.0 mg/dL Final    HDL/Cholesterol Ratio 05/20/2022 25.1  20.0 - 50.0 % Final    Total Cholesterol/HDL Ratio 05/20/2022 4.0  2.0 - 5.0 Final    Non-HDL Cholesterol 05/20/2022 167  mg/dL Final    Glucose 05/20/2022 135 (H)  70 - 110 mg/dL Final    Sodium 05/20/2022 139  136 - 145 mmol/L Final    Potassium 05/20/2022 4.0  3.5 - 5.1 mmol/L Final    Chloride 05/20/2022 103  95 - 110 mmol/L Final    CO2 05/20/2022 24  23 - 29 mmol/L Final    BUN 05/20/2022 28 (H)  8 - 23 mg/dL Final    Calcium 05/20/2022 9.3  8.7 - 10.5 mg/dL Final    Creatinine 05/20/2022 1.2  0.5 - 1.4 mg/dL Final    Albumin 05/20/2022 4.0  3.5 - 5.2 g/dL Final    Phosphorus 05/20/2022 3.6  2.7 - 4.5 mg/dL Final    eGFR if  05/20/2022 48.0 (A)  >60 mL/min/1.73 m^2 Final    eGFR if non African American 05/20/2022 41.6 (A)  >60 mL/min/1.73 m^2 Final    Anion Gap 05/20/2022 12  8 - 16 mmol/L Final    RBC, UA 05/20/2022 1  0 - 4 /hpf Final    WBC, UA 05/20/2022 0  0 - 5 /hpf Final    Bacteria 05/20/2022 Negative  None-Occ /hpf Final    Squam Epithel, UA 05/20/2022 1  /hpf Final    Hyaline Casts, UA 05/20/2022 2 (A)  0-1/lpf /lpf Final    Microscopic Comment 05/20/2022 SEE COMMENT   Final    Protein, Urine Random 05/20/2022 6  6 - 15 mg/dL Final    Creatinine, Urine 05/20/2022 97.0  15.0 - 325.0 mg/dL Final    Prot/Creat Ratio, Urine 05/20/2022 0.06  0.00 - 0.20 Final   Lab Visit on 05/10/2022   Component Date Value Ref Range Status    BNP 05/10/2022 490 (H)  0 - 99 pg/mL Final    TSH 05/10/2022 1.280  0.340 - 5.600 uIU/mL Final    WBC 05/10/2022 7.41  3.90 - 12.70 K/uL Final    RBC 05/10/2022 4.20  4.00 - 5.40 M/uL Final    Hemoglobin 05/10/2022 11.5 (L)  12.0 - 16.0 g/dL Final    Hematocrit 05/10/2022 37.2  37.0 - 48.5 % Final    MCV 05/10/2022 89  82 - 98 fL Final    MCH 05/10/2022 27.4  27.0 - 31.0 pg Final    MCHC 05/10/2022 30.9 (L)  32.0 - 36.0 g/dL Final    RDW 05/10/2022 13.8  11.5 - 14.5 % Final    Platelets 05/10/2022 249  150 -  450 K/uL Final    MPV 05/10/2022 10.0  9.2 - 12.9 fL Final    Immature Granulocytes 05/10/2022 0.3  0.0 - 0.5 % Final    Gran # (ANC) 05/10/2022 3.4  1.8 - 7.7 K/uL Final    Immature Grans (Abs) 05/10/2022 0.02  0.00 - 0.04 K/uL Final    Lymph # 05/10/2022 2.5  1.0 - 4.8 K/uL Final    Mono # 05/10/2022 1.1 (H)  0.3 - 1.0 K/uL Final    Eos # 05/10/2022 0.3  0.0 - 0.5 K/uL Final    Baso # 05/10/2022 0.08  0.00 - 0.20 K/uL Final    nRBC 05/10/2022 0  0 /100 WBC Final    Gran % 05/10/2022 46.0  38.0 - 73.0 % Final    Lymph % 05/10/2022 34.3  18.0 - 48.0 % Final    Mono % 05/10/2022 14.7  4.0 - 15.0 % Final    Eosinophil % 05/10/2022 3.6  0.0 - 8.0 % Final    Basophil % 05/10/2022 1.1  0.0 - 1.9 % Final    Differential Method 05/10/2022 Automated   Final    Sodium 05/10/2022 140  136 - 145 mmol/L Final    Potassium 05/10/2022 3.9  3.5 - 5.1 mmol/L Final    Chloride 05/10/2022 103  95 - 110 mmol/L Final    CO2 05/10/2022 27  23 - 29 mmol/L Final    Glucose 05/10/2022 93  70 - 110 mg/dL Final    BUN 05/10/2022 24 (H)  8 - 23 mg/dL Final    Creatinine 05/10/2022 1.4  0.5 - 1.4 mg/dL Final    Calcium 05/10/2022 9.0  8.7 - 10.5 mg/dL Final    Total Protein 05/10/2022 7.2  6.0 - 8.4 g/dL Final    Albumin 05/10/2022 3.9  3.5 - 5.2 g/dL Final    Total Bilirubin 05/10/2022 0.6  0.1 - 1.0 mg/dL Final    Alkaline Phosphatase 05/10/2022 68  55 - 135 U/L Final    AST 05/10/2022 24  10 - 40 U/L Final    ALT 05/10/2022 22  10 - 44 U/L Final    Anion Gap 05/10/2022 10  8 - 16 mmol/L Final    eGFR if  05/10/2022 39.8 (A)  >60 mL/min/1.73 m^2 Final    eGFR if non African American 05/10/2022 34.5 (A)  >60 mL/min/1.73 m^2 Final    Total Protein 05/10/2022 7.2  6.0 - 8.4 g/dL Final    Albumin 05/10/2022 3.9  3.5 - 5.2 g/dL Final    Total Bilirubin 05/10/2022 0.6  0.1 - 1.0 mg/dL Final    Bilirubin, Direct 05/10/2022 <0.1 (A)  0.1 - 0.3 mg/dL Final    AST 05/10/2022 24  10 - 40 U/L Final    ALT 05/10/2022 22  10 - 44 U/L Final     Alkaline Phosphatase 05/10/2022 68  55 - 135 U/L Final    Glucose 05/10/2022 93  70 - 110 mg/dL Final    Sodium 05/10/2022 140  136 - 145 mmol/L Final    Potassium 05/10/2022 3.9  3.5 - 5.1 mmol/L Final    Chloride 05/10/2022 103  95 - 110 mmol/L Final    CO2 05/10/2022 27  23 - 29 mmol/L Final    BUN 05/10/2022 24 (H)  8 - 23 mg/dL Final    Calcium 05/10/2022 9.0  8.7 - 10.5 mg/dL Final    Creatinine 05/10/2022 1.4  0.5 - 1.4 mg/dL Final    Albumin 05/10/2022 3.9  3.5 - 5.2 g/dL Final    Phosphorus 05/10/2022 3.7  2.7 - 4.5 mg/dL Final    eGFR if  05/10/2022 39.8 (A)  >60 mL/min/1.73 m^2 Final    eGFR if non African American 05/10/2022 34.5 (A)  >60 mL/min/1.73 m^2 Final    Anion Gap 05/10/2022 10  8 - 16 mmol/L Final       Past Medical History:   Diagnosis Date    Coronary artery disease     Dermatitis     Dermatitis 12/8/2017    Diabetes mellitus     Diabetes mellitus type II     Leah Barr virus infection     Fibromyalgia     GERD (gastroesophageal reflux disease)     Hypertension     MI (myocardial infarction) 09/23/2011    Pure hypercholesterolemia 2/26/2020     Past Surgical History:   Procedure Laterality Date    adenoids      ANGIOPLASTY  1987    APPENDECTOMY      CARDIAC PACEMAKER PLACEMENT  01/12/2017    CATARACT EXTRACTION, BILATERAL Bilateral 9/2/15, 3/17/15    right eye-9/2/15, left eye-3/17/15    CHOLECYSTECTOMY  1987    CORONARY ARTERY BYPASS GRAFT  2006    quadruple    coronary stents  1999    x2    CYST REMOVAL  04/25/2017    on back    HYSTERECTOMY  1966    OVARY SURGERY  1948    Ovary burst & was repaired    RHIZOTOMY  09/14/2018    TONSILLECTOMY  1940     Family History   Problem Relation Age of Onset    No Known Problems Mother     No Known Problems Father        Marital Status:   Alcohol History:  reports no history of alcohol use.  Tobacco History:  reports that she has never smoked. She has never used smokeless tobacco.  Drug History:  reports no history of drug  use.    Review of patient's allergies indicates:   Allergen Reactions    Arthrotec 50 [diclofenac-misoprostol]     Doxycycline     Effexor [venlafaxine]     Estradiol     Flagyl [metronidazole]     Fluconazole     Gabapentin     Iodine     Levaquin [levofloxacin]     Nexium [esomeprazole magnesium]     Penicillins     Red yeast rice (monascus purpureus)     Shellfish containing products     Statins-hmg-coa reductase inhibitors     Sulfa (sulfonamide antibiotics)     Tea tree oil     Tegaserod      ZOLNORM    Tegaserod hydrogen maleate     Trazodone     Yeast, dried     Adhesive Rash     Band-aids       Current Outpatient Medications:     acetaminophen (TYLENOL) 500 MG tablet, Take 500 mg by mouth every 6 (six) hours as needed for Pain., Disp: , Rfl:     ALPRAZolam (XANAX) 0.25 MG tablet, TAKE 1 TABLET BY MOUTH AS NEEDED FOR ANXIETY, Disp: 30 tablet, Rfl: 0    aspirin (ECOTRIN) 81 MG EC tablet, Take 1 tablet (81 mg total) by mouth once daily. (Patient taking differently: Take 81 mg by mouth every evening.), Disp: , Rfl: 0    coenzyme Q10 100 mg capsule, Take 1 capsule (100 mg total) by mouth 2 (two) times daily., Disp: 60 capsule, Rfl: 11    cyanocobalamin 2000 MCG tablet, Take 2,000 mcg by mouth once daily., Disp: , Rfl:     ECHINACEA ORAL, Take 750 mg by mouth 2 (two) times a day., Disp: , Rfl:     isosorbide mononitrate (IMDUR) 120 MG 24 hr tablet, TAKE 1 TABLET (120 MG TOTAL) BY MOUTH ONCE DAILY. DO NOT CRUSH OR CHEW SWALLOW WHOLE., Disp: 90 tablet, Rfl: 3    MAGNESIUM ORAL, Take 500 mg by mouth once daily. , Disp: , Rfl:     metoprolol succinate (TOPROL-XL) 25 MG 24 hr tablet, TAKE 1 TABLET BY MOUTH EVERY DAY, Disp: 90 tablet, Rfl: 3    montelukast (SINGULAIR) 10 mg tablet, Take 1 tablet (10 mg total) by mouth every evening., Disp: 90 tablet, Rfl: 1    multivitamin (THERAGRAN) tablet, Take 1 tablet by mouth 2 (two) times a day. , Disp: , Rfl:     nitroGLYCERIN (NITROSTAT) 0.4 MG SL tablet, Place 0.4 mg under the  tongue every 5 (five) minutes as needed for Chest pain., Disp: , Rfl:     omeprazole (PRILOSEC) 20 MG capsule, Take 1 capsule (20 mg total) by mouth once daily., Disp: 90 capsule, Rfl: 3    SYNTHROID 88 mcg tablet, TAKE 1 TABLET ONCE DAILY, Disp: 90 tablet, Rfl: 1    traMADoL (ULTRAM) 50 mg tablet, Take 1 tablet (50 mg total) by mouth 2 (two) times daily as needed (pain)., Disp: 14 tablet, Rfl: 0    turmeric 400 mg Cap, Take 1,200 mg by mouth once daily., Disp: , Rfl:     UNABLE TO FIND, Take 1 capsule by mouth once daily. medication name: GI probiotic, Disp: , Rfl:     UNABLE TO FIND, Take 1 capsule by mouth once daily. Joint health, Disp: , Rfl:     UNABLE TO FIND, Take 1 capsule by mouth 2 (two) times a day. Cardio platinum, Disp: , Rfl:     UNABLE TO FIND, Nerve Sunset, Disp: , Rfl:     zinc gluconate 50 mg tablet, Take 50 mg by mouth 2 (two) times a day., Disp: , Rfl:     apixaban (ELIQUIS) 2.5 mg Tab, Take 1 tablet (2.5 mg total) by mouth 2 (two) times daily., Disp: 180 tablet, Rfl: 1    diphenhydrAMINE (BENYLIN) 12.5 mg/5 mL liquid, Take 12.5 mg by mouth 4 (four) times daily as needed for Allergies., Disp: , Rfl:     hydrocortisone 2.5 % ointment, Apply topically 2 (two) times daily. (Patient not taking: Reported on 3/29/2023), Disp: 28 g, Rfl: 3    losartan (COZAAR) 25 MG tablet, Take 0.5 tablets (12.5 mg total) by mouth every evening., Disp: 45 tablet, Rfl: 3    tiZANidine (ZANAFLEX) 4 MG tablet, Take 1 tablet (4 mg total) by mouth once daily., Disp: 90 tablet, Rfl: 0    triamcinolone acetonide 0.1% (KENALOG) 0.1 % cream, aaa bid x 1-2 wks, tk 1 wk off prn rash (Patient not taking: Reported on 3/29/2023), Disp: 30 g, Rfl: 2    Current Facility-Administered Medications:     evolocumab PnIj 140 mg, 140 mg, Subcutaneous, Q14 Days, Kenton Ware MD    Review of Systems       Objective:      Vitals:    03/29/23 1502   BP: (!) 142/78   Pulse:    Resp:    Temp:        Physical Exam  Vitals and nursing note  reviewed.   Constitutional:       General: She is not in acute distress.     Appearance: Normal appearance. She is not ill-appearing.   HENT:      Head: Normocephalic and atraumatic.   Eyes:      Extraocular Movements: Extraocular movements intact.      Conjunctiva/sclera: Conjunctivae normal.      Pupils: Pupils are equal, round, and reactive to light.   Neck:      Vascular: No carotid bruit.   Cardiovascular:      Rate and Rhythm: Normal rate and regular rhythm.      Pulses: Normal pulses.      Heart sounds: Normal heart sounds.   Pulmonary:      Effort: Pulmonary effort is normal.      Breath sounds: Normal breath sounds.   Abdominal:      General: Abdomen is flat.      Palpations: Abdomen is soft.      Tenderness: There is no right CVA tenderness or left CVA tenderness.   Musculoskeletal:      Right lower leg: No edema.      Left lower leg: No edema.   Lymphadenopathy:      Cervical: No cervical adenopathy.   Skin:     General: Skin is warm and dry.   Neurological:      General: No focal deficit present.      Mental Status: She is alert and oriented to person, place, and time.   Psychiatric:         Mood and Affect: Mood normal.         Behavior: Behavior normal.         Thought Content: Thought content normal.         Judgment: Judgment normal.         Assessment:       1. Diabetes mellitus type 2, diet-controlled    2. Labile hypertension    3. Muscle spasm    4. Uncontrolled hypertension    5. Stable angina    6. Chronic kidney disease, stage 4 (severe)    7. Encounter for screening mammogram for breast cancer           Plan:       Diabetes mellitus type 2, diet-controlled  -     Hemoglobin A1C; Future; Expected date: 03/29/2023  -     Microalbumin/Creatinine Ratio, Urine; Future; Expected date: 03/29/2023    Labile hypertension  -     apixaban (ELIQUIS) 2.5 mg Tab; Take 1 tablet (2.5 mg total) by mouth 2 (two) times daily.  Dispense: 180 tablet; Refill: 1  -     losartan (COZAAR) 25 MG tablet; Take 0.5  tablets (12.5 mg total) by mouth every evening.  Dispense: 45 tablet; Refill: 3    Muscle spasm  -     tiZANidine (ZANAFLEX) 4 MG tablet; Take 1 tablet (4 mg total) by mouth once daily.  Dispense: 90 tablet; Refill: 0    Uncontrolled hypertension  -     losartan (COZAAR) 25 MG tablet; Take 0.5 tablets (12.5 mg total) by mouth every evening.  Dispense: 45 tablet; Refill: 3  -     US Renal Artery Stenosis Hyperten (xpd); Future; Expected date: 03/29/2023    Stable angina    Chronic kidney disease, stage 4 (severe)    Encounter for screening mammogram for breast cancer  -     Mammo Digital Screening Bilat; Future; Expected date: 04/05/2023      Follow up in about 4 weeks (around 4/26/2023) for FOLLOW UP LABS, FOLLOW-UP STATUS.        3/30/2023 Elena Sullivan M.D.

## 2023-04-04 ENCOUNTER — HOSPITAL ENCOUNTER (OUTPATIENT)
Dept: RADIOLOGY | Facility: HOSPITAL | Age: 88
Discharge: HOME | End: 2023-04-04
Attending: INTERNAL MEDICINE
Payer: MEDICARE

## 2023-04-04 DIAGNOSIS — I10 UNCONTROLLED HYPERTENSION: ICD-10-CM

## 2023-04-04 PROCEDURE — 93975 VASCULAR STUDY: CPT | Mod: TC

## 2023-04-14 ENCOUNTER — HOSPITAL ENCOUNTER (OUTPATIENT)
Dept: RADIOLOGY | Facility: HOSPITAL | Age: 88
Discharge: HOME | End: 2023-04-14
Attending: INTERNAL MEDICINE
Payer: MEDICARE

## 2023-04-14 DIAGNOSIS — Z12.31 ENCOUNTER FOR SCREENING MAMMOGRAM FOR BREAST CANCER: ICD-10-CM

## 2023-04-14 PROCEDURE — 77067 SCR MAMMO BI INCL CAD: CPT | Mod: TC,PO

## 2023-04-20 ENCOUNTER — OFFICE VISIT (OUTPATIENT)
Dept: FAMILY MEDICINE | Facility: CLINIC | Age: 88
End: 2023-04-20
Payer: MEDICARE

## 2023-04-20 VITALS
DIASTOLIC BLOOD PRESSURE: 64 MMHG | HEART RATE: 72 BPM | OXYGEN SATURATION: 97 % | SYSTOLIC BLOOD PRESSURE: 136 MMHG | TEMPERATURE: 98 F | BODY MASS INDEX: 26.11 KG/M2 | RESPIRATION RATE: 12 BRPM | WEIGHT: 133 LBS | HEIGHT: 60 IN

## 2023-04-20 DIAGNOSIS — M54.32 SCIATICA OF LEFT SIDE: Primary | ICD-10-CM

## 2023-04-20 DIAGNOSIS — E03.9 ACQUIRED HYPOTHYROIDISM: ICD-10-CM

## 2023-04-20 DIAGNOSIS — I10 WELL-CONTROLLED HYPERTENSION: ICD-10-CM

## 2023-04-20 DIAGNOSIS — E11.9 DIABETES MELLITUS TYPE 2, DIET-CONTROLLED: ICD-10-CM

## 2023-04-20 PROBLEM — L29.9 PRURITUS: Status: RESOLVED | Noted: 2022-10-18 | Resolved: 2023-04-20

## 2023-04-20 PROBLEM — L30.9 DERMATITIS: Status: RESOLVED | Noted: 2017-12-08 | Resolved: 2023-04-20

## 2023-04-20 PROBLEM — R29.898 LEFT LEG WEAKNESS: Status: RESOLVED | Noted: 2022-10-12 | Resolved: 2023-04-20

## 2023-04-20 PROBLEM — R21 RASH AND NONSPECIFIC SKIN ERUPTION: Status: RESOLVED | Noted: 2022-10-18 | Resolved: 2023-04-20

## 2023-04-20 PROBLEM — M46.1: Status: ACTIVE | Noted: 2023-04-20
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
## 2023-04-20 PROCEDURE — 99214 PR OFFICE/OUTPT VISIT, EST, LEVL IV, 30-39 MIN: ICD-10-PCS | Mod: S$PBB,AQ,, | Performed by: INTERNAL MEDICINE

## 2023-04-20 PROCEDURE — 99214 OFFICE O/P EST MOD 30 MIN: CPT | Mod: S$PBB,AQ,, | Performed by: INTERNAL MEDICINE

## 2023-04-20 PROCEDURE — 99214 OFFICE O/P EST MOD 30 MIN: CPT | Performed by: INTERNAL MEDICINE

## 2023-04-20 RX ORDER — LIDOCAINE 50 MG/G
1 PATCH TOPICAL DAILY
Qty: 10 PATCH | Refills: 3 | Status: SHIPPED | OUTPATIENT
Start: 2023-04-20 | End: 2023-09-12 | Stop reason: SDUPTHER

## 2023-04-20 NOTE — PROGRESS NOTES
SUBJECTIVE:    Patient ID: Karma Chen is a 87 y.o. female.    Chief Complaint: Hypertension and Follow-up        Patient returns for BP control follow-up--the diary reports mostly controlled levels with highest of 155/77, 156/76-her pulse has been regular-she has had no chest pressure nor any edema    She complains of some left buttock pain with radiation to the top of the left anterolateral hip    A1C 5.8 urine is neg for protein    Mag 2.3 H/H 12.3/38.5 wbc 8.37    TSH .960    Anxiety has been good-she was called by [facility] and told he did not like what she was taking!!!!!!!!!    Lab Visit on 04/04/2023   Component Date Value Ref Range Status    Microalbumin, Urine 04/04/2023 12.0  <19.9 ug/mL Final    Creatinine, Urine 04/04/2023 63.0  15.0 - 325.0 mg/dL Final    Microalb/Creat Ratio 04/04/2023 19.0  0.0 - 30.0 ug/mg Final   Lab Visit on 04/04/2023   Component Date Value Ref Range Status    Hemoglobin A1C 04/04/2023 5.8  4.5 - 6.2 % Final    Estimated Avg Glucose 04/04/2023 120  68 - 131 mg/dL Final   Admission on 03/08/2023, Discharged on 03/09/2023   Component Date Value Ref Range Status    Magnesium 03/08/2023 2.3  1.6 - 2.6 mg/dL Final    WBC 03/08/2023 8.37  3.90 - 12.70 K/uL Final    RBC 03/08/2023 4.42  4.00 - 5.40 M/uL Final    Hemoglobin 03/08/2023 12.3  12.0 - 16.0 g/dL Final    Hematocrit 03/08/2023 38.5  37.0 - 48.5 % Final    MCV 03/08/2023 87  82 - 98 fL Final    MCH 03/08/2023 27.8  27.0 - 31.0 pg Final    MCHC 03/08/2023 31.9 (L)  32.0 - 36.0 g/dL Final    RDW 03/08/2023 14.3  11.5 - 14.5 % Final    Platelets 03/08/2023 264  150 - 450 K/uL Final    MPV 03/08/2023 10.0  9.2 - 12.9 fL Final    Immature Granulocytes 03/08/2023 0.4  0.0 - 0.5 % Final    Gran # (ANC) 03/08/2023 3.7  1.8 - 7.7 K/uL Final    Immature Grans (Abs) 03/08/2023 0.03  0.00 - 0.04 K/uL Final    Lymph # 03/08/2023 3.3  1.0 - 4.8 K/uL Final    Mono # 03/08/2023 1.1 (H)  0.3 - 1.0 K/uL Final    Eos # 03/08/2023  0.3  0.0 - 0.5 K/uL Final    Baso # 03/08/2023 0.07  0.00 - 0.20 K/uL Final    nRBC 03/08/2023 0  0 /100 WBC Final    Gran % 03/08/2023 43.8  38.0 - 73.0 % Final    Lymph % 03/08/2023 39.3  18.0 - 48.0 % Final    Mono % 03/08/2023 12.7  4.0 - 15.0 % Final    Eosinophil % 03/08/2023 3.0  0.0 - 8.0 % Final    Basophil % 03/08/2023 0.8  0.0 - 1.9 % Final    Differential Method 03/08/2023 Automated   Final    Sodium 03/08/2023 141  136 - 145 mmol/L Final    Potassium 03/08/2023 3.9  3.5 - 5.1 mmol/L Final    Chloride 03/08/2023 105  95 - 110 mmol/L Final    CO2 03/08/2023 28  23 - 29 mmol/L Final    Glucose 03/08/2023 128 (H)  70 - 110 mg/dL Final    BUN 03/08/2023 20  8 - 23 mg/dL Final    Creatinine 03/08/2023 1.2  0.5 - 1.4 mg/dL Final    Calcium 03/08/2023 9.3  8.7 - 10.5 mg/dL Final    Total Protein 03/08/2023 7.7  6.0 - 8.4 g/dL Final    Albumin 03/08/2023 4.3  3.5 - 5.2 g/dL Final    Total Bilirubin 03/08/2023 0.4  0.1 - 1.0 mg/dL Final    Alkaline Phosphatase 03/08/2023 65  55 - 135 U/L Final    AST 03/08/2023 27  10 - 40 U/L Final    ALT 03/08/2023 18  10 - 44 U/L Final    Anion Gap 03/08/2023 8  8 - 16 mmol/L Final    eGFR 03/08/2023 44.4 (A)  >60 mL/min/1.73 m^2 Final    Troponin I High Sensitivity 03/08/2023 16.8 (H)  0.0 - 14.9 pg/mL Final    Troponin I High Sensitivity 03/08/2023 19.0 (H)  0.0 - 14.9 pg/mL Final    BNP 03/08/2023 403 (H)  0 - 99 pg/mL Final    TSH 03/08/2023 0.960  0.340 - 5.600 uIU/mL Final    Specimen UA 03/08/2023 Urine, Clean Catch   Final    Color, UA 03/08/2023 Yellow  Yellow, Straw, Anila Final    Appearance, UA 03/08/2023 Clear  Clear Final    pH, UA 03/08/2023 7.0  5.0 - 8.0 Final    Specific Gravity, UA 03/08/2023 1.005  1.005 - 1.030 Final    Protein, UA 03/08/2023 Negative  Negative Final    Glucose, UA 03/08/2023 Negative  Negative Final    Ketones, UA 03/08/2023 Negative  Negative Final    Bilirubin (UA) 03/08/2023 Negative  Negative Final    Occult Blood UA 03/08/2023  Negative  Negative Final    Nitrite, UA 03/08/2023 Negative  Negative Final    Urobilinogen, UA 03/08/2023 Negative  Negative EU/dL Final    Leukocytes, UA 03/08/2023 2+ (A)  Negative Final    Influenza A, Molecular 03/08/2023 Negative  Negative Final    Influenza B, Molecular 03/08/2023 Negative  Negative Final    Flu A & B Source 03/08/2023 NP   Final    RBC, UA 03/08/2023 0  0 - 4 /hpf Final    WBC, UA 03/08/2023 14 (H)  0 - 5 /hpf Final    Bacteria 03/08/2023 Negative  None-Occ /hpf Final    Squam Epithel, UA 03/08/2023 1  /hpf Final    Hyaline Casts, UA 03/08/2023 0  0-1/lpf /lpf Final    Microscopic Comment 03/08/2023 SEE COMMENT   Final    Urine Culture, Routine 03/08/2023 No growth   Final   Hospital Outpatient Visit on 03/07/2023   Component Date Value Ref Range Status    Battery Voltage (V) 03/07/2023 2.99  V Final    P/R-wave RA Lead 03/07/2023 1.8  mV Final    P/R-wave RA Lead (native) 03/07/2023 11  mV Final    Impedance RA Lead 03/07/2023 475  Ohms Final    Impedance RA Lead (native) 03/07/2023 494  Ohms Final    Threshold V RA Lead 03/07/2023 0.7  V Final    Theshold ms RA Lead 03/07/2023 0.4  ms Final    Threshold V RA Lead (native) 03/07/2023 0.5  V Final    Threshold ms RA Lead (native) 03/07/2023 0.4  ms Final   Lab Visit on 11/29/2022   Component Date Value Ref Range Status    Glucose 11/29/2022 83  70 - 110 mg/dL Final    Sodium 11/29/2022 139  136 - 145 mmol/L Final    Potassium 11/29/2022 3.8  3.5 - 5.1 mmol/L Final    Chloride 11/29/2022 104  95 - 110 mmol/L Final    CO2 11/29/2022 27  23 - 29 mmol/L Final    BUN 11/29/2022 21  8 - 23 mg/dL Final    Calcium 11/29/2022 9.2  8.7 - 10.5 mg/dL Final    Creatinine 11/29/2022 1.2  0.5 - 1.4 mg/dL Final    Albumin 11/29/2022 3.8  3.5 - 5.2 g/dL Final    Phosphorus 11/29/2022 4.1  2.7 - 4.5 mg/dL Final    eGFR 11/29/2022 44.6 (A)  >60 mL/min/1.73 m^2 Final    Anion Gap 11/29/2022 8  8 - 16 mmol/L Final    WBC 11/29/2022 6.36  3.90 - 12.70 K/uL Final     RBC 11/29/2022 4.11  4.00 - 5.40 M/uL Final    Hemoglobin 11/29/2022 11.5 (L)  12.0 - 16.0 g/dL Final    Hematocrit 11/29/2022 37.7  37.0 - 48.5 % Final    MCV 11/29/2022 92  82 - 98 fL Final    MCH 11/29/2022 28.0  27.0 - 31.0 pg Final    MCHC 11/29/2022 30.5 (L)  32.0 - 36.0 g/dL Final    RDW 11/29/2022 12.7  11.5 - 14.5 % Final    Platelets 11/29/2022 249  150 - 450 K/uL Final    MPV 11/29/2022 10.6  9.2 - 12.9 fL Final    Immature Granulocytes 11/29/2022 0.2  0.0 - 0.5 % Final    Gran # (ANC) 11/29/2022 2.4  1.8 - 7.7 K/uL Final    Immature Grans (Abs) 11/29/2022 0.01  0.00 - 0.04 K/uL Final    Lymph # 11/29/2022 2.8  1.0 - 4.8 K/uL Final    Mono # 11/29/2022 0.9  0.3 - 1.0 K/uL Final    Eos # 11/29/2022 0.2  0.0 - 0.5 K/uL Final    Baso # 11/29/2022 0.06  0.00 - 0.20 K/uL Final    nRBC 11/29/2022 0  0 /100 WBC Final    Gran % 11/29/2022 37.7 (L)  38.0 - 73.0 % Final    Lymph % 11/29/2022 44.0  18.0 - 48.0 % Final    Mono % 11/29/2022 13.7  4.0 - 15.0 % Final    Eosinophil % 11/29/2022 3.5  0.0 - 8.0 % Final    Basophil % 11/29/2022 0.9  0.0 - 1.9 % Final    Differential Method 11/29/2022 Automated   Final    Vit D, 25-Hydroxy 11/29/2022 82  30 - 96 ng/mL Final    RBC, UA 11/29/2022 0  0 - 4 /hpf Final    WBC, UA 11/29/2022 5  0 - 5 /hpf Final    Bacteria 11/29/2022 Negative  None-Occ /hpf Final    Squam Epithel, UA 11/29/2022 1  /hpf Final    Hyaline Casts, UA 11/29/2022 1  0-1/lpf /lpf Final    Microscopic Comment 11/29/2022 SEE COMMENT   Final    PTH, Intact 11/29/2022 23.2  9.0 - 77.0 pg/mL Final    Protein, Urine Random 11/29/2022 <6  6 - 15 mg/dL Final    Creatinine, Urine 11/29/2022 40.0  15.0 - 325.0 mg/dL Final    Prot/Creat Ratio, Urine 11/29/2022 Unable to calculate  0.00 - 0.20 Final   Hospital Outpatient Visit on 11/01/2022   Component Date Value Ref Range Status    Battery Voltage (V) 11/01/2022 2.99  V Final    P/R-wave RA Lead 11/01/2022 1.5  mV Final    P/R-wave RA Lead (native) 11/01/2022  9.5  mV Final    Impedance RA Lead 11/01/2022 475  Ohms Final    Impedance RA Lead (native) 11/01/2022 513  Ohms Final    Threshold V RA Lead (native) 11/01/2022 0.500  V Final    Threshold ms RA Lead (native) 11/01/2022 0.40  ms Final   Office Visit on 09/18/2022   Component Date Value Ref Range Status    SARS Coronavirus 2 Antigen 09/18/2022 Negative  Negative Final     Acceptable 09/18/2022 Yes   Final   Lab Visit on 09/15/2022   Component Date Value Ref Range Status    Immunofix Interp. 09/15/2022 Comment   Final    IgA 09/15/2022 305  64 - 422 mg/dL Final    IgG 09/15/2022 739  586 - 1602 mg/dL Final    IgM 09/15/2022 36  26 - 217 mg/dL Final    Total Protein 09/15/2022 6.3  6.0 - 8.5 g/dL Final    Albumin 09/15/2022 3.2  2.9 - 4.4 g/dL Final    Alpha-1 09/15/2022 0.2  0.0 - 0.4 g/dL Final    Alpha-2 09/15/2022 0.9  0.4 - 1.0 g/dL Final    Beta 09/15/2022 1.2  0.7 - 1.3 g/dL Final    Gamma 09/15/2022 0.7  0.4 - 1.8 g/dL Final    M-SPIKE, PROT ELECTRO 09/15/2022 Not Observed  Not Observed g/dL Final    Globulin, Total 09/15/2022 3.1  2.2 - 3.9 g/dL Final    A/G Ratio 09/15/2022 1.0  0.7 - 1.7 Final    Please Note: 09/15/2022 Comment   Final    Methlymalonic Acid 09/15/2022 216  0 - 378 nmol/L Final    Thiamine 09/15/2022 494.7 (H)  66.5 - 200.0 nmol/L Final    Vitamin B-12 09/15/2022 1092 (H)  210 - 950 pg/mL Final    Vitamin B6 09/15/2022 86.0 (H)  3.4 - 65.2 ug/L Final    SINGH 09/15/2022 Negative   Final    Sed Rate 09/15/2022 38 (H)  0 - 20 mm/Hr Final    CRP 09/15/2022 0.74  <0.76 mg/dL Final   Hospital Outpatient Visit on 08/02/2022   Component Date Value Ref Range Status    P/R-wave RA Lead 08/02/2022 3.3  mV Final    P/R-wave RA Lead (native) 08/02/2022 10.6  mV Final    Impedance RA Lead 08/02/2022 494  Ohms Final    Impedance RA Lead (native) 08/02/2022 570  Ohms Final    Threshold V RA Lead 08/02/2022 0.875  V Final    Theshold ms RA Lead 08/02/2022 0.4  ms Final    Threshold V RA Lead  (native) 08/02/2022 0.5  V Final    Threshold ms RA Lead (native) 08/02/2022 0.4  ms Final   Lab Visit on 05/20/2022   Component Date Value Ref Range Status    Hemoglobin A1C 05/20/2022 5.5  4.5 - 6.2 % Final    Estimated Avg Glucose 05/20/2022 111  68 - 131 mg/dL Final    Microalbumin, Urine 05/20/2022 10.8  <19.9 ug/mL Final    Creatinine, Urine 05/20/2022 97.0  15.0 - 325.0 mg/dL Final    Microalb/Creat Ratio 05/20/2022 11.1  0.0 - 30.0 ug/mg Final    Cholesterol 05/20/2022 223 (H)  120 - 199 mg/dL Final    Triglycerides 05/20/2022 109  30 - 150 mg/dL Final    HDL 05/20/2022 56  40 - 75 mg/dL Final    LDL Cholesterol 05/20/2022 145.2  63.0 - 159.0 mg/dL Final    HDL/Cholesterol Ratio 05/20/2022 25.1  20.0 - 50.0 % Final    Total Cholesterol/HDL Ratio 05/20/2022 4.0  2.0 - 5.0 Final    Non-HDL Cholesterol 05/20/2022 167  mg/dL Final    Glucose 05/20/2022 135 (H)  70 - 110 mg/dL Final    Sodium 05/20/2022 139  136 - 145 mmol/L Final    Potassium 05/20/2022 4.0  3.5 - 5.1 mmol/L Final    Chloride 05/20/2022 103  95 - 110 mmol/L Final    CO2 05/20/2022 24  23 - 29 mmol/L Final    BUN 05/20/2022 28 (H)  8 - 23 mg/dL Final    Calcium 05/20/2022 9.3  8.7 - 10.5 mg/dL Final    Creatinine 05/20/2022 1.2  0.5 - 1.4 mg/dL Final    Albumin 05/20/2022 4.0  3.5 - 5.2 g/dL Final    Phosphorus 05/20/2022 3.6  2.7 - 4.5 mg/dL Final    eGFR if  05/20/2022 48.0 (A)  >60 mL/min/1.73 m^2 Final    eGFR if non African American 05/20/2022 41.6 (A)  >60 mL/min/1.73 m^2 Final    Anion Gap 05/20/2022 12  8 - 16 mmol/L Final    RBC, UA 05/20/2022 1  0 - 4 /hpf Final    WBC, UA 05/20/2022 0  0 - 5 /hpf Final    Bacteria 05/20/2022 Negative  None-Occ /hpf Final    Squam Epithel, UA 05/20/2022 1  /hpf Final    Hyaline Casts, UA 05/20/2022 2 (A)  0-1/lpf /lpf Final    Microscopic Comment 05/20/2022 SEE COMMENT   Final    Protein, Urine Random 05/20/2022 6  6 - 15 mg/dL Final    Creatinine, Urine 05/20/2022 97.0  15.0 - 325.0  mg/dL Final    Prot/Creat Ratio, Urine 05/20/2022 0.06  0.00 - 0.20 Final   There may be more visits with results that are not included.       Past Medical History:   Diagnosis Date    Coronary artery disease     Dermatitis     Dermatitis 12/8/2017    Diabetes mellitus     Diabetes mellitus type II     Leah Barr virus infection     Fibromyalgia     GERD (gastroesophageal reflux disease)     Hypertension     MI (myocardial infarction) 09/23/2011    Pure hypercholesterolemia 2/26/2020     Past Surgical History:   Procedure Laterality Date    adenoids      ANGIOPLASTY  1987    APPENDECTOMY      CARDIAC PACEMAKER PLACEMENT  01/12/2017    CATARACT EXTRACTION, BILATERAL Bilateral 9/2/15, 3/17/15    right eye-9/2/15, left eye-3/17/15    CHOLECYSTECTOMY  1987    CORONARY ARTERY BYPASS GRAFT  2006    quadruple    coronary stents  1999    x2    CYST REMOVAL  04/25/2017    on back    HYSTERECTOMY  1966    OVARY SURGERY  1948    Ovary burst & was repaired    RHIZOTOMY  09/14/2018    TONSILLECTOMY  1940     Family History   Problem Relation Age of Onset    No Known Problems Mother     No Known Problems Father        Marital Status:   Alcohol History:  reports no history of alcohol use.  Tobacco History:  reports that she has never smoked. She has never used smokeless tobacco.  Drug History:  reports no history of drug use.    Review of patient's allergies indicates:   Allergen Reactions    Arthrotec 50 [diclofenac-misoprostol]     Doxycycline     Effexor [venlafaxine]     Estradiol     Flagyl [metronidazole]     Fluconazole     Gabapentin     Iodine     Levaquin [levofloxacin]     Nexium [esomeprazole magnesium]     Penicillins     Red yeast rice (monascus purpureus)     Shellfish containing products     Statins-hmg-coa reductase inhibitors     Sulfa (sulfonamide antibiotics)     Tea tree oil     Tegaserod      ZOLNORM    Tegaserod hydrogen maleate     Trazodone     Yeast, dried     Adhesive Rash     Band-aids        Current Outpatient Medications:     acetaminophen (TYLENOL) 500 MG tablet, Take 500 mg by mouth every 6 (six) hours as needed for Pain., Disp: , Rfl:     ALPRAZolam (XANAX) 0.25 MG tablet, TAKE 1 TABLET BY MOUTH AS NEEDED FOR ANXIETY, Disp: 30 tablet, Rfl: 0    apixaban (ELIQUIS) 2.5 mg Tab, Take 1 tablet (2.5 mg total) by mouth 2 (two) times daily., Disp: 180 tablet, Rfl: 1    aspirin (ECOTRIN) 81 MG EC tablet, Take 1 tablet (81 mg total) by mouth once daily. (Patient taking differently: Take 81 mg by mouth every evening.), Disp: , Rfl: 0    coenzyme Q10 100 mg capsule, Take 1 capsule (100 mg total) by mouth 2 (two) times daily., Disp: 60 capsule, Rfl: 11    cyanocobalamin 2000 MCG tablet, Take 2,000 mcg by mouth once daily., Disp: , Rfl:     diphenhydrAMINE (BENYLIN) 12.5 mg/5 mL liquid, Take 12.5 mg by mouth 4 (four) times daily as needed for Allergies., Disp: , Rfl:     ECHINACEA ORAL, Take 750 mg by mouth 2 (two) times a day., Disp: , Rfl:     isosorbide mononitrate (IMDUR) 120 MG 24 hr tablet, TAKE 1 TABLET (120 MG TOTAL) BY MOUTH ONCE DAILY. DO NOT CRUSH OR CHEW SWALLOW WHOLE., Disp: 90 tablet, Rfl: 3    losartan (COZAAR) 25 MG tablet, Take 0.5 tablets (12.5 mg total) by mouth every evening., Disp: 45 tablet, Rfl: 3    MAGNESIUM ORAL, Take 500 mg by mouth once daily. , Disp: , Rfl:     metoprolol succinate (TOPROL-XL) 25 MG 24 hr tablet, TAKE 1 TABLET BY MOUTH EVERY DAY (Patient taking differently: Take 12.5 mg by mouth 2 (two) times daily.), Disp: 90 tablet, Rfl: 3    montelukast (SINGULAIR) 10 mg tablet, Take 1 tablet (10 mg total) by mouth every evening., Disp: 90 tablet, Rfl: 1    multivitamin (THERAGRAN) tablet, Take 1 tablet by mouth 2 (two) times a day. , Disp: , Rfl:     nitroGLYCERIN (NITROSTAT) 0.4 MG SL tablet, Place 0.4 mg under the tongue every 5 (five) minutes as needed for Chest pain., Disp: , Rfl:     omeprazole (PRILOSEC) 20 MG capsule, Take 1 capsule (20 mg total) by mouth once daily.,  Disp: 90 capsule, Rfl: 3    SYNTHROID 88 mcg tablet, TAKE 1 TABLET ONCE DAILY, Disp: 90 tablet, Rfl: 1    tiZANidine (ZANAFLEX) 4 MG tablet, Take 1 tablet (4 mg total) by mouth once daily., Disp: 90 tablet, Rfl: 0    traMADoL (ULTRAM) 50 mg tablet, Take 1 tablet (50 mg total) by mouth 2 (two) times daily as needed (pain)., Disp: 14 tablet, Rfl: 0    triamcinolone acetonide 0.1% (KENALOG) 0.1 % cream, aaa bid x 1-2 wks, tk 1 wk off prn rash, Disp: 30 g, Rfl: 2    turmeric 400 mg Cap, Take 1,200 mg by mouth once daily., Disp: , Rfl:     UNABLE TO FIND, Take 1 capsule by mouth once daily. medication name: GI probiotic, Disp: , Rfl:     UNABLE TO FIND, Take 1 capsule by mouth once daily. Joint health, Disp: , Rfl:     UNABLE TO FIND, Take 1 capsule by mouth 2 (two) times a day. Cardio platinum, Disp: , Rfl:     UNABLE TO FIND, Nerve Jorge, Disp: , Rfl:     zinc gluconate 50 mg tablet, Take 50 mg by mouth 2 (two) times a day., Disp: , Rfl:     hydrocortisone 2.5 % ointment, Apply topically 2 (two) times daily. (Patient not taking: Reported on 3/29/2023), Disp: 28 g, Rfl: 3    LIDOcaine (LIDODERM) 5 %, Place 1 patch onto the skin once daily. Remove & Discard patch within 12 hours or as directed by MD, Disp: 10 patch, Rfl: 3    Current Facility-Administered Medications:     evolocumab PnIj 140 mg, 140 mg, Subcutaneous, Q14 Days, Kenton Ware MD    Review of Systems   Constitutional:  Negative for appetite change, chills, diaphoresis, fatigue, fever and unexpected weight change.   HENT:  Negative for congestion, ear pain, hearing loss, nosebleeds, postnasal drip, sinus pressure, sinus pain, sneezing, sore throat, tinnitus, trouble swallowing and voice change.    Eyes:  Negative for photophobia, pain, itching and visual disturbance.   Respiratory:  Negative for apnea, cough, chest tightness, shortness of breath, wheezing and stridor.    Cardiovascular:  Negative for chest pain, palpitations and leg swelling.    Gastrointestinal:  Negative for abdominal distention, abdominal pain, blood in stool, constipation, diarrhea, nausea and vomiting.   Endocrine: Negative for cold intolerance, heat intolerance, polydipsia and polyuria.   Genitourinary:  Negative for difficulty urinating, dyspareunia, dysuria, flank pain, frequency, hematuria, menstrual problem, pelvic pain, urgency, vaginal discharge and vaginal pain.   Musculoskeletal:  Negative for arthralgias, back pain, joint swelling, myalgias, neck pain and neck stiffness.   Skin:  Negative for pallor.   Allergic/Immunologic: Negative for environmental allergies and food allergies.   Neurological:  Negative for dizziness, tremors, speech difficulty, weakness, light-headedness and numbness.   Hematological:  Does not bruise/bleed easily.   Psychiatric/Behavioral:  Positive for sleep disturbance (good with her rx). Negative for agitation, confusion, decreased concentration and suicidal ideas. The patient is not nervous/anxious.         Objective:      Vitals:    04/20/23 1430   BP: 136/64   Pulse: 72   Resp: 12   Temp: 97.9 °F (36.6 °C)       Physical Exam  Constitutional:       General: She is not in acute distress.     Appearance: Normal appearance. She is not ill-appearing.   HENT:      Head: Normocephalic and atraumatic.   Eyes:      Extraocular Movements: Extraocular movements intact.      Conjunctiva/sclera: Conjunctivae normal.      Pupils: Pupils are equal, round, and reactive to light.   Neck:      Vascular: No carotid bruit.   Cardiovascular:      Rate and Rhythm: Normal rate and regular rhythm.      Pulses: Normal pulses.      Heart sounds: Normal heart sounds.   Pulmonary:      Effort: Pulmonary effort is normal.      Breath sounds: Normal breath sounds.   Abdominal:      General: Bowel sounds are normal.      Palpations: Abdomen is soft.   Musculoskeletal:      Cervical back: Normal range of motion and neck supple.      Right lower leg: No edema.      Left lower  leg: No edema.   Lymphadenopathy:      Cervical: No cervical adenopathy.   Skin:     General: Skin is warm and dry.      Capillary Refill: Capillary refill takes less than 2 seconds.   Neurological:      General: No focal deficit present.      Mental Status: She is alert and oriented to person, place, and time.   Psychiatric:         Mood and Affect: Mood normal.         Behavior: Behavior normal.         Thought Content: Thought content normal.         Assessment:       1. Sciatica of left side    2. Well-controlled hypertension    3. Diabetes mellitus type 2, diet-controlled    4. Acquired hypothyroidism         Plan:       Sciatica of left side  -     LIDOcaine (LIDODERM) 5 %; Place 1 patch onto the skin once daily. Remove & Discard patch within 12 hours or as directed by MD  Dispense: 10 patch; Refill: 3  -     best to use ice and heat -    Well-controlled hypertension        -     continue as is        -     Reduce the amount of salt in your diet; Lose weight; Avoid drinking too much alcohol; Exercise at least 30 minutes per day most days of the week.  Continue current medications and home BP monitoring.     Diabetes mellitus type 2, diet-controlled        -     continue as is    Acquired hypothyroidism        -     continue as is      Follow up in about 3 months (around 7/20/2023).        4/22/2023 Elena Sullivan M.D.

## 2023-05-17 DIAGNOSIS — F41.9 SEVERE ANXIETY: ICD-10-CM

## 2023-05-18 RX ORDER — ALPRAZOLAM 0.25 MG/1
TABLET ORAL
Qty: 30 TABLET | Refills: 0 | Status: SHIPPED | OUTPATIENT
Start: 2023-05-18 | End: 2023-07-04

## 2023-05-24 DIAGNOSIS — M54.31 SCIATICA, RIGHT SIDE: ICD-10-CM

## 2023-05-24 DIAGNOSIS — M79.604 RIGHT LEG PAIN: ICD-10-CM

## 2023-05-25 RX ORDER — TRAMADOL HYDROCHLORIDE 50 MG/1
50 TABLET ORAL 2 TIMES DAILY PRN
Qty: 14 TABLET | Refills: 0 | Status: SHIPPED | OUTPATIENT
Start: 2023-05-25 | End: 2023-09-12 | Stop reason: SDUPTHER

## 2023-05-26 ENCOUNTER — LAB VISIT (OUTPATIENT)
Dept: LAB | Facility: HOSPITAL | Age: 88
End: 2023-05-26
Attending: INTERNAL MEDICINE
Payer: MEDICARE

## 2023-05-26 DIAGNOSIS — N18.30 CHRONIC KIDNEY DISEASE, STAGE III (MODERATE): ICD-10-CM

## 2023-05-26 DIAGNOSIS — N18.30 CHRONIC KIDNEY DISEASE, STAGE III (MODERATE): Primary | ICD-10-CM

## 2023-05-26 LAB
ALBUMIN SERPL BCP-MCNC: 3.9 G/DL (ref 3.5–5.2)
ANION GAP SERPL CALC-SCNC: 9 MMOL/L (ref 8–16)
BACTERIA #/AREA URNS HPF: NEGATIVE /HPF
BILIRUB UR QL STRIP: NEGATIVE
BUN SERPL-MCNC: 23 MG/DL (ref 8–23)
CALCIUM SERPL-MCNC: 9.4 MG/DL (ref 8.7–10.5)
CHLORIDE SERPL-SCNC: 106 MMOL/L (ref 95–110)
CLARITY UR: CLEAR
CO2 SERPL-SCNC: 27 MMOL/L (ref 23–29)
COLOR UR: YELLOW
CREAT SERPL-MCNC: 1.2 MG/DL (ref 0.5–1.4)
CREAT UR-MCNC: 132 MG/DL (ref 15–325)
EST. GFR  (NO RACE VARIABLE): 44.4 ML/MIN/1.73 M^2
GLUCOSE SERPL-MCNC: 82 MG/DL (ref 70–110)
GLUCOSE UR QL STRIP: NEGATIVE
HGB UR QL STRIP: NEGATIVE
HYALINE CASTS #/AREA URNS LPF: 7 /LPF
KETONES UR QL STRIP: NEGATIVE
LEUKOCYTE ESTERASE UR QL STRIP: ABNORMAL
MICROSCOPIC COMMENT: ABNORMAL
NITRITE UR QL STRIP: NEGATIVE
PH UR STRIP: 7 [PH] (ref 5–8)
PHOSPHATE SERPL-MCNC: 4.1 MG/DL (ref 2.7–4.5)
POTASSIUM SERPL-SCNC: 4 MMOL/L (ref 3.5–5.1)
PROT UR QL STRIP: ABNORMAL
PROT UR-MCNC: 16 MG/DL (ref 6–15)
PROT/CREAT UR: 0.12 MG/G{CREAT} (ref 0–0.2)
RBC #/AREA URNS HPF: 4 /HPF (ref 0–4)
SODIUM SERPL-SCNC: 142 MMOL/L (ref 136–145)
SP GR UR STRIP: 1.01 (ref 1–1.03)
SQUAMOUS #/AREA URNS HPF: 6 /HPF
URN SPEC COLLECT METH UR: ABNORMAL
UROBILINOGEN UR STRIP-ACNC: NEGATIVE EU/DL
WBC #/AREA URNS HPF: 13 /HPF (ref 0–5)

## 2023-05-26 PROCEDURE — 81001 URINALYSIS AUTO W/SCOPE: CPT | Performed by: INTERNAL MEDICINE

## 2023-05-26 PROCEDURE — 82570 ASSAY OF URINE CREATININE: CPT | Performed by: INTERNAL MEDICINE

## 2023-05-26 PROCEDURE — 80069 RENAL FUNCTION PANEL: CPT | Performed by: INTERNAL MEDICINE

## 2023-05-26 PROCEDURE — 36415 COLL VENOUS BLD VENIPUNCTURE: CPT | Performed by: INTERNAL MEDICINE

## 2023-05-31 DIAGNOSIS — J30.89 ENVIRONMENTAL AND SEASONAL ALLERGIES: Primary | ICD-10-CM

## 2023-05-31 DIAGNOSIS — Z91.09 ENVIRONMENTAL ALLERGIES: ICD-10-CM

## 2023-05-31 RX ORDER — MINERAL OIL
180 ENEMA (ML) RECTAL DAILY
COMMUNITY
End: 2023-05-31 | Stop reason: SDUPTHER

## 2023-06-01 RX ORDER — MINERAL OIL
180 ENEMA (ML) RECTAL DAILY
Qty: 90 TABLET | Refills: 1 | Status: SHIPPED | OUTPATIENT
Start: 2023-06-01 | End: 2023-09-12

## 2023-06-01 RX ORDER — MONTELUKAST SODIUM 10 MG/1
TABLET ORAL
Qty: 90 TABLET | Refills: 1 | Status: SHIPPED | OUTPATIENT
Start: 2023-06-01 | End: 2023-12-05 | Stop reason: SDUPTHER

## 2023-07-03 DIAGNOSIS — F41.9 SEVERE ANXIETY: ICD-10-CM

## 2023-07-04 RX ORDER — ALPRAZOLAM 0.25 MG/1
TABLET ORAL
Qty: 30 TABLET | Refills: 0 | Status: SHIPPED | OUTPATIENT
Start: 2023-07-04 | End: 2023-08-09

## 2023-07-11 ENCOUNTER — OFFICE VISIT (OUTPATIENT)
Dept: CARDIOLOGY | Facility: CLINIC | Age: 88
End: 2023-07-11
Payer: MEDICARE

## 2023-07-11 VITALS
BODY MASS INDEX: 26.5 KG/M2 | HEART RATE: 73 BPM | DIASTOLIC BLOOD PRESSURE: 74 MMHG | SYSTOLIC BLOOD PRESSURE: 134 MMHG | OXYGEN SATURATION: 98 % | HEIGHT: 60 IN | WEIGHT: 135 LBS | RESPIRATION RATE: 16 BRPM

## 2023-07-11 DIAGNOSIS — Z95.0 PACEMAKER: Chronic | ICD-10-CM

## 2023-07-11 DIAGNOSIS — I25.10 CORONARY ARTERY DISEASE INVOLVING LEFT MAIN CORONARY ARTERY: ICD-10-CM

## 2023-07-11 DIAGNOSIS — I48.0 PAF (PAROXYSMAL ATRIAL FIBRILLATION): Chronic | ICD-10-CM

## 2023-07-11 DIAGNOSIS — N18.4 CHRONIC KIDNEY DISEASE, STAGE 4 (SEVERE): ICD-10-CM

## 2023-07-11 DIAGNOSIS — I25.10 CORONARY ARTERY DISEASE INVOLVING NATIVE CORONARY ARTERY OF NATIVE HEART WITHOUT ANGINA PECTORIS: ICD-10-CM

## 2023-07-11 PROCEDURE — 99999 PR PBB SHADOW E&M-EST. PATIENT-LVL V: CPT | Mod: PBBFAC,,, | Performed by: INTERNAL MEDICINE

## 2023-07-11 PROCEDURE — 99999 PR PBB SHADOW E&M-EST. PATIENT-LVL V: ICD-10-PCS | Mod: PBBFAC,,, | Performed by: INTERNAL MEDICINE

## 2023-07-11 PROCEDURE — 99214 PR OFFICE/OUTPT VISIT, EST, LEVL IV, 30-39 MIN: ICD-10-PCS | Mod: S$PBB,,, | Performed by: INTERNAL MEDICINE

## 2023-07-11 PROCEDURE — 99214 OFFICE O/P EST MOD 30 MIN: CPT | Mod: S$PBB,,, | Performed by: INTERNAL MEDICINE

## 2023-07-11 PROCEDURE — 99215 OFFICE O/P EST HI 40 MIN: CPT | Mod: PBBFAC,PN | Performed by: INTERNAL MEDICINE

## 2023-07-11 RX ORDER — LORATADINE 10 MG/1
10 TABLET ORAL DAILY
COMMUNITY

## 2023-07-11 NOTE — ASSESSMENT & PLAN NOTE
No further episodes of angina noted.  Continue on current therapy to include aspirin, isosorbide mononitrate metoprolol.

## 2023-07-11 NOTE — ASSESSMENT & PLAN NOTE
And she is no further episodes of palpitations noted at this time continue on Eliquis 2.5 mg p.o. b.i.d. and continue on metoprolol are succinate 12.5 mg twice daily.  Appears to be well controlled at this time

## 2023-07-11 NOTE — ASSESSMENT & PLAN NOTE
Cardiac standpoint she is doing well with no recurrence of anginal symptoms continue on aspirin, isosorbide mononitrate as well as metoprolol and stable.

## 2023-07-11 NOTE — PROGRESS NOTES
Subjective:    Patient ID:  Karma Chen is a 87 y.o. female patient here for evaluation Follow-up      History of Present Illness:     Patient is 87-year-old with history of known coronary artery disease and quadruple bypass surgery and status post PCI seeking follow-up evaluation.  She is been doing fairly well with no recurrence of any angina no shortness of breath no edema PND orthopnea noted.  She is been  for 66 years.  And she is getting along very well with no new cardiac symptoms.  Effort capacity is fairly good.  No swelling in the lower extremities noted.        Review of patient's allergies indicates:   Allergen Reactions    Arthrotec 50 [diclofenac-misoprostol]     Doxycycline     Effexor [venlafaxine]     Estradiol     Flagyl [metronidazole]     Fluconazole     Gabapentin     Iodine     Levaquin [levofloxacin]     Nexium [esomeprazole magnesium]     Penicillins     Red yeast rice (monascus purpureus)     Shellfish containing products     Statins-hmg-coa reductase inhibitors     Sulfa (sulfonamide antibiotics)     Tea tree oil     Tegaserod      ZOLNORM    Tegaserod hydrogen maleate     Trazodone     Yeast, dried     Adhesive Rash     Band-aids       Past Medical History:   Diagnosis Date    Coronary artery disease     Dermatitis     Dermatitis 12/8/2017    Diabetes mellitus     Diabetes mellitus type II     Leah Barr virus infection     Fibromyalgia     GERD (gastroesophageal reflux disease)     Hypertension     MI (myocardial infarction) 09/23/2011    Pure hypercholesterolemia 2/26/2020     Past Surgical History:   Procedure Laterality Date    adenoids      ANGIOPLASTY  1987    APPENDECTOMY      CARDIAC PACEMAKER PLACEMENT  01/12/2017    CATARACT EXTRACTION, BILATERAL Bilateral 9/2/15, 3/17/15    right eye-9/2/15, left eye-3/17/15    CHOLECYSTECTOMY  1987    CORONARY ARTERY BYPASS GRAFT  2006    quadruple    coronary stents  1999    x2    CYST REMOVAL  04/25/2017    on back     HYSTERECTOMY  1966    OVARY SURGERY  1948    Ovary burst & was repaired    RHIZOTOMY  09/14/2018    TONSILLECTOMY  1940     Social History     Tobacco Use    Smoking status: Never    Smokeless tobacco: Never   Substance Use Topics    Alcohol use: No    Drug use: No        Review of Systems:    As noted in HPI in addition      REVIEW OF SYSTEMS  CARDIOVASCULAR: No recent chest pain, palpitations, arm, neck, or jaw pain  RESPIRATORY: No recent fever, cough chills, SOB or congestion  : No blood in the urine  GI: No Nausea, vomiting, constipation, diarrhea, blood, or reflux.  MUSCULOSKELETAL: No myalgias  NEURO: No lightheadedness or dizziness  EYES: No Double vision, blurry, vision or headache              Objective        Vitals:    07/11/23 1442   BP: 134/74   Pulse: 73   Resp: 16       LIPIDS - LAST 2   Lab Results   Component Value Date    CHOL 223 (H) 05/20/2022    CHOL 248 (H) 11/08/2021    HDL 56 05/20/2022    HDL 41 11/08/2021    LDLCALC 145.2 05/20/2022    LDLCALC 170.0 (H) 11/08/2021    TRIG 109 05/20/2022    TRIG 185 (H) 11/08/2021    CHOLHDL 25.1 05/20/2022    CHOLHDL 16.5 (L) 11/08/2021       CBC - LAST 2  Lab Results   Component Value Date    WBC 8.37 03/08/2023    WBC 6.36 11/29/2022    RBC 4.42 03/08/2023    RBC 4.11 11/29/2022    HGB 12.3 03/08/2023    HGB 11.5 (L) 11/29/2022    HCT 38.5 03/08/2023    HCT 37.7 11/29/2022    MCV 87 03/08/2023    MCV 92 11/29/2022    MCH 27.8 03/08/2023    MCH 28.0 11/29/2022    MCHC 31.9 (L) 03/08/2023    MCHC 30.5 (L) 11/29/2022    RDW 14.3 03/08/2023    RDW 12.7 11/29/2022     03/08/2023     11/29/2022    MPV 10.0 03/08/2023    MPV 10.6 11/29/2022    GRAN 3.7 03/08/2023    GRAN 43.8 03/08/2023    LYMPH 3.3 03/08/2023    LYMPH 39.3 03/08/2023    MONO 1.1 (H) 03/08/2023    MONO 12.7 03/08/2023    BASO 0.07 03/08/2023    BASO 0.06 11/29/2022    NRBC 0 03/08/2023    NRBC 0 11/29/2022       CHEMISTRY & LIVER FUNCTION - LAST 2  Lab Results   Component Value  Date     05/26/2023     03/08/2023    K 4.0 05/26/2023    K 3.9 03/08/2023     05/26/2023     03/08/2023    CO2 27 05/26/2023    CO2 28 03/08/2023    ANIONGAP 9 05/26/2023    ANIONGAP 8 03/08/2023    BUN 23 05/26/2023    BUN 20 03/08/2023    CREATININE 1.2 05/26/2023    CREATININE 1.2 03/08/2023    GLU 82 05/26/2023     (H) 03/08/2023    CALCIUM 9.4 05/26/2023    CALCIUM 9.3 03/08/2023    MG 2.3 03/08/2023    MG 2.3 10/22/2021    MG 2.3 10/22/2021    ALBUMIN 3.9 05/26/2023    ALBUMIN 4.3 03/08/2023    PROT 7.7 03/08/2023    PROT 6.3 09/15/2022    ALKPHOS 65 03/08/2023    ALKPHOS 68 05/10/2022    ALKPHOS 68 05/10/2022    ALT 18 03/08/2023    ALT 22 05/10/2022    ALT 22 05/10/2022    AST 27 03/08/2023    AST 24 05/10/2022    AST 24 05/10/2022    BILITOT 0.4 03/08/2023    BILITOT 0.6 05/10/2022    BILITOT 0.6 05/10/2022        CARDIAC PROFILE - LAST 2  Lab Results   Component Value Date     (H) 03/08/2023     (H) 05/10/2022    CPK 53 10/22/2021    CPKMB 1.57 02/20/2012    TROPONINI <0.030 10/22/2021    TROPONINI 0.077 (HH) 07/25/2020    TROPONINIHS 19.0 (H) 03/08/2023    TROPONINIHS 16.8 (H) 03/08/2023        COAGULATION - LAST 2  Lab Results   Component Value Date    LABPT 13.9 (H) 10/22/2021    LABPT 13.6 05/02/2020    INR 1.2 10/22/2021    INR 1.1 05/02/2020    APTT 26.9 12/10/2017    APTT 26.6 02/20/2012       ENDOCRINE & PSA - LAST 2  Lab Results   Component Value Date    HGBA1C 5.8 04/04/2023    HGBA1C 5.5 05/20/2022    TSH 0.960 03/08/2023    TSH 1.280 05/10/2022        ECHOCARDIOGRAM RESULTS  Results for orders placed during the hospital encounter of 06/10/21    Echo    Interpretation Summary  · The left ventricle is mildly enlarged with mild concentric hypertrophy  · The estimated ejection fraction is 49%. Which is decreased  · Grade II left ventricular diastolic dysfunction.  · There is mild left ventricular global hypokinesis.  · Atrial fibrillation not  observed.  · Normal right ventricular size with normal right ventricular systolic function.  · Moderate left atrial enlargement.  · Mild-to-moderate aortic regurgitation.  · There is mild aortic valve stenosis.  · Aortic valve area is 1.99 cm2; peak velocity is 1.41 m/s; mean gradient is 4 mmHg.  · Mild mitral regurgitation.  · Mild to moderate tricuspid regurgitation.  · Mild pulmonic regurgitation.  · Normal central venous pressure (3 mmHg).  · The estimated PA systolic pressure is 35 mmHg. Mild pulmonary hypertension  · Pacemaker lead is present       CLINICAL HISTORY:  87 years Female Chest Pain     COMPARISON: October 22, 2021     FINDINGS: Cardiac silhouette size is stable compared to prior. Atherosclerotic calcification of the aorta. Pacing leads are unchanged in position. Status post median sternotomy.     Lungs are clear. No pleural effusion or pneumothorax. No acute osseous abnormality.     IMPRESSION:     Stable chest radiograph. No acute pulmonary pathology.     Electronically signed by:  Hever Lemons MD  3/9/2023 6:52 AM [email or web address] Workstation: [id number]           Specimen Collected: 03/08/23 19:11 Last Resulted: 03/09/23 06:52                  CURRENT/PREVIOUS VISIT EKG  Results for orders placed or performed during the hospital encounter of 03/08/23   EKG 12-lead    Collection Time: 03/08/23  7:22 PM    Narrative    Test Reason : R07.9,    Vent. Rate : 075 BPM     Atrial Rate : 075 BPM     P-R Int : 236 ms          QRS Dur : 120 ms      QT Int : 394 ms       P-R-T Axes : 082 003 139 degrees     QTc Int : 439 ms    Atrial-paced rhythm with prolonged AV conduction  Septal infarct (cited on or before 11-DEC-2017)  ST and T wave abnormality, consider lateral ischemia  Abnormal ECG  When compared with ECG of 07-JUL-2022 15:30,  No significant change was found  Confirmed by Chava Bains MD ([id number]) on 3/18/2023 8:28:58 PM    Referred By: [email or web address]   SELF           Confirmed By:Chava Bains MD     No valid  procedures specified.   Results for orders placed during the hospital encounter of 06/10/21    Nuclear Stress - Cardiology Interpreted    Interpretation Summary    Normal myocardial perfusion scan. There is no evidence of myocardial ischemia or infarction.    The gated perfusion images showed an ejection fraction of 51% post stress. Normal ejection fraction is greater than 53%.    There is normal wall motion post stress.    LV cavity size is  and normal at stress.    The EKG portion of this study is abnormal but not diagnostic.    The patient reported no chest pain during the stress test.    There were no arrhythmias during stress.    No valid procedures specified.    PHYSICAL EXAM  CONSTITUTIONAL: Well built, well nourished in no apparent distress  NECK: no carotid bruit, no JVD  LUNGS: CTA  CHEST WALL: no tenderness  HEART: regular rate and rhythm, normal S1 diminished S2 and a grade 2 systolic ejection murmur noted.    ABDOMEN: soft, non-tender; bowel sounds normal; no masses,  no organomegaly  EXTREMITIES: Extremities normal, no edema, no calf tenderness noted  NEURO: AAO X 3    I HAVE REVIEWED :    The vital signs, nurses notes, and all the pertinent radiology and labs.        Current Outpatient Medications   Medication Instructions    acetaminophen (TYLENOL) 500 mg, Oral, Every 6 hours PRN    ALPRAZolam (XANAX) 0.25 MG tablet TAKE 1 TABLET BY MOUTH EVERY DAY AS NEEDED FOR ANXIETY    apixaban (ELIQUIS) 2.5 mg, Oral, 2 times daily    aspirin (ECOTRIN) 81 mg, Oral, Daily    coenzyme Q10 100 mg, Oral, 2 times daily    cyanocobalamin 2,000 mcg, Oral, Daily    diphenhydrAMINE (BENYLIN) 12.5 mg, Oral, 4 times daily PRN    ECHINACEA ORAL 750 mg, Oral, 2 times daily    fexofenadine (ALLEGRA) 180 mg, Oral, Daily    hydrocortisone 2.5 % ointment Topical (Top), 2 times daily    isosorbide mononitrate (IMDUR) 120 MG 24 hr tablet TAKE 1 TABLET (120 MG TOTAL) BY MOUTH ONCE DAILY. DO NOT CRUSH OR CHEW SWALLOW WHOLE.     LIDOcaine (LIDODERM) 5 % 1 patch, Transdermal, Daily, Remove & Discard patch within 12 hours or as directed by MD    loratadine (CLARITIN) 10 mg, Oral, Daily    losartan (COZAAR) 12.5 mg, Oral, Nightly    MAGNESIUM ORAL 500 mg, Oral, Daily    metoprolol succinate (TOPROL-XL) 25 MG 24 hr tablet TAKE 1 TABLET BY MOUTH EVERY DAY    montelukast (SINGULAIR) 10 mg tablet TAKE 1 TABLET BY MOUTH EVERY DAY IN THE EVENING    multivitamin (THERAGRAN) tablet 1 tablet, Oral, 2 times daily    nitroGLYCERIN (NITROSTAT) 0.4 mg, Sublingual, Every 5 min PRN    omeprazole (PRILOSEC) 20 mg, Oral, Daily    SYNTHROID 88 mcg tablet TAKE 1 TABLET ONCE DAILY    tiZANidine (ZANAFLEX) 4 mg, Oral, Daily    traMADoL (ULTRAM) 50 mg, Oral, 2 times daily PRN    triamcinolone acetonide 0.1% (KENALOG) 0.1 % cream aaa bid x 1-2 wks, tk 1 wk off prn rash    turmeric 1,200 mg, Oral, Daily    UNABLE TO FIND 1 capsule, Oral, Daily, medication name: GI probiotic     UNABLE TO FIND 1 capsule, Oral, Daily, Joint health     UNABLE TO FIND 1 capsule, Oral, 2 times daily, Cardio platinum    UNABLE TO FIND Nerve [place]    zinc gluconate 50 mg, Oral, 2 times daily          Assessment & Plan     PAF (paroxysmal atrial fibrillation)  And she is no further episodes of palpitations noted at this time continue on Eliquis 2.5 mg p.o. b.i.d. and continue on metoprolol are succinate 12.5 mg twice daily.  Appears to be well controlled at this time    Pacemaker in place  Has a functioning pacemaker in Situ doing well    Coronary artery disease involving native coronary artery of native heart without angina pectoris  No further episodes of angina noted.  Continue on current therapy to include aspirin, isosorbide mononitrate metoprolol.    Coronary artery disease involving left main coronary artery  Cardiac standpoint she is doing well with no recurrence of anginal symptoms continue on aspirin, isosorbide mononitrate as well as metoprolol and stable.    Chronic kidney  disease, stage 4 (severe)  Chronic kidney disease noted.  Continue to monitor.          Follow up in about 6 months (around 1/11/2024).

## 2023-07-27 NOTE — PROGRESS NOTES
SUBJECTIVE:    Patient ID: Karma Chen is a 83 y.o. female.    Chief Complaint: Diabetes; Hypertension; and Follow-up    HPI     Patient in for follow-up of diabetes and hypertension. A1C was 5.7. Urine was negative for protein.    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-taking a nerve renewal supplement with B6 400 mg and 2000 ucg B12--    Office Visit on 01/08/2019   Component Date Value Ref Range Status    Microalbumin, POC 01/08/2019 neg   Final       Past Medical History:   Diagnosis Date    Coronary artery disease     Dermatitis     Dermatitis 12/8/2017    Diabetes mellitus     Diabetes mellitus type II     Leah Madrigal virus infection     Fibromyalgia     GERD (gastroesophageal reflux disease)     Hypertension     MI (myocardial infarction) 09/23/2011     Past Surgical History:   Procedure Laterality Date    adenoids      ANGIOPLASTY  1987    Angioplasty-coronary N/A 12/11/2017    Performed by Devante Garces MD at [facility]    APPENDECTOMY      CARDIAC PACEMAKER PLACEMENT  01/12/2017    CATARACT EXTRACTION, BILATERAL Bilateral 9/2/15, 3/17/15    right eye-9/2/15, left eye-3/17/15    CHOLECYSTECTOMY  1987    CORONARY ARTERY BYPASS GRAFT  2006    quadruple    coronary stents  1999    x2    CYST REMOVAL  04/25/2017    on back    HYSTERECTOMY  1966    OVARY SURGERY  1948    Ovary burst & was repaired    RHIZOTOMY  09/14/2018    TONSILLECTOMY  1940     Family History   Problem Relation Age of Onset    No Known Problems Mother     No Known Problems Father        Marital Status:   Alcohol History:  reports that she does not drink alcohol.  Tobacco History:  reports that she has never smoked. She has never used smokeless tobacco.  Drug History:  reports that she does not use drugs.    Review of patient's allergies indicates:   Allergen Reactions    Lipitor [atorvastatin] Other (See Comments)     Muscle  pain and weakness in legs    Arthrotec 50 [diclofenac-misoprostol]     Doxycycline     Effexor [venlafaxine]     Estradiol     Flagyl [metronidazole]     Fluconazole     Iodine     Levaquin [levofloxacin]     Nexium [esomeprazole magnesium]     Penicillins     Shellfish containing products     Sulfa (sulfonamide antibiotics)     Tea tree oil     Tegaserod      ZOLNORM    Trazodone     Yeast, dried        Current Outpatient Medications:     acetaminophen (TYLENOL) 500 MG tablet, Take 500 mg by mouth every 6 (six) hours as needed for Pain., Disp: , Rfl:     aspirin (ECOTRIN) 81 MG EC tablet, Take 1 tablet (81 mg total) by mouth once daily., Disp: , Rfl: 0    cetirizine (ZYRTEC) 5 MG tablet, Take 1 tablet (5 mg total) by mouth once daily. (Patient taking differently: Take 10 mg by mouth once daily. ), Disp: 90 tablet, Rfl: 0    clopidogrel (PLAVIX) 75 mg tablet, Take 1 tablet (75 mg total) by mouth once daily., Disp: 90 tablet, Rfl: 0    coenzyme Q10 100 mg capsule, Take 1 capsule (100 mg total) by mouth 2 (two) times daily., Disp: 60 capsule, Rfl: 11    echinacea 400 mg Cap, Take 1 capsule by mouth 2 (two) times daily. , Disp: , Rfl:     estradiol (ESTRACE) 0.01 % (0.1 mg/g) vaginal cream, Place vaginally once a week.  , Disp: , Rfl:     fish oil-omega-3 fatty acids 300-1,000 mg capsule, Take by mouth once daily., Disp: , Rfl:     HYDROcodone-acetaminophen (NORCO) 5-325 mg per tablet, Take 1 tablet by mouth every 12 (twelve) hours as needed for Pain., Disp: , Rfl:     isosorbide mononitrate (IMDUR) 60 MG 24 hr tablet, TAKE 1 TABLET (60 MG TOTAL) BY MOUTH ONCE DAILY, Disp: 90 tablet, Rfl: 1    ketoconazole (NIZORAL) 2 % cream, MIX WITH OTHER INGREDIENTS AND APPLY TWICE DAILY TO ITCHY RASH AS DIRECTED, Disp: , Rfl: 0    levothyroxine (SYNTHROID) 88 MCG tablet, Take 1 tablet (88 mcg total) by mouth once daily. Can substitute, Disp: 90 tablet, Rfl: 1    loratadine (CLARITIN) 10 mg tablet, Take  10 mg by mouth once daily. , Disp: , Rfl:     losartan (COZAAR) 25 MG tablet, Take 25 mg by mouth. 25mg in am and 12.5mg in pm, Disp: , Rfl: 1    magnesium 250 mg Tab, Take 1 tablet by mouth once., Disp: , Rfl:     metoprolol succinate (TOPROL-XL) 25 MG 24 hr tablet, TAKE 1 TABLET (25 MG TOTAL) BY MOUTH ONCE DAILY. (Patient taking differently: Take 12.5 mg by mouth once daily. ), Disp: 30 tablet, Rfl: 2    montelukast (SINGULAIR) 10 mg tablet, TAKE 1 TABLET BY MOUTH EVERY DAY IN THE EVENING, Disp: 90 tablet, Rfl: 1    multivitamin (THERAGRAN) tablet, Take 1 tablet by mouth once daily., Disp: , Rfl:     nitroGLYCERIN (NITROSTAT) 0.4 MG SL tablet, , Disp: , Rfl:     tiZANidine (ZANAFLEX) 4 MG tablet, Take 4 mg by mouth every 6 (six) hours as needed., Disp: , Rfl:     triamcinolone acetonide 0.1% (KENALOG) 0.1 % cream, APPLY TO AFFECTED AREA TWICE A DAY FOR ITCHY RASH. MIX WITH OTHER INGREDIENTS AS DIRECTED, Disp: , Rfl: 0    UNABLE TO FIND, Take 1 tablet by mouth 2 (two) times daily. Tamaflex, Disp: , Rfl:     UNABLE TO FIND, Take 1 tablet by mouth 2 (two) times daily. [place], Disp: , Rfl:     valACYclovir (VALTREX) 1000 MG tablet, TAKE 1 TABLET EVERY 24     HOURS (Patient taking differently: TAKE 1 TABLET EVERY 24 HOURS PRN), Disp: 90 tablet, Rfl: 1    Review of Systems   Constitutional: Negative for chills, fatigue, fever and unexpected weight change.   HENT: Negative for congestion, ear pain, hearing loss, sinus pain and sore throat.    Eyes: Negative for pain and visual disturbance.   Respiratory: Negative for cough, shortness of breath and wheezing.    Cardiovascular: Negative for chest pain, palpitations and leg swelling.   Gastrointestinal: Negative for abdominal pain, blood in stool, constipation, diarrhea, nausea and vomiting.   Endocrine: Negative for cold intolerance and heat intolerance.   Genitourinary: Negative for difficulty urinating, dysuria, frequency, pelvic pain and urgency.    Musculoskeletal: Positive for arthralgias and back pain (had ablation bilaterally friday). Negative for joint swelling and neck pain.   Skin: Negative for pallor and rash.   Neurological: Negative for dizziness, tremors, weakness, numbness (both lower legs and feet) and headaches.   Hematological: Does not bruise/bleed easily.   Psychiatric/Behavioral: Negative for agitation, sleep disturbance and suicidal ideas.          Objective:      Vitals:    05/20/19 1203   BP: 132/70   Pulse: 72   Resp: 12   Temp: 98.1 °F (36.7 °C)   SpO2: 98%   Weight: 67.6 kg (149 lb)   Height: 5' (1.524 m)     Physical Exam   Constitutional: She is oriented to person, place, and time. She appears well-developed and well-nourished. She is cooperative.   HENT:   Right Ear: Tympanic membrane normal.   Left Ear: Tympanic membrane normal.   Eyes: Conjunctivae, EOM and lids are normal. Right pupil is round and reactive. Left pupil is round and reactive.   Neck: Trachea normal and normal range of motion. Neck supple. No JVD present. Carotid bruit is not present. No thyromegaly present.   Cardiovascular: Normal rate, regular rhythm, S1 normal, S2 normal, normal heart sounds and intact distal pulses. Exam reveals no gallop and no friction rub.   No murmur heard.  Pulmonary/Chest: Breath sounds normal. No respiratory distress. She has no wheezes. She has no rales.   Abdominal: Soft. Bowel sounds are normal. She exhibits no mass. There is no tenderness. There is no rigidity and no guarding.   Musculoskeletal: She exhibits no edema.   Neurological: She is alert and oriented to person, place, and time.   Skin: Skin is warm and dry. Capillary refill takes less than 2 seconds. No lesion and no rash noted. Nails show no clubbing.   Psychiatric: She has a normal mood and affect. Her behavior is normal. Judgment and thought content normal.   Nursing note and vitals reviewed.    Protective Sensation (w/ 10 gram monofilament):  Right: Intact  Left:  Intact    Visual Inspection:  Normal -  Bilateral    Pedal Pulses:   Right: Present  Left: Present    Posterior tibialis:   Right:Present  Left: Present      Assessment:       1. Essential hypertension    2. Coronary artery disease involving left main coronary artery    3. Pure hypercholesterolemia    4. Diabetes mellitus type 2, diet-controlled         Plan:       Essential hypertension        -     Continue as is    Coronary artery disease involving left main coronary artery        -     continue per cardiology    Pure hypercholesterolemia        -     Discussed Repatha or Prauluent    Diabetes mellitus type 2, diet-controlled        -     Controlled-continue as is      No follow-ups on file.        5/20/2019 Elena Sullivan M.D.     Double Z Plasty Text: The lesion was extirpated to the level of the fat with a #15 scalpel blade. Given the location of the defect, shape of the defect and the proximity to free margins a double Z-plasty was deemed most appropriate for repair. Using a sterile surgical marker, the appropriate transposition arms of the double Z-plasty were drawn incorporating the defect and placing the expected incisions within the relaxed skin tension lines where possible. The area thus outlined was incised deep to adipose tissue with a #15 scalpel blade. The skin margins were undermined to an appropriate distance in all directions utilizing iris scissors. The opposing transposition arms were then transposed and carried over into place in opposite direction and anchored with interrupted buried subcutaneous sutures.

## 2023-08-09 DIAGNOSIS — F41.9 SEVERE ANXIETY: ICD-10-CM

## 2023-08-09 RX ORDER — ALPRAZOLAM 0.25 MG/1
TABLET ORAL
Qty: 30 TABLET | Refills: 0 | Status: SHIPPED | OUTPATIENT
Start: 2023-08-09 | End: 2023-09-12 | Stop reason: SDUPTHER

## 2023-08-22 DIAGNOSIS — E53.8 B12 DEFICIENCY: ICD-10-CM

## 2023-08-22 DIAGNOSIS — E11.9 DIABETES MELLITUS TYPE 2, DIET-CONTROLLED: ICD-10-CM

## 2023-08-22 DIAGNOSIS — R53.83 FATIGUE, UNSPECIFIED TYPE: ICD-10-CM

## 2023-08-22 DIAGNOSIS — R53.1 WEAKNESS: ICD-10-CM

## 2023-08-22 DIAGNOSIS — E78.00 PURE HYPERCHOLESTEROLEMIA: Primary | ICD-10-CM

## 2023-08-22 DIAGNOSIS — R09.89 LABILE HYPERTENSION: ICD-10-CM

## 2023-08-23 ENCOUNTER — LAB VISIT (OUTPATIENT)
Dept: LAB | Facility: HOSPITAL | Age: 88
End: 2023-08-23
Attending: INTERNAL MEDICINE
Payer: MEDICARE

## 2023-08-23 DIAGNOSIS — R09.89 LABILE HYPERTENSION: ICD-10-CM

## 2023-08-23 DIAGNOSIS — E53.8 B12 DEFICIENCY: ICD-10-CM

## 2023-08-23 DIAGNOSIS — E11.9 DIABETES MELLITUS TYPE 2, DIET-CONTROLLED: ICD-10-CM

## 2023-08-23 DIAGNOSIS — R53.1 WEAKNESS: ICD-10-CM

## 2023-08-23 DIAGNOSIS — E78.00 PURE HYPERCHOLESTEROLEMIA: ICD-10-CM

## 2023-08-23 DIAGNOSIS — R53.83 FATIGUE, UNSPECIFIED TYPE: ICD-10-CM

## 2023-08-23 LAB
ALBUMIN SERPL BCP-MCNC: 3.6 G/DL (ref 3.5–5.2)
ALP SERPL-CCNC: 63 U/L (ref 55–135)
ALT SERPL W/O P-5'-P-CCNC: 17 U/L (ref 10–44)
ANION GAP SERPL CALC-SCNC: 6 MMOL/L (ref 8–16)
AST SERPL-CCNC: 24 U/L (ref 10–40)
BASOPHILS # BLD AUTO: 0.05 K/UL (ref 0–0.2)
BASOPHILS NFR BLD: 0.7 % (ref 0–1.9)
BILIRUB SERPL-MCNC: 0.5 MG/DL (ref 0.1–1)
BUN SERPL-MCNC: 26 MG/DL (ref 8–23)
CALCIUM SERPL-MCNC: 8.7 MG/DL (ref 8.7–10.5)
CHLORIDE SERPL-SCNC: 107 MMOL/L (ref 95–110)
CHOLEST SERPL-MCNC: 229 MG/DL (ref 120–199)
CHOLEST/HDLC SERPL: 6 {RATIO} (ref 2–5)
CO2 SERPL-SCNC: 24 MMOL/L (ref 23–29)
CREAT SERPL-MCNC: 1.5 MG/DL (ref 0.5–1.4)
DIFFERENTIAL METHOD: ABNORMAL
EOSINOPHIL # BLD AUTO: 0.3 K/UL (ref 0–0.5)
EOSINOPHIL NFR BLD: 3.6 % (ref 0–8)
ERYTHROCYTE [DISTWIDTH] IN BLOOD BY AUTOMATED COUNT: 13.9 % (ref 11.5–14.5)
EST. GFR  (NO RACE VARIABLE): 33.9 ML/MIN/1.73 M^2
ESTIMATED AVG GLUCOSE: 120 MG/DL (ref 68–131)
GLUCOSE SERPL-MCNC: 91 MG/DL (ref 70–110)
HBA1C MFR BLD: 5.8 % (ref 4.5–6.2)
HCT VFR BLD AUTO: 36.4 % (ref 37–48.5)
HDLC SERPL-MCNC: 38 MG/DL (ref 40–75)
HDLC SERPL: 16.6 % (ref 20–50)
HGB BLD-MCNC: 11.4 G/DL (ref 12–16)
IMM GRANULOCYTES # BLD AUTO: 0.01 K/UL (ref 0–0.04)
IMM GRANULOCYTES NFR BLD AUTO: 0.1 % (ref 0–0.5)
LDLC SERPL CALC-MCNC: 124 MG/DL (ref 63–159)
LYMPHOCYTES # BLD AUTO: 3.2 K/UL (ref 1–4.8)
LYMPHOCYTES NFR BLD: 42.7 % (ref 18–48)
MCH RBC QN AUTO: 28.4 PG (ref 27–31)
MCHC RBC AUTO-ENTMCNC: 31.3 G/DL (ref 32–36)
MCV RBC AUTO: 91 FL (ref 82–98)
MONOCYTES # BLD AUTO: 0.9 K/UL (ref 0.3–1)
MONOCYTES NFR BLD: 11.4 % (ref 4–15)
NEUTROPHILS # BLD AUTO: 3.1 K/UL (ref 1.8–7.7)
NEUTROPHILS NFR BLD: 41.5 % (ref 38–73)
NONHDLC SERPL-MCNC: 191 MG/DL
NRBC BLD-RTO: 0 /100 WBC
PLATELET # BLD AUTO: 271 K/UL (ref 150–450)
PMV BLD AUTO: 10.5 FL (ref 9.2–12.9)
POTASSIUM SERPL-SCNC: 4.1 MMOL/L (ref 3.5–5.1)
PROT SERPL-MCNC: 6.9 G/DL (ref 6–8.4)
RBC # BLD AUTO: 4.01 M/UL (ref 4–5.4)
SODIUM SERPL-SCNC: 137 MMOL/L (ref 136–145)
TRIGL SERPL-MCNC: 335 MG/DL (ref 30–150)
VIT B12 SERPL-MCNC: 747 PG/ML (ref 210–950)
WBC # BLD AUTO: 7.43 K/UL (ref 3.9–12.7)

## 2023-08-23 PROCEDURE — 85025 COMPLETE CBC W/AUTO DIFF WBC: CPT | Performed by: INTERNAL MEDICINE

## 2023-08-23 PROCEDURE — 80053 COMPREHEN METABOLIC PANEL: CPT | Performed by: INTERNAL MEDICINE

## 2023-08-23 PROCEDURE — 80061 LIPID PANEL: CPT | Performed by: INTERNAL MEDICINE

## 2023-08-23 PROCEDURE — 82607 VITAMIN B-12: CPT | Performed by: INTERNAL MEDICINE

## 2023-08-23 PROCEDURE — 83036 HEMOGLOBIN GLYCOSYLATED A1C: CPT | Performed by: INTERNAL MEDICINE

## 2023-08-23 PROCEDURE — 36415 COLL VENOUS BLD VENIPUNCTURE: CPT | Performed by: INTERNAL MEDICINE

## 2023-09-09 DIAGNOSIS — E03.9 ACQUIRED HYPOTHYROIDISM: ICD-10-CM

## 2023-09-09 RX ORDER — LEVOTHYROXINE SODIUM 88 UG/1
TABLET ORAL
Qty: 90 TABLET | Refills: 1 | Status: SHIPPED | OUTPATIENT
Start: 2023-09-09 | End: 2023-09-12 | Stop reason: SDUPTHER

## 2023-09-12 ENCOUNTER — OFFICE VISIT (OUTPATIENT)
Dept: FAMILY MEDICINE | Facility: CLINIC | Age: 88
End: 2023-09-12
Payer: MEDICARE

## 2023-09-12 VITALS
SYSTOLIC BLOOD PRESSURE: 134 MMHG | RESPIRATION RATE: 14 BRPM | HEIGHT: 60 IN | HEART RATE: 72 BPM | BODY MASS INDEX: 26.9 KG/M2 | WEIGHT: 137 LBS | OXYGEN SATURATION: 99 % | DIASTOLIC BLOOD PRESSURE: 86 MMHG

## 2023-09-12 DIAGNOSIS — M79.604 RIGHT LEG PAIN: ICD-10-CM

## 2023-09-12 DIAGNOSIS — F41.9 SEVERE ANXIETY: ICD-10-CM

## 2023-09-12 DIAGNOSIS — E03.9 ACQUIRED HYPOTHYROIDISM: ICD-10-CM

## 2023-09-12 DIAGNOSIS — R07.2 PRECORDIAL PAIN: ICD-10-CM

## 2023-09-12 DIAGNOSIS — E11.59 CORONARY ARTERY DISEASE DUE TO TYPE 2 DIABETES MELLITUS: ICD-10-CM

## 2023-09-12 DIAGNOSIS — Z95.1 HX OF CABG: ICD-10-CM

## 2023-09-12 DIAGNOSIS — I25.10 CORONARY ARTERY DISEASE DUE TO TYPE 2 DIABETES MELLITUS: ICD-10-CM

## 2023-09-12 DIAGNOSIS — M54.31 SCIATICA, RIGHT SIDE: ICD-10-CM

## 2023-09-12 DIAGNOSIS — E78.00 PURE HYPERCHOLESTEROLEMIA: Primary | ICD-10-CM

## 2023-09-12 DIAGNOSIS — I10 ESSENTIAL HYPERTENSION: Primary | ICD-10-CM

## 2023-09-12 DIAGNOSIS — M62.838 MUSCLE SPASM: ICD-10-CM

## 2023-09-12 DIAGNOSIS — M54.32 SCIATICA OF LEFT SIDE: ICD-10-CM

## 2023-09-12 PROCEDURE — 99215 OFFICE O/P EST HI 40 MIN: CPT | Performed by: INTERNAL MEDICINE

## 2023-09-12 PROCEDURE — 99213 PR OFFICE/OUTPT VISIT, EST, LEVL III, 20-29 MIN: ICD-10-PCS | Mod: S$PBB,AQ,, | Performed by: INTERNAL MEDICINE

## 2023-09-12 PROCEDURE — 99213 OFFICE O/P EST LOW 20 MIN: CPT | Mod: S$PBB,AQ,, | Performed by: INTERNAL MEDICINE

## 2023-09-12 RX ORDER — TIZANIDINE 4 MG/1
4 TABLET ORAL DAILY
Qty: 90 TABLET | Refills: 0 | Status: SHIPPED | OUTPATIENT
Start: 2023-09-12 | End: 2023-12-05 | Stop reason: SDUPTHER

## 2023-09-12 RX ORDER — TRAMADOL HYDROCHLORIDE 50 MG/1
50 TABLET ORAL 2 TIMES DAILY PRN
Qty: 14 TABLET | Refills: 0 | Status: SHIPPED | OUTPATIENT
Start: 2023-09-12 | End: 2023-12-05 | Stop reason: SDUPTHER

## 2023-09-12 RX ORDER — FLUOCINONIDE CREAM (EMULSIFIED BASE) 0.5 MG/G
CREAM TOPICAL 2 TIMES DAILY
Qty: 60 G | Refills: 1 | Status: SHIPPED | OUTPATIENT
Start: 2023-09-12

## 2023-09-12 RX ORDER — ISOSORBIDE MONONITRATE 120 MG/1
TABLET, EXTENDED RELEASE ORAL
Qty: 90 TABLET | Refills: 3 | Status: SHIPPED | OUTPATIENT
Start: 2023-09-12 | End: 2024-02-19 | Stop reason: SDUPTHER

## 2023-09-12 RX ORDER — LIDOCAINE 50 MG/G
1 PATCH TOPICAL DAILY
Qty: 10 PATCH | Refills: 3 | Status: SHIPPED | OUTPATIENT
Start: 2023-09-12 | End: 2023-12-05 | Stop reason: SDUPTHER

## 2023-09-12 RX ORDER — LEVOTHYROXINE SODIUM 88 UG/1
88 TABLET ORAL DAILY
Qty: 90 TABLET | Refills: 1 | Status: SHIPPED | OUTPATIENT
Start: 2023-09-12 | End: 2023-12-05 | Stop reason: SDUPTHER

## 2023-09-12 RX ORDER — ALPRAZOLAM 0.25 MG/1
TABLET ORAL
Qty: 90 TABLET | Refills: 0 | Status: SHIPPED | OUTPATIENT
Start: 2023-09-12 | End: 2023-12-05 | Stop reason: SDUPTHER

## 2023-09-12 NOTE — PROGRESS NOTES
SUBJECTIVE:    Patient ID: Karma Chen is a 87 y.o. female.    Chief Complaint: Hypertension (Flu vaccine declined), Medication Refill, and Follow-up    Hypertension  Pertinent negatives include no chest pain, headaches, neck pain, palpitations or shortness of breath.   Medication Refill  Pertinent negatives include no abdominal pain, chest pain, chills, congestion, coughing, fatigue, fever, headaches, joint swelling, nausea, neck pain, numbness, rash, sore throat, vomiting or weakness.   Follow-up  Pertinent negatives include no abdominal pain, chest pain, chills, congestion, coughing, fatigue, fever, headaches, joint swelling, nausea, neck pain, numbness, rash, sore throat, vomiting or weakness.       Patient is in for recheck--    Labs- chol 229 Trigs 339 HDL 38  A1C 5.8 wbc 7.43 H/H 11.4/36.4 platelets 271,000 bun 26 Cr 1.5     Lab Visit on 08/23/2023   Component Date Value Ref Range Status    Cholesterol 08/23/2023 229 (H)  120 - 199 mg/dL Final    Triglycerides 08/23/2023 335 (H)  30 - 150 mg/dL Final    HDL 08/23/2023 38 (L)  40 - 75 mg/dL Final    LDL Cholesterol 08/23/2023 124.0  63.0 - 159.0 mg/dL Final    HDL/Cholesterol Ratio 08/23/2023 16.6 (L)  20.0 - 50.0 % Final    Total Cholesterol/HDL Ratio 08/23/2023 6.0 (H)  2.0 - 5.0 Final    Non-HDL Cholesterol 08/23/2023 191  mg/dL Final    Hemoglobin A1C 08/23/2023 5.8  4.5 - 6.2 % Final    Estimated Avg Glucose 08/23/2023 120  68 - 131 mg/dL Final    WBC 08/23/2023 7.43  3.90 - 12.70 K/uL Final    RBC 08/23/2023 4.01  4.00 - 5.40 M/uL Final    Hemoglobin 08/23/2023 11.4 (L)  12.0 - 16.0 g/dL Final    Hematocrit 08/23/2023 36.4 (L)  37.0 - 48.5 % Final    MCV 08/23/2023 91  82 - 98 fL Final    MCH 08/23/2023 28.4  27.0 - 31.0 pg Final    MCHC 08/23/2023 31.3 (L)  32.0 - 36.0 g/dL Final    RDW 08/23/2023 13.9  11.5 - 14.5 % Final    Platelets 08/23/2023 271  150 - 450 K/uL Final    MPV 08/23/2023 10.5  9.2 - 12.9 fL Final    Immature Granulocytes  08/23/2023 0.1  0.0 - 0.5 % Final    Gran # (ANC) 08/23/2023 3.1  1.8 - 7.7 K/uL Final    Immature Grans (Abs) 08/23/2023 0.01  0.00 - 0.04 K/uL Final    Lymph # 08/23/2023 3.2  1.0 - 4.8 K/uL Final    Mono # 08/23/2023 0.9  0.3 - 1.0 K/uL Final    Eos # 08/23/2023 0.3  0.0 - 0.5 K/uL Final    Baso # 08/23/2023 0.05  0.00 - 0.20 K/uL Final    nRBC 08/23/2023 0  0 /100 WBC Final    Gran % 08/23/2023 41.5  38.0 - 73.0 % Final    Lymph % 08/23/2023 42.7  18.0 - 48.0 % Final    Mono % 08/23/2023 11.4  4.0 - 15.0 % Final    Eosinophil % 08/23/2023 3.6  0.0 - 8.0 % Final    Basophil % 08/23/2023 0.7  0.0 - 1.9 % Final    Differential Method 08/23/2023 Automated   Final    Sodium 08/23/2023 137  136 - 145 mmol/L Final    Potassium 08/23/2023 4.1  3.5 - 5.1 mmol/L Final    Chloride 08/23/2023 107  95 - 110 mmol/L Final    CO2 08/23/2023 24  23 - 29 mmol/L Final    Glucose 08/23/2023 91  70 - 110 mg/dL Final    BUN 08/23/2023 26 (H)  8 - 23 mg/dL Final    Creatinine 08/23/2023 1.5 (H)  0.5 - 1.4 mg/dL Final    Calcium 08/23/2023 8.7  8.7 - 10.5 mg/dL Final    Total Protein 08/23/2023 6.9  6.0 - 8.4 g/dL Final    Albumin 08/23/2023 3.6  3.5 - 5.2 g/dL Final    Total Bilirubin 08/23/2023 0.5  0.1 - 1.0 mg/dL Final    Alkaline Phosphatase 08/23/2023 63  55 - 135 U/L Final    AST 08/23/2023 24  10 - 40 U/L Final    ALT 08/23/2023 17  10 - 44 U/L Final    eGFR 08/23/2023 33.9 (A)  >60 mL/min/1.73 m^2 Final    Anion Gap 08/23/2023 6 (L)  8 - 16 mmol/L Final    Vitamin B-12 08/23/2023 747  210 - 950 pg/mL Final   Hospital Outpatient Visit on 08/01/2023   Component Date Value Ref Range Status    Battery Voltage (V) 08/01/2023 2.98  V Final    P/R-wave RA Lead 08/01/2023 1.3  mV Final    Impedance RA Lead 08/01/2023 475  Ohms Final    P/R-wave RA Lead (native) 08/01/2023 12.6  mV Final    Impedance RA Lead (native) 08/01/2023 513  Ohms Final    Threshold V RA Lead 08/01/2023 0.75  V Final    Theshold ms RA Lead 08/01/2023 0.4  ms  Final    Threshold V RA Lead (native) 08/01/2023 0.625  V Final    Threshold ms RA Lead (native) 08/01/2023 0.4  ms Final   Lab Visit on 05/26/2023   Component Date Value Ref Range Status    Glucose 05/26/2023 82  70 - 110 mg/dL Final    Sodium 05/26/2023 142  136 - 145 mmol/L Final    Potassium 05/26/2023 4.0  3.5 - 5.1 mmol/L Final    Chloride 05/26/2023 106  95 - 110 mmol/L Final    CO2 05/26/2023 27  23 - 29 mmol/L Final    BUN 05/26/2023 23  8 - 23 mg/dL Final    Calcium 05/26/2023 9.4  8.7 - 10.5 mg/dL Final    Creatinine 05/26/2023 1.2  0.5 - 1.4 mg/dL Final    Albumin 05/26/2023 3.9  3.5 - 5.2 g/dL Final    Phosphorus 05/26/2023 4.1  2.7 - 4.5 mg/dL Final    eGFR 05/26/2023 44.4 (A)  >60 mL/min/1.73 m^2 Final    Anion Gap 05/26/2023 9  8 - 16 mmol/L Final    Specimen UA 05/26/2023 Urine, Clean Catch   Final    Color, UA 05/26/2023 Yellow  Yellow, Straw, Anila Final    Appearance, UA 05/26/2023 Clear  Clear Final    pH, UA 05/26/2023 7.0  5.0 - 8.0 Final    Specific Gravity, UA 05/26/2023 1.015  1.005 - 1.030 Final    Protein, UA 05/26/2023 Trace (A)  Negative Final    Glucose, UA 05/26/2023 Negative  Negative Final    Ketones, UA 05/26/2023 Negative  Negative Final    Bilirubin (UA) 05/26/2023 Negative  Negative Final    Occult Blood UA 05/26/2023 Negative  Negative Final    Nitrite, UA 05/26/2023 Negative  Negative Final    Urobilinogen, UA 05/26/2023 Negative  Negative EU/dL Final    Leukocytes, UA 05/26/2023 3+ (A)  Negative Final    Protein, Urine Random 05/26/2023 16 (H)  6 - 15 mg/dL Final    Creatinine, Urine 05/26/2023 132.0  15.0 - 325.0 mg/dL Final    Prot/Creat Ratio, Urine 05/26/2023 0.12  0.00 - 0.20 Final    RBC, UA 05/26/2023 4  0 - 4 /hpf Final    WBC, UA 05/26/2023 13 (H)  0 - 5 /hpf Final    Bacteria 05/26/2023 Negative  None-Occ /hpf Final    Squam Epithel, UA 05/26/2023 6  /hpf Final    Hyaline Casts, UA 05/26/2023 7 (A)  0-1/lpf /lpf Final    Microscopic Comment 05/26/2023 SEE COMMENT    Final   Lab Visit on 04/04/2023   Component Date Value Ref Range Status    Microalbumin, Urine 04/04/2023 12.0  <19.9 ug/mL Final    Creatinine, Urine 04/04/2023 63.0  15.0 - 325.0 mg/dL Final    Microalb/Creat Ratio 04/04/2023 19.0  0.0 - 30.0 ug/mg Final   Lab Visit on 04/04/2023   Component Date Value Ref Range Status    Hemoglobin A1C 04/04/2023 5.8  4.5 - 6.2 % Final    Estimated Avg Glucose 04/04/2023 120  68 - 131 mg/dL Final   Admission on 03/08/2023, Discharged on 03/09/2023   Component Date Value Ref Range Status    Magnesium 03/08/2023 2.3  1.6 - 2.6 mg/dL Final    WBC 03/08/2023 8.37  3.90 - 12.70 K/uL Final    RBC 03/08/2023 4.42  4.00 - 5.40 M/uL Final    Hemoglobin 03/08/2023 12.3  12.0 - 16.0 g/dL Final    Hematocrit 03/08/2023 38.5  37.0 - 48.5 % Final    MCV 03/08/2023 87  82 - 98 fL Final    MCH 03/08/2023 27.8  27.0 - 31.0 pg Final    MCHC 03/08/2023 31.9 (L)  32.0 - 36.0 g/dL Final    RDW 03/08/2023 14.3  11.5 - 14.5 % Final    Platelets 03/08/2023 264  150 - 450 K/uL Final    MPV 03/08/2023 10.0  9.2 - 12.9 fL Final    Immature Granulocytes 03/08/2023 0.4  0.0 - 0.5 % Final    Gran # (ANC) 03/08/2023 3.7  1.8 - 7.7 K/uL Final    Immature Grans (Abs) 03/08/2023 0.03  0.00 - 0.04 K/uL Final    Lymph # 03/08/2023 3.3  1.0 - 4.8 K/uL Final    Mono # 03/08/2023 1.1 (H)  0.3 - 1.0 K/uL Final    Eos # 03/08/2023 0.3  0.0 - 0.5 K/uL Final    Baso # 03/08/2023 0.07  0.00 - 0.20 K/uL Final    nRBC 03/08/2023 0  0 /100 WBC Final    Gran % 03/08/2023 43.8  38.0 - 73.0 % Final    Lymph % 03/08/2023 39.3  18.0 - 48.0 % Final    Mono % 03/08/2023 12.7  4.0 - 15.0 % Final    Eosinophil % 03/08/2023 3.0  0.0 - 8.0 % Final    Basophil % 03/08/2023 0.8  0.0 - 1.9 % Final    Differential Method 03/08/2023 Automated   Final    Sodium 03/08/2023 141  136 - 145 mmol/L Final    Potassium 03/08/2023 3.9  3.5 - 5.1 mmol/L Final    Chloride 03/08/2023 105  95 - 110 mmol/L Final    CO2 03/08/2023 28  23 - 29 mmol/L Final     Glucose 03/08/2023 128 (H)  70 - 110 mg/dL Final    BUN 03/08/2023 20  8 - 23 mg/dL Final    Creatinine 03/08/2023 1.2  0.5 - 1.4 mg/dL Final    Calcium 03/08/2023 9.3  8.7 - 10.5 mg/dL Final    Total Protein 03/08/2023 7.7  6.0 - 8.4 g/dL Final    Albumin 03/08/2023 4.3  3.5 - 5.2 g/dL Final    Total Bilirubin 03/08/2023 0.4  0.1 - 1.0 mg/dL Final    Alkaline Phosphatase 03/08/2023 65  55 - 135 U/L Final    AST 03/08/2023 27  10 - 40 U/L Final    ALT 03/08/2023 18  10 - 44 U/L Final    Anion Gap 03/08/2023 8  8 - 16 mmol/L Final    eGFR 03/08/2023 44.4 (A)  >60 mL/min/1.73 m^2 Final    Troponin I High Sensitivity 03/08/2023 16.8 (H)  0.0 - 14.9 pg/mL Final    Troponin I High Sensitivity 03/08/2023 19.0 (H)  0.0 - 14.9 pg/mL Final    BNP 03/08/2023 403 (H)  0 - 99 pg/mL Final    TSH 03/08/2023 0.960  0.340 - 5.600 uIU/mL Final    Specimen UA 03/08/2023 Urine, Clean Catch   Final    Color, UA 03/08/2023 Yellow  Yellow, Straw, Anila Final    Appearance, UA 03/08/2023 Clear  Clear Final    pH, UA 03/08/2023 7.0  5.0 - 8.0 Final    Specific Gravity, UA 03/08/2023 1.005  1.005 - 1.030 Final    Protein, UA 03/08/2023 Negative  Negative Final    Glucose, UA 03/08/2023 Negative  Negative Final    Ketones, UA 03/08/2023 Negative  Negative Final    Bilirubin (UA) 03/08/2023 Negative  Negative Final    Occult Blood UA 03/08/2023 Negative  Negative Final    Nitrite, UA 03/08/2023 Negative  Negative Final    Urobilinogen, UA 03/08/2023 Negative  Negative EU/dL Final    Leukocytes, UA 03/08/2023 2+ (A)  Negative Final    Influenza A, Molecular 03/08/2023 Negative  Negative Final    Influenza B, Molecular 03/08/2023 Negative  Negative Final    Flu A & B Source 03/08/2023 NP   Final    RBC, UA 03/08/2023 0  0 - 4 /hpf Final    WBC, UA 03/08/2023 14 (H)  0 - 5 /hpf Final    Bacteria 03/08/2023 Negative  None-Occ /hpf Final    Squam Epithel, UA 03/08/2023 1  /hpf Final    Hyaline Casts,  03/08/2023 0  0-1/lpf /lpf Final     Microscopic Comment 03/08/2023 SEE COMMENT   Final    Urine Culture, Routine 03/08/2023 No growth   Final   Hospital Outpatient Visit on 03/07/2023   Component Date Value Ref Range Status    Battery Voltage (V) 03/07/2023 2.99  V Final    P/R-wave RA Lead 03/07/2023 1.8  mV Final    P/R-wave RA Lead (native) 03/07/2023 11  mV Final    Impedance RA Lead 03/07/2023 475  Ohms Final    Impedance RA Lead (native) 03/07/2023 494  Ohms Final    Threshold V RA Lead 03/07/2023 0.7  V Final    Theshold ms RA Lead 03/07/2023 0.4  ms Final    Threshold V RA Lead (native) 03/07/2023 0.5  V Final    Threshold ms RA Lead (native) 03/07/2023 0.4  ms Final   Lab Visit on 11/29/2022   Component Date Value Ref Range Status    Glucose 11/29/2022 83  70 - 110 mg/dL Final    Sodium 11/29/2022 139  136 - 145 mmol/L Final    Potassium 11/29/2022 3.8  3.5 - 5.1 mmol/L Final    Chloride 11/29/2022 104  95 - 110 mmol/L Final    CO2 11/29/2022 27  23 - 29 mmol/L Final    BUN 11/29/2022 21  8 - 23 mg/dL Final    Calcium 11/29/2022 9.2  8.7 - 10.5 mg/dL Final    Creatinine 11/29/2022 1.2  0.5 - 1.4 mg/dL Final    Albumin 11/29/2022 3.8  3.5 - 5.2 g/dL Final    Phosphorus 11/29/2022 4.1  2.7 - 4.5 mg/dL Final    eGFR 11/29/2022 44.6 (A)  >60 mL/min/1.73 m^2 Final    Anion Gap 11/29/2022 8  8 - 16 mmol/L Final    WBC 11/29/2022 6.36  3.90 - 12.70 K/uL Final    RBC 11/29/2022 4.11  4.00 - 5.40 M/uL Final    Hemoglobin 11/29/2022 11.5 (L)  12.0 - 16.0 g/dL Final    Hematocrit 11/29/2022 37.7  37.0 - 48.5 % Final    MCV 11/29/2022 92  82 - 98 fL Final    MCH 11/29/2022 28.0  27.0 - 31.0 pg Final    MCHC 11/29/2022 30.5 (L)  32.0 - 36.0 g/dL Final    RDW 11/29/2022 12.7  11.5 - 14.5 % Final    Platelets 11/29/2022 249  150 - 450 K/uL Final    MPV 11/29/2022 10.6  9.2 - 12.9 fL Final    Immature Granulocytes 11/29/2022 0.2  0.0 - 0.5 % Final    Gran # (ANC) 11/29/2022 2.4  1.8 - 7.7 K/uL Final    Immature Grans (Abs) 11/29/2022 0.01  0.00 - 0.04 K/uL  Final    Lymph # 11/29/2022 2.8  1.0 - 4.8 K/uL Final    Mono # 11/29/2022 0.9  0.3 - 1.0 K/uL Final    Eos # 11/29/2022 0.2  0.0 - 0.5 K/uL Final    Baso # 11/29/2022 0.06  0.00 - 0.20 K/uL Final    nRBC 11/29/2022 0  0 /100 WBC Final    Gran % 11/29/2022 37.7 (L)  38.0 - 73.0 % Final    Lymph % 11/29/2022 44.0  18.0 - 48.0 % Final    Mono % 11/29/2022 13.7  4.0 - 15.0 % Final    Eosinophil % 11/29/2022 3.5  0.0 - 8.0 % Final    Basophil % 11/29/2022 0.9  0.0 - 1.9 % Final    Differential Method 11/29/2022 Automated   Final    Vit D, 25-Hydroxy 11/29/2022 82  30 - 96 ng/mL Final    RBC, UA 11/29/2022 0  0 - 4 /hpf Final    WBC, UA 11/29/2022 5  0 - 5 /hpf Final    Bacteria 11/29/2022 Negative  None-Occ /hpf Final    Squam Epithel, UA 11/29/2022 1  /hpf Final    Hyaline Casts, UA 11/29/2022 1  0-1/lpf /lpf Final    Microscopic Comment 11/29/2022 SEE COMMENT   Final    PTH, Intact 11/29/2022 23.2  9.0 - 77.0 pg/mL Final    Protein, Urine Random 11/29/2022 <6  6 - 15 mg/dL Final    Creatinine, Urine 11/29/2022 40.0  15.0 - 325.0 mg/dL Final    Prot/Creat Ratio, Urine 11/29/2022 Unable to calculate  0.00 - 0.20 Final   Hospital Outpatient Visit on 11/01/2022   Component Date Value Ref Range Status    Battery Voltage (V) 11/01/2022 2.99  V Final    P/R-wave RA Lead 11/01/2022 1.5  mV Final    P/R-wave RA Lead (native) 11/01/2022 9.5  mV Final    Impedance RA Lead 11/01/2022 475  Ohms Final    Impedance RA Lead (native) 11/01/2022 513  Ohms Final    Threshold V RA Lead (native) 11/01/2022 0.500  V Final    Threshold ms RA Lead (native) 11/01/2022 0.40  ms Final   Office Visit on 09/18/2022   Component Date Value Ref Range Status    SARS Coronavirus 2 Antigen 09/18/2022 Negative  Negative Final     Acceptable 09/18/2022 Yes   Final   There may be more visits with results that are not included.       Past Medical History:   Diagnosis Date    Coronary artery disease     Dermatitis     Dermatitis 12/8/2017     Diabetes mellitus     Diabetes mellitus type II     Leah Barr virus infection     Fibromyalgia     GERD (gastroesophageal reflux disease)     Hypertension     MI (myocardial infarction) 09/23/2011    Pure hypercholesterolemia 2/26/2020     Past Surgical History:   Procedure Laterality Date    adenoids      ANGIOPLASTY  1987    APPENDECTOMY      CARDIAC PACEMAKER PLACEMENT  01/12/2017    CATARACT EXTRACTION, BILATERAL Bilateral 9/2/15, 3/17/15    right eye-9/2/15, left eye-3/17/15    CHOLECYSTECTOMY  1987    CORONARY ARTERY BYPASS GRAFT  2006    quadruple    coronary stents  1999    x2    CYST REMOVAL  04/25/2017    on back    HYSTERECTOMY  1966    OVARY SURGERY  1948    Ovary burst & was repaired    RHIZOTOMY  09/14/2018    TONSILLECTOMY  1940     Family History   Problem Relation Age of Onset    No Known Problems Mother     No Known Problems Father        Marital Status:   Alcohol History:  reports no history of alcohol use.  Tobacco History:  reports that she has never smoked. She has never used smokeless tobacco.  Drug History:  reports no history of drug use.    Review of patient's allergies indicates:   Allergen Reactions    Diclofenac-misoprostol      Other reaction(s): Not available    Doxycycline     Effexor [venlafaxine]     Estradiol     Flagyl [metronidazole]     Fluconazole     Gabapentin     Iodine     Levaquin [levofloxacin]     Nexium [esomeprazole magnesium]     Penicillins     Red yeast rice (monascus purpureus)     Shellfish containing products     Statins-hmg-coa reductase inhibitors     Sulfa (sulfonamide antibiotics)     Tea tree oil     Tegaserod      ZOLNORM    Tegaserod hydrogen maleate     Trazodone     Yeast, dried     Adhesive Rash     Band-aids       Current Outpatient Medications:     acetaminophen (TYLENOL) 500 MG tablet, Take 500 mg by mouth every 6 (six) hours as needed for Pain., Disp: , Rfl:     apixaban (ELIQUIS) 2.5 mg Tab, Take 1 tablet (2.5 mg total) by mouth 2 (two)  times daily., Disp: 180 tablet, Rfl: 1    aspirin (ECOTRIN) 81 MG EC tablet, Take 1 tablet (81 mg total) by mouth once daily. (Patient taking differently: Take 81 mg by mouth every evening.), Disp: , Rfl: 0    coenzyme Q10 100 mg capsule, Take 1 capsule (100 mg total) by mouth 2 (two) times daily., Disp: 60 capsule, Rfl: 11    cyanocobalamin 2000 MCG tablet, Take 2,000 mcg by mouth once daily., Disp: , Rfl:     ECHINACEA ORAL, Take 750 mg by mouth 2 (two) times a day., Disp: , Rfl:     loratadine (CLARITIN) 10 mg tablet, Take 10 mg by mouth once daily., Disp: , Rfl:     losartan (COZAAR) 25 MG tablet, Take 0.5 tablets (12.5 mg total) by mouth every evening., Disp: 45 tablet, Rfl: 3    MAGNESIUM ORAL, Take 500 mg by mouth once daily. , Disp: , Rfl:     metoprolol succinate (TOPROL-XL) 25 MG 24 hr tablet, TAKE 1 TABLET BY MOUTH EVERY DAY (Patient taking differently: Take 12.5 mg by mouth 2 (two) times daily.), Disp: 90 tablet, Rfl: 3    montelukast (SINGULAIR) 10 mg tablet, TAKE 1 TABLET BY MOUTH EVERY DAY IN THE EVENING, Disp: 90 tablet, Rfl: 1    multivitamin (THERAGRAN) tablet, Take 1 tablet by mouth 2 (two) times a day. , Disp: , Rfl:     nitroGLYCERIN (NITROSTAT) 0.4 MG SL tablet, Place 0.4 mg under the tongue every 5 (five) minutes as needed for Chest pain., Disp: , Rfl:     omeprazole (PRILOSEC) 20 MG capsule, Take 1 capsule (20 mg total) by mouth once daily., Disp: 90 capsule, Rfl: 3    triamcinolone acetonide 0.1% (KENALOG) 0.1 % cream, aaa bid x 1-2 wks, tk 1 wk off prn rash, Disp: 30 g, Rfl: 2    turmeric 400 mg Cap, Take 1,200 mg by mouth once daily., Disp: , Rfl:     UNABLE TO FIND, Take 1 capsule by mouth once daily. medication name: GI probiotic, Disp: , Rfl:     UNABLE TO FIND, Take 1 capsule by mouth once daily. Joint health, Disp: , Rfl:     UNABLE TO FIND, Take 1 capsule by mouth 2 (two) times a day. Cardio platinum, Disp: , Rfl:     UNABLE TO FIND, Nerve [place], Disp: , Rfl:     zinc gluconate 50 mg  tablet, Take 50 mg by mouth 2 (two) times a day., Disp: , Rfl:     ALPRAZolam (XANAX) 0.25 MG tablet, TAKE 1 TABLET BY MOUTH EVERY DAY AS NEEDED FOR ANXIETY, Disp: 90 tablet, Rfl: 0    diphenhydrAMINE (BENYLIN) 12.5 mg/5 mL liquid, Take 12.5 mg by mouth 4 (four) times daily as needed for Allergies., Disp: , Rfl:     evolocumab (REPATHA SURECLICK) 140 mg/mL PnIj, Inject 1 mL (140 mg total) into the skin every 14 (fourteen) days., Disp: 6 mL, Rfl: 1    evolocumab (REPATHA SURECLICK) 140 mg/mL PnIj, Inject 1 mL (140 mg total) into the skin every 14 (fourteen) days., Disp: 6 mL, Rfl: 3    fluocinonide-emollient (FLUOCINONIDE-E) 0.05 % Crea, Apply topically 2 (two) times daily., Disp: 60 g, Rfl: 1    hydrocortisone 2.5 % ointment, Apply topically 2 (two) times daily. (Patient not taking: Reported on 3/29/2023), Disp: 28 g, Rfl: 3    isosorbide mononitrate (IMDUR) 120 MG 24 hr tablet, TAKE 1 TABLET (120 MG TOTAL) BY MOUTH ONCE DAILY. DO NOT CRUSH OR CHEW SWALLOW WHOLE., Disp: 90 tablet, Rfl: 3    levothyroxine (SYNTHROID) 88 MCG tablet, Take 1 tablet (88 mcg total) by mouth once daily., Disp: 90 tablet, Rfl: 1    LIDOcaine (LIDODERM) 5 %, Place 1 patch onto the skin once daily. Remove & Discard patch within 12 hours or as directed by MD, Disp: 10 patch, Rfl: 3    tiZANidine (ZANAFLEX) 4 MG tablet, Take 1 tablet (4 mg total) by mouth once daily., Disp: 90 tablet, Rfl: 0    traMADoL (ULTRAM) 50 mg tablet, Take 1 tablet (50 mg total) by mouth 2 (two) times daily as needed (pain)., Disp: 14 tablet, Rfl: 0    Current Facility-Administered Medications:     evolocumab PnIj 140 mg, 140 mg, Subcutaneous, Q14 Days, Kenton Ware MD    Review of Systems   Constitutional:  Negative for activity change, appetite change, chills, fatigue, fever and unexpected weight change.   HENT:  Negative for congestion, ear pain, hearing loss, postnasal drip, sinus pain, sneezing, sore throat, tinnitus and trouble swallowing.    Eyes:  Negative  for pain, discharge and visual disturbance.   Respiratory:  Negative for cough, choking, shortness of breath and wheezing.    Cardiovascular:  Negative for chest pain, palpitations and leg swelling.   Gastrointestinal:  Negative for abdominal distention, abdominal pain, blood in stool, constipation, diarrhea, nausea and vomiting.   Endocrine: Negative for cold intolerance and heat intolerance.   Genitourinary:  Negative for difficulty urinating, dysuria, frequency, pelvic pain and urgency.   Musculoskeletal:  Negative for back pain, joint swelling and neck pain.   Skin:  Negative for pallor and rash.   Allergic/Immunologic: Negative for environmental allergies and food allergies.   Neurological:  Negative for dizziness, tremors, weakness, numbness and headaches.   Hematological:  Does not bruise/bleed easily.   Psychiatric/Behavioral:  Negative for agitation, confusion, dysphoric mood, sleep disturbance and suicidal ideas. The patient is not nervous/anxious.           Objective:          3/7/2023     1:16 PM 3/8/2023     6:57 PM 3/29/2023     2:41 PM 4/20/2023     2:30 PM 7/11/2023     2:42 PM 9/12/2023     5:02 PM   Vitals - 1 value per visit   SYSTOLIC 136 185 150 136 134 134   DIASTOLIC 70 77 68 64 74 86   Pulse 76 70 72 72 73 72   Temp  98.6 °F (37 °C) 97.9 °F (36.6 °C) 97.9 °F (36.6 °C)     Resp  18 12 12 16 14   SPO2 98 % 99 % 99 % 97 % 98 % 99 %   Weight (lb)  133 132 133 135 137   Weight (kg)  60.328 59.875 60.328 61.236 62.143   Height  5' (1.524 m) 5' (1.524 m) 5' (1.524 m) 5' (1.524 m) 5' (1.524 m)   BMI (Calculated)  26 25.8 26 26.4 26.8   Pain Score   Zero Zero Zero Zero   (    Physical Exam  Constitutional:       General: She is not in acute distress.     Appearance: She is not ill-appearing.   HENT:      Head: Normocephalic and atraumatic.   Eyes:      Extraocular Movements: Extraocular movements intact.      Pupils: Pupils are equal, round, and reactive to light.   Neck:      Vascular: No carotid  bruit.   Cardiovascular:      Rate and Rhythm: Normal rate and regular rhythm.      Pulses: Normal pulses.      Heart sounds: Normal heart sounds. No murmur heard.  Pulmonary:      Effort: Pulmonary effort is normal.      Breath sounds: Normal breath sounds.   Abdominal:      General: Bowel sounds are normal.      Palpations: Abdomen is soft.   Musculoskeletal:      Right lower leg: No edema.      Left lower leg: No edema.   Lymphadenopathy:      Cervical: No cervical adenopathy.   Skin:     General: Skin is warm and dry.   Neurological:      General: No focal deficit present.      Mental Status: She is alert and oriented to person, place, and time.   Psychiatric:         Mood and Affect: Mood normal.         Behavior: Behavior normal.         Thought Content: Thought content normal.           Assessment:       1. Essential hypertension    2. Coronary artery disease due to type 2 diabetes mellitus    3. Hx of CABG         Plan:       Essential hypertension    Coronary artery disease due to type 2 diabetes mellitus  -     evolocumab (REPATHA SURECLICK) 140 mg/mL PnIj; Inject 1 mL (140 mg total) into the skin every 14 (fourteen) days.  Dispense: 6 mL; Refill: 3    Hx of CABG  -     evolocumab (REPATHA SURECLICK) 140 mg/mL PnIj; Inject 1 mL (140 mg total) into the skin every 14 (fourteen) days.  Dispense: 6 mL; Refill: 3      Follow up in about 3 months (around 12/12/2023) for FOLLOW UP LABS, FOLLOW-UP STATUS, FOLLOW UP MEDICATIONS.        9/12/2023 Elena Sullivan M.D.

## 2023-10-08 NOTE — PROGRESS NOTES
SUBJECTIVE:    Patient ID: Karma Chen is a 87 y.o. female.    Chief Complaint: Hypertension, Follow-up, and Fatigue    HPI       Karma Chen is here for follow-up of her hypertension. Home blood pressure readings: all acceptable. Salt intake and diet: salt not added to cooking and salt shaker not on table. Usual weight: stable. Associated signs and symptoms: none. Patient denies: blurred vision, chest pain, dyspnea, headache, neck aches, orthopnea, palpitations, paroxysmal nocturnal dyspnea, peripheral edema, pulsating in the ears, and tiredness/fatigue. Use of agents associated with hypertension: none. Medication compliance: taking as prescribed.    Chol 229 Trigs 335 HDL 38 -she says her repatha gave her a side effect-two days later she was so fatigued she could not sit up and she had diarrhea-the next day she had a severe headache-headache lasted all day-no BP elevation-the next four days she remained very weak--      Lab Visit on 08/23/2023   Component Date Value Ref Range Status    Cholesterol 08/23/2023 229 (H)  120 - 199 mg/dL Final    Triglycerides 08/23/2023 335 (H)  30 - 150 mg/dL Final    HDL 08/23/2023 38 (L)  40 - 75 mg/dL Final    LDL Cholesterol 08/23/2023 124.0  63.0 - 159.0 mg/dL Final    HDL/Cholesterol Ratio 08/23/2023 16.6 (L)  20.0 - 50.0 % Final    Total Cholesterol/HDL Ratio 08/23/2023 6.0 (H)  2.0 - 5.0 Final    Non-HDL Cholesterol 08/23/2023 191  mg/dL Final    Hemoglobin A1C 08/23/2023 5.8  4.5 - 6.2 % Final    Estimated Avg Glucose 08/23/2023 120  68 - 131 mg/dL Final    WBC 08/23/2023 7.43  3.90 - 12.70 K/uL Final    RBC 08/23/2023 4.01  4.00 - 5.40 M/uL Final    Hemoglobin 08/23/2023 11.4 (L)  12.0 - 16.0 g/dL Final    Hematocrit 08/23/2023 36.4 (L)  37.0 - 48.5 % Final    MCV 08/23/2023 91  82 - 98 fL Final    MCH 08/23/2023 28.4  27.0 - 31.0 pg Final    MCHC 08/23/2023 31.3 (L)  32.0 - 36.0 g/dL Final    RDW 08/23/2023 13.9  11.5 - 14.5 % Final    Platelets 08/23/2023  271  150 - 450 K/uL Final    MPV 08/23/2023 10.5  9.2 - 12.9 fL Final    Immature Granulocytes 08/23/2023 0.1  0.0 - 0.5 % Final    Gran # (ANC) 08/23/2023 3.1  1.8 - 7.7 K/uL Final    Immature Grans (Abs) 08/23/2023 0.01  0.00 - 0.04 K/uL Final    Lymph # 08/23/2023 3.2  1.0 - 4.8 K/uL Final    Mono # 08/23/2023 0.9  0.3 - 1.0 K/uL Final    Eos # 08/23/2023 0.3  0.0 - 0.5 K/uL Final    Baso # 08/23/2023 0.05  0.00 - 0.20 K/uL Final    nRBC 08/23/2023 0  0 /100 WBC Final    Gran % 08/23/2023 41.5  38.0 - 73.0 % Final    Lymph % 08/23/2023 42.7  18.0 - 48.0 % Final    Mono % 08/23/2023 11.4  4.0 - 15.0 % Final    Eosinophil % 08/23/2023 3.6  0.0 - 8.0 % Final    Basophil % 08/23/2023 0.7  0.0 - 1.9 % Final    Differential Method 08/23/2023 Automated   Final    Sodium 08/23/2023 137  136 - 145 mmol/L Final    Potassium 08/23/2023 4.1  3.5 - 5.1 mmol/L Final    Chloride 08/23/2023 107  95 - 110 mmol/L Final    CO2 08/23/2023 24  23 - 29 mmol/L Final    Glucose 08/23/2023 91  70 - 110 mg/dL Final    BUN 08/23/2023 26 (H)  8 - 23 mg/dL Final    Creatinine 08/23/2023 1.5 (H)  0.5 - 1.4 mg/dL Final    Calcium 08/23/2023 8.7  8.7 - 10.5 mg/dL Final    Total Protein 08/23/2023 6.9  6.0 - 8.4 g/dL Final    Albumin 08/23/2023 3.6  3.5 - 5.2 g/dL Final    Total Bilirubin 08/23/2023 0.5  0.1 - 1.0 mg/dL Final    Alkaline Phosphatase 08/23/2023 63  55 - 135 U/L Final    AST 08/23/2023 24  10 - 40 U/L Final    ALT 08/23/2023 17  10 - 44 U/L Final    eGFR 08/23/2023 33.9 (A)  >60 mL/min/1.73 m^2 Final    Anion Gap 08/23/2023 6 (L)  8 - 16 mmol/L Final    Vitamin B-12 08/23/2023 747  210 - 950 pg/mL Final   Hospital Outpatient Visit on 08/01/2023   Component Date Value Ref Range Status    Battery Voltage (V) 08/01/2023 2.98  V Final    P/R-wave RA Lead 08/01/2023 1.3  mV Final    Impedance RA Lead 08/01/2023 475  Ohms Final    P/R-wave RA Lead (native) 08/01/2023 12.6  mV Final    Impedance RA Lead (native) 08/01/2023 513  Ohms  Final    Threshold V RA Lead 08/01/2023 0.75  V Final    Theshold ms RA Lead 08/01/2023 0.4  ms Final    Threshold V RA Lead (native) 08/01/2023 0.625  V Final    Threshold ms RA Lead (native) 08/01/2023 0.4  ms Final   Lab Visit on 05/26/2023   Component Date Value Ref Range Status    Glucose 05/26/2023 82  70 - 110 mg/dL Final    Sodium 05/26/2023 142  136 - 145 mmol/L Final    Potassium 05/26/2023 4.0  3.5 - 5.1 mmol/L Final    Chloride 05/26/2023 106  95 - 110 mmol/L Final    CO2 05/26/2023 27  23 - 29 mmol/L Final    BUN 05/26/2023 23  8 - 23 mg/dL Final    Calcium 05/26/2023 9.4  8.7 - 10.5 mg/dL Final    Creatinine 05/26/2023 1.2  0.5 - 1.4 mg/dL Final    Albumin 05/26/2023 3.9  3.5 - 5.2 g/dL Final    Phosphorus 05/26/2023 4.1  2.7 - 4.5 mg/dL Final    eGFR 05/26/2023 44.4 (A)  >60 mL/min/1.73 m^2 Final    Anion Gap 05/26/2023 9  8 - 16 mmol/L Final    Specimen UA 05/26/2023 Urine, Clean Catch   Final    Color, UA 05/26/2023 Yellow  Yellow, Straw, Anila Final    Appearance, UA 05/26/2023 Clear  Clear Final    pH, UA 05/26/2023 7.0  5.0 - 8.0 Final    Specific Gravity, UA 05/26/2023 1.015  1.005 - 1.030 Final    Protein, UA 05/26/2023 Trace (A)  Negative Final    Glucose, UA 05/26/2023 Negative  Negative Final    Ketones, UA 05/26/2023 Negative  Negative Final    Bilirubin (UA) 05/26/2023 Negative  Negative Final    Occult Blood UA 05/26/2023 Negative  Negative Final    Nitrite, UA 05/26/2023 Negative  Negative Final    Urobilinogen, UA 05/26/2023 Negative  Negative EU/dL Final    Leukocytes, UA 05/26/2023 3+ (A)  Negative Final    Protein, Urine Random 05/26/2023 16 (H)  6 - 15 mg/dL Final    Creatinine, Urine 05/26/2023 132.0  15.0 - 325.0 mg/dL Final    Prot/Creat Ratio, Urine 05/26/2023 0.12  0.00 - 0.20 Final    RBC, UA 05/26/2023 4  0 - 4 /hpf Final    WBC, UA 05/26/2023 13 (H)  0 - 5 /hpf Final    Bacteria 05/26/2023 Negative  None-Occ /hpf Final    Squam Epithel, UA 05/26/2023 6  /hpf Final    Hyaline  Casts, UA 05/26/2023 7 (A)  0-1/lpf /lpf Final    Microscopic Comment 05/26/2023 SEE COMMENT   Final   Lab Visit on 04/04/2023   Component Date Value Ref Range Status    Microalbumin, Urine 04/04/2023 12.0  <19.9 ug/mL Final    Creatinine, Urine 04/04/2023 63.0  15.0 - 325.0 mg/dL Final    Microalb/Creat Ratio 04/04/2023 19.0  0.0 - 30.0 ug/mg Final   Lab Visit on 04/04/2023   Component Date Value Ref Range Status    Hemoglobin A1C 04/04/2023 5.8  4.5 - 6.2 % Final    Estimated Avg Glucose 04/04/2023 120  68 - 131 mg/dL Final   Admission on 03/08/2023, Discharged on 03/09/2023   Component Date Value Ref Range Status    Magnesium 03/08/2023 2.3  1.6 - 2.6 mg/dL Final    WBC 03/08/2023 8.37  3.90 - 12.70 K/uL Final    RBC 03/08/2023 4.42  4.00 - 5.40 M/uL Final    Hemoglobin 03/08/2023 12.3  12.0 - 16.0 g/dL Final    Hematocrit 03/08/2023 38.5  37.0 - 48.5 % Final    MCV 03/08/2023 87  82 - 98 fL Final    MCH 03/08/2023 27.8  27.0 - 31.0 pg Final    MCHC 03/08/2023 31.9 (L)  32.0 - 36.0 g/dL Final    RDW 03/08/2023 14.3  11.5 - 14.5 % Final    Platelets 03/08/2023 264  150 - 450 K/uL Final    MPV 03/08/2023 10.0  9.2 - 12.9 fL Final    Immature Granulocytes 03/08/2023 0.4  0.0 - 0.5 % Final    Gran # (ANC) 03/08/2023 3.7  1.8 - 7.7 K/uL Final    Immature Grans (Abs) 03/08/2023 0.03  0.00 - 0.04 K/uL Final    Lymph # 03/08/2023 3.3  1.0 - 4.8 K/uL Final    Mono # 03/08/2023 1.1 (H)  0.3 - 1.0 K/uL Final    Eos # 03/08/2023 0.3  0.0 - 0.5 K/uL Final    Baso # 03/08/2023 0.07  0.00 - 0.20 K/uL Final    nRBC 03/08/2023 0  0 /100 WBC Final    Gran % 03/08/2023 43.8  38.0 - 73.0 % Final    Lymph % 03/08/2023 39.3  18.0 - 48.0 % Final    Mono % 03/08/2023 12.7  4.0 - 15.0 % Final    Eosinophil % 03/08/2023 3.0  0.0 - 8.0 % Final    Basophil % 03/08/2023 0.8  0.0 - 1.9 % Final    Differential Method 03/08/2023 Automated   Final    Sodium 03/08/2023 141  136 - 145 mmol/L Final    Potassium 03/08/2023 3.9  3.5 - 5.1 mmol/L  Final    Chloride 03/08/2023 105  95 - 110 mmol/L Final    CO2 03/08/2023 28  23 - 29 mmol/L Final    Glucose 03/08/2023 128 (H)  70 - 110 mg/dL Final    BUN 03/08/2023 20  8 - 23 mg/dL Final    Creatinine 03/08/2023 1.2  0.5 - 1.4 mg/dL Final    Calcium 03/08/2023 9.3  8.7 - 10.5 mg/dL Final    Total Protein 03/08/2023 7.7  6.0 - 8.4 g/dL Final    Albumin 03/08/2023 4.3  3.5 - 5.2 g/dL Final    Total Bilirubin 03/08/2023 0.4  0.1 - 1.0 mg/dL Final    Alkaline Phosphatase 03/08/2023 65  55 - 135 U/L Final    AST 03/08/2023 27  10 - 40 U/L Final    ALT 03/08/2023 18  10 - 44 U/L Final    Anion Gap 03/08/2023 8  8 - 16 mmol/L Final    eGFR 03/08/2023 44.4 (A)  >60 mL/min/1.73 m^2 Final    Troponin I High Sensitivity 03/08/2023 16.8 (H)  0.0 - 14.9 pg/mL Final    Troponin I High Sensitivity 03/08/2023 19.0 (H)  0.0 - 14.9 pg/mL Final    BNP 03/08/2023 403 (H)  0 - 99 pg/mL Final    TSH 03/08/2023 0.960  0.340 - 5.600 uIU/mL Final    Specimen UA 03/08/2023 Urine, Clean Catch   Final    Color, UA 03/08/2023 Yellow  Yellow, Straw, Anila Final    Appearance, UA 03/08/2023 Clear  Clear Final    pH, UA 03/08/2023 7.0  5.0 - 8.0 Final    Specific Gravity, UA 03/08/2023 1.005  1.005 - 1.030 Final    Protein, UA 03/08/2023 Negative  Negative Final    Glucose, UA 03/08/2023 Negative  Negative Final    Ketones, UA 03/08/2023 Negative  Negative Final    Bilirubin (UA) 03/08/2023 Negative  Negative Final    Occult Blood UA 03/08/2023 Negative  Negative Final    Nitrite, UA 03/08/2023 Negative  Negative Final    Urobilinogen, UA 03/08/2023 Negative  Negative EU/dL Final    Leukocytes, UA 03/08/2023 2+ (A)  Negative Final    Influenza A, Molecular 03/08/2023 Negative  Negative Final    Influenza B, Molecular 03/08/2023 Negative  Negative Final    Flu A & B Source 03/08/2023 NP   Final    RBC, UA 03/08/2023 0  0 - 4 /hpf Final    WBC, UA 03/08/2023 14 (H)  0 - 5 /hpf Final    Bacteria 03/08/2023 Negative  None-Occ /hpf Final    Squam  Epithel, UA 03/08/2023 1  /hpf Final    Hyaline Casts, UA 03/08/2023 0  0-1/lpf /lpf Final    Microscopic Comment 03/08/2023 SEE COMMENT   Final    Urine Culture, Routine 03/08/2023 No growth   Final   Hospital Outpatient Visit on 03/07/2023   Component Date Value Ref Range Status    Battery Voltage (V) 03/07/2023 2.99  V Final    P/R-wave RA Lead 03/07/2023 1.8  mV Final    P/R-wave RA Lead (native) 03/07/2023 11  mV Final    Impedance RA Lead 03/07/2023 475  Ohms Final    Impedance RA Lead (native) 03/07/2023 494  Ohms Final    Threshold V RA Lead 03/07/2023 0.7  V Final    Theshold ms RA Lead 03/07/2023 0.4  ms Final    Threshold V RA Lead (native) 03/07/2023 0.5  V Final    Threshold ms RA Lead (native) 03/07/2023 0.4  ms Final   Lab Visit on 11/29/2022   Component Date Value Ref Range Status    Glucose 11/29/2022 83  70 - 110 mg/dL Final    Sodium 11/29/2022 139  136 - 145 mmol/L Final    Potassium 11/29/2022 3.8  3.5 - 5.1 mmol/L Final    Chloride 11/29/2022 104  95 - 110 mmol/L Final    CO2 11/29/2022 27  23 - 29 mmol/L Final    BUN 11/29/2022 21  8 - 23 mg/dL Final    Calcium 11/29/2022 9.2  8.7 - 10.5 mg/dL Final    Creatinine 11/29/2022 1.2  0.5 - 1.4 mg/dL Final    Albumin 11/29/2022 3.8  3.5 - 5.2 g/dL Final    Phosphorus 11/29/2022 4.1  2.7 - 4.5 mg/dL Final    eGFR 11/29/2022 44.6 (A)  >60 mL/min/1.73 m^2 Final    Anion Gap 11/29/2022 8  8 - 16 mmol/L Final    WBC 11/29/2022 6.36  3.90 - 12.70 K/uL Final    RBC 11/29/2022 4.11  4.00 - 5.40 M/uL Final    Hemoglobin 11/29/2022 11.5 (L)  12.0 - 16.0 g/dL Final    Hematocrit 11/29/2022 37.7  37.0 - 48.5 % Final    MCV 11/29/2022 92  82 - 98 fL Final    MCH 11/29/2022 28.0  27.0 - 31.0 pg Final    MCHC 11/29/2022 30.5 (L)  32.0 - 36.0 g/dL Final    RDW 11/29/2022 12.7  11.5 - 14.5 % Final    Platelets 11/29/2022 249  150 - 450 K/uL Final    MPV 11/29/2022 10.6  9.2 - 12.9 fL Final    Immature Granulocytes 11/29/2022 0.2  0.0 - 0.5 % Final    Gran # (ANC)  11/29/2022 2.4  1.8 - 7.7 K/uL Final    Immature Grans (Abs) 11/29/2022 0.01  0.00 - 0.04 K/uL Final    Lymph # 11/29/2022 2.8  1.0 - 4.8 K/uL Final    Mono # 11/29/2022 0.9  0.3 - 1.0 K/uL Final    Eos # 11/29/2022 0.2  0.0 - 0.5 K/uL Final    Baso # 11/29/2022 0.06  0.00 - 0.20 K/uL Final    nRBC 11/29/2022 0  0 /100 WBC Final    Gran % 11/29/2022 37.7 (L)  38.0 - 73.0 % Final    Lymph % 11/29/2022 44.0  18.0 - 48.0 % Final    Mono % 11/29/2022 13.7  4.0 - 15.0 % Final    Eosinophil % 11/29/2022 3.5  0.0 - 8.0 % Final    Basophil % 11/29/2022 0.9  0.0 - 1.9 % Final    Differential Method 11/29/2022 Automated   Final    Vit D, 25-Hydroxy 11/29/2022 82  30 - 96 ng/mL Final    RBC, UA 11/29/2022 0  0 - 4 /hpf Final    WBC, UA 11/29/2022 5  0 - 5 /hpf Final    Bacteria 11/29/2022 Negative  None-Occ /hpf Final    Squam Epithel, UA 11/29/2022 1  /hpf Final    Hyaline Casts, UA 11/29/2022 1  0-1/lpf /lpf Final    Microscopic Comment 11/29/2022 SEE COMMENT   Final    PTH, Intact 11/29/2022 23.2  9.0 - 77.0 pg/mL Final    Protein, Urine Random 11/29/2022 <6  6 - 15 mg/dL Final    Creatinine, Urine 11/29/2022 40.0  15.0 - 325.0 mg/dL Final    Prot/Creat Ratio, Urine 11/29/2022 Unable to calculate  0.00 - 0.20 Final   Hospital Outpatient Visit on 11/01/2022   Component Date Value Ref Range Status    Battery Voltage (V) 11/01/2022 2.99  V Final    P/R-wave RA Lead 11/01/2022 1.5  mV Final    P/R-wave RA Lead (native) 11/01/2022 9.5  mV Final    Impedance RA Lead 11/01/2022 475  Ohms Final    Impedance RA Lead (native) 11/01/2022 513  Ohms Final    Threshold V RA Lead (native) 11/01/2022 0.500  V Final    Threshold ms RA Lead (native) 11/01/2022 0.40  ms Final       Past Medical History:   Diagnosis Date    Coronary artery disease     Dermatitis     Dermatitis 12/8/2017    Diabetes mellitus     Diabetes mellitus type II     Leah Barr virus infection     Fibromyalgia     GERD (gastroesophageal reflux disease)      Hypertension     MI (myocardial infarction) 09/23/2011    Pure hypercholesterolemia 2/26/2020     Past Surgical History:   Procedure Laterality Date    adenoids      ANGIOPLASTY  1987    APPENDECTOMY      CARDIAC PACEMAKER PLACEMENT  01/12/2017    CATARACT EXTRACTION, BILATERAL Bilateral 9/2/15, 3/17/15    right eye-9/2/15, left eye-3/17/15    CHOLECYSTECTOMY  1987    CORONARY ARTERY BYPASS GRAFT  2006    quadruple    coronary stents  1999    x2    CYST REMOVAL  04/25/2017    on back    HYSTERECTOMY  1966    OVARY SURGERY  1948    Ovary burst & was repaired    RHIZOTOMY  09/14/2018    TONSILLECTOMY  1940     Family History   Problem Relation Age of Onset    No Known Problems Mother     No Known Problems Father        Marital Status:   Alcohol History:  reports no history of alcohol use.  Tobacco History:  reports that she has never smoked. She has never used smokeless tobacco.  Drug History:  reports no history of drug use.    Review of patient's allergies indicates:   Allergen Reactions    Diclofenac-misoprostol      Other reaction(s): Not available    Doxycycline     Effexor [venlafaxine]     Estradiol     Flagyl [metronidazole]     Fluconazole     Gabapentin     Iodine     Levaquin [levofloxacin]     Nexium [esomeprazole magnesium]     Penicillins     Red yeast rice (monascus purpureus)     Shellfish containing products     Statins-hmg-coa reductase inhibitors     Sulfa (sulfonamide antibiotics)     Tea tree oil     Tegaserod      ZOLNORM    Tegaserod hydrogen maleate     Trazodone     Yeast, dried     Adhesive Rash     Band-aids       Current Outpatient Medications:     acetaminophen (TYLENOL) 500 MG tablet, Take 500 mg by mouth every 6 (six) hours as needed for Pain., Disp: , Rfl:     ALPRAZolam (XANAX) 0.25 MG tablet, TAKE 1 TABLET BY MOUTH EVERY DAY AS NEEDED FOR ANXIETY, Disp: 90 tablet, Rfl: 0    apixaban (ELIQUIS) 2.5 mg Tab, Take 1 tablet (2.5 mg total) by mouth 2 (two) times daily., Disp: 180  tablet, Rfl: 1    aspirin (ECOTRIN) 81 MG EC tablet, Take 1 tablet (81 mg total) by mouth once daily. (Patient taking differently: Take 81 mg by mouth every evening.), Disp: , Rfl: 0    coenzyme Q10 100 mg capsule, Take 1 capsule (100 mg total) by mouth 2 (two) times daily., Disp: 60 capsule, Rfl: 11    cyanocobalamin 2000 MCG tablet, Take 2,000 mcg by mouth once daily., Disp: , Rfl:     diphenhydrAMINE (BENYLIN) 12.5 mg/5 mL liquid, Take 12.5 mg by mouth 4 (four) times daily as needed for Allergies., Disp: , Rfl:     ECHINACEA ORAL, Take 750 mg by mouth 2 (two) times a day., Disp: , Rfl:     fluocinonide-emollient (FLUOCINONIDE-E) 0.05 % Crea, Apply topically 2 (two) times daily., Disp: 60 g, Rfl: 1    isosorbide mononitrate (IMDUR) 120 MG 24 hr tablet, TAKE 1 TABLET (120 MG TOTAL) BY MOUTH ONCE DAILY. DO NOT CRUSH OR CHEW SWALLOW WHOLE., Disp: 90 tablet, Rfl: 3    levothyroxine (SYNTHROID) 88 MCG tablet, Take 1 tablet (88 mcg total) by mouth once daily., Disp: 90 tablet, Rfl: 1    LIDOcaine (LIDODERM) 5 %, Place 1 patch onto the skin once daily. Remove & Discard patch within 12 hours or as directed by MD, Disp: 10 patch, Rfl: 3    loratadine (CLARITIN) 10 mg tablet, Take 10 mg by mouth once daily., Disp: , Rfl:     losartan (COZAAR) 25 MG tablet, Take 0.5 tablets (12.5 mg total) by mouth every evening., Disp: 45 tablet, Rfl: 3    MAGNESIUM ORAL, Take 500 mg by mouth once daily. , Disp: , Rfl:     metoprolol succinate (TOPROL-XL) 25 MG 24 hr tablet, TAKE 1 TABLET BY MOUTH EVERY DAY, Disp: 90 tablet, Rfl: 3    montelukast (SINGULAIR) 10 mg tablet, TAKE 1 TABLET BY MOUTH EVERY DAY IN THE EVENING, Disp: 90 tablet, Rfl: 1    multivitamin (THERAGRAN) tablet, Take 1 tablet by mouth 2 (two) times a day. , Disp: , Rfl:     nitroGLYCERIN (NITROSTAT) 0.4 MG SL tablet, Place 0.4 mg under the tongue every 5 (five) minutes as needed for Chest pain., Disp: , Rfl:     omeprazole (PRILOSEC) 20 MG capsule, Take 1 capsule (20 mg  total) by mouth once daily., Disp: 90 capsule, Rfl: 3    tiZANidine (ZANAFLEX) 4 MG tablet, Take 1 tablet (4 mg total) by mouth once daily., Disp: 90 tablet, Rfl: 0    traMADoL (ULTRAM) 50 mg tablet, Take 1 tablet (50 mg total) by mouth 2 (two) times daily as needed (pain)., Disp: 14 tablet, Rfl: 0    triamcinolone acetonide 0.1% (KENALOG) 0.1 % cream, aaa bid x 1-2 wks, tk 1 wk off prn rash, Disp: 30 g, Rfl: 2    turmeric 400 mg Cap, Take 1,200 mg by mouth once daily., Disp: , Rfl:     UNABLE TO FIND, Take 1 capsule by mouth once daily. medication name: GI probiotic, Disp: , Rfl:     UNABLE TO FIND, Take 1 capsule by mouth once daily. Joint health, Disp: , Rfl:     UNABLE TO FIND, Take 1 capsule by mouth 2 (two) times a day. Cardio platinum, Disp: , Rfl:     UNABLE TO FIND, Nerve Jorge, Disp: , Rfl:     zinc gluconate 50 mg tablet, Take 50 mg by mouth 2 (two) times a day., Disp: , Rfl:     azelastine (ASTELIN) 137 mcg (0.1 %) nasal spray, 1 spray (137 mcg total) by Nasal route 2 (two) times daily., Disp: 30 mL, Rfl: 0    fluticasone propionate (FLONASE) 50 mcg/actuation nasal spray, 1 spray (50 mcg total) by Each Nostril route once daily., Disp: 9.9 mL, Rfl: 1    hydrocortisone 2.5 % ointment, Apply topically 2 (two) times daily. (Patient not taking: Reported on 3/29/2023), Disp: 28 g, Rfl: 3    Current Facility-Administered Medications:     evolocumab PnIj 140 mg, 140 mg, Subcutaneous, Q14 Days, Kenton Ware MD    Review of Systems   Constitutional:  Positive for fatigue. Negative for appetite change, chills, diaphoresis, fever and unexpected weight change.   HENT:  Negative for congestion, ear pain, hearing loss, nosebleeds, postnasal drip, sinus pressure, sinus pain, sneezing, sore throat, trouble swallowing and voice change.    Eyes:  Negative for photophobia, pain, itching and visual disturbance.   Respiratory:  Negative for apnea, cough, chest tightness, shortness of breath, wheezing and stridor.     Cardiovascular:  Negative for chest pain, palpitations and leg swelling.   Gastrointestinal:  Positive for diarrhea. Negative for abdominal distention, abdominal pain, blood in stool, constipation, nausea and vomiting.   Endocrine: Negative for cold intolerance, heat intolerance, polydipsia and polyuria.   Genitourinary:  Negative for difficulty urinating, dyspareunia, dysuria, flank pain, frequency, hematuria, menstrual problem, pelvic pain, urgency, vaginal discharge and vaginal pain.   Musculoskeletal:  Negative for arthralgias, back pain, joint swelling, myalgias, neck pain and neck stiffness.   Skin:  Negative for pallor.   Allergic/Immunologic: Negative for environmental allergies and food allergies.   Neurological:  Positive for weakness and headaches. Negative for dizziness, tremors, speech difficulty, light-headedness and numbness.   Hematological:  Does not bruise/bleed easily.   Psychiatric/Behavioral:  Negative for agitation, confusion, decreased concentration, sleep disturbance and suicidal ideas. The patient is not nervous/anxious.           Objective:          3/8/2023     6:57 PM 3/29/2023     2:41 PM 4/20/2023     2:30 PM 7/11/2023     2:42 PM 9/12/2023     5:02 PM 10/12/2023     2:33 PM   Vitals - 1 value per visit   SYSTOLIC 185 150 136 134 134 132   DIASTOLIC 77 68 64 74 86 74   Pulse 70 72 72 73 72 80   Temp 98.6 °F (37 °C) 97.9 °F (36.6 °C) 97.9 °F (36.6 °C)   97.8 °F (36.6 °C)   Resp 18 12 12 16 14 12   SPO2 99 % 99 % 97 % 98 % 99 % 99 %   Weight (lb) 133 132 133 135 137 137   Weight (kg) 60.328 59.875 60.328 61.236 62.143 62.143   Height 5' (1.524 m) 5' (1.524 m) 5' (1.524 m) 5' (1.524 m) 5' (1.524 m) 5' (1.524 m)   BMI (Calculated) 26 25.8 26 26.4 26.8 26.8   Pain Score  Zero Zero Zero Zero Zero   (    Physical Exam  Constitutional:       General: She is not in acute distress.     Appearance: Normal appearance. She is not ill-appearing.   HENT:      Head: Normocephalic and atraumatic.       Nose: Nose normal.      Mouth/Throat:      Mouth: Mucous membranes are moist.   Eyes:      Conjunctiva/sclera: Conjunctivae normal.      Pupils: Pupils are equal, round, and reactive to light.   Neck:      Vascular: No carotid bruit.   Cardiovascular:      Rate and Rhythm: Normal rate and regular rhythm.      Pulses: Normal pulses.      Heart sounds: Normal heart sounds.   Pulmonary:      Effort: Pulmonary effort is normal.      Breath sounds: Normal breath sounds.   Abdominal:      General: Bowel sounds are normal.      Palpations: Abdomen is soft.   Musculoskeletal:      Right lower leg: No edema.      Left lower leg: No edema.   Lymphadenopathy:      Cervical: No cervical adenopathy.   Skin:     General: Skin is warm and dry.   Neurological:      General: No focal deficit present.      Mental Status: She is alert and oriented to person, place, and time.   Psychiatric:         Mood and Affect: Mood normal.         Behavior: Behavior normal.         Thought Content: Thought content normal.           Assessment:       1. Essential hypertension    2. Pure hypercholesterolemia    3. Type 2 diabetes mellitus with stage 3a chronic kidney disease, without long-term current use of insulin    4. Fatigue, unspecified type    5. Allergy, subsequent encounter    6. Stage 3b chronic kidney disease         Plan:       Essential hypertension  -     CBC Auto Differential; Future; Expected date: 02/23/2024    Pure hypercholesterolemia  -     Lipid Panel; Future; Expected date: 02/23/2024  -     we need to check into trial with nexletol since she could even tolerate repatha    Type 2 diabetes mellitus with stage 3a chronic kidney disease, without long-term current use of insulin  -     Comprehensive Metabolic Panel; Future; Expected date: 02/23/2024  -     Hemoglobin A1C; Future; Expected date: 02/23/2024    Chronic kidney disease, stage 4 (severe)        -     following renal function    Fatigue, unspecified type    Allergy,  subsequent encounter  -     fluticasone propionate (FLONASE) 50 mcg/actuation nasal spray; 1 spray (50 mcg total) by Each Nostril route once daily.  Dispense: 9.9 mL; Refill: 1  -     azelastine (ASTELIN) 137 mcg (0.1 %) nasal spray; 1 spray (137 mcg total) by Nasal route 2 (two) times daily.  Dispense: 30 mL; Refill: 0      Follow up in about 3 months (around 1/12/2024).        10/13/2023 Elena Sullivan M.D.

## 2023-10-11 ENCOUNTER — PATIENT MESSAGE (OUTPATIENT)
Dept: FAMILY MEDICINE | Facility: CLINIC | Age: 88
End: 2023-10-11

## 2023-10-11 DIAGNOSIS — R09.89 LABILE HYPERTENSION: ICD-10-CM

## 2023-10-12 ENCOUNTER — OFFICE VISIT (OUTPATIENT)
Dept: FAMILY MEDICINE | Facility: CLINIC | Age: 88
End: 2023-10-12
Payer: MEDICARE

## 2023-10-12 VITALS
TEMPERATURE: 98 F | SYSTOLIC BLOOD PRESSURE: 132 MMHG | HEART RATE: 80 BPM | RESPIRATION RATE: 12 BRPM | OXYGEN SATURATION: 99 % | HEIGHT: 60 IN | DIASTOLIC BLOOD PRESSURE: 74 MMHG | WEIGHT: 137 LBS | BODY MASS INDEX: 26.9 KG/M2

## 2023-10-12 DIAGNOSIS — E11.22 TYPE 2 DIABETES MELLITUS WITH STAGE 3A CHRONIC KIDNEY DISEASE, WITHOUT LONG-TERM CURRENT USE OF INSULIN: Chronic | ICD-10-CM

## 2023-10-12 DIAGNOSIS — N18.31 TYPE 2 DIABETES MELLITUS WITH STAGE 3A CHRONIC KIDNEY DISEASE, WITHOUT LONG-TERM CURRENT USE OF INSULIN: Chronic | ICD-10-CM

## 2023-10-12 DIAGNOSIS — T78.40XD ALLERGY, SUBSEQUENT ENCOUNTER: ICD-10-CM

## 2023-10-12 DIAGNOSIS — N18.32 STAGE 3B CHRONIC KIDNEY DISEASE: ICD-10-CM

## 2023-10-12 DIAGNOSIS — E78.00 PURE HYPERCHOLESTEROLEMIA: ICD-10-CM

## 2023-10-12 DIAGNOSIS — R53.83 FATIGUE, UNSPECIFIED TYPE: ICD-10-CM

## 2023-10-12 DIAGNOSIS — I10 ESSENTIAL HYPERTENSION: Primary | ICD-10-CM

## 2023-10-12 PROCEDURE — 99215 OFFICE O/P EST HI 40 MIN: CPT | Performed by: INTERNAL MEDICINE

## 2023-10-12 PROCEDURE — 99214 PR OFFICE/OUTPT VISIT, EST, LEVL IV, 30-39 MIN: ICD-10-PCS | Mod: S$PBB,AQ,, | Performed by: INTERNAL MEDICINE

## 2023-10-12 PROCEDURE — 99214 OFFICE O/P EST MOD 30 MIN: CPT | Mod: S$PBB,AQ,, | Performed by: INTERNAL MEDICINE

## 2023-10-12 RX ORDER — AZELASTINE 1 MG/ML
1 SPRAY, METERED NASAL 2 TIMES DAILY
Qty: 30 ML | Refills: 0 | Status: SHIPPED | OUTPATIENT
Start: 2023-10-12 | End: 2023-11-01

## 2023-10-12 RX ORDER — FLUTICASONE PROPIONATE 50 MCG
1 SPRAY, SUSPENSION (ML) NASAL DAILY
Qty: 9.9 ML | Refills: 1 | Status: SHIPPED | OUTPATIENT
Start: 2023-10-12 | End: 2023-12-05 | Stop reason: SDUPTHER

## 2023-10-13 PROBLEM — N18.4 CHRONIC KIDNEY DISEASE, STAGE 4 (SEVERE): Status: RESOLVED | Noted: 2023-03-29 | Resolved: 2023-10-13

## 2023-10-23 ENCOUNTER — TELEPHONE (OUTPATIENT)
Dept: FAMILY MEDICINE | Facility: CLINIC | Age: 88
End: 2023-10-23

## 2023-10-23 DIAGNOSIS — M79.18 CHRONIC MUSCULOSKELETAL PAIN DUE TO PERSISTENT INFLAMMATORY DISORDER: Primary | ICD-10-CM

## 2023-10-23 DIAGNOSIS — G89.29 CHRONIC MUSCULOSKELETAL PAIN DUE TO PERSISTENT INFLAMMATORY DISORDER: Primary | ICD-10-CM

## 2023-10-23 RX ORDER — PREDNISONE 20 MG/1
20 TABLET ORAL DAILY
Qty: 5 TABLET | Refills: 0 | Status: SHIPPED | OUTPATIENT
Start: 2023-10-23 | End: 2023-12-05

## 2023-10-23 NOTE — TELEPHONE ENCOUNTER
Patient c/o arthritis pain in left upper back and down to her left elbow. She went to the chiropractor twice, it helps a little and comes right back.   She has pain pills. She asks if a steroid would help?    Please advise

## 2023-10-26 ENCOUNTER — TELEPHONE (OUTPATIENT)
Dept: FAMILY MEDICINE | Facility: CLINIC | Age: 88
End: 2023-10-26

## 2023-10-26 ENCOUNTER — PATIENT MESSAGE (OUTPATIENT)
Dept: FAMILY MEDICINE | Facility: CLINIC | Age: 88
End: 2023-10-26

## 2023-10-26 NOTE — TELEPHONE ENCOUNTER
Patient reports she took prednisone for 2 days, but none yesterday.  She noticed the inside of her lip looks swollen. And her head just didn't feel good.     Today she feels ok. Lip not swollen. She took a child's dose of benadryl.  She asks if maybe she should take 1/2 of one?    Please advise

## 2023-11-01 ENCOUNTER — OFFICE VISIT (OUTPATIENT)
Dept: FAMILY MEDICINE | Facility: CLINIC | Age: 88
End: 2023-11-01
Payer: MEDICARE

## 2023-11-01 VITALS
WEIGHT: 140 LBS | DIASTOLIC BLOOD PRESSURE: 76 MMHG | OXYGEN SATURATION: 99 % | TEMPERATURE: 98 F | HEART RATE: 70 BPM | SYSTOLIC BLOOD PRESSURE: 138 MMHG | RESPIRATION RATE: 16 BRPM | HEIGHT: 60 IN | BODY MASS INDEX: 27.48 KG/M2

## 2023-11-01 DIAGNOSIS — J40 BRONCHITIS: ICD-10-CM

## 2023-11-01 DIAGNOSIS — R53.83 FATIGUE, UNSPECIFIED TYPE: ICD-10-CM

## 2023-11-01 DIAGNOSIS — J02.9 PHARYNGITIS, UNSPECIFIED ETIOLOGY: Primary | ICD-10-CM

## 2023-11-01 PROCEDURE — 99213 PR OFFICE/OUTPT VISIT, EST, LEVL III, 20-29 MIN: ICD-10-PCS | Mod: S$PBB,AQ,, | Performed by: INTERNAL MEDICINE

## 2023-11-01 PROCEDURE — 99215 OFFICE O/P EST HI 40 MIN: CPT | Performed by: INTERNAL MEDICINE

## 2023-11-01 PROCEDURE — 99213 OFFICE O/P EST LOW 20 MIN: CPT | Mod: S$PBB,AQ,, | Performed by: INTERNAL MEDICINE

## 2023-11-01 RX ORDER — CYANOCOBALAMIN 1000 UG/ML
INJECTION, SOLUTION INTRAMUSCULAR; SUBCUTANEOUS
Qty: 10 ML | Refills: 1 | Status: SHIPPED | OUTPATIENT
Start: 2023-11-01

## 2023-11-01 RX ORDER — AZITHROMYCIN 250 MG/1
250 TABLET, FILM COATED ORAL DAILY
Qty: 6 TABLET | Refills: 0 | Status: SHIPPED | OUTPATIENT
Start: 2023-11-01 | End: 2023-12-05 | Stop reason: ALTCHOICE

## 2023-11-01 RX ORDER — BENZONATATE 200 MG/1
200 CAPSULE ORAL 3 TIMES DAILY PRN
Qty: 30 CAPSULE | Refills: 0 | Status: SHIPPED | OUTPATIENT
Start: 2023-11-01 | End: 2023-11-11

## 2023-11-01 NOTE — PROGRESS NOTES
SUBJECTIVE:    Patient ID: Karma Chen is a 87 y.o. female.    Chief Complaint: Cough and Sore Throat    Cough  Associated symptoms include a fever and a sore throat. Pertinent negatives include no chest pain, chills, ear pain, headaches, postnasal drip, rash, shortness of breath or wheezing. There is no history of environmental allergies.   Sore Throat   Associated symptoms include congestion and coughing. Pertinent negatives include no abdominal pain, diarrhea, ear pain, headaches, neck pain, shortness of breath, trouble swallowing or vomiting.       Patient started four days ago with sore throat and fatigue-she subsequently experienced a cough yesterday or the day before-last night she was coughing when she went to bed and she got up and rubbed her chest with vicks -she ws able to go to sleep-she denies any pain-she knows that a sore throat usually goes into the chest-low grade fever for her-she has much sinus drip-    Lab Visit on 08/23/2023   Component Date Value Ref Range Status    Cholesterol 08/23/2023 229 (H)  120 - 199 mg/dL Final    Triglycerides 08/23/2023 335 (H)  30 - 150 mg/dL Final    HDL 08/23/2023 38 (L)  40 - 75 mg/dL Final    LDL Cholesterol 08/23/2023 124.0  63.0 - 159.0 mg/dL Final    HDL/Cholesterol Ratio 08/23/2023 16.6 (L)  20.0 - 50.0 % Final    Total Cholesterol/HDL Ratio 08/23/2023 6.0 (H)  2.0 - 5.0 Final    Non-HDL Cholesterol 08/23/2023 191  mg/dL Final    Hemoglobin A1C 08/23/2023 5.8  4.5 - 6.2 % Final    Estimated Avg Glucose 08/23/2023 120  68 - 131 mg/dL Final    WBC 08/23/2023 7.43  3.90 - 12.70 K/uL Final    RBC 08/23/2023 4.01  4.00 - 5.40 M/uL Final    Hemoglobin 08/23/2023 11.4 (L)  12.0 - 16.0 g/dL Final    Hematocrit 08/23/2023 36.4 (L)  37.0 - 48.5 % Final    MCV 08/23/2023 91  82 - 98 fL Final    MCH 08/23/2023 28.4  27.0 - 31.0 pg Final    MCHC 08/23/2023 31.3 (L)  32.0 - 36.0 g/dL Final    RDW 08/23/2023 13.9  11.5 - 14.5 % Final    Platelets 08/23/2023 271  150 -  450 K/uL Final    MPV 08/23/2023 10.5  9.2 - 12.9 fL Final    Immature Granulocytes 08/23/2023 0.1  0.0 - 0.5 % Final    Gran # (ANC) 08/23/2023 3.1  1.8 - 7.7 K/uL Final    Immature Grans (Abs) 08/23/2023 0.01  0.00 - 0.04 K/uL Final    Lymph # 08/23/2023 3.2  1.0 - 4.8 K/uL Final    Mono # 08/23/2023 0.9  0.3 - 1.0 K/uL Final    Eos # 08/23/2023 0.3  0.0 - 0.5 K/uL Final    Baso # 08/23/2023 0.05  0.00 - 0.20 K/uL Final    nRBC 08/23/2023 0  0 /100 WBC Final    Gran % 08/23/2023 41.5  38.0 - 73.0 % Final    Lymph % 08/23/2023 42.7  18.0 - 48.0 % Final    Mono % 08/23/2023 11.4  4.0 - 15.0 % Final    Eosinophil % 08/23/2023 3.6  0.0 - 8.0 % Final    Basophil % 08/23/2023 0.7  0.0 - 1.9 % Final    Differential Method 08/23/2023 Automated   Final    Sodium 08/23/2023 137  136 - 145 mmol/L Final    Potassium 08/23/2023 4.1  3.5 - 5.1 mmol/L Final    Chloride 08/23/2023 107  95 - 110 mmol/L Final    CO2 08/23/2023 24  23 - 29 mmol/L Final    Glucose 08/23/2023 91  70 - 110 mg/dL Final    BUN 08/23/2023 26 (H)  8 - 23 mg/dL Final    Creatinine 08/23/2023 1.5 (H)  0.5 - 1.4 mg/dL Final    Calcium 08/23/2023 8.7  8.7 - 10.5 mg/dL Final    Total Protein 08/23/2023 6.9  6.0 - 8.4 g/dL Final    Albumin 08/23/2023 3.6  3.5 - 5.2 g/dL Final    Total Bilirubin 08/23/2023 0.5  0.1 - 1.0 mg/dL Final    Alkaline Phosphatase 08/23/2023 63  55 - 135 U/L Final    AST 08/23/2023 24  10 - 40 U/L Final    ALT 08/23/2023 17  10 - 44 U/L Final    eGFR 08/23/2023 33.9 (A)  >60 mL/min/1.73 m^2 Final    Anion Gap 08/23/2023 6 (L)  8 - 16 mmol/L Final    Vitamin B-12 08/23/2023 747  210 - 950 pg/mL Final   Hospital Outpatient Visit on 08/01/2023   Component Date Value Ref Range Status    Battery Voltage (V) 08/01/2023 2.98  V Final    P/R-wave RA Lead 08/01/2023 1.3  mV Final    Impedance RA Lead 08/01/2023 475  Ohms Final    P/R-wave RA Lead (native) 08/01/2023 12.6  mV Final    Impedance RA Lead (native) 08/01/2023 513  Ohms Final     Threshold V RA Lead 08/01/2023 0.75  V Final    Theshold ms RA Lead 08/01/2023 0.4  ms Final    Threshold V RA Lead (native) 08/01/2023 0.625  V Final    Threshold ms RA Lead (native) 08/01/2023 0.4  ms Final   Lab Visit on 05/26/2023   Component Date Value Ref Range Status    Glucose 05/26/2023 82  70 - 110 mg/dL Final    Sodium 05/26/2023 142  136 - 145 mmol/L Final    Potassium 05/26/2023 4.0  3.5 - 5.1 mmol/L Final    Chloride 05/26/2023 106  95 - 110 mmol/L Final    CO2 05/26/2023 27  23 - 29 mmol/L Final    BUN 05/26/2023 23  8 - 23 mg/dL Final    Calcium 05/26/2023 9.4  8.7 - 10.5 mg/dL Final    Creatinine 05/26/2023 1.2  0.5 - 1.4 mg/dL Final    Albumin 05/26/2023 3.9  3.5 - 5.2 g/dL Final    Phosphorus 05/26/2023 4.1  2.7 - 4.5 mg/dL Final    eGFR 05/26/2023 44.4 (A)  >60 mL/min/1.73 m^2 Final    Anion Gap 05/26/2023 9  8 - 16 mmol/L Final    Specimen UA 05/26/2023 Urine, Clean Catch   Final    Color, UA 05/26/2023 Yellow  Yellow, Straw, Anila Final    Appearance, UA 05/26/2023 Clear  Clear Final    pH, UA 05/26/2023 7.0  5.0 - 8.0 Final    Specific Gravity, UA 05/26/2023 1.015  1.005 - 1.030 Final    Protein, UA 05/26/2023 Trace (A)  Negative Final    Glucose, UA 05/26/2023 Negative  Negative Final    Ketones, UA 05/26/2023 Negative  Negative Final    Bilirubin (UA) 05/26/2023 Negative  Negative Final    Occult Blood UA 05/26/2023 Negative  Negative Final    Nitrite, UA 05/26/2023 Negative  Negative Final    Urobilinogen, UA 05/26/2023 Negative  Negative EU/dL Final    Leukocytes, UA 05/26/2023 3+ (A)  Negative Final    Protein, Urine Random 05/26/2023 16 (H)  6 - 15 mg/dL Final    Creatinine, Urine 05/26/2023 132.0  15.0 - 325.0 mg/dL Final    Prot/Creat Ratio, Urine 05/26/2023 0.12  0.00 - 0.20 Final    RBC, UA 05/26/2023 4  0 - 4 /hpf Final    WBC, UA 05/26/2023 13 (H)  0 - 5 /hpf Final    Bacteria 05/26/2023 Negative  None-Occ /hpf Final    Squam Epithel, UA 05/26/2023 6  /hpf Final    Hyaline Casts,  UA 05/26/2023 7 (A)  0-1/lpf /lpf Final    Microscopic Comment 05/26/2023 SEE COMMENT   Final   Lab Visit on 04/04/2023   Component Date Value Ref Range Status    Microalbumin, Urine 04/04/2023 12.0  <19.9 ug/mL Final    Creatinine, Urine 04/04/2023 63.0  15.0 - 325.0 mg/dL Final    Microalb/Creat Ratio 04/04/2023 19.0  0.0 - 30.0 ug/mg Final   Lab Visit on 04/04/2023   Component Date Value Ref Range Status    Hemoglobin A1C 04/04/2023 5.8  4.5 - 6.2 % Final    Estimated Avg Glucose 04/04/2023 120  68 - 131 mg/dL Final   Admission on 03/08/2023, Discharged on 03/09/2023   Component Date Value Ref Range Status    Magnesium 03/08/2023 2.3  1.6 - 2.6 mg/dL Final    WBC 03/08/2023 8.37  3.90 - 12.70 K/uL Final    RBC 03/08/2023 4.42  4.00 - 5.40 M/uL Final    Hemoglobin 03/08/2023 12.3  12.0 - 16.0 g/dL Final    Hematocrit 03/08/2023 38.5  37.0 - 48.5 % Final    MCV 03/08/2023 87  82 - 98 fL Final    MCH 03/08/2023 27.8  27.0 - 31.0 pg Final    MCHC 03/08/2023 31.9 (L)  32.0 - 36.0 g/dL Final    RDW 03/08/2023 14.3  11.5 - 14.5 % Final    Platelets 03/08/2023 264  150 - 450 K/uL Final    MPV 03/08/2023 10.0  9.2 - 12.9 fL Final    Immature Granulocytes 03/08/2023 0.4  0.0 - 0.5 % Final    Gran # (ANC) 03/08/2023 3.7  1.8 - 7.7 K/uL Final    Immature Grans (Abs) 03/08/2023 0.03  0.00 - 0.04 K/uL Final    Lymph # 03/08/2023 3.3  1.0 - 4.8 K/uL Final    Mono # 03/08/2023 1.1 (H)  0.3 - 1.0 K/uL Final    Eos # 03/08/2023 0.3  0.0 - 0.5 K/uL Final    Baso # 03/08/2023 0.07  0.00 - 0.20 K/uL Final    nRBC 03/08/2023 0  0 /100 WBC Final    Gran % 03/08/2023 43.8  38.0 - 73.0 % Final    Lymph % 03/08/2023 39.3  18.0 - 48.0 % Final    Mono % 03/08/2023 12.7  4.0 - 15.0 % Final    Eosinophil % 03/08/2023 3.0  0.0 - 8.0 % Final    Basophil % 03/08/2023 0.8  0.0 - 1.9 % Final    Differential Method 03/08/2023 Automated   Final    Sodium 03/08/2023 141  136 - 145 mmol/L Final    Potassium 03/08/2023 3.9  3.5 - 5.1 mmol/L Final     Chloride 03/08/2023 105  95 - 110 mmol/L Final    CO2 03/08/2023 28  23 - 29 mmol/L Final    Glucose 03/08/2023 128 (H)  70 - 110 mg/dL Final    BUN 03/08/2023 20  8 - 23 mg/dL Final    Creatinine 03/08/2023 1.2  0.5 - 1.4 mg/dL Final    Calcium 03/08/2023 9.3  8.7 - 10.5 mg/dL Final    Total Protein 03/08/2023 7.7  6.0 - 8.4 g/dL Final    Albumin 03/08/2023 4.3  3.5 - 5.2 g/dL Final    Total Bilirubin 03/08/2023 0.4  0.1 - 1.0 mg/dL Final    Alkaline Phosphatase 03/08/2023 65  55 - 135 U/L Final    AST 03/08/2023 27  10 - 40 U/L Final    ALT 03/08/2023 18  10 - 44 U/L Final    Anion Gap 03/08/2023 8  8 - 16 mmol/L Final    eGFR 03/08/2023 44.4 (A)  >60 mL/min/1.73 m^2 Final    Troponin I High Sensitivity 03/08/2023 16.8 (H)  0.0 - 14.9 pg/mL Final    Troponin I High Sensitivity 03/08/2023 19.0 (H)  0.0 - 14.9 pg/mL Final    BNP 03/08/2023 403 (H)  0 - 99 pg/mL Final    TSH 03/08/2023 0.960  0.340 - 5.600 uIU/mL Final    Specimen UA 03/08/2023 Urine, Clean Catch   Final    Color, UA 03/08/2023 Yellow  Yellow, Straw, Anila Final    Appearance, UA 03/08/2023 Clear  Clear Final    pH, UA 03/08/2023 7.0  5.0 - 8.0 Final    Specific Gravity, UA 03/08/2023 1.005  1.005 - 1.030 Final    Protein, UA 03/08/2023 Negative  Negative Final    Glucose, UA 03/08/2023 Negative  Negative Final    Ketones, UA 03/08/2023 Negative  Negative Final    Bilirubin (UA) 03/08/2023 Negative  Negative Final    Occult Blood UA 03/08/2023 Negative  Negative Final    Nitrite, UA 03/08/2023 Negative  Negative Final    Urobilinogen, UA 03/08/2023 Negative  Negative EU/dL Final    Leukocytes, UA 03/08/2023 2+ (A)  Negative Final    Influenza A, Molecular 03/08/2023 Negative  Negative Final    Influenza B, Molecular 03/08/2023 Negative  Negative Final    Flu A & B Source 03/08/2023 NP   Final    RBC, UA 03/08/2023 0  0 - 4 /hpf Final    WBC, UA 03/08/2023 14 (H)  0 - 5 /hpf Final    Bacteria 03/08/2023 Negative  None-Occ /hpf Final    Squam Epithel,  UA 03/08/2023 1  /hpf Final    Hyaline Casts, UA 03/08/2023 0  0-1/lpf /lpf Final    Microscopic Comment 03/08/2023 SEE COMMENT   Final    Urine Culture, Routine 03/08/2023 No growth   Final   Hospital Outpatient Visit on 03/07/2023   Component Date Value Ref Range Status    Battery Voltage (V) 03/07/2023 2.99  V Final    P/R-wave RA Lead 03/07/2023 1.8  mV Final    P/R-wave RA Lead (native) 03/07/2023 11  mV Final    Impedance RA Lead 03/07/2023 475  Ohms Final    Impedance RA Lead (native) 03/07/2023 494  Ohms Final    Threshold V RA Lead 03/07/2023 0.7  V Final    Theshold ms RA Lead 03/07/2023 0.4  ms Final    Threshold V RA Lead (native) 03/07/2023 0.5  V Final    Threshold ms RA Lead (native) 03/07/2023 0.4  ms Final   Lab Visit on 11/29/2022   Component Date Value Ref Range Status    Glucose 11/29/2022 83  70 - 110 mg/dL Final    Sodium 11/29/2022 139  136 - 145 mmol/L Final    Potassium 11/29/2022 3.8  3.5 - 5.1 mmol/L Final    Chloride 11/29/2022 104  95 - 110 mmol/L Final    CO2 11/29/2022 27  23 - 29 mmol/L Final    BUN 11/29/2022 21  8 - 23 mg/dL Final    Calcium 11/29/2022 9.2  8.7 - 10.5 mg/dL Final    Creatinine 11/29/2022 1.2  0.5 - 1.4 mg/dL Final    Albumin 11/29/2022 3.8  3.5 - 5.2 g/dL Final    Phosphorus 11/29/2022 4.1  2.7 - 4.5 mg/dL Final    eGFR 11/29/2022 44.6 (A)  >60 mL/min/1.73 m^2 Final    Anion Gap 11/29/2022 8  8 - 16 mmol/L Final    WBC 11/29/2022 6.36  3.90 - 12.70 K/uL Final    RBC 11/29/2022 4.11  4.00 - 5.40 M/uL Final    Hemoglobin 11/29/2022 11.5 (L)  12.0 - 16.0 g/dL Final    Hematocrit 11/29/2022 37.7  37.0 - 48.5 % Final    MCV 11/29/2022 92  82 - 98 fL Final    MCH 11/29/2022 28.0  27.0 - 31.0 pg Final    MCHC 11/29/2022 30.5 (L)  32.0 - 36.0 g/dL Final    RDW 11/29/2022 12.7  11.5 - 14.5 % Final    Platelets 11/29/2022 249  150 - 450 K/uL Final    MPV 11/29/2022 10.6  9.2 - 12.9 fL Final    Immature Granulocytes 11/29/2022 0.2  0.0 - 0.5 % Final    Gran # (ANC) 11/29/2022  2.4  1.8 - 7.7 K/uL Final    Immature Grans (Abs) 11/29/2022 0.01  0.00 - 0.04 K/uL Final    Lymph # 11/29/2022 2.8  1.0 - 4.8 K/uL Final    Mono # 11/29/2022 0.9  0.3 - 1.0 K/uL Final    Eos # 11/29/2022 0.2  0.0 - 0.5 K/uL Final    Baso # 11/29/2022 0.06  0.00 - 0.20 K/uL Final    nRBC 11/29/2022 0  0 /100 WBC Final    Gran % 11/29/2022 37.7 (L)  38.0 - 73.0 % Final    Lymph % 11/29/2022 44.0  18.0 - 48.0 % Final    Mono % 11/29/2022 13.7  4.0 - 15.0 % Final    Eosinophil % 11/29/2022 3.5  0.0 - 8.0 % Final    Basophil % 11/29/2022 0.9  0.0 - 1.9 % Final    Differential Method 11/29/2022 Automated   Final    Vit D, 25-Hydroxy 11/29/2022 82  30 - 96 ng/mL Final    RBC, UA 11/29/2022 0  0 - 4 /hpf Final    WBC, UA 11/29/2022 5  0 - 5 /hpf Final    Bacteria 11/29/2022 Negative  None-Occ /hpf Final    Squam Epithel, UA 11/29/2022 1  /hpf Final    Hyaline Casts, UA 11/29/2022 1  0-1/lpf /lpf Final    Microscopic Comment 11/29/2022 SEE COMMENT   Final    PTH, Intact 11/29/2022 23.2  9.0 - 77.0 pg/mL Final    Protein, Urine Random 11/29/2022 <6  6 - 15 mg/dL Final    Creatinine, Urine 11/29/2022 40.0  15.0 - 325.0 mg/dL Final    Prot/Creat Ratio, Urine 11/29/2022 Unable to calculate  0.00 - 0.20 Final       Past Medical History:   Diagnosis Date    Coronary artery disease     Dermatitis     Dermatitis 12/8/2017    Diabetes mellitus     Diabetes mellitus type II     Leah Barr virus infection     Fibromyalgia     GERD (gastroesophageal reflux disease)     Hypertension     MI (myocardial infarction) 09/23/2011    Pure hypercholesterolemia 2/26/2020     Past Surgical History:   Procedure Laterality Date    adenoids      ANGIOPLASTY  1987    APPENDECTOMY      CARDIAC PACEMAKER PLACEMENT  01/12/2017    CATARACT EXTRACTION, BILATERAL Bilateral 9/2/15, 3/17/15    right eye-9/2/15, left eye-3/17/15    CHOLECYSTECTOMY  1987    CORONARY ARTERY BYPASS GRAFT  2006    quadruple    coronary stents  1999    x2    CYST REMOVAL   04/25/2017    on back    HYSTERECTOMY  1966    OVARY SURGERY  1948    Ovary burst & was repaired    RHIZOTOMY  09/14/2018    TONSILLECTOMY  1940     Family History   Problem Relation Age of Onset    No Known Problems Mother     No Known Problems Father        Marital Status:   Alcohol History:  reports no history of alcohol use.  Tobacco History:  reports that she has never smoked. She has never used smokeless tobacco.  Drug History:  reports no history of drug use.    Review of patient's allergies indicates:   Allergen Reactions    Diclofenac-misoprostol      Other reaction(s): Not available    Doxycycline     Effexor [venlafaxine]     Estradiol     Flagyl [metronidazole]     Fluconazole     Gabapentin     Iodine     Levaquin [levofloxacin]     Nexium [esomeprazole magnesium]     Penicillins     Red yeast rice (monascus purpureus)     Shellfish containing products     Statins-hmg-coa reductase inhibitors     Sulfa (sulfonamide antibiotics)     Tea tree oil     Tegaserod      ZOLNORM    Tegaserod hydrogen maleate     Trazodone     Yeast, dried     Adhesive Rash     Band-aids       Current Outpatient Medications:     acetaminophen (TYLENOL) 500 MG tablet, Take 500 mg by mouth every 6 (six) hours as needed for Pain., Disp: , Rfl:     ALPRAZolam (XANAX) 0.25 MG tablet, TAKE 1 TABLET BY MOUTH EVERY DAY AS NEEDED FOR ANXIETY, Disp: 90 tablet, Rfl: 0    apixaban (ELIQUIS) 2.5 mg Tab, Take 1 tablet (2.5 mg total) by mouth 2 (two) times daily., Disp: 180 tablet, Rfl: 1    aspirin (ECOTRIN) 81 MG EC tablet, Take 1 tablet (81 mg total) by mouth once daily. (Patient taking differently: Take 81 mg by mouth every evening.), Disp: , Rfl: 0    coenzyme Q10 100 mg capsule, Take 1 capsule (100 mg total) by mouth 2 (two) times daily., Disp: 60 capsule, Rfl: 11    diphenhydrAMINE (BENYLIN) 12.5 mg/5 mL liquid, Take 12.5 mg by mouth 4 (four) times daily as needed for Allergies., Disp: , Rfl:     ECHINACEA ORAL, Take 750 mg by  mouth 2 (two) times a day., Disp: , Rfl:     fluocinonide-emollient (FLUOCINONIDE-E) 0.05 % Crea, Apply topically 2 (two) times daily., Disp: 60 g, Rfl: 1    fluticasone propionate (FLONASE) 50 mcg/actuation nasal spray, 1 spray (50 mcg total) by Each Nostril route once daily., Disp: 9.9 mL, Rfl: 1    isosorbide mononitrate (IMDUR) 120 MG 24 hr tablet, TAKE 1 TABLET (120 MG TOTAL) BY MOUTH ONCE DAILY. DO NOT CRUSH OR CHEW SWALLOW WHOLE., Disp: 90 tablet, Rfl: 3    levothyroxine (SYNTHROID) 88 MCG tablet, Take 1 tablet (88 mcg total) by mouth once daily., Disp: 90 tablet, Rfl: 1    LIDOcaine (LIDODERM) 5 %, Place 1 patch onto the skin once daily. Remove & Discard patch within 12 hours or as directed by MD, Disp: 10 patch, Rfl: 3    loratadine (CLARITIN) 10 mg tablet, Take 10 mg by mouth once daily., Disp: , Rfl:     losartan (COZAAR) 25 MG tablet, Take 0.5 tablets (12.5 mg total) by mouth every evening., Disp: 45 tablet, Rfl: 3    MAGNESIUM ORAL, Take 500 mg by mouth once daily. , Disp: , Rfl:     metoprolol succinate (TOPROL-XL) 25 MG 24 hr tablet, TAKE 1 TABLET BY MOUTH EVERY DAY, Disp: 90 tablet, Rfl: 3    montelukast (SINGULAIR) 10 mg tablet, TAKE 1 TABLET BY MOUTH EVERY DAY IN THE EVENING, Disp: 90 tablet, Rfl: 1    multivitamin (THERAGRAN) tablet, Take 1 tablet by mouth 2 (two) times a day. , Disp: , Rfl:     nitroGLYCERIN (NITROSTAT) 0.4 MG SL tablet, Place 0.4 mg under the tongue every 5 (five) minutes as needed for Chest pain., Disp: , Rfl:     omeprazole (PRILOSEC) 20 MG capsule, Take 1 capsule (20 mg total) by mouth once daily., Disp: 90 capsule, Rfl: 3    tiZANidine (ZANAFLEX) 4 MG tablet, Take 1 tablet (4 mg total) by mouth once daily., Disp: 90 tablet, Rfl: 0    traMADoL (ULTRAM) 50 mg tablet, Take 1 tablet (50 mg total) by mouth 2 (two) times daily as needed (pain)., Disp: 14 tablet, Rfl: 0    turmeric 400 mg Cap, Take 1,200 mg by mouth once daily., Disp: , Rfl:     UNABLE TO FIND, Take 1 capsule by  mouth once daily. medication name: GI probiotic, Disp: , Rfl:     UNABLE TO FIND, Take 1 capsule by mouth once daily. Joint health, Disp: , Rfl:     UNABLE TO FIND, Take 1 capsule by mouth 2 (two) times a day. Cardio platinum, Disp: , Rfl:     UNABLE TO FIND, Nerve Jorge, Disp: , Rfl:     zinc gluconate 50 mg tablet, Take 50 mg by mouth 2 (two) times a day., Disp: , Rfl:     azithromycin (Z-POLY) 250 MG tablet, Take 1 tablet (250 mg total) by mouth once daily. po on day 1 then 1 tab po on days 2-5, Disp: 6 tablet, Rfl: 0    benzonatate (TESSALON) 200 MG capsule, Take 1 capsule (200 mg total) by mouth 3 (three) times daily as needed., Disp: 30 capsule, Rfl: 0    cyanocobalamin 1,000 mcg/mL injection, 2cc I'm every other week, Disp: 10 mL, Rfl: 1    hydrocortisone 2.5 % ointment, Apply topically 2 (two) times daily. (Patient not taking: Reported on 3/29/2023), Disp: 28 g, Rfl: 3    predniSONE (DELTASONE) 20 MG tablet, Take 1 tablet (20 mg total) by mouth once daily. (Patient not taking: Reported on 11/1/2023), Disp: 5 tablet, Rfl: 0    triamcinolone acetonide 0.1% (KENALOG) 0.1 % cream, aaa bid x 1-2 wks, tk 1 wk off prn rash (Patient not taking: Reported on 11/1/2023), Disp: 30 g, Rfl: 2    Current Facility-Administered Medications:     evolocumab PnIj 140 mg, 140 mg, Subcutaneous, Q14 Days, Kenton Ware MD    Review of Systems   Constitutional:  Positive for fatigue and fever. Negative for activity change, appetite change, chills and unexpected weight change.   HENT:  Positive for congestion and sore throat. Negative for ear pain, hearing loss, postnasal drip, sinus pain, sneezing, tinnitus and trouble swallowing.    Eyes:  Negative for pain, discharge and visual disturbance.   Respiratory:  Positive for cough. Negative for choking, shortness of breath and wheezing.    Cardiovascular:  Negative for chest pain, palpitations and leg swelling.   Gastrointestinal:  Negative for abdominal distention, abdominal pain,  blood in stool, constipation, diarrhea, nausea and vomiting.   Endocrine: Negative for cold intolerance and heat intolerance.   Genitourinary:  Negative for difficulty urinating, dysuria, frequency, pelvic pain and urgency.   Musculoskeletal:  Negative for back pain, joint swelling and neck pain.   Skin:  Negative for pallor and rash.   Allergic/Immunologic: Negative for environmental allergies and food allergies.   Neurological:  Negative for dizziness, tremors, weakness, numbness and headaches.   Hematological:  Does not bruise/bleed easily.   Psychiatric/Behavioral:  Negative for agitation, confusion, dysphoric mood, sleep disturbance and suicidal ideas. The patient is not nervous/anxious.           Objective:          3/29/2023     2:41 PM 4/20/2023     2:30 PM 7/11/2023     2:42 PM 9/12/2023     5:02 PM 10/12/2023     2:33 PM 11/1/2023    11:29 AM   Vitals - 1 value per visit   SYSTOLIC 150 136 134 134 132 138   DIASTOLIC 68 64 74 86 74 76   Pulse 72 72 73 72 80 70   Temp 97.9 °F (36.6 °C) 97.9 °F (36.6 °C)   97.8 °F (36.6 °C) 98 °F (36.7 °C)   Resp 12 12 16 14 12 16   SPO2 99 % 97 % 98 % 99 % 99 % 99 %   Weight (lb) 132 133 135 137 137 140   Weight (kg) 59.875 60.328 61.236 62.143 62.143 63.504   Height 5' (1.524 m) 5' (1.524 m) 5' (1.524 m) 5' (1.524 m) 5' (1.524 m) 5' (1.524 m)   BMI (Calculated) 25.8 26 26.4 26.8 26.8 27.3   Pain Score Zero Zero Zero Zero Zero Zero   (    Physical Exam  Constitutional:       Appearance: Normal appearance.   HENT:      Head: Normocephalic and atraumatic.      Nose: Nose normal.      Mouth/Throat:      Mouth: Mucous membranes are moist.      Pharynx: Posterior oropharyngeal erythema present.   Eyes:      Conjunctiva/sclera: Conjunctivae normal.      Pupils: Pupils are equal, round, and reactive to light.   Pulmonary:      Breath sounds: Rhonchi present.   Abdominal:      General: Bowel sounds are normal.      Palpations: Abdomen is soft.   Musculoskeletal:      Right lower  leg: No edema.      Left lower leg: No edema.   Skin:     General: Skin is warm.      Capillary Refill: Capillary refill takes less than 2 seconds.   Neurological:      General: No focal deficit present.      Mental Status: She is alert and oriented to person, place, and time.      Gait: Gait normal.      Deep Tendon Reflexes: Reflexes normal.   Psychiatric:         Mood and Affect: Mood normal.         Behavior: Behavior normal.         Thought Content: Thought content normal.           Assessment:       1. Pharyngitis, unspecified etiology    2. Bronchitis    3. Fatigue, unspecified type         Plan:       Pharyngitis, unspecified etiology  -     azithromycin (Z-POLY) 250 MG tablet; Take 1 tablet (250 mg total) by mouth once daily. po on day 1 then 1 tab po on days 2-5  Dispense: 6 tablet; Refill: 0    Bronchitis  -     azithromycin (Z-POLY) 250 MG tablet; Take 1 tablet (250 mg total) by mouth once daily. po on day 1 then 1 tab po on days 2-5  Dispense: 6 tablet; Refill: 0  -     benzonatate (TESSALON) 200 MG capsule; Take 1 capsule (200 mg total) by mouth 3 (three) times daily as needed.  Dispense: 30 capsule; Refill: 0    Fatigue, unspecified type  -     cyanocobalamin 1,000 mcg/mL injection; 2cc I'm every other week  Dispense: 10 mL; Refill: 1      Follow up if symptoms worsen or fail to improve, for she will call with report of her status tomorrow afternoon.        11/1/2023 Elena Sullivan M.D.

## 2023-11-02 ENCOUNTER — PATIENT MESSAGE (OUTPATIENT)
Dept: FAMILY MEDICINE | Facility: CLINIC | Age: 88
End: 2023-11-02

## 2023-11-02 ENCOUNTER — TELEPHONE (OUTPATIENT)
Dept: FAMILY MEDICINE | Facility: CLINIC | Age: 88
End: 2023-11-02

## 2023-11-02 NOTE — TELEPHONE ENCOUNTER
Pt called to give update on how she is doing- LOV  11/1/23    Pt reports a temp of 99, the cough has stopped she has not had to take any cough med this am, she is not having any shortness of breath.  She still feels wiped out. She is drinking as much as she can.    She took her 2nd dose of abx and is so far ok with that.

## 2023-11-13 ENCOUNTER — TELEPHONE (OUTPATIENT)
Dept: CARDIOLOGY | Facility: CLINIC | Age: 88
End: 2023-11-13
Payer: COMMERCIAL

## 2023-11-13 NOTE — TELEPHONE ENCOUNTER
----- Message from Shaq Sanchez sent at 11/13/2023  4:01 PM CST -----  Contact: Self  Type:  Sooner Appointment Request    Caller is requesting a sooner appointment.  Caller declined first available appointment listed below.  Caller will not accept being placed on the waitlist and is requesting a message be sent to doctor.    Name of Caller:  Patient  When is the first available appointment?  12/9  Symptoms:  6m  Would the patient rather a call back or a response via [facility]? Call  Best Call Back Number:  [phone]   Additional Information:   Would like herself and spouse to be nicholas before 1/1. Also states she had allergic reaction to repatha

## 2023-11-21 ENCOUNTER — LAB VISIT (OUTPATIENT)
Dept: LAB | Facility: HOSPITAL | Age: 88
End: 2023-11-21
Attending: INTERNAL MEDICINE
Payer: MEDICARE

## 2023-11-21 DIAGNOSIS — N25.81 SECONDARY HYPERPARATHYROIDISM OF RENAL ORIGIN: ICD-10-CM

## 2023-11-21 DIAGNOSIS — N18.30 CHRONIC KIDNEY DISEASE, STAGE III (MODERATE): ICD-10-CM

## 2023-11-21 DIAGNOSIS — N18.9 CHRONIC KIDNEY DISEASE, UNSPECIFIED: ICD-10-CM

## 2023-11-21 DIAGNOSIS — I10 ESSENTIAL HYPERTENSION, MALIGNANT: ICD-10-CM

## 2023-11-21 DIAGNOSIS — N18.30 CHRONIC KIDNEY DISEASE, STAGE III (MODERATE): Primary | ICD-10-CM

## 2023-11-21 LAB
25(OH)D3+25(OH)D2 SERPL-MCNC: 64 NG/ML (ref 30–96)
ALBUMIN SERPL BCP-MCNC: 4 G/DL (ref 3.5–5.2)
ANION GAP SERPL CALC-SCNC: 10 MMOL/L (ref 8–16)
BACTERIA #/AREA URNS HPF: NEGATIVE /HPF
BASOPHILS # BLD AUTO: 0.07 K/UL (ref 0–0.2)
BASOPHILS NFR BLD: 1 % (ref 0–1.9)
BUN SERPL-MCNC: 26 MG/DL (ref 8–23)
CALCIUM SERPL-MCNC: 9.3 MG/DL (ref 8.7–10.5)
CHLORIDE SERPL-SCNC: 105 MMOL/L (ref 95–110)
CO2 SERPL-SCNC: 25 MMOL/L (ref 23–29)
CREAT SERPL-MCNC: 1.2 MG/DL (ref 0.5–1.4)
CREAT UR-MCNC: 59.4 MG/DL (ref 15–325)
DIFFERENTIAL METHOD: ABNORMAL
EOSINOPHIL # BLD AUTO: 0.2 K/UL (ref 0–0.5)
EOSINOPHIL NFR BLD: 3.4 % (ref 0–8)
ERYTHROCYTE [DISTWIDTH] IN BLOOD BY AUTOMATED COUNT: 13.6 % (ref 11.5–14.5)
EST. GFR  (NO RACE VARIABLE): 44.4 ML/MIN/1.73 M^2
GLUCOSE SERPL-MCNC: 80 MG/DL (ref 70–110)
HCT VFR BLD AUTO: 38.2 % (ref 37–48.5)
HGB BLD-MCNC: 12 G/DL (ref 12–16)
HYALINE CASTS #/AREA URNS LPF: 0 /LPF
IMM GRANULOCYTES # BLD AUTO: 0.01 K/UL (ref 0–0.04)
IMM GRANULOCYTES NFR BLD AUTO: 0.1 % (ref 0–0.5)
LYMPHOCYTES # BLD AUTO: 3.1 K/UL (ref 1–4.8)
LYMPHOCYTES NFR BLD: 44.2 % (ref 18–48)
MAGNESIUM SERPL-MCNC: 2.4 MG/DL (ref 1.6–2.6)
MCH RBC QN AUTO: 28.5 PG (ref 27–31)
MCHC RBC AUTO-ENTMCNC: 31.4 G/DL (ref 32–36)
MCV RBC AUTO: 91 FL (ref 82–98)
MICROSCOPIC COMMENT: ABNORMAL
MONOCYTES # BLD AUTO: 0.8 K/UL (ref 0.3–1)
MONOCYTES NFR BLD: 11.5 % (ref 4–15)
NEUTROPHILS # BLD AUTO: 2.8 K/UL (ref 1.8–7.7)
NEUTROPHILS NFR BLD: 39.8 % (ref 38–73)
NRBC BLD-RTO: 0 /100 WBC
PHOSPHATE SERPL-MCNC: 3.9 MG/DL (ref 2.7–4.5)
PLATELET # BLD AUTO: 281 K/UL (ref 150–450)
PMV BLD AUTO: 10.3 FL (ref 9.2–12.9)
POTASSIUM SERPL-SCNC: 4 MMOL/L (ref 3.5–5.1)
PROT UR-MCNC: 4 MG/DL (ref 6–15)
PROT/CREAT UR: 0.07 MG/G{CREAT} (ref 0–0.2)
PTH-INTACT SERPL-MCNC: 35.7 PG/ML (ref 9–77)
RBC # BLD AUTO: 4.21 M/UL (ref 4–5.4)
RBC #/AREA URNS HPF: 0 /HPF (ref 0–4)
SODIUM SERPL-SCNC: 140 MMOL/L (ref 136–145)
SQUAMOUS #/AREA URNS HPF: 0 /HPF
URATE SERPL-MCNC: 6.5 MG/DL (ref 2.4–5.7)
WBC # BLD AUTO: 7.03 K/UL (ref 3.9–12.7)
WBC #/AREA URNS HPF: 1 /HPF (ref 0–5)

## 2023-11-21 PROCEDURE — 83735 ASSAY OF MAGNESIUM: CPT | Performed by: INTERNAL MEDICINE

## 2023-11-21 PROCEDURE — 81001 URINALYSIS AUTO W/SCOPE: CPT | Performed by: INTERNAL MEDICINE

## 2023-11-21 PROCEDURE — 82306 VITAMIN D 25 HYDROXY: CPT | Performed by: INTERNAL MEDICINE

## 2023-11-21 PROCEDURE — 84156 ASSAY OF PROTEIN URINE: CPT | Performed by: INTERNAL MEDICINE

## 2023-11-21 PROCEDURE — 83970 ASSAY OF PARATHORMONE: CPT | Performed by: INTERNAL MEDICINE

## 2023-11-21 PROCEDURE — 36415 COLL VENOUS BLD VENIPUNCTURE: CPT | Performed by: INTERNAL MEDICINE

## 2023-11-21 PROCEDURE — 84550 ASSAY OF BLOOD/URIC ACID: CPT | Performed by: INTERNAL MEDICINE

## 2023-11-21 PROCEDURE — 80069 RENAL FUNCTION PANEL: CPT | Performed by: INTERNAL MEDICINE

## 2023-11-21 PROCEDURE — 85025 COMPLETE CBC W/AUTO DIFF WBC: CPT | Performed by: INTERNAL MEDICINE

## 2023-12-03 NOTE — PROGRESS NOTES
SUBJECTIVE:    Patient ID: Karma Chen is a 87 y.o. female.    Chief Complaint: sinus drip (With cough since lov on 11-1-2023) and Follow-up    HPI    Patient has chronic sinus drip which worsened a few weeks ago -throat became sore and it went into the chest-she took an antibiotic and after 1-2 days her mouth felt strange-but she felt better-the cough is not productive-she can just feel congestion in the throat-the mucous is whitish-she has had no fever since the antibiotics-no facial or ear pain but ringing  constantly-    She says she took zithromax before without any difficulty-    Lab Visit on 11/21/2023   Component Date Value Ref Range Status    Glucose 11/21/2023 80  70 - 110 mg/dL Final    Sodium 11/21/2023 140  136 - 145 mmol/L Final    Potassium 11/21/2023 4.0  3.5 - 5.1 mmol/L Final    Chloride 11/21/2023 105  95 - 110 mmol/L Final    CO2 11/21/2023 25  23 - 29 mmol/L Final    BUN 11/21/2023 26 (H)  8 - 23 mg/dL Final    Calcium 11/21/2023 9.3  8.7 - 10.5 mg/dL Final    Creatinine 11/21/2023 1.2  0.5 - 1.4 mg/dL Final    Albumin 11/21/2023 4.0  3.5 - 5.2 g/dL Final    Phosphorus 11/21/2023 3.9  2.7 - 4.5 mg/dL Final    eGFR 11/21/2023 44.4 (A)  >60 mL/min/1.73 m^2 Final    Anion Gap 11/21/2023 10  8 - 16 mmol/L Final    Magnesium 11/21/2023 2.4  1.6 - 2.6 mg/dL Final    WBC 11/21/2023 7.03  3.90 - 12.70 K/uL Final    RBC 11/21/2023 4.21  4.00 - 5.40 M/uL Final    Hemoglobin 11/21/2023 12.0  12.0 - 16.0 g/dL Final    Hematocrit 11/21/2023 38.2  37.0 - 48.5 % Final    MCV 11/21/2023 91  82 - 98 fL Final    MCH 11/21/2023 28.5  27.0 - 31.0 pg Final    MCHC 11/21/2023 31.4 (L)  32.0 - 36.0 g/dL Final    RDW 11/21/2023 13.6  11.5 - 14.5 % Final    Platelets 11/21/2023 281  150 - 450 K/uL Final    MPV 11/21/2023 10.3  9.2 - 12.9 fL Final    Immature Granulocytes 11/21/2023 0.1  0.0 - 0.5 % Final    Gran # (ANC) 11/21/2023 2.8  1.8 - 7.7 K/uL Final    Immature Grans (Abs) 11/21/2023 0.01  0.00 - 0.04 K/uL  Final    Lymph # 11/21/2023 3.1  1.0 - 4.8 K/uL Final    Mono # 11/21/2023 0.8  0.3 - 1.0 K/uL Final    Eos # 11/21/2023 0.2  0.0 - 0.5 K/uL Final    Baso # 11/21/2023 0.07  0.00 - 0.20 K/uL Final    nRBC 11/21/2023 0  0 /100 WBC Final    Gran % 11/21/2023 39.8  38.0 - 73.0 % Final    Lymph % 11/21/2023 44.2  18.0 - 48.0 % Final    Mono % 11/21/2023 11.5  4.0 - 15.0 % Final    Eosinophil % 11/21/2023 3.4  0.0 - 8.0 % Final    Basophil % 11/21/2023 1.0  0.0 - 1.9 % Final    Differential Method 11/21/2023 Automated   Final    Vit D, 25-Hydroxy 11/21/2023 64  30 - 96 ng/mL Final    RBC, UA 11/21/2023 0  0 - 4 /hpf Final    WBC, UA 11/21/2023 1  0 - 5 /hpf Final    Bacteria 11/21/2023 Negative  None-Occ /hpf Final    Squam Epithel, UA 11/21/2023 0  /hpf Final    Hyaline Casts, UA 11/21/2023 0.00 (A)  0-1/lpf /lpf Final    Microscopic Comment 11/21/2023 SEE COMMENT   Final    Uric Acid 11/21/2023 6.5 (H)  2.4 - 5.7 mg/dL Final    PTH, Intact 11/21/2023 35.7  9.0 - 77.0 pg/mL Final    Protein, Urine Random 11/21/2023 4 (L)  6 - 15 mg/dL Final    Creatinine, Urine 11/21/2023 59.4  15.0 - 325.0 mg/dL Final    Prot/Creat Ratio, Urine 11/21/2023 0.07  0.00 - 0.20 Final   Lab Visit on 08/23/2023   Component Date Value Ref Range Status    Cholesterol 08/23/2023 229 (H)  120 - 199 mg/dL Final    Triglycerides 08/23/2023 335 (H)  30 - 150 mg/dL Final    HDL 08/23/2023 38 (L)  40 - 75 mg/dL Final    LDL Cholesterol 08/23/2023 124.0  63.0 - 159.0 mg/dL Final    HDL/Cholesterol Ratio 08/23/2023 16.6 (L)  20.0 - 50.0 % Final    Total Cholesterol/HDL Ratio 08/23/2023 6.0 (H)  2.0 - 5.0 Final    Non-HDL Cholesterol 08/23/2023 191  mg/dL Final    Hemoglobin A1C 08/23/2023 5.8  4.5 - 6.2 % Final    Estimated Avg Glucose 08/23/2023 120  68 - 131 mg/dL Final    WBC 08/23/2023 7.43  3.90 - 12.70 K/uL Final    RBC 08/23/2023 4.01  4.00 - 5.40 M/uL Final    Hemoglobin 08/23/2023 11.4 (L)  12.0 - 16.0 g/dL Final    Hematocrit 08/23/2023 36.4  (L)  37.0 - 48.5 % Final    MCV 08/23/2023 91  82 - 98 fL Final    MCH 08/23/2023 28.4  27.0 - 31.0 pg Final    MCHC 08/23/2023 31.3 (L)  32.0 - 36.0 g/dL Final    RDW 08/23/2023 13.9  11.5 - 14.5 % Final    Platelets 08/23/2023 271  150 - 450 K/uL Final    MPV 08/23/2023 10.5  9.2 - 12.9 fL Final    Immature Granulocytes 08/23/2023 0.1  0.0 - 0.5 % Final    Gran # (ANC) 08/23/2023 3.1  1.8 - 7.7 K/uL Final    Immature Grans (Abs) 08/23/2023 0.01  0.00 - 0.04 K/uL Final    Lymph # 08/23/2023 3.2  1.0 - 4.8 K/uL Final    Mono # 08/23/2023 0.9  0.3 - 1.0 K/uL Final    Eos # 08/23/2023 0.3  0.0 - 0.5 K/uL Final    Baso # 08/23/2023 0.05  0.00 - 0.20 K/uL Final    nRBC 08/23/2023 0  0 /100 WBC Final    Gran % 08/23/2023 41.5  38.0 - 73.0 % Final    Lymph % 08/23/2023 42.7  18.0 - 48.0 % Final    Mono % 08/23/2023 11.4  4.0 - 15.0 % Final    Eosinophil % 08/23/2023 3.6  0.0 - 8.0 % Final    Basophil % 08/23/2023 0.7  0.0 - 1.9 % Final    Differential Method 08/23/2023 Automated   Final    Sodium 08/23/2023 137  136 - 145 mmol/L Final    Potassium 08/23/2023 4.1  3.5 - 5.1 mmol/L Final    Chloride 08/23/2023 107  95 - 110 mmol/L Final    CO2 08/23/2023 24  23 - 29 mmol/L Final    Glucose 08/23/2023 91  70 - 110 mg/dL Final    BUN 08/23/2023 26 (H)  8 - 23 mg/dL Final    Creatinine 08/23/2023 1.5 (H)  0.5 - 1.4 mg/dL Final    Calcium 08/23/2023 8.7  8.7 - 10.5 mg/dL Final    Total Protein 08/23/2023 6.9  6.0 - 8.4 g/dL Final    Albumin 08/23/2023 3.6  3.5 - 5.2 g/dL Final    Total Bilirubin 08/23/2023 0.5  0.1 - 1.0 mg/dL Final    Alkaline Phosphatase 08/23/2023 63  55 - 135 U/L Final    AST 08/23/2023 24  10 - 40 U/L Final    ALT 08/23/2023 17  10 - 44 U/L Final    eGFR 08/23/2023 33.9 (A)  >60 mL/min/1.73 m^2 Final    Anion Gap 08/23/2023 6 (L)  8 - 16 mmol/L Final    Vitamin B-12 08/23/2023 747  210 - 950 pg/mL Final   Hospital Outpatient Visit on 08/01/2023   Component Date Value Ref Range Status    Battery Voltage (V)  08/01/2023 2.98  V Final    P/R-wave RA Lead 08/01/2023 1.3  mV Final    Impedance RA Lead 08/01/2023 475  Ohms Final    P/R-wave RA Lead (native) 08/01/2023 12.6  mV Final    Impedance RA Lead (native) 08/01/2023 513  Ohms Final    Threshold V RA Lead 08/01/2023 0.75  V Final    Theshold ms RA Lead 08/01/2023 0.4  ms Final    Threshold V RA Lead (native) 08/01/2023 0.625  V Final    Threshold ms RA Lead (native) 08/01/2023 0.4  ms Final   Lab Visit on 05/26/2023   Component Date Value Ref Range Status    Glucose 05/26/2023 82  70 - 110 mg/dL Final    Sodium 05/26/2023 142  136 - 145 mmol/L Final    Potassium 05/26/2023 4.0  3.5 - 5.1 mmol/L Final    Chloride 05/26/2023 106  95 - 110 mmol/L Final    CO2 05/26/2023 27  23 - 29 mmol/L Final    BUN 05/26/2023 23  8 - 23 mg/dL Final    Calcium 05/26/2023 9.4  8.7 - 10.5 mg/dL Final    Creatinine 05/26/2023 1.2  0.5 - 1.4 mg/dL Final    Albumin 05/26/2023 3.9  3.5 - 5.2 g/dL Final    Phosphorus 05/26/2023 4.1  2.7 - 4.5 mg/dL Final    eGFR 05/26/2023 44.4 (A)  >60 mL/min/1.73 m^2 Final    Anion Gap 05/26/2023 9  8 - 16 mmol/L Final    Specimen UA 05/26/2023 Urine, Clean Catch   Final    Color, UA 05/26/2023 Yellow  Yellow, Straw, Anila Final    Appearance, UA 05/26/2023 Clear  Clear Final    pH, UA 05/26/2023 7.0  5.0 - 8.0 Final    Specific Gravity, UA 05/26/2023 1.015  1.005 - 1.030 Final    Protein, UA 05/26/2023 Trace (A)  Negative Final    Glucose, UA 05/26/2023 Negative  Negative Final    Ketones, UA 05/26/2023 Negative  Negative Final    Bilirubin (UA) 05/26/2023 Negative  Negative Final    Occult Blood UA 05/26/2023 Negative  Negative Final    Nitrite, UA 05/26/2023 Negative  Negative Final    Urobilinogen, UA 05/26/2023 Negative  Negative EU/dL Final    Leukocytes, UA 05/26/2023 3+ (A)  Negative Final    Protein, Urine Random 05/26/2023 16 (H)  6 - 15 mg/dL Final    Creatinine, Urine 05/26/2023 132.0  15.0 - 325.0 mg/dL Final    Prot/Creat Ratio, Urine  05/26/2023 0.12  0.00 - 0.20 Final    RBC, UA 05/26/2023 4  0 - 4 /hpf Final    WBC, UA 05/26/2023 13 (H)  0 - 5 /hpf Final    Bacteria 05/26/2023 Negative  None-Occ /hpf Final    Squam Epithel, UA 05/26/2023 6  /hpf Final    Hyaline Casts, UA 05/26/2023 7 (A)  0-1/lpf /lpf Final    Microscopic Comment 05/26/2023 SEE COMMENT   Final   Lab Visit on 04/04/2023   Component Date Value Ref Range Status    Microalbumin, Urine 04/04/2023 12.0  <19.9 ug/mL Final    Creatinine, Urine 04/04/2023 63.0  15.0 - 325.0 mg/dL Final    Microalb/Creat Ratio 04/04/2023 19.0  0.0 - 30.0 ug/mg Final   Lab Visit on 04/04/2023   Component Date Value Ref Range Status    Hemoglobin A1C 04/04/2023 5.8  4.5 - 6.2 % Final    Estimated Avg Glucose 04/04/2023 120  68 - 131 mg/dL Final   Admission on 03/08/2023, Discharged on 03/09/2023   Component Date Value Ref Range Status    Magnesium 03/08/2023 2.3  1.6 - 2.6 mg/dL Final    WBC 03/08/2023 8.37  3.90 - 12.70 K/uL Final    RBC 03/08/2023 4.42  4.00 - 5.40 M/uL Final    Hemoglobin 03/08/2023 12.3  12.0 - 16.0 g/dL Final    Hematocrit 03/08/2023 38.5  37.0 - 48.5 % Final    MCV 03/08/2023 87  82 - 98 fL Final    MCH 03/08/2023 27.8  27.0 - 31.0 pg Final    MCHC 03/08/2023 31.9 (L)  32.0 - 36.0 g/dL Final    RDW 03/08/2023 14.3  11.5 - 14.5 % Final    Platelets 03/08/2023 264  150 - 450 K/uL Final    MPV 03/08/2023 10.0  9.2 - 12.9 fL Final    Immature Granulocytes 03/08/2023 0.4  0.0 - 0.5 % Final    Gran # (ANC) 03/08/2023 3.7  1.8 - 7.7 K/uL Final    Immature Grans (Abs) 03/08/2023 0.03  0.00 - 0.04 K/uL Final    Lymph # 03/08/2023 3.3  1.0 - 4.8 K/uL Final    Mono # 03/08/2023 1.1 (H)  0.3 - 1.0 K/uL Final    Eos # 03/08/2023 0.3  0.0 - 0.5 K/uL Final    Baso # 03/08/2023 0.07  0.00 - 0.20 K/uL Final    nRBC 03/08/2023 0  0 /100 WBC Final    Gran % 03/08/2023 43.8  38.0 - 73.0 % Final    Lymph % 03/08/2023 39.3  18.0 - 48.0 % Final    Mono % 03/08/2023 12.7  4.0 - 15.0 % Final    Eosinophil %  03/08/2023 3.0  0.0 - 8.0 % Final    Basophil % 03/08/2023 0.8  0.0 - 1.9 % Final    Differential Method 03/08/2023 Automated   Final    Sodium 03/08/2023 141  136 - 145 mmol/L Final    Potassium 03/08/2023 3.9  3.5 - 5.1 mmol/L Final    Chloride 03/08/2023 105  95 - 110 mmol/L Final    CO2 03/08/2023 28  23 - 29 mmol/L Final    Glucose 03/08/2023 128 (H)  70 - 110 mg/dL Final    BUN 03/08/2023 20  8 - 23 mg/dL Final    Creatinine 03/08/2023 1.2  0.5 - 1.4 mg/dL Final    Calcium 03/08/2023 9.3  8.7 - 10.5 mg/dL Final    Total Protein 03/08/2023 7.7  6.0 - 8.4 g/dL Final    Albumin 03/08/2023 4.3  3.5 - 5.2 g/dL Final    Total Bilirubin 03/08/2023 0.4  0.1 - 1.0 mg/dL Final    Alkaline Phosphatase 03/08/2023 65  55 - 135 U/L Final    AST 03/08/2023 27  10 - 40 U/L Final    ALT 03/08/2023 18  10 - 44 U/L Final    Anion Gap 03/08/2023 8  8 - 16 mmol/L Final    eGFR 03/08/2023 44.4 (A)  >60 mL/min/1.73 m^2 Final    Troponin I High Sensitivity 03/08/2023 16.8 (H)  0.0 - 14.9 pg/mL Final    Troponin I High Sensitivity 03/08/2023 19.0 (H)  0.0 - 14.9 pg/mL Final    BNP 03/08/2023 403 (H)  0 - 99 pg/mL Final    TSH 03/08/2023 0.960  0.340 - 5.600 uIU/mL Final    Specimen UA 03/08/2023 Urine, Clean Catch   Final    Color, UA 03/08/2023 Yellow  Yellow, Straw, Anila Final    Appearance, UA 03/08/2023 Clear  Clear Final    pH, UA 03/08/2023 7.0  5.0 - 8.0 Final    Specific Gravity, UA 03/08/2023 1.005  1.005 - 1.030 Final    Protein, UA 03/08/2023 Negative  Negative Final    Glucose, UA 03/08/2023 Negative  Negative Final    Ketones, UA 03/08/2023 Negative  Negative Final    Bilirubin (UA) 03/08/2023 Negative  Negative Final    Occult Blood UA 03/08/2023 Negative  Negative Final    Nitrite, UA 03/08/2023 Negative  Negative Final    Urobilinogen, UA 03/08/2023 Negative  Negative EU/dL Final    Leukocytes, UA 03/08/2023 2+ (A)  Negative Final    Influenza A, Molecular 03/08/2023 Negative  Negative Final    Influenza B, Molecular  03/08/2023 Negative  Negative Final    Flu A & B Source 03/08/2023 NP   Final    RBC, UA 03/08/2023 0  0 - 4 /hpf Final    WBC, UA 03/08/2023 14 (H)  0 - 5 /hpf Final    Bacteria 03/08/2023 Negative  None-Occ /hpf Final    Squam Epithel, UA 03/08/2023 1  /hpf Final    Hyaline Casts, UA 03/08/2023 0  0-1/lpf /lpf Final    Microscopic Comment 03/08/2023 SEE COMMENT   Final    Urine Culture, Routine 03/08/2023 No growth   Final   Hospital Outpatient Visit on 03/07/2023   Component Date Value Ref Range Status    Battery Voltage (V) 03/07/2023 2.99  V Final    P/R-wave RA Lead 03/07/2023 1.8  mV Final    P/R-wave RA Lead (native) 03/07/2023 11  mV Final    Impedance RA Lead 03/07/2023 475  Ohms Final    Impedance RA Lead (native) 03/07/2023 494  Ohms Final    Threshold V RA Lead 03/07/2023 0.7  V Final    Theshold ms RA Lead 03/07/2023 0.4  ms Final    Threshold V RA Lead (native) 03/07/2023 0.5  V Final    Threshold ms RA Lead (native) 03/07/2023 0.4  ms Final       Past Medical History:   Diagnosis Date    Coronary artery disease     Dermatitis     Dermatitis 12/8/2017    Diabetes mellitus     Diabetes mellitus type II     Leah Barr virus infection     Fibromyalgia     GERD (gastroesophageal reflux disease)     Hypertension     MI (myocardial infarction) 09/23/2011    Pure hypercholesterolemia 2/26/2020     Past Surgical History:   Procedure Laterality Date    adenoids      ANGIOPLASTY  1987    APPENDECTOMY      CARDIAC PACEMAKER PLACEMENT  01/12/2017    CATARACT EXTRACTION, BILATERAL Bilateral 9/2/15, 3/17/15    right eye-9/2/15, left eye-3/17/15    CHOLECYSTECTOMY  1987    CORONARY ARTERY BYPASS GRAFT  2006    quadruple    coronary stents  1999    x2    CYST REMOVAL  04/25/2017    on back    HYSTERECTOMY  1966    OVARY SURGERY  1948    Ovary burst & was repaired    RHIZOTOMY  09/14/2018    TONSILLECTOMY  1940     Family History   Problem Relation Age of Onset    No Known Problems Mother     No Known Problems  Father        Marital Status:   Alcohol History:  reports no history of alcohol use.  Tobacco History:  reports that she has never smoked. She has never used smokeless tobacco.  Drug History:  reports no history of drug use.    Review of patient's allergies indicates:   Allergen Reactions    Diclofenac-misoprostol      Other reaction(s): Not available    Doxycycline     Effexor [venlafaxine]     Estradiol     Flagyl [metronidazole]     Fluconazole     Gabapentin     Iodine     Levaquin [levofloxacin]     Nexium [esomeprazole magnesium]     Penicillins     Red yeast rice (monascus purpureus)     Shellfish containing products     Statins-hmg-coa reductase inhibitors     Sulfa (sulfonamide antibiotics)     Tea tree oil     Tegaserod      ZOLNORM    Tegaserod hydrogen maleate     Trazodone     Yeast, dried     Adhesive Rash     Band-aids       Current Outpatient Medications:     acetaminophen (TYLENOL) 500 MG tablet, Take 500 mg by mouth every 6 (six) hours as needed for Pain., Disp: , Rfl:     apixaban (ELIQUIS) 2.5 mg Tab, Take 1 tablet (2.5 mg total) by mouth 2 (two) times daily., Disp: 180 tablet, Rfl: 1    aspirin (ECOTRIN) 81 MG EC tablet, Take 1 tablet (81 mg total) by mouth once daily. (Patient taking differently: Take 81 mg by mouth every evening.), Disp: , Rfl: 0    coenzyme Q10 100 mg capsule, Take 1 capsule (100 mg total) by mouth 2 (two) times daily., Disp: 60 capsule, Rfl: 11    cyanocobalamin 1,000 mcg/mL injection, 2cc I'm every other week, Disp: 10 mL, Rfl: 1    diphenhydrAMINE (BENYLIN) 12.5 mg/5 mL liquid, Take 12.5 mg by mouth 4 (four) times daily as needed for Allergies., Disp: , Rfl:     ECHINACEA ORAL, Take 750 mg by mouth 2 (two) times a day., Disp: , Rfl:     fluocinonide-emollient (FLUOCINONIDE-E) 0.05 % Crea, Apply topically 2 (two) times daily., Disp: 60 g, Rfl: 1    hydrocortisone 2.5 % ointment, Apply topically 2 (two) times daily., Disp: 28 g, Rfl: 3    isosorbide mononitrate  (IMDUR) 120 MG 24 hr tablet, TAKE 1 TABLET (120 MG TOTAL) BY MOUTH ONCE DAILY. DO NOT CRUSH OR CHEW SWALLOW WHOLE., Disp: 90 tablet, Rfl: 3    loratadine (CLARITIN) 10 mg tablet, Take 10 mg by mouth once daily., Disp: , Rfl:     losartan (COZAAR) 25 MG tablet, Take 0.5 tablets (12.5 mg total) by mouth every evening., Disp: 45 tablet, Rfl: 3    MAGNESIUM ORAL, Take 500 mg by mouth once daily. , Disp: , Rfl:     metoprolol succinate (TOPROL-XL) 25 MG 24 hr tablet, TAKE 1 TABLET BY MOUTH EVERY DAY, Disp: 90 tablet, Rfl: 3    multivitamin (THERAGRAN) tablet, Take 1 tablet by mouth 2 (two) times a day. , Disp: , Rfl:     nitroGLYCERIN (NITROSTAT) 0.4 MG SL tablet, Place 0.4 mg under the tongue every 5 (five) minutes as needed for Chest pain., Disp: , Rfl:     omeprazole (PRILOSEC) 20 MG capsule, Take 1 capsule (20 mg total) by mouth once daily., Disp: 90 capsule, Rfl: 3    turmeric 400 mg Cap, Take 1,200 mg by mouth once daily., Disp: , Rfl:     UNABLE TO FIND, Take 1 capsule by mouth once daily. medication name: GI probiotic, Disp: , Rfl:     UNABLE TO FIND, Take 1 capsule by mouth once daily. Joint health, Disp: , Rfl:     UNABLE TO FIND, Take 1 capsule by mouth 2 (two) times a day. Cardio platinum, Disp: , Rfl:     UNABLE TO FIND, Nerve [place], Disp: , Rfl:     zinc gluconate 50 mg tablet, Take 50 mg by mouth 2 (two) times a day., Disp: , Rfl:     ALPRAZolam (XANAX) 0.25 MG tablet, TAKE 1 TABLET BY MOUTH EVERY DAY AS NEEDED FOR ANXIETY, Disp: 90 tablet, Rfl: 0    azithromycin (Z-POLY) 250 MG tablet, Take 1 tablet (250 mg total) by mouth once daily. po on day 1 then 1 tab po on days 2-5, Disp: 6 tablet, Rfl: 0    fluticasone propionate (FLONASE) 50 mcg/actuation nasal spray, 1 spray (50 mcg total) by Each Nostril route once daily., Disp: 9.9 mL, Rfl: 1    levothyroxine (SYNTHROID) 88 MCG tablet, Take 1 tablet (88 mcg total) by mouth once daily., Disp: 90 tablet, Rfl: 1    LIDOcaine (LIDODERM) 5 %, Place 1 patch onto the  skin once daily. Remove & Discard patch within 12 hours or as directed by MD, Disp: 30 patch, Rfl: 1    LIDOcaine HCl 2% (XYLOCAINE) 2 % Soln, May gargle and spit out as needed for sore throat-do not swalllow, Disp: 20 mL, Rfl: 0    montelukast (SINGULAIR) 10 mg tablet, TAKE 1 TABLET BY MOUTH EVERY DAY IN THE EVENING, Disp: 90 tablet, Rfl: 1    tiZANidine (ZANAFLEX) 4 MG tablet, Take 1 tablet (4 mg total) by mouth once daily., Disp: 90 tablet, Rfl: 0    traMADoL (ULTRAM) 50 mg tablet, Take 1 tablet (50 mg total) by mouth 2 (two) times daily as needed (pain)., Disp: 14 tablet, Rfl: 0    triamcinolone acetonide 0.1% (KENALOG) 0.1 % cream, aaa bid x 1-2 wks, tk 1 wk off prn rash (Patient not taking: Reported on 11/1/2023), Disp: 30 g, Rfl: 2    Current Facility-Administered Medications:     evolocumab PnIj 140 mg, 140 mg, Subcutaneous, Q14 Days, Kenton Ware MD    Review of Systems   Constitutional:  Negative for activity change, appetite change, chills, fatigue, fever and unexpected weight change.   HENT:  Positive for congestion, sore throat, tinnitus and trouble swallowing. Negative for ear pain, hearing loss, postnasal drip, sinus pain and sneezing.    Eyes:  Negative for pain, discharge and visual disturbance.   Respiratory:  Positive for cough. Negative for choking, shortness of breath and wheezing.    Cardiovascular:  Negative for chest pain, palpitations and leg swelling.   Gastrointestinal:  Negative for abdominal distention, abdominal pain, blood in stool, constipation, diarrhea, nausea and vomiting.   Endocrine: Negative for cold intolerance and heat intolerance.   Genitourinary:  Negative for difficulty urinating, dysuria, frequency, pelvic pain and urgency.   Musculoskeletal:  Negative for back pain, joint swelling and neck pain.   Skin:  Negative for pallor and rash.   Allergic/Immunologic: Negative for environmental allergies and food allergies.   Neurological:  Negative for dizziness, tremors,  weakness, numbness and headaches.   Hematological:  Does not bruise/bleed easily.   Psychiatric/Behavioral:  Negative for agitation, confusion, dysphoric mood, sleep disturbance and suicidal ideas. The patient is not nervous/anxious.           Objective:          4/20/2023     2:30 PM 7/11/2023     2:42 PM 9/12/2023     5:02 PM 10/12/2023     2:33 PM 11/1/2023    11:29 AM 12/5/2023     3:56 PM   Vitals - 1 value per visit   SYSTOLIC 136 134 134 132 138 155   DIASTOLIC 64 74 86 74 76 77   Pulse 72 73 72 80 70 80   Temp 97.9 °F (36.6 °C)   97.8 °F (36.6 °C) 98 °F (36.7 °C) 98.3 °F (36.8 °C)   Resp 12 16 14 12 16 14   SPO2 97 % 98 % 99 % 99 % 99 % 99 %   Weight (lb) 133 135 137 137 140 139   Weight (kg) 60.328 61.236 62.143 62.143 63.504 63.05   Height 5' (1.524 m) 5' (1.524 m) 5' (1.524 m) 5' (1.524 m) 5' (1.524 m) 5' (1.524 m)   BMI (Calculated) 26 26.4 26.8 26.8 27.3 27.1   Pain Score Zero Zero Zero Zero Zero Zero   (    Physical Exam  Constitutional:       General: She is not in acute distress.     Appearance: Normal appearance. She is not ill-appearing.   HENT:      Head: Normocephalic and atraumatic.      Nose: Nose normal.      Mouth/Throat:      Mouth: Mucous membranes are moist.      Pharynx: Posterior oropharyngeal erythema (some nodular erythematous patches) present.   Eyes:      Conjunctiva/sclera: Conjunctivae normal.      Pupils: Pupils are equal, round, and reactive to light.   Neck:      Vascular: No carotid bruit.   Cardiovascular:      Rate and Rhythm: Normal rate and regular rhythm.      Pulses: Normal pulses.      Heart sounds: Normal heart sounds.   Pulmonary:      Effort: Pulmonary effort is normal. No respiratory distress.      Breath sounds: Normal breath sounds. No stridor. No wheezing.   Musculoskeletal:      Cervical back: Normal range of motion and neck supple.   Lymphadenopathy:      Cervical: No cervical adenopathy.   Skin:     General: Skin is warm and dry.   Neurological:      Mental  Status: She is alert.   Psychiatric:         Mood and Affect: Mood normal.         Thought Content: Thought content normal.         Judgment: Judgment normal.           Assessment:       1. Bronchitis    2. Pharyngitis, unspecified etiology    3. Sciatica of left side    4. Sciatica, right side    5. Right leg pain    6. Severe anxiety    7. Acquired hypothyroidism    8. Muscle spasm    9. Environmental allergies    10. Allergy, subsequent encounter         Plan:       Bronchitis  -     azithromycin (Z-POLY) 250 MG tablet; Take 1 tablet (250 mg total) by mouth once daily. po on day 1 then 1 tab po on days 2-5  Dispense: 6 tablet; Refill: 0    Pharyngitis, unspecified etiology  -     LIDOcaine HCl 2% (XYLOCAINE) 2 % Soln; May gargle and spit out as needed for sore throat-do not swalllow  Dispense: 20 mL; Refill: 0  -     azithromycin (Z-POLY) 250 MG tablet; Take 1 tablet (250 mg total) by mouth once daily. po on day 1 then 1 tab po on days 2-5  Dispense: 6 tablet; Refill: 0    Sciatica of left side  -     LIDOcaine (LIDODERM) 5 %; Place 1 patch onto the skin once daily. Remove & Discard patch within 12 hours or as directed by MD  Dispense: 30 patch; Refill: 1    Sciatica, right side  -     traMADoL (ULTRAM) 50 mg tablet; Take 1 tablet (50 mg total) by mouth 2 (two) times daily as needed (pain).  Dispense: 14 tablet; Refill: 0    Right leg pain  -     traMADoL (ULTRAM) 50 mg tablet; Take 1 tablet (50 mg total) by mouth 2 (two) times daily as needed (pain).  Dispense: 14 tablet; Refill: 0    Severe anxiety  -     ALPRAZolam (XANAX) 0.25 MG tablet; TAKE 1 TABLET BY MOUTH EVERY DAY AS NEEDED FOR ANXIETY  Dispense: 90 tablet; Refill: 0    Acquired hypothyroidism  -     levothyroxine (SYNTHROID) 88 MCG tablet; Take 1 tablet (88 mcg total) by mouth once daily.  Dispense: 90 tablet; Refill: 1    Muscle spasm  -     tiZANidine (ZANAFLEX) 4 MG tablet; Take 1 tablet (4 mg total) by mouth once daily.  Dispense: 90 tablet; Refill:  0    Environmental allergies  -     montelukast (SINGULAIR) 10 mg tablet; TAKE 1 TABLET BY MOUTH EVERY DAY IN THE EVENING  Dispense: 90 tablet; Refill: 1    Allergy, subsequent encounter  -     fluticasone propionate (FLONASE) 50 mcg/actuation nasal spray; 1 spray (50 mcg total) by Each Nostril route once daily.  Dispense: 9.9 mL; Refill: 1      Follow up if symptoms worsen or fail to improve. If chest congestion continues she can see mi in 6 days        12/5/2023 Elena Sullivan M.D.

## 2023-12-05 ENCOUNTER — OFFICE VISIT (OUTPATIENT)
Dept: FAMILY MEDICINE | Facility: CLINIC | Age: 88
End: 2023-12-05
Payer: MEDICARE

## 2023-12-05 VITALS
HEIGHT: 60 IN | TEMPERATURE: 98 F | WEIGHT: 139 LBS | BODY MASS INDEX: 27.29 KG/M2 | RESPIRATION RATE: 14 BRPM | HEART RATE: 80 BPM | OXYGEN SATURATION: 99 % | DIASTOLIC BLOOD PRESSURE: 73 MMHG | SYSTOLIC BLOOD PRESSURE: 181 MMHG

## 2023-12-05 DIAGNOSIS — J40 BRONCHITIS: Primary | ICD-10-CM

## 2023-12-05 DIAGNOSIS — M54.32 SCIATICA OF LEFT SIDE: ICD-10-CM

## 2023-12-05 DIAGNOSIS — M62.838 MUSCLE SPASM: ICD-10-CM

## 2023-12-05 DIAGNOSIS — M79.604 RIGHT LEG PAIN: ICD-10-CM

## 2023-12-05 DIAGNOSIS — F41.9 SEVERE ANXIETY: ICD-10-CM

## 2023-12-05 DIAGNOSIS — Z91.09 ENVIRONMENTAL ALLERGIES: ICD-10-CM

## 2023-12-05 DIAGNOSIS — M54.31 SCIATICA, RIGHT SIDE: ICD-10-CM

## 2023-12-05 DIAGNOSIS — J02.9 PHARYNGITIS, UNSPECIFIED ETIOLOGY: ICD-10-CM

## 2023-12-05 DIAGNOSIS — E03.9 ACQUIRED HYPOTHYROIDISM: ICD-10-CM

## 2023-12-05 DIAGNOSIS — T78.40XD ALLERGY, SUBSEQUENT ENCOUNTER: ICD-10-CM

## 2023-12-05 PROCEDURE — 99214 OFFICE O/P EST MOD 30 MIN: CPT | Mod: S$PBB,AQ,, | Performed by: INTERNAL MEDICINE

## 2023-12-05 PROCEDURE — 99214 PR OFFICE/OUTPT VISIT, EST, LEVL IV, 30-39 MIN: ICD-10-PCS | Mod: S$PBB,AQ,, | Performed by: INTERNAL MEDICINE

## 2023-12-05 PROCEDURE — 99213 OFFICE O/P EST LOW 20 MIN: CPT | Performed by: INTERNAL MEDICINE

## 2023-12-05 RX ORDER — ALPRAZOLAM 0.25 MG/1
TABLET ORAL
Qty: 90 TABLET | Refills: 0 | Status: SHIPPED | OUTPATIENT
Start: 2023-12-05 | End: 2024-03-25 | Stop reason: SDUPTHER

## 2023-12-05 RX ORDER — AZITHROMYCIN 250 MG/1
250 TABLET, FILM COATED ORAL DAILY
Qty: 6 TABLET | Refills: 0 | Status: SHIPPED | OUTPATIENT
Start: 2023-12-05 | End: 2024-03-25

## 2023-12-05 RX ORDER — LEVOTHYROXINE SODIUM 88 UG/1
88 TABLET ORAL DAILY
Qty: 90 TABLET | Refills: 1 | Status: SHIPPED | OUTPATIENT
Start: 2023-12-05 | End: 2024-02-19 | Stop reason: SDUPTHER

## 2023-12-05 RX ORDER — TRAMADOL HYDROCHLORIDE 50 MG/1
50 TABLET ORAL 2 TIMES DAILY PRN
Qty: 14 TABLET | Refills: 0 | Status: SHIPPED | OUTPATIENT
Start: 2023-12-05 | End: 2024-03-25 | Stop reason: SDUPTHER

## 2023-12-05 RX ORDER — LIDOCAINE HYDROCHLORIDE 20 MG/ML
SOLUTION OROPHARYNGEAL
Qty: 20 ML | Refills: 0 | Status: SHIPPED | OUTPATIENT
Start: 2023-12-05 | End: 2024-03-25

## 2023-12-05 RX ORDER — MONTELUKAST SODIUM 10 MG/1
TABLET ORAL
Qty: 90 TABLET | Refills: 1 | Status: SHIPPED | OUTPATIENT
Start: 2023-12-05

## 2023-12-05 RX ORDER — LIDOCAINE 50 MG/G
1 PATCH TOPICAL DAILY
Qty: 30 PATCH | Refills: 1 | Status: SHIPPED | OUTPATIENT
Start: 2023-12-05

## 2023-12-05 RX ORDER — TIZANIDINE 4 MG/1
4 TABLET ORAL DAILY
Qty: 90 TABLET | Refills: 0 | Status: SHIPPED | OUTPATIENT
Start: 2023-12-05 | End: 2024-03-25 | Stop reason: SDUPTHER

## 2023-12-05 RX ORDER — FLUTICASONE PROPIONATE 50 MCG
1 SPRAY, SUSPENSION (ML) NASAL DAILY
Qty: 9.9 ML | Refills: 1 | Status: SHIPPED | OUTPATIENT
Start: 2023-12-05

## 2023-12-06 ENCOUNTER — TELEPHONE (OUTPATIENT)
Dept: FAMILY MEDICINE | Facility: CLINIC | Age: 88
End: 2023-12-06

## 2023-12-06 NOTE — TELEPHONE ENCOUNTER
BP report    Last night 3 hours after taking her BP med her BP was 146/76  -then 145/77 then 152/71    Took med this am BP was 124/70- this afternoon it is 142/78

## 2023-12-06 NOTE — TELEPHONE ENCOUNTER
Pt notified BP is acceptable. Keep us posted twice a week.    Pt verbalized understanding of instruction.

## 2023-12-13 ENCOUNTER — TELEPHONE (OUTPATIENT)
Dept: FAMILY MEDICINE | Facility: CLINIC | Age: 88
End: 2023-12-13

## 2023-12-14 ENCOUNTER — PATIENT MESSAGE (OUTPATIENT)
Dept: FAMILY MEDICINE | Facility: CLINIC | Age: 88
End: 2023-12-14

## 2023-12-14 NOTE — TELEPHONE ENCOUNTER
Pt reports she has finished her antibiotic.  Has head ache and congestion, post nasal drip but no fever.   She is taking Claritin- does that need to be changed.    Please advise

## 2023-12-21 ENCOUNTER — OFFICE VISIT (OUTPATIENT)
Dept: CARDIOLOGY | Facility: CLINIC | Age: 88
End: 2023-12-21
Payer: MEDICARE

## 2023-12-21 VITALS
OXYGEN SATURATION: 99 % | DIASTOLIC BLOOD PRESSURE: 70 MMHG | WEIGHT: 140 LBS | HEIGHT: 60 IN | BODY MASS INDEX: 27.48 KG/M2 | SYSTOLIC BLOOD PRESSURE: 142 MMHG | HEART RATE: 74 BPM

## 2023-12-21 DIAGNOSIS — Z78.9 STATIN INTOLERANCE: ICD-10-CM

## 2023-12-21 DIAGNOSIS — I10 ESSENTIAL HYPERTENSION: ICD-10-CM

## 2023-12-21 DIAGNOSIS — R07.89 OTHER CHEST PAIN: Primary | ICD-10-CM

## 2023-12-21 DIAGNOSIS — I48.0 PAF (PAROXYSMAL ATRIAL FIBRILLATION): Chronic | ICD-10-CM

## 2023-12-21 DIAGNOSIS — I25.10 CORONARY ARTERY DISEASE INVOLVING LEFT MAIN CORONARY ARTERY: ICD-10-CM

## 2023-12-21 DIAGNOSIS — Z95.0 PACEMAKER: Chronic | ICD-10-CM

## 2023-12-21 PROCEDURE — 99999 PR PBB SHADOW E&M-EST. PATIENT-LVL V: ICD-10-PCS | Mod: PBBFAC,,, | Performed by: INTERNAL MEDICINE

## 2023-12-21 PROCEDURE — 99215 OFFICE O/P EST HI 40 MIN: CPT | Mod: PBBFAC,PN | Performed by: INTERNAL MEDICINE

## 2023-12-21 PROCEDURE — 99999 PR PBB SHADOW E&M-EST. PATIENT-LVL V: CPT | Mod: PBBFAC,,, | Performed by: INTERNAL MEDICINE

## 2023-12-21 PROCEDURE — 99214 PR OFFICE/OUTPT VISIT, EST, LEVL IV, 30-39 MIN: ICD-10-PCS | Mod: S$PBB,,, | Performed by: INTERNAL MEDICINE

## 2023-12-21 PROCEDURE — 99214 OFFICE O/P EST MOD 30 MIN: CPT | Mod: S$PBB,,, | Performed by: INTERNAL MEDICINE

## 2023-12-21 RX ORDER — BEMPEDOIC ACID 180 MG/1
180 TABLET, FILM COATED ORAL DAILY
Qty: 30 TABLET | Refills: 11 | Status: SHIPPED | OUTPATIENT
Start: 2023-12-21 | End: 2024-12-20

## 2023-12-21 NOTE — ASSESSMENT & PLAN NOTE
Continue on low doses of Eliquis at 2.5 mg p.o. b.i.d. continue on metoprolol she is doing well on the present regimen.  Along with magnesium supplements

## 2023-12-21 NOTE — ASSESSMENT & PLAN NOTE
Patient has profound statin intolerance and intolerant to Repatha also.  Will try Nexletol see if this will be successful in optimizing her her lipid levels.

## 2023-12-21 NOTE — ASSESSMENT & PLAN NOTE
She is doing well continue on risk factor modification however she does have severe statin intolerance and now intolerant to Repatha as well.  So her choices diet control alone at this time she is willing to give a trial with NEXLETOL.

## 2023-12-21 NOTE — PROGRESS NOTES
Subjective:    Patient ID:  Karma Chen is a 88 y.o. female patient here for evaluation Coronary Artery Disease and Hypertension      History of Present Illness:       Patient is here for follow-up evaluation she is doing remarkably well denies having episodes of chest discomfort no arm neck or jaw pain no palpitations dizziness noted.  And she has no cough congestion no fevers chills no nausea vomiting she is doing good.      Review of patient's allergies indicates:   Allergen Reactions    Diclofenac-misoprostol      Other reaction(s): Not available    Doxycycline     Effexor [venlafaxine]     Estradiol     Flagyl [metronidazole]     Fluconazole     Gabapentin     Iodine     Levaquin [levofloxacin]     Nexium [esomeprazole magnesium]     Penicillins     Red yeast rice (monascus purpureus)     Shellfish containing products     Statins-hmg-coa reductase inhibitors     Sulfa (sulfonamide antibiotics)     Tea tree oil     Tegaserod      ZOLNORM    Tegaserod hydrogen maleate     Trazodone     Yeast, dried     Adhesive Rash     Band-aids       Past Medical History:   Diagnosis Date    Coronary artery disease     Dermatitis     Dermatitis 12/8/2017    Diabetes mellitus     Diabetes mellitus type II     Leah Barr virus infection     Fibromyalgia     GERD (gastroesophageal reflux disease)     Hypertension     MI (myocardial infarction) 09/23/2011    Pure hypercholesterolemia 2/26/2020     Past Surgical History:   Procedure Laterality Date    adenoids      ANGIOPLASTY  1987    APPENDECTOMY      CARDIAC PACEMAKER PLACEMENT  01/12/2017    CATARACT EXTRACTION, BILATERAL Bilateral 9/2/15, 3/17/15    right eye-9/2/15, left eye-3/17/15    CHOLECYSTECTOMY  1987    CORONARY ARTERY BYPASS GRAFT  2006    quadruple    coronary stents  1999    x2    CYST REMOVAL  04/25/2017    on back    HYSTERECTOMY  1966    OVARY SURGERY  1948    Ovary burst & was repaired    RHIZOTOMY  09/14/2018    TONSILLECTOMY  1940     Social History      Tobacco Use    Smoking status: Never    Smokeless tobacco: Never   Substance Use Topics    Alcohol use: No    Drug use: No        Review of Systems:    As noted in HPI in addition      REVIEW OF SYSTEMS  CARDIOVASCULAR: No recent chest pain, palpitations, arm, neck, or jaw pain  RESPIRATORY: No recent fever, cough chills, SOB or congestion  : No blood in the urine  GI: No Nausea, vomiting, constipation, diarrhea, blood, or reflux.  MUSCULOSKELETAL: No myalgias  NEURO: No lightheadedness or dizziness  EYES: No Double vision, blurry, vision or headache              Objective        Vitals:    12/21/23 1331   BP: (!) 142/70   Pulse: 74       LIPIDS - LAST 2   Lab Results   Component Value Date    CHOL 229 (H) 08/23/2023    CHOL 223 (H) 05/20/2022    HDL 38 (L) 08/23/2023    HDL 56 05/20/2022    LDLCALC 124.0 08/23/2023    LDLCALC 145.2 05/20/2022    TRIG 335 (H) 08/23/2023    TRIG 109 05/20/2022    CHOLHDL 16.6 (L) 08/23/2023    CHOLHDL 25.1 05/20/2022       CBC - LAST 2  Lab Results   Component Value Date    WBC 7.03 11/21/2023    WBC 7.43 08/23/2023    RBC 4.21 11/21/2023    RBC 4.01 08/23/2023    HGB 12.0 11/21/2023    HGB 11.4 (L) 08/23/2023    HCT 38.2 11/21/2023    HCT 36.4 (L) 08/23/2023    MCV 91 11/21/2023    MCV 91 08/23/2023    MCH 28.5 11/21/2023    MCH 28.4 08/23/2023    MCHC 31.4 (L) 11/21/2023    MCHC 31.3 (L) 08/23/2023    RDW 13.6 11/21/2023    RDW 13.9 08/23/2023     11/21/2023     08/23/2023    MPV 10.3 11/21/2023    MPV 10.5 08/23/2023    GRAN 2.8 11/21/2023    GRAN 39.8 11/21/2023    LYMPH 3.1 11/21/2023    LYMPH 44.2 11/21/2023    MONO 0.8 11/21/2023    MONO 11.5 11/21/2023    BASO 0.07 11/21/2023    BASO 0.05 08/23/2023    NRBC 0 11/21/2023    NRBC 0 08/23/2023       CHEMISTRY & LIVER FUNCTION - LAST 2  Lab Results   Component Value Date     11/21/2023     08/23/2023    K 4.0 11/21/2023    K 4.1 08/23/2023     11/21/2023     08/23/2023    CO2 25  11/21/2023    CO2 24 08/23/2023    ANIONGAP 10 11/21/2023    ANIONGAP 6 (L) 08/23/2023    BUN 26 (H) 11/21/2023    BUN 26 (H) 08/23/2023    CREATININE 1.2 11/21/2023    CREATININE 1.5 (H) 08/23/2023    GLU 80 11/21/2023    GLU 91 08/23/2023    CALCIUM 9.3 11/21/2023    CALCIUM 8.7 08/23/2023    MG 2.4 11/21/2023    MG 2.3 03/08/2023    ALBUMIN 4.0 11/21/2023    ALBUMIN 3.6 08/23/2023    PROT 6.9 08/23/2023    PROT 7.7 03/08/2023    ALKPHOS 63 08/23/2023    ALKPHOS 65 03/08/2023    ALT 17 08/23/2023    ALT 18 03/08/2023    AST 24 08/23/2023    AST 27 03/08/2023    BILITOT 0.5 08/23/2023    BILITOT 0.4 03/08/2023        CARDIAC PROFILE - LAST 2  Lab Results   Component Value Date     (H) 03/08/2023     (H) 05/10/2022    CPK 53 10/22/2021    CPKMB 1.57 02/20/2012    TROPONINI <0.030 10/22/2021    TROPONINI 0.077 (HH) 07/25/2020    TROPONINIHS 19.0 (H) 03/08/2023    TROPONINIHS 16.8 (H) 03/08/2023        COAGULATION - LAST 2  Lab Results   Component Value Date    LABPT 13.9 (H) 10/22/2021    LABPT 13.6 05/02/2020    INR 1.2 10/22/2021    INR 1.1 05/02/2020    APTT 26.9 12/10/2017    APTT 26.6 02/20/2012       ENDOCRINE & PSA - LAST 2  Lab Results   Component Value Date    HGBA1C 5.8 08/23/2023    HGBA1C 5.8 04/04/2023    TSH 0.960 03/08/2023    TSH 1.280 05/10/2022        ECHOCARDIOGRAM RESULTS  Results for orders placed during the hospital encounter of 06/10/21    Echo    Interpretation Summary  · The left ventricle is mildly enlarged with mild concentric hypertrophy  · The estimated ejection fraction is 49%. Which is decreased  · Grade II left ventricular diastolic dysfunction.  · There is mild left ventricular global hypokinesis.  · Atrial fibrillation not observed.  · Normal right ventricular size with normal right ventricular systolic function.  · Moderate left atrial enlargement.  · Mild-to-moderate aortic regurgitation.  · There is mild aortic valve stenosis.  · Aortic valve area is 1.99 cm2; peak  velocity is 1.41 m/s; mean gradient is 4 mmHg.  · Mild mitral regurgitation.  · Mild to moderate tricuspid regurgitation.  · Mild pulmonic regurgitation.  · Normal central venous pressure (3 mmHg).  · The estimated PA systolic pressure is 35 mmHg. Mild pulmonary hypertension  · Pacemaker lead is present      CURRENT/PREVIOUS VISIT EKG  Results for orders placed or performed during the hospital encounter of 03/08/23   EKG 12-lead    Collection Time: 03/08/23  7:22 PM    Narrative    Test Reason : R07.9,    Vent. Rate : 075 BPM     Atrial Rate : 075 BPM     P-R Int : 236 ms          QRS Dur : 120 ms      QT Int : 394 ms       P-R-T Axes : 082 003 139 degrees     QTc Int : 439 ms    Atrial-paced rhythm with prolonged AV conduction  Septal infarct (cited on or before 11-DEC-2017)  ST and T wave abnormality, consider lateral ischemia  Abnormal ECG  When compared with ECG of 07-JUL-2022 15:30,  No significant change was found  Confirmed by Herson CORTEZ, Chava COHEN ([id number]) on 3/18/2023 8:28:58 PM    Referred By: [email or web address]   SELF           Confirmed By:Chava Bains MD     No valid procedures specified.   Results for orders placed during the hospital encounter of 06/10/21    Nuclear Stress - Cardiology Interpreted    Interpretation Summary    Normal myocardial perfusion scan. There is no evidence of myocardial ischemia or infarction.    The gated perfusion images showed an ejection fraction of 51% post stress. Normal ejection fraction is greater than 53%.    There is normal wall motion post stress.    LV cavity size is  and normal at stress.    The EKG portion of this study is abnormal but not diagnostic.    The patient reported no chest pain during the stress test.    There were no arrhythmias during stress.    No valid procedures specified.    PHYSICAL EXAM  CONSTITUTIONAL: Well built, well nourished in no apparent distress  NECK: no carotid bruit, no JVD  LUNGS: CTA  CHEST WALL: no tenderness  HEART: regular rate and rhythm,  S1, S2 normal, no murmur, click, rub or gallop   ABDOMEN: soft, non-tender; bowel sounds normal; no masses,  no organomegaly  EXTREMITIES: Extremities normal, no edema, no calf tenderness noted  NEURO: AAO X 3    I HAVE REVIEWED :    The vital signs, nurses notes, and all the pertinent radiology and labs.        Current Outpatient Medications   Medication Instructions    acetaminophen (TYLENOL) 500 mg, Oral, Every 6 hours PRN    ALPRAZolam (XANAX) 0.25 MG tablet TAKE 1 TABLET BY MOUTH EVERY DAY AS NEEDED FOR ANXIETY    apixaban (ELIQUIS) 2.5 mg, Oral, 2 times daily    aspirin (ECOTRIN) 81 mg, Oral, Daily    azithromycin (Z-POLY) 250 mg, Oral, Daily, po on day 1 then 1 tab po on days 2-5    coenzyme Q10 100 mg, Oral, 2 times daily    cyanocobalamin 1,000 mcg/mL injection 2cc I'm every other week    diphenhydrAMINE (BENYLIN) 12.5 mg, Oral, 4 times daily PRN    ECHINACEA ORAL 750 mg, Oral, 2 times daily    fluocinonide-emollient (FLUOCINONIDE-E) 0.05 % Crea Topical (Top), 2 times daily    fluticasone propionate (FLONASE) 50 mcg, Each Nostril, Daily    hydrocortisone 2.5 % ointment Topical (Top), 2 times daily    isosorbide mononitrate (IMDUR) 120 MG 24 hr tablet TAKE 1 TABLET (120 MG TOTAL) BY MOUTH ONCE DAILY. DO NOT CRUSH OR CHEW SWALLOW WHOLE.    levothyroxine (SYNTHROID) 88 mcg, Oral, Daily    LIDOcaine (LIDODERM) 5 % 1 patch, Transdermal, Daily, Remove & Discard patch within 12 hours or as directed by MD    LIDOcaine HCl 2% (XYLOCAINE) 2 % Soln May gargle and spit out as needed for sore throat-do not swalllow    loratadine (CLARITIN) 10 mg, Oral, Daily    losartan (COZAAR) 12.5 mg, Oral, Nightly    MAGNESIUM ORAL 500 mg, Oral, Daily    metoprolol succinate (TOPROL-XL) 25 MG 24 hr tablet TAKE 1 TABLET BY MOUTH EVERY DAY    montelukast (SINGULAIR) 10 mg tablet TAKE 1 TABLET BY MOUTH EVERY DAY IN THE EVENING    multivitamin (THERAGRAN) tablet 1 tablet, Oral, 2 times daily    nitroGLYCERIN (NITROSTAT) 0.4 mg,  Sublingual, Every 5 min PRN    omeprazole (PRILOSEC) 20 mg, Oral, Daily    tiZANidine (ZANAFLEX) 4 mg, Oral, Daily    traMADoL (ULTRAM) 50 mg, Oral, 2 times daily PRN    triamcinolone acetonide 0.1% (KENALOG) 0.1 % cream aaa bid x 1-2 wks, tk 1 wk off prn rash    turmeric 1,200 mg, Oral, Daily    UNABLE TO FIND 1 capsule, Oral, Daily, medication name: GI probiotic     UNABLE TO FIND 1 capsule, Oral, Daily, Joint health     UNABLE TO FIND 1 capsule, Oral, 2 times daily, Cardio platinum    UNABLE TO FIND Nerve Jorge    zinc gluconate 50 mg, Oral, 2 times daily          Assessment & Plan     Angina  Stable angina continue on present therapy to include isosorbide mononitrate 120 mg daily maintain on enteric-coated aspirin 81 mg a day, continue to maintain on diet and activity as tolerated.  She is also on Toprol-XL 25 mg a day maintain the same    Coronary artery disease involving left main coronary artery  She is doing well continue on risk factor modification however she does have severe statin intolerance and now intolerant to Repatha as well.  So her choices diet control alone at this time she is willing to give a trial with NEXLETOL.    Essential hypertension  Her blood pressure is suboptimally controlled recommend to increase the losartan to full tablet of 25 mg a day her pressure today is 142/70 mm Hg continue on metoprolol and other low-salt low-fat diet.    PAF (paroxysmal atrial fibrillation)  Continue on low doses of Eliquis at 2.5 mg p.o. b.i.d. continue on metoprolol she is doing well on the present regimen.  Along with magnesium supplements    Pacemaker in place  She has a functioning pacemaker in Situ although stable    Statin intolerance  Patient has profound statin intolerance and intolerant to Repatha also.  Will try Nexletol see if this will be successful in optimizing her her lipid levels.        No follow-ups on file.

## 2023-12-21 NOTE — ASSESSMENT & PLAN NOTE
Stable angina continue on present therapy to include isosorbide mononitrate 120 mg daily maintain on enteric-coated aspirin 81 mg a day, continue to maintain on diet and activity as tolerated.  She is also on Toprol-XL 25 mg a day maintain the same

## 2023-12-21 NOTE — PROGRESS NOTES
Subjective:    Patient ID:  Karma Chen is a 88 y.o. female patient here for evaluation Coronary Artery Disease and Hypertension      History of Present Illness:     Patient is 88-year-old lady with history of known coronary artery disease and arterial hypertension and severe statin intolerance to all the drugs as noted to have developed intolerance after 1st dose of Repatha as well with profound side effects and she stopped taking it.  She has no occurrence of angina fortunately she is managing fairly well denies having any swelling in the lower extremities.  Effort capacity has been fairly good no nausea vomiting no blood in the stools or black stools no fevers no chills        Review of patient's allergies indicates:   Allergen Reactions    Diclofenac-misoprostol      Other reaction(s): Not available    Doxycycline     Effexor [venlafaxine]     Estradiol     Flagyl [metronidazole]     Fluconazole     Gabapentin     Iodine     Levaquin [levofloxacin]     Nexium [esomeprazole magnesium]     Penicillins     Red yeast rice (monascus purpureus)     Shellfish containing products     Statins-hmg-coa reductase inhibitors     Sulfa (sulfonamide antibiotics)     Tea tree oil     Tegaserod      ZOLNORM    Tegaserod hydrogen maleate     Trazodone     Yeast, dried     Adhesive Rash     Band-aids       Past Medical History:   Diagnosis Date    Coronary artery disease     Dermatitis     Dermatitis 12/8/2017    Diabetes mellitus     Diabetes mellitus type II     Leah Barr virus infection     Fibromyalgia     GERD (gastroesophageal reflux disease)     Hypertension     MI (myocardial infarction) 09/23/2011    Pure hypercholesterolemia 2/26/2020     Past Surgical History:   Procedure Laterality Date    adenoids      ANGIOPLASTY  1987    APPENDECTOMY      CARDIAC PACEMAKER PLACEMENT  01/12/2017    CATARACT EXTRACTION, BILATERAL Bilateral 9/2/15, 3/17/15    right eye-9/2/15, left eye-3/17/15    CHOLECYSTECTOMY  1987     CORONARY ARTERY BYPASS GRAFT  2006    quadruple    coronary stents  1999    x2    CYST REMOVAL  04/25/2017    on back    HYSTERECTOMY  1966    OVARY SURGERY  1948    Ovary burst & was repaired    RHIZOTOMY  09/14/2018    TONSILLECTOMY  1940     Social History     Tobacco Use    Smoking status: Never    Smokeless tobacco: Never   Substance Use Topics    Alcohol use: No    Drug use: No        Review of Systems:    As noted in HPI in addition      REVIEW OF SYSTEMS  CARDIOVASCULAR: No recent chest pain, palpitations, arm, neck, or jaw pain  RESPIRATORY: No recent fever, cough chills, SOB or congestion  : No blood in the urine  GI: No Nausea, vomiting, constipation, diarrhea, blood, or reflux.  MUSCULOSKELETAL: No myalgias  NEURO: No lightheadedness or dizziness  EYES: No Double vision, blurry, vision or headache     She is recently overcome bout of bronchitis.  However she required 2 courses of antibiotics and she is feeling much better at this time         Objective        Vitals:    12/21/23 1331   BP: (!) 142/70   Pulse: 74       LIPIDS - LAST 2   Lab Results   Component Value Date    CHOL 229 (H) 08/23/2023    CHOL 223 (H) 05/20/2022    HDL 38 (L) 08/23/2023    HDL 56 05/20/2022    LDLCALC 124.0 08/23/2023    LDLCALC 145.2 05/20/2022    TRIG 335 (H) 08/23/2023    TRIG 109 05/20/2022    CHOLHDL 16.6 (L) 08/23/2023    CHOLHDL 25.1 05/20/2022       CBC - LAST 2  Lab Results   Component Value Date    WBC 7.03 11/21/2023    WBC 7.43 08/23/2023    RBC 4.21 11/21/2023    RBC 4.01 08/23/2023    HGB 12.0 11/21/2023    HGB 11.4 (L) 08/23/2023    HCT 38.2 11/21/2023    HCT 36.4 (L) 08/23/2023    MCV 91 11/21/2023    MCV 91 08/23/2023    MCH 28.5 11/21/2023    MCH 28.4 08/23/2023    MCHC 31.4 (L) 11/21/2023    MCHC 31.3 (L) 08/23/2023    RDW 13.6 11/21/2023    RDW 13.9 08/23/2023     11/21/2023     08/23/2023    MPV 10.3 11/21/2023    MPV 10.5 08/23/2023    GRAN 2.8 11/21/2023    GRAN 39.8 11/21/2023    LYMPH  3.1 11/21/2023    LYMPH 44.2 11/21/2023    MONO 0.8 11/21/2023    MONO 11.5 11/21/2023    BASO 0.07 11/21/2023    BASO 0.05 08/23/2023    NRBC 0 11/21/2023    NRBC 0 08/23/2023       CHEMISTRY & LIVER FUNCTION - LAST 2  Lab Results   Component Value Date     11/21/2023     08/23/2023    K 4.0 11/21/2023    K 4.1 08/23/2023     11/21/2023     08/23/2023    CO2 25 11/21/2023    CO2 24 08/23/2023    ANIONGAP 10 11/21/2023    ANIONGAP 6 (L) 08/23/2023    BUN 26 (H) 11/21/2023    BUN 26 (H) 08/23/2023    CREATININE 1.2 11/21/2023    CREATININE 1.5 (H) 08/23/2023    GLU 80 11/21/2023    GLU 91 08/23/2023    CALCIUM 9.3 11/21/2023    CALCIUM 8.7 08/23/2023    MG 2.4 11/21/2023    MG 2.3 03/08/2023    ALBUMIN 4.0 11/21/2023    ALBUMIN 3.6 08/23/2023    PROT 6.9 08/23/2023    PROT 7.7 03/08/2023    ALKPHOS 63 08/23/2023    ALKPHOS 65 03/08/2023    ALT 17 08/23/2023    ALT 18 03/08/2023    AST 24 08/23/2023    AST 27 03/08/2023    BILITOT 0.5 08/23/2023    BILITOT 0.4 03/08/2023        CARDIAC PROFILE - LAST 2  Lab Results   Component Value Date     (H) 03/08/2023     (H) 05/10/2022    CPK 53 10/22/2021    CPKMB 1.57 02/20/2012    TROPONINI <0.030 10/22/2021    TROPONINI 0.077 (HH) 07/25/2020    TROPONINIHS 19.0 (H) 03/08/2023    TROPONINIHS 16.8 (H) 03/08/2023        COAGULATION - LAST 2  Lab Results   Component Value Date    LABPT 13.9 (H) 10/22/2021    LABPT 13.6 05/02/2020    INR 1.2 10/22/2021    INR 1.1 05/02/2020    APTT 26.9 12/10/2017    APTT 26.6 02/20/2012       ENDOCRINE & PSA - LAST 2  Lab Results   Component Value Date    HGBA1C 5.8 08/23/2023    HGBA1C 5.8 04/04/2023    TSH 0.960 03/08/2023    TSH 1.280 05/10/2022        ECHOCARDIOGRAM RESULTS  Results for orders placed during the hospital encounter of 06/10/21    Echo    Interpretation Summary  · The left ventricle is mildly enlarged with mild concentric hypertrophy  · The estimated ejection fraction is 49%. Which is  decreased  · Grade II left ventricular diastolic dysfunction.  · There is mild left ventricular global hypokinesis.  · Atrial fibrillation not observed.  · Normal right ventricular size with normal right ventricular systolic function.  · Moderate left atrial enlargement.  · Mild-to-moderate aortic regurgitation.  · There is mild aortic valve stenosis.  · Aortic valve area is 1.99 cm2; peak velocity is 1.41 m/s; mean gradient is 4 mmHg.  · Mild mitral regurgitation.  · Mild to moderate tricuspid regurgitation.  · Mild pulmonic regurgitation.  · Normal central venous pressure (3 mmHg).  · The estimated PA systolic pressure is 35 mmHg. Mild pulmonary hypertension  · Pacemaker lead is present      CURRENT/PREVIOUS VISIT EKG  Results for orders placed or performed during the hospital encounter of 03/08/23   EKG 12-lead    Collection Time: 03/08/23  7:22 PM    Narrative    Test Reason : R07.9,    Vent. Rate : 075 BPM     Atrial Rate : 075 BPM     P-R Int : 236 ms          QRS Dur : 120 ms      QT Int : 394 ms       P-R-T Axes : 082 003 139 degrees     QTc Int : 439 ms    Atrial-paced rhythm with prolonged AV conduction  Septal infarct (cited on or before 11-DEC-2017)  ST and T wave abnormality, consider lateral ischemia  Abnormal ECG  When compared with ECG of 07-JUL-2022 15:30,  No significant change was found  Confirmed by Herson CORTEZ, Chava COHEN ([id number]) on 3/18/2023 8:28:58 PM    Referred By: [email or web address]   SELF           Confirmed By:Chava Bains MD     No valid procedures specified.   Results for orders placed during the hospital encounter of 06/10/21    Nuclear Stress - Cardiology Interpreted    Interpretation Summary    Normal myocardial perfusion scan. There is no evidence of myocardial ischemia or infarction.    The gated perfusion images showed an ejection fraction of 51% post stress. Normal ejection fraction is greater than 53%.    There is normal wall motion post stress.    LV cavity size is  and normal at  stress.    The EKG portion of this study is abnormal but not diagnostic.    The patient reported no chest pain during the stress test.    There were no arrhythmias during stress.    No valid procedures specified.    PHYSICAL EXAM  CONSTITUTIONAL: Well built, well nourished in no apparent distress  NECK: no carotid bruit, no JVD  LUNGS: CTA  CHEST WALL: no tenderness  HEART: regular rate and rhythm, S1, diminished S2 soft systolic murmur noted.    ABDOMEN: soft, non-tender; bowel sounds normal; no masses,  no organomegaly  EXTREMITIES: Extremities normal, no edema, no calf tenderness noted  NEURO: AAO X 3    I HAVE REVIEWED :    The vital signs, nurses notes, and all the pertinent radiology and labs.        Current Outpatient Medications   Medication Instructions    acetaminophen (TYLENOL) 500 mg, Oral, Every 6 hours PRN    ALPRAZolam (XANAX) 0.25 MG tablet TAKE 1 TABLET BY MOUTH EVERY DAY AS NEEDED FOR ANXIETY    apixaban (ELIQUIS) 2.5 mg, Oral, 2 times daily    aspirin (ECOTRIN) 81 mg, Oral, Daily    azithromycin (Z-POLY) 250 mg, Oral, Daily, po on day 1 then 1 tab po on days 2-5    coenzyme Q10 100 mg, Oral, 2 times daily    cyanocobalamin 1,000 mcg/mL injection 2cc I'm every other week    diphenhydrAMINE (BENYLIN) 12.5 mg, Oral, 4 times daily PRN    ECHINACEA ORAL 750 mg, Oral, 2 times daily    fluocinonide-emollient (FLUOCINONIDE-E) 0.05 % Crea Topical (Top), 2 times daily    fluticasone propionate (FLONASE) 50 mcg, Each Nostril, Daily    hydrocortisone 2.5 % ointment Topical (Top), 2 times daily    isosorbide mononitrate (IMDUR) 120 MG 24 hr tablet TAKE 1 TABLET (120 MG TOTAL) BY MOUTH ONCE DAILY. DO NOT CRUSH OR CHEW SWALLOW WHOLE.    levothyroxine (SYNTHROID) 88 mcg, Oral, Daily    LIDOcaine (LIDODERM) 5 % 1 patch, Transdermal, Daily, Remove & Discard patch within 12 hours or as directed by MD    LIDOcaine HCl 2% (XYLOCAINE) 2 % Soln May gargle and spit out as needed for sore throat-do not swalllow     loratadine (CLARITIN) 10 mg, Oral, Daily    losartan (COZAAR) 12.5 mg, Oral, Nightly    MAGNESIUM ORAL 500 mg, Oral, Daily    metoprolol succinate (TOPROL-XL) 25 MG 24 hr tablet TAKE 1 TABLET BY MOUTH EVERY DAY    montelukast (SINGULAIR) 10 mg tablet TAKE 1 TABLET BY MOUTH EVERY DAY IN THE EVENING    multivitamin (THERAGRAN) tablet 1 tablet, Oral, 2 times daily    nitroGLYCERIN (NITROSTAT) 0.4 mg, Sublingual, Every 5 min PRN    omeprazole (PRILOSEC) 20 mg, Oral, Daily    tiZANidine (ZANAFLEX) 4 mg, Oral, Daily    traMADoL (ULTRAM) 50 mg, Oral, 2 times daily PRN    triamcinolone acetonide 0.1% (KENALOG) 0.1 % cream aaa bid x 1-2 wks, tk 1 wk off prn rash    turmeric 1,200 mg, Oral, Daily    UNABLE TO FIND 1 capsule, Oral, Daily, medication name: GI probiotic     UNABLE TO FIND 1 capsule, Oral, Daily, Joint health     UNABLE TO FIND 1 capsule, Oral, 2 times daily, Cardio platinum    UNABLE TO FIND Nerve Jorge    zinc gluconate 50 mg, Oral, 2 times daily          Assessment & Plan     Angina  Stable angina continue on present therapy to include isosorbide mononitrate 120 mg daily maintain on enteric-coated aspirin 81 mg a day, continue to maintain on diet and activity as tolerated.  She is also on Toprol-XL 25 mg a day maintain the same    Coronary artery disease involving left main coronary artery  She is doing well continue on risk factor modification however she does have severe statin intolerance and now intolerant to Repatha as well.  So her choices diet control alone at this time she is willing to give a trial with NEXLETOL.    Essential hypertension  Her blood pressure is suboptimally controlled recommend to increase the losartan to full tablet of 25 mg a day her pressure today is 142/70 mm Hg continue on metoprolol and other low-salt low-fat diet.    PAF (paroxysmal atrial fibrillation)  Continue on low doses of Eliquis at 2.5 mg p.o. b.i.d. continue on metoprolol she is doing well on the present regimen.   Along with magnesium supplements    Pacemaker in place  She has a functioning pacemaker in Situ although stable          No follow-ups on file.

## 2023-12-21 NOTE — ASSESSMENT & PLAN NOTE
Her blood pressure is suboptimally controlled recommend to increase the losartan to full tablet of 25 mg a day her pressure today is 142/70 mm Hg continue on metoprolol and other low-salt low-fat diet.

## 2024-01-03 RX ORDER — NITROGLYCERIN 0.4 MG/1
0.4 TABLET SUBLINGUAL EVERY 5 MIN PRN
Qty: 30 TABLET | Refills: 1 | Status: SHIPPED | OUTPATIENT
Start: 2024-01-03

## 2024-01-03 NOTE — TELEPHONE ENCOUNTER
----- Message from Shaq Sanchez sent at 1/3/2024 10:23 AM CST -----  Contact: Self  Type:  RX Refill Request    Who Called:  Patient  Refill or New Rx:  Refill  RX Name and Strength:  nitroGLYCERIN (NITROSTAT) 0.4 MG SL tablet  How is the patient currently taking it? (ex. 1XDay):  as directed  Is this a 30 day or 90 day RX:  30  Preferred Pharmacy with phone number:    [facility]  [place] - [address]  [address] [place] [address]  Phone: [phone] Fax: [phone]      Local or Mail Order:  Local  Ordering Provider:  WERO  Best Call Back Number:  [phone]   Additional Information:

## 2024-01-04 ENCOUNTER — OFFICE VISIT (OUTPATIENT)
Dept: FAMILY MEDICINE | Facility: CLINIC | Age: 89
End: 2024-01-04
Payer: MEDICARE

## 2024-01-04 VITALS
OXYGEN SATURATION: 98 % | HEART RATE: 71 BPM | DIASTOLIC BLOOD PRESSURE: 88 MMHG | WEIGHT: 140 LBS | HEIGHT: 60 IN | BODY MASS INDEX: 27.48 KG/M2 | TEMPERATURE: 98 F | SYSTOLIC BLOOD PRESSURE: 138 MMHG

## 2024-01-04 DIAGNOSIS — J40 BRONCHITIS: ICD-10-CM

## 2024-01-04 DIAGNOSIS — R04.2 BLOOD IN SPUTUM: Primary | ICD-10-CM

## 2024-01-04 PROCEDURE — 99215 OFFICE O/P EST HI 40 MIN: CPT | Mod: PBBFAC,PN | Performed by: NURSE PRACTITIONER

## 2024-01-04 PROCEDURE — 99999 PR PBB SHADOW E&M-EST. PATIENT-LVL V: CPT | Mod: PBBFAC,,, | Performed by: NURSE PRACTITIONER

## 2024-01-04 PROCEDURE — 99214 OFFICE O/P EST MOD 30 MIN: CPT | Mod: S$PBB,,, | Performed by: NURSE PRACTITIONER

## 2024-01-04 NOTE — PROGRESS NOTES
SUBJECTIVE:      Patient ID: Karma Chen is a 88 y.o. female.    Chief Complaint: Cough (Been most of Dec. She states she has been seeing blood in the mornings only for the past 2 days )    88-year-old female with multiple allergies and a significant past medical history presents to the clinic with complaints of coughing up blood and mucus in throat.  She reports having a cough for the month of December.  She was treated for bronchitis twice, both times with a Z-Issa.  The cough has improved, will only cough a few times daily, still has Tessalon which she will occasionally use.  She continues to have increased mucus production in her throat which is more noticeable in the morning.  Over the last 2 days when she clears her throat after wakening,  she noticed streaks of blood in her mucus.  She is on Eliquis and takes a daily baby aspirin.  She has been using children's Benadryl at night to treat her symptoms.  She did report dry mouth this morning.  She has a history of GERD and is on omeprazole 20 mg which she is trying to discontinue.  She did not take her medication today.  No known exposure to TB.  No prior history of pulmonary embolism.  She denies symptoms of shortness of breath, chest pain, epistaxis.         Family History   Problem Relation Age of Onset    No Known Problems Mother     No Known Problems Father       Social History     Socioeconomic History    Marital status:    Tobacco Use    Smoking status: Never    Smokeless tobacco: Never   Substance and Sexual Activity    Alcohol use: No    Drug use: No     Social Determinants of Health     Stress: No Stress Concern Present (9/10/2020)    [employer] - Occupational Stress Questionnaire     Feeling of Stress : Not at all     Current Outpatient Medications   Medication Sig Dispense Refill    acetaminophen (TYLENOL) 500 MG tablet Take 500 mg by mouth every 6 (six) hours as needed for Pain.      ALPRAZolam (XANAX)  0.25 MG tablet TAKE 1 TABLET BY MOUTH EVERY DAY AS NEEDED FOR ANXIETY 90 tablet 0    apixaban (ELIQUIS) 2.5 mg Tab Take 1 tablet (2.5 mg total) by mouth 2 (two) times daily. 180 tablet 1    aspirin (ECOTRIN) 81 MG EC tablet Take 1 tablet (81 mg total) by mouth once daily. (Patient taking differently: Take 81 mg by mouth every evening.)  0    azithromycin (Z-POLY) 250 MG tablet Take 1 tablet (250 mg total) by mouth once daily. po on day 1 then 1 tab po on days 2-5 6 tablet 0    bempedoic acid (NEXLETOL) 180 mg Tab Take 1 tablet (180 mg total) by mouth once daily. 30 tablet 11    coenzyme Q10 100 mg capsule Take 1 capsule (100 mg total) by mouth 2 (two) times daily. 60 capsule 11    cyanocobalamin 1,000 mcg/mL injection 2cc I'm every other week 10 mL 1    diphenhydrAMINE (BENYLIN) 12.5 mg/5 mL liquid Take 12.5 mg by mouth 4 (four) times daily as needed for Allergies.      ECHINACEA ORAL Take 750 mg by mouth 2 (two) times a day.      fluocinonide-emollient (FLUOCINONIDE-E) 0.05 % Crea Apply topically 2 (two) times daily. 60 g 1    fluticasone propionate (FLONASE) 50 mcg/actuation nasal spray 1 spray (50 mcg total) by Each Nostril route once daily. 9.9 mL 1    hydrocortisone 2.5 % ointment Apply topically 2 (two) times daily. 28 g 3    isosorbide mononitrate (IMDUR) 120 MG 24 hr tablet TAKE 1 TABLET (120 MG TOTAL) BY MOUTH ONCE DAILY. DO NOT CRUSH OR CHEW SWALLOW WHOLE. 90 tablet 3    levothyroxine (SYNTHROID) 88 MCG tablet Take 1 tablet (88 mcg total) by mouth once daily. 90 tablet 1    LIDOcaine (LIDODERM) 5 % Place 1 patch onto the skin once daily. Remove & Discard patch within 12 hours or as directed by MD 30 patch 1    LIDOcaine HCl 2% (XYLOCAINE) 2 % Soln May gargle and spit out as needed for sore throat-do not swalllow 20 mL 0    loratadine (CLARITIN) 10 mg tablet Take 10 mg by mouth once daily.      losartan (COZAAR) 25 MG tablet Take 0.5 tablets (12.5 mg total) by mouth every evening. 45 tablet 3    MAGNESIUM  ORAL Take 500 mg by mouth once daily.       metoprolol succinate (TOPROL-XL) 25 MG 24 hr tablet TAKE 1 TABLET BY MOUTH EVERY DAY 90 tablet 3    montelukast (SINGULAIR) 10 mg tablet TAKE 1 TABLET BY MOUTH EVERY DAY IN THE EVENING 90 tablet 1    multivitamin (THERAGRAN) tablet Take 1 tablet by mouth 2 (two) times a day.       nitroGLYCERIN (NITROSTAT) 0.4 MG SL tablet Place 1 tablet (0.4 mg total) under the tongue every 5 (five) minutes as needed for Chest pain. 30 tablet 1    tiZANidine (ZANAFLEX) 4 MG tablet Take 1 tablet (4 mg total) by mouth once daily. 90 tablet 0    traMADoL (ULTRAM) 50 mg tablet Take 1 tablet (50 mg total) by mouth 2 (two) times daily as needed (pain). 14 tablet 0    triamcinolone acetonide 0.1% (KENALOG) 0.1 % cream aaa bid x 1-2 wks, tk 1 wk off prn rash 30 g 2    turmeric 400 mg Cap Take 1,200 mg by mouth once daily.      UNABLE TO FIND Take 1 capsule by mouth once daily. medication name: GI probiotic      UNABLE TO FIND Take 1 capsule by mouth once daily. Joint health      UNABLE TO FIND Take 1 capsule by mouth 2 (two) times a day. Cardio platinum      UNABLE TO FIND Nerve Jorge      zinc gluconate 50 mg tablet Take 50 mg by mouth 2 (two) times a day.      omeprazole (PRILOSEC) 20 MG capsule Take 1 capsule (20 mg total) by mouth once daily. 90 capsule 3     Current Facility-Administered Medications   Medication Dose Route Frequency Provider Last Rate Last Admin    evolocumab PnIj 140 mg  140 mg Subcutaneous Q14 Days Kenton Ware MD         Review of patient's allergies indicates:   Allergen Reactions    Diclofenac-misoprostol      Other reaction(s): Not available    Doxycycline     Effexor [venlafaxine]     Estradiol     Flagyl [metronidazole]     Fluconazole     Gabapentin     Iodine     Levaquin [levofloxacin]     Nexium [esomeprazole magnesium]     Penicillins     Red yeast rice (monascus purpureus)     Shellfish containing products     Statins-hmg-coa reductase inhibitors     Sulfa  (sulfonamide antibiotics)     Tea tree oil     Tegaserod      ZOLNORM    Tegaserod hydrogen maleate     Trazodone     Yeast, dried     Adhesive Rash     Band-aids      Past Medical History:   Diagnosis Date    Coronary artery disease     Dermatitis     Dermatitis 12/8/2017    Diabetes mellitus     Diabetes mellitus type II     Leah Barr virus infection     Fibromyalgia     GERD (gastroesophageal reflux disease)     Hypertension     MI (myocardial infarction) 09/23/2011    Pure hypercholesterolemia 2/26/2020     Past Surgical History:   Procedure Laterality Date    adenoids      ANGIOPLASTY  1987    APPENDECTOMY      CARDIAC PACEMAKER PLACEMENT  01/12/2017    CATARACT EXTRACTION, BILATERAL Bilateral 9/2/15, 3/17/15    right eye-9/2/15, left eye-3/17/15    CHOLECYSTECTOMY  1987    CORONARY ARTERY BYPASS GRAFT  2006    quadruple    coronary stents  1999    x2    CYST REMOVAL  04/25/2017    on back    HYSTERECTOMY  1966    OVARY SURGERY  1948    Ovary burst & was repaired    RHIZOTOMY  09/14/2018    TONSILLECTOMY  1940       Review of Systems   Constitutional:  Negative for activity change, appetite change, chills, diaphoresis, fatigue, fever and unexpected weight change.   HENT:  Negative for congestion, ear pain, sinus pressure, sore throat, trouble swallowing and voice change.    Eyes:  Negative for pain, discharge and visual disturbance.   Respiratory:  Positive for cough. Negative for chest tightness, shortness of breath and wheezing.         Streaks of blood noted in sputum   Cardiovascular:  Negative for chest pain and palpitations.        CAD, HTN, HLD   Gastrointestinal:  Negative for abdominal pain, constipation, diarrhea, nausea and vomiting.        GERD   Endocrine:        T2DM   Genitourinary:  Negative for difficulty urinating, flank pain, frequency and urgency.   Musculoskeletal:  Negative for back pain and joint swelling.   Skin:  Negative for color change and rash.   Neurological:  Negative for  dizziness, seizures, syncope, weakness, numbness and headaches.   Hematological:  Negative for adenopathy.        On Eliquis   Psychiatric/Behavioral:  Negative for dysphoric mood and sleep disturbance. The patient is not nervous/anxious.       OBJECTIVE:      Vitals:    01/04/24 1625   BP: 138/88   BP Location: Left arm   Patient Position: Sitting   BP Method: Large (Automatic)   Pulse: 71   Temp: 98.4 °F (36.9 °C)   TempSrc: Oral   SpO2: 98%   Weight: 63.5 kg (140 lb)   Height: 5' (1.524 m)     Physical Exam  Vitals and nursing note reviewed.   Constitutional:       General: She is awake. She is not in acute distress.     Appearance: Normal appearance. She is well-developed, well-groomed and overweight. She is not ill-appearing, toxic-appearing or diaphoretic.   HENT:      Head: Normocephalic and atraumatic.      Nose: Nose normal.      Left Nostril: No epistaxis.      Mouth/Throat:      Lips: Pink.      Mouth: Mucous membranes are moist.      Tongue: Tongue does not deviate from midline.      Pharynx: Oropharynx is clear. Uvula midline. No pharyngeal swelling, oropharyngeal exudate, posterior oropharyngeal erythema or uvula swelling.      Comments: No signs of blood in mouth or pharynx.   Eyes:      General: Lids are normal. Gaze aligned appropriately.      Conjunctiva/sclera: Conjunctivae normal.      Right eye: Right conjunctiva is not injected.      Left eye: Left conjunctiva is not injected.      Pupils: Pupils are equal, round, and reactive to light.   Neck:      Thyroid: No thyroid tenderness.   Cardiovascular:      Rate and Rhythm: Normal rate and regular rhythm.      Pulses: Normal pulses.      Heart sounds: Normal heart sounds, S1 normal and S2 normal. No murmur heard.     No friction rub. No gallop.   Pulmonary:      Effort: Pulmonary effort is normal. No respiratory distress.      Breath sounds: Normal breath sounds. No stridor. No decreased breath sounds, wheezing, rhonchi or rales.      Comments: BBS  clear  Chest:      Chest wall: No tenderness.   Musculoskeletal:      Cervical back: Neck supple.      Right lower leg: No edema.      Left lower leg: No edema.   Lymphadenopathy:      Cervical: No cervical adenopathy.   Skin:     General: Skin is warm and dry.      Capillary Refill: Capillary refill takes less than 2 seconds.      Findings: No erythema or rash.   Neurological:      Mental Status: She is alert and oriented to person, place, and time. Mental status is at baseline.   Psychiatric:         Attention and Perception: Attention normal.         Mood and Affect: Mood normal.         Speech: Speech normal.         Behavior: Behavior normal. Behavior is cooperative.         Thought Content: Thought content normal.         Judgment: Judgment normal.        Assessment:       1. Blood in sputum    2. Bronchitis        Plan:       Blood in sputum  Suspect the blood is likely from coughing up the mucus or possible coming from the posterior nose. She has been on benadryl and complains of a dry mouth, which may be causing some bleeding. The mucus in her though is likely from a postnasal drip. Will get a chest x-rays to start due to a cough x1 month. Stop benadryl due to dry mucous membranes. I have low suspicions for TB and PE.   -     X-Ray Chest PA And Lateral; Future; Expected date: 01/04/2024    Bronchitis  Improving, treated with a Z-pack twice in December. Cough is now infrequent and not bothersome.     This note was created using [employer] voice recognition software that occasionally misinterprets phrases or words.     I spent a total of 25 minutes on the day of the visit.This includes face to face time and non-face to face time preparing to see the patient (eg, review of tests), obtaining and/or reviewing separately obtained history, documenting clinical information in the electronic or other health record, independently interpreting results and communicating results to the patient/family/caregiver, or care  coordinator.    No follow-ups on file.          1/4/2024 Johnnie Gibson, SU, MANPREETP

## 2024-01-05 ENCOUNTER — HOSPITAL ENCOUNTER (OUTPATIENT)
Dept: RADIOLOGY | Facility: HOSPITAL | Age: 89
Discharge: HOME | End: 2024-01-05
Attending: NURSE PRACTITIONER
Payer: MEDICARE

## 2024-01-05 DIAGNOSIS — R04.2 BLOOD IN SPUTUM: ICD-10-CM

## 2024-01-05 PROCEDURE — 71046 X-RAY EXAM CHEST 2 VIEWS: CPT | Mod: TC,PO

## 2024-01-08 ENCOUNTER — TELEPHONE (OUTPATIENT)
Dept: FAMILY MEDICINE | Facility: CLINIC | Age: 89
End: 2024-01-08
Payer: MEDICARE

## 2024-01-08 NOTE — TELEPHONE ENCOUNTER
----- Message from Johnnie Gibson NP sent at 1/8/2024  8:39 AM CST -----  Please call patient.  Chest x-ray shows no evidence of active cardiopulmonary disease.

## 2024-02-19 DIAGNOSIS — R07.2 PRECORDIAL PAIN: ICD-10-CM

## 2024-02-19 DIAGNOSIS — E03.9 ACQUIRED HYPOTHYROIDISM: ICD-10-CM

## 2024-02-19 RX ORDER — LEVOTHYROXINE SODIUM 88 UG/1
88 TABLET ORAL DAILY
Qty: 90 TABLET | Refills: 1 | Status: SHIPPED | OUTPATIENT
Start: 2024-02-19

## 2024-02-19 RX ORDER — ISOSORBIDE MONONITRATE 120 MG/1
TABLET, EXTENDED RELEASE ORAL
Qty: 90 TABLET | Refills: 3 | Status: SHIPPED | OUTPATIENT
Start: 2024-02-19

## 2024-03-06 ENCOUNTER — TELEPHONE (OUTPATIENT)
Dept: FAMILY MEDICINE | Facility: CLINIC | Age: 89
End: 2024-03-06
Payer: MEDICARE

## 2024-03-06 DIAGNOSIS — R53.83 FATIGUE, UNSPECIFIED TYPE: ICD-10-CM

## 2024-03-06 DIAGNOSIS — Z79.01 CURRENT USE OF ANTICOAGULANT THERAPY: Primary | ICD-10-CM

## 2024-03-06 NOTE — TELEPHONE ENCOUNTER
Pt reports she has be having a lot of weakness. Her stools have gotten darker. She is not having any pain. But, She is on Eliquis 2.5 mg bid.      NOV is 3/25/24    Please advise

## 2024-03-07 ENCOUNTER — LAB VISIT (OUTPATIENT)
Dept: LAB | Facility: HOSPITAL | Age: 89
End: 2024-03-07
Attending: INTERNAL MEDICINE
Payer: MEDICARE

## 2024-03-07 ENCOUNTER — PATIENT MESSAGE (OUTPATIENT)
Dept: FAMILY MEDICINE | Facility: CLINIC | Age: 89
End: 2024-03-07
Payer: MEDICARE

## 2024-03-07 DIAGNOSIS — Z79.01 LONG TERM (CURRENT) USE OF ANTICOAGULANTS: ICD-10-CM

## 2024-03-07 DIAGNOSIS — R53.83 FATIGUE: ICD-10-CM

## 2024-03-07 DIAGNOSIS — R53.83 FATIGUE: Primary | ICD-10-CM

## 2024-03-07 LAB
BASOPHILS # BLD AUTO: 0.06 K/UL (ref 0–0.2)
BASOPHILS NFR BLD: 0.8 % (ref 0–1.9)
DIFFERENTIAL METHOD BLD: ABNORMAL
EOSINOPHIL # BLD AUTO: 0.2 K/UL (ref 0–0.5)
EOSINOPHIL NFR BLD: 2 % (ref 0–8)
ERYTHROCYTE [DISTWIDTH] IN BLOOD BY AUTOMATED COUNT: 13.4 % (ref 11.5–14.5)
HCT VFR BLD AUTO: 37.6 % (ref 37–48.5)
HGB BLD-MCNC: 11.6 G/DL (ref 12–16)
IMM GRANULOCYTES # BLD AUTO: 0.01 K/UL (ref 0–0.04)
IMM GRANULOCYTES NFR BLD AUTO: 0.1 % (ref 0–0.5)
LYMPHOCYTES # BLD AUTO: 2.7 K/UL (ref 1–4.8)
LYMPHOCYTES NFR BLD: 35.5 % (ref 18–48)
MCH RBC QN AUTO: 28.2 PG (ref 27–31)
MCHC RBC AUTO-ENTMCNC: 30.9 G/DL (ref 32–36)
MCV RBC AUTO: 91 FL (ref 82–98)
MONOCYTES # BLD AUTO: 0.8 K/UL (ref 0.3–1)
MONOCYTES NFR BLD: 11 % (ref 4–15)
NEUTROPHILS # BLD AUTO: 3.8 K/UL (ref 1.8–7.7)
NEUTROPHILS NFR BLD: 50.6 % (ref 38–73)
NRBC BLD-RTO: 0 /100 WBC
OB PNL STL: NEGATIVE
PLATELET # BLD AUTO: 282 K/UL (ref 150–450)
PMV BLD AUTO: 10.4 FL (ref 9.2–12.9)
RBC # BLD AUTO: 4.12 M/UL (ref 4–5.4)
WBC # BLD AUTO: 7.57 K/UL (ref 3.9–12.7)

## 2024-03-07 PROCEDURE — 85025 COMPLETE CBC W/AUTO DIFF WBC: CPT | Performed by: INTERNAL MEDICINE

## 2024-03-07 PROCEDURE — 82272 OCCULT BLD FECES 1-3 TESTS: CPT | Performed by: INTERNAL MEDICINE

## 2024-03-07 PROCEDURE — 36415 COLL VENOUS BLD VENIPUNCTURE: CPT | Performed by: INTERNAL MEDICINE

## 2024-03-08 ENCOUNTER — TELEPHONE (OUTPATIENT)
Dept: CARDIOLOGY | Facility: CLINIC | Age: 89
End: 2024-03-08
Payer: MEDICARE

## 2024-03-08 NOTE — TELEPHONE ENCOUNTER
----- Message from Shaq Sanchez sent at 3/8/2024  3:24 PM CST -----  Contact: Self  Type: Needs Medical Advice  Who Called:  Patient  Symptoms (please be specific):  Pain at ribcage    Best Call Back Number: [phone]   Additional Information: States she has had stents. Would like to discuss with office

## 2024-03-19 DIAGNOSIS — R09.89 LABILE HYPERTENSION: ICD-10-CM

## 2024-03-20 RX ORDER — APIXABAN 2.5 MG/1
2.5 TABLET, FILM COATED ORAL 2 TIMES DAILY
Qty: 180 TABLET | Refills: 0 | Status: SHIPPED | OUTPATIENT
Start: 2024-03-20 | End: 2024-04-11 | Stop reason: SDUPTHER

## 2024-03-24 NOTE — PROGRESS NOTES
SUBJECTIVE:    Patient ID: Karma Chen is a 88 y.o. female.    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-she does not always feel this-someties the hands are sensitive and they turn red or blue-    She saw Podiatry who recommended something for neuropathy-application-it helped some but it does not help the hands-at night the hands get very red but one finger was very white and hurt badly    She is concerned that one brother has alzheimers and one is  of Parkinsins      Last 3 sets of Vitals        2023     1:31 PM 2024     4:25 PM 3/25/2024    11:38 AM   Vitals - 1 value per visit   SYSTOLIC 142 138 138   DIASTOLIC 70 88 72   Pulse 74 71 72   Temp  98.4 °F (36.9 °C) 98 °F (36.7 °C)   Resp   14   SPO2 99 % 98 % 98 %   Weight (lb) 140 140 136   Weight (kg) 63.504 63.504 61.689   Height 5' (1.524 m) 5' (1.524 m) 5' (1.524 m)   BMI (Calculated) 27.3 27.3 26.6   Pain Score Zero Zero Four          Lab Visit on 2024   Component Date Value Ref Range Status    Occult Blood 2024 Negative  Negative Final    WBC 2024 7.57  3.90 - 12.70 K/uL Final    RBC 2024 4.12  4.00 - 5.40 M/uL Final    Hemoglobin 2024 11.6 (L)  12.0 - 16.0 g/dL Final    Hematocrit 2024 37.6  37.0 - 48.5 % Final    MCV 2024 91  82 - 98 fL Final    MCH 2024 28.2  27.0 - 31.0 pg Final    MCHC 2024 30.9 (L)  32.0 - 36.0 g/dL Final    RDW 2024 13.4  11.5 - 14.5 % Final    Platelets 2024 282  150 - 450 K/uL Final    MPV 2024 10.4  9.2 - 12.9 fL Final    Immature Granulocytes 2024 0.1  0.0 - 0.5 % Final    Gran # (ANC) 2024 3.8  1.8 - 7.7 K/uL Final    Immature Grans (Abs)  03/07/2024 0.01  0.00 - 0.04 K/uL Final    Lymph # 03/07/2024 2.7  1.0 - 4.8 K/uL Final    Mono # 03/07/2024 0.8  0.3 - 1.0 K/uL Final    Eos # 03/07/2024 0.2  0.0 - 0.5 K/uL Final    Baso # 03/07/2024 0.06  0.00 - 0.20 K/uL Final    nRBC 03/07/2024 0  0 /100 WBC Final    Gran % 03/07/2024 50.6  38.0 - 73.0 % Final    Lymph % 03/07/2024 35.5  18.0 - 48.0 % Final    Mono % 03/07/2024 11.0  4.0 - 15.0 % Final    Eosinophil % 03/07/2024 2.0  0.0 - 8.0 % Final    Basophil % 03/07/2024 0.8  0.0 - 1.9 % Final    Differential Method 03/07/2024 Automated   Final   Lab Visit on 11/21/2023   Component Date Value Ref Range Status    Glucose 11/21/2023 80  70 - 110 mg/dL Final    Sodium 11/21/2023 140  136 - 145 mmol/L Final    Potassium 11/21/2023 4.0  3.5 - 5.1 mmol/L Final    Chloride 11/21/2023 105  95 - 110 mmol/L Final    CO2 11/21/2023 25  23 - 29 mmol/L Final    BUN 11/21/2023 26 (H)  8 - 23 mg/dL Final    Calcium 11/21/2023 9.3  8.7 - 10.5 mg/dL Final    Creatinine 11/21/2023 1.2  0.5 - 1.4 mg/dL Final    Albumin 11/21/2023 4.0  3.5 - 5.2 g/dL Final    Phosphorus 11/21/2023 3.9  2.7 - 4.5 mg/dL Final    eGFR 11/21/2023 44.4 (A)  >60 mL/min/1.73 m^2 Final    Anion Gap 11/21/2023 10  8 - 16 mmol/L Final    Magnesium 11/21/2023 2.4  1.6 - 2.6 mg/dL Final    WBC 11/21/2023 7.03  3.90 - 12.70 K/uL Final    RBC 11/21/2023 4.21  4.00 - 5.40 M/uL Final    Hemoglobin 11/21/2023 12.0  12.0 - 16.0 g/dL Final    Hematocrit 11/21/2023 38.2  37.0 - 48.5 % Final    MCV 11/21/2023 91  82 - 98 fL Final    MCH 11/21/2023 28.5  27.0 - 31.0 pg Final    MCHC 11/21/2023 31.4 (L)  32.0 - 36.0 g/dL Final    RDW 11/21/2023 13.6  11.5 - 14.5 % Final    Platelets 11/21/2023 281  150 - 450 K/uL Final    MPV 11/21/2023 10.3  9.2 - 12.9 fL Final    Immature Granulocytes 11/21/2023 0.1  0.0 - 0.5 % Final    Gran # (ANC) 11/21/2023 2.8  1.8 - 7.7 K/uL Final    Immature Grans (Abs) 11/21/2023 0.01  0.00 - 0.04 K/uL Final    Lymph # 11/21/2023 3.1  1.0  - 4.8 K/uL Final    Mono # 11/21/2023 0.8  0.3 - 1.0 K/uL Final    Eos # 11/21/2023 0.2  0.0 - 0.5 K/uL Final    Baso # 11/21/2023 0.07  0.00 - 0.20 K/uL Final    nRBC 11/21/2023 0  0 /100 WBC Final    Gran % 11/21/2023 39.8  38.0 - 73.0 % Final    Lymph % 11/21/2023 44.2  18.0 - 48.0 % Final    Mono % 11/21/2023 11.5  4.0 - 15.0 % Final    Eosinophil % 11/21/2023 3.4  0.0 - 8.0 % Final    Basophil % 11/21/2023 1.0  0.0 - 1.9 % Final    Differential Method 11/21/2023 Automated   Final    Vit D, 25-Hydroxy 11/21/2023 64  30 - 96 ng/mL Final    RBC, UA 11/21/2023 0  0 - 4 /hpf Final    WBC, UA 11/21/2023 1  0 - 5 /hpf Final    Bacteria 11/21/2023 Negative  None-Occ /hpf Final    Squam Epithel, UA 11/21/2023 0  /hpf Final    Hyaline Casts, UA 11/21/2023 0.00 (A)  0-1/lpf /lpf Final    Microscopic Comment 11/21/2023 SEE COMMENT   Final    Uric Acid 11/21/2023 6.5 (H)  2.4 - 5.7 mg/dL Final    PTH, Intact 11/21/2023 35.7  9.0 - 77.0 pg/mL Final    Protein, Urine Random 11/21/2023 4 (L)  6 - 15 mg/dL Final    Creatinine, Urine 11/21/2023 59.4  15.0 - 325.0 mg/dL Final    Prot/Creat Ratio, Urine 11/21/2023 0.07  0.00 - 0.20 Final   Lab Visit on 08/23/2023   Component Date Value Ref Range Status    Cholesterol 08/23/2023 229 (H)  120 - 199 mg/dL Final    Triglycerides 08/23/2023 335 (H)  30 - 150 mg/dL Final    HDL 08/23/2023 38 (L)  40 - 75 mg/dL Final    LDL Cholesterol 08/23/2023 124.0  63.0 - 159.0 mg/dL Final    HDL/Cholesterol Ratio 08/23/2023 16.6 (L)  20.0 - 50.0 % Final    Total Cholesterol/HDL Ratio 08/23/2023 6.0 (H)  2.0 - 5.0 Final    Non-HDL Cholesterol 08/23/2023 191  mg/dL Final    Hemoglobin A1C 08/23/2023 5.8  4.5 - 6.2 % Final    Estimated Avg Glucose 08/23/2023 120  68 - 131 mg/dL Final    WBC 08/23/2023 7.43  3.90 - 12.70 K/uL Final    RBC 08/23/2023 4.01  4.00 - 5.40 M/uL Final    Hemoglobin 08/23/2023 11.4 (L)  12.0 - 16.0 g/dL Final    Hematocrit 08/23/2023 36.4 (L)  37.0 - 48.5 % Final    MCV  08/23/2023 91  82 - 98 fL Final    MCH 08/23/2023 28.4  27.0 - 31.0 pg Final    MCHC 08/23/2023 31.3 (L)  32.0 - 36.0 g/dL Final    RDW 08/23/2023 13.9  11.5 - 14.5 % Final    Platelets 08/23/2023 271  150 - 450 K/uL Final    MPV 08/23/2023 10.5  9.2 - 12.9 fL Final    Immature Granulocytes 08/23/2023 0.1  0.0 - 0.5 % Final    Gran # (ANC) 08/23/2023 3.1  1.8 - 7.7 K/uL Final    Immature Grans (Abs) 08/23/2023 0.01  0.00 - 0.04 K/uL Final    Lymph # 08/23/2023 3.2  1.0 - 4.8 K/uL Final    Mono # 08/23/2023 0.9  0.3 - 1.0 K/uL Final    Eos # 08/23/2023 0.3  0.0 - 0.5 K/uL Final    Baso # 08/23/2023 0.05  0.00 - 0.20 K/uL Final    nRBC 08/23/2023 0  0 /100 WBC Final    Gran % 08/23/2023 41.5  38.0 - 73.0 % Final    Lymph % 08/23/2023 42.7  18.0 - 48.0 % Final    Mono % 08/23/2023 11.4  4.0 - 15.0 % Final    Eosinophil % 08/23/2023 3.6  0.0 - 8.0 % Final    Basophil % 08/23/2023 0.7  0.0 - 1.9 % Final    Differential Method 08/23/2023 Automated   Final    Sodium 08/23/2023 137  136 - 145 mmol/L Final    Potassium 08/23/2023 4.1  3.5 - 5.1 mmol/L Final    Chloride 08/23/2023 107  95 - 110 mmol/L Final    CO2 08/23/2023 24  23 - 29 mmol/L Final    Glucose 08/23/2023 91  70 - 110 mg/dL Final    BUN 08/23/2023 26 (H)  8 - 23 mg/dL Final    Creatinine 08/23/2023 1.5 (H)  0.5 - 1.4 mg/dL Final    Calcium 08/23/2023 8.7  8.7 - 10.5 mg/dL Final    Total Protein 08/23/2023 6.9  6.0 - 8.4 g/dL Final    Albumin 08/23/2023 3.6  3.5 - 5.2 g/dL Final    Total Bilirubin 08/23/2023 0.5  0.1 - 1.0 mg/dL Final    Alkaline Phosphatase 08/23/2023 63  55 - 135 U/L Final    AST 08/23/2023 24  10 - 40 U/L Final    ALT 08/23/2023 17  10 - 44 U/L Final    eGFR 08/23/2023 33.9 (A)  >60 mL/min/1.73 m^2 Final    Anion Gap 08/23/2023 6 (L)  8 - 16 mmol/L Final    Vitamin B-12 08/23/2023 747  210 - 950 pg/mL Final   Hospital Outpatient Visit on 08/01/2023   Component Date Value Ref Range Status    Battery Voltage (V) 08/01/2023 2.98  V Final     P/R-wave RA Lead 08/01/2023 1.3  mV Final    Impedance RA Lead 08/01/2023 475  Ohms Final    P/R-wave RA Lead (native) 08/01/2023 12.6  mV Final    Impedance RA Lead (native) 08/01/2023 513  Ohms Final    Threshold V RA Lead 08/01/2023 0.75  V Final    Theshold ms RA Lead 08/01/2023 0.4  ms Final    Threshold V RA Lead (native) 08/01/2023 0.625  V Final    Threshold ms RA Lead (native) 08/01/2023 0.4  ms Final   Lab Visit on 05/26/2023   Component Date Value Ref Range Status    Glucose 05/26/2023 82  70 - 110 mg/dL Final    Sodium 05/26/2023 142  136 - 145 mmol/L Final    Potassium 05/26/2023 4.0  3.5 - 5.1 mmol/L Final    Chloride 05/26/2023 106  95 - 110 mmol/L Final    CO2 05/26/2023 27  23 - 29 mmol/L Final    BUN 05/26/2023 23  8 - 23 mg/dL Final    Calcium 05/26/2023 9.4  8.7 - 10.5 mg/dL Final    Creatinine 05/26/2023 1.2  0.5 - 1.4 mg/dL Final    Albumin 05/26/2023 3.9  3.5 - 5.2 g/dL Final    Phosphorus 05/26/2023 4.1  2.7 - 4.5 mg/dL Final    eGFR 05/26/2023 44.4 (A)  >60 mL/min/1.73 m^2 Final    Anion Gap 05/26/2023 9  8 - 16 mmol/L Final    Specimen UA 05/26/2023 Urine, Clean Catch   Final    Color, UA 05/26/2023 Yellow  Yellow, Straw, Anila Final    Appearance, UA 05/26/2023 Clear  Clear Final    pH, UA 05/26/2023 7.0  5.0 - 8.0 Final    Specific Gravity, UA 05/26/2023 1.015  1.005 - 1.030 Final    Protein, UA 05/26/2023 Trace (A)  Negative Final    Glucose, UA 05/26/2023 Negative  Negative Final    Ketones, UA 05/26/2023 Negative  Negative Final    Bilirubin (UA) 05/26/2023 Negative  Negative Final    Occult Blood UA 05/26/2023 Negative  Negative Final    Nitrite, UA 05/26/2023 Negative  Negative Final    Urobilinogen, UA 05/26/2023 Negative  Negative EU/dL Final    Leukocytes, UA 05/26/2023 3+ (A)  Negative Final    Protein, Urine Random 05/26/2023 16 (H)  6 - 15 mg/dL Final    Creatinine, Urine 05/26/2023 132.0  15.0 - 325.0 mg/dL Final    Prot/Creat Ratio, Urine 05/26/2023 0.12  0.00 - 0.20 Final     RBC, UA 05/26/2023 4  0 - 4 /hpf Final    WBC, UA 05/26/2023 13 (H)  0 - 5 /hpf Final    Bacteria 05/26/2023 Negative  None-Occ /hpf Final    Squam Epithel, UA 05/26/2023 6  /hpf Final    Hyaline Casts, UA 05/26/2023 7 (A)  0-1/lpf /lpf Final    Microscopic Comment 05/26/2023 SEE COMMENT   Final   Lab Visit on 04/04/2023   Component Date Value Ref Range Status    Microalbumin, Urine 04/04/2023 12.0  <19.9 ug/mL Final    Creatinine, Urine 04/04/2023 63.0  15.0 - 325.0 mg/dL Final    Microalb/Creat Ratio 04/04/2023 19.0  0.0 - 30.0 ug/mg Final   Lab Visit on 04/04/2023   Component Date Value Ref Range Status    Hemoglobin A1C 04/04/2023 5.8  4.5 - 6.2 % Final    Estimated Avg Glucose 04/04/2023 120  68 - 131 mg/dL Final         Past Medical History:   Diagnosis Date    Coronary artery disease     Dermatitis     Dermatitis 12/8/2017    Diabetes mellitus     Diabetes mellitus type II     Leah Barr virus infection     Fibromyalgia     GERD (gastroesophageal reflux disease)     Hypertension     MI (myocardial infarction) 09/23/2011    Pure hypercholesterolemia 2/26/2020     Past Surgical History:   Procedure Laterality Date    adenoids      ANGIOPLASTY  1987    APPENDECTOMY      CARDIAC PACEMAKER PLACEMENT  01/12/2017    CATARACT EXTRACTION, BILATERAL Bilateral 9/2/15, 3/17/15    right eye-9/2/15, left eye-3/17/15    CHOLECYSTECTOMY  1987    CORONARY ARTERY BYPASS GRAFT  2006    quadruple    coronary stents  1999    x2    CYST REMOVAL  04/25/2017    on back    HYSTERECTOMY  1966    OVARY SURGERY  1948    Ovary burst & was repaired    RHIZOTOMY  09/14/2018    TONSILLECTOMY  1940     Family History   Problem Relation Age of Onset    No Known Problems Mother     No Known Problems Father        Marital Status:   Alcohol History:  reports no history of alcohol use.  Tobacco History:  reports that she has never smoked. She has never used smokeless tobacco.  Drug History:  reports no history of drug use.    Review of  patient's allergies indicates:   Allergen Reactions    Diclofenac-misoprostol      Other reaction(s): Not available    Doxycycline     Effexor [venlafaxine]     Estradiol     Flagyl [metronidazole]     Fluconazole     Gabapentin     Iodine     Levaquin [levofloxacin]     Nexium [esomeprazole magnesium]     Penicillins     Red yeast rice (monascus purpureus)     Shellfish containing products     Statins-hmg-coa reductase inhibitors     Sulfa (sulfonamide antibiotics)     Tea tree oil     Tegaserod      ZOLNORM    Tegaserod hydrogen maleate     Trazodone     Yeast, dried     Adhesive Rash     Band-aids       Current Outpatient Medications:     acetaminophen (TYLENOL) 500 MG tablet, Take 500 mg by mouth every 6 (six) hours as needed for Pain., Disp: , Rfl:     aspirin (ECOTRIN) 81 MG EC tablet, Take 1 tablet (81 mg total) by mouth once daily. (Patient taking differently: Take 81 mg by mouth every evening.), Disp: , Rfl: 0    bempedoic acid (NEXLETOL) 180 mg Tab, Take 1 tablet (180 mg total) by mouth once daily., Disp: 30 tablet, Rfl: 11    coenzyme Q10 100 mg capsule, Take 1 capsule (100 mg total) by mouth 2 (two) times daily., Disp: 60 capsule, Rfl: 11    cyanocobalamin 1,000 mcg/mL injection, 2cc I'm every other week, Disp: 10 mL, Rfl: 1    diphenhydrAMINE (BENYLIN) 12.5 mg/5 mL liquid, Take 12.5 mg by mouth 4 (four) times daily as needed for Allergies., Disp: , Rfl:     ECHINACEA ORAL, Take 750 mg by mouth 2 (two) times a day., Disp: , Rfl:     ELIQUIS 2.5 mg Tab, Take 1 tablet by mouth twice daily, Disp: 180 tablet, Rfl: 0    fluocinonide-emollient (FLUOCINONIDE-E) 0.05 % Crea, Apply topically 2 (two) times daily., Disp: 60 g, Rfl: 1    fluticasone propionate (FLONASE) 50 mcg/actuation nasal spray, 1 spray (50 mcg total) by Each Nostril route once daily., Disp: 9.9 mL, Rfl: 1    isosorbide mononitrate (IMDUR) 120 MG 24 hr tablet, TAKE 1 TABLET (120 MG TOTAL) BY MOUTH ONCE DAILY. DO NOT CRUSH OR CHEW SWALLOW WHOLE.,  Disp: 90 tablet, Rfl: 3    levothyroxine (SYNTHROID) 88 MCG tablet, Take 1 tablet (88 mcg total) by mouth once daily., Disp: 90 tablet, Rfl: 1    LIDOcaine (LIDODERM) 5 %, Place 1 patch onto the skin once daily. Remove & Discard patch within 12 hours or as directed by MD, Disp: 30 patch, Rfl: 1    loratadine (CLARITIN) 10 mg tablet, Take 10 mg by mouth once daily., Disp: , Rfl:     MAGNESIUM ORAL, Take 500 mg by mouth once daily. , Disp: , Rfl:     montelukast (SINGULAIR) 10 mg tablet, TAKE 1 TABLET BY MOUTH EVERY DAY IN THE EVENING, Disp: 90 tablet, Rfl: 1    multivitamin (THERAGRAN) tablet, Take 1 tablet by mouth 2 (two) times a day. , Disp: , Rfl:     nitroGLYCERIN (NITROSTAT) 0.4 MG SL tablet, Place 1 tablet (0.4 mg total) under the tongue every 5 (five) minutes as needed for Chest pain., Disp: 30 tablet, Rfl: 1    prednisoLONE acetate (PRED FORTE) 1 % DrpS, Place 1 drop into the right eye 4 (four) times daily., Disp: , Rfl:     turmeric 400 mg Cap, Take 1,200 mg by mouth once daily., Disp: , Rfl:     UNABLE TO FIND, Take 1 capsule by mouth once daily. Joint health, Disp: , Rfl:     UNABLE TO FIND, Take 1 capsule by mouth 2 (two) times a day. Cardio platinum, Disp: , Rfl:     UNABLE TO FIND, Nerve Jorge, Disp: , Rfl:     zinc gluconate 50 mg tablet, Take 50 mg by mouth 2 (two) times a day., Disp: , Rfl:     ALPRAZolam (XANAX) 0.25 MG tablet, TAKE 1 TABLET BY MOUTH EVERY DAY AS NEEDED FOR ANXIETY, Disp: 90 tablet, Rfl: 0    losartan (COZAAR) 25 MG tablet, Take 0.5 tablets (12.5 mg total) by mouth every evening., Disp: 45 tablet, Rfl: 3    metoprolol succinate (TOPROL-XL) 25 MG 24 hr tablet, TAKE 1 TABLET BY MOUTH EVERY DAY, Disp: 90 tablet, Rfl: 3    omeprazole (PRILOSEC) 20 MG capsule, Take 1 capsule (20 mg total) by mouth once daily., Disp: 90 capsule, Rfl: 3    tiZANidine (ZANAFLEX) 4 MG tablet, Take 1 tablet (4 mg total) by mouth once daily., Disp: 90 tablet, Rfl: 0    traMADoL (ULTRAM) 50 mg tablet, Take 1  tablet (50 mg total) by mouth 2 (two) times daily as needed (pain)., Disp: 14 tablet, Rfl: 0    Current Facility-Administered Medications:     evolocumab PnIj 140 mg, 140 mg, Subcutaneous, Q14 Days, Kenton Ware MD    Review of Systems   Constitutional:  Positive for activity change. Negative for appetite change, chills, fatigue, fever and unexpected weight change.   HENT:  Positive for tinnitus. Negative for congestion, ear pain, hearing loss, postnasal drip, sinus pain, sneezing, sore throat and trouble swallowing.    Eyes:  Negative for pain, discharge and visual disturbance.   Respiratory:  Negative for cough, choking, shortness of breath and wheezing.    Cardiovascular:  Negative for chest pain, palpitations and leg swelling.        HPI   Gastrointestinal:  Negative for abdominal distention, abdominal pain, blood in stool, constipation, diarrhea, nausea and vomiting.        Some recent soft stools that resolved   Endocrine: Negative for cold intolerance and heat intolerance.   Genitourinary:  Negative for difficulty urinating, dysuria, frequency, pelvic pain and urgency.   Musculoskeletal:  Positive for back pain (sciatica). Negative for joint swelling and neck pain.   Skin:  Negative for pallor and rash.   Allergic/Immunologic: Negative for environmental allergies and food allergies.   Neurological:  Negative for dizziness, tremors, weakness, numbness and headaches.   Hematological:  Does not bruise/bleed easily.   Psychiatric/Behavioral:  Negative for agitation, confusion, dysphoric mood, sleep disturbance and suicidal ideas. The patient is not nervous/anxious.           Objective:          10/12/2023     2:33 PM 11/1/2023    11:29 AM 12/5/2023     3:56 PM 12/21/2023     1:31 PM 1/4/2024     4:25 PM 3/25/2024    11:38 AM   Vitals - 1 value per visit   SYSTOLIC 132 138 155 142 138 138   DIASTOLIC 74 76 77 70 88 72   Pulse 80 70 80 74 71 72   Temp 97.8 °F (36.6 °C) 98 °F (36.7 °C) 98.3 °F (36.8 °C)  98.4  °F (36.9 °C) 98 °F (36.7 °C)   Resp 12 16 14   14   SPO2 99 % 99 % 99 % 99 % 98 % 98 %   Weight (lb) 137 140 139 140 140 136   Weight (kg) 62.143 63.504 63.05 63.504 63.504 61.689   Height 5' (1.524 m) 5' (1.524 m) 5' (1.524 m) 5' (1.524 m) 5' (1.524 m) 5' (1.524 m)   BMI (Calculated) 26.8 27.3 27.1 27.3 27.3 26.6   Pain Score Zero Zero Zero Zero Zero Four   (    Physical Exam  Constitutional:       Appearance: Normal appearance.   HENT:      Head: Normocephalic and atraumatic.      Nose: Nose normal.      Mouth/Throat:      Mouth: Mucous membranes are moist.      Pharynx: Oropharynx is clear.   Eyes:      Conjunctiva/sclera: Conjunctivae normal.      Pupils: Pupils are equal, round, and reactive to light.   Neck:      Vascular: No carotid bruit.   Cardiovascular:      Rate and Rhythm: Normal rate and regular rhythm.      Pulses: Normal pulses.      Heart sounds: Normal heart sounds. No murmur heard.  Pulmonary:      Effort: Pulmonary effort is normal. No respiratory distress.      Breath sounds: Normal breath sounds.   Abdominal:      General: Abdomen is flat.      Palpations: Abdomen is soft.   Musculoskeletal:      Right lower leg: No edema.      Left lower leg: No edema.      Comments: Varicosity above and lateral to left knee   Lymphadenopathy:      Cervical: No cervical adenopathy.   Skin:     General: Skin is warm and dry.   Neurological:      General: No focal deficit present.      Mental Status: She is alert and oriented to person, place, and time.   Psychiatric:         Mood and Affect: Mood normal.         Thought Content: Thought content normal.         Judgment: Judgment normal.           Assessment:       1. Pure hypercholesterolemia    2. Type 2 diabetes mellitus with stage 3a chronic kidney disease, without long-term current use of insulin    3. Encounter for screening mammogram for breast cancer    4. Menopause    5. Encounter for osteoporosis screening in asymptomatic postmenopausal patient    6.  Severe anxiety    7. Sciatica, right side    8. Right leg pain    9. Labile hypertension    10. Uncontrolled hypertension    11. Muscle spasm    12. Essential hypertension, benign    13. Gastroesophageal reflux disease with esophagitis without hemorrhage         Plan:       Pure hypercholesterolemia  -     Lipid Panel; Future; Expected date: 03/25/2024    Type 2 diabetes mellitus with stage 3a chronic kidney disease, without long-term current use of insulin  -     HEMOGLOBIN A1C; Future; Expected date: 03/25/2024  -     Microalbumin/creatinine urine ratio; Future; Expected date: 03/25/2024  -     Comprehensive Metabolic Panel; Future; Expected date: 03/25/2024    Encounter for screening mammogram for breast cancer  -     Mammo Digital Screening Bilat; Future; Expected date: 04/16/2024    Menopause  -     DXA Bone Density Axial Skeleton 1 or more sites; Future    Encounter for osteoporosis screening in asymptomatic postmenopausal patient  -     DXA Bone Density Axial Skeleton 1 or more sites; Future    Severe anxiety  -     ALPRAZolam (XANAX) 0.25 MG tablet; TAKE 1 TABLET BY MOUTH EVERY DAY AS NEEDED FOR ANXIETY  Dispense: 90 tablet; Refill: 0    Sciatica, right side  -     traMADoL (ULTRAM) 50 mg tablet; Take 1 tablet (50 mg total) by mouth 2 (two) times daily as needed (pain).  Dispense: 14 tablet; Refill: 0    Right leg pain  -     traMADoL (ULTRAM) 50 mg tablet; Take 1 tablet (50 mg total) by mouth 2 (two) times daily as needed (pain).  Dispense: 14 tablet; Refill: 0    Labile hypertension  -     losartan (COZAAR) 25 MG tablet; Take 0.5 tablets (12.5 mg total) by mouth every evening.  Dispense: 45 tablet; Refill: 3    Uncontrolled hypertension  -     losartan (COZAAR) 25 MG tablet; Take 0.5 tablets (12.5 mg total) by mouth every evening.  Dispense: 45 tablet; Refill: 3    Muscle spasm  -     tiZANidine (ZANAFLEX) 4 MG tablet; Take 1 tablet (4 mg total) by mouth once daily.  Dispense: 90 tablet; Refill:  0    Essential hypertension, benign  -     metoprolol succinate (TOPROL-XL) 25 MG 24 hr tablet; TAKE 1 TABLET BY MOUTH EVERY DAY  Dispense: 90 tablet; Refill: 3    Gastroesophageal reflux disease with esophagitis without hemorrhage  -     omeprazole (PRILOSEC) 20 MG capsule; Take 1 capsule (20 mg total) by mouth once daily.  Dispense: 90 capsule; Refill: 3      Follow up in about 3 months (around 6/25/2024) for FOLLOW UP LABS, FOLLOW UP MEDICATIONS, FOLLOW-UP STATUS.        3/25/2024 Elena Sullivan M.D.

## 2024-03-25 ENCOUNTER — OFFICE VISIT (OUTPATIENT)
Dept: FAMILY MEDICINE | Facility: CLINIC | Age: 89
End: 2024-03-25
Payer: MEDICARE

## 2024-03-25 VITALS
SYSTOLIC BLOOD PRESSURE: 138 MMHG | TEMPERATURE: 98 F | RESPIRATION RATE: 14 BRPM | DIASTOLIC BLOOD PRESSURE: 72 MMHG | WEIGHT: 136 LBS | HEIGHT: 60 IN | OXYGEN SATURATION: 98 % | BODY MASS INDEX: 26.7 KG/M2 | HEART RATE: 72 BPM

## 2024-03-25 DIAGNOSIS — R09.89 LABILE HYPERTENSION: ICD-10-CM

## 2024-03-25 DIAGNOSIS — K21.00 GASTROESOPHAGEAL REFLUX DISEASE WITH ESOPHAGITIS WITHOUT HEMORRHAGE: ICD-10-CM

## 2024-03-25 DIAGNOSIS — E11.22 TYPE 2 DIABETES MELLITUS WITH STAGE 3A CHRONIC KIDNEY DISEASE, WITHOUT LONG-TERM CURRENT USE OF INSULIN: Chronic | ICD-10-CM

## 2024-03-25 DIAGNOSIS — M62.838 MUSCLE SPASM: ICD-10-CM

## 2024-03-25 DIAGNOSIS — Z13.820 ENCOUNTER FOR OSTEOPOROSIS SCREENING IN ASYMPTOMATIC POSTMENOPAUSAL PATIENT: ICD-10-CM

## 2024-03-25 DIAGNOSIS — N18.31 TYPE 2 DIABETES MELLITUS WITH STAGE 3A CHRONIC KIDNEY DISEASE, WITHOUT LONG-TERM CURRENT USE OF INSULIN: Chronic | ICD-10-CM

## 2024-03-25 DIAGNOSIS — Z12.31 ENCOUNTER FOR SCREENING MAMMOGRAM FOR BREAST CANCER: ICD-10-CM

## 2024-03-25 DIAGNOSIS — F41.9 SEVERE ANXIETY: ICD-10-CM

## 2024-03-25 DIAGNOSIS — M54.31 SCIATICA, RIGHT SIDE: ICD-10-CM

## 2024-03-25 DIAGNOSIS — I10 ESSENTIAL HYPERTENSION, BENIGN: ICD-10-CM

## 2024-03-25 DIAGNOSIS — Z78.0 MENOPAUSE: ICD-10-CM

## 2024-03-25 DIAGNOSIS — M79.604 RIGHT LEG PAIN: ICD-10-CM

## 2024-03-25 DIAGNOSIS — E78.00 PURE HYPERCHOLESTEROLEMIA: Primary | ICD-10-CM

## 2024-03-25 DIAGNOSIS — I10 UNCONTROLLED HYPERTENSION: ICD-10-CM

## 2024-03-25 DIAGNOSIS — Z78.0 ENCOUNTER FOR OSTEOPOROSIS SCREENING IN ASYMPTOMATIC POSTMENOPAUSAL PATIENT: ICD-10-CM

## 2024-03-25 PROCEDURE — 99215 OFFICE O/P EST HI 40 MIN: CPT | Mod: PBBFAC,PN | Performed by: INTERNAL MEDICINE

## 2024-03-25 PROCEDURE — 99214 OFFICE O/P EST MOD 30 MIN: CPT | Mod: S$PBB,AQ,, | Performed by: INTERNAL MEDICINE

## 2024-03-25 PROCEDURE — 99999 PR PBB SHADOW E&M-EST. PATIENT-LVL V: CPT | Mod: PBBFAC,,, | Performed by: INTERNAL MEDICINE

## 2024-03-25 RX ORDER — METOPROLOL SUCCINATE 25 MG/1
TABLET, EXTENDED RELEASE ORAL
Qty: 90 TABLET | Refills: 3 | Status: SHIPPED | OUTPATIENT
Start: 2024-03-25

## 2024-03-25 RX ORDER — TRAMADOL HYDROCHLORIDE 50 MG/1
50 TABLET ORAL 2 TIMES DAILY PRN
Qty: 14 TABLET | Refills: 0 | Status: SHIPPED | OUTPATIENT
Start: 2024-03-25

## 2024-03-25 RX ORDER — TIZANIDINE 4 MG/1
4 TABLET ORAL DAILY
Qty: 90 TABLET | Refills: 0 | Status: SHIPPED | OUTPATIENT
Start: 2024-03-25

## 2024-03-25 RX ORDER — OMEPRAZOLE 20 MG/1
20 CAPSULE, DELAYED RELEASE ORAL DAILY
Qty: 90 CAPSULE | Refills: 3 | Status: SHIPPED | OUTPATIENT
Start: 2024-03-25 | End: 2025-03-25

## 2024-03-25 RX ORDER — LOSARTAN POTASSIUM 25 MG/1
12.5 TABLET ORAL NIGHTLY
Qty: 45 TABLET | Refills: 3 | Status: SHIPPED | OUTPATIENT
Start: 2024-03-25

## 2024-03-25 RX ORDER — ALPRAZOLAM 0.25 MG/1
TABLET ORAL
Qty: 90 TABLET | Refills: 0 | Status: SHIPPED | OUTPATIENT
Start: 2024-03-25

## 2024-03-25 RX ORDER — PREDNISOLONE ACETATE 10 MG/ML
1 SUSPENSION/ DROPS OPHTHALMIC 4 TIMES DAILY
COMMUNITY
Start: 2024-03-11

## 2024-04-03 ENCOUNTER — LAB VISIT (OUTPATIENT)
Dept: LAB | Facility: HOSPITAL | Age: 89
End: 2024-04-03
Attending: INTERNAL MEDICINE
Payer: MEDICARE

## 2024-04-03 DIAGNOSIS — E78.00 PURE HYPERCHOLESTEROLEMIA: ICD-10-CM

## 2024-04-03 DIAGNOSIS — E11.22 TYPE 2 DIABETES MELLITUS WITH STAGE 3A CHRONIC KIDNEY DISEASE, WITHOUT LONG-TERM CURRENT USE OF INSULIN: Chronic | ICD-10-CM

## 2024-04-03 DIAGNOSIS — I10 ESSENTIAL HYPERTENSION: ICD-10-CM

## 2024-04-03 DIAGNOSIS — N18.31 TYPE 2 DIABETES MELLITUS WITH STAGE 3A CHRONIC KIDNEY DISEASE, WITHOUT LONG-TERM CURRENT USE OF INSULIN: Chronic | ICD-10-CM

## 2024-04-03 LAB
ALBUMIN SERPL BCP-MCNC: 4.1 G/DL (ref 3.5–5.2)
ALBUMIN/CREAT UR: 15 UG/MG (ref 0–30)
ALP SERPL-CCNC: 77 U/L (ref 55–135)
ALT SERPL W/O P-5'-P-CCNC: 17 U/L (ref 10–44)
ANION GAP SERPL CALC-SCNC: 9 MMOL/L (ref 8–16)
AST SERPL-CCNC: 23 U/L (ref 10–40)
BASOPHILS # BLD AUTO: 0.07 K/UL (ref 0–0.2)
BASOPHILS NFR BLD: 0.8 % (ref 0–1.9)
BILIRUB SERPL-MCNC: 0.6 MG/DL (ref 0.1–1)
BUN SERPL-MCNC: 22 MG/DL (ref 8–23)
CALCIUM SERPL-MCNC: 9.4 MG/DL (ref 8.7–10.5)
CHLORIDE SERPL-SCNC: 104 MMOL/L (ref 95–110)
CHOLEST SERPL-MCNC: 193 MG/DL (ref 120–199)
CHOLEST/HDLC SERPL: 4.6 {RATIO} (ref 2–5)
CO2 SERPL-SCNC: 26 MMOL/L (ref 23–29)
CREAT SERPL-MCNC: 1.1 MG/DL (ref 0.5–1.4)
CREAT UR-MCNC: 111.8 MG/DL (ref 15–325)
DIFFERENTIAL METHOD BLD: ABNORMAL
EOSINOPHIL # BLD AUTO: 0.2 K/UL (ref 0–0.5)
EOSINOPHIL NFR BLD: 2.3 % (ref 0–8)
ERYTHROCYTE [DISTWIDTH] IN BLOOD BY AUTOMATED COUNT: 13.7 % (ref 11.5–14.5)
EST. GFR  (NO RACE VARIABLE): 48.9 ML/MIN/1.73 M^2
GLUCOSE SERPL-MCNC: 88 MG/DL (ref 70–110)
HCT VFR BLD AUTO: 38 % (ref 37–48.5)
HDLC SERPL-MCNC: 42 MG/DL (ref 40–75)
HDLC SERPL: 21.8 % (ref 20–50)
HGB BLD-MCNC: 12 G/DL (ref 12–16)
IMM GRANULOCYTES # BLD AUTO: 0.02 K/UL (ref 0–0.04)
IMM GRANULOCYTES NFR BLD AUTO: 0.2 % (ref 0–0.5)
LDLC SERPL CALC-MCNC: 110 MG/DL (ref 63–159)
LYMPHOCYTES # BLD AUTO: 3.2 K/UL (ref 1–4.8)
LYMPHOCYTES NFR BLD: 38.5 % (ref 18–48)
MCH RBC QN AUTO: 28.7 PG (ref 27–31)
MCHC RBC AUTO-ENTMCNC: 31.6 G/DL (ref 32–36)
MCV RBC AUTO: 91 FL (ref 82–98)
MICROALBUMIN UR DL<=1MG/L-MCNC: 16.8 UG/ML
MONOCYTES # BLD AUTO: 0.8 K/UL (ref 0.3–1)
MONOCYTES NFR BLD: 9.8 % (ref 4–15)
NEUTROPHILS # BLD AUTO: 4 K/UL (ref 1.8–7.7)
NEUTROPHILS NFR BLD: 48.4 % (ref 38–73)
NONHDLC SERPL-MCNC: 151 MG/DL
NRBC BLD-RTO: 0 /100 WBC
PLATELET # BLD AUTO: 269 K/UL (ref 150–450)
PMV BLD AUTO: 10.5 FL (ref 9.2–12.9)
POTASSIUM SERPL-SCNC: 4.2 MMOL/L (ref 3.5–5.1)
PROT SERPL-MCNC: 7.3 G/DL (ref 6–8.4)
RBC # BLD AUTO: 4.18 M/UL (ref 4–5.4)
SODIUM SERPL-SCNC: 139 MMOL/L (ref 136–145)
TRIGL SERPL-MCNC: 205 MG/DL (ref 30–150)
WBC # BLD AUTO: 8.33 K/UL (ref 3.9–12.7)

## 2024-04-03 PROCEDURE — 85025 COMPLETE CBC W/AUTO DIFF WBC: CPT | Performed by: INTERNAL MEDICINE

## 2024-04-03 PROCEDURE — 82043 UR ALBUMIN QUANTITATIVE: CPT | Performed by: INTERNAL MEDICINE

## 2024-04-03 PROCEDURE — 83036 HEMOGLOBIN GLYCOSYLATED A1C: CPT | Performed by: INTERNAL MEDICINE

## 2024-04-03 PROCEDURE — 80061 LIPID PANEL: CPT | Performed by: INTERNAL MEDICINE

## 2024-04-03 PROCEDURE — 80053 COMPREHEN METABOLIC PANEL: CPT | Performed by: INTERNAL MEDICINE

## 2024-04-03 PROCEDURE — 36415 COLL VENOUS BLD VENIPUNCTURE: CPT | Performed by: INTERNAL MEDICINE

## 2024-04-04 LAB
ESTIMATED AVG GLUCOSE: 134 MG/DL (ref 68–131)
HBA1C MFR BLD: 6.3 % (ref 4.5–6.2)

## 2024-04-09 DIAGNOSIS — R09.89 LABILE HYPERTENSION: ICD-10-CM

## 2024-04-09 NOTE — TELEPHONE ENCOUNTER
----- Message from Annalisaelvia CarreroArrington sent at 4/9/2024  9:33 AM CDT -----  Type:  RX Refill Request    Who Called:  patient  Refill or New Rx:  refill  RX Name and Strength:  ELIQUIS 2.5 mg Tab  How is the patient currently taking it? (ex. 1XDay):  as directed  Is this a 30 day or 90 day RX:  90  Preferred Pharmacy with phone number:    [facility] [address] [place] [address]  Phone: [phone] Fax: [phone]  Local or Mail Order:  local  Ordering Provider:  david Coronado Call Back Number:  [phone] (home)   Additional Information:

## 2024-04-11 DIAGNOSIS — R09.89 LABILE HYPERTENSION: ICD-10-CM

## 2024-04-11 NOTE — TELEPHONE ENCOUNTER
----- Message from Hadley Monae sent at 4/11/2024  2:27 PM CDT -----  Regarding: refill  Contact: patient  Type:  RX Refill Request    Who Called: patient  Refill or New Rx:refill/ new MD order  RX Name and Strength:ELIQUIS 2.5 mg Tab  How is the patient currently taking it? (ex. 1XDay):  Is this a 30 day or 90 day RX:90  Preferred Pharmacy with phone number:  [facility] [address] [address]  Phone: [phone] Fax: [phone]  Local or Mail Order:local  Ordering Provider:Chaz  Would the patient rather a call back or a response via [facility]? New MD order  Best Call Back Number:[phone]  Additional Information: 2nd request please let patient know if MD will not refill

## 2024-05-03 ENCOUNTER — TELEPHONE (OUTPATIENT)
Dept: FAMILY MEDICINE | Facility: CLINIC | Age: 89
End: 2024-05-03
Payer: MEDICARE

## 2024-05-03 NOTE — TELEPHONE ENCOUNTER
[facility] has sent faxes and left voicemail's to get DM supply signed to send out her DM supplies.    Last office visit: 3/25/24 [place]  Next office visit: no upcoming visit    Please advise

## 2024-05-09 DIAGNOSIS — Z13.820 ENCOUNTER FOR OSTEOPOROSIS SCREENING IN ASYMPTOMATIC POSTMENOPAUSAL PATIENT: ICD-10-CM

## 2024-05-09 DIAGNOSIS — Z12.31 ENCOUNTER FOR SCREENING MAMMOGRAM FOR MALIGNANT NEOPLASM OF BREAST: Primary | ICD-10-CM

## 2024-05-09 DIAGNOSIS — Z78.0 ENCOUNTER FOR OSTEOPOROSIS SCREENING IN ASYMPTOMATIC POSTMENOPAUSAL PATIENT: ICD-10-CM

## 2024-05-16 ENCOUNTER — HOSPITAL ENCOUNTER (OUTPATIENT)
Dept: RADIOLOGY | Facility: HOSPITAL | Age: 89
Discharge: HOME | End: 2024-05-16
Attending: INTERNAL MEDICINE
Payer: MEDICARE

## 2024-05-16 VITALS — BODY MASS INDEX: 26.7 KG/M2 | HEIGHT: 60 IN | WEIGHT: 136 LBS

## 2024-05-16 DIAGNOSIS — Z12.31 ENCOUNTER FOR SCREENING MAMMOGRAM FOR MALIGNANT NEOPLASM OF BREAST: ICD-10-CM

## 2024-05-16 DIAGNOSIS — Z13.820 ENCOUNTER FOR OSTEOPOROSIS SCREENING IN ASYMPTOMATIC POSTMENOPAUSAL PATIENT: ICD-10-CM

## 2024-05-16 DIAGNOSIS — Z78.0 ENCOUNTER FOR OSTEOPOROSIS SCREENING IN ASYMPTOMATIC POSTMENOPAUSAL PATIENT: ICD-10-CM

## 2024-05-16 PROCEDURE — 77080 DXA BONE DENSITY AXIAL: CPT | Mod: TC,PO

## 2024-05-16 PROCEDURE — 77063 BREAST TOMOSYNTHESIS BI: CPT | Mod: TC,PO

## 2024-05-16 PROCEDURE — 77063 BREAST TOMOSYNTHESIS BI: CPT | Mod: 26,,, | Performed by: RADIOLOGY

## 2024-05-16 PROCEDURE — 77080 DXA BONE DENSITY AXIAL: CPT | Mod: 26,,, | Performed by: RADIOLOGY

## 2024-05-16 PROCEDURE — 77067 SCR MAMMO BI INCL CAD: CPT | Mod: 26,,, | Performed by: RADIOLOGY

## 2024-05-16 PROCEDURE — 77067 SCR MAMMO BI INCL CAD: CPT | Mod: TC,PO

## 2024-05-28 ENCOUNTER — LAB VISIT (OUTPATIENT)
Dept: LAB | Facility: HOSPITAL | Age: 89
End: 2024-05-28
Attending: INTERNAL MEDICINE
Payer: MEDICARE

## 2024-05-28 DIAGNOSIS — N18.30 CHRONIC KIDNEY DISEASE, STAGE III (MODERATE): ICD-10-CM

## 2024-05-28 DIAGNOSIS — E66.9 OBESITY, DIABETES, AND HYPERTENSION SYNDROME: Primary | ICD-10-CM

## 2024-05-28 DIAGNOSIS — E11.69 OBESITY, DIABETES, AND HYPERTENSION SYNDROME: Primary | ICD-10-CM

## 2024-05-28 DIAGNOSIS — E11.59 OBESITY, DIABETES, AND HYPERTENSION SYNDROME: ICD-10-CM

## 2024-05-28 DIAGNOSIS — I15.2 OBESITY, DIABETES, AND HYPERTENSION SYNDROME: Primary | ICD-10-CM

## 2024-05-28 DIAGNOSIS — E11.69 OBESITY, DIABETES, AND HYPERTENSION SYNDROME: ICD-10-CM

## 2024-05-28 DIAGNOSIS — N18.30 CHRONIC KIDNEY DISEASE, STAGE III (MODERATE): Primary | ICD-10-CM

## 2024-05-28 DIAGNOSIS — E11.59 OBESITY, DIABETES, AND HYPERTENSION SYNDROME: Primary | ICD-10-CM

## 2024-05-28 DIAGNOSIS — I15.2 OBESITY, DIABETES, AND HYPERTENSION SYNDROME: ICD-10-CM

## 2024-05-28 DIAGNOSIS — E66.9 OBESITY, DIABETES, AND HYPERTENSION SYNDROME: ICD-10-CM

## 2024-05-28 LAB
ALBUMIN SERPL BCP-MCNC: 3.9 G/DL (ref 3.5–5.2)
ANION GAP SERPL CALC-SCNC: 7 MMOL/L (ref 8–16)
BUN SERPL-MCNC: 26 MG/DL (ref 8–23)
CALCIUM SERPL-MCNC: 9.1 MG/DL (ref 8.7–10.5)
CHLORIDE SERPL-SCNC: 104 MMOL/L (ref 95–110)
CO2 SERPL-SCNC: 28 MMOL/L (ref 23–29)
CREAT SERPL-MCNC: 1.2 MG/DL (ref 0.5–1.4)
CREAT UR-MCNC: 101 MG/DL (ref 15–325)
EST. GFR  (NO RACE VARIABLE): 44.1 ML/MIN/1.73 M^2
ESTIMATED AVG GLUCOSE: 126 MG/DL (ref 68–131)
GLUCOSE SERPL-MCNC: 89 MG/DL (ref 70–110)
HBA1C MFR BLD: 6 % (ref 4.5–6.2)
HYALINE CASTS #/AREA URNS LPF: 5 /LPF
MICROSCOPIC COMMENT: ABNORMAL
PHOSPHATE SERPL-MCNC: 4 MG/DL (ref 2.7–4.5)
POTASSIUM SERPL-SCNC: 4 MMOL/L (ref 3.5–5.1)
PROT UR-MCNC: 12 MG/DL (ref 6–15)
PROT/CREAT UR: 0.12 MG/G{CREAT} (ref 0–0.2)
RBC #/AREA URNS HPF: 1 /HPF (ref 0–4)
SODIUM SERPL-SCNC: 139 MMOL/L (ref 136–145)
SQUAMOUS #/AREA URNS HPF: 1 /HPF
WBC #/AREA URNS HPF: 14 /HPF (ref 0–5)

## 2024-05-28 PROCEDURE — 84156 ASSAY OF PROTEIN URINE: CPT | Performed by: INTERNAL MEDICINE

## 2024-05-28 PROCEDURE — 81001 URINALYSIS AUTO W/SCOPE: CPT | Performed by: INTERNAL MEDICINE

## 2024-05-28 PROCEDURE — 80069 RENAL FUNCTION PANEL: CPT | Performed by: INTERNAL MEDICINE

## 2024-05-28 PROCEDURE — 36415 COLL VENOUS BLD VENIPUNCTURE: CPT | Performed by: INTERNAL MEDICINE

## 2024-05-28 PROCEDURE — 83036 HEMOGLOBIN GLYCOSYLATED A1C: CPT | Performed by: INTERNAL MEDICINE

## 2024-08-01 DIAGNOSIS — Z95.0 PACEMAKER: Primary | ICD-10-CM

## 2024-08-06 ENCOUNTER — HOSPITAL ENCOUNTER (OUTPATIENT)
Dept: CARDIOLOGY | Facility: CLINIC | Age: 89
Discharge: HOME | End: 2024-08-06
Attending: INTERNAL MEDICINE
Payer: MEDICARE

## 2024-08-06 DIAGNOSIS — Z95.0 PACEMAKER: Chronic | ICD-10-CM

## 2024-08-06 PROCEDURE — 93288 INTERROG EVL PM/LDLS PM IP: CPT | Mod: 26,S$PBB,, | Performed by: INTERNAL MEDICINE

## 2024-08-07 LAB
BATTERY VOLTAGE (V): 2.96 V
IMPEDANCE RA LEAD (NATIVE): 475 OHMS
IMPEDANCE RA LEAD: 494 OHMS
P/R-WAVE RA LEAD (NATIVE): 9.8 MV
P/R-WAVE RA LEAD: 1 MV
THRESHOLD MS RA LEAD (NATIVE): 0.4 MS
THRESHOLD MS RA LEAD: 0.4 MS
THRESHOLD V RA LEAD (NATIVE): 0.6 V
THRESHOLD V RA LEAD: 0.7 V

## 2024-08-13 ENCOUNTER — OFFICE VISIT (OUTPATIENT)
Dept: CARDIOLOGY | Facility: CLINIC | Age: 89
End: 2024-08-13
Payer: MEDICARE

## 2024-08-13 VITALS
WEIGHT: 137 LBS | HEART RATE: 72 BPM | HEIGHT: 60 IN | BODY MASS INDEX: 26.9 KG/M2 | RESPIRATION RATE: 16 BRPM | SYSTOLIC BLOOD PRESSURE: 118 MMHG | DIASTOLIC BLOOD PRESSURE: 68 MMHG | OXYGEN SATURATION: 99 %

## 2024-08-13 DIAGNOSIS — E03.9 ACQUIRED HYPOTHYROIDISM: ICD-10-CM

## 2024-08-13 DIAGNOSIS — I48.0 PAF (PAROXYSMAL ATRIAL FIBRILLATION): Chronic | ICD-10-CM

## 2024-08-13 DIAGNOSIS — I10 ESSENTIAL HYPERTENSION: ICD-10-CM

## 2024-08-13 DIAGNOSIS — Z95.0 PACEMAKER: Chronic | ICD-10-CM

## 2024-08-13 DIAGNOSIS — I70.0 AORTIC ATHEROSCLEROSIS: Primary | ICD-10-CM

## 2024-08-13 DIAGNOSIS — Z78.9 STATIN INTOLERANCE: ICD-10-CM

## 2024-08-13 DIAGNOSIS — I25.10 CORONARY ARTERY DISEASE INVOLVING NATIVE CORONARY ARTERY OF NATIVE HEART WITHOUT ANGINA PECTORIS: ICD-10-CM

## 2024-08-13 PROCEDURE — 99213 OFFICE O/P EST LOW 20 MIN: CPT | Mod: PBBFAC,PN | Performed by: INTERNAL MEDICINE

## 2024-08-13 PROCEDURE — 99214 OFFICE O/P EST MOD 30 MIN: CPT | Mod: S$PBB,,, | Performed by: INTERNAL MEDICINE

## 2024-08-13 PROCEDURE — 99999 PR PBB SHADOW E&M-EST. PATIENT-LVL III: CPT | Mod: PBBFAC,,, | Performed by: INTERNAL MEDICINE

## 2024-08-13 NOTE — ASSESSMENT & PLAN NOTE
Blood pressure is stable at 1 18/68 mm Hg continue on present therapy to include low doses of metoprolol 25 and isosorbide mononitrate therapy.  Daily physical activity as tolerated

## 2024-08-13 NOTE — ASSESSMENT & PLAN NOTE
As above maintain on present therapy with risk factor modification including metoprolol aspirin and isosorbide.  Severe statin intolerance noted

## 2024-08-13 NOTE — ASSESSMENT & PLAN NOTE
No further episodes of sustained palpitations noted.  Rate is controlled at 72 continue on Eliquis 2.5 mg p.o. b.i.d. and metoprolol and magnesium tablets

## 2024-08-13 NOTE — ASSESSMENT & PLAN NOTE
Continue on risk factor modification.  Maintain on aspirin therapy baby at 81 mg a day and she was also on Eliquis 2.5 mg p.o. b.i.d..  Continue on low doses of metoprolol and isosorbide mononitrate.  She was intolerance to statin therapies

## 2024-08-13 NOTE — PROGRESS NOTES
Subjective:    Patient ID:  Karma Chen is a 88 y.o. female patient here for evaluation Follow-up      History of Present Illness:   Patient was 88-year-old with history of known coronary artery disease aortic arteriosclerosis sclerosis and arterial hypertension seeking follow-up evaluation.  She has been doing fairly well.  She has been taken off her blood pressure medicines she continues to have some episodes of occasional weak spells and associated some fatigue and tiredness.  And no occurrence of any chest discomfort no shoulder pain neck pain or back pain.  Effort capacity has been somewhat limited she was a quad cane for stability.    No edema PND orthopnea and no angina symptoms are noted          Review of patient's allergies indicates:   Allergen Reactions    Diclofenac-misoprostol      Other reaction(s): Not available    Doxycycline     Effexor [venlafaxine]     Estradiol     Flagyl [metronidazole]     Fluconazole     Gabapentin     Iodine     Levaquin [levofloxacin]     Nexium [esomeprazole magnesium]     Nexletol [bempedoic acid] Other (See Comments)    Penicillins     Red yeast rice (monascus purpureus)     Shellfish containing products     Statins-hmg-coa reductase inhibitors     Sulfa (sulfonamide antibiotics)     Tea tree oil     Tegaserod      ZOLNORM    Tegaserod hydrogen maleate     Trazodone     Yeast, dried     Adhesive Rash     Band-aids       Past Medical History:   Diagnosis Date    Coronary artery disease     Dermatitis     Dermatitis 12/8/2017    Diabetes mellitus     Diabetes mellitus type II     Leah Barr virus infection     Fibromyalgia     GERD (gastroesophageal reflux disease)     Hypertension     MI (myocardial infarction) 09/23/2011    Pure hypercholesterolemia 2/26/2020     Past Surgical History:   Procedure Laterality Date    adenoids      ANGIOPLASTY  1987    APPENDECTOMY      CARDIAC PACEMAKER PLACEMENT  01/12/2017    CATARACT EXTRACTION, BILATERAL Bilateral 9/2/15,  3/17/15    right eye-9/2/15, left eye-3/17/15    CHOLECYSTECTOMY  1987    CORONARY ARTERY BYPASS GRAFT  2006    quadruple    coronary stents  1999    x2    CYST REMOVAL  04/25/2017    on back    HYSTERECTOMY  1966    OVARY SURGERY  1948    Ovary burst & was repaired    RHIZOTOMY  09/14/2018    TONSILLECTOMY  1940     Social History     Tobacco Use    Smoking status: Never    Smokeless tobacco: Never   Substance Use Topics    Alcohol use: No    Drug use: No        Review of Systems:    As noted in HPI in addition      REVIEW OF SYSTEMS  CARDIOVASCULAR: No recent chest pain, palpitations, arm, neck, or jaw pain  RESPIRATORY: No recent fever, cough chills, SOB or congestion  : No blood in the urine  GI: No Nausea, vomiting, constipation, diarrhea, blood, or reflux.  MUSCULOSKELETAL: No myalgias  NEURO: No lightheadedness or dizziness  EYES: No Double vision, blurry, vision or headache              Objective        Vitals:    08/13/24 0902   BP: 118/68   Pulse: 72   Resp: 16       LIPIDS - LAST 2   Lab Results   Component Value Date    CHOL 193 04/03/2024    CHOL 229 (H) 08/23/2023    HDL 42 04/03/2024    HDL 38 (L) 08/23/2023    LDLCALC 110.0 04/03/2024    LDLCALC 124.0 08/23/2023    TRIG 205 (H) 04/03/2024    TRIG 335 (H) 08/23/2023    CHOLHDL 21.8 04/03/2024    CHOLHDL 16.6 (L) 08/23/2023       CBC - LAST 2  Lab Results   Component Value Date    WBC 8.33 04/03/2024    WBC 7.57 03/07/2024    RBC 4.18 04/03/2024    RBC 4.12 03/07/2024    HGB 12.0 04/03/2024    HGB 11.6 (L) 03/07/2024    HCT 38.0 04/03/2024    HCT 37.6 03/07/2024    MCV 91 04/03/2024    MCV 91 03/07/2024    MCH 28.7 04/03/2024    MCH 28.2 03/07/2024    MCHC 31.6 (L) 04/03/2024    MCHC 30.9 (L) 03/07/2024    RDW 13.7 04/03/2024    RDW 13.4 03/07/2024     04/03/2024     03/07/2024    MPV 10.5 04/03/2024    MPV 10.4 03/07/2024    GRAN 4.0 04/03/2024    GRAN 48.4 04/03/2024    LYMPH 3.2 04/03/2024    LYMPH 38.5 04/03/2024    MONO 0.8  04/03/2024    MONO 9.8 04/03/2024    BASO 0.07 04/03/2024    BASO 0.06 03/07/2024    NRBC 0 04/03/2024    NRBC 0 03/07/2024       CHEMISTRY & LIVER FUNCTION - LAST 2  Lab Results   Component Value Date     05/28/2024     04/03/2024    K 4.0 05/28/2024    K 4.2 04/03/2024     05/28/2024     04/03/2024    CO2 28 05/28/2024    CO2 26 04/03/2024    ANIONGAP 7 (L) 05/28/2024    ANIONGAP 9 04/03/2024    BUN 26 (H) 05/28/2024    BUN 22 04/03/2024    CREATININE 1.2 05/28/2024    CREATININE 1.1 04/03/2024    GLU 89 05/28/2024    GLU 88 04/03/2024    CALCIUM 9.1 05/28/2024    CALCIUM 9.4 04/03/2024    MG 2.4 11/21/2023    MG 2.3 03/08/2023    ALBUMIN 3.9 05/28/2024    ALBUMIN 4.1 04/03/2024    PROT 7.3 04/03/2024    PROT 6.9 08/23/2023    ALKPHOS 77 04/03/2024    ALKPHOS 63 08/23/2023    ALT 17 04/03/2024    ALT 17 08/23/2023    AST 23 04/03/2024    AST 24 08/23/2023    BILITOT 0.6 04/03/2024    BILITOT 0.5 08/23/2023        CARDIAC PROFILE - LAST 2  Lab Results   Component Value Date     (H) 03/08/2023     (H) 05/10/2022    CPK 53 10/22/2021    CPKMB 1.57 02/20/2012    TROPONINI <0.030 10/22/2021    TROPONINI 0.077 (HH) 07/25/2020    TROPONINIHS 19.0 (H) 03/08/2023    TROPONINIHS 16.8 (H) 03/08/2023        COAGULATION - LAST 2  Lab Results   Component Value Date    LABPT 13.9 (H) 10/22/2021    LABPT 13.6 05/02/2020    INR 1.2 10/22/2021    INR 1.1 05/02/2020    APTT 26.9 12/10/2017    APTT 26.6 02/20/2012       ENDOCRINE & PSA - LAST 2  Lab Results   Component Value Date    HGBA1C 6.0 05/28/2024    HGBA1C 6.3 (H) 04/03/2024    TSH 0.960 03/08/2023    TSH 1.280 05/10/2022        ECHOCARDIOGRAM RESULTS  Results for orders placed during the hospital encounter of 06/10/21    Echo    Interpretation Summary  · The left ventricle is mildly enlarged with mild concentric hypertrophy  · The estimated ejection fraction is 49%. Which is decreased  · Grade II left ventricular diastolic  dysfunction.  · There is mild left ventricular global hypokinesis.  · Atrial fibrillation not observed.  · Normal right ventricular size with normal right ventricular systolic function.  · Moderate left atrial enlargement.  · Mild-to-moderate aortic regurgitation.  · There is mild aortic valve stenosis.  · Aortic valve area is 1.99 cm2; peak velocity is 1.41 m/s; mean gradient is 4 mmHg.  · Mild mitral regurgitation.  · Mild to moderate tricuspid regurgitation.  · Mild pulmonic regurgitation.  · Normal central venous pressure (3 mmHg).  · The estimated PA systolic pressure is 35 mmHg. Mild pulmonary hypertension  · Pacemaker lead is present      CURRENT/PREVIOUS VISIT EKG  Results for orders placed or performed during the hospital encounter of 03/08/23   EKG 12-lead    Collection Time: 03/08/23  7:22 PM    Narrative    Test Reason : R07.9,    Vent. Rate : 075 BPM     Atrial Rate : 075 BPM     P-R Int : 236 ms          QRS Dur : 120 ms      QT Int : 394 ms       P-R-T Axes : 082 003 139 degrees     QTc Int : 439 ms    Atrial-paced rhythm with prolonged AV conduction  Septal infarct (cited on or before 11-DEC-2017)  ST and T wave abnormality, consider lateral ischemia  Abnormal ECG  When compared with ECG of 07-JUL-2022 15:30,  No significant change was found  Confirmed by Herson CORTEZ, Chava COHEN ([id number]) on 3/18/2023 8:28:58 PM    Referred By: [email or web address]   SELF           Confirmed By:Chava Bains MD     No valid procedures specified.   Results for orders placed during the hospital encounter of 06/10/21    Nuclear Stress - Cardiology Interpreted    Interpretation Summary    Normal myocardial perfusion scan. There is no evidence of myocardial ischemia or infarction.    The gated perfusion images showed an ejection fraction of 51% post stress. Normal ejection fraction is greater than 53%.    There is normal wall motion post stress.    LV cavity size is  and normal at stress.    The EKG portion of this study is abnormal but  not diagnostic.    The patient reported no chest pain during the stress test.    There were no arrhythmias during stress.    No valid procedures specified.    PHYSICAL EXAM  CONSTITUTIONAL: Well built, well nourished in no apparent distress  NECK: no carotid bruit, no JVD  LUNGS: CTA  CHEST WALL: no tenderness  HEART: regular rate and rhythm, S1, S2 normal, no murmur, click, rub or gallop   ABDOMEN: soft, non-tender; bowel sounds normal; no masses,  no organomegaly  EXTREMITIES: Extremities normal, no edema, no calf tenderness noted  NEURO: AAO X 3    I HAVE REVIEWED :    The vital signs, nurses notes, and all the pertinent radiology and labs.        Current Outpatient Medications   Medication Instructions    acetaminophen (TYLENOL) 500 mg, Oral, Every 6 hours PRN    ALPRAZolam (XANAX) 0.25 MG tablet TAKE 1 TABLET BY MOUTH EVERY DAY AS NEEDED FOR ANXIETY    aspirin (ECOTRIN) 81 mg, Oral, Daily    coenzyme Q10 100 mg, Oral, 2 times daily    cyanocobalamin 1,000 mcg/mL injection 2cc I'm every other week    diphenhydrAMINE (BENYLIN) 12.5 mg, Oral, 4 times daily PRN    ECHINACEA ORAL 750 mg, Oral, 2 times daily    ELIQUIS 2.5 mg, Oral, 2 times daily    fluocinonide-emollient (FLUOCINONIDE-E) 0.05 % Crea Topical (Top), 2 times daily    fluticasone propionate (FLONASE) 50 mcg, Each Nostril, Daily    isosorbide mononitrate (IMDUR) 120 MG 24 hr tablet TAKE 1 TABLET (120 MG TOTAL) BY MOUTH ONCE DAILY. DO NOT CRUSH OR CHEW SWALLOW WHOLE.    levothyroxine (SYNTHROID) 88 mcg, Oral, Daily    LIDOcaine (LIDODERM) 5 % 1 patch, Transdermal, Daily, Remove & Discard patch within 12 hours or as directed by MD    loratadine (CLARITIN) 10 mg, Oral, Daily    MAGNESIUM ORAL 500 mg, Oral, Daily    metoprolol succinate (TOPROL-XL) 25 MG 24 hr tablet TAKE 1 TABLET BY MOUTH EVERY DAY    montelukast (SINGULAIR) 10 mg tablet TAKE 1 TABLET BY MOUTH EVERY DAY IN THE EVENING    multivitamin (THERAGRAN) tablet 1 tablet, Oral, 2 times daily     nitroGLYCERIN (NITROSTAT) 0.4 mg, Sublingual, Every 5 min PRN    omeprazole (PRILOSEC) 20 mg, Oral, Daily    prednisoLONE acetate (PRED FORTE) 1 % DrpS 1 drop, Right Eye, 4 times daily    tiZANidine (ZANAFLEX) 4 mg, Oral, Daily    traMADoL (ULTRAM) 50 mg, Oral, 2 times daily PRN    turmeric 1,200 mg, Oral, Daily    UNABLE TO FIND 1 capsule, Oral, Daily, Joint health     UNABLE TO FIND 1 capsule, Oral, 2 times daily, Cardio platinum    UNABLE TO FIND Nerve Jorge    zinc gluconate 50 mg, Oral, 2 times daily          Assessment & Plan     1. Aortic atherosclerosis  Assessment & Plan:  Continue on risk factor modification.  Maintain on aspirin therapy baby at 81 mg a day and she was also on Eliquis 2.5 mg p.o. b.i.d..  Continue on low doses of metoprolol and isosorbide mononitrate.  She was intolerance to statin therapies      2. Coronary artery disease involving native coronary artery of native heart without angina pectoris  Assessment & Plan:  As above maintain on present therapy with risk factor modification including metoprolol aspirin and isosorbide.  Severe statin intolerance noted      3. Essential hypertension  Assessment & Plan:  Blood pressure is stable at 1 18/68 mm Hg continue on present therapy to include low doses of metoprolol 25 and isosorbide mononitrate therapy.  Daily physical activity as tolerated      4. PAF (paroxysmal atrial fibrillation)  Assessment & Plan:  No further episodes of sustained palpitations noted.  Rate is controlled at 72 continue on Eliquis 2.5 mg p.o. b.i.d. and metoprolol and magnesium tablets      5. Pacemaker in place  Assessment & Plan:  Functioning pacer in Situ stable      6. Acquired hypothyroidism  Assessment & Plan:  Maintain on levothyroxine at 88 mcg a day and continue the same      7. Statin intolerance  Assessment & Plan:  Severe statin intolerance to multiple drugs noted.            Follow up in about 6 months (around 2/13/2025).

## 2024-10-31 ENCOUNTER — OFFICE VISIT (OUTPATIENT)
Dept: URGENT CARE | Facility: CLINIC | Age: 89
End: 2024-10-31
Payer: MEDICARE

## 2024-10-31 VITALS
RESPIRATION RATE: 16 BRPM | TEMPERATURE: 98 F | WEIGHT: 137 LBS | HEIGHT: 60 IN | DIASTOLIC BLOOD PRESSURE: 79 MMHG | OXYGEN SATURATION: 98 % | SYSTOLIC BLOOD PRESSURE: 153 MMHG | HEART RATE: 76 BPM | BODY MASS INDEX: 26.9 KG/M2

## 2024-10-31 DIAGNOSIS — B96.89 ACUTE BACTERIAL SINUSITIS: Primary | ICD-10-CM

## 2024-10-31 DIAGNOSIS — J01.90 ACUTE BACTERIAL SINUSITIS: Primary | ICD-10-CM

## 2024-10-31 DIAGNOSIS — R05.9 COUGH, UNSPECIFIED TYPE: ICD-10-CM

## 2024-10-31 LAB
CTP QC/QA: YES
FLUAV AG NPH QL: NEGATIVE
FLUBV AG NPH QL: NEGATIVE
S PYO RRNA THROAT QL PROBE: NEGATIVE
SARS-COV-2 AG RESP QL IA.RAPID: NEGATIVE

## 2024-10-31 RX ORDER — AZITHROMYCIN 250 MG/1
TABLET, FILM COATED ORAL
Qty: 6 TABLET | Refills: 0 | Status: SHIPPED | OUTPATIENT
Start: 2024-10-31 | End: 2024-11-05

## 2024-10-31 RX ORDER — DEXCHLORPHENIRAMINE MALEATE, DEXTROMETHORPHAN HBR, PHENYLEPHRINE HCL 1; 10; 5 MG/5ML; MG/5ML; MG/5ML
SYRUP ORAL
Qty: 120 ML | Refills: 0 | Status: SHIPPED | OUTPATIENT
Start: 2024-10-31

## 2024-11-20 ENCOUNTER — HOSPITAL ENCOUNTER (OUTPATIENT)
Dept: CARDIOLOGY | Facility: CLINIC | Age: 89
Discharge: HOME | End: 2024-11-20
Attending: INTERNAL MEDICINE
Payer: MEDICARE

## 2024-11-20 ENCOUNTER — CLINICAL SUPPORT (OUTPATIENT)
Dept: CARDIOLOGY | Facility: CLINIC | Age: 89
End: 2024-11-20

## 2024-11-20 DIAGNOSIS — Z95.0 PRESENCE OF CARDIAC PACEMAKER: ICD-10-CM

## 2024-11-20 DIAGNOSIS — R00.1 BRADYCARDIA, UNSPECIFIED: ICD-10-CM

## 2024-11-20 PROCEDURE — 93294 REM INTERROG EVL PM/LDLS PM: CPT | Mod: ,,, | Performed by: INTERNAL MEDICINE

## 2024-11-25 ENCOUNTER — LAB VISIT (OUTPATIENT)
Dept: LAB | Facility: HOSPITAL | Age: 89
End: 2024-11-25
Attending: INTERNAL MEDICINE
Payer: MEDICARE

## 2024-11-25 DIAGNOSIS — I10 ESSENTIAL HYPERTENSION, MALIGNANT: ICD-10-CM

## 2024-11-25 DIAGNOSIS — N18.9 CHRONIC KIDNEY DISEASE, UNSPECIFIED: Primary | ICD-10-CM

## 2024-11-25 DIAGNOSIS — N25.81 SECONDARY HYPERPARATHYROIDISM OF RENAL ORIGIN: ICD-10-CM

## 2024-11-25 DIAGNOSIS — N18.30 CHRONIC KIDNEY DISEASE, STAGE III (MODERATE): ICD-10-CM

## 2024-11-25 DIAGNOSIS — N18.9 CHRONIC KIDNEY DISEASE, UNSPECIFIED: ICD-10-CM

## 2024-11-25 LAB
25(OH)D3+25(OH)D2 SERPL-MCNC: 81 NG/ML (ref 30–96)
ALBUMIN SERPL BCP-MCNC: 3.8 G/DL (ref 3.5–5.2)
ANION GAP SERPL CALC-SCNC: 9 MMOL/L (ref 8–16)
BACTERIA #/AREA URNS HPF: ABNORMAL /HPF
BASOPHILS # BLD AUTO: 0.06 K/UL (ref 0–0.2)
BASOPHILS NFR BLD: 0.9 % (ref 0–1.9)
BUN SERPL-MCNC: 19 MG/DL (ref 8–23)
CALCIUM SERPL-MCNC: 8.9 MG/DL (ref 8.7–10.5)
CHLORIDE SERPL-SCNC: 104 MMOL/L (ref 95–110)
CO2 SERPL-SCNC: 25 MMOL/L (ref 23–29)
CREAT SERPL-MCNC: 1.1 MG/DL (ref 0.5–1.4)
CREAT UR-MCNC: 64.3 MG/DL (ref 15–325)
DIFFERENTIAL METHOD BLD: ABNORMAL
EOSINOPHIL # BLD AUTO: 0.2 K/UL (ref 0–0.5)
EOSINOPHIL NFR BLD: 3.1 % (ref 0–8)
ERYTHROCYTE [DISTWIDTH] IN BLOOD BY AUTOMATED COUNT: 14.3 % (ref 11.5–14.5)
EST. GFR  (NO RACE VARIABLE): 48.9 ML/MIN/1.73 M^2
GLUCOSE SERPL-MCNC: 81 MG/DL (ref 70–110)
HCT VFR BLD AUTO: 37.2 % (ref 37–48.5)
HGB BLD-MCNC: 11.3 G/DL (ref 12–16)
IMM GRANULOCYTES # BLD AUTO: 0.01 K/UL (ref 0–0.04)
IMM GRANULOCYTES NFR BLD AUTO: 0.1 % (ref 0–0.5)
LYMPHOCYTES # BLD AUTO: 3.3 K/UL (ref 1–4.8)
LYMPHOCYTES NFR BLD: 46.2 % (ref 18–48)
MAGNESIUM SERPL-MCNC: 2.5 MG/DL (ref 1.6–2.6)
MCH RBC QN AUTO: 28.1 PG (ref 27–31)
MCHC RBC AUTO-ENTMCNC: 30.4 G/DL (ref 32–36)
MCV RBC AUTO: 93 FL (ref 82–98)
MICROSCOPIC COMMENT: ABNORMAL
MONOCYTES # BLD AUTO: 0.9 K/UL (ref 0.3–1)
MONOCYTES NFR BLD: 12.4 % (ref 4–15)
NEUTROPHILS # BLD AUTO: 2.6 K/UL (ref 1.8–7.7)
NEUTROPHILS NFR BLD: 37.3 % (ref 38–73)
NRBC BLD-RTO: 0 /100 WBC
PHOSPHATE SERPL-MCNC: 3.3 MG/DL (ref 2.7–4.5)
PLATELET # BLD AUTO: 250 K/UL (ref 150–450)
PMV BLD AUTO: 10.4 FL (ref 9.2–12.9)
POTASSIUM SERPL-SCNC: 3.9 MMOL/L (ref 3.5–5.1)
PROT UR-MCNC: 9 MG/DL (ref 6–15)
PROT/CREAT UR: 0.14 MG/G{CREAT} (ref 0–0.2)
PTH-INTACT SERPL-MCNC: 56 PG/ML (ref 9–77)
RBC # BLD AUTO: 4.02 M/UL (ref 4–5.4)
RBC #/AREA URNS HPF: 1 /HPF (ref 0–4)
SODIUM SERPL-SCNC: 138 MMOL/L (ref 136–145)
SQUAMOUS #/AREA URNS HPF: 1 /HPF
URATE SERPL-MCNC: 6.7 MG/DL (ref 2.4–5.7)
WBC # BLD AUTO: 7.04 K/UL (ref 3.9–12.7)
WBC #/AREA URNS HPF: 6 /HPF (ref 0–5)

## 2024-11-25 PROCEDURE — 80069 RENAL FUNCTION PANEL: CPT | Performed by: INTERNAL MEDICINE

## 2024-11-25 PROCEDURE — 81001 URINALYSIS AUTO W/SCOPE: CPT | Performed by: INTERNAL MEDICINE

## 2024-11-25 PROCEDURE — 82306 VITAMIN D 25 HYDROXY: CPT | Performed by: INTERNAL MEDICINE

## 2024-11-25 PROCEDURE — 83735 ASSAY OF MAGNESIUM: CPT | Performed by: INTERNAL MEDICINE

## 2024-11-25 PROCEDURE — 82570 ASSAY OF URINE CREATININE: CPT | Performed by: INTERNAL MEDICINE

## 2024-11-25 PROCEDURE — 36415 COLL VENOUS BLD VENIPUNCTURE: CPT | Performed by: INTERNAL MEDICINE

## 2024-11-25 PROCEDURE — 83970 ASSAY OF PARATHORMONE: CPT | Performed by: INTERNAL MEDICINE

## 2024-11-25 PROCEDURE — 84550 ASSAY OF BLOOD/URIC ACID: CPT | Performed by: INTERNAL MEDICINE

## 2024-11-25 PROCEDURE — 85025 COMPLETE CBC W/AUTO DIFF WBC: CPT | Performed by: INTERNAL MEDICINE

## 2024-12-10 ENCOUNTER — HOSPITAL ENCOUNTER (INPATIENT)
Facility: HOSPITAL | Age: 89
LOS: 2 days | Discharge: HOME | DRG: 291 | End: 2024-12-13
Attending: EMERGENCY MEDICINE | Admitting: INTERNAL MEDICINE
Payer: MEDICARE

## 2024-12-10 DIAGNOSIS — I44.7 LBBB (LEFT BUNDLE BRANCH BLOCK): ICD-10-CM

## 2024-12-10 DIAGNOSIS — R07.9 CHEST PAIN: ICD-10-CM

## 2024-12-10 DIAGNOSIS — R79.89 ELEVATED TROPONIN: ICD-10-CM

## 2024-12-10 DIAGNOSIS — R10.10 UPPER ABDOMINAL PAIN: Primary | ICD-10-CM

## 2024-12-10 DIAGNOSIS — R07.9 CHEST PAIN, UNSPECIFIED TYPE: ICD-10-CM

## 2024-12-10 DIAGNOSIS — I20.89 STABLE ANGINA: ICD-10-CM

## 2024-12-10 DIAGNOSIS — I10 HYPERTENSION, UNSPECIFIED TYPE: ICD-10-CM

## 2024-12-10 DIAGNOSIS — I10 ESSENTIAL HYPERTENSION, BENIGN: ICD-10-CM

## 2024-12-10 DIAGNOSIS — I50.9 CHF (CONGESTIVE HEART FAILURE): ICD-10-CM

## 2024-12-10 PROCEDURE — 99285 EMERGENCY DEPT VISIT HI MDM: CPT | Mod: 25

## 2024-12-10 PROCEDURE — 96360 HYDRATION IV INFUSION INIT: CPT

## 2024-12-10 NOTE — Clinical Note
Diagnosis: Upper abdominal pain [[id number]]   Future Attending Provider: TROY HASSAN [[id number]]   Place in Observation: [facility] [[id number]]   Special Needs:: No Special Needs [1]

## 2024-12-11 ENCOUNTER — CLINICAL SUPPORT (OUTPATIENT)
Dept: CARDIOLOGY | Facility: HOSPITAL | Age: 89
End: 2024-12-11
Attending: INTERNAL MEDICINE
Payer: MEDICARE

## 2024-12-11 VITALS — WEIGHT: 138 LBS | BODY MASS INDEX: 27.09 KG/M2 | HEIGHT: 60 IN

## 2024-12-11 PROBLEM — I50.9 CHF (CONGESTIVE HEART FAILURE): Status: ACTIVE | Noted: 2024-12-11

## 2024-12-11 PROBLEM — N17.9 AKI (ACUTE KIDNEY INJURY): Status: ACTIVE | Noted: 2024-12-11

## 2024-12-11 PROBLEM — R79.89 ELEVATED TROPONIN: Status: ACTIVE | Noted: 2024-12-11

## 2024-12-11 LAB
ALBUMIN SERPL BCP-MCNC: 4.2 G/DL (ref 3.5–5.2)
ALP SERPL-CCNC: 76 U/L (ref 55–135)
ALT SERPL W/O P-5'-P-CCNC: 14 U/L (ref 10–44)
ANION GAP SERPL CALC-SCNC: 10 MMOL/L (ref 8–16)
AORTIC ROOT ANNULUS: 3 CM
AORTIC VALVE CUSP SEPERATION: 1.3 CM
APICAL FOUR CHAMBER EJECTION FRACTION: 25 %
APICAL TWO CHAMBER EJECTION FRACTION: 43 %
ASCENDING AORTA: 2.7 CM
AST SERPL-CCNC: 22 U/L (ref 10–40)
AV INDEX (PROSTH): 0.67
AV MEAN GRADIENT: 4 MMHG
AV PEAK GRADIENT: 6.8 MMHG
AV REGURGITATION PRESSURE HALF TIME: 516 MS
AV VALVE AREA BY VELOCITY RATIO: 1.9 CM²
AV VALVE AREA: 2.1 CM²
AV VELOCITY RATIO: 0.62
BASOPHILS # BLD AUTO: 0.05 K/UL (ref 0–0.2)
BASOPHILS NFR BLD: 0.5 % (ref 0–1.9)
BILIRUB SERPL-MCNC: 0.6 MG/DL (ref 0.1–1)
BILIRUB UR QL STRIP: NEGATIVE
BNP SERPL-MCNC: 2493 PG/ML (ref 0–99)
BSA FOR ECHO PROCEDURE: 1.63 M2
BUN SERPL-MCNC: 22 MG/DL (ref 8–23)
CALCIUM SERPL-MCNC: 9.4 MG/DL (ref 8.7–10.5)
CHLORIDE SERPL-SCNC: 106 MMOL/L (ref 95–110)
CLARITY UR: CLEAR
CO2 SERPL-SCNC: 26 MMOL/L (ref 23–29)
COLOR UR: YELLOW
CREAT SERPL-MCNC: 1.5 MG/DL (ref 0.5–1.4)
CV ECHO LV RWT: 0.34 CM
DIFFERENTIAL METHOD BLD: ABNORMAL
DOP CALC AO PEAK VEL: 1.3 M/S
DOP CALC AO VTI: 26.4 CM
DOP CALC LVOT AREA: 3.1 CM2
DOP CALC LVOT DIAMETER: 2 CM
DOP CALC LVOT PEAK VEL: 0.8 M/S
DOP CALC LVOT STROKE VOLUME: 55.6 CM3
DOP CALC MV VTI: 24.9 CM
DOP CALCLVOT PEAK VEL VTI: 17.7 CM
E WAVE DECELERATION TIME: 158 MSEC
E/A RATIO: 1.61
E/E' RATIO: 18.22 M/S
ECHO LV POSTERIOR WALL: 0.8 CM (ref 0.6–1.1)
EOSINOPHIL # BLD AUTO: 0.2 K/UL (ref 0–0.5)
EOSINOPHIL NFR BLD: 1.5 % (ref 0–8)
ERYTHROCYTE [DISTWIDTH] IN BLOOD BY AUTOMATED COUNT: 14 % (ref 11.5–14.5)
EST. GFR  (NO RACE VARIABLE): 33.5 ML/MIN/1.73 M^2
FRACTIONAL SHORTENING: 19.1 % (ref 28–44)
GLUCOSE SERPL-MCNC: 137 MG/DL (ref 70–110)
GLUCOSE UR QL STRIP: NEGATIVE
HCT VFR BLD AUTO: 39 % (ref 37–48.5)
HGB BLD-MCNC: 12.4 G/DL (ref 12–16)
HGB UR QL STRIP: NEGATIVE
IMM GRANULOCYTES # BLD AUTO: 0.04 K/UL (ref 0–0.04)
IMM GRANULOCYTES NFR BLD AUTO: 0.4 % (ref 0–0.5)
INTERVENTRICULAR SEPTUM: 0.8 CM (ref 0.6–1.1)
IVC DIAMETER: 1.54 CM
KETONES UR QL STRIP: NEGATIVE
LEFT ATRIUM AREA SYSTOLIC (APICAL 2 CHAMBER): 21.1 CM2
LEFT ATRIUM AREA SYSTOLIC (APICAL 4 CHAMBER): 20.5 CM2
LEFT ATRIUM SIZE: 4 CM
LEFT ATRIUM VOLUME INDEX MOD: 44.8 ML/M2
LEFT ATRIUM VOLUME MOD: 71.2 ML
LEFT INTERNAL DIMENSION IN SYSTOLE: 3.8 CM (ref 2.1–4)
LEFT VENTRICLE DIASTOLIC VOLUME INDEX: 64.7 ML/M2
LEFT VENTRICLE DIASTOLIC VOLUME: 102.87 ML
LEFT VENTRICLE END DIASTOLIC VOLUME APICAL 2 CHAMBER: 68.6 ML
LEFT VENTRICLE END DIASTOLIC VOLUME APICAL 4 CHAMBER: 96.7 ML
LEFT VENTRICLE END SYSTOLIC VOLUME APICAL 2 CHAMBER: 73.5 ML
LEFT VENTRICLE END SYSTOLIC VOLUME APICAL 4 CHAMBER: 68.9 ML
LEFT VENTRICLE MASS INDEX: 76.9 G/M2
LEFT VENTRICLE SYSTOLIC VOLUME INDEX: 39.9 ML/M2
LEFT VENTRICLE SYSTOLIC VOLUME: 63.52 ML
LEFT VENTRICULAR INTERNAL DIMENSION IN DIASTOLE: 4.7 CM (ref 3.5–6)
LEFT VENTRICULAR MASS: 122.3 G
LEUKOCYTE ESTERASE UR QL STRIP: ABNORMAL
LIPASE SERPL-CCNC: 7 U/L (ref 4–60)
LV LATERAL E/E' RATIO: 16.4 M/S
LV SEPTAL E/E' RATIO: 20.5 M/S
LVED V (TEICH): 102.87 ML
LVES V (TEICH): 63.52 ML
LVOT MG: 2 MMHG
LVOT MV: 0.54 CM/S
LYMPHOCYTES # BLD AUTO: 2.2 K/UL (ref 1–4.8)
LYMPHOCYTES NFR BLD: 22.3 % (ref 18–48)
MAGNESIUM SERPL-MCNC: 1.8 MG/DL (ref 1.6–2.6)
MCH RBC QN AUTO: 28.5 PG (ref 27–31)
MCHC RBC AUTO-ENTMCNC: 31.8 G/DL (ref 32–36)
MCV RBC AUTO: 90 FL (ref 82–98)
MICROSCOPIC COMMENT: NORMAL
MONOCYTES # BLD AUTO: 1.1 K/UL (ref 0.3–1)
MONOCYTES NFR BLD: 11.1 % (ref 4–15)
MV MEAN GRADIENT: 2 MMHG
MV PEAK A VEL: 0.51 M/S
MV PEAK E VEL: 0.82 M/S
MV PEAK GRADIENT: 4 MMHG
MV STENOSIS PRESSURE HALF TIME: 92 MS
MV VALVE AREA BY CONTINUITY EQUATION: 2.23 CM2
MV VALVE AREA P 1/2 METHOD: 2.39 CM2
NEUTROPHILS # BLD AUTO: 6.4 K/UL (ref 1.8–7.7)
NEUTROPHILS NFR BLD: 64.2 % (ref 38–73)
NITRITE UR QL STRIP: NEGATIVE
NRBC BLD-RTO: 0 /100 WBC
OHS LV EJECTION FRACTION SIMPSONS BIPLANE MOD: 33 %
OHS QRS DURATION: 142 MS
OHS QTC CALCULATION: 475 MS
PH UR STRIP: 7 [PH] (ref 5–8)
PHOSPHATE SERPL-MCNC: 4.3 MG/DL (ref 2.7–4.5)
PISA AR MAX VEL: 3.81 M/S
PISA MRMAX VEL: 4.88 M/S
PISA TR MAX VEL: 3.85 M/S
PLATELET # BLD AUTO: 279 K/UL (ref 150–450)
PMV BLD AUTO: 10.1 FL (ref 9.2–12.9)
POTASSIUM SERPL-SCNC: 3.8 MMOL/L (ref 3.5–5.1)
PROT SERPL-MCNC: 7.6 G/DL (ref 6–8.4)
PROT UR QL STRIP: NEGATIVE
PV MV: 0.53 M/S
PV PEAK GRADIENT: 2 MMHG
PV PEAK VELOCITY: 0.78 M/S
RBC # BLD AUTO: 4.35 M/UL (ref 4–5.4)
RBC #/AREA URNS HPF: 1 /HPF (ref 0–4)
RIGHT ATRIUM VOLUME AREA LENGTH APICAL 4 CHAMBER: 66.3 ML
RV TISSUE DOPPLER FREE WALL SYSTOLIC VELOCITY 1 (APICAL 4 CHAMBER VIEW): 9.03 CM/S
SINUS: 2.9 CM
SODIUM SERPL-SCNC: 142 MMOL/L (ref 136–145)
SP GR UR STRIP: 1.01 (ref 1–1.03)
SQUAMOUS #/AREA URNS HPF: 0 /HPF
STJ: 2.28 CM
TDI LATERAL: 0.05 M/S
TDI SEPTAL: 0.04 M/S
TDI: 0.05 M/S
TR MAX PG: 59 MMHG
TRICUSPID ANNULAR PLANE SYSTOLIC EXCURSION: 1.8 CM
TROPONIN I SERPL HS-MCNC: 27.6 PG/ML (ref 0–14.9)
TROPONIN I SERPL HS-MCNC: 32.8 PG/ML (ref 0–14.9)
URN SPEC COLLECT METH UR: ABNORMAL
UROBILINOGEN UR STRIP-ACNC: NEGATIVE EU/DL
WBC # BLD AUTO: 9.9 K/UL (ref 3.9–12.7)
WBC #/AREA URNS HPF: 3 /HPF (ref 0–5)
Z-SCORE OF LEFT VENTRICULAR DIMENSION IN END DIASTOLE: 0.35
Z-SCORE OF LEFT VENTRICULAR DIMENSION IN END SYSTOLE: 2.37

## 2024-12-11 PROCEDURE — 83690 ASSAY OF LIPASE: CPT | Performed by: EMERGENCY MEDICINE

## 2024-12-11 PROCEDURE — 81001 URINALYSIS AUTO W/SCOPE: CPT | Performed by: EMERGENCY MEDICINE

## 2024-12-11 PROCEDURE — 99900031 HC PATIENT EDUCATION (STAT)

## 2024-12-11 PROCEDURE — 12000002 HC ACUTE/MED SURGE SEMI-PRIVATE ROOM

## 2024-12-11 PROCEDURE — 63600175 PHARM REV CODE 636 W HCPCS: Performed by: INTERNAL MEDICINE

## 2024-12-11 PROCEDURE — 25000003 PHARM REV CODE 250: Performed by: EMERGENCY MEDICINE

## 2024-12-11 PROCEDURE — 84484 ASSAY OF TROPONIN QUANT: CPT | Mod: 91 | Performed by: EMERGENCY MEDICINE

## 2024-12-11 PROCEDURE — 25000242 PHARM REV CODE 250 ALT 637 W/ HCPCS: Performed by: EMERGENCY MEDICINE

## 2024-12-11 PROCEDURE — 25000003 PHARM REV CODE 250: Performed by: INTERNAL MEDICINE

## 2024-12-11 PROCEDURE — 83735 ASSAY OF MAGNESIUM: CPT | Performed by: EMERGENCY MEDICINE

## 2024-12-11 PROCEDURE — 93306 TTE W/DOPPLER COMPLETE: CPT | Mod: 26,,, | Performed by: GENERAL PRACTICE

## 2024-12-11 PROCEDURE — 93010 ELECTROCARDIOGRAM REPORT: CPT | Mod: ,,, | Performed by: INTERNAL MEDICINE

## 2024-12-11 PROCEDURE — 83880 ASSAY OF NATRIURETIC PEPTIDE: CPT | Performed by: EMERGENCY MEDICINE

## 2024-12-11 PROCEDURE — 84484 ASSAY OF TROPONIN QUANT: CPT | Performed by: EMERGENCY MEDICINE

## 2024-12-11 PROCEDURE — 94761 N-INVAS EAR/PLS OXIMETRY MLT: CPT

## 2024-12-11 PROCEDURE — 93306 TTE W/DOPPLER COMPLETE: CPT

## 2024-12-11 PROCEDURE — 93005 ELECTROCARDIOGRAM TRACING: CPT | Performed by: INTERNAL MEDICINE

## 2024-12-11 PROCEDURE — 84100 ASSAY OF PHOSPHORUS: CPT | Performed by: EMERGENCY MEDICINE

## 2024-12-11 PROCEDURE — 85025 COMPLETE CBC W/AUTO DIFF WBC: CPT | Performed by: EMERGENCY MEDICINE

## 2024-12-11 PROCEDURE — 80053 COMPREHEN METABOLIC PANEL: CPT | Performed by: EMERGENCY MEDICINE

## 2024-12-11 RX ORDER — SODIUM,POTASSIUM PHOSPHATES 280-250MG
2 POWDER IN PACKET (EA) ORAL
Status: DISCONTINUED | OUTPATIENT
Start: 2024-12-11 | End: 2024-12-13 | Stop reason: HOSPADM

## 2024-12-11 RX ORDER — NYSTATIN AND TRIAMCINOLONE ACETONIDE 100000; 1 [USP'U]/G; MG/G
1 CREAM TOPICAL 4 TIMES DAILY
COMMUNITY

## 2024-12-11 RX ORDER — SPIRONOLACTONE 25 MG/1
25 TABLET ORAL DAILY
Status: DISCONTINUED | OUTPATIENT
Start: 2024-12-11 | End: 2024-12-11

## 2024-12-11 RX ORDER — SODIUM CHLORIDE 0.9 % (FLUSH) 0.9 %
3 SYRINGE (ML) INJECTION EVERY 12 HOURS PRN
Status: DISCONTINUED | OUTPATIENT
Start: 2024-12-11 | End: 2024-12-13 | Stop reason: HOSPADM

## 2024-12-11 RX ORDER — AMOXICILLIN 250 MG
2 CAPSULE ORAL 2 TIMES DAILY
Status: DISCONTINUED | OUTPATIENT
Start: 2024-12-11 | End: 2024-12-13 | Stop reason: HOSPADM

## 2024-12-11 RX ORDER — TALC
6 POWDER (GRAM) TOPICAL NIGHTLY PRN
Status: DISCONTINUED | OUTPATIENT
Start: 2024-12-11 | End: 2024-12-13 | Stop reason: HOSPADM

## 2024-12-11 RX ORDER — NALOXONE HCL 0.4 MG/ML
0.02 VIAL (ML) INJECTION
Status: DISCONTINUED | OUTPATIENT
Start: 2024-12-11 | End: 2024-12-13 | Stop reason: HOSPADM

## 2024-12-11 RX ORDER — TRAMADOL HYDROCHLORIDE 50 MG/1
50 TABLET ORAL 2 TIMES DAILY PRN
Status: CANCELLED | OUTPATIENT
Start: 2024-12-11

## 2024-12-11 RX ORDER — ENOXAPARIN SODIUM 100 MG/ML
30 INJECTION SUBCUTANEOUS EVERY 24 HOURS
Status: DISCONTINUED | OUTPATIENT
Start: 2024-12-11 | End: 2024-12-13 | Stop reason: HOSPADM

## 2024-12-11 RX ORDER — ALUMINUM HYDROXIDE, MAGNESIUM HYDROXIDE, AND SIMETHICONE 1200; 120; 1200 MG/30ML; MG/30ML; MG/30ML
30 SUSPENSION ORAL 4 TIMES DAILY PRN
Status: DISCONTINUED | OUTPATIENT
Start: 2024-12-11 | End: 2024-12-13 | Stop reason: HOSPADM

## 2024-12-11 RX ORDER — ISOSORBIDE MONONITRATE 30 MG/1
120 TABLET, EXTENDED RELEASE ORAL DAILY
Status: CANCELLED | OUTPATIENT
Start: 2024-12-11

## 2024-12-11 RX ORDER — METOPROLOL SUCCINATE 25 MG/1
25 TABLET, EXTENDED RELEASE ORAL DAILY
Status: CANCELLED | OUTPATIENT
Start: 2024-12-11

## 2024-12-11 RX ORDER — ALPRAZOLAM 0.25 MG/1
0.25 TABLET ORAL 2 TIMES DAILY PRN
Status: CANCELLED | OUTPATIENT
Start: 2024-12-11

## 2024-12-11 RX ORDER — FUROSEMIDE 10 MG/ML
40 INJECTION INTRAMUSCULAR; INTRAVENOUS EVERY 8 HOURS
Status: DISCONTINUED | OUTPATIENT
Start: 2024-12-11 | End: 2024-12-11

## 2024-12-11 RX ORDER — LANOLIN ALCOHOL/MO/W.PET/CERES
800 CREAM (GRAM) TOPICAL
Status: DISCONTINUED | OUTPATIENT
Start: 2024-12-11 | End: 2024-12-13 | Stop reason: HOSPADM

## 2024-12-11 RX ORDER — POTASSIUM CHLORIDE 20 MEQ/1
20 TABLET, EXTENDED RELEASE ORAL 2 TIMES DAILY
Status: DISCONTINUED | OUTPATIENT
Start: 2024-12-11 | End: 2024-12-13 | Stop reason: HOSPADM

## 2024-12-11 RX ORDER — SODIUM CHLORIDE 0.9 % (FLUSH) 0.9 %
10 SYRINGE (ML) INJECTION
Status: DISCONTINUED | OUTPATIENT
Start: 2024-12-11 | End: 2024-12-13 | Stop reason: HOSPADM

## 2024-12-11 RX ORDER — NITROGLYCERIN 0.4 MG/1
0.4 TABLET SUBLINGUAL
Status: COMPLETED | OUTPATIENT
Start: 2024-12-11 | End: 2024-12-11

## 2024-12-11 RX ORDER — ACETAMINOPHEN 325 MG/1
650 TABLET ORAL EVERY 8 HOURS PRN
Status: DISCONTINUED | OUTPATIENT
Start: 2024-12-11 | End: 2024-12-13 | Stop reason: HOSPADM

## 2024-12-11 RX ORDER — DICLOFENAC SODIUM 10 MG/G
2 GEL TOPICAL 2 TIMES DAILY
Status: ON HOLD | COMMUNITY
End: 2024-12-11

## 2024-12-11 RX ORDER — LEVOTHYROXINE SODIUM 88 UG/1
88 TABLET ORAL DAILY
Status: CANCELLED | OUTPATIENT
Start: 2024-12-11

## 2024-12-11 RX ORDER — IBUPROFEN 200 MG
16 TABLET ORAL
Status: DISCONTINUED | OUTPATIENT
Start: 2024-12-11 | End: 2024-12-13 | Stop reason: HOSPADM

## 2024-12-11 RX ORDER — IBUPROFEN 200 MG
24 TABLET ORAL
Status: DISCONTINUED | OUTPATIENT
Start: 2024-12-11 | End: 2024-12-13 | Stop reason: HOSPADM

## 2024-12-11 RX ORDER — FUROSEMIDE 10 MG/ML
40 INJECTION INTRAMUSCULAR; INTRAVENOUS ONCE
Status: COMPLETED | OUTPATIENT
Start: 2024-12-11 | End: 2024-12-11

## 2024-12-11 RX ORDER — AZELASTINE 1 MG/ML
1 SPRAY, METERED NASAL 2 TIMES DAILY
Status: ON HOLD | COMMUNITY
End: 2024-12-11

## 2024-12-11 RX ORDER — ASPIRIN 81 MG/1
162 TABLET ORAL DAILY
Status: DISCONTINUED | OUTPATIENT
Start: 2024-12-11 | End: 2024-12-12

## 2024-12-11 RX ORDER — MINERAL OIL
180 ENEMA (ML) RECTAL DAILY
COMMUNITY

## 2024-12-11 RX ORDER — GLUCAGON 1 MG
1 KIT INJECTION
Status: DISCONTINUED | OUTPATIENT
Start: 2024-12-11 | End: 2024-12-13 | Stop reason: HOSPADM

## 2024-12-11 RX ORDER — ONDANSETRON HYDROCHLORIDE 2 MG/ML
4 INJECTION, SOLUTION INTRAVENOUS EVERY 6 HOURS PRN
Status: DISCONTINUED | OUTPATIENT
Start: 2024-12-11 | End: 2024-12-13 | Stop reason: HOSPADM

## 2024-12-11 RX ORDER — ASPIRIN 325 MG
325 TABLET ORAL
Status: COMPLETED | OUTPATIENT
Start: 2024-12-11 | End: 2024-12-11

## 2024-12-11 RX ORDER — ACETAMINOPHEN 325 MG/1
650 TABLET ORAL EVERY 4 HOURS PRN
Status: DISCONTINUED | OUTPATIENT
Start: 2024-12-11 | End: 2024-12-13 | Stop reason: HOSPADM

## 2024-12-11 RX ORDER — LANOLIN ALCOHOL/MO/W.PET/CERES
400 CREAM (GRAM) TOPICAL 2 TIMES DAILY
Status: DISCONTINUED | OUTPATIENT
Start: 2024-12-11 | End: 2024-12-13 | Stop reason: HOSPADM

## 2024-12-11 RX ADMIN — ENOXAPARIN SODIUM 30 MG: 30 INJECTION SUBCUTANEOUS at 04:12

## 2024-12-11 RX ADMIN — POTASSIUM CHLORIDE 20 MEQ: 1500 TABLET, EXTENDED RELEASE ORAL at 08:12

## 2024-12-11 RX ADMIN — SODIUM CHLORIDE 500 ML: 9 INJECTION, SOLUTION INTRAVENOUS at 12:12

## 2024-12-11 RX ADMIN — NITROGLYCERIN 0.4 MG: 0.4 TABLET SUBLINGUAL at 01:12

## 2024-12-11 RX ADMIN — ASPIRIN 162 MG: 81 TABLET, COATED ORAL at 10:12

## 2024-12-11 RX ADMIN — Medication 400 MG: at 10:12

## 2024-12-11 RX ADMIN — SENNOSIDES AND DOCUSATE SODIUM 2 TABLET: 8.6; 5 TABLET ORAL at 08:12

## 2024-12-11 RX ADMIN — ASPIRIN 325 MG ORAL TABLET 325 MG: 325 PILL ORAL at 02:12

## 2024-12-11 RX ADMIN — SPIRONOLACTONE 25 MG: 25 TABLET ORAL at 10:12

## 2024-12-11 RX ADMIN — FUROSEMIDE 40 MG: 10 INJECTION, SOLUTION INTRAMUSCULAR; INTRAVENOUS at 07:12

## 2024-12-11 RX ADMIN — Medication 6 MG: at 09:12

## 2024-12-11 RX ADMIN — POTASSIUM CHLORIDE 20 MEQ: 1500 TABLET, EXTENDED RELEASE ORAL at 10:12

## 2024-12-11 RX ADMIN — Medication 400 MG: at 08:12

## 2024-12-11 NOTE — ASSESSMENT & PLAN NOTE
I think from demand of the CHF  and FRANKLIN contributes also     I think her abdominal pain is CHF      BNP was 2,000 +       - aspirin 162 mg po daily   - cont. Metoprolol  XL daily   - ECHO ordered

## 2024-12-11 NOTE — ASSESSMENT & PLAN NOTE
I see no old echo     Get new echo    IV Lasix 40 mg Q8 to start    KCL and mag po bid to stay ahead of the renal losses    Admit to telemetry since troponins up       I think troponins up from the demand of CHF and some FRANKLIN     Cont. Her metoprolol x l daily     Aspirin 162 mg po daily

## 2024-12-11 NOTE — CONSULTS
[facility]  Adult Nutrition  Education Short Note      Nutrition Education    Previous education: yes    Diet at home: Low Sodium    Written information provided and explained on cardiac diet, fluid restriction, and 2 gram sodium diet.     Discussed with: patient and spouse    Educational Need? yes    Barriers: none identified    Interventions: Fluid modified diet and Sodium modified diet    Patient and/or family comprehend instructions: yes    Outcome: Verbalizes understanding     Thanks for the consult!    Ximena Alegre RD 12/11/2024 3:03 PM

## 2024-12-11 NOTE — SUBJECTIVE & OBJECTIVE
Past Medical History:   Diagnosis Date    Coronary artery disease     Dermatitis     Dermatitis 12/8/2017    Diabetes mellitus     Diabetes mellitus type II     Leah Barr virus infection     Fibromyalgia     GERD (gastroesophageal reflux disease)     Hypertension     MI (myocardial infarction) 09/23/2011    Pure hypercholesterolemia 2/26/2020       Past Surgical History:   Procedure Laterality Date    adenoids      ANGIOPLASTY  1987    APPENDECTOMY      CARDIAC PACEMAKER PLACEMENT  01/12/2017    CATARACT EXTRACTION, BILATERAL Bilateral 9/2/15, 3/17/15    right eye-9/2/15, left eye-3/17/15    CHOLECYSTECTOMY  1987    CORONARY ARTERY BYPASS GRAFT  2006    quadruple    coronary stents  1999    x2    CYST REMOVAL  04/25/2017    on back    HYSTERECTOMY  1966    OVARY SURGERY  1948    Ovary burst & was repaired    RHIZOTOMY  09/14/2018    TONSILLECTOMY  1940       Review of patient's allergies indicates:   Allergen Reactions    Diclofenac-misoprostol      Other reaction(s): Not available    Doxycycline     Effexor [venlafaxine]     Estradiol     Flagyl [metronidazole]     Fluconazole     Gabapentin     Iodine     Levaquin [levofloxacin]     Nexium [esomeprazole magnesium]     Nexletol [bempedoic acid] Other (See Comments)    Penicillins     Red yeast rice (monascus purpureus)     Shellfish containing products     Statins-hmg-coa reductase inhibitors     Sulfa (sulfonamide antibiotics)     Tea tree oil     Tegaserod      ZOLNORM    Tegaserod hydrogen maleate     Trazodone     Yeast, dried     Adhesive Rash     Band-aids       Current Facility-Administered Medications on File Prior to Encounter   Medication    evolocumab PnIj 140 mg     Current Outpatient Medications on File Prior to Encounter   Medication Sig    acetaminophen (TYLENOL) 500 MG tablet Take 500 mg by mouth every 6 (six) hours as needed for Pain.    ALPRAZolam (XANAX) 0.25 MG tablet TAKE 1 TABLET BY MOUTH EVERY DAY AS NEEDED FOR ANXIETY    apixaban  (ELIQUIS) 2.5 mg Tab Take 1 tablet (2.5 mg total) by mouth 2 (two) times daily.    aspirin (ECOTRIN) 81 MG EC tablet Take 1 tablet (81 mg total) by mouth once daily. (Patient taking differently: Take 81 mg by mouth every evening.)    coenzyme Q10 100 mg capsule Take 1 capsule (100 mg total) by mouth 2 (two) times daily.    cyanocobalamin 1,000 mcg/mL injection 2cc I'm every other week    dexchlorphen-phenylephrine-DM (POLYTUSSIN DM,DEXCHLORPHENRMN,) 1-5-10 mg/5 mL Syrp Take 2 TSP per 4-6 hours, not to exceed 12 TSP in 24 hours    diphenhydrAMINE (BENYLIN) 12.5 mg/5 mL liquid Take 12.5 mg by mouth 4 (four) times daily as needed for Allergies.    ECHINACEA ORAL Take 750 mg by mouth 2 (two) times a day.    fluocinonide-emollient (FLUOCINONIDE-E) 0.05 % Crea Apply topically 2 (two) times daily.    fluticasone propionate (FLONASE) 50 mcg/actuation nasal spray 1 spray (50 mcg total) by Each Nostril route once daily.    isosorbide mononitrate (IMDUR) 120 MG 24 hr tablet TAKE 1 TABLET (120 MG TOTAL) BY MOUTH ONCE DAILY. DO NOT CRUSH OR CHEW SWALLOW WHOLE.    levothyroxine (SYNTHROID) 88 MCG tablet Take 1 tablet (88 mcg total) by mouth once daily.    LIDOcaine (LIDODERM) 5 % Place 1 patch onto the skin once daily. Remove & Discard patch within 12 hours or as directed by MD    loratadine (CLARITIN) 10 mg tablet Take 10 mg by mouth once daily.    MAGNESIUM ORAL Take 500 mg by mouth once daily.     metoprolol succinate (TOPROL-XL) 25 MG 24 hr tablet TAKE 1 TABLET BY MOUTH EVERY DAY    montelukast (SINGULAIR) 10 mg tablet TAKE 1 TABLET BY MOUTH EVERY DAY IN THE EVENING    multivitamin (THERAGRAN) tablet Take 1 tablet by mouth 2 (two) times a day.     nitroGLYCERIN (NITROSTAT) 0.4 MG SL tablet Place 1 tablet (0.4 mg total) under the tongue every 5 (five) minutes as needed for Chest pain.    omeprazole (PRILOSEC) 20 MG capsule Take 1 capsule (20 mg total) by mouth once daily.    prednisoLONE acetate (PRED FORTE) 1 % DrpS Place 1  drop into the right eye 4 (four) times daily.    tiZANidine (ZANAFLEX) 4 MG tablet Take 1 tablet (4 mg total) by mouth once daily.    traMADoL (ULTRAM) 50 mg tablet Take 1 tablet (50 mg total) by mouth 2 (two) times daily as needed (pain).    turmeric 400 mg Cap Take 1,200 mg by mouth once daily.    UNABLE TO FIND Take 1 capsule by mouth once daily. Joint health    UNABLE TO FIND Take 1 capsule by mouth 2 (two) times a day. Cardio platinum    UNABLE TO FIND Nerve Jorge    zinc gluconate 50 mg tablet Take 50 mg by mouth 2 (two) times a day.     Family History       Problem Relation (Age of Onset)    No Known Problems Mother, Father          Tobacco Use    Smoking status: Never    Smokeless tobacco: Never   Substance and Sexual Activity    Alcohol use: No    Drug use: No    Sexual activity: Not on file     Review of Systems   All other systems reviewed and are negative.    Objective:     Vital Signs (Most Recent):  Temp: 97.8 °F (36.6 °C) (12/10/24 2310)  Pulse: 69 (12/11/24 0600)  Resp: (!) 25 (12/11/24 0600)  BP: (!) 168/74 (12/11/24 0600)  SpO2: (!) 93 % (12/11/24 0600) Vital Signs (24h Range):  Temp:  [97.8 °F (36.6 °C)] 97.8 °F (36.6 °C)  Pulse:  [69-75] 69  Resp:  [18-26] 25  SpO2:  [89 %-96 %] 93 %  BP: (148-187)/() 168/74     Weight: 62.6 kg (138 lb)  Body mass index is 26.95 kg/m².     Physical Exam  Vitals and nursing note reviewed.   HENT:      Head: Normocephalic.      Nose: Nose normal.      Mouth/Throat:      Mouth: Mucous membranes are moist.   Eyes:      Extraocular Movements: Extraocular movements intact.      Pupils: Pupils are equal, round, and reactive to light.   Cardiovascular:      Rate and Rhythm: Normal rate and regular rhythm.      Pulses: Normal pulses.      Heart sounds: Normal heart sounds.   Pulmonary:      Effort: Pulmonary effort is normal.      Breath sounds: Normal breath sounds.   Abdominal:      General: Abdomen is flat. Bowel sounds are normal. There is distension.       "Palpations: Abdomen is soft.   Musculoskeletal:         General: Normal range of motion.      Cervical back: Normal range of motion.   Skin:     General: Skin is warm.      Capillary Refill: Capillary refill takes less than 2 seconds.   Neurological:      General: No focal deficit present.      Mental Status: She is alert.   Psychiatric:         Mood and Affect: Mood normal.              CRANIAL NERVES     CN III, IV, VI   Pupils are equal, round, and reactive to light.       Significant Labs: All pertinent labs within the past 24 hours have been reviewed.  CBC:   Recent Labs   Lab 12/11/24  0038   WBC 9.90   HGB 12.4   HCT 39.0        CMP:   Recent Labs   Lab 12/11/24  0038      K 3.8      CO2 26   *   BUN 22   CREATININE 1.5*   CALCIUM 9.4   PROT 7.6   ALBUMIN 4.2   BILITOT 0.6   ALKPHOS 76   AST 22   ALT 14   ANIONGAP 10     Cardiac Markers:   Recent Labs   Lab 12/11/24 0038   BNP 2,493*     Coagulation: No results for input(s): "PT", "INR", "APTT" in the last 48 hours.  Lactic Acid: No results for input(s): "LACTATE" in the last 48 hours.  Lipid Panel: No results for input(s): "CHOL", "HDL", "LDLCALC", "TRIG", "CHOLHDL" in the last 48 hours.  Magnesium:   Recent Labs   Lab 12/11/24  0038   MG 1.8     Troponin:   Recent Labs   Lab 12/11/24 0038 12/11/24  0155   TROPONINIHS 27.6* 32.8*     TSH: No results for input(s): "TSH" in the last 4320 hours.  Urine Studies:   Recent Labs   Lab 12/11/24  0135   COLORU Yellow   APPEARANCEUA Clear   PHUR 7.0   SPECGRAV 1.010   PROTEINUA Negative   GLUCUA Negative   KETONESU Negative   BILIRUBINUA Negative   OCCULTUA Negative   NITRITE Negative   UROBILINOGEN Negative   LEUKOCYTESUR Trace*   RBCUA 1   WBCUA 3   SQUAMEPITHEL 0       Significant Imaging: I have reviewed all pertinent imaging results/findings within the past 24 hours.  I have reviewed and interpreted all pertinent imaging results/findings within the past 24 hours.  "

## 2024-12-11 NOTE — CARE UPDATE
12/11/24 1400   Patient Assessment/Suction   Level of Consciousness (AVPU) alert   Respiratory Effort Normal;Unlabored   Expansion/Accessory Muscles/Retractions no use of accessory muscles;no retractions;expansion symmetric   All Lung Fields Breath Sounds Anterior:   Rhythm/Pattern, Respiratory unlabored   Cough Frequency no cough   PRE-TX-O2   Device (Oxygen Therapy) room air   SpO2 (!) 94 %   Pulse Oximetry Type Intermittent   $ Pulse Oximetry - Multiple Charge Pulse Oximetry - Multiple   Pulse 74   Resp 17   Education   $ Education 15 min;Oxygen

## 2024-12-11 NOTE — ED PROVIDER NOTES
Encounter Date: 12/10/2024       History     Chief Complaint   Patient presents with    Abdominal Pain     Pt having pain in the center of her abdomen Pt had diarrhea on Monday     Emergent evaluation of an 89-year-old female with history of coronary artery disease status post 4 vessel CABG 2006 , and 6 stents, prior MI in 2011, diabetes, hypertension, GERD, fibromyalgia, hypercholesterolemia PRESENTS TO THE ER DUE TO UPPER ABDOMINAL PAIN THAT SHE REPORTS IT IS A SORENESS YESTERDAY AND TODAY.  PATIENT REPORTS THAT ON SATURDAY NIGHT SHE FELT THAT SHE MAY BE CONSTIPATED HAD A SENSATION THAT SHE NEEDS TO HAVE A BOWEL MOVEMENT.  SHE IS ABLE TO HAVE 1 BOWEL MOVEMENT SATURDAY NIGHT.  ON SUNDAY WAS ABLE TO HAVE SEVERAL BOWEL MOVEMENTS THROUGHOUT THE DAY NO SPECIFIC ABDOMINAL PAIN.  ON MONDAY HAD DIARRHEA IN THE MORNING.  SHE TOOK PEPTO-BISMOL AND REPORTS TO FURTHER BOWEL MOVEMENTS DURING THE DAY WITH FIRMING UP THE STOOL.  STOOL DID BECOME DARK IN COLOR.  ABDOMINAL PAIN BEGAN THAT EVENING IN HIS A SORENESS.  NO NAUSEA OR VOMITING SHE REPORTS SHE HAS BEEN BELCHING A GREAT DEAL.  NO FEVER CHILLS OR SWEATS.  No urinary symptoms.  Reports that tonight when she was belching and having abdominal pain she was concerned it maybe or hard so she took a nitro at 10:00 p.m..  She had already taken her Eliquis today as well as her isosorbide mononitrate this morning and her metoprolol b.i.d. blood pressure still ill 80s.  She reports nitro did not change her abdominal pain.  When she has had a prior MI and needed multiple stents she always had atypical symptoms.      Dr. GRACIA Ware - cARDS       Review of patient's allergies indicates:   Allergen Reactions    Diclofenac-misoprostol      Other reaction(s): Not available    Doxycycline     Effexor [venlafaxine]     Estradiol     Flagyl [metronidazole]     Fluconazole     Gabapentin     Iodine     Levaquin [levofloxacin]     Nexium [esomeprazole magnesium]     Nexletol [bempedoic acid]  Other (See Comments)    Penicillins     Red yeast rice (monascus purpureus)     Shellfish containing products     Statins-hmg-coa reductase inhibitors     Sulfa (sulfonamide antibiotics)     Tea tree oil     Tegaserod      ZOLNORM    Tegaserod hydrogen maleate     Trazodone     Yeast, dried     Adhesive Rash     Band-aids     Past Medical History:   Diagnosis Date    Coronary artery disease     Dermatitis     Dermatitis 12/8/2017    Diabetes mellitus     Diabetes mellitus type II     Leah Barr virus infection     Fibromyalgia     GERD (gastroesophageal reflux disease)     Hypertension     MI (myocardial infarction) 09/23/2011    Pure hypercholesterolemia 2/26/2020     Past Surgical History:   Procedure Laterality Date    adenoids      ANGIOPLASTY  1987    APPENDECTOMY      CARDIAC PACEMAKER PLACEMENT  01/12/2017    CATARACT EXTRACTION, BILATERAL Bilateral 9/2/15, 3/17/15    right eye-9/2/15, left eye-3/17/15    CHOLECYSTECTOMY  1987    CORONARY ARTERY BYPASS GRAFT  2006    quadruple    coronary stents  1999    x2    CYST REMOVAL  04/25/2017    on back    HYSTERECTOMY  1966    OVARY SURGERY  1948    Ovary burst & was repaired    RHIZOTOMY  09/14/2018    TONSILLECTOMY  1940     Family History   Problem Relation Name Age of Onset    No Known Problems Mother      No Known Problems Father       Social History     Tobacco Use    Smoking status: Never    Smokeless tobacco: Never   Substance Use Topics    Alcohol use: No    Drug use: No     Review of Systems   Constitutional:  Negative for activity change, appetite change, chills, diaphoresis, fatigue and fever.   HENT:  Negative for congestion, postnasal drip, rhinorrhea and sore throat.    Respiratory:  Negative for cough, chest tightness, shortness of breath, wheezing and stridor.    Cardiovascular:  Negative for chest pain and palpitations.   Gastrointestinal:  Positive for abdominal pain and diarrhea. Negative for abdominal distention, blood in stool, constipation,  nausea and vomiting.   Endocrine: Positive for heat intolerance.   Genitourinary:  Negative for dysuria, flank pain, frequency, hematuria and urgency.   Musculoskeletal:  Negative for arthralgias, back pain, myalgias, neck pain and neck stiffness.   Skin:  Negative for rash.   Neurological:  Negative for dizziness, syncope, weakness, light-headedness and headaches.   Hematological:  Does not bruise/bleed easily.   Psychiatric/Behavioral:  Negative for confusion. The patient is not nervous/anxious.    All other systems reviewed and are negative.      Physical Exam     Initial Vitals [12/10/24 2310]   BP Pulse Resp Temp SpO2   (!) 148/82 75 18 97.8 °F (36.6 °C) 96 %      MAP       --         Physical Exam    Nursing note and vitals reviewed.  Constitutional: She appears well-developed and well-nourished. She is not diaphoretic. No distress.   HENT:   Head: Normocephalic and atraumatic.   Right Ear: External ear normal.   Left Ear: External ear normal.   Nose: Nose normal. Mouth/Throat: Oropharynx is clear and moist.   Eyes: Conjunctivae and EOM are normal. Pupils are equal, round, and reactive to light.   Neck: Neck supple. No tracheal deviation present.   Normal range of motion.  Cardiovascular:  Normal rate, regular rhythm, normal heart sounds and intact distal pulses.     Exam reveals no gallop and no friction rub.       No murmur heard.  Blood pressure 187/84 heart rate 75   Pulmonary/Chest: Breath sounds normal. No stridor. No respiratory distress. She has no wheezes. She has no rhonchi. She has no rales. She exhibits no tenderness.   Sats 96% on room air respirations 18 clear breath sounds   Abdominal: Abdomen is soft. Bowel sounds are normal. She exhibits no distension and no mass. There is abdominal tenderness. There is no rebound and no guarding.   Musculoskeletal:         General: No edema. Normal range of motion.      Cervical back: Normal range of motion and neck supple.     Neurological: She is alert and  oriented to person, place, and time. She has normal strength. No cranial nerve deficit or sensory deficit.   Skin: Skin is warm and dry. No rash noted. No erythema. No pallor.   Psychiatric: She has a normal mood and affect. Her behavior is normal. Judgment and thought content normal.         ED Course   Procedures  Labs Reviewed   CBC W/ AUTO DIFFERENTIAL - Abnormal       Result Value    WBC 9.90      RBC 4.35      Hemoglobin 12.4      Hematocrit 39.0      MCV 90      MCH 28.5      MCHC 31.8 (*)     RDW 14.0      Platelets 279      MPV 10.1      Immature Granulocytes 0.4      Gran # (ANC) 6.4      Immature Grans (Abs) 0.04      Lymph # 2.2      Mono # 1.1 (*)     Eos # 0.2      Baso # 0.05      nRBC 0      Gran % 64.2      Lymph % 22.3      Mono % 11.1      Eosinophil % 1.5      Basophil % 0.5      Differential Method Automated     COMPREHENSIVE METABOLIC PANEL - Abnormal    Sodium 142      Potassium 3.8      Chloride 106      CO2 26      Glucose 137 (*)     BUN 22      Creatinine 1.5 (*)     Calcium 9.4      Total Protein 7.6      Albumin 4.2      Total Bilirubin 0.6      Alkaline Phosphatase 76      AST 22      ALT 14      eGFR 33.5 (*)     Anion Gap 10     TROPONIN I HIGH SENSITIVITY - Abnormal    Troponin I High Sensitivity 27.6 (*)    B-TYPE NATRIURETIC PEPTIDE - Abnormal    BNP 2,493 (*)    URINALYSIS, REFLEX TO URINE CULTURE - Abnormal    Specimen UA Urine, Clean Catch      Color, UA Yellow      Appearance, UA Clear      pH, UA 7.0      Specific Gravity, UA 1.010      Protein, UA Negative      Glucose, UA Negative      Ketones, UA Negative      Bilirubin (UA) Negative      Occult Blood UA Negative      Nitrite, UA Negative      Urobilinogen, UA Negative      Leukocytes, UA Trace (*)     Narrative:     Specimen Source->Urine   MAGNESIUM    Magnesium 1.8     LIPASE    Lipase 7     PHOSPHORUS    Phosphorus 4.3     URINALYSIS MICROSCOPIC    RBC, UA 1      WBC, UA 3      Squam Epithel, UA 0      Microscopic  Comment SEE COMMENT      Narrative:     Specimen Source->Urine   TROPONIN I HIGH SENSITIVITY   POCT LACTATE   POCT CREATININE          Imaging Results              CT Abdomen Pelvis  Without Contrast (Final result)  Result time 12/11/24 01:35:31      Final result by Sourav Morelos MD (12/11/24 01:35:31)                   Impression:      Diverticulosis without evidence of acute diverticulitis.    Interstitial thickening at the visualized lung bases with small volume of right sided pleural fluid.  Clinical correlation for interstitial edema advised.    Cholecystectomy.    Additional findings as above.      Electronically signed by: Sourav Morelos MD  Date:    12/11/2024  Time:    01:35               Narrative:    EXAMINATION:  CT ABDOMEN PELVIS WITHOUT CONTRAST    CLINICAL HISTORY:  Abdominal abscess/infection suspected;    TECHNIQUE:  Low dose axial images, sagittal and coronal reformations were obtained from the lung bases to the pubic symphysis without IV contrast.  Oral contrast was not administered.    COMPARISON:  None    FINDINGS:  There is interstitial thickening of the visualized lung bases.  There is a small volume of pleural fluid at the right lung base.  There is right middle lobe atelectasis.  Heart appears mildly enlarged.  There are transvenous pacing leads.  No significant pericardial effusion.    The liver is unremarkable in appearance on this limited non-contrast examination.  Gallbladder is surgically absent.  There is no intra-or extrahepatic biliary ductal dilatation.    Stomach, spleen, and adrenal glands demonstrate no acute abnormality.  There is fatty involutional change of the pancreas.    Kidneys demonstrate mild bilateral atrophy.  There is no hydronephrosis.  There is nonspecific bilateral perinephric edema.  Urinary bladder appears within normal limits.  The uterus is surgically absent.  No significant free fluid in the pelvis.    The abdominal aorta is normal in course and caliber  with significant atherosclerotic calcification along its course.  There is no retroperitoneal hematoma.    The visualized loops of small and large bowel show no evidence of obstruction or inflammation. There is colonic diverticulosis without evidence of acute diverticulitis.  The appendix is not definitively visualized, however no localized inflammatory change is seen at its expected location. There is no ascites, portal venous gas, or free intraperitoneal air.  There is a small fat containing umbilical hernia.    There are degenerative changes of visualized lumbar spine.  The extraperitoneal soft tissues are unremarkable.                                       X-Ray Chest AP Portable (Final result)  Result time 12/11/24 01:05:08      Final result by Sourav Morelos MD (12/11/24 01:05:08)                   Impression:      As above.      Electronically signed by: Sourav Morelos MD  Date:    12/11/2024  Time:    01:05               Narrative:    EXAMINATION:  XR CHEST AP PORTABLE    CLINICAL HISTORY:  Chest Pain;    TECHNIQUE:  Single frontal view of the chest was performed.    COMPARISON:  01/05/2024    FINDINGS:  Cardiac monitoring leads overlie the chest.  There is a left chest wall cardiac pacing device present.  There is postoperative change of prior median sternotomy.  Cardiac silhouette is enlarged.  There is aortic atherosclerosis.  Lungs are symmetrically expanded.  There is increased interstitial attenuation bilaterally which may represent interstitial edema in the appropriate clinical setting.  Possible trace pleural fluid noted at the lung bases.  No evidence of pneumothorax.  Osseous structures demonstrate degenerative changes.  Surgical clips project over the upper abdomen.                                       Medications   sodium chloride 0.9% bolus 500 mL 500 mL (0 mLs Intravenous Stopped 12/11/24 0141)   nitroGLYCERIN SL tablet 0.4 mg (0.4 mg Sublingual Given 12/11/24 0141)   aspirin tablet 325  mg (325 mg Oral Given 12/11/24 0207)     Medical Decision Making  Emergent evaluation of an 89-year-old female with history of coronary artery disease status post 4 vessel CABG 2006 , and 6 stents, prior MI in 2011, diabetes, hypertension, GERD, fibromyalgia, hypercholesterolemia PRESENTS TO THE ER DUE TO UPPER ABDOMINAL PAIN THAT SHE REPORTS IT IS A SORENESS YESTERDAY AND TODAY.  PATIENT REPORTS THAT ON SATURDAY NIGHT SHE FELT THAT SHE MAY BE CONSTIPATED HAD A SENSATION THAT SHE NEEDS TO HAVE A BOWEL MOVEMENT.  SHE IS ABLE TO HAVE 1 BOWEL MOVEMENT SATURDAY NIGHT.  ON SUNDAY WAS ABLE TO HAVE SEVERAL BOWEL MOVEMENTS THROUGHOUT THE DAY NO SPECIFIC ABDOMINAL PAIN.  ON MONDAY HAD DIARRHEA IN THE MORNING.  SHE TOOK PEPTO-BISMOL AND REPORTS TO FURTHER BOWEL MOVEMENTS DURING THE DAY WITH FIRMING UP THE STOOL.  STOOL DID BECOME DARK IN COLOR.  ABDOMINAL PAIN BEGAN THAT EVENING IN HIS A SORENESS.  NO NAUSEA OR VOMITING SHE REPORTS SHE HAS BEEN BELCHING A GREAT DEAL.  NO FEVER CHILLS OR SWEATS.  No urinary symptoms.  Reports that tonight when she was belching and having abdominal pain she was concerned it maybe or hard so she took a nitro at 10:00 p.m..  She had already taken her Eliquis today as well as her isosorbide mononitrate this morning and her metoprolol b.i.d. blood pressure still ill 80s.  She reports nitro did not change her abdominal pain.  When she has had a prior MI and needed multiple stents she always had atypical symptoms.      Dr. GRACIA Ware - [place]   On physical exam blood pressure elevated 187/84 other vitals are normal patient is afebrile 978 no distress.  Soft nontender abdomen no rebound or guarding normal cardiac and lung exam other than hypertension.  No peripheral edema  MDM    Patient presents for emergent evaluation of acute abdominal pain since yesterday reports that she took nitro due to having atypical anginal symptoms in the past with MIs.  Also had diarrhea yesterday morning that poses a  possible threat to life and/or bodily function.    Differential diagnosis includes but is not limited to acute ACS including possible MI, cardiac dysrhythmia,,  pancreatitis, acute cholecystitis and cholangitis, colitis, acute appendicitis, urinary tract infection, ovarian  torsion,PID, TOA, pyelonephritis, kidney stone, volvulus, small bowel obstruction, enteritis, gastritis, colitis, mesenteric ischemia, AAA, AAA rupture, aortic dissection, constipation, upper or lower gi bleeding, traumatic injury.     In the ED patient found to have acute hypertensive urgency with upper abdominal pain anginal equivalent with elevated troponin and BNP mild pulmonary edema.   I ordered labs and personally reviewed them.  Labs significant for see below     I ordered X-rays and personally reviewed them and reviewed the radiologist interpretation.  Xray significant for see above.  I ordered EKG and personally reviewed it.  EKG significant for see above.    I ordered CT scan and personally reviewed it and reviewed the radiologist interpretation.  CT abdomen pelvis with IV contrast significant for Impression:     Diverticulosis without evidence of acute diverticulitis.     Interstitial thickening at the visualized lung bases with small volume of right sided pleural fluid.  Clinical correlation for interstitial edema advised.     Cholecystectomy.     Additional findings as above.      Admission MDM  I discussed the patient presentation labs, ekg, X-rays, CT findings with the Hospitalist  Patient was managed in the ED with nitro 0.4 mg IV due to hypertension and see if this helps stations abdominal discomfort while willing out ACS as well as abdominal pathology   The response to treatment was good.   Patient required emergent consultation to hospitalist for admission.  Bridgett Ayala M.D.       Amount and/or Complexity of Data Reviewed  Independent Historian: spouse  External Data Reviewed: labs, radiology and ECG.  Labs: ordered.  Decision-making details documented in ED Course.  Radiology: ordered and independent interpretation performed.  ECG/medicine tests: ordered and independent interpretation performed.    Risk  OTC drugs.  Prescription drug management.  Decision regarding hospitalization.               ED Course as of 12/11/24 0225   Tue Dec 10, 2024   2341 I have independently interpreted and reviewed the EKG which reveals atrially paced rhythm, with wide QRS and ventricularly paced complexes.  Left bundle-branch block rate 75 [RM]   Wed Dec 11, 2024   0142 CMP with glucose 137 creatinine 1.5 otherwise normal    Phosphorus 4.3 Mag 1.8  Lipase 7  Normal CBC  BNP 2493 [RM]   0143 I have personally reviewed the repeat EKG performed at 1:30 a.m. atrially paced with prolonged AV conduction and a left bundle-branch block similar in appearance to prior EKG.  Rate 72 [RM]   0144 I have personally reviewed the chest x-ray which reveals cardiomegaly mild pulmonary edema [RM]   0145 Troponin 27.6   [RM]      ED Course User Index  [RM] Bridgett Ayala MD                           Clinical Impression:  Final diagnoses:  [R07.9] Chest pain  [I44.7] LBBB (left bundle branch block)  [R10.10] Upper abdominal pain (Primary)  [I10] Hypertension, unspecified type  [R79.89] Elevated troponin          ED Disposition Condition    Admit Stable                Bridgett Ayala MD  12/11/24 0225

## 2024-12-11 NOTE — PLAN OF CARE
[facility]  Initial Discharge Assessment       Primary Care Provider: Elena Sullivan MD    Admission Diagnosis: Upper abdominal pain [R10.10]    Admission Date: 12/10/2024  Expected Discharge Date: 12/12/2024     met with the patient at the bedside to complete the initial discharge assessment plan. Demographics, PCP, and other information on the face sheet verified by the patient as being correct.  The patient reported she does not have an Advance Directive.  She stated she live in a one story home with her spouse Jacinto. She reported she was driving and independent in ADLs prior to admission. The DME that the patient has is listed below but she is only using a rollator and cane at this time. Preferred Pharmacy: [facility] [address] PCP: Elena Sullivan. She denies coumadin, HHC, and HD. Pt reported being on eliquis and stated she has no difficulty obtaining her med. The anticipated DC Disposition is home and her spouse will transport her home at discharge. CM will continue to follow and assess DC needs throughout this hospitalization.      Transition of Care Barriers: None    Payor: MEDICARE / Plan: MEDICARE PART A & B / Product Type: Government /     Extended Emergency Contact Information  Primary Emergency Contact: Jacinto Zacarias  Address: [address] [place] [address]  Home Phone: [phone]  Mobile Phone: [phone]  Relation: Spouse  Preferred language: English   needed? No    Discharge Plan A: Home with family  Discharge Plan B: Home Health      [facility] [address] [place] [address] [address] [facility]  [address] [facility] [address]  Phone: [phone] Fax: [phone]    [facility] AT Portal to Registered [facility] Sites  [facility]  [place] [address]  Phone: [phone] Fax: [phone]    [facility] [address] [address]  Phone: [phone] Fax: [phone]      Initial Assessment (most recent)       Adult Discharge Assessment - 12/11/24 1417          Discharge Assessment    Assessment Type Discharge Planning Assessment     Confirmed/corrected address, phone number and insurance Yes     Confirmed Demographics Correct on Facesheet     Source of Information patient     When was your last doctors appointment? 11/13/24     Does patient/caregiver understand observation status Yes     Communicated JANNA with patient/caregiver Yes     Reason For Admission CHF     People in Home spouse     Facility Arrived From: Home     Do you expect to return to your current living situation? Yes     Prior to hospitilization cognitive status: Unable to Assess     Current cognitive status: Alert/Oriented     Walking or Climbing Stairs Difficulty yes     Walking or Climbing Stairs ambulation difficulty, requires equipment     Mobility Management Uses a rollator and cane at baseline     Dressing/Bathing Difficulty no     Home Accessibility wheelchair accessible     Home Layout Able to live on 1st floor     Equipment Currently Used at Home cane, quad;rollator;shower chair;walker, rolling;wheelchair     Readmission within 30 days? No     Do you currently have service(s) that help you manage your care at home? No     Do you take prescription medications? Yes     Do you have prescription coverage? Yes     Do you have any problems affording any of your prescribed medications? No     Who is going to help you get home at discharge? Spouse     How do you get to doctors appointments? car, drives self;family or friend will provide     Are you on dialysis? No     Do you take coumadin? No     Discharge Plan A Home with family     Discharge Plan B Home Health     DME Needed Upon Discharge  none     Discharge Plan discussed with: Patient     Transition of Care Barriers None

## 2024-12-11 NOTE — PLAN OF CARE
Patient is a 89 year old female who was admitted with abdominal pain. She states she had diarrhea for two days which is now resolved after taking a peptobismol. CT abdomen on admission was fairly unremarkable. Patient was found to have elevated BNP and therefore was started on IV Lasix on admission. Patient does not have any SOB or leg swelling. FRANKLIN could be from the recent diarrhea. I will hold off Lasix for now. CXR does have some pulmonary congestion. Will hold off on IVF and rpt Cr in the morning.

## 2024-12-11 NOTE — HPI
89-year-old female with history of coronary artery disease status post 4 vessel CABG 2006 , and 6 stents, prior MI in 2011, diabetes, hypertension, GERD, fibromyalgia, hypercholesterolemia PRESENTS TO THE ER DUE TO UPPER ABDOMINAL PAIN THAT SHE REPORTS IT IS A SORENESS YESTERDAY AND TODAY.     She thought maybe it was her needing to have a BM but that didn't help.  Stool was darker she said.   She took her nitro and imdur and still didn't help the pain or pressure       So she came to ER    Non smoker  Non drinker      In our ER  CXR  Cardiac monitoring leads overlie the chest. There is a left chest wall cardiac pacing device present. There is postoperative change of prior median sternotomy. Cardiac silhouette is enlarged. There is aortic atherosclerosis. Lungs are symmetrically expanded. There is increased interstitial attenuation bilaterally which may represent interstitial edema in the appropriate clinical setting. Possible trace pleural fluid noted at the lung bases. No evidence of pneumothorax. Osseous structures demonstrate degenerative changes. Surgical clips project over the upper abdomen.       Labs mostly normal.   Mild FRANKLIN present       BNP was high at 2,000     Mild troponin elevation also     But EKG Ok        She is being admitted for chest pain , CHF , troponin elevation , franklin

## 2024-12-11 NOTE — PLAN OF CARE
Karma Chen has warranted treatment spanning two or more midnights of hospital level care for the management of  FRANKLIN, heart failure and abdominal pain . She continues to require daily labs, monitoring of vital signs, and medication adjustments. Her condition is also complicated by the following comorbidities: Hypertension and Diabetes.

## 2024-12-11 NOTE — H&P
[facility] - Emergency Dept  Hospital Medicine  History & Physical    Patient Name: Karma Chen  MRN: [id number]  Patient Class: OP- Observation  Admission Date: 12/10/2024  Attending Physician: Amaury Fernandez MD  Primary Care Provider: Elena Sullivan MD         Patient information was obtained from patient and ER records.     Subjective:     Principal Problem:CHF (congestive heart failure)    Chief Complaint:   Chief Complaint   Patient presents with    Abdominal Pain     Pt having pain in the center of her abdomen Pt had diarrhea on Monday        HPI: 89-year-old female with history of coronary artery disease status post 4 vessel CABG 2006 , and 6 stents, prior MI in 2011, diabetes, hypertension, GERD, fibromyalgia, hypercholesterolemia PRESENTS TO THE ER DUE TO UPPER ABDOMINAL PAIN THAT SHE REPORTS IT IS A SORENESS YESTERDAY AND TODAY.     She thought maybe it was her needing to have a BM but that didn't help.  Stool was darker she said.   She took her nitro and imdur and still didn't help the pain or pressure       So she came to ER    Non smoker  Non drinker      In our ER  CXR  Cardiac monitoring leads overlie the chest. There is a left chest wall cardiac pacing device present. There is postoperative change of prior median sternotomy. Cardiac silhouette is enlarged. There is aortic atherosclerosis. Lungs are symmetrically expanded. There is increased interstitial attenuation bilaterally which may represent interstitial edema in the appropriate clinical setting. Possible trace pleural fluid noted at the lung bases. No evidence of pneumothorax. Osseous structures demonstrate degenerative changes. Surgical clips project over the upper abdomen.       Labs mostly normal.   Mild FRANKLIN present       BNP was high at 2,000     Mild troponin elevation also     But EKG Ok        She is being admitted for chest pain , CHF , troponin elevation , franklin     Past Medical History:   Diagnosis Date    Coronary  artery disease     Dermatitis     Dermatitis 12/8/2017    Diabetes mellitus     Diabetes mellitus type II     Leah Barr virus infection     Fibromyalgia     GERD (gastroesophageal reflux disease)     Hypertension     MI (myocardial infarction) 09/23/2011    Pure hypercholesterolemia 2/26/2020       Past Surgical History:   Procedure Laterality Date    adenoids      ANGIOPLASTY  1987    APPENDECTOMY      CARDIAC PACEMAKER PLACEMENT  01/12/2017    CATARACT EXTRACTION, BILATERAL Bilateral 9/2/15, 3/17/15    right eye-9/2/15, left eye-3/17/15    CHOLECYSTECTOMY  1987    CORONARY ARTERY BYPASS GRAFT  2006    quadruple    coronary stents  1999    x2    CYST REMOVAL  04/25/2017    on back    HYSTERECTOMY  1966    OVARY SURGERY  1948    Ovary burst & was repaired    RHIZOTOMY  09/14/2018    TONSILLECTOMY  1940       Review of patient's allergies indicates:   Allergen Reactions    Diclofenac-misoprostol      Other reaction(s): Not available    Doxycycline     Effexor [venlafaxine]     Estradiol     Flagyl [metronidazole]     Fluconazole     Gabapentin     Iodine     Levaquin [levofloxacin]     Nexium [esomeprazole magnesium]     Nexletol [bempedoic acid] Other (See Comments)    Penicillins     Red yeast rice (monascus purpureus)     Shellfish containing products     Statins-hmg-coa reductase inhibitors     Sulfa (sulfonamide antibiotics)     Tea tree oil     Tegaserod      ZOLNORM    Tegaserod hydrogen maleate     Trazodone     Yeast, dried     Adhesive Rash     Band-aids       Current Facility-Administered Medications on File Prior to Encounter   Medication    evolocumab PnIj 140 mg     Current Outpatient Medications on File Prior to Encounter   Medication Sig    acetaminophen (TYLENOL) 500 MG tablet Take 500 mg by mouth every 6 (six) hours as needed for Pain.    ALPRAZolam (XANAX) 0.25 MG tablet TAKE 1 TABLET BY MOUTH EVERY DAY AS NEEDED FOR ANXIETY    apixaban (ELIQUIS) 2.5 mg Tab Take 1 tablet (2.5 mg total) by mouth  2 (two) times daily.    aspirin (ECOTRIN) 81 MG EC tablet Take 1 tablet (81 mg total) by mouth once daily. (Patient taking differently: Take 81 mg by mouth every evening.)    coenzyme Q10 100 mg capsule Take 1 capsule (100 mg total) by mouth 2 (two) times daily.    cyanocobalamin 1,000 mcg/mL injection 2cc I'm every other week    dexchlorphen-phenylephrine-DM (POLYTUSSIN DM,DEXCHLORPHENRMN,) 1-5-10 mg/5 mL Syrp Take 2 TSP per 4-6 hours, not to exceed 12 TSP in 24 hours    diphenhydrAMINE (BENYLIN) 12.5 mg/5 mL liquid Take 12.5 mg by mouth 4 (four) times daily as needed for Allergies.    ECHINACEA ORAL Take 750 mg by mouth 2 (two) times a day.    fluocinonide-emollient (FLUOCINONIDE-E) 0.05 % Crea Apply topically 2 (two) times daily.    fluticasone propionate (FLONASE) 50 mcg/actuation nasal spray 1 spray (50 mcg total) by Each Nostril route once daily.    isosorbide mononitrate (IMDUR) 120 MG 24 hr tablet TAKE 1 TABLET (120 MG TOTAL) BY MOUTH ONCE DAILY. DO NOT CRUSH OR CHEW SWALLOW WHOLE.    levothyroxine (SYNTHROID) 88 MCG tablet Take 1 tablet (88 mcg total) by mouth once daily.    LIDOcaine (LIDODERM) 5 % Place 1 patch onto the skin once daily. Remove & Discard patch within 12 hours or as directed by MD    loratadine (CLARITIN) 10 mg tablet Take 10 mg by mouth once daily.    MAGNESIUM ORAL Take 500 mg by mouth once daily.     metoprolol succinate (TOPROL-XL) 25 MG 24 hr tablet TAKE 1 TABLET BY MOUTH EVERY DAY    montelukast (SINGULAIR) 10 mg tablet TAKE 1 TABLET BY MOUTH EVERY DAY IN THE EVENING    multivitamin (THERAGRAN) tablet Take 1 tablet by mouth 2 (two) times a day.     nitroGLYCERIN (NITROSTAT) 0.4 MG SL tablet Place 1 tablet (0.4 mg total) under the tongue every 5 (five) minutes as needed for Chest pain.    omeprazole (PRILOSEC) 20 MG capsule Take 1 capsule (20 mg total) by mouth once daily.    prednisoLONE acetate (PRED FORTE) 1 % DrpS Place 1 drop into the right eye 4 (four) times daily.    tiZANidine  (ZANAFLEX) 4 MG tablet Take 1 tablet (4 mg total) by mouth once daily.    traMADoL (ULTRAM) 50 mg tablet Take 1 tablet (50 mg total) by mouth 2 (two) times daily as needed (pain).    turmeric 400 mg Cap Take 1,200 mg by mouth once daily.    UNABLE TO FIND Take 1 capsule by mouth once daily. Joint health    UNABLE TO FIND Take 1 capsule by mouth 2 (two) times a day. Cardio platinum    UNABLE TO FIND Nerve [place]    zinc gluconate 50 mg tablet Take 50 mg by mouth 2 (two) times a day.     Family History       Problem Relation (Age of Onset)    No Known Problems Mother, Father          Tobacco Use    Smoking status: Never    Smokeless tobacco: Never   Substance and Sexual Activity    Alcohol use: No    Drug use: No    Sexual activity: Not on file     Review of Systems   All other systems reviewed and are negative.    Objective:     Vital Signs (Most Recent):  Temp: 97.8 °F (36.6 °C) (12/10/24 2310)  Pulse: 69 (12/11/24 0600)  Resp: (!) 25 (12/11/24 0600)  BP: (!) 168/74 (12/11/24 0600)  SpO2: (!) 93 % (12/11/24 0600) Vital Signs (24h Range):  Temp:  [97.8 °F (36.6 °C)] 97.8 °F (36.6 °C)  Pulse:  [69-75] 69  Resp:  [18-26] 25  SpO2:  [89 %-96 %] 93 %  BP: (148-187)/() 168/74     Weight: 62.6 kg (138 lb)  Body mass index is 26.95 kg/m².     Physical Exam  Vitals and nursing note reviewed.   HENT:      Head: Normocephalic.      Nose: Nose normal.      Mouth/Throat:      Mouth: Mucous membranes are moist.   Eyes:      Extraocular Movements: Extraocular movements intact.      Pupils: Pupils are equal, round, and reactive to light.   Cardiovascular:      Rate and Rhythm: Normal rate and regular rhythm.      Pulses: Normal pulses.      Heart sounds: Normal heart sounds.   Pulmonary:      Effort: Pulmonary effort is normal.      Breath sounds: Normal breath sounds.   Abdominal:      General: Abdomen is flat. Bowel sounds are normal. There is distension.      Palpations: Abdomen is soft.   Musculoskeletal:         General:  "Normal range of motion.      Cervical back: Normal range of motion.   Skin:     General: Skin is warm.      Capillary Refill: Capillary refill takes less than 2 seconds.   Neurological:      General: No focal deficit present.      Mental Status: She is alert.   Psychiatric:         Mood and Affect: Mood normal.              CRANIAL NERVES     CN III, IV, VI   Pupils are equal, round, and reactive to light.       Significant Labs: All pertinent labs within the past 24 hours have been reviewed.  CBC:   Recent Labs   Lab 12/11/24 0038   WBC 9.90   HGB 12.4   HCT 39.0        CMP:   Recent Labs   Lab 12/11/24 0038      K 3.8      CO2 26   *   BUN 22   CREATININE 1.5*   CALCIUM 9.4   PROT 7.6   ALBUMIN 4.2   BILITOT 0.6   ALKPHOS 76   AST 22   ALT 14   ANIONGAP 10     Cardiac Markers:   Recent Labs   Lab 12/11/24 0038   BNP 2,493*     Coagulation: No results for input(s): "PT", "INR", "APTT" in the last 48 hours.  Lactic Acid: No results for input(s): "LACTATE" in the last 48 hours.  Lipid Panel: No results for input(s): "CHOL", "HDL", "LDLCALC", "TRIG", "CHOLHDL" in the last 48 hours.  Magnesium:   Recent Labs   Lab 12/11/24  0038   MG 1.8     Troponin:   Recent Labs   Lab 12/11/24 0038 12/11/24  0155   TROPONINIHS 27.6* 32.8*     TSH: No results for input(s): "TSH" in the last 4320 hours.  Urine Studies:   Recent Labs   Lab 12/11/24  0135   COLORU Yellow   APPEARANCEUA Clear   PHUR 7.0   SPECGRAV 1.010   PROTEINUA Negative   GLUCUA Negative   KETONESU Negative   BILIRUBINUA Negative   OCCULTUA Negative   NITRITE Negative   UROBILINOGEN Negative   LEUKOCYTESUR Trace*   RBCUA 1   WBCUA 3   SQUAMEPITHEL 0       Significant Imaging: I have reviewed all pertinent imaging results/findings within the past 24 hours.  I have reviewed and interpreted all pertinent imaging results/findings within the past 24 hours.  Assessment/Plan:     * CHF (congestive heart failure)  I see no old echo     Get new " echo    IV Lasix 40 mg Q8 to start    KCL and mag po bid to stay ahead of the renal losses    Admit to telemetry since troponins up       I think troponins up from the demand of CHF and some FRANKLIN     Cont. Her metoprolol x l daily     Aspirin 162 mg po daily     FRANKLIN (acute kidney injury)  If this is CHF then its from CHF  like cardiorenal syndrome      Will try diuresing her and see what it does     Get new BMP tomorrow am     Elevated troponin    I think from demand of the CHF  and FRANKLIN contributes also     I think her abdominal pain is CHF      BNP was 2,000 +       - aspirin 162 mg po daily   - cont. Metoprolol  XL daily   - ECHO ordered       VTE Risk Mitigation (From admission, onward)           Ordered     enoxaparin injection 30 mg  Daily         12/11/24 0643     Place sequential compression device  Until discontinued         12/11/24 0643     IP VTE HIGH RISK PATIENT  Once         12/11/24 0643     Place sequential compression device  Until discontinued         12/11/24 0643                       On 12/11/2024, patient should be placed in hospital observation services under my care.             Amaury Fernandez MD  Department of Hospital Medicine  [facility] - Emergency Dept

## 2024-12-11 NOTE — ASSESSMENT & PLAN NOTE
If this is CHF then its from CHF  like cardiorenal syndrome      Will try diuresing her and see what it does     Get new BMP tomorrow am

## 2024-12-12 LAB
ALBUMIN SERPL BCP-MCNC: 4.2 G/DL (ref 3.5–5.2)
ALP SERPL-CCNC: 77 U/L (ref 55–135)
ALT SERPL W/O P-5'-P-CCNC: 11 U/L (ref 10–44)
ANION GAP SERPL CALC-SCNC: 10 MMOL/L (ref 8–16)
AST SERPL-CCNC: 19 U/L (ref 10–40)
BASOPHILS # BLD AUTO: 0.06 K/UL (ref 0–0.2)
BASOPHILS NFR BLD: 0.5 % (ref 0–1.9)
BILIRUB SERPL-MCNC: 1.2 MG/DL (ref 0.1–1)
BUN SERPL-MCNC: 25 MG/DL (ref 8–23)
CALCIUM SERPL-MCNC: 9.3 MG/DL (ref 8.7–10.5)
CHLORIDE SERPL-SCNC: 100 MMOL/L (ref 95–110)
CO2 SERPL-SCNC: 28 MMOL/L (ref 23–29)
CREAT SERPL-MCNC: 1.3 MG/DL (ref 0.5–1.4)
DIFFERENTIAL METHOD BLD: ABNORMAL
EOSINOPHIL # BLD AUTO: 0.1 K/UL (ref 0–0.5)
EOSINOPHIL NFR BLD: 0.9 % (ref 0–8)
ERYTHROCYTE [DISTWIDTH] IN BLOOD BY AUTOMATED COUNT: 14.1 % (ref 11.5–14.5)
EST. GFR  (NO RACE VARIABLE): 39.8 ML/MIN/1.73 M^2
GLUCOSE SERPL-MCNC: 114 MG/DL (ref 70–110)
HCT VFR BLD AUTO: 42.3 % (ref 37–48.5)
HGB BLD-MCNC: 13.6 G/DL (ref 12–16)
IMM GRANULOCYTES # BLD AUTO: 0.06 K/UL (ref 0–0.04)
IMM GRANULOCYTES NFR BLD AUTO: 0.5 % (ref 0–0.5)
LYMPHOCYTES # BLD AUTO: 2.7 K/UL (ref 1–4.8)
LYMPHOCYTES NFR BLD: 21.3 % (ref 18–48)
MAGNESIUM SERPL-MCNC: 1.9 MG/DL (ref 1.6–2.6)
MCH RBC QN AUTO: 28.6 PG (ref 27–31)
MCHC RBC AUTO-ENTMCNC: 32.2 G/DL (ref 32–36)
MCV RBC AUTO: 89 FL (ref 82–98)
MONOCYTES # BLD AUTO: 1.6 K/UL (ref 0.3–1)
MONOCYTES NFR BLD: 12.5 % (ref 4–15)
NEUTROPHILS # BLD AUTO: 8.2 K/UL (ref 1.8–7.7)
NEUTROPHILS NFR BLD: 64.3 % (ref 38–73)
NRBC BLD-RTO: 0 /100 WBC
OHS QRS DURATION: 146 MS
OHS QTC CALCULATION: 520 MS
PLATELET # BLD AUTO: 293 K/UL (ref 150–450)
PMV BLD AUTO: 10.3 FL (ref 9.2–12.9)
POTASSIUM SERPL-SCNC: 4 MMOL/L (ref 3.5–5.1)
PROT SERPL-MCNC: 7.6 G/DL (ref 6–8.4)
RBC # BLD AUTO: 4.76 M/UL (ref 4–5.4)
SODIUM SERPL-SCNC: 138 MMOL/L (ref 136–145)
WBC # BLD AUTO: 12.75 K/UL (ref 3.9–12.7)

## 2024-12-12 PROCEDURE — 63600175 PHARM REV CODE 636 W HCPCS: Performed by: INTERNAL MEDICINE

## 2024-12-12 PROCEDURE — 99223 1ST HOSP IP/OBS HIGH 75: CPT | Mod: ,,, | Performed by: INTERNAL MEDICINE

## 2024-12-12 PROCEDURE — 83735 ASSAY OF MAGNESIUM: CPT | Performed by: INTERNAL MEDICINE

## 2024-12-12 PROCEDURE — 12000002 HC ACUTE/MED SURGE SEMI-PRIVATE ROOM

## 2024-12-12 PROCEDURE — 25000003 PHARM REV CODE 250: Performed by: INTERNAL MEDICINE

## 2024-12-12 PROCEDURE — 85025 COMPLETE CBC W/AUTO DIFF WBC: CPT | Performed by: INTERNAL MEDICINE

## 2024-12-12 PROCEDURE — 36415 COLL VENOUS BLD VENIPUNCTURE: CPT | Performed by: INTERNAL MEDICINE

## 2024-12-12 PROCEDURE — 80053 COMPREHEN METABOLIC PANEL: CPT | Performed by: INTERNAL MEDICINE

## 2024-12-12 RX ORDER — SACUBITRIL AND VALSARTAN 24; 26 MG/1; MG/1
1 TABLET, FILM COATED ORAL 2 TIMES DAILY
Status: DISCONTINUED | OUTPATIENT
Start: 2024-12-12 | End: 2024-12-13 | Stop reason: HOSPADM

## 2024-12-12 RX ORDER — ASPIRIN 81 MG/1
81 TABLET ORAL DAILY
Status: DISCONTINUED | OUTPATIENT
Start: 2024-12-13 | End: 2024-12-13 | Stop reason: HOSPADM

## 2024-12-12 RX ORDER — FUROSEMIDE 10 MG/ML
20 INJECTION INTRAMUSCULAR; INTRAVENOUS ONCE
Status: DISCONTINUED | OUTPATIENT
Start: 2024-12-12 | End: 2024-12-12

## 2024-12-12 RX ORDER — FUROSEMIDE 10 MG/ML
40 INJECTION INTRAMUSCULAR; INTRAVENOUS EVERY 12 HOURS
Status: DISCONTINUED | OUTPATIENT
Start: 2024-12-12 | End: 2024-12-13

## 2024-12-12 RX ADMIN — SACUBITRIL AND VALSARTAN 1 TABLET: 24; 26 TABLET, FILM COATED ORAL at 08:12

## 2024-12-12 RX ADMIN — POTASSIUM CHLORIDE 20 MEQ: 1500 TABLET, EXTENDED RELEASE ORAL at 08:12

## 2024-12-12 RX ADMIN — FUROSEMIDE 40 MG: 10 INJECTION, SOLUTION INTRAMUSCULAR; INTRAVENOUS at 04:12

## 2024-12-12 RX ADMIN — ASPIRIN 162 MG: 81 TABLET, COATED ORAL at 08:12

## 2024-12-12 RX ADMIN — Medication 6 MG: at 08:12

## 2024-12-12 RX ADMIN — Medication 400 MG: at 08:12

## 2024-12-12 RX ADMIN — ENOXAPARIN SODIUM 30 MG: 30 INJECTION SUBCUTANEOUS at 05:12

## 2024-12-12 RX ADMIN — METOPROLOL SUCCINATE 12.5 MG: 25 TABLET, EXTENDED RELEASE ORAL at 08:12

## 2024-12-12 NOTE — SUBJECTIVE & OBJECTIVE
Interval History: Patient reports no abdominal pain, diarrhea, chest pain or SOB.     Review of Systems  Objective:     Vital Signs (Most Recent):  Temp: 98 °F (36.7 °C) (12/12/24 1122)  Pulse: 71 (12/12/24 1122)  Resp: 16 (12/12/24 1122)  BP: (!) 153/81 (105) (12/12/24 1122)  SpO2: 96 % (12/12/24 1122) Vital Signs (24h Range):  Temp:  [97.1 °F (36.2 °C)-98.8 °F (37.1 °C)] 98 °F (36.7 °C)  Pulse:  [71-75] 71  Resp:  [14-18] 16  SpO2:  [92 %-97 %] 96 %  BP: (136-153)/(77-82) 153/81     Weight: 60.4 kg (133 lb 2.5 oz)  Body mass index is 26.01 kg/m².    Intake/Output Summary (Last 24 hours) at 12/12/2024 1241  Last data filed at 12/11/2024 2130  Gross per 24 hour   Intake 420 ml   Output --   Net 420 ml         Physical Exam  Vitals and nursing note reviewed.   HENT:      Head: Normocephalic.      Nose: Nose normal.      Mouth/Throat:      Mouth: Mucous membranes are moist.   Eyes:      Extraocular Movements: Extraocular movements intact.      Pupils: Pupils are equal, round, and reactive to light.   Cardiovascular:      Rate and Rhythm: Normal rate and regular rhythm.      Pulses: Normal pulses.      Heart sounds: Normal heart sounds. Murmur heard   Pulmonary:      Effort: Pulmonary effort is normal.      Breath sounds: Normal breath sounds.   Abdominal:      General: Abdomen is flat. Bowel sounds are normal.     Palpations: Abdomen is soft.   Musculoskeletal:         General: Normal range of motion.      Cervical back: Normal range of motion.   Skin:     General: Skin is warm.      Capillary Refill: Capillary refill takes less than 2 seconds.   Neurological:      General: No focal deficit present.      Mental Status: She is alert.   Psychiatric:         Mood and Affect: Mood normal.     Significant Labs: All pertinent labs within the past 24 hours have been reviewed.  CBC:   Recent Labs   Lab 12/11/24  0038 12/12/24  0451   WBC 9.90 12.75*   HGB 12.4 13.6   HCT 39.0 42.3    293     CMP:   Recent Labs   Lab  12/11/24  0038 12/12/24  0451    138   K 3.8 4.0    100   CO2 26 28   * 114*   BUN 22 25*   CREATININE 1.5* 1.3   CALCIUM 9.4 9.3   PROT 7.6 7.6   ALBUMIN 4.2 4.2   BILITOT 0.6 1.2*   ALKPHOS 76 77   AST 22 19   ALT 14 11   ANIONGAP 10 10       Significant Imaging: I have reviewed all pertinent imaging results/findings within the past 24 hours.

## 2024-12-12 NOTE — CONSULTS
[facility]  Department of Cardiology  Consult Note      PATIENT NAME: Karma Chen    MRN: [id number]  TODAY'S DATE: 12/12/2024  ADMIT DATE: 12/10/2024                          CONSULT REQUESTED BY: Herman Joiner MD    SUBJECTIVE     PRINCIPAL PROBLEM: CHF (congestive heart failure)    HPI:    Patient is an 89-year-old female who presented to the emergency room with complaints of upper abdominal pain which felt like a band around her waist.  Patient states that she had had multiple BMs in the past couple of days followed by some diarrhea.  Patient found to have some possible interstitial edema on chest x-ray and had an elevated BNP.  Patient also had a mild nondiagnostic troponin elevation.  She does have a known history of CAD with CABG in the past as well as stents since then.  She also has history of diabetes, hypertension, hyperlipidemia.  She was seen sitting up in chair with no acute distress noted denies any chest discomfort or abdominal pain at the present time.      REASON FOR CONSULT:  From Hospitalist H&P: 89-year-old female with history of coronary artery disease status post 4 vessel CABG 2006 , and 6 stents, prior MI in 2011, diabetes, hypertension, GERD, fibromyalgia, hypercholesterolemia PRESENTS TO THE ER DUE TO UPPER ABDOMINAL PAIN THAT SHE REPORTS IT IS A SORENESS YESTERDAY AND TODAY.      She thought maybe it was her needing to have a BM but that didn't help.  Stool was darker she said.   She took her nitro and imdur and still didn't help the pain or pressure         So she came to ER     Non smoker  Non drinker        In our ER  CXR  Cardiac monitoring leads overlie the chest. There is a left chest wall cardiac pacing device present. There is postoperative change of prior median sternotomy. Cardiac silhouette is enlarged. There is aortic atherosclerosis. Lungs are symmetrically expanded. There is increased interstitial attenuation bilaterally which may represent  interstitial edema in the appropriate clinical setting. Possible trace pleural fluid noted at the lung bases. No evidence of pneumothorax. Osseous structures demonstrate degenerative changes. Surgical clips project over the upper abdomen.         Labs mostly normal.   Mild FRANKLIN present        BNP was high at 2,000      Mild troponin elevation also      But EKG Ok           She is being admitted for chest pain , CHF , troponin elevation , franklin      Review of patient's allergies indicates:   Allergen Reactions    Diclofenac-misoprostol      Other reaction(s): Not available    Doxycycline     Effexor [venlafaxine]     Estradiol     Flagyl [metronidazole]     Fluconazole     Gabapentin     Iodine     Levaquin [levofloxacin]     Nexium [esomeprazole magnesium]     Nexletol [bempedoic acid] Other (See Comments)    Penicillins     Red yeast rice (monascus purpureus)     Shellfish containing products     Statins-hmg-coa reductase inhibitors     Sulfa (sulfonamide antibiotics)     Tea tree oil     Tegaserod      ZOLNORM    Tegaserod hydrogen maleate     Trazodone     Yeast, dried     Adhesive Rash     Band-aids       Past Medical History:   Diagnosis Date    Coronary artery disease     Dermatitis     Dermatitis 12/8/2017    Diabetes mellitus     Diabetes mellitus type II     Leah Barr virus infection     Fibromyalgia     GERD (gastroesophageal reflux disease)     Hypertension     MI (myocardial infarction) 09/23/2011    Pure hypercholesterolemia 2/26/2020     Past Surgical History:   Procedure Laterality Date    adenoids      ANGIOPLASTY  1987    APPENDECTOMY      CARDIAC PACEMAKER PLACEMENT  01/12/2017    CATARACT EXTRACTION, BILATERAL Bilateral 9/2/15, 3/17/15    right eye-9/2/15, left eye-3/17/15    CHOLECYSTECTOMY  1987    CORONARY ARTERY BYPASS GRAFT  2006    quadruple    coronary stents  1999    x2    CYST REMOVAL  04/25/2017    on back    HYSTERECTOMY  1966    OVARY SURGERY  1948    Ovary burst & was repaired     RHIZOTOMY  09/14/2018    TONSILLECTOMY  1940     Social History     Tobacco Use    Smoking status: Never    Smokeless tobacco: Never   Substance Use Topics    Alcohol use: No    Drug use: No        REVIEW OF SYSTEMS  Per HPI    OBJECTIVE     VITAL SIGNS (Most Recent)  Temp: 98 °F (36.7 °C) (12/12/24 1122)  Pulse: 71 (12/12/24 1122)  Resp: 16 (12/12/24 1122)  BP: (!) 153/81 (105) (12/12/24 1122)  SpO2: 96 % (12/12/24 1122)    VENTILATION STATUS  Resp: 16 (12/12/24 1122)  SpO2: 96 % (12/12/24 1122)           I & O (Last 24H):  Intake/Output Summary (Last 24 hours) at 12/12/2024 1409  Last data filed at 12/12/2024 0923  Gross per 24 hour   Intake 420 ml   Output --   Net 420 ml       WEIGHTS  Wt Readings from Last 1 Encounters:   12/12/24 0615 60.4 kg (133 lb 2.5 oz)   12/11/24 1030 61.5 kg (135 lb 9.3 oz)   12/11/24 0832 61.5 kg (135 lb 9.3 oz)   12/10/24 2310 62.6 kg (138 lb)       PHYSICAL EXAM  CONSTITUTIONAL: No fever, no chills  HEENT: Normocephalic, atraumatic,pupils reactive to light                 NECK:  No JVD no carotid bruit  CVS: S1S2+, RRR  LUNGS: Clear  ABDOMEN: Soft, NT, BS+  EXTREMITIES: No cyanosis, edema  : No mccarthy catheter  NEURO: AAO X 3  PSY: Normal affect      HOME MEDICATIONS:  No current facility-administered medications on file prior to encounter.     Current Outpatient Medications on File Prior to Encounter   Medication Sig Dispense Refill    acetaminophen (TYLENOL) 650 MG TbSR Take 650 mg by mouth every 6 (six) hours as needed for Pain.      ALPRAZolam (XANAX) 0.25 MG tablet TAKE 1 TABLET BY MOUTH EVERY DAY AS NEEDED FOR ANXIETY (Patient taking differently: Take 0.25 mg by mouth daily as needed for Anxiety. TAKE 1 TABLET BY MOUTH EVERY DAY AS NEEDED FOR ANXIETY) 90 tablet 0    apixaban (ELIQUIS) 2.5 mg Tab Take 1 tablet (2.5 mg total) by mouth 2 (two) times daily. 180 tablet 3    aspirin (ECOTRIN) 81 MG EC tablet Take 1 tablet (81 mg total) by mouth once daily. (Patient taking  differently: Take 81 mg by mouth every evening.)  0    coenzyme Q10 100 mg capsule Take 1 capsule (100 mg total) by mouth 2 (two) times daily. (Patient taking differently: Take 100 mg by mouth once daily.) 60 capsule 11    cyanocobalamin 1,000 mcg/mL injection 2cc I'm every other week (Patient taking differently: Inject 1,000 mcg into the muscle every 14 (fourteen) days. 2cc I'm every other week) 10 mL 1    fexofenadine (ALLEGRA) 180 MG tablet Take 180 mg by mouth once daily.      fluocinonide-emollient (FLUOCINONIDE-E) 0.05 % Crea Apply topically 2 (two) times daily. (Patient taking differently: Apply 1 application  topically 2 (two) times daily.) 60 g 1    fluticasone propionate (FLONASE) 50 mcg/actuation nasal spray 1 spray (50 mcg total) by Each Nostril route once daily. 9.9 mL 1    isosorbide mononitrate (IMDUR) 120 MG 24 hr tablet TAKE 1 TABLET (120 MG TOTAL) BY MOUTH ONCE DAILY. DO NOT CRUSH OR CHEW SWALLOW WHOLE. (Patient taking differently: Take 120 mg by mouth once daily. TAKE 1 TABLET (120 MG TOTAL) BY MOUTH ONCE DAILY. DO NOT CRUSH OR CHEW SWALLOW WHOLE.) 90 tablet 3    levothyroxine (SYNTHROID) 88 MCG tablet Take 1 tablet (88 mcg total) by mouth once daily. 90 tablet 1    LIDOcaine (LIDODERM) 5 % Place 1 patch onto the skin once daily. Remove & Discard patch within 12 hours or as directed by MD 30 patch 1    MAGNESIUM ORAL Take 100 mg by mouth once daily.      montelukast (SINGULAIR) 10 mg tablet TAKE 1 TABLET BY MOUTH EVERY DAY IN THE EVENING (Patient taking differently: Take 10 mg by mouth every evening.) 90 tablet 1    multivitamin (THERAGRAN) tablet Take 1 tablet by mouth 2 (two) times a day.       nitroGLYCERIN (NITROSTAT) 0.4 MG SL tablet Place 1 tablet (0.4 mg total) under the tongue every 5 (five) minutes as needed for Chest pain. 30 tablet 1    nystatin-triamcinolone (MYCOLOG II) cream Apply 1 g topically 4 (four) times daily.      omeprazole (PRILOSEC) 20 MG capsule Take 1 capsule (20 mg  total) by mouth once daily. 90 capsule 3    tiZANidine (ZANAFLEX) 4 MG tablet Take 1 tablet (4 mg total) by mouth once daily. (Patient taking differently: Take 4 mg by mouth every evening.) 90 tablet 0    traMADoL (ULTRAM) 50 mg tablet Take 1 tablet (50 mg total) by mouth 2 (two) times daily as needed (pain). 14 tablet 0    TURMERIC ORAL Take 200 mg by mouth once daily.      UNABLE TO FIND Take 1 capsule by mouth once daily. [facility]      UNABLE TO FIND Take 1 capsule by mouth 2 (two) times a day. Cardio platinum      loratadine (CLARITIN) 10 mg tablet Take 10 mg by mouth once daily. (Patient not taking: Reported on 12/11/2024)      metoprolol succinate (TOPROL-XL) 25 MG 24 hr tablet TAKE 1 TABLET BY MOUTH EVERY DAY (Patient not taking: Reported on 12/11/2024) 90 tablet 3       SCHEDULED MEDS:   aspirin  162 mg Oral Daily    enoxparin  30 mg Subcutaneous Daily    magnesium oxide  400 mg Oral BID    metoprolol succinate  12.5 mg Oral Daily    potassium chloride  20 mEq Oral BID    sacubitriL-valsartan  1 tablet Oral BID    senna-docusate 8.6-50 mg  2 tablet Oral BID       CONTINUOUS INFUSIONS:    PRN MEDS:  Current Facility-Administered Medications:     acetaminophen, 650 mg, Oral, Q8H PRN    acetaminophen, 650 mg, Oral, Q4H PRN    aluminum-magnesium hydroxide-simethicone, 30 mL, Oral, QID PRN    dextrose 50%, 12.5 g, Intravenous, PRN    dextrose 50%, 25 g, Intravenous, PRN    glucagon (human recombinant), 1 mg, Intramuscular, PRN    glucose, 16 g, Oral, PRN    glucose, 24 g, Oral, PRN    magnesium oxide, 800 mg, Oral, PRN    magnesium oxide, 800 mg, Oral, PRN    melatonin, 6 mg, Oral, Nightly PRN    naloxone, 0.02 mg, Intravenous, PRN    ondansetron, 4 mg, Intravenous, Q6H PRN    potassium bicarbonate, 35 mEq, Oral, PRN    potassium bicarbonate, 50 mEq, Oral, PRN    potassium bicarbonate, 60 mEq, Oral, PRN    potassium, sodium phosphates, 2 packet, Oral, PRN    potassium, sodium phosphates, 2 packet, Oral, PRN    " potassium, sodium phosphates, 2 packet, Oral, PRN    sodium chloride 0.9%, 10 mL, Intravenous, PRN    sodium chloride 0.9%, 3 mL, Intravenous, Q12H PRN    LABS AND DIAGNOSTICS     CBC LAST 3 DAYS  Recent Labs   Lab 12/11/24  0038 12/12/24  0451   WBC 9.90 12.75*   RBC 4.35 4.76   HGB 12.4 13.6   HCT 39.0 42.3   MCV 90 89   MCH 28.5 28.6   MCHC 31.8* 32.2   RDW 14.0 14.1    293   MPV 10.1 10.3   GRAN 64.2  6.4 64.3  8.2*   LYMPH 22.3  2.2 21.3  2.7   MONO 11.1  1.1* 12.5  1.6*   BASO 0.05 0.06   NRBC 0 0       COAGULATION LAST 3 DAYS  No results for input(s): "LABPT", "INR", "APTT" in the last 168 hours.    CHEMISTRY LAST 3 DAYS  Recent Labs   Lab 12/11/24  0038 12/12/24  0451    138   K 3.8 4.0    100   CO2 26 28   ANIONGAP 10 10   BUN 22 25*   CREATININE 1.5* 1.3   * 114*   CALCIUM 9.4 9.3   MG 1.8 1.9   ALBUMIN 4.2 4.2   PROT 7.6 7.6   ALKPHOS 76 77   ALT 14 11   AST 22 19   BILITOT 0.6 1.2*       CARDIAC PROFILE LAST 3 DAYS  Recent Labs   Lab 12/11/24  0038 12/11/24  0155   BNP 2,493*  --    TROPONINIHS 27.6* 32.8*       ENDOCRINE LAST 3 DAYS  No results for input(s): "TSH", "PROCAL" in the last 168 hours.    LAST ARTERIAL BLOOD GAS  ABG  No results for input(s): "PH", "PO2", "PCO2", "HCO3", "BE" in the last 168 hours.    LAST 7 DAYS MICROBIOLOGY   Microbiology Results (last 7 days)       ** No results found for the last 168 hours. **            MOST RECENT IMAGING  Echo    Left Ventricle: The left ventricle is normal in size. Normal wall   thickness. Normal wall motion. Septal motion is consistent with pacing.   There is severely reduced systolic function with a visually estimated   ejection fraction of 25 - 30%. There is normal diastolic function. E/A   ratio is 1.61.    Right Ventricle: Right ventricle was not assessed. Right ventricular   enlargement. Systolic function is reduced.TAPSE is 1.80 cm. Pacemaker lead   present in the ventricle.    Left Atrium: Left atrium is " severely dilated.    Right Atrium: Right atrium is severely dilated.    Aortic Valve: There is mild aortic regurgitation.    Mitral Valve: There is moderate to severe regurgitation.    Tricuspid Valve: There is mild to moderate regurgitation. There is   moderate pulmonary hypertension. The estimated PA systolic pressure is at   least 59 mmHg.    Pulmonic Valve: There is mild regurgitation.  CT Abdomen Pelvis  Without Contrast  Narrative: EXAMINATION:  CT ABDOMEN PELVIS WITHOUT CONTRAST    CLINICAL HISTORY:  Abdominal abscess/infection suspected;    TECHNIQUE:  Low dose axial images, sagittal and coronal reformations were obtained from the lung bases to the pubic symphysis without IV contrast.  Oral contrast was not administered.    COMPARISON:  None    FINDINGS:  There is interstitial thickening of the visualized lung bases.  There is a small volume of pleural fluid at the right lung base.  There is right middle lobe atelectasis.  Heart appears mildly enlarged.  There are transvenous pacing leads.  No significant pericardial effusion.    The liver is unremarkable in appearance on this limited non-contrast examination.  Gallbladder is surgically absent.  There is no intra-or extrahepatic biliary ductal dilatation.    Stomach, spleen, and adrenal glands demonstrate no acute abnormality.  There is fatty involutional change of the pancreas.    Kidneys demonstrate mild bilateral atrophy.  There is no hydronephrosis.  There is nonspecific bilateral perinephric edema.  Urinary bladder appears within normal limits.  The uterus is surgically absent.  No significant free fluid in the pelvis.    The abdominal aorta is normal in course and caliber with significant atherosclerotic calcification along its course.  There is no retroperitoneal hematoma.    The visualized loops of small and large bowel show no evidence of obstruction or inflammation. There is colonic diverticulosis without evidence of acute diverticulitis.  The  appendix is not definitively visualized, however no localized inflammatory change is seen at its expected location. There is no ascites, portal venous gas, or free intraperitoneal air.  There is a small fat containing umbilical hernia.    There are degenerative changes of visualized lumbar spine.  The extraperitoneal soft tissues are unremarkable.  Impression: Diverticulosis without evidence of acute diverticulitis.    Interstitial thickening at the visualized lung bases with small volume of right sided pleural fluid.  Clinical correlation for interstitial edema advised.    Cholecystectomy.    Additional findings as above.    Electronically signed by: Sourav Morelos MD  Date:    12/11/2024  Time:    01:35  X-Ray Chest AP Portable  Narrative: EXAMINATION:  XR CHEST AP PORTABLE    CLINICAL HISTORY:  Chest Pain;    TECHNIQUE:  Single frontal view of the chest was performed.    COMPARISON:  01/05/2024    FINDINGS:  Cardiac monitoring leads overlie the chest.  There is a left chest wall cardiac pacing device present.  There is postoperative change of prior median sternotomy.  Cardiac silhouette is enlarged.  There is aortic atherosclerosis.  Lungs are symmetrically expanded.  There is increased interstitial attenuation bilaterally which may represent interstitial edema in the appropriate clinical setting.  Possible trace pleural fluid noted at the lung bases.  No evidence of pneumothorax.  Osseous structures demonstrate degenerative changes.  Surgical clips project over the upper abdomen.  Impression: As above.    Electronically signed by: Sourav Morelos MD  Date:    12/11/2024  Time:    01:05      ECHOCARDIOGRAM RESULTS (last 5)  Results for orders placed during the hospital encounter of 12/10/24    Echo    Interpretation Summary    Left Ventricle: The left ventricle is normal in size. Normal wall thickness. Normal wall motion. Septal motion is consistent with pacing. There is severely reduced systolic function with a  visually estimated ejection fraction of 25 - 30%. There is normal diastolic function. E/A ratio is 1.61.    Right Ventricle: Right ventricle was not assessed. Right ventricular enlargement. Systolic function is reduced.TAPSE is 1.80 cm. Pacemaker lead present in the ventricle.    Left Atrium: Left atrium is severely dilated.    Right Atrium: Right atrium is severely dilated.    Aortic Valve: There is mild aortic regurgitation.    Mitral Valve: There is moderate to severe regurgitation.    Tricuspid Valve: There is mild to moderate regurgitation. There is moderate pulmonary hypertension. The estimated PA systolic pressure is at least 59 mmHg.    Pulmonic Valve: There is mild regurgitation.      Results for orders placed during the hospital encounter of 06/10/21    Echo    Interpretation Summary  · The left ventricle is mildly enlarged with mild concentric hypertrophy  · The estimated ejection fraction is 49%. Which is decreased  · Grade II left ventricular diastolic dysfunction.  · There is mild left ventricular global hypokinesis.  · Atrial fibrillation not observed.  · Normal right ventricular size with normal right ventricular systolic function.  · Moderate left atrial enlargement.  · Mild-to-moderate aortic regurgitation.  · There is mild aortic valve stenosis.  · Aortic valve area is 1.99 cm2; peak velocity is 1.41 m/s; mean gradient is 4 mmHg.  · Mild mitral regurgitation.  · Mild to moderate tricuspid regurgitation.  · Mild pulmonic regurgitation.  · Normal central venous pressure (3 mmHg).  · The estimated PA systolic pressure is 35 mmHg. Mild pulmonary hypertension  · Pacemaker lead is present      Results for orders placed during the hospital encounter of 07/24/20    Echo Color Flow Doppler? Yes; Bubble Contrast? No    Interpretation Summary  · Concentric left ventricular remodeling.  · Normal left ventricular systolic function. The estimated ejection fraction is 55%.  · Normal LV diastolic function.  · No  wall motion abnormalities.  · Normal right ventricular systolic function.  · Mild left atrial enlargement.  · Mild aortic regurgitation.  · The mitral valve is mildly sclerotic.  · Mild tricuspid regurgitation.  · Normal central venous pressure (3 mmHg).  · The estimated PA systolic pressure is 27 mmHg.      Results for orders placed during the hospital encounter of 12/08/17    2D echo with color flow doppler    Narrative  Date of Procedure: 12/08/2017        TEST DESCRIPTION  Technical Quality: This is a portable study performed at the patient's bedside. This is a technically adequate study.    General: A catheter is present in the right-sided cardiac chambers.    Aorta: The aortic root is normal in size, measuring 2.5 cm at sinotubular junction and 3.0 cm at Sinuses of Valsalva. The proximal ascending aorta is normal in size, measuring 3.0 cm across.    Left Atrium: The left atrial volume index is severely enlarged, measuring 55.08 cc/m2.    Left Ventricle: The left ventricle is normal in size, with an end-diastolic diameter of 4.3 cm, and an end-systolic diameter of 3.4 cm. LV wall thickness is normal, with the septum measuring 1.1 cm and the posterior wall measuring 0.9 cm across. Relative  wall thickness was normal at 0.42, and the LV mass index was 98.1 g/m2 consistent with normal left ventricular mass. There is global hypokinesis. Left ventricular systolic function appears low normal to mildly depressed. Visually estimated ejection  fraction is 50-55%. The LV Doppler derived stroke volume equals 56.0 ccs.    Diastolic indices: E wave velocity 0.8 m/s, E/A ratio 1.1,  msec., E/e' ratio(avg) 14. Pulmonary vein systolic/diastolic ratio is normal. There is pseudonormalization of mitral inflow pattern consistent with significant diastolic dysfunction.    Right Atrium: The right atrium is normal in size, measuring 4.3 cm in length and 3.8 cm in width in the apical view.    Right Ventricle: The right ventricle  is upper limit of normal measuring 4.1 cm at the base in the apical right ventricle-focused view. Global right ventricular systolic function appears normal. There is abnormal septal motion. Tricuspid annular plane  systolic excursion (TAPSE) is 1.8 cm. Tissue Doppler-derived tricuspid annular peak systolic velocity (S prime) is 9.6 cm/s. The estimated PA systolic pressure is 37 mmHg.    Aortic Valve:  The aortic valve is mildly sclerotic with normal leaflet mobility. The aortic valve is tri-leaflet in structure. Using a left ventricular outflow tract diameter of 2.0 cm, a left ventricular outflow tract velocity time integral of 18 cm,  and a peak instantaneous transvalvular velocity time integral of 26 cm, the calculated aortic valve area is 2.15 cm2(AVAi is 1.29 cm2/m2). Additionally, there is mild aortic regurgitation. There is aortic annular calcification.    Mitral Valve:  The mitral valve is normal in structure with normal leaflet mobility. There is mitral annular calcification.    Tricuspid Valve:  The tricuspid valve is normal in structure with normal leaflet mobility. There is mild to moderate tricuspid regurgitation.    Pulmonary Valve:  The pulmonic valve is normal in structure with normal leaflet mobility. There is trivial pulmonic regurgitation.    IVC: IVC is normal in size and collapses > 50% with a sniff, suggesting normal right atrial pressure of 3 mmHg.    Intracavitary: There is no evidence of pericardial effusion, intracavity mass, thrombi, or vegetation.        CONCLUSIONS  1 - Low normal to mildly depressed left ventricular systolic function (EF 50-55%) - in some views the basal and mid lateral wall appear hypokinetic, but endocardial definition is suboptimal for these walls.  2 - Right ventricle is upper limit of normal in size with normal systolic function.  3 - Impaired LV relaxation, elevated LAP (grade 2 diastolic dysfunction).  4 - Severe left atrial enlargement.  5 - Mild to moderate  tricuspid regurgitation.  6 - Mild aortic regurgitation.  7 - The estimated PA systolic pressure is 37 mmHg.            This document has been electronically  SIGNED BY: Chava Ponce MD On: 12/11/2017 10:02      CURRENT/PREVIOUS VISIT EKG  Results for orders placed or performed during the hospital encounter of 12/10/24   EKG 12-lead    Collection Time: 12/12/24 11:31 AM   Result Value Ref Range    QRS Duration 146 ms    [facility] QTC Calculation 520 ms    Narrative    Test Reason : R07.9,    Vent. Rate :  70 BPM     Atrial Rate :  70 BPM     P-R Int : 188 ms          QRS Dur : 146 ms      QT Int : 482 ms       P-R-T Axes :  78 -29 163 degrees    QTcB Int : 520 ms    Atrial-paced rhythm  Left bundle branch block  Abnormal ECG  When compared with ECG of 11-Dec-2024 01:30,  No significant change was found    Referred By: AAAREFERRAL SELF           Confirmed By:            ASSESSMENT/PLAN:     Active Hospital Problems    Diagnosis    *CHF (congestive heart failure)    Elevated troponin    FRANKLIN (acute kidney injury)    Type 2 diabetes mellitus, without long-term current use of insulin       ASSESSMENT & PLAN:     Abdominal pain  Newly reduced ejection fraction  Heart failure with reduced ejection fraction  CAD with history of CABG and stents      RECOMMENDATIONS:    Patient was given furosemide 40 mg x 1 dose in the IV yesterday in her renal function has improved.  Would give additional dose of furosemide 20 mg IV today and monitor I&O Strictly.  Trend labs.  Discussed with patient and  regarding the newly reduced ejection fraction which is a change from previous.  Recommend nuclear stress test to evaluate for reversible ischemia.  Patient has an extensive coronary artery disease history.  Keep NPO after midnight for stress test tomorrow.  Also maximize medical therapy given the reduced ejection fraction.  Patient was started on Entresto 24-26 mg p.o. b.i.d. and metoprolol succinate 12.5 mg p.o. daily.  Patient is on  the hypertensive side and may tolerate higher doses of Entresto.  Will follow.        Bridgett Vanessa NP  Date of Service: 12/12/2024  2:09 PM

## 2024-12-12 NOTE — ASSESSMENT & PLAN NOTE
New onset HFrEF  Echo shows EF 25-30%  Patient currently euvolemic   Added metoprolol and entresto   Consulted cardiology, ? Does she need inpatient ischemic work up, life vest?

## 2024-12-12 NOTE — PROGRESS NOTES
[facility] Medicine  Progress Note    Patient Name: Karma Chen  MRN: [id number]  Patient Class: IP- Inpatient   Admission Date: 12/10/2024  Length of Stay: 1 days  Attending Physician: Herman Joiner MD  Primary Care Provider: Elena Sullivan MD        Subjective     Principal Problem:CHF (congestive heart failure)        HPI:  89-year-old female with history of coronary artery disease status post 4 vessel CABG 2006 , and 6 stents, prior MI in 2011, diabetes, hypertension, GERD, fibromyalgia, hypercholesterolemia PRESENTS TO THE ER DUE TO UPPER ABDOMINAL PAIN THAT SHE REPORTS IT IS A SORENESS YESTERDAY AND TODAY.     She thought maybe it was her needing to have a BM but that didn't help.  Stool was darker she said.   She took her nitro and imdur and still didn't help the pain or pressure       So she came to ER    Non smoker  Non drinker      In our ER  CXR  Cardiac monitoring leads overlie the chest. There is a left chest wall cardiac pacing device present. There is postoperative change of prior median sternotomy. Cardiac silhouette is enlarged. There is aortic atherosclerosis. Lungs are symmetrically expanded. There is increased interstitial attenuation bilaterally which may represent interstitial edema in the appropriate clinical setting. Possible trace pleural fluid noted at the lung bases. No evidence of pneumothorax. Osseous structures demonstrate degenerative changes. Surgical clips project over the upper abdomen.       Labs mostly normal.   Mild FRANKLIN present       BNP was high at 2,000     Mild troponin elevation also     But EKG Ok        She is being admitted for chest pain , CHF , troponin elevation , franklin     Overview/Hospital Course:  Patient is a 89 year old female with history of GERD, DM, HTN, HLD, CAD who present with abdominal pain. Patient had diarrhea two days prior to presentation which improved after peptobismol. Patient was found to be in fluid overload with  elevated BNP. She did not have echo on file. Patient was diuresed with IV lasix, Her Cr improved from 1.5 >> 1.3. Patient is currently euvolemic and diuretics discontinued. Echo shows EF 25-30%. Metoprolol and entresto was started and cardiology was consulted.     Interval History: Patient reports no abdominal pain, diarrhea, chest pain or SOB.     Review of Systems  Objective:     Vital Signs (Most Recent):  Temp: 98 °F (36.7 °C) (12/12/24 1122)  Pulse: 71 (12/12/24 1122)  Resp: 16 (12/12/24 1122)  BP: (!) 153/81 (105) (12/12/24 1122)  SpO2: 96 % (12/12/24 1122) Vital Signs (24h Range):  Temp:  [97.1 °F (36.2 °C)-98.8 °F (37.1 °C)] 98 °F (36.7 °C)  Pulse:  [71-75] 71  Resp:  [14-18] 16  SpO2:  [92 %-97 %] 96 %  BP: (136-153)/(77-82) 153/81     Weight: 60.4 kg (133 lb 2.5 oz)  Body mass index is 26.01 kg/m².    Intake/Output Summary (Last 24 hours) at 12/12/2024 1241  Last data filed at 12/11/2024 2130  Gross per 24 hour   Intake 420 ml   Output --   Net 420 ml         Physical Exam  Vitals and nursing note reviewed.   HENT:      Head: Normocephalic.      Nose: Nose normal.      Mouth/Throat:      Mouth: Mucous membranes are moist.   Eyes:      Extraocular Movements: Extraocular movements intact.      Pupils: Pupils are equal, round, and reactive to light.   Cardiovascular:      Rate and Rhythm: Normal rate and regular rhythm.      Pulses: Normal pulses.      Heart sounds: Normal heart sounds. Murmur heard   Pulmonary:      Effort: Pulmonary effort is normal.      Breath sounds: Normal breath sounds.   Abdominal:      General: Abdomen is flat. Bowel sounds are normal.     Palpations: Abdomen is soft.   Musculoskeletal:         General: Normal range of motion.      Cervical back: Normal range of motion.   Skin:     General: Skin is warm.      Capillary Refill: Capillary refill takes less than 2 seconds.   Neurological:      General: No focal deficit present.      Mental Status: She is alert.   Psychiatric:          Mood and Affect: Mood normal.     Significant Labs: All pertinent labs within the past 24 hours have been reviewed.  CBC:   Recent Labs   Lab 12/11/24  0038 12/12/24  0451   WBC 9.90 12.75*   HGB 12.4 13.6   HCT 39.0 42.3    293     CMP:   Recent Labs   Lab 12/11/24  0038 12/12/24  0451    138   K 3.8 4.0    100   CO2 26 28   * 114*   BUN 22 25*   CREATININE 1.5* 1.3   CALCIUM 9.4 9.3   PROT 7.6 7.6   ALBUMIN 4.2 4.2   BILITOT 0.6 1.2*   ALKPHOS 76 77   AST 22 19   ALT 14 11   ANIONGAP 10 10       Significant Imaging: I have reviewed all pertinent imaging results/findings within the past 24 hours.    Assessment and Plan     * CHF (congestive heart failure)  New onset HFrEF  Echo shows EF 25-30%  Patient currently euvolemic   Added metoprolol and entresto   Consulted cardiology, ? Does she need inpatient ischemic work up, life vest?     FRANKLIN (acute kidney injury)  ? Cardiorenal syndrome   Improved with IV lasix   Monitor     Elevated troponin  Troponin stable   Patient does not have any chest pain       Type 2 diabetes mellitus, without long-term current use of insulin  Sliding scale         VTE Risk Mitigation (From admission, onward)           Ordered     enoxaparin injection 30 mg  Daily         12/11/24 0643     Place sequential compression device  Until discontinued         12/11/24 0643     IP VTE HIGH RISK PATIENT  Once         12/11/24 0643     Place sequential compression device  Until discontinued         12/11/24 0643                    Discharge Planning   JANNA: 12/13/2024     Code Status: Full Code   Medical Readiness for Discharge Date:   Discharge Plan A: Home with family                        Herman Joiner MD  Department of Hospital Medicine   [facility]

## 2024-12-12 NOTE — HOSPITAL COURSE
Patient is a 89 year old female with history of GERD, DM, HTN, HLD, CAD who present with abdominal pain. Patient had diarrhea two days prior to presentation which improved after peptobismol. Patient was found to be in fluid overload with elevated BNP. She did not have echo on file. Patient was diuresed with IV lasix, Her Cr improved from 1.5 >> 1.3. Patient is currently euvolemic and diuretics discontinued. Echo shows EF 25-30%. Metoprolol and entresto was started and cardiology was consulted. Patient underwent stress test which was negative for reversible ischemia. Patient was cleared to discharge from cardiology standpoint. No life vest was recommended. Jardiance was added.

## 2024-12-12 NOTE — PLAN OF CARE
Problem: Adult Inpatient Plan of Care  Goal: Plan of Care Review  Outcome: Progressing  Goal: Patient-Specific Goal (Individualized)  Outcome: Progressing  Goal: Absence of Hospital-Acquired Illness or Injury  Outcome: Progressing  Goal: Optimal Comfort and Wellbeing  Outcome: Progressing  Goal: Readiness for Transition of Care  Outcome: Progressing     Problem: Diabetes Comorbidity  Goal: Blood Glucose Level Within Targeted Range  Outcome: Progressing     Problem: Acute Kidney Injury/Impairment  Goal: Fluid and Electrolyte Balance  Outcome: Progressing  Goal: Improved Oral Intake  Outcome: Progressing  Goal: Effective Renal Function  Outcome: Progressing     Problem: Fall Injury Risk  Goal: Absence of Fall and Fall-Related Injury  Outcome: Progressing     Problem: Wound  Goal: Optimal Coping  Outcome: Progressing  Goal: Optimal Functional Ability  Outcome: Progressing  Goal: Absence of Infection Signs and Symptoms  Outcome: Progressing  Goal: Improved Oral Intake  Outcome: Progressing  Goal: Optimal Pain Control and Function  Outcome: Progressing  Goal: Skin Health and Integrity  Outcome: Progressing  Goal: Optimal Wound Healing  Outcome: Progressing

## 2024-12-12 NOTE — PLAN OF CARE
Problem: Adult Inpatient Plan of Care  Goal: Plan of Care Review  Outcome: Progressing     Problem: Fall Injury Risk  Goal: Absence of Fall and Fall-Related Injury  Outcome: Progressing     Problem: Wound  Goal: Absence of Infection Signs and Symptoms  Outcome: Progressing

## 2024-12-13 ENCOUNTER — CLINICAL SUPPORT (OUTPATIENT)
Dept: CARDIOLOGY | Facility: HOSPITAL | Age: 89
End: 2024-12-13
Attending: EMERGENCY MEDICINE
Payer: MEDICARE

## 2024-12-13 ENCOUNTER — TELEPHONE (OUTPATIENT)
Dept: CARDIOLOGY | Facility: CLINIC | Age: 89
End: 2024-12-13
Payer: MEDICARE

## 2024-12-13 VITALS
HEART RATE: 74 BPM | RESPIRATION RATE: 19 BRPM | DIASTOLIC BLOOD PRESSURE: 78 MMHG | HEIGHT: 60 IN | WEIGHT: 141.56 LBS | TEMPERATURE: 97 F | BODY MASS INDEX: 27.79 KG/M2 | OXYGEN SATURATION: 97 % | SYSTOLIC BLOOD PRESSURE: 139 MMHG

## 2024-12-13 LAB
ALBUMIN SERPL BCP-MCNC: 4.1 G/DL (ref 3.5–5.2)
ALP SERPL-CCNC: 77 U/L (ref 55–135)
ALT SERPL W/O P-5'-P-CCNC: 10 U/L (ref 10–44)
ANION GAP SERPL CALC-SCNC: 11 MMOL/L (ref 8–16)
AST SERPL-CCNC: 18 U/L (ref 10–40)
BASOPHILS # BLD AUTO: 0.08 K/UL (ref 0–0.2)
BASOPHILS NFR BLD: 0.6 % (ref 0–1.9)
BILIRUB SERPL-MCNC: 0.8 MG/DL (ref 0.1–1)
BUN SERPL-MCNC: 32 MG/DL (ref 8–23)
CALCIUM SERPL-MCNC: 9.2 MG/DL (ref 8.7–10.5)
CHLORIDE SERPL-SCNC: 100 MMOL/L (ref 95–110)
CO2 SERPL-SCNC: 27 MMOL/L (ref 23–29)
CREAT SERPL-MCNC: 1.3 MG/DL (ref 0.5–1.4)
CV PHARM DOSE: 0.4 MG
CV STRESS BASE HR: 77 BPM
DIASTOLIC BLOOD PRESSURE: 63 MMHG
DIFFERENTIAL METHOD BLD: ABNORMAL
EOSINOPHIL # BLD AUTO: 0.2 K/UL (ref 0–0.5)
EOSINOPHIL NFR BLD: 1.2 % (ref 0–8)
ERYTHROCYTE [DISTWIDTH] IN BLOOD BY AUTOMATED COUNT: 14.1 % (ref 11.5–14.5)
EST. GFR  (NO RACE VARIABLE): 39.8 ML/MIN/1.73 M^2
GLUCOSE SERPL-MCNC: 130 MG/DL (ref 70–110)
HCT VFR BLD AUTO: 48.6 % (ref 37–48.5)
HGB BLD-MCNC: 15.8 G/DL (ref 12–16)
IMM GRANULOCYTES # BLD AUTO: 0.05 K/UL (ref 0–0.04)
IMM GRANULOCYTES NFR BLD AUTO: 0.4 % (ref 0–0.5)
LYMPHOCYTES # BLD AUTO: 2.8 K/UL (ref 1–4.8)
LYMPHOCYTES NFR BLD: 21.7 % (ref 18–48)
MAGNESIUM SERPL-MCNC: 2.1 MG/DL (ref 1.6–2.6)
MCH RBC QN AUTO: 28.7 PG (ref 27–31)
MCHC RBC AUTO-ENTMCNC: 32.5 G/DL (ref 32–36)
MCV RBC AUTO: 88 FL (ref 82–98)
MONOCYTES # BLD AUTO: 1.6 K/UL (ref 0.3–1)
MONOCYTES NFR BLD: 12.2 % (ref 4–15)
NEUTROPHILS # BLD AUTO: 8.3 K/UL (ref 1.8–7.7)
NEUTROPHILS NFR BLD: 63.9 % (ref 38–73)
NRBC BLD-RTO: 0 /100 WBC
OHS CV CPX 1 MINUTE RECOVERY HEART RATE: 77 BPM
OHS CV CPX 85 PERCENT MAX PREDICTED HEART RATE MALE: 113
OHS CV CPX MAX PREDICTED HEART RATE: 133
OHS CV CPX PATIENT IS FEMALE: 1
OHS CV CPX PATIENT IS MALE: 0
OHS CV CPX PEAK DIASTOLIC BLOOD PRESSURE: 74 MMHG
OHS CV CPX PEAK HEAR RATE: 91 BPM
OHS CV CPX PEAK RATE PRESSURE PRODUCT: NORMAL
OHS CV CPX PEAK SYSTOLIC BLOOD PRESSURE: 136 MMHG
OHS CV CPX PERCENT MAX PREDICTED HEART RATE ACHIEVED: 70
OHS CV CPX RATE PRESSURE PRODUCT PRESENTING: 8624
PLATELET # BLD AUTO: 342 K/UL (ref 150–450)
PMV BLD AUTO: 10.3 FL (ref 9.2–12.9)
POTASSIUM SERPL-SCNC: 4.2 MMOL/L (ref 3.5–5.1)
PROT SERPL-MCNC: 7.8 G/DL (ref 6–8.4)
RBC # BLD AUTO: 5.5 M/UL (ref 4–5.4)
SODIUM SERPL-SCNC: 138 MMOL/L (ref 136–145)
SYSTOLIC BLOOD PRESSURE: 112 MMHG
WBC # BLD AUTO: 13.03 K/UL (ref 3.9–12.7)

## 2024-12-13 PROCEDURE — 93017 CV STRESS TEST TRACING ONLY: CPT

## 2024-12-13 PROCEDURE — 25000003 PHARM REV CODE 250: Performed by: INTERNAL MEDICINE

## 2024-12-13 PROCEDURE — 85025 COMPLETE CBC W/AUTO DIFF WBC: CPT | Performed by: INTERNAL MEDICINE

## 2024-12-13 PROCEDURE — 93018 CV STRESS TEST I&R ONLY: CPT | Mod: ,,, | Performed by: GENERAL PRACTICE

## 2024-12-13 PROCEDURE — 93016 CV STRESS TEST SUPVJ ONLY: CPT | Mod: ,,, | Performed by: GENERAL PRACTICE

## 2024-12-13 PROCEDURE — 80053 COMPREHEN METABOLIC PANEL: CPT | Performed by: INTERNAL MEDICINE

## 2024-12-13 PROCEDURE — 83735 ASSAY OF MAGNESIUM: CPT | Performed by: INTERNAL MEDICINE

## 2024-12-13 PROCEDURE — A9502 TC99M TETROFOSMIN: HCPCS | Performed by: INTERNAL MEDICINE

## 2024-12-13 PROCEDURE — 63600175 PHARM REV CODE 636 W HCPCS: Performed by: INTERNAL MEDICINE

## 2024-12-13 PROCEDURE — 36415 COLL VENOUS BLD VENIPUNCTURE: CPT | Performed by: INTERNAL MEDICINE

## 2024-12-13 PROCEDURE — 99233 SBSQ HOSP IP/OBS HIGH 50: CPT | Mod: ,,, | Performed by: INTERNAL MEDICINE

## 2024-12-13 RX ORDER — SACUBITRIL AND VALSARTAN 24; 26 MG/1; MG/1
1 TABLET, FILM COATED ORAL 2 TIMES DAILY
Qty: 60 TABLET | Refills: 0 | Status: SHIPPED | OUTPATIENT
Start: 2024-12-13 | End: 2024-12-13 | Stop reason: HOSPADM

## 2024-12-13 RX ORDER — METOPROLOL SUCCINATE 25 MG/1
12.5 TABLET, EXTENDED RELEASE ORAL DAILY
Qty: 15 TABLET | Refills: 0 | Status: SHIPPED | OUTPATIENT
Start: 2024-12-14 | End: 2025-01-13

## 2024-12-13 RX ORDER — LOSARTAN POTASSIUM 25 MG/1
25 TABLET ORAL DAILY
Qty: 30 TABLET | Refills: 0 | Status: SHIPPED | OUTPATIENT
Start: 2024-12-13 | End: 2024-12-13 | Stop reason: HOSPADM

## 2024-12-13 RX ORDER — REGADENOSON 0.08 MG/ML
0.4 INJECTION, SOLUTION INTRAVENOUS
Status: COMPLETED | OUTPATIENT
Start: 2024-12-13 | End: 2024-12-13

## 2024-12-13 RX ORDER — FUROSEMIDE 20 MG/1
20 TABLET ORAL DAILY
Qty: 30 TABLET | Refills: 0 | Status: ON HOLD | OUTPATIENT
Start: 2024-12-14 | End: 2024-12-19

## 2024-12-13 RX ORDER — FUROSEMIDE 20 MG/1
20 TABLET ORAL DAILY
Status: DISCONTINUED | OUTPATIENT
Start: 2024-12-14 | End: 2024-12-13 | Stop reason: HOSPADM

## 2024-12-13 RX ADMIN — REGADENOSON 0.4 MG: 0.08 INJECTION, SOLUTION INTRAVENOUS at 09:12

## 2024-12-13 RX ADMIN — Medication 400 MG: at 11:12

## 2024-12-13 RX ADMIN — SACUBITRIL AND VALSARTAN 1 TABLET: 24; 26 TABLET, FILM COATED ORAL at 11:12

## 2024-12-13 RX ADMIN — TETROFOSMIN 10.8 MILLICURIE: 0.23 INJECTION, POWDER, LYOPHILIZED, FOR SOLUTION INTRAVENOUS at 07:12

## 2024-12-13 RX ADMIN — FUROSEMIDE 40 MG: 10 INJECTION, SOLUTION INTRAMUSCULAR; INTRAVENOUS at 11:12

## 2024-12-13 RX ADMIN — METOPROLOL SUCCINATE 12.5 MG: 25 TABLET, EXTENDED RELEASE ORAL at 11:12

## 2024-12-13 RX ADMIN — ASPIRIN 81 MG: 81 TABLET, COATED ORAL at 11:12

## 2024-12-13 RX ADMIN — POTASSIUM CHLORIDE 20 MEQ: 1500 TABLET, EXTENDED RELEASE ORAL at 11:12

## 2024-12-13 RX ADMIN — TETROFOSMIN 26.6 MILLICURIE: 0.23 INJECTION, POWDER, LYOPHILIZED, FOR SOLUTION INTRAVENOUS at 09:12

## 2024-12-13 RX ADMIN — SENNOSIDES AND DOCUSATE SODIUM 2 TABLET: 8.6; 5 TABLET ORAL at 11:12

## 2024-12-13 NOTE — TELEPHONE ENCOUNTER
----- Message from  Lori sent at 12/13/2024  4:55 PM CST -----  Regarding: Hospital Follow Up  The patient is discharging home today from [facility]. Please contact the patient to schedule a follow up appointment within 2 weeks.

## 2024-12-13 NOTE — PROGRESS NOTES
[facility]  Department of Cardiology  Progress Note      PATIENT NAME: Karma Chen    MRN: [id number]  TODAY'S DATE: 12/13/2024  ADMIT DATE: 12/10/2024                          CONSULT REQUESTED BY: Herman Joiner MD    SUBJECTIVE     PRINCIPAL PROBLEM: CHF (congestive heart failure)    12/13/2024  Patient seen resting in bed with no acute distress noted.  She underwent nuclear stress test which was negative for reversible ischemia.  Denies any chest discomfort or shortness of breath.  Seen lying near flat in bed.    HPI:    Patient is an 89-year-old female who presented to the emergency room with complaints of upper abdominal pain which felt like a band around her waist.  Patient states that she had had multiple BMs in the past couple of days followed by some diarrhea.  Patient found to have some possible interstitial edema on chest x-ray and had an elevated BNP.  Patient also had a mild nondiagnostic troponin elevation.  She does have a known history of CAD with CABG in the past as well as stents since then.  She also has history of diabetes, hypertension, hyperlipidemia.  She was seen sitting up in chair with no acute distress noted denies any chest discomfort or abdominal pain at the present time.      REASON FOR CONSULT:  From Hospitalist H&P: 89-year-old female with history of coronary artery disease status post 4 vessel CABG 2006 , and 6 stents, prior MI in 2011, diabetes, hypertension, GERD, fibromyalgia, hypercholesterolemia PRESENTS TO THE ER DUE TO UPPER ABDOMINAL PAIN THAT SHE REPORTS IT IS A SORENESS YESTERDAY AND TODAY.      She thought maybe it was her needing to have a BM but that didn't help.  Stool was darker she said.   She took her nitro and imdur and still didn't help the pain or pressure         So she came to ER     Non smoker  Non drinker        In our ER  CXR  Cardiac monitoring leads overlie the chest. There is a left chest wall cardiac pacing device present. There is  postoperative change of prior median sternotomy. Cardiac silhouette is enlarged. There is aortic atherosclerosis. Lungs are symmetrically expanded. There is increased interstitial attenuation bilaterally which may represent interstitial edema in the appropriate clinical setting. Possible trace pleural fluid noted at the lung bases. No evidence of pneumothorax. Osseous structures demonstrate degenerative changes. Surgical clips project over the upper abdomen.         Labs mostly normal.   Mild FRANKLIN present        BNP was high at 2,000      Mild troponin elevation also      But EKG Ok           She is being admitted for chest pain , CHF , troponin elevation , franklin      Review of patient's allergies indicates:   Allergen Reactions    Diclofenac-misoprostol      Other reaction(s): Not available    Doxycycline     Effexor [venlafaxine]     Estradiol     Flagyl [metronidazole]     Fluconazole     Gabapentin     Iodine     Levaquin [levofloxacin]     Nexium [esomeprazole magnesium]     Nexletol [bempedoic acid] Other (See Comments)    Penicillins     Red yeast rice (monascus purpureus)     Shellfish containing products     Statins-hmg-coa reductase inhibitors     Sulfa (sulfonamide antibiotics)     Tea tree oil     Tegaserod      ZOLNORM    Tegaserod hydrogen maleate     Trazodone     Yeast, dried     Adhesive Rash     Band-aids       Past Medical History:   Diagnosis Date    Coronary artery disease     Dermatitis     Dermatitis 12/8/2017    Diabetes mellitus     Diabetes mellitus type II     Leah Barr virus infection     Fibromyalgia     GERD (gastroesophageal reflux disease)     Hypertension     MI (myocardial infarction) 09/23/2011    Pure hypercholesterolemia 2/26/2020     Past Surgical History:   Procedure Laterality Date    adenoids      ANGIOPLASTY  1987    APPENDECTOMY      CARDIAC PACEMAKER PLACEMENT  01/12/2017    CATARACT EXTRACTION, BILATERAL Bilateral 9/2/15, 3/17/15    right eye-9/2/15, left eye-3/17/15     CHOLECYSTECTOMY  1987    CORONARY ARTERY BYPASS GRAFT  2006    quadruple    coronary stents  1999    x2    CYST REMOVAL  04/25/2017    on back    HYSTERECTOMY  1966    OVARY SURGERY  1948    Ovary burst & was repaired    RHIZOTOMY  09/14/2018    TONSILLECTOMY  1940     Social History     Tobacco Use    Smoking status: Never    Smokeless tobacco: Never   Substance Use Topics    Alcohol use: No    Drug use: No        REVIEW OF SYSTEMS  Per HPI    OBJECTIVE     VITAL SIGNS (Most Recent)  Temp: 97.3 °F (36.3 °C) (12/13/24 1101)  Pulse: 74 (12/13/24 1101)  Resp: 19 (12/13/24 1101)  BP: 139/78 (12/13/24 1101)  SpO2: 97 % (12/13/24 1101)    VENTILATION STATUS  Resp: 19 (12/13/24 1101)  SpO2: 97 % (12/13/24 1101)           I & O (Last 24H):  Intake/Output Summary (Last 24 hours) at 12/13/2024 1559  Last data filed at 12/13/2024 1000  Gross per 24 hour   Intake 360 ml   Output --   Net 360 ml       WEIGHTS  Wt Readings from Last 1 Encounters:   12/13/24 0515 64.2 kg (141 lb 8.6 oz)   12/12/24 0615 60.4 kg (133 lb 2.5 oz)   12/11/24 1030 61.5 kg (135 lb 9.3 oz)   12/11/24 0832 61.5 kg (135 lb 9.3 oz)   12/10/24 2310 62.6 kg (138 lb)       PHYSICAL EXAM  CONSTITUTIONAL: No fever, no chills  HEENT: Normocephalic, atraumatic,pupils reactive to light                 NECK:  No JVD no carotid bruit  CVS: S1S2+, RRR  LUNGS: Clear  ABDOMEN: Soft, NT, BS+  EXTREMITIES: No cyanosis, edema  : No mccarthy catheter  NEURO: AAO X 3  PSY: Normal affect      HOME MEDICATIONS:  No current facility-administered medications on file prior to encounter.     Current Outpatient Medications on File Prior to Encounter   Medication Sig Dispense Refill    acetaminophen (TYLENOL) 650 MG TbSR Take 650 mg by mouth every 6 (six) hours as needed for Pain.      ALPRAZolam (XANAX) 0.25 MG tablet TAKE 1 TABLET BY MOUTH EVERY DAY AS NEEDED FOR ANXIETY (Patient taking differently: Take 0.25 mg by mouth daily as needed for Anxiety. TAKE 1 TABLET BY MOUTH EVERY DAY  AS NEEDED FOR ANXIETY) 90 tablet 0    apixaban (ELIQUIS) 2.5 mg Tab Take 1 tablet (2.5 mg total) by mouth 2 (two) times daily. 180 tablet 3    aspirin (ECOTRIN) 81 MG EC tablet Take 1 tablet (81 mg total) by mouth once daily. (Patient taking differently: Take 81 mg by mouth every evening.)  0    coenzyme Q10 100 mg capsule Take 1 capsule (100 mg total) by mouth 2 (two) times daily. (Patient taking differently: Take 100 mg by mouth once daily.) 60 capsule 11    cyanocobalamin 1,000 mcg/mL injection 2cc I'm every other week (Patient taking differently: Inject 1,000 mcg into the muscle every 14 (fourteen) days. 2cc I'm every other week) 10 mL 1    fexofenadine (ALLEGRA) 180 MG tablet Take 180 mg by mouth once daily.      fluocinonide-emollient (FLUOCINONIDE-E) 0.05 % Crea Apply topically 2 (two) times daily. (Patient taking differently: Apply 1 application  topically 2 (two) times daily.) 60 g 1    fluticasone propionate (FLONASE) 50 mcg/actuation nasal spray 1 spray (50 mcg total) by Each Nostril route once daily. 9.9 mL 1    isosorbide mononitrate (IMDUR) 120 MG 24 hr tablet TAKE 1 TABLET (120 MG TOTAL) BY MOUTH ONCE DAILY. DO NOT CRUSH OR CHEW SWALLOW WHOLE. (Patient taking differently: Take 120 mg by mouth once daily. TAKE 1 TABLET (120 MG TOTAL) BY MOUTH ONCE DAILY. DO NOT CRUSH OR CHEW SWALLOW WHOLE.) 90 tablet 3    levothyroxine (SYNTHROID) 88 MCG tablet Take 1 tablet (88 mcg total) by mouth once daily. 90 tablet 1    LIDOcaine (LIDODERM) 5 % Place 1 patch onto the skin once daily. Remove & Discard patch within 12 hours or as directed by MD 30 patch 1    MAGNESIUM ORAL Take 100 mg by mouth once daily.      montelukast (SINGULAIR) 10 mg tablet TAKE 1 TABLET BY MOUTH EVERY DAY IN THE EVENING (Patient taking differently: Take 10 mg by mouth every evening.) 90 tablet 1    multivitamin (THERAGRAN) tablet Take 1 tablet by mouth 2 (two) times a day.       nitroGLYCERIN (NITROSTAT) 0.4 MG SL tablet Place 1 tablet (0.4  mg total) under the tongue every 5 (five) minutes as needed for Chest pain. 30 tablet 1    nystatin-triamcinolone (MYCOLOG II) cream Apply 1 g topically 4 (four) times daily.      omeprazole (PRILOSEC) 20 MG capsule Take 1 capsule (20 mg total) by mouth once daily. 90 capsule 3    tiZANidine (ZANAFLEX) 4 MG tablet Take 1 tablet (4 mg total) by mouth once daily. (Patient taking differently: Take 4 mg by mouth every evening.) 90 tablet 0    traMADoL (ULTRAM) 50 mg tablet Take 1 tablet (50 mg total) by mouth 2 (two) times daily as needed (pain). 14 tablet 0    TURMERIC ORAL Take 200 mg by mouth once daily.      UNABLE TO FIND Take 1 capsule by mouth once daily. Joint health      UNABLE TO FIND Take 1 capsule by mouth 2 (two) times a day. Cardio platinum      loratadine (CLARITIN) 10 mg tablet Take 10 mg by mouth once daily. (Patient not taking: Reported on 12/11/2024)      metoprolol succinate (TOPROL-XL) 25 MG 24 hr tablet TAKE 1 TABLET BY MOUTH EVERY DAY (Patient not taking: Reported on 12/11/2024) 90 tablet 3       SCHEDULED MEDS:   aspirin  81 mg Oral Daily    enoxparin  30 mg Subcutaneous Daily    magnesium oxide  400 mg Oral BID    metoprolol succinate  12.5 mg Oral Daily    potassium chloride  20 mEq Oral BID    sacubitriL-valsartan  1 tablet Oral BID    senna-docusate 8.6-50 mg  2 tablet Oral BID       CONTINUOUS INFUSIONS:    PRN MEDS:  Current Facility-Administered Medications:     acetaminophen, 650 mg, Oral, Q8H PRN    acetaminophen, 650 mg, Oral, Q4H PRN    aluminum-magnesium hydroxide-simethicone, 30 mL, Oral, QID PRN    dextrose 50%, 12.5 g, Intravenous, PRN    dextrose 50%, 25 g, Intravenous, PRN    glucagon (human recombinant), 1 mg, Intramuscular, PRN    glucose, 16 g, Oral, PRN    glucose, 24 g, Oral, PRN    magnesium oxide, 800 mg, Oral, PRN    magnesium oxide, 800 mg, Oral, PRN    melatonin, 6 mg, Oral, Nightly PRN    naloxone, 0.02 mg, Intravenous, PRN    ondansetron, 4 mg, Intravenous, Q6H PRN     "potassium bicarbonate, 35 mEq, Oral, PRN    potassium bicarbonate, 50 mEq, Oral, PRN    potassium bicarbonate, 60 mEq, Oral, PRN    potassium, sodium phosphates, 2 packet, Oral, PRN    potassium, sodium phosphates, 2 packet, Oral, PRN    potassium, sodium phosphates, 2 packet, Oral, PRN    sodium chloride 0.9%, 10 mL, Intravenous, PRN    sodium chloride 0.9%, 3 mL, Intravenous, Q12H PRN    LABS AND DIAGNOSTICS     CBC LAST 3 DAYS  Recent Labs   Lab 12/11/24  0038 12/12/24 0451 12/13/24  0453   WBC 9.90 12.75* 13.03*   RBC 4.35 4.76 5.50*   HGB 12.4 13.6 15.8   HCT 39.0 42.3 48.6*   MCV 90 89 88   MCH 28.5 28.6 28.7   MCHC 31.8* 32.2 32.5   RDW 14.0 14.1 14.1    293 342   MPV 10.1 10.3 10.3   GRAN 64.2  6.4 64.3  8.2* 63.9  8.3*   LYMPH 22.3  2.2 21.3  2.7 21.7  2.8   MONO 11.1  1.1* 12.5  1.6* 12.2  1.6*   BASO 0.05 0.06 0.08   NRBC 0 0 0       COAGULATION LAST 3 DAYS  No results for input(s): "LABPT", "INR", "APTT" in the last 168 hours.    CHEMISTRY LAST 3 DAYS  Recent Labs   Lab 12/11/24 0038 12/12/24 0451 12/13/24 0453    138 138   K 3.8 4.0 4.2    100 100   CO2 26 28 27   ANIONGAP 10 10 11   BUN 22 25* 32*   CREATININE 1.5* 1.3 1.3   * 114* 130*   CALCIUM 9.4 9.3 9.2   MG 1.8 1.9 2.1   ALBUMIN 4.2 4.2 4.1   PROT 7.6 7.6 7.8   ALKPHOS 76 77 77   ALT 14 11 10   AST 22 19 18   BILITOT 0.6 1.2* 0.8       CARDIAC PROFILE LAST 3 DAYS  Recent Labs   Lab 12/11/24 0038 12/11/24  0155   BNP 2,493*  --    TROPONINIHS 27.6* 32.8*       ENDOCRINE LAST 3 DAYS  No results for input(s): "TSH", "PROCAL" in the last 168 hours.    LAST ARTERIAL BLOOD GAS  ABG  No results for input(s): "PH", "PO2", "PCO2", "HCO3", "BE" in the last 168 hours.    LAST 7 DAYS MICROBIOLOGY   Microbiology Results (last 7 days)       ** No results found for the last 168 hours. **            MOST RECENT IMAGING  NM Myocardial Perfusion Spect Multi Pharmacologic  Narrative: EXAMINATION:  NM MYOCARDIAL PERFUSION " SPECT MULTI PHARM    CLINICAL HISTORY:  Chest pain/anginal equiv, high CAD risk, treadmill candidate; Chest pain, unspecified    TECHNIQUE:  Radiopharmaceutical: 10.8 mCi Technetium 99m Myoview utilized for rest imaging. 26.6 mCi Technetium 99m Myoview utilized for stress imaging.    0.4 mg Lexiscan administered for pharmacologic stress.    COMPARISON:  None    FINDINGS:  Tomographic images reveal symmetric radiopharmaceutical distribution throughout the left ventricular myocardium on stress and rest images. No reversible perfusion defect to suggest myocardial ischemia.    Gated SPECT images show global left ventricular hypokinesis calculated left ventricular ejection fraction is 31 %.  Impression: 1. No scintigraphic evidence of left ventricular myocardial ischemia.    2. Global left ventricular hypokinesis.    3. Calculated left ventricular ejection fraction of 31 %.    Electronically signed by: Dany Bills  Date:    12/13/2024  Time:    10:44  Nuclear Stress Test    The study's ECG is uninterpretable due to left bundle branch block.    The patient reported no chest pain during the stress test.    There were no arrhythmias during stress.    The nuclear portion of this study will be reported separately.      ECHOCARDIOGRAM RESULTS (last 5)  Results for orders placed during the hospital encounter of 12/10/24    Echo    Interpretation Summary    Left Ventricle: The left ventricle is normal in size. Normal wall thickness. Normal wall motion. Septal motion is consistent with pacing. There is severely reduced systolic function with a visually estimated ejection fraction of 25 - 30%. There is normal diastolic function. E/A ratio is 1.61.    Right Ventricle: Right ventricle was not assessed. Right ventricular enlargement. Systolic function is reduced.TAPSE is 1.80 cm. Pacemaker lead present in the ventricle.    Left Atrium: Left atrium is severely dilated.    Right Atrium: Right atrium is severely dilated.    Aortic  Valve: There is mild aortic regurgitation.    Mitral Valve: There is moderate to severe regurgitation.    Tricuspid Valve: There is mild to moderate regurgitation. There is moderate pulmonary hypertension. The estimated PA systolic pressure is at least 59 mmHg.    Pulmonic Valve: There is mild regurgitation.      Results for orders placed during the hospital encounter of 06/10/21    Echo    Interpretation Summary  · The left ventricle is mildly enlarged with mild concentric hypertrophy  · The estimated ejection fraction is 49%. Which is decreased  · Grade II left ventricular diastolic dysfunction.  · There is mild left ventricular global hypokinesis.  · Atrial fibrillation not observed.  · Normal right ventricular size with normal right ventricular systolic function.  · Moderate left atrial enlargement.  · Mild-to-moderate aortic regurgitation.  · There is mild aortic valve stenosis.  · Aortic valve area is 1.99 cm2; peak velocity is 1.41 m/s; mean gradient is 4 mmHg.  · Mild mitral regurgitation.  · Mild to moderate tricuspid regurgitation.  · Mild pulmonic regurgitation.  · Normal central venous pressure (3 mmHg).  · The estimated PA systolic pressure is 35 mmHg. Mild pulmonary hypertension  · Pacemaker lead is present      Results for orders placed during the hospital encounter of 07/24/20    Echo Color Flow Doppler? Yes; Bubble Contrast? No    Interpretation Summary  · Concentric left ventricular remodeling.  · Normal left ventricular systolic function. The estimated ejection fraction is 55%.  · Normal LV diastolic function.  · No wall motion abnormalities.  · Normal right ventricular systolic function.  · Mild left atrial enlargement.  · Mild aortic regurgitation.  · The mitral valve is mildly sclerotic.  · Mild tricuspid regurgitation.  · Normal central venous pressure (3 mmHg).  · The estimated PA systolic pressure is 27 mmHg.      Results for orders placed during the hospital encounter of 12/08/17    2D echo  with color flow doppler    Narrative  Date of Procedure: 12/08/2017        TEST DESCRIPTION  Technical Quality: This is a portable study performed at the patient's bedside. This is a technically adequate study.    General: A catheter is present in the right-sided cardiac chambers.    Aorta: The aortic root is normal in size, measuring 2.5 cm at sinotubular junction and 3.0 cm at Sinuses of Valsalva. The proximal ascending aorta is normal in size, measuring 3.0 cm across.    Left Atrium: The left atrial volume index is severely enlarged, measuring 55.08 cc/m2.    Left Ventricle: The left ventricle is normal in size, with an end-diastolic diameter of 4.3 cm, and an end-systolic diameter of 3.4 cm. LV wall thickness is normal, with the septum measuring 1.1 cm and the posterior wall measuring 0.9 cm across. Relative  wall thickness was normal at 0.42, and the LV mass index was 98.1 g/m2 consistent with normal left ventricular mass. There is global hypokinesis. Left ventricular systolic function appears low normal to mildly depressed. Visually estimated ejection  fraction is 50-55%. The LV Doppler derived stroke volume equals 56.0 ccs.    Diastolic indices: E wave velocity 0.8 m/s, E/A ratio 1.1,  msec., E/e' ratio(avg) 14. Pulmonary vein systolic/diastolic ratio is normal. There is pseudonormalization of mitral inflow pattern consistent with significant diastolic dysfunction.    Right Atrium: The right atrium is normal in size, measuring 4.3 cm in length and 3.8 cm in width in the apical view.    Right Ventricle: The right ventricle is upper limit of normal measuring 4.1 cm at the base in the apical right ventricle-focused view. Global right ventricular systolic function appears normal. There is abnormal septal motion. Tricuspid annular plane  systolic excursion (TAPSE) is 1.8 cm. Tissue Doppler-derived tricuspid annular peak systolic velocity (S prime) is 9.6 cm/s. The estimated PA systolic pressure is 37  mmHg.    Aortic Valve:  The aortic valve is mildly sclerotic with normal leaflet mobility. The aortic valve is tri-leaflet in structure. Using a left ventricular outflow tract diameter of 2.0 cm, a left ventricular outflow tract velocity time integral of 18 cm,  and a peak instantaneous transvalvular velocity time integral of 26 cm, the calculated aortic valve area is 2.15 cm2(AVAi is 1.29 cm2/m2). Additionally, there is mild aortic regurgitation. There is aortic annular calcification.    Mitral Valve:  The mitral valve is normal in structure with normal leaflet mobility. There is mitral annular calcification.    Tricuspid Valve:  The tricuspid valve is normal in structure with normal leaflet mobility. There is mild to moderate tricuspid regurgitation.    Pulmonary Valve:  The pulmonic valve is normal in structure with normal leaflet mobility. There is trivial pulmonic regurgitation.    IVC: IVC is normal in size and collapses > 50% with a sniff, suggesting normal right atrial pressure of 3 mmHg.    Intracavitary: There is no evidence of pericardial effusion, intracavity mass, thrombi, or vegetation.        CONCLUSIONS  1 - Low normal to mildly depressed left ventricular systolic function (EF 50-55%) - in some views the basal and mid lateral wall appear hypokinetic, but endocardial definition is suboptimal for these walls.  2 - Right ventricle is upper limit of normal in size with normal systolic function.  3 - Impaired LV relaxation, elevated LAP (grade 2 diastolic dysfunction).  4 - Severe left atrial enlargement.  5 - Mild to moderate tricuspid regurgitation.  6 - Mild aortic regurgitation.  7 - The estimated PA systolic pressure is 37 mmHg.            This document has been electronically  SIGNED BY: Chava Ponce MD On: 12/11/2017 10:02      CURRENT/PREVIOUS VISIT EKG  Results for orders placed or performed during the hospital encounter of 12/10/24   EKG 12-lead    Collection Time: 12/12/24 11:31 AM   Result  Value Ref Range    QRS Duration 146 ms    [facility] QTC Calculation 520 ms    Narrative    Test Reason : R07.9,    Vent. Rate :  70 BPM     Atrial Rate :  70 BPM     P-R Int : 188 ms          QRS Dur : 146 ms      QT Int : 482 ms       P-R-T Axes :  78 -29 163 degrees    QTcB Int : 520 ms    Atrial-paced rhythm  Left bundle branch block  Abnormal ECG  When compared with ECG of 11-Dec-2024 01:30,  No significant change was found    Referred By: AAAREFERRAL SELF           Confirmed By:            ASSESSMENT/PLAN:     Active Hospital Problems    Diagnosis    *CHF (congestive heart failure)    Elevated troponin    FRANKLIN (acute kidney injury)    Type 2 diabetes mellitus, without long-term current use of insulin       ASSESSMENT & PLAN:     Abdominal pain  Newly reduced ejection fraction  Heart failure with reduced ejection fraction  CAD with history of CABG and stents      RECOMMENDATIONS:    Nuclear stress test negative for reversible ischemia.  Recommend to continue with conservative medical management and she can follow-up outpatient.  Continue Entresto 24-26 mg p.o. b.i.d. and metoprolol succinate 12.5 mg p.o. daily.  Start furosemide 20 mg p.o. daily and Jardiance 10 mg p.o. daily.  Check BMP in 1 week.  Recommend outpatient follow-up with Cardiology.  Discharge appropriateness and timing per hospitalist's discretion/evaluation. Please call if you have cardiac specific questions or concerns.           Bridgett Vanessa NP  Date of Service: 12/13/2024  2:09 PM

## 2024-12-13 NOTE — ASSESSMENT & PLAN NOTE
New onset HFrEF  Echo shows EF 25-30%  Patient currently euvolemic   Added metoprolol and entresto   Consulted cardiology, stress test showed no reversible ischemia.   PO Lasix 20 mg daily and jardiance was added on discharge

## 2024-12-13 NOTE — PLAN OF CARE
reviewed the chart and spoke with the Attending MD. The patient will discharge home. CM attempted to schedule PCP follow up but office is closed. In basket sent to PCP and Cardiology office to call patient to schedule follow up apt.     The patient [facility] Attending MD and Dr. Dailey notified via [employer]. Per Attending MD the auth will be completed by Cardiology (Dr. Dailey) office, Dr. Dailey agreed. Per Attending MD the patient is clear to discharge without the Jardiance and can follow up outpatient with Cardiology office.    The patient is now cleared from a Case Management standpoint to discharge home.        12/13/24 1712   Final Note   Assessment Type Final Discharge Note   Anticipated Discharge Disposition Home   Hospital Resources/Appts/Education Provided Appointment suggestion unavailable

## 2024-12-13 NOTE — DISCHARGE SUMMARY
[facility] Medicine  Discharge Summary      Patient Name: Karma Chen  MRN: [id number]  KERRIE: [id number]  Patient Class: IP- Inpatient  Admission Date: 12/10/2024  Hospital Length of Stay: 2 days  Discharge Date and Time:  12/13/2024 4:15 PM  Attending Physician: Herman Joiner MD   Discharging Provider: Herman Joiner MD  Primary Care Provider: Elena Sullivan MD    Primary Care Team: Networked reference to record PCT     HPI:   89-year-old female with history of coronary artery disease status post 4 vessel CABG 2006 , and 6 stents, prior MI in 2011, diabetes, hypertension, GERD, fibromyalgia, hypercholesterolemia PRESENTS TO THE ER DUE TO UPPER ABDOMINAL PAIN THAT SHE REPORTS IT IS A SORENESS YESTERDAY AND TODAY.     She thought maybe it was her needing to have a BM but that didn't help.  Stool was darker she said.   She took her nitro and imdur and still didn't help the pain or pressure       So she came to ER    Non smoker  Non drinker      In our ER  CXR  Cardiac monitoring leads overlie the chest. There is a left chest wall cardiac pacing device present. There is postoperative change of prior median sternotomy. Cardiac silhouette is enlarged. There is aortic atherosclerosis. Lungs are symmetrically expanded. There is increased interstitial attenuation bilaterally which may represent interstitial edema in the appropriate clinical setting. Possible trace pleural fluid noted at the lung bases. No evidence of pneumothorax. Osseous structures demonstrate degenerative changes. Surgical clips project over the upper abdomen.       Labs mostly normal.   Mild BAKARI present       BNP was high at 2,000     Mild troponin elevation also     But EKG Ok        She is being admitted for chest pain , CHF , troponin elevation , bakari     * No surgery found *      Hospital Course:   Patient is a 89 year old female with history of GERD, DM, HTN, HLD, CAD who present with abdominal pain. Patient  had diarrhea two days prior to presentation which improved after peptobismol. Patient was found to be in fluid overload with elevated BNP. She did not have echo on file. Patient was diuresed with IV lasix, Her Cr improved from 1.5 >> 1.3. Patient is currently euvolemic and diuretics discontinued. Echo shows EF 25-30%. Metoprolol and entresto was started and cardiology was consulted. Patient underwent stress test which was negative for reversible ischemia. Patient was cleared to discharge from cardiology standpoint. No life vest was recommended. Jardiance was added.      Goals of Care Treatment Preferences:  Code Status: Full Code    Physical Exam  Vitals and nursing note reviewed.   HENT:      Head: Normocephalic.      Nose: Nose normal.      Mouth/Throat:      Mouth: Mucous membranes are moist.   Eyes:      Extraocular Movements: Extraocular movements intact.      Pupils: Pupils are equal, round, and reactive to light.   Cardiovascular:      Rate and Rhythm: Normal rate and regular rhythm.      Pulses: Normal pulses.      Heart sounds: Normal heart sounds. Murmur heard   Pulmonary:      Effort: Pulmonary effort is normal.      Breath sounds: Normal breath sounds.   Abdominal:      General: Abdomen is flat. Bowel sounds are normal.     Palpations: Abdomen is soft.   Musculoskeletal:         General: Normal range of motion.      Cervical back: Normal range of motion.   Skin:     General: Skin is warm.      Capillary Refill: Capillary refill takes less than 2 seconds.   Neurological:      General: No focal deficit present.      Mental Status: She is alert.   Psychiatric:         Mood and Affect: Mood normal.     SDOH Screening:  The patient declined to be screened for utility difficulties, food insecurity, transport difficulties, housing insecurity, and interpersonal safety, so no concerns could be identified this admission.     Consults:   Consults (From admission, onward)          Status Ordering Provider      Inpatient consult to Cardiology  Once        Provider:  Quan Dailey MD    Completed SIDNEY MOSQUEDA     Inpatient consult to Social Work/Case Management  Once        Provider:  (Not yet assigned)    Completed TROY HASSAN     Inpatient consult to Registered Dietitian/Nutritionist  Once        Provider:  (Not yet assigned)    Completed TROY HASSAN            * CHF (congestive heart failure)  New onset HFrEF  Echo shows EF 25-30%  Patient currently euvolemic   Added metoprolol and entresto   Consulted cardiology, stress test showed no reversible ischemia.   PO Lasix 20 mg daily and jardiance was added on discharge     FRANKLIN (acute kidney injury)  Improved with Lasix  PO lasix 20 mg daily on discharge      Elevated troponin  Troponin stable   Patient does not have any chest pain       Type 2 diabetes mellitus, without long-term current use of insulin  Resume home meds  Jardiance was added for HFrEF        Final Active Diagnoses:    Diagnosis Date Noted POA    PRINCIPAL PROBLEM:  CHF (congestive heart failure) [I50.9] 12/11/2024 Yes    Elevated troponin [R79.89] 12/11/2024 Yes    FRANKLIN (acute kidney injury) [N17.9] 12/11/2024 Yes    Type 2 diabetes mellitus, without long-term current use of insulin [E11.9] 07/25/2020 Yes     Chronic      Problems Resolved During this Admission:       Discharged Condition: good    Disposition: Home or Self Care    Follow Up:   Follow-up Information       Elena Sullivan MD Follow up in 1 week(s).    Specialty: Internal Medicine  Contact information:  [address] [place] [address] [id number] [phone]                           Patient Instructions:   No discharge procedures on file.    Significant Diagnostic Studies: Labs: CMP   Recent Labs   Lab 12/12/24  0451 12/13/24  0453    138   K 4.0 4.2    100   CO2 28 27   * 130*   BUN 25* 32*   CREATININE 1.3 1.3   CALCIUM 9.3 9.2   PROT 7.6 7.8   ALBUMIN 4.2 4.1   BILITOT 1.2* 0.8   ALKPHOS 77 77   AST 19 18    ALT 11 10   ANIONGAP 10 11    and CBC   Recent Labs   Lab 12/12/24  0451 12/13/24  0453   WBC 12.75* 13.03*   HGB 13.6 15.8   HCT 42.3 48.6*    342       Pending Diagnostic Studies:       None           Medications:  Reconciled Home Medications:      Medication List        START taking these medications      empagliflozin 10 mg tablet  Commonly known as: Jardiance  Take 1 tablet (10 mg total) by mouth once daily.  Start taking on: December 14, 2024     furosemide 20 MG tablet  Commonly known as: LASIX  Take 1 tablet (20 mg total) by mouth once daily.  Start taking on: December 14, 2024     sacubitriL-valsartan 24-26 mg per tablet  Commonly known as: ENTRESTO  Take 1 tablet by mouth 2 (two) times daily.            CHANGE how you take these medications      ALPRAZolam 0.25 MG tablet  Commonly known as: XANAX  TAKE 1 TABLET BY MOUTH EVERY DAY AS NEEDED FOR ANXIETY  What changed:   how much to take  how to take this  when to take this  reasons to take this     aspirin 81 MG EC tablet  Commonly known as: ECOTRIN  Take 1 tablet (81 mg total) by mouth once daily.  What changed: when to take this     coenzyme Q10 100 mg capsule  Take 1 capsule (100 mg total) by mouth 2 (two) times daily.  What changed: when to take this     cyanocobalamin 1,000 mcg/mL injection  2cc I'm every other week  What changed:   how much to take  how to take this  when to take this     fluocinonide-emollient 0.05 % Crea  Commonly known as: FLUOCINONIDE-E  Apply topically 2 (two) times daily.  What changed: how much to take     metoprolol succinate 25 MG 24 hr tablet  Commonly known as: TOPROL-XL  Take 0.5 tablets (12.5 mg total) by mouth once daily.  Start taking on: December 14, 2024  What changed:   how much to take  how to take this  when to take this     montelukast 10 mg tablet  Commonly known as: SINGULAIR  TAKE 1 TABLET BY MOUTH EVERY DAY IN THE EVENING  What changed:   how much to take  how to take this  when to take this             CONTINUE taking these medications      acetaminophen 650 MG Tbsr  Commonly known as: TYLENOL  Take 650 mg by mouth every 6 (six) hours as needed for Pain.     ELIQUIS 2.5 mg Tab  Generic drug: apixaban  Take 1 tablet (2.5 mg total) by mouth 2 (two) times daily.     fexofenadine 180 MG tablet  Commonly known as: ALLEGRA  Take 180 mg by mouth once daily.     fluticasone propionate 50 mcg/actuation nasal spray  Commonly known as: FLONASE  1 spray (50 mcg total) by Each Nostril route once daily.     levothyroxine 88 MCG tablet  Commonly known as: SYNTHROID  Take 1 tablet (88 mcg total) by mouth once daily.     LIDOcaine 5 %  Commonly known as: LIDODERM  Place 1 patch onto the skin once daily. Remove & Discard patch within 12 hours or as directed by MD     MAGNESIUM ORAL  Take 100 mg by mouth once daily.     multivitamin tablet  Commonly known as: THERAGRAN  Take 1 tablet by mouth 2 (two) times a day.     nitroGLYCERIN 0.4 MG SL tablet  Commonly known as: NITROSTAT  Place 1 tablet (0.4 mg total) under the tongue every 5 (five) minutes as needed for Chest pain.     nystatin-triamcinolone cream  Commonly known as: MYCOLOG II  Apply 1 g topically 4 (four) times daily.     omeprazole 20 MG capsule  Commonly known as: PRILOSEC  Take 1 capsule (20 mg total) by mouth once daily.     traMADoL 50 mg tablet  Commonly known as: ULTRAM  Take 1 tablet (50 mg total) by mouth 2 (two) times daily as needed (pain).     TURMERIC ORAL  Take 200 mg by mouth once daily.     UNABLE TO FIND  Take 1 capsule by mouth once daily. Joint health     UNABLE TO FIND  Take 1 capsule by mouth 2 (two) times a day. Cardio platinum            STOP taking these medications      isosorbide mononitrate 120 MG 24 hr tablet  Commonly known as: IMDUR     loratadine 10 mg tablet  Commonly known as: CLARITIN     tiZANidine 4 MG tablet  Commonly known as: ZANAFLEX              Indwelling Lines/Drains at time of discharge:   Lines/Drains/Airways       None                    Time spent on the discharge of patient: 40 minutes         Herman Joiner MD  Department of Hospital Medicine  [facility]

## 2024-12-13 NOTE — PLAN OF CARE
Problem: Adult Inpatient Plan of Care  Goal: Plan of Care Review  Outcome: Met  Goal: Patient-Specific Goal (Individualized)  Outcome: Met  Goal: Absence of Hospital-Acquired Illness or Injury  Outcome: Met  Goal: Optimal Comfort and Wellbeing  Outcome: Met  Goal: Readiness for Transition of Care  Outcome: Met     Problem: Diabetes Comorbidity  Goal: Blood Glucose Level Within Targeted Range  Outcome: Met     Problem: Acute Kidney Injury/Impairment  Goal: Fluid and Electrolyte Balance  Outcome: Met  Goal: Improved Oral Intake  Outcome: Met  Goal: Effective Renal Function  Outcome: Met     Problem: Fall Injury Risk  Goal: Absence of Fall and Fall-Related Injury  Outcome: Met     Problem: Wound  Goal: Optimal Coping  Outcome: Met  Goal: Optimal Functional Ability  Outcome: Met  Goal: Absence of Infection Signs and Symptoms  Outcome: Met  Goal: Improved Oral Intake  Outcome: Met  Goal: Optimal Pain Control and Function  Outcome: Met  Goal: Skin Health and Integrity  Outcome: Met  Goal: Optimal Wound Healing  Outcome: Met

## 2024-12-14 NOTE — NURSING
Patients IV removed. Monitor removed and returned. Reviewed discharge instructions with patient and patients  at bedside per patients request, verbalized understanding. During review patient stated that she can not take losartan and will follow up with PCP regarding medication. MD Rhys notified of patients request. Patient also aware of pre auth needed for jardiance. Patient transferred via wheelchair downstairs where spouse awaits for discharge home with patients belongings.

## 2024-12-17 LAB
OHS QRS DURATION: 144 MS
OHS QTC CALCULATION: 483 MS

## 2024-12-18 ENCOUNTER — TELEPHONE (OUTPATIENT)
Dept: CARDIOLOGY | Facility: CLINIC | Age: 89
End: 2024-12-18
Payer: MEDICARE

## 2024-12-18 ENCOUNTER — HOSPITAL ENCOUNTER (OUTPATIENT)
Facility: HOSPITAL | Age: 89
Discharge: HOME | End: 2024-12-19
Attending: EMERGENCY MEDICINE | Admitting: INTERNAL MEDICINE
Payer: MEDICARE

## 2024-12-18 DIAGNOSIS — R94.31 PROLONGED Q-T INTERVAL ON ECG: ICD-10-CM

## 2024-12-18 DIAGNOSIS — I48.91 A-FIB: ICD-10-CM

## 2024-12-18 DIAGNOSIS — R00.2 PALPITATIONS: Primary | ICD-10-CM

## 2024-12-18 DIAGNOSIS — R00.0 TACHYCARDIA: ICD-10-CM

## 2024-12-18 DIAGNOSIS — R07.9 CHEST PAIN: ICD-10-CM

## 2024-12-18 PROBLEM — N18.9 ACUTE KIDNEY INJURY SUPERIMPOSED ON CHRONIC KIDNEY DISEASE: Status: ACTIVE | Noted: 2024-12-18

## 2024-12-18 PROBLEM — N17.9 ACUTE KIDNEY INJURY SUPERIMPOSED ON CHRONIC KIDNEY DISEASE: Status: ACTIVE | Noted: 2024-12-18

## 2024-12-18 PROBLEM — I44.7 LEFT BUNDLE BRANCH BLOCK: Status: ACTIVE | Noted: 2024-12-18

## 2024-12-18 LAB
ALBUMIN SERPL BCP-MCNC: 4.7 G/DL (ref 3.5–5.2)
ALLENS TEST: ABNORMAL
ALP SERPL-CCNC: 79 U/L (ref 55–135)
ALT SERPL W/O P-5'-P-CCNC: 16 U/L (ref 10–44)
ANION GAP SERPL CALC-SCNC: 12 MMOL/L (ref 8–16)
AST SERPL-CCNC: 27 U/L (ref 10–40)
BASOPHILS # BLD AUTO: 0.06 K/UL (ref 0–0.2)
BASOPHILS NFR BLD: 0.6 % (ref 0–1.9)
BILIRUB SERPL-MCNC: 0.5 MG/DL (ref 0.1–1)
BNP SERPL-MCNC: 843 PG/ML (ref 0–99)
BUN SERPL-MCNC: 44 MG/DL (ref 8–23)
CALCIUM SERPL-MCNC: 9.5 MG/DL (ref 8.7–10.5)
CHLORIDE SERPL-SCNC: 98 MMOL/L (ref 95–110)
CO2 SERPL-SCNC: 25 MMOL/L (ref 23–29)
CREAT SERPL-MCNC: 2 MG/DL (ref 0.5–1.4)
DELSYS: ABNORMAL
DIFFERENTIAL METHOD BLD: ABNORMAL
EOSINOPHIL # BLD AUTO: 0.2 K/UL (ref 0–0.5)
EOSINOPHIL NFR BLD: 2.2 % (ref 0–8)
ERYTHROCYTE [DISTWIDTH] IN BLOOD BY AUTOMATED COUNT: 14.2 % (ref 11.5–14.5)
EST. GFR  (NO RACE VARIABLE): 23.7 ML/MIN/1.73 M^2
GLUCOSE SERPL-MCNC: 125 MG/DL (ref 70–110)
GLUCOSE SERPL-MCNC: 133 MG/DL (ref 70–110)
HCO3 UR-SCNC: 27 MMOL/L (ref 24–28)
HCT VFR BLD AUTO: 45.2 % (ref 37–48.5)
HCT VFR BLD CALC: 43 %PCV (ref 36–54)
HGB BLD-MCNC: 14.4 G/DL (ref 12–16)
IMM GRANULOCYTES # BLD AUTO: 0.03 K/UL (ref 0–0.04)
IMM GRANULOCYTES NFR BLD AUTO: 0.3 % (ref 0–0.5)
LYMPHOCYTES # BLD AUTO: 4.7 K/UL (ref 1–4.8)
LYMPHOCYTES NFR BLD: 46.4 % (ref 18–48)
MAGNESIUM SERPL-MCNC: 2.7 MG/DL (ref 1.6–2.6)
MCH RBC QN AUTO: 28.5 PG (ref 27–31)
MCHC RBC AUTO-ENTMCNC: 31.9 G/DL (ref 32–36)
MCV RBC AUTO: 90 FL (ref 82–98)
MONOCYTES # BLD AUTO: 1.3 K/UL (ref 0.3–1)
MONOCYTES NFR BLD: 13 % (ref 4–15)
NEUTROPHILS # BLD AUTO: 3.8 K/UL (ref 1.8–7.7)
NEUTROPHILS NFR BLD: 37.5 % (ref 38–73)
NRBC BLD-RTO: 0 /100 WBC
PCO2 BLDA: 42.8 MMHG (ref 35–45)
PH SMN: 7.41 [PH] (ref 7.35–7.45)
PLATELET # BLD AUTO: 321 K/UL (ref 150–450)
PMV BLD AUTO: 10.5 FL (ref 9.2–12.9)
PO2 BLDA: 37 MMHG (ref 40–60)
POC BE: 2 MMOL/L
POC IONIZED CALCIUM: 1.15 MMOL/L (ref 1.06–1.42)
POC SATURATED O2: 70 % (ref 95–100)
POC TCO2: 28 MMOL/L (ref 24–29)
POTASSIUM BLD-SCNC: 4.4 MMOL/L (ref 3.5–5.1)
POTASSIUM SERPL-SCNC: 4.2 MMOL/L (ref 3.5–5.1)
PROT SERPL-MCNC: 8.3 G/DL (ref 6–8.4)
RBC # BLD AUTO: 5.05 M/UL (ref 4–5.4)
SAMPLE: ABNORMAL
SITE: ABNORMAL
SODIUM BLD-SCNC: 139 MMOL/L (ref 136–145)
SODIUM SERPL-SCNC: 135 MMOL/L (ref 136–145)
TROPONIN I SERPL HS-MCNC: 38.8 PG/ML (ref 0–14.9)
WBC # BLD AUTO: 10.21 K/UL (ref 3.9–12.7)

## 2024-12-18 PROCEDURE — 85025 COMPLETE CBC W/AUTO DIFF WBC: CPT | Performed by: STUDENT IN AN ORGANIZED HEALTH CARE EDUCATION/TRAINING PROGRAM

## 2024-12-18 PROCEDURE — 83735 ASSAY OF MAGNESIUM: CPT | Performed by: STUDENT IN AN ORGANIZED HEALTH CARE EDUCATION/TRAINING PROGRAM

## 2024-12-18 PROCEDURE — 84295 ASSAY OF SERUM SODIUM: CPT | Mod: 91

## 2024-12-18 PROCEDURE — 93005 ELECTROCARDIOGRAM TRACING: CPT | Performed by: INTERNAL MEDICINE

## 2024-12-18 PROCEDURE — 82330 ASSAY OF CALCIUM: CPT

## 2024-12-18 PROCEDURE — 83880 ASSAY OF NATRIURETIC PEPTIDE: CPT | Performed by: STUDENT IN AN ORGANIZED HEALTH CARE EDUCATION/TRAINING PROGRAM

## 2024-12-18 PROCEDURE — 82962 GLUCOSE BLOOD TEST: CPT

## 2024-12-18 PROCEDURE — 82803 BLOOD GASES ANY COMBINATION: CPT

## 2024-12-18 PROCEDURE — 93010 ELECTROCARDIOGRAM REPORT: CPT | Mod: ,,, | Performed by: INTERNAL MEDICINE

## 2024-12-18 PROCEDURE — 84484 ASSAY OF TROPONIN QUANT: CPT | Performed by: STUDENT IN AN ORGANIZED HEALTH CARE EDUCATION/TRAINING PROGRAM

## 2024-12-18 PROCEDURE — 96374 THER/PROPH/DIAG INJ IV PUSH: CPT

## 2024-12-18 PROCEDURE — 80053 COMPREHEN METABOLIC PANEL: CPT | Performed by: STUDENT IN AN ORGANIZED HEALTH CARE EDUCATION/TRAINING PROGRAM

## 2024-12-18 PROCEDURE — 99285 EMERGENCY DEPT VISIT HI MDM: CPT | Mod: 25

## 2024-12-18 PROCEDURE — 84132 ASSAY OF SERUM POTASSIUM: CPT

## 2024-12-18 PROCEDURE — 25000003 PHARM REV CODE 250: Performed by: STUDENT IN AN ORGANIZED HEALTH CARE EDUCATION/TRAINING PROGRAM

## 2024-12-18 PROCEDURE — 99900035 HC TECH TIME PER 15 MIN (STAT)

## 2024-12-18 PROCEDURE — 85014 HEMATOCRIT: CPT | Mod: 91

## 2024-12-18 RX ORDER — ALUMINUM HYDROXIDE, MAGNESIUM HYDROXIDE, AND SIMETHICONE 1200; 120; 1200 MG/30ML; MG/30ML; MG/30ML
30 SUSPENSION ORAL 4 TIMES DAILY PRN
Status: DISCONTINUED | OUTPATIENT
Start: 2024-12-19 | End: 2024-12-19 | Stop reason: HOSPADM

## 2024-12-18 RX ORDER — IBUPROFEN 200 MG
16 TABLET ORAL
Status: DISCONTINUED | OUTPATIENT
Start: 2024-12-19 | End: 2024-12-19 | Stop reason: HOSPADM

## 2024-12-18 RX ORDER — SODIUM CHLORIDE 0.9 % (FLUSH) 0.9 %
10 SYRINGE (ML) INJECTION EVERY 12 HOURS PRN
Status: DISCONTINUED | OUTPATIENT
Start: 2024-12-19 | End: 2024-12-19 | Stop reason: HOSPADM

## 2024-12-18 RX ORDER — HYDROCODONE BITARTRATE AND ACETAMINOPHEN 5; 325 MG/1; MG/1
1 TABLET ORAL EVERY 6 HOURS PRN
Status: DISCONTINUED | OUTPATIENT
Start: 2024-12-19 | End: 2024-12-19 | Stop reason: HOSPADM

## 2024-12-18 RX ORDER — TALC
6 POWDER (GRAM) TOPICAL NIGHTLY PRN
Status: DISCONTINUED | OUTPATIENT
Start: 2024-12-19 | End: 2024-12-19 | Stop reason: HOSPADM

## 2024-12-18 RX ORDER — GLUCAGON 1 MG
1 KIT INJECTION
Status: DISCONTINUED | OUTPATIENT
Start: 2024-12-19 | End: 2024-12-19 | Stop reason: HOSPADM

## 2024-12-18 RX ORDER — ACETAMINOPHEN 325 MG/1
650 TABLET ORAL EVERY 4 HOURS PRN
Status: DISCONTINUED | OUTPATIENT
Start: 2024-12-19 | End: 2024-12-19 | Stop reason: HOSPADM

## 2024-12-18 RX ORDER — METOPROLOL TARTRATE 1 MG/ML
5 INJECTION, SOLUTION INTRAVENOUS EVERY 4 HOURS PRN
Status: DISCONTINUED | OUTPATIENT
Start: 2024-12-19 | End: 2024-12-19 | Stop reason: HOSPADM

## 2024-12-18 RX ORDER — NALOXONE HCL 0.4 MG/ML
0.02 VIAL (ML) INJECTION
Status: DISCONTINUED | OUTPATIENT
Start: 2024-12-19 | End: 2024-12-19 | Stop reason: HOSPADM

## 2024-12-18 RX ORDER — ONDANSETRON HYDROCHLORIDE 2 MG/ML
4 INJECTION, SOLUTION INTRAVENOUS EVERY 6 HOURS PRN
Status: DISCONTINUED | OUTPATIENT
Start: 2024-12-19 | End: 2024-12-19 | Stop reason: HOSPADM

## 2024-12-18 RX ORDER — IBUPROFEN 200 MG
24 TABLET ORAL
Status: DISCONTINUED | OUTPATIENT
Start: 2024-12-19 | End: 2024-12-19 | Stop reason: HOSPADM

## 2024-12-18 RX ORDER — METOPROLOL TARTRATE 1 MG/ML
5 INJECTION, SOLUTION INTRAVENOUS EVERY 5 MIN PRN
Status: COMPLETED | OUTPATIENT
Start: 2024-12-18 | End: 2024-12-18

## 2024-12-18 RX ORDER — GLUCAGON 1 MG
1 KIT INJECTION
Status: DISCONTINUED | OUTPATIENT
Start: 2024-12-19 | End: 2024-12-18

## 2024-12-18 RX ORDER — AMOXICILLIN 250 MG
1 CAPSULE ORAL DAILY PRN
Status: DISCONTINUED | OUTPATIENT
Start: 2024-12-19 | End: 2024-12-19 | Stop reason: HOSPADM

## 2024-12-18 RX ADMIN — METOPROLOL TARTRATE 5 MG: 5 INJECTION INTRAVENOUS at 09:12

## 2024-12-18 NOTE — TELEPHONE ENCOUNTER
----- Message from Td sent at 12/18/2024  3:04 PM CST -----  Regarding: Reschedule Appt  Type:  Reschedule Appointment Request    Caller is requesting to reschedule appointment.      Name of Caller:Pt    Date of previous appointment?1/2    Symptoms:hosp f/u    Would the patient rather a call back or a response via [employer]? Call back    Best Call Back Number:[phone]      Additional Information: wants to schedule with Dr Ware.   Please advise -- Thank you

## 2024-12-19 VITALS
TEMPERATURE: 98 F | HEIGHT: 60 IN | OXYGEN SATURATION: 97 % | RESPIRATION RATE: 18 BRPM | HEART RATE: 71 BPM | SYSTOLIC BLOOD PRESSURE: 148 MMHG | WEIGHT: 134 LBS | DIASTOLIC BLOOD PRESSURE: 74 MMHG | BODY MASS INDEX: 26.31 KG/M2

## 2024-12-19 LAB
ALBUMIN SERPL BCP-MCNC: 3.8 G/DL (ref 3.5–5.2)
ALP SERPL-CCNC: 64 U/L (ref 55–135)
ALT SERPL W/O P-5'-P-CCNC: 13 U/L (ref 10–44)
ANION GAP SERPL CALC-SCNC: 7 MMOL/L (ref 8–16)
APTT PPP: 31.9 SEC (ref 21–32)
AST SERPL-CCNC: 22 U/L (ref 10–40)
BASOPHILS # BLD AUTO: 0.06 K/UL (ref 0–0.2)
BASOPHILS NFR BLD: 0.9 % (ref 0–1.9)
BILIRUB SERPL-MCNC: 0.6 MG/DL (ref 0.1–1)
BILIRUB UR QL STRIP: NEGATIVE
BUN SERPL-MCNC: 41 MG/DL (ref 8–23)
CALCIUM SERPL-MCNC: 8.9 MG/DL (ref 8.7–10.5)
CHLORIDE SERPL-SCNC: 104 MMOL/L (ref 95–110)
CLARITY UR: CLEAR
CO2 SERPL-SCNC: 29 MMOL/L (ref 23–29)
COLOR UR: YELLOW
CREAT SERPL-MCNC: 1.6 MG/DL (ref 0.5–1.4)
DIFFERENTIAL METHOD BLD: ABNORMAL
EOSINOPHIL # BLD AUTO: 0.2 K/UL (ref 0–0.5)
EOSINOPHIL NFR BLD: 3.3 % (ref 0–8)
ERYTHROCYTE [DISTWIDTH] IN BLOOD BY AUTOMATED COUNT: 14.2 % (ref 11.5–14.5)
EST. GFR  (NO RACE VARIABLE): 31 ML/MIN/1.73 M^2
ESTIMATED AVG GLUCOSE: 123 MG/DL (ref 68–131)
GLUCOSE SERPL-MCNC: 94 MG/DL (ref 70–110)
GLUCOSE UR QL STRIP: NEGATIVE
HBA1C MFR BLD: 5.9 % (ref 4.5–6.2)
HCT VFR BLD AUTO: 40 % (ref 37–48.5)
HGB BLD-MCNC: 12.8 G/DL (ref 12–16)
HGB UR QL STRIP: NEGATIVE
HYALINE CASTS #/AREA URNS LPF: 3 /LPF
IMM GRANULOCYTES # BLD AUTO: 0.01 K/UL (ref 0–0.04)
IMM GRANULOCYTES NFR BLD AUTO: 0.2 % (ref 0–0.5)
INR PPP: 1 (ref 0.8–1.2)
KETONES UR QL STRIP: NEGATIVE
LEUKOCYTE ESTERASE UR QL STRIP: ABNORMAL
LYMPHOCYTES # BLD AUTO: 2.4 K/UL (ref 1–4.8)
LYMPHOCYTES NFR BLD: 36.8 % (ref 18–48)
MAGNESIUM SERPL-MCNC: 2.6 MG/DL (ref 1.6–2.6)
MCH RBC QN AUTO: 28.1 PG (ref 27–31)
MCHC RBC AUTO-ENTMCNC: 32 G/DL (ref 32–36)
MCV RBC AUTO: 88 FL (ref 82–98)
MICROSCOPIC COMMENT: ABNORMAL
MONOCYTES # BLD AUTO: 1 K/UL (ref 0.3–1)
MONOCYTES NFR BLD: 15.5 % (ref 4–15)
NEUTROPHILS # BLD AUTO: 2.9 K/UL (ref 1.8–7.7)
NEUTROPHILS NFR BLD: 43.3 % (ref 38–73)
NITRITE UR QL STRIP: NEGATIVE
NRBC BLD-RTO: 0 /100 WBC
OHS QRS DURATION: 142 MS
OHS QTC CALCULATION: 528 MS
PH UR STRIP: 5 [PH] (ref 5–8)
PLATELET # BLD AUTO: 280 K/UL (ref 150–450)
PMV BLD AUTO: 10.3 FL (ref 9.2–12.9)
POCT GLUCOSE: 108 MG/DL (ref 70–110)
POCT GLUCOSE: 114 MG/DL (ref 70–110)
POCT GLUCOSE: 127 MG/DL (ref 70–110)
POTASSIUM SERPL-SCNC: 4.2 MMOL/L (ref 3.5–5.1)
PROT SERPL-MCNC: 6.8 G/DL (ref 6–8.4)
PROT UR QL STRIP: NEGATIVE
PROTHROMBIN TIME: 11.5 SEC (ref 9–12.5)
RBC # BLD AUTO: 4.56 M/UL (ref 4–5.4)
RBC #/AREA URNS HPF: 0 /HPF (ref 0–4)
SODIUM SERPL-SCNC: 140 MMOL/L (ref 136–145)
SP GR UR STRIP: 1.01 (ref 1–1.03)
SQUAMOUS #/AREA URNS HPF: 0 /HPF
TROPONIN I SERPL HS-MCNC: 167.8 PG/ML (ref 0–14.9)
TROPONIN I SERPL HS-MCNC: 227 PG/ML (ref 0–14.9)
TROPONIN I SERPL HS-MCNC: 94.1 PG/ML (ref 0–14.9)
TSH SERPL DL<=0.005 MIU/L-ACNC: 3.82 UIU/ML (ref 0.34–5.6)
URN SPEC COLLECT METH UR: ABNORMAL
UROBILINOGEN UR STRIP-ACNC: NEGATIVE EU/DL
WBC # BLD AUTO: 6.6 K/UL (ref 3.9–12.7)
WBC #/AREA URNS HPF: 3 /HPF (ref 0–5)

## 2024-12-19 PROCEDURE — 85025 COMPLETE CBC W/AUTO DIFF WBC: CPT

## 2024-12-19 PROCEDURE — 97535 SELF CARE MNGMENT TRAINING: CPT

## 2024-12-19 PROCEDURE — G0378 HOSPITAL OBSERVATION PER HR: HCPCS

## 2024-12-19 PROCEDURE — 25000003 PHARM REV CODE 250: Performed by: INTERNAL MEDICINE

## 2024-12-19 PROCEDURE — 81001 URINALYSIS AUTO W/SCOPE: CPT

## 2024-12-19 PROCEDURE — 97161 PT EVAL LOW COMPLEX 20 MIN: CPT

## 2024-12-19 PROCEDURE — 84484 ASSAY OF TROPONIN QUANT: CPT | Mod: 91

## 2024-12-19 PROCEDURE — 85610 PROTHROMBIN TIME: CPT

## 2024-12-19 PROCEDURE — 36415 COLL VENOUS BLD VENIPUNCTURE: CPT

## 2024-12-19 PROCEDURE — 80053 COMPREHEN METABOLIC PANEL: CPT

## 2024-12-19 PROCEDURE — 83036 HEMOGLOBIN GLYCOSYLATED A1C: CPT

## 2024-12-19 PROCEDURE — 99214 OFFICE O/P EST MOD 30 MIN: CPT | Mod: FS,,, | Performed by: INTERNAL MEDICINE

## 2024-12-19 PROCEDURE — 84443 ASSAY THYROID STIM HORMONE: CPT

## 2024-12-19 PROCEDURE — 84484 ASSAY OF TROPONIN QUANT: CPT | Performed by: STUDENT IN AN ORGANIZED HEALTH CARE EDUCATION/TRAINING PROGRAM

## 2024-12-19 PROCEDURE — 85730 THROMBOPLASTIN TIME PARTIAL: CPT

## 2024-12-19 PROCEDURE — 97165 OT EVAL LOW COMPLEX 30 MIN: CPT

## 2024-12-19 PROCEDURE — 83735 ASSAY OF MAGNESIUM: CPT

## 2024-12-19 PROCEDURE — 25000003 PHARM REV CODE 250

## 2024-12-19 RX ORDER — PANTOPRAZOLE SODIUM 40 MG/1
40 TABLET, DELAYED RELEASE ORAL DAILY
Status: DISCONTINUED | OUTPATIENT
Start: 2024-12-19 | End: 2024-12-19 | Stop reason: HOSPADM

## 2024-12-19 RX ORDER — LEVOTHYROXINE SODIUM 88 UG/1
88 TABLET ORAL DAILY
Status: DISCONTINUED | OUTPATIENT
Start: 2024-12-19 | End: 2024-12-19 | Stop reason: HOSPADM

## 2024-12-19 RX ORDER — MORPHINE SULFATE 2 MG/ML
1 INJECTION, SOLUTION INTRAMUSCULAR; INTRAVENOUS EVERY 4 HOURS PRN
Status: DISCONTINUED | OUTPATIENT
Start: 2024-12-19 | End: 2024-12-19 | Stop reason: HOSPADM

## 2024-12-19 RX ORDER — ALPRAZOLAM 0.25 MG/1
0.25 TABLET ORAL DAILY PRN
Status: DISCONTINUED | OUTPATIENT
Start: 2024-12-19 | End: 2024-12-19 | Stop reason: HOSPADM

## 2024-12-19 RX ORDER — CETIRIZINE HYDROCHLORIDE 5 MG/1
5 TABLET ORAL DAILY
Status: DISCONTINUED | OUTPATIENT
Start: 2024-12-19 | End: 2024-12-19 | Stop reason: HOSPADM

## 2024-12-19 RX ORDER — TIZANIDINE 4 MG/1
4 TABLET ORAL DAILY PRN
COMMUNITY

## 2024-12-19 RX ORDER — NAPROXEN SODIUM 220 MG/1
81 TABLET, FILM COATED ORAL DAILY
Status: DISCONTINUED | OUTPATIENT
Start: 2024-12-19 | End: 2024-12-19 | Stop reason: HOSPADM

## 2024-12-19 RX ORDER — FUROSEMIDE 20 MG/1
20 TABLET ORAL DAILY
Start: 2024-12-22 | End: 2025-01-21

## 2024-12-19 RX ORDER — MONTELUKAST SODIUM 10 MG/1
10 TABLET ORAL NIGHTLY
Status: DISCONTINUED | OUTPATIENT
Start: 2024-12-19 | End: 2024-12-19 | Stop reason: HOSPADM

## 2024-12-19 RX ORDER — NITROGLYCERIN 0.4 MG/1
0.4 TABLET SUBLINGUAL EVERY 5 MIN PRN
Status: DISCONTINUED | OUTPATIENT
Start: 2024-12-19 | End: 2024-12-19 | Stop reason: HOSPADM

## 2024-12-19 RX ORDER — FLUTICASONE PROPIONATE 50 MCG
1 SPRAY, SUSPENSION (ML) NASAL DAILY
Status: DISCONTINUED | OUTPATIENT
Start: 2024-12-19 | End: 2024-12-19 | Stop reason: HOSPADM

## 2024-12-19 RX ORDER — LOSARTAN POTASSIUM 25 MG/1
25 TABLET ORAL EVERY MORNING
COMMUNITY
Start: 2024-12-18 | End: 2025-06-16

## 2024-12-19 RX ADMIN — PANTOPRAZOLE SODIUM 40 MG: 40 TABLET, DELAYED RELEASE ORAL at 05:12

## 2024-12-19 RX ADMIN — LEVOTHYROXINE SODIUM 88 MCG: 0.09 TABLET ORAL at 05:12

## 2024-12-19 RX ADMIN — CETIRIZINE HYDROCHLORIDE 5 MG: 5 TABLET ORAL at 11:12

## 2024-12-19 RX ADMIN — METOPROLOL SUCCINATE 12.5 MG: 25 TABLET, EXTENDED RELEASE ORAL at 08:12

## 2024-12-19 RX ADMIN — ASPIRIN 81 MG 81 MG: 81 TABLET ORAL at 01:12

## 2024-12-19 RX ADMIN — APIXABAN 2.5 MG: 2.5 TABLET, FILM COATED ORAL at 08:12

## 2024-12-19 NOTE — PLAN OF CARE
Problem: Adult Inpatient Plan of Care  Goal: Plan of Care Review  Outcome: Met  Goal: Patient-Specific Goal (Individualized)  Outcome: Met  Goal: Absence of Hospital-Acquired Illness or Injury  Outcome: Met  Goal: Optimal Comfort and Wellbeing  Outcome: Met  Goal: Readiness for Transition of Care  Outcome: Met     Problem: Wound  Goal: Optimal Coping  Outcome: Met  Goal: Optimal Functional Ability  Outcome: Met  Goal: Absence of Infection Signs and Symptoms  Outcome: Met  Goal: Improved Oral Intake  Outcome: Met  Goal: Optimal Pain Control and Function  Outcome: Met  Goal: Skin Health and Integrity  Outcome: Met  Goal: Optimal Wound Healing  Outcome: Met     Problem: Acute Kidney Injury/Impairment  Goal: Fluid and Electrolyte Balance  Outcome: Met  Goal: Improved Oral Intake  Outcome: Met  Goal: Effective Renal Function  Outcome: Met     Problem: Skin Injury Risk Increased  Goal: Skin Health and Integrity  Outcome: Met     Problem: Fall Injury Risk  Goal: Absence of Fall and Fall-Related Injury  Outcome: Met     Problem: Diabetes Comorbidity  Goal: Blood Glucose Level Within Targeted Range  Outcome: Met

## 2024-12-19 NOTE — ASSESSMENT & PLAN NOTE
FRANKLIN is likely due to acute tubular necrosis caused by medication changes . Baseline creatinine is  1.1-1.5 . Most recent creatinine and eGFR are listed below.  Recent Labs     12/18/24 2129   CREATININE 2.0*   EGFRNORACEVR 23.7*      Plan  - FRANKLIN is stable  - Avoid nephrotoxins and renally dose meds for GFR listed above  - Monitor urine output, serial CMP, and adjust therapy as needed  - Holding new home meds entresto, jardiance, lasix  -Unable to give IVF 2/2 HFrEF

## 2024-12-19 NOTE — DISCHARGE INSTRUCTIONS
Avoid any use of nonsteroidal anti-inflammatory medications such as ibuprofen, Motrin, Aleve or Naprosyn etc.    Hold metoprolol if systolic blood pressure under 100 mmHg or heart rate less than 55 beats per minute.    Hold Lasix for 2 days.  Have BMP checked next week.

## 2024-12-19 NOTE — PROGRESS NOTES
[facility] Medicine  Progress Note    Patient Name: Karma Chen  MRN: [id number]  Patient Class: OP- Observation   Admission Date: 12/18/2024  Length of Stay: 0 days  Attending Physician: Francia Ybarra MD  Primary Care Provider: Elena Sullivan MD        Subjective     Principal Problem:Tachycardia        HPI:  89-year-old female presented to ED for eval of palpitations. pMHx CAD s/p CABG and PCI, MI, DM diet-controlled, HTN, fibromyalgia, HLD, PAF, PPM, LBBB.  Patient reported prior to arrival this afternoon she felt like her heart was beating fast, subsequently checked her heart rate which was in the 140s.  She states she also took her blood pressure at that time and noted her systolic blood pressure was in the 90s.  She also states she tried to call the hospital for advice, states she was told by nurse over the phone that she needed to go to ER for eval.  Patient denies chest pain, shortness of breath.  Denies fever, chills.  Denies abdominal pain, nausea, vomiting, changes in bowel habits.  Patient does have extensive cardiac history including PAF for which she takes Eliquis at home however she denies previous episodes of rapid HR, reported she has not taken her metoprolol for the last 3 days 2/2 SBP< 130.  Patient was recently admitted with elevated BNP and found to have EF 25-30%, she was evaluated by Cardiology, discharged 12/13/2024 with the addition of Jardiance, Lasix, and Entresto to her med list.  On arrival to ED today, patient found with wide QRS tachycardia with HR 140s, HR improved with IV Lopressor x1 dose.  Also noted with LBBB on today's EKG, which was also noted on EKG dated 12/10/2024.  Initial troponin 38.8, repeat pending.  .  Patient also found to have FRANKLIN with creatinine 2, baseline appears to be 1.1-1.4.  CXR impression with enlarged cardiac silhouette, atherosclerosis of the aortic arch, no focal consolidation or pleural effusion.  Admit to hospital  medicine for further eval.    Overview/Hospital Course:  No notes on file    Interval History:  Patient is seen and examined during multidisciplinary rounds.  Patient's  is present at bedside.  Patient denies any further palpitations, chest pain, shortness of breath.  Patient is scheduled to undergo pacemaker interrogation and cardiologist evaluation pending.  Serum troponin is starting to trend.  Patient is already on apixaban.    Review of Systems   Constitutional:  Negative for chills and fever.   HENT:  Negative for congestion and sore throat.    Respiratory:  Positive for shortness of breath.    Cardiovascular:  Positive for palpitations. Negative for chest pain and leg swelling.   Gastrointestinal:  Negative for abdominal pain, constipation, diarrhea, nausea and vomiting.   Genitourinary:  Negative for difficulty urinating.   Neurological:  Negative for syncope.   Psychiatric/Behavioral:  Negative for confusion.      Objective:     Vital Signs (Most Recent):  Temp: 97.5 °F (36.4 °C) (12/19/24 0715)  Pulse: 68 (12/19/24 0715)  Resp: 18 (12/19/24 0715)  BP: 117/65 (12/19/24 0715)  SpO2: (!) 94 % (12/19/24 0715) Vital Signs (24h Range):  Temp:  [97.5 °F (36.4 °C)-97.9 °F (36.6 °C)] 97.5 °F (36.4 °C)  Pulse:  [] 68  Resp:  [16-24] 18  SpO2:  [94 %-99 %] 94 %  BP: (110-150)/(58-76) 117/65     Weight: 60.8 kg (134 lb)  Body mass index is 26.17 kg/m².  No intake or output data in the 24 hours ending 12/19/24 0758      Physical Exam  Vitals reviewed.   Constitutional:       General: She is not in acute distress.  HENT:      Head: Normocephalic and atraumatic.      Mouth/Throat:      Mouth: Mucous membranes are dry.   Eyes:      Conjunctiva/sclera: Conjunctivae normal.   Cardiovascular:      Rate and Rhythm: Normal rate and regular rhythm.   Pulmonary:      Effort: Pulmonary effort is normal. No respiratory distress.      Breath sounds: Normal breath sounds.   Abdominal:      General: Bowel sounds are  "normal.      Palpations: Abdomen is soft.      Tenderness: There is no abdominal tenderness.   Musculoskeletal:         General: Normal range of motion.      Cervical back: Normal range of motion.      Right lower leg: No edema.      Left lower leg: No edema.   Skin:     General: Skin is warm and dry.      Coloration: Skin is pale.   Neurological:      Mental Status: She is alert. Mental status is at baseline.   Psychiatric:         Mood and Affect: Mood normal.             Significant Labs: All pertinent labs within the past 24 hours have been reviewed.  CBC:   Recent Labs   Lab 12/18/24 2129 12/18/24 2301 12/19/24  0655   WBC 10.21  --  6.60   HGB 14.4  --  12.8   HCT 45.2 43 40.0     --  280     CMP:   Recent Labs   Lab 12/18/24 2129   *   K 4.2   CL 98   CO2 25   *   BUN 44*   CREATININE 2.0*   CALCIUM 9.5   PROT 8.3   ALBUMIN 4.7   BILITOT 0.5   ALKPHOS 79   AST 27   ALT 16   ANIONGAP 12     Coagulation:   Recent Labs   Lab 12/19/24  0020   INR 1.0   APTT 31.9     Troponin:   Recent Labs   Lab 12/18/24 2129 12/19/24  0020   TROPONINIHS 38.8* 94.1*     TSH: No results for input(s): "TSH" in the last 4320 hours.  Urine Studies:   Recent Labs   Lab 12/19/24  0027   COLORU Yellow   APPEARANCEUA Clear   PHUR 5.0   SPECGRAV 1.010   PROTEINUA Negative   GLUCUA Negative   KETONESU Negative   BILIRUBINUA Negative   OCCULTUA Negative   NITRITE Negative   UROBILINOGEN Negative   LEUKOCYTESUR Trace*   RBCUA 0   WBCUA 3   SQUAMEPITHEL 0   HYALINECASTS 3*     Microbiology Results (last 7 days)       ** No results found for the last 168 hours. **          Significant Imaging:   Recent Leximyoview (12/12/24):  1. No scintigraphic evidence of left ventricular myocardial ischemia.  2. Global left ventricular hypokinesis.  3. Calculated left ventricular ejection fraction of 31 %.    CXR:  Enlarged cardiac silhouette. Atherosclerosis of the aortic arch. No focal consolidation or pleural effusion. Left chest " wall cardiac pacemaker.     Assessment and Plan     * Tachycardia  Wide QRS tachy on arrival with HR 140s, elevated troponin  Possible ACS.  Follow Cardiology recommendations.  Continue long-term anticoagulation therapy.  Interrogate PPM  Metoprolol PRN HR >110  EKG PRN  Tele monitoring      Prolonged QT interval  Avoid qt prolonging meds  Tele monitoring  Repeat EKG in am      Left bundle branch block  See above  LBBB noted on EKG 12/10/24    Acute kidney injury superimposed on chronic kidney disease  FRANKLIN is likely due to acute tubular necrosis caused by medication changes . Baseline creatinine is  1.1-1.5 . Most recent creatinine and eGFR are listed below.  Recent Labs     12/18/24 2129   CREATININE 2.0*   EGFRNORACEVR 23.7*      Plan  - FRANKLIN is stable  - Avoid nephrotoxins and renally dose meds for GFR listed above  - Monitor urine output, serial CMP, and adjust therapy as needed  - Holding new home meds entresto, jardiance, lasix  -Unable to give IVF 2/2 HFrEF    FRANKLIN (acute kidney injury)  FRANKLIN is likely due to acute tubular necrosis caused by medication changes . Baseline creatinine is  1.1-1.5 . Most recent creatinine and eGFR are listed below.      Recent Labs     12/18/24 2129   CREATININE 2.0*   EGFRNORACEVR 23.7*       Plan  - FRANKLIN is stable  - Avoid nephrotoxins and renally dose meds for GFR listed above  - Monitor urine output, serial CMP, and adjust therapy as needed  - Holding new home meds entresto, jardiance, lasix  -Unable to give IVF 2/2 HFrEF    CHF (congestive heart failure)  Patient has Systolic (HFrEF) heart failure that is Chronic. On presentation their CHF was well compensated. Most recent BNP and echo results are listed below.  Recent Labs     12/18/24 2129   *       Latest ECHO  Results for orders placed during the hospital encounter of 12/10/24    Echo    Interpretation Summary    Left Ventricle: The left ventricle is normal in size. Normal wall thickness. Normal wall motion. Septal  motion is consistent with pacing. There is severely reduced systolic function with a visually estimated ejection fraction of 25 - 30%. There is normal diastolic function. E/A ratio is 1.61.    Right Ventricle: Right ventricle was not assessed. Right ventricular enlargement. Systolic function is reduced.TAPSE is 1.80 cm. Pacemaker lead present in the ventricle.    Left Atrium: Left atrium is severely dilated.    Right Atrium: Right atrium is severely dilated.    Aortic Valve: There is mild aortic regurgitation.    Mitral Valve: There is moderate to severe regurgitation.    Tricuspid Valve: There is mild to moderate regurgitation. There is moderate pulmonary hypertension. The estimated PA systolic pressure is at least 59 mmHg.    Pulmonic Valve: There is mild regurgitation.    Current Heart Failure Medications  metoprolol succinate (TOPROL-XL) 24 hr split tablet 12.5 mg, Daily, Oral  , Every morning, Oral    Plan  - Monitor strict I&Os and daily weights.    - Place on telemetry  - Low sodium diet  - Cardiology has been consulted  - The patient's volume status is at their baseline  - Holding off on resuming home lasix, entresto, jardiance for now 2/2 FRANKLIN        PAF (paroxysmal atrial fibrillation)  Patient has paroxysmal (<7 days) atrial fibrillation. Patient is currently in  atrial paced . [id number] Score: 5. The patients heart rate in the last 24 hours is as follows:  Pulse  Min: 68  Max: 149     Antiarrhythmics  metoprolol injection 5 mg, Every 4 hours PRN, Intravenous  metoprolol succinate (TOPROL-XL) 24 hr split tablet 12.5 mg, Daily, Oral    Anticoagulants  apixaban tablet 2.5 mg, 2 times daily, Oral    Plan  - Replete lytes with a goal of K>4, Mg >2  - Patient is anticoagulated, see medications listed above.  - Patient's afib is currently controlled  - Coags        Type 2 diabetes mellitus, without long-term current use of insulin  Diet controlled  Accuchecks q6h while NPO  Holding off on SSI given allergy        Discussed with patient's , bedside nurse and .  VTE Risk Mitigation (From admission, onward)           Ordered     apixaban tablet 2.5 mg  2 times daily         12/18/24 2357     Reason for No Pharmacological VTE Prophylaxis  Once        Question:  Reasons:  Answer:  Already adequately anticoagulated on oral Anticoagulants    12/18/24 2354     IP VTE HIGH RISK PATIENT  Once         12/18/24 2354     Place sequential compression device  Until discontinued         12/18/24 2354                    Discharge Planning   JANNA:   December 21, 2024.    Code Status: Full Code   Medical Readiness for Discharge Date:                    Please place Justification for DME        Francia Ybarra MD  Department of Hospital Medicine   Formerly [facility], [facility]

## 2024-12-19 NOTE — HOSPITAL COURSE
Patient admitted to medicine telemetry service where patient's serum troponin levels were trended which noted to be mildly elevated suggestive of possible non ST elevation MI. patient maintained on long-term anticoagulation therapy.  Cardiology team consulted.  Pacemaker interrogated.

## 2024-12-19 NOTE — ASSESSMENT & PLAN NOTE
Patient has paroxysmal (<7 days) atrial fibrillation. Patient is currently in  atrial paced . [id number] Score: 5. The patients heart rate in the last 24 hours is as follows:  Pulse  Min: 68  Max: 149     Antiarrhythmics  metoprolol injection 5 mg, Every 4 hours PRN, Intravenous  metoprolol succinate (TOPROL-XL) 24 hr split tablet 12.5 mg, Daily, Oral    Anticoagulants  apixaban tablet 2.5 mg, 2 times daily, Oral    Plan  - Replete lytes with a goal of K>4, Mg >2  - Patient is anticoagulated, see medications listed above.  - Patient's afib is currently controlled  - Coags

## 2024-12-19 NOTE — DISCHARGE SUMMARY
[facility] Medicine  Discharge Summary      Patient Name: Karma Chen  MRN: [id number]  KERRIE: [id number]  Patient Class: OP- Observation  Admission Date: 12/18/2024  Hospital Length of Stay: 0 days  Discharge Date and Time:  12/19/2024 4:19 PM  Attending Physician: Francia Ybarra MD   Discharging Provider: Francia Ybarra MD  Primary Care Provider: Elena Sullivan MD    Primary Care Team: Networked reference to record PCT     HPI:   89-year-old female presented to ED for eval of palpitations. pMHx CAD s/p CABG and PCI, MI, DM diet-controlled, HTN, fibromyalgia, HLD, PAF, PPM, LBBB.  Patient reported prior to arrival this afternoon she felt like her heart was beating fast, subsequently checked her heart rate which was in the 140s.  She states she also took her blood pressure at that time and noted her systolic blood pressure was in the 90s.  She also states she tried to call the hospital for advice, states she was told by nurse over the phone that she needed to go to ER for eval.  Patient denies chest pain, shortness of breath.  Denies fever, chills.  Denies abdominal pain, nausea, vomiting, changes in bowel habits.  Patient does have extensive cardiac history including PAF for which she takes Eliquis at home however she denies previous episodes of rapid HR, reported she has not taken her metoprolol for the last 3 days 2/2 SBP< 130.  Patient was recently admitted with elevated BNP and found to have EF 25-30%, she was evaluated by Cardiology, discharged 12/13/2024 with the addition of Jardiance, Lasix, and Entresto to her med list.  On arrival to ED today, patient found with wide QRS tachycardia with HR 140s, HR improved with IV Lopressor x1 dose.  Also noted with LBBB on today's EKG, which was also noted on EKG dated 12/10/2024.  Initial troponin 38.8, repeat pending.  .  Patient also found to have FRANKLIN with creatinine 2, baseline appears to be 1.1-1.4.  CXR impression with enlarged  cardiac silhouette, atherosclerosis of the aortic arch, no focal consolidation or pleural effusion.  Admit to hospital medicine for further eval.    * No surgery found *      Hospital Course:   Patient admitted to medicine telemetry service where patient's serum troponin levels were trended which noted to be mildly elevated which were thought to be related to demand ischemia and acute kidney injury. Patient maintained on long-term anticoagulation therapy.  Cardiology team consulted.  Pacemaker interrogated.  No further cardiac intervention recommended.  Patient's symptoms resolved.  Patient feels ready to go home.  Patient instructed to avoid use of any nonsteroidal anti-inflammatory medications.  Patient to hold Lasix for 2 days and will have BMP checked next week.  Patient also counseled to hold metoprolol if systolic blood pressure under 100 or heart rate less than 50 per minute.  Patient will continue close follow-up with primary care physician and cardiologist as outpatient.  Patient has been cleared for discharge by cardiology team.    Goals of Care Treatment Preferences:  Code Status: Full Code      SDOH Screening:  The patient declined to be screened for utility difficulties, food insecurity, transport difficulties, housing insecurity, and interpersonal safety, so no concerns could be identified this admission.     Consults:   Consults (From admission, onward)          Status Ordering Provider     Inpatient consult to Cardiology  Once        Provider:  Cheryl Doty MD    Completed DERICK FERRO     Case Management/  Once        Provider:  (Not yet assigned)    Completed DERICK FERRO            * Tachycardia  Wide QRS tachy on arrival with HR 140s, elevated troponin  Possible ACS.  Follow Cardiology recommendations.  Continue long-term anticoagulation therapy.  Interrogate PPM  Metoprolol PRN HR >110  EKG PRN  Tele monitoring      Prolonged QT interval  Avoid qt prolonging  meds  Tele monitoring  Repeat EKG in am      Left bundle branch block  See above  LBBB noted on EKG 12/10/24    Acute kidney injury superimposed on chronic kidney disease  FRANKLIN is likely due to acute tubular necrosis caused by medication changes . Baseline creatinine is  1.1-1.5 . Most recent creatinine and eGFR are listed below.  Recent Labs     12/18/24 2129   CREATININE 2.0*   EGFRNORACEVR 23.7*      Plan  - FRANKLIN is stable  - Avoid nephrotoxins and renally dose meds for GFR listed above  - Monitor urine output, serial CMP, and adjust therapy as needed  - Holding new home meds entresto, jardiance, lasix  -Unable to give IVF 2/2 HFrEF    FRANKLIN (acute kidney injury)  FRANKLIN is likely due to acute tubular necrosis caused by medication changes . Baseline creatinine is  1.1-1.5 . Most recent creatinine and eGFR are listed below.      Recent Labs     12/18/24 2129   CREATININE 2.0*   EGFRNORACEVR 23.7*       Plan  - FRANKLIN is stable  - Avoid nephrotoxins and renally dose meds for GFR listed above  - Monitor urine output, serial CMP, and adjust therapy as needed  - Holding new home meds entresto, jardiance, lasix  -Unable to give IVF 2/2 HFrEF    CHF (congestive heart failure)  Patient has Systolic (HFrEF) heart failure that is Chronic. On presentation their CHF was well compensated. Most recent BNP and echo results are listed below.  Recent Labs     12/18/24 2129   *       Latest ECHO  Results for orders placed during the hospital encounter of 12/10/24    Echo    Interpretation Summary    Left Ventricle: The left ventricle is normal in size. Normal wall thickness. Normal wall motion. Septal motion is consistent with pacing. There is severely reduced systolic function with a visually estimated ejection fraction of 25 - 30%. There is normal diastolic function. E/A ratio is 1.61.    Right Ventricle: Right ventricle was not assessed. Right ventricular enlargement. Systolic function is reduced.TAPSE is 1.80 cm. Pacemaker  lead present in the ventricle.    Left Atrium: Left atrium is severely dilated.    Right Atrium: Right atrium is severely dilated.    Aortic Valve: There is mild aortic regurgitation.    Mitral Valve: There is moderate to severe regurgitation.    Tricuspid Valve: There is mild to moderate regurgitation. There is moderate pulmonary hypertension. The estimated PA systolic pressure is at least 59 mmHg.    Pulmonic Valve: There is mild regurgitation.    Current Heart Failure Medications  metoprolol succinate (TOPROL-XL) 24 hr split tablet 12.5 mg, Daily, Oral  , Every morning, Oral    Plan  - Monitor strict I&Os and daily weights.    - Place on telemetry  - Low sodium diet  - Cardiology has been consulted  - The patient's volume status is at their baseline  - Holding off on resuming home lasix, entresto, jardiance for now 2/2 FRANKLIN        PAF (paroxysmal atrial fibrillation)  Patient has paroxysmal (<7 days) atrial fibrillation. Patient is currently in  atrial paced . [id number] Score: 5. The patients heart rate in the last 24 hours is as follows:  Pulse  Min: 68  Max: 149     Antiarrhythmics  metoprolol injection 5 mg, Every 4 hours PRN, Intravenous  metoprolol succinate (TOPROL-XL) 24 hr split tablet 12.5 mg, Daily, Oral    Anticoagulants  apixaban tablet 2.5 mg, 2 times daily, Oral    Plan  - Replete lytes with a goal of K>4, Mg >2  - Patient is anticoagulated, see medications listed above.  - Patient's afib is currently controlled  - Coags        Type 2 diabetes mellitus, without long-term current use of insulin  Diet controlled  Accuchecks q6h while NPO  Holding off on SSI given allergy         Recent Leximyoview (12/12/24):  1. No scintigraphic evidence of left ventricular myocardial ischemia.  2. Global left ventricular hypokinesis.  3. Calculated left ventricular ejection fraction of 31 %.     CXR:  Enlarged cardiac silhouette. Atherosclerosis of the aortic arch. No focal consolidation or pleural effusion. Left  chest wall cardiac pacemaker.     Final Active Diagnoses:    Diagnosis Date Noted POA    PRINCIPAL PROBLEM:  Tachycardia [R00.0] 12/18/2024 Yes    Left bundle branch block [I44.7] 12/18/2024 Yes    Prolonged QT interval [R94.31] 12/18/2024 Yes    CHF (congestive heart failure) [I50.9] 12/11/2024 Yes    FRANKLIN (acute kidney injury) [N17.9] 12/11/2024 Yes    PAF (paroxysmal atrial fibrillation) [I48.0] 07/25/2020 Yes     Chronic    Type 2 diabetes mellitus, without long-term current use of insulin [E11.9] 07/25/2020 Yes     Chronic      Problems Resolved During this Admission:       Discharged Condition: good    Disposition: Home or Self Care    Follow Up:   Follow-up Information       Elena Sullivan MD. Go on 1/2/2025.    Specialty: Internal Medicine  Why: hospital f/u 01/02/2025 @ 10:40 AM  Contact information:  [address]  [address]  [place] [address]  [phone]               Lorenzo Ware MD. Go on 1/6/2025.    Specialties: Interventional Cardiology, Cardiology, Cardiovascular Disease  Why: @930am for hospital follow up  Contact information:  [address]  [address]  [place] [address]  [phone]                           Patient Instructions:      Basic metabolic panel   Standing Status: Future Standing Exp. Date: 03/19/26     Order Specific Question Answer Comments   Send normal result to authorizing provider's In Basket if patient is active on MyChart: Yes      Diet Cardiac     Diet diabetic   Order Comments: Glucerna can with meals     Notify your health care provider if you experience any of the following:  temperature >100.4     Notify your health care provider if you experience any of the following:  persistent nausea and vomiting or diarrhea     Notify your health care provider if you experience any of the following:  severe uncontrolled pain     Notify your health care provider if you experience any of the following:  redness, tenderness, or signs of infection (pain, swelling, redness, odor or  "green/yellow discharge around incision site)     Notify your health care provider if you experience any of the following:  difficulty breathing or increased cough     Notify your health care provider if you experience any of the following:  severe persistent headache     Notify your health care provider if you experience any of the following:  worsening rash     Notify your health care provider if you experience any of the following:  persistent dizziness, light-headedness, or visual disturbances     Notify your health care provider if you experience any of the following:  increased confusion or weakness     Activity as tolerated   Order Comments: Fall precautions       Significant Diagnostic Studies: Labs: CMP   Recent Labs   Lab 12/18/24 2129 12/19/24  0655   * 140   K 4.2 4.2   CL 98 104   CO2 25 29   * 94   BUN 44* 41*   CREATININE 2.0* 1.6*   CALCIUM 9.5 8.9   PROT 8.3 6.8   ALBUMIN 4.7 3.8   BILITOT 0.5 0.6   ALKPHOS 79 64   AST 27 22   ALT 16 13   ANIONGAP 12 7*   , CBC   Recent Labs   Lab 12/18/24 2129 12/18/24 2301 12/19/24  0655   WBC 10.21  --  6.60   HGB 14.4  --  12.8   HCT 45.2   < > 40.0     --  280    < > = values in this interval not displayed.   , INR   Lab Results   Component Value Date    INR 1.0 12/19/2024    INR 1.2 10/22/2021    INR 1.1 05/02/2020   , Lipid Panel   Lab Results   Component Value Date    CHOL 193 04/03/2024    HDL 42 04/03/2024    LDLCALC 110.0 04/03/2024    TRIG 205 (H) 04/03/2024    CHOLHDL 21.8 04/03/2024   , and Troponin No results for input(s): "TROPONINI" in the last 168 hours.    Pending Diagnostic Studies:       Procedure Component Value Units Date/Time    EKG 12-lead [[id number]]     Order Status: Sent Lab Status: No result            Medications:  Reconciled Home Medications:      Medication List        CHANGE how you take these medications      ALPRAZolam 0.25 MG tablet  Commonly known as: XANAX  TAKE 1 TABLET BY MOUTH EVERY DAY AS NEEDED FOR " ANXIETY  What changed:   how much to take  how to take this  when to take this  reasons to take this     aspirin 81 MG EC tablet  Commonly known as: ECOTRIN  Take 1 tablet (81 mg total) by mouth once daily.  What changed: when to take this     cyanocobalamin 1,000 mcg/mL injection  2cc I'm every other week  What changed:   how much to take  how to take this  when to take this     furosemide 20 MG tablet  Commonly known as: LASIX  Take 1 tablet (20 mg total) by mouth once daily.  Start taking on: December 22, 2024  What changed: These instructions start on December 22, 2024. If you are unsure what to do until then, ask your doctor or other care provider.            CONTINUE taking these medications      acetaminophen 650 MG Tbsr  Commonly known as: TYLENOL  Take 650 mg by mouth every 6 (six) hours as needed for Pain.     coenzyme Q10 100 mg capsule  Take 1 capsule (100 mg total) by mouth 2 (two) times daily.     ELIQUIS 2.5 mg Tab  Generic drug: apixaban  Take 1 tablet (2.5 mg total) by mouth 2 (two) times daily.     empagliflozin 10 mg tablet  Commonly known as: Jardiance  Take 1 tablet (10 mg total) by mouth once daily.     fexofenadine 180 MG tablet  Commonly known as: ALLEGRA  Take 180 mg by mouth once daily.     fluocinonide-emollient 0.05 % Crea  Commonly known as: FLUOCINONIDE-E  Apply topically 2 (two) times daily.     fluticasone propionate 50 mcg/actuation nasal spray  Commonly known as: FLONASE  1 spray (50 mcg total) by Each Nostril route once daily.     levothyroxine 88 MCG tablet  Commonly known as: SYNTHROID  Take 1 tablet (88 mcg total) by mouth once daily.     LIDOcaine 5 %  Commonly known as: LIDODERM  Place 1 patch onto the skin once daily. Remove & Discard patch within 12 hours or as directed by MD     losartan 25 MG tablet  Commonly known as: COZAAR  Take 25 mg by mouth every morning. NEW Rx - NOT YET STARTED     MAGNESIUM ORAL  Take 100 mg by mouth once daily.     metoprolol succinate 25 MG 24  hr tablet  Commonly known as: TOPROL-XL  Take 0.5 tablets (12.5 mg total) by mouth once daily.     montelukast 10 mg tablet  Commonly known as: SINGULAIR  TAKE 1 TABLET BY MOUTH EVERY DAY IN THE EVENING     multivitamin tablet  Commonly known as: THERAGRAN  Take 1 tablet by mouth 2 (two) times a day.     nitroGLYCERIN 0.4 MG SL tablet  Commonly known as: NITROSTAT  Place 1 tablet (0.4 mg total) under the tongue every 5 (five) minutes as needed for Chest pain.     nystatin-triamcinolone cream  Commonly known as: MYCOLOG II  Apply 1 g topically 4 (four) times daily.     omeprazole 20 MG capsule  Commonly known as: PRILOSEC  Take 1 capsule (20 mg total) by mouth once daily.     tiZANidine 4 MG tablet  Commonly known as: ZANAFLEX  Take 4 mg by mouth daily as needed.     TURMERIC ORAL  Take 200 mg by mouth once daily.     UNABLE TO FIND  Take 1 capsule by mouth once daily. [facility]     UNABLE TO FIND  Take 1 capsule by mouth 2 (two) times a day. Cardio platinum              Indwelling Lines/Drains at time of discharge:   Lines/Drains/Airways       None                   Time spent on the discharge of patient: 34 minutes         Francia Ybarra MD  Department of Hospital Medicine  [facility]

## 2024-12-19 NOTE — ASSESSMENT & PLAN NOTE
Wide QRS tachy on arrival with HR 140s, elevated troponin  Serial troponins x 3, TSH, UA  Interrogate PPM  Metoprolol PRN HR >110  EKG PRN  Tele monitoring  Consult cardiology given new onset of wide QRS tachycardia

## 2024-12-19 NOTE — HPI
89-year-old female presented to ED for eval of palpitations. pMHx CAD s/p CABG and PCI, MI, DM diet-controlled, HTN, fibromyalgia, HLD, PAF, PPM, LBBB.  Patient reported prior to arrival this afternoon she felt like her heart was beating fast, subsequently checked her heart rate which was in the 140s.  She states she also took her blood pressure at that time and noted her systolic blood pressure was in the 90s.  She also states she tried to call the hospital for advice, states she was told by nurse over the phone that she needed to go to ER for eval.  Patient denies chest pain, shortness of breath.  Denies fever, chills.  Denies abdominal pain, nausea, vomiting, changes in bowel habits.  Patient does have extensive cardiac history including PAF for which she takes Eliquis at home however she denies previous episodes of rapid HR, reported she has not taken her metoprolol for the last 3 days 2/2 SBP< 130.  Patient was recently admitted with elevated BNP and found to have EF 25-30%, she was evaluated by Cardiology, discharged 12/13/2024 with the addition of Jardiance, Lasix, and Entresto to her med list.  On arrival to ED today, patient found with wide QRS tachycardia with HR 140s, HR improved with IV Lopressor x1 dose.  Also noted with LBBB on today's EKG, which was also noted on EKG dated 12/10/2024.  Initial troponin 38.8, repeat pending.  .  Patient also found to have FRANKLIN with creatinine 2, baseline appears to be 1.1-1.4.  CXR impression with enlarged cardiac silhouette, atherosclerosis of the aortic arch, no focal consolidation or pleural effusion.  Admit to hospital medicine for further eval.

## 2024-12-19 NOTE — ED PROVIDER NOTES
Encounter Date: 12/18/2024       History     Chief Complaint   Patient presents with    Tachycardia      at home; pt took 1 nitro     Palpitations     HPI    89-year-old female with history of coronary artery disease status post 4 vessel CABG 2006 , and 6 stents, prior MI in 2011, diabetes, hypertension, GERD, fibromyalgia presenting for evaluation of tachycardia.  Patient noted at home to have had elevated heart rate and lower blood pressure in the 90s when she checked on her home monitor. She has palpitations at that time however and remains asymptomatic now.  Denies chest pain, shortness breath, severe abdominal pain, loss of consciousness, melena, hematochezia, dysuria, urgency, frequency.    Patient reports she has not taken her metoprolol since Saturday (3 days without metoprolol) due to her blood pressure being under 130 systolic.  She did not feel she needed to take it.  Patient did take 1 nitroglycerin at home.    Review of patient's allergies indicates:   Allergen Reactions    Diclofenac-misoprostol      Other reaction(s): Not available    Doxycycline     Effexor [venlafaxine]     Estradiol     Flagyl [metronidazole]     Fluconazole     Gabapentin     Iodine     Levaquin [levofloxacin]     Nexium [esomeprazole magnesium]     Nexletol [bempedoic acid] Other (See Comments)    Penicillins     Red yeast rice (monascus purpureus)     Shellfish containing products     Statins-hmg-coa reductase inhibitors     Sulfa (sulfonamide antibiotics)     Tea tree oil     Tegaserod      ZOLNORM    Tegaserod hydrogen maleate     Trazodone     Yeast, dried     Adhesive Rash     Band-aids     Past Medical History:   Diagnosis Date    Coronary artery disease     Dermatitis     Dermatitis 12/8/2017    Diabetes mellitus     Diabetes mellitus type II     Leah Barr virus infection     Fibromyalgia     GERD (gastroesophageal reflux disease)     Hypertension     MI (myocardial infarction) 09/23/2011    Pure  hypercholesterolemia 2/26/2020     Past Surgical History:   Procedure Laterality Date    adenoids      ANGIOPLASTY  1987    APPENDECTOMY      CARDIAC PACEMAKER PLACEMENT  01/12/2017    CATARACT EXTRACTION, BILATERAL Bilateral 9/2/15, 3/17/15    right eye-9/2/15, left eye-3/17/15    CHOLECYSTECTOMY  1987    CORONARY ARTERY BYPASS GRAFT  2006    quadruple    coronary stents  1999    x2    CYST REMOVAL  04/25/2017    on back    HYSTERECTOMY  1966    OVARY SURGERY  1948    Ovary burst & was repaired    RHIZOTOMY  09/14/2018    TONSILLECTOMY  1940     Family History   Problem Relation Name Age of Onset    No Known Problems Mother      No Known Problems Father       Social History     Tobacco Use    Smoking status: Never    Smokeless tobacco: Never   Substance Use Topics    Alcohol use: No    Drug use: No     Review of Systems  Negative besides where noted above  Physical Exam     Initial Vitals [12/18/24 2100]   BP Pulse Resp Temp SpO2   127/76 (!) 149 16 97.9 °F (36.6 °C) 98 %      MAP       --         Physical Exam    Vitals reviewed.  Constitutional: She appears well-developed and well-nourished.   HENT:   Head: Normocephalic and atraumatic.   Eyes: EOM are normal. Pupils are equal, round, and reactive to light.   Neck: Neck supple.   Normal range of motion.  Cardiovascular:  Normal heart sounds and intact distal pulses.     Exam reveals no gallop and no friction rub.       No murmur heard.  Regular rate, tachycardia, signs sternotomy scar well healed, left-sided chest wall pacemaker   Pulmonary/Chest: Breath sounds normal. She has no wheezes. She has no rhonchi. She has no rales. She exhibits no tenderness.   Abdominal: Abdomen is soft. Bowel sounds are normal. She exhibits no mass. There is no abdominal tenderness. There is no rebound and no guarding.   Musculoskeletal:      Cervical back: Normal range of motion and neck supple.     Neurological: She is alert and oriented to person, place, and time. GCS score is 15.  GCS eye subscore is 4. GCS verbal subscore is 5. GCS motor subscore is 6.   Skin: Skin is warm. Capillary refill takes less than 2 seconds.         ED Course   Procedures  Labs Reviewed   MAGNESIUM - Abnormal       Result Value    Magnesium 2.7 (*)    CBC W/ AUTO DIFFERENTIAL - Abnormal    WBC 10.21      RBC 5.05      Hemoglobin 14.4      Hematocrit 45.2      MCV 90      MCH 28.5      MCHC 31.9 (*)     RDW 14.2      Platelets 321      MPV 10.5      Immature Granulocytes 0.3      Gran # (ANC) 3.8      Immature Grans (Abs) 0.03      Lymph # 4.7      Mono # 1.3 (*)     Eos # 0.2      Baso # 0.06      nRBC 0      Gran % 37.5 (*)     Lymph % 46.4      Mono % 13.0      Eosinophil % 2.2      Basophil % 0.6      Differential Method Automated     COMPREHENSIVE METABOLIC PANEL - Abnormal    Sodium 135 (*)     Potassium 4.2      Chloride 98      CO2 25      Glucose 133 (*)     BUN 44 (*)     Creatinine 2.0 (*)     Calcium 9.5      Total Protein 8.3      Albumin 4.7      Total Bilirubin 0.5      Alkaline Phosphatase 79      AST 27      ALT 16      eGFR 23.7 (*)     Anion Gap 12     TROPONIN I HIGH SENSITIVITY - Abnormal    Troponin I High Sensitivity 38.8 (*)    B-TYPE NATRIURETIC PEPTIDE - Abnormal     (*)    TROPONIN I HIGH SENSITIVITY          Imaging Results              X-Ray Chest AP Portable (Final result)  Result time 12/18/24 21:48:38      Final result by Tamra Hester MD (12/18/24 21:48:38)                   Impression:      As above      Electronically signed by: Tamra Hester  Date:    12/18/2024  Time:    21:48               Narrative:    EXAMINATION:  XR CHEST AP PORTABLE    CLINICAL HISTORY:  Chest Pain;    TECHNIQUE:  Single frontal view of the chest was performed.    COMPARISON:  12/11/2024    FINDINGS:  Enlarged cardiac silhouette.  Atherosclerosis of the aortic arch.  No focal consolidation or pleural effusion.  Left chest wall cardiac pacemaker.                                        Medications   metoprolol injection 5 mg (5 mg Intravenous Given 12/18/24 2151)     Medical Decision Making    89-year-old female presenting for evaluation as above.  She is afebrile and hemodynamically stable on evaluation however noted to be significantly tachycardic with an EKG concerning for tachycardic left bundle branch block.  Patient at baseline has an atrial paced rhythm under slower heart rates.  Has not taken metoprolol for 3 days.  Also noted to have mild FRANKLIN and elevated troponin.  Patient admitted to Internal Medicine for further evaluation and management.    Ginna Salmeron MD             ED Course as of 12/18/24 2236   Wed Dec 18, 2024   2121 EKG is atrial paced rhythm at a rate of 145 left bundle-branch block morphology and no we have concerning to meet Sgarbossa criteria. [MH]   2232 Magnesium (!): 2.7 [MH]   2232 Creatinine(!): 2.0 [MH]   2233 . There is severely reduced systolic function with a visually estimated ejection fraction of 25 - 30%. [MH]   2233 RBC: 5.05 [MH]      ED Course User Index  [MH] Ginna Salmeron MD                             Clinical Impression:  Final diagnoses:  [R07.9] Chest pain  [I48.91] A-fib                 Ginna Salmeron MD  Resident  12/19/24 005

## 2024-12-19 NOTE — PLAN OF CARE
FRAGA explained to pt. Pt verbalized understanding and signed form.         12/19/24 5784   FRAGA Message   Medicare Outpatient and Observation Notification regarding financial responsibility Given to patient/caregiver;Explained to patient/caregiver   Date FRAGA was signed 12/19/24   Time FRAGA was signed 2520

## 2024-12-19 NOTE — PLAN OF CARE
[facility]  Initial Discharge Assessment       Primary Care Provider: Elena Sullivan MD    Admission Diagnosis: Tachycardia [R00.0]    Admission Date: 12/18/2024  Expected Discharge Date: 12/22/2024    Transition of Care Barriers: None    Payor: MEDICARE / Plan: MEDICARE PART A & B / Product Type: Government /     Extended Emergency Contact Information  Primary Emergency Contact: Jacinto Zacarias  Address: [address] [place] [address] [id number] [place] [address]  Home Phone: [phone]  Mobile Phone: [phone]  Relation: Spouse  Preferred language: English   needed? No    Assessment completed at bedside with patient and spouse. NOK is patients spouse Jacinto. Patient is independent, uses a rollator and cane for ambulation aides as needed. Denies any HH/HD/DME needs at this time.  PCP is Dr. Sullivan.     Discharge Plan A: Home with family  Discharge Plan B: Home      [facility] [address] [place] [address] [address] [place] [address]  [address] [facility] [address]  Phone: [phone] Fax: [phone]    [facility] - [facility] AT Portal to Registered [facility] Sites  [facility]  [place] [address]  Phone: [phone] Fax: [phone]    [facility]  [address]  [facility] [address]  Phone: [phone] Fax: [phone]      Initial Assessment (most recent)       Adult Discharge Assessment - 12/19/24 1108          Discharge Assessment    Assessment Type Discharge Planning Assessment     Confirmed/corrected address, phone number and insurance Yes     Confirmed Demographics Correct on Facesheet     Source of Information patient     When was your last doctors appointment? 12/18/24     Does patient/caregiver understand observation status Yes     Communicated JANNA with patient/caregiver Yes     Reason For Admission wide complex tachycardia     People in Home spouse     Facility  Arrived From: home     Do you expect to return to your current living situation? Yes     Prior to hospitilization cognitive status: Alert/Oriented     Current cognitive status: Alert/Oriented     Walking or Climbing Stairs Difficulty yes     Walking or Climbing Stairs ambulation difficulty, assistance 1 person     Mobility Management rollator and cane     Dressing/Bathing Difficulty no     Home Accessibility wheelchair accessible     Equipment Currently Used at Home rollator;cane, straight     Patient currently being followed by outpatient case management? No     Do you currently have service(s) that help you manage your care at home? No     Do you take prescription medications? Yes     Do you have prescription coverage? Yes     Coverage bcbs     Do you have any problems affording any of your prescribed medications? No     Is the patient taking medications as prescribed? yes     Who is going to help you get home at discharge? patient drives self     How do you get to doctors appointments? family or friend will provide;car, drives self     Are you on dialysis? No     Do you take coumadin? No     Discharge Plan A Home with family     Discharge Plan B Home     DME Needed Upon Discharge  none     Discharge Plan discussed with: Patient;Spouse/sig other     Transition of Care Barriers None

## 2024-12-19 NOTE — CONSULTS
[facility]  Department of Cardiology  Consult Note      PATIENT NAME: Karma Chen  MRN: [id number]  TODAY'S DATE: 12/19/2024  ADMIT DATE: 12/18/2024                          CONSULT REQUESTED BY: Francia Ybarra MD    SUBJECTIVE     PRINCIPAL PROBLEM: Tachycardia      REASON FOR CONSULT:    New wide complex tachycardia, elevated troponin      HPI:    Patient is an 89 y.o. female with PMH of CAD s/p CABG and PCI, MI, DM diet-controlled, HTN, fibromyalgia, HLD, PAF, PPM, LBBB who presented to the ED with palpitations and tachycardia.  HR 140s at home.  SBP 90s.  Hx PAF on Eliquis.  She reports not taking her metoprolol at home for past few days. She reports not taking BP medications if SBP <130.  Patient was recently dx with HFrEF 25-30%.  Stress test negative for reversible ischemia.  She was discharged on Jardiance, Lasix, and Entresto.  In ED, EKG showed wide QRS tachycardia HR 140s,  EKG appears AFRVR with LBBB. No acute ST-T wave changes. Tachycardia improved with IV lopressor.  Elevated troponin HS this admission 38 > 94 > 227 > 167. Elevated BNP.  CXR no acute findings. FRANKLIN in ED with Cr 2, improving this admission.    During examination, patient is resting comfortably in bed. Denies CP, shortness of breath, palpitations or acute complaints. She appears euvolemic.  SR on tele.  HR 60s.  Patient is anxious to go home.         Review of patient's allergies indicates:   Allergen Reactions    Diclofenac-misoprostol      Other reaction(s): Not available    Doxycycline     Effexor [venlafaxine]     Estradiol     Flagyl [metronidazole]     Fluconazole     Gabapentin     Iodine     Levaquin [levofloxacin]     Nexium [esomeprazole magnesium]     Nexletol [bempedoic acid] Other (See Comments)    Penicillins     Red yeast rice (monascus purpureus)     Shellfish containing products     Statins-hmg-coa reductase inhibitors     Sulfa (sulfonamide antibiotics)     Tea tree oil     Tegaserod      ZOLNORM     Tegaserod hydrogen maleate     Trazodone     Yeast, dried     Adhesive Rash     Band-aids       Past Medical History:   Diagnosis Date    Coronary artery disease     Dermatitis     Dermatitis 12/8/2017    Diabetes mellitus     Diabetes mellitus type II     Leah Barr virus infection     Fibromyalgia     GERD (gastroesophageal reflux disease)     Hypertension     MI (myocardial infarction) 09/23/2011    Pure hypercholesterolemia 2/26/2020     Past Surgical History:   Procedure Laterality Date    adenoids      ANGIOPLASTY  1987    APPENDECTOMY      CARDIAC PACEMAKER PLACEMENT  01/12/2017    CATARACT EXTRACTION, BILATERAL Bilateral 9/2/15, 3/17/15    right eye-9/2/15, left eye-3/17/15    CHOLECYSTECTOMY  1987    CORONARY ARTERY BYPASS GRAFT  2006    quadruple    coronary stents  1999    x2    CYST REMOVAL  04/25/2017    on back    HYSTERECTOMY  1966    OVARY SURGERY  1948    Ovary burst & was repaired    RHIZOTOMY  09/14/2018    TONSILLECTOMY  1940     Social History     Tobacco Use    Smoking status: Never    Smokeless tobacco: Never   Substance Use Topics    Alcohol use: No    Drug use: No        REVIEW OF SYSTEMS    As mentioned in HPI    OBJECTIVE     VITAL SIGNS (Most Recent)  Temp: 97.6 °F (36.4 °C) (12/19/24 1100)  Pulse: (!) 47 (12/19/24 1100)  Resp: 18 (12/19/24 1100)  BP: 135/75 (12/19/24 1100)  SpO2: 98 % (12/19/24 1100)    VENTILATION STATUS  Resp: 18 (12/19/24 1100)  SpO2: 98 % (12/19/24 1100)           I & O (Last 24H):  Intake/Output Summary (Last 24 hours) at 12/19/2024 1504  Last data filed at 12/19/2024 0830  Gross per 24 hour   Intake 0 ml   Output --   Net 0 ml       WEIGHTS  Wt Readings from Last 3 Encounters:   12/19/24 0745 60.8 kg (134 lb)   12/19/24 0358 60.8 kg (134 lb)   12/18/24 2100 60.8 kg (134 lb)   12/13/24 0515 64.2 kg (141 lb 8.6 oz)   12/12/24 0615 60.4 kg (133 lb 2.5 oz)   12/11/24 1030 61.5 kg (135 lb 9.3 oz)   12/11/24 0832 61.5 kg (135 lb 9.3 oz)   12/10/24 2310 62.6 kg (138 lb)    12/11/24 1006 62.6 kg (138 lb)       PHYSICAL EXAM    CONSTITUTIONAL: NAD  HEENT: Normocephalic. No pallor  NECK: no JVD  LUNGS: CTA b/l  HEART: regular rate and rhythm, S1, S2 normal, no murmur   ABDOMEN: soft, non-tender, bowel sounds normal  EXTREMITIES: No edema  SKIN: No rash  NEURO: AAO X 3  PSYCH: normal affect      HOME MEDICATIONS:  No current facility-administered medications on file prior to encounter.     Current Outpatient Medications on File Prior to Encounter   Medication Sig Dispense Refill    ALPRAZolam (XANAX) 0.25 MG tablet TAKE 1 TABLET BY MOUTH EVERY DAY AS NEEDED FOR ANXIETY (Patient taking differently: Take 0.25 mg by mouth daily as needed for Anxiety. TAKE 1 TABLET BY MOUTH EVERY DAY AS NEEDED FOR ANXIETY) 90 tablet 0    apixaban (ELIQUIS) 2.5 mg Tab Take 1 tablet (2.5 mg total) by mouth 2 (two) times daily. 180 tablet 3    aspirin (ECOTRIN) 81 MG EC tablet Take 1 tablet (81 mg total) by mouth once daily. (Patient taking differently: Take 81 mg by mouth every evening.)  0    coenzyme Q10 100 mg capsule Take 1 capsule (100 mg total) by mouth 2 (two) times daily. 60 capsule 11    cyanocobalamin 1,000 mcg/mL injection 2cc I'm every other week (Patient taking differently: Inject 1,000 mcg into the muscle every 14 (fourteen) days. 2cc I'm every other week) 10 mL 1    empagliflozin (JARDIANCE) 10 mg tablet Take 1 tablet (10 mg total) by mouth once daily. 30 tablet 0    furosemide (LASIX) 20 MG tablet Take 1 tablet (20 mg total) by mouth once daily. 30 tablet 0    levothyroxine (SYNTHROID) 88 MCG tablet Take 1 tablet (88 mcg total) by mouth once daily. 90 tablet 1    LIDOcaine (LIDODERM) 5 % Place 1 patch onto the skin once daily. Remove & Discard patch within 12 hours or as directed by MD 30 patch 1    losartan (COZAAR) 25 MG tablet Take 25 mg by mouth every morning. NEW Rx - NOT YET STARTED      MAGNESIUM ORAL Take 100 mg by mouth once daily.      metoprolol succinate (TOPROL-XL) 25 MG 24 hr  tablet Take 0.5 tablets (12.5 mg total) by mouth once daily. 15 tablet 0    montelukast (SINGULAIR) 10 mg tablet TAKE 1 TABLET BY MOUTH EVERY DAY IN THE EVENING (Patient taking differently: Take 10 mg by mouth every evening.) 90 tablet 1    multivitamin (THERAGRAN) tablet Take 1 tablet by mouth 2 (two) times a day.       nitroGLYCERIN (NITROSTAT) 0.4 MG SL tablet Place 1 tablet (0.4 mg total) under the tongue every 5 (five) minutes as needed for Chest pain. 30 tablet 1    nystatin-triamcinolone (MYCOLOG II) cream Apply 1 g topically 4 (four) times daily.      omeprazole (PRILOSEC) 20 MG capsule Take 1 capsule (20 mg total) by mouth once daily. 90 capsule 3    tiZANidine (ZANAFLEX) 4 MG tablet Take 4 mg by mouth daily as needed.      TURMERIC ORAL Take 200 mg by mouth once daily.      UNABLE TO FIND Take 1 capsule by mouth once daily. Joint health      UNABLE TO FIND Take 1 capsule by mouth 2 (two) times a day. Cardio platinum      acetaminophen (TYLENOL) 650 MG TbSR Take 650 mg by mouth every 6 (six) hours as needed for Pain.      fexofenadine (ALLEGRA) 180 MG tablet Take 180 mg by mouth once daily.      fluocinonide-emollient (FLUOCINONIDE-E) 0.05 % Crea Apply topically 2 (two) times daily. 60 g 1    fluticasone propionate (FLONASE) 50 mcg/actuation nasal spray 1 spray (50 mcg total) by Each Nostril route once daily. 9.9 mL 1    [DISCONTINUED] traMADoL (ULTRAM) 50 mg tablet Take 1 tablet (50 mg total) by mouth 2 (two) times daily as needed (pain). 14 tablet 0       SCHEDULED MEDS:   apixaban  2.5 mg Oral BID    aspirin  81 mg Oral Daily    cetirizine  5 mg Oral Daily    fluticasone propionate  1 spray Each Nostril Daily    levothyroxine  88 mcg Oral Daily    metoprolol succinate  12.5 mg Oral Daily    montelukast  10 mg Oral QHS    pantoprazole  40 mg Oral Daily       CONTINUOUS INFUSIONS:    PRN MEDS:  Current Facility-Administered Medications:     acetaminophen, 650 mg, Oral, Q4H PRN    ALPRAZolam, 0.25 mg, Oral,  Daily PRN    aluminum-magnesium hydroxide-simethicone, 30 mL, Oral, QID PRN    dextrose 50%, 12.5 g, Intravenous, PRN    glucagon (human recombinant), 1 mg, Intramuscular, PRN    glucose, 16 g, Oral, PRN    glucose, 24 g, Oral, PRN    HYDROcodone-acetaminophen, 1 tablet, Oral, Q6H PRN    melatonin, 6 mg, Oral, Nightly PRN    metoprolol, 5 mg, Intravenous, Q4H PRN    morphine, 1 mg, Intravenous, Q4H PRN    naloxone, 0.02 mg, Intravenous, PRN    nitroGLYCERIN, 0.4 mg, Sublingual, Q5 Min PRN    ondansetron, 4 mg, Intravenous, Q6H PRN    senna-docusate 8.6-50 mg, 1 tablet, Oral, Daily PRN    sodium chloride 0.9%, 10 mL, Intravenous, Q12H PRN    LABS AND DIAGNOSTICS     CBC LAST 3 DAYS  Recent Labs   Lab 12/13/24 0453 12/18/24 2129 12/18/24 2301 12/19/24  0655   WBC 13.03* 10.21  --  6.60   RBC 5.50* 5.05  --  4.56   HGB 15.8 14.4  --  12.8   HCT 48.6* 45.2 43 40.0   MCV 88 90  --  88   MCH 28.7 28.5  --  28.1   MCHC 32.5 31.9*  --  32.0   RDW 14.1 14.2  --  14.2    321  --  280   MPV 10.3 10.5  --  10.3   GRAN 63.9  8.3* 37.5*  3.8  --  43.3  2.9   LYMPH 21.7  2.8 46.4  4.7  --  36.8  2.4   MONO 12.2  1.6* 13.0  1.3*  --  15.5*  1.0   BASO 0.08 0.06  --  0.06   NRBC 0 0  --  0       COAGULATION LAST 3 DAYS  Recent Labs   Lab 12/19/24  0020   INR 1.0   APTT 31.9       CHEMISTRY LAST 3 DAYS  Recent Labs   Lab 12/13/24 0453 12/18/24 2129 12/18/24 2301 12/19/24  0655    135*  --  140   K 4.2 4.2  --  4.2    98  --  104   CO2 27 25  --  29   ANIONGAP 11 12  --  7*   BUN 32* 44*  --  41*   CREATININE 1.3 2.0*  --  1.6*   * 133*  --  94   CALCIUM 9.2 9.5  --  8.9   PH  --   --  7.408  --    MG 2.1 2.7*  --  2.6   ALBUMIN 4.1 4.7  --  3.8   PROT 7.8 8.3  --  6.8   ALKPHOS 77 79  --  64   ALT 10 16  --  13   AST 18 27  --  22   BILITOT 0.8 0.5  --  0.6       CARDIAC PROFILE LAST 3 DAYS  Recent Labs   Lab 12/18/24 2129 12/19/24  0020 12/19/24  0655 12/19/24  1215   *  --   --   --     TROPONINIHS 38.8* 94.1* 227.0* 167.8*       ENDOCRINE LAST 3 DAYS  Recent Labs   Lab 12/19/24  0655   TSH 3.817       LAST ARTERIAL BLOOD GAS  ABG  Recent Labs   Lab 12/18/24  2301   PH 7.408   PO2 37*   PCO2 42.8   HCO3 27.0   BE 2       LAST 7 DAYS MICROBIOLOGY   Microbiology Results (last 7 days)       ** No results found for the last 168 hours. **            MOST RECENT IMAGING  X-Ray Chest AP Portable  Narrative: EXAMINATION:  XR CHEST AP PORTABLE    CLINICAL HISTORY:  Chest Pain;    TECHNIQUE:  Single frontal view of the chest was performed.    COMPARISON:  12/11/2024    FINDINGS:  Enlarged cardiac silhouette.  Atherosclerosis of the aortic arch.  No focal consolidation or pleural effusion.  Left chest wall cardiac pacemaker.  Impression: As above    Electronically signed by: Tamra Hester  Date:    12/18/2024  Time:    21:48      ECHOCARDIOGRAM RESULTS (last 5)  Results for orders placed during the hospital encounter of 12/10/24    Echo    Interpretation Summary    Left Ventricle: The left ventricle is normal in size. Normal wall thickness. Normal wall motion. Septal motion is consistent with pacing. There is severely reduced systolic function with a visually estimated ejection fraction of 25 - 30%. There is normal diastolic function. E/A ratio is 1.61.    Right Ventricle: Right ventricle was not assessed. Right ventricular enlargement. Systolic function is reduced.TAPSE is 1.80 cm. Pacemaker lead present in the ventricle.    Left Atrium: Left atrium is severely dilated.    Right Atrium: Right atrium is severely dilated.    Aortic Valve: There is mild aortic regurgitation.    Mitral Valve: There is moderate to severe regurgitation.    Tricuspid Valve: There is mild to moderate regurgitation. There is moderate pulmonary hypertension. The estimated PA systolic pressure is at least 59 mmHg.    Pulmonic Valve: There is mild regurgitation.      Results for orders placed during the hospital encounter of  06/10/21    Echo    Interpretation Summary  · The left ventricle is mildly enlarged with mild concentric hypertrophy  · The estimated ejection fraction is 49%. Which is decreased  · Grade II left ventricular diastolic dysfunction.  · There is mild left ventricular global hypokinesis.  · Atrial fibrillation not observed.  · Normal right ventricular size with normal right ventricular systolic function.  · Moderate left atrial enlargement.  · Mild-to-moderate aortic regurgitation.  · There is mild aortic valve stenosis.  · Aortic valve area is 1.99 cm2; peak velocity is 1.41 m/s; mean gradient is 4 mmHg.  · Mild mitral regurgitation.  · Mild to moderate tricuspid regurgitation.  · Mild pulmonic regurgitation.  · Normal central venous pressure (3 mmHg).  · The estimated PA systolic pressure is 35 mmHg. Mild pulmonary hypertension  · Pacemaker lead is present      Results for orders placed during the hospital encounter of 07/24/20    Echo Color Flow Doppler? Yes; Bubble Contrast? No    Interpretation Summary  · Concentric left ventricular remodeling.  · Normal left ventricular systolic function. The estimated ejection fraction is 55%.  · Normal LV diastolic function.  · No wall motion abnormalities.  · Normal right ventricular systolic function.  · Mild left atrial enlargement.  · Mild aortic regurgitation.  · The mitral valve is mildly sclerotic.  · Mild tricuspid regurgitation.  · Normal central venous pressure (3 mmHg).  · The estimated PA systolic pressure is 27 mmHg.      Results for orders placed during the hospital encounter of 12/08/17    2D echo with color flow doppler    Narrative  Date of Procedure: 12/08/2017        TEST DESCRIPTION  Technical Quality: This is a portable study performed at the patient's bedside. This is a technically adequate study.    General: A catheter is present in the right-sided cardiac chambers.    Aorta: The aortic root is normal in size, measuring 2.5 cm at sinotubular junction and  3.0 cm at Sinuses of Valsalva. The proximal ascending aorta is normal in size, measuring 3.0 cm across.    Left Atrium: The left atrial volume index is severely enlarged, measuring 55.08 cc/m2.    Left Ventricle: The left ventricle is normal in size, with an end-diastolic diameter of 4.3 cm, and an end-systolic diameter of 3.4 cm. LV wall thickness is normal, with the septum measuring 1.1 cm and the posterior wall measuring 0.9 cm across. Relative  wall thickness was normal at 0.42, and the LV mass index was 98.1 g/m2 consistent with normal left ventricular mass. There is global hypokinesis. Left ventricular systolic function appears low normal to mildly depressed. Visually estimated ejection  fraction is 50-55%. The LV Doppler derived stroke volume equals 56.0 ccs.    Diastolic indices: E wave velocity 0.8 m/s, E/A ratio 1.1,  msec., E/e' ratio(avg) 14. Pulmonary vein systolic/diastolic ratio is normal. There is pseudonormalization of mitral inflow pattern consistent with significant diastolic dysfunction.    Right Atrium: The right atrium is normal in size, measuring 4.3 cm in length and 3.8 cm in width in the apical view.    Right Ventricle: The right ventricle is upper limit of normal measuring 4.1 cm at the base in the apical right ventricle-focused view. Global right ventricular systolic function appears normal. There is abnormal septal motion. Tricuspid annular plane  systolic excursion (TAPSE) is 1.8 cm. Tissue Doppler-derived tricuspid annular peak systolic velocity (S prime) is 9.6 cm/s. The estimated PA systolic pressure is 37 mmHg.    Aortic Valve:  The aortic valve is mildly sclerotic with normal leaflet mobility. The aortic valve is tri-leaflet in structure. Using a left ventricular outflow tract diameter of 2.0 cm, a left ventricular outflow tract velocity time integral of 18 cm,  and a peak instantaneous transvalvular velocity time integral of 26 cm, the calculated aortic valve area is 2.15  cm2(AVAi is 1.29 cm2/m2). Additionally, there is mild aortic regurgitation. There is aortic annular calcification.    Mitral Valve:  The mitral valve is normal in structure with normal leaflet mobility. There is mitral annular calcification.    Tricuspid Valve:  The tricuspid valve is normal in structure with normal leaflet mobility. There is mild to moderate tricuspid regurgitation.    Pulmonary Valve:  The pulmonic valve is normal in structure with normal leaflet mobility. There is trivial pulmonic regurgitation.    IVC: IVC is normal in size and collapses > 50% with a sniff, suggesting normal right atrial pressure of 3 mmHg.    Intracavitary: There is no evidence of pericardial effusion, intracavity mass, thrombi, or vegetation.        CONCLUSIONS  1 - Low normal to mildly depressed left ventricular systolic function (EF 50-55%) - in some views the basal and mid lateral wall appear hypokinetic, but endocardial definition is suboptimal for these walls.  2 - Right ventricle is upper limit of normal in size with normal systolic function.  3 - Impaired LV relaxation, elevated LAP (grade 2 diastolic dysfunction).  4 - Severe left atrial enlargement.  5 - Mild to moderate tricuspid regurgitation.  6 - Mild aortic regurgitation.  7 - The estimated PA systolic pressure is 37 mmHg.            This document has been electronically  SIGNED BY: Chava Ponce MD On: 12/11/2017 10:02      CURRENT/PREVIOUS VISIT EKG  Results for orders placed or performed during the hospital encounter of 12/18/24   EKG 12-lead    Collection Time: 12/18/24 10:34 PM   Result Value Ref Range    QRS Duration 142 ms    OHS QTC Calculation 508 ms    Narrative    Test Reason : I48.91,    Vent. Rate :  71 BPM     Atrial Rate :  71 BPM     P-R Int :    ms          QRS Dur : 142 ms      QT Int : 468 ms       P-R-T Axes :   2 -26 156 degrees    QTcB Int : 508 ms    Atrial-paced rhythm  Left bundle branch block  Abnormal ECG  No previous ECGs  available    Referred By: AAAREFERRAL SELF           Confirmed By:            ASSESSMENT/PLAN:     Active Hospital Problems    Diagnosis    *Tachycardia    Left bundle branch block    Prolonged QT interval    CHF (congestive heart failure)    FRANKLIN (acute kidney injury)    PAF (paroxysmal atrial fibrillation)    Type 2 diabetes mellitus, without long-term current use of insulin       ASSESSMENT & PLAN:     AFRVR  Elevated troponin HS  LBBB  Acute on chronic HFrEF  FRANKLIN  PAF  Hypertension  CAD s/p CABG and PCI  DM  HLD  PPM      RECOMMENDATIONS:    Interrogate ppm.  Rhythm on EKG appears AFRVR with LBBB.  Patient's HR is now controlled. Pacing during exam.  Euvolemic.  Elevated BNP, no significant findings on exam/CXR.  Troponin HS elevated this admission, downtrending. No acute ST-T wave changes. Likely elevated 2/2 demand ischemia from tachycardia and FRANKLIN.  Denies CP.  Patient was not taking beta blocker at home. Discussed with patient that she should only hold BB for SBP <100.  FRANKLIN this admission, improving.  She was recently discharged on Entresto, Jardiance, lasix.  Patient will need BMP in a few days outpatient to make sure renal function does not worsen.  If it continues to worsen, would recommend stopping entresto.  If no acute findings on pacer interrogation, patient is stable for discharge from cardiac standpoint with close follow up outpatient.       Nhung Greene NP  Department of Cardiology  Date of Service: 12/19/2024      1. Paroxysmal atrial fibrillation with rapid ventricular rate with underlying left bundle branch block.  Patient is currently in sinus rhythm.    Continue metoprolol 12.5 mg p.o. daily  Continue Eliquis 2.5 mg p.o. b.i.d.  2. Underlying renal insufficiency with a BUN of 41 and creatinine of 1.6 and is slowly improving.  3. As above       I have personally interviewed and examined the patient, I have reviewed the Nurse Practitioner's history and physical, assessment, and plan. I agree  with the findings and plan.      Lorenzo Ware M.D.  Department of Cardiology  Date of Service: 12/19/2024

## 2024-12-19 NOTE — SUBJECTIVE & OBJECTIVE
Past Medical History:   Diagnosis Date    Coronary artery disease     Dermatitis     Dermatitis 12/8/2017    Diabetes mellitus     Diabetes mellitus type II     Leah Barr virus infection     Fibromyalgia     GERD (gastroesophageal reflux disease)     Hypertension     MI (myocardial infarction) 09/23/2011    Pure hypercholesterolemia 2/26/2020       Past Surgical History:   Procedure Laterality Date    adenoids      ANGIOPLASTY  1987    APPENDECTOMY      CARDIAC PACEMAKER PLACEMENT  01/12/2017    CATARACT EXTRACTION, BILATERAL Bilateral 9/2/15, 3/17/15    right eye-9/2/15, left eye-3/17/15    CHOLECYSTECTOMY  1987    CORONARY ARTERY BYPASS GRAFT  2006    quadruple    coronary stents  1999    x2    CYST REMOVAL  04/25/2017    on back    HYSTERECTOMY  1966    OVARY SURGERY  1948    Ovary burst & was repaired    RHIZOTOMY  09/14/2018    TONSILLECTOMY  1940       Review of patient's allergies indicates:   Allergen Reactions    Diclofenac-misoprostol      Other reaction(s): Not available    Doxycycline     Effexor [venlafaxine]     Estradiol     Flagyl [metronidazole]     Fluconazole     Gabapentin     Iodine     Levaquin [levofloxacin]     Nexium [esomeprazole magnesium]     Nexletol [bempedoic acid] Other (See Comments)    Penicillins     Red yeast rice (monascus purpureus)     Shellfish containing products     Statins-hmg-coa reductase inhibitors     Sulfa (sulfonamide antibiotics)     Tea tree oil     Tegaserod      ZOLNORM    Tegaserod hydrogen maleate     Trazodone     Yeast, dried     Adhesive Rash     Band-aids       Current Facility-Administered Medications on File Prior to Encounter   Medication    evolocumab PnIj 140 mg     Current Outpatient Medications on File Prior to Encounter   Medication Sig    acetaminophen (TYLENOL) 650 MG TbSR Take 650 mg by mouth every 6 (six) hours as needed for Pain.    ALPRAZolam (XANAX) 0.25 MG tablet TAKE 1 TABLET BY MOUTH EVERY DAY AS NEEDED FOR ANXIETY (Patient taking  differently: Take 0.25 mg by mouth daily as needed for Anxiety. TAKE 1 TABLET BY MOUTH EVERY DAY AS NEEDED FOR ANXIETY)    apixaban (ELIQUIS) 2.5 mg Tab Take 1 tablet (2.5 mg total) by mouth 2 (two) times daily.    aspirin (ECOTRIN) 81 MG EC tablet Take 1 tablet (81 mg total) by mouth once daily. (Patient taking differently: Take 81 mg by mouth every evening.)    coenzyme Q10 100 mg capsule Take 1 capsule (100 mg total) by mouth 2 (two) times daily.    cyanocobalamin 1,000 mcg/mL injection 2cc I'm every other week (Patient taking differently: Inject 1,000 mcg into the muscle every 14 (fourteen) days. 2cc I'm every other week)    empagliflozin (JARDIANCE) 10 mg tablet Take 1 tablet (10 mg total) by mouth once daily.    fexofenadine (ALLEGRA) 180 MG tablet Take 180 mg by mouth once daily.    fluocinonide-emollient (FLUOCINONIDE-E) 0.05 % Crea Apply topically 2 (two) times daily.    fluticasone propionate (FLONASE) 50 mcg/actuation nasal spray 1 spray (50 mcg total) by Each Nostril route once daily.    furosemide (LASIX) 20 MG tablet Take 1 tablet (20 mg total) by mouth once daily.    levothyroxine (SYNTHROID) 88 MCG tablet Take 1 tablet (88 mcg total) by mouth once daily.    LIDOcaine (LIDODERM) 5 % Place 1 patch onto the skin once daily. Remove & Discard patch within 12 hours or as directed by MD    MAGNESIUM ORAL Take 100 mg by mouth once daily.    metoprolol succinate (TOPROL-XL) 25 MG 24 hr tablet Take 0.5 tablets (12.5 mg total) by mouth once daily.    montelukast (SINGULAIR) 10 mg tablet TAKE 1 TABLET BY MOUTH EVERY DAY IN THE EVENING    multivitamin (THERAGRAN) tablet Take 1 tablet by mouth 2 (two) times a day.     nitroGLYCERIN (NITROSTAT) 0.4 MG SL tablet Place 1 tablet (0.4 mg total) under the tongue every 5 (five) minutes as needed for Chest pain.    nystatin-triamcinolone (MYCOLOG II) cream Apply 1 g topically 4 (four) times daily.    omeprazole (PRILOSEC) 20 MG capsule Take 1 capsule (20 mg total) by  mouth once daily.    traMADoL (ULTRAM) 50 mg tablet Take 1 tablet (50 mg total) by mouth 2 (two) times daily as needed (pain).    TURMERIC ORAL Take 200 mg by mouth once daily.    UNABLE TO FIND Take 1 capsule by mouth once daily. Joint health    UNABLE TO FIND Take 1 capsule by mouth 2 (two) times a day. Cardio platinum     Family History       Problem Relation (Age of Onset)    No Known Problems Mother, Father          Tobacco Use    Smoking status: Never    Smokeless tobacco: Never   Substance and Sexual Activity    Alcohol use: No    Drug use: No    Sexual activity: Not on file     Review of Systems   Constitutional:  Negative for chills and fever.   HENT:  Negative for congestion and sore throat.    Respiratory:  Positive for shortness of breath.    Cardiovascular:  Positive for palpitations. Negative for chest pain and leg swelling.   Gastrointestinal:  Negative for abdominal pain, constipation, diarrhea, nausea and vomiting.   Genitourinary:  Negative for difficulty urinating.   Neurological:  Negative for syncope.   Psychiatric/Behavioral:  Negative for confusion.      Objective:     Vital Signs (Most Recent):  Temp: 97.9 °F (36.6 °C) (12/18/24 2100)  Pulse: (!) 147 (12/18/24 2130)  Resp: (!) 26 (12/18/24 2130)  BP: 124/75 (12/18/24 2151)  SpO2: 99 % (12/18/24 2130) Vital Signs (24h Range):  Temp:  [97.9 °F (36.6 °C)] 97.9 °F (36.6 °C)  Pulse:  [147-149] 147  Resp:  [16-26] 26  SpO2:  [98 %-99 %] 99 %  BP: (124-127)/(75-76) 124/75     Weight: 60.8 kg (134 lb)  Body mass index is 26.17 kg/m².     Physical Exam  Vitals reviewed.   Constitutional:       General: She is not in acute distress.  HENT:      Head: Normocephalic and atraumatic.      Mouth/Throat:      Mouth: Mucous membranes are dry.   Eyes:      Conjunctiva/sclera: Conjunctivae normal.   Cardiovascular:      Rate and Rhythm: Normal rate and regular rhythm.   Pulmonary:      Effort: Pulmonary effort is normal. No respiratory distress.      Breath  "sounds: Normal breath sounds.   Abdominal:      General: Bowel sounds are normal.      Palpations: Abdomen is soft.      Tenderness: There is no abdominal tenderness.   Musculoskeletal:         General: Normal range of motion.      Cervical back: Normal range of motion.      Right lower leg: No edema.      Left lower leg: No edema.   Skin:     General: Skin is warm and dry.      Coloration: Skin is pale.   Neurological:      Mental Status: She is alert. Mental status is at baseline.   Psychiatric:         Mood and Affect: Mood normal.                Significant Labs: All pertinent labs within the past 24 hours have been reviewed.  CBC:   Recent Labs   Lab 12/18/24 2129 12/18/24  2301   WBC 10.21  --    HGB 14.4  --    HCT 45.2 43     --      CMP:   Recent Labs   Lab 12/18/24 2129   *   K 4.2   CL 98   CO2 25   *   BUN 44*   CREATININE 2.0*   CALCIUM 9.5   PROT 8.3   ALBUMIN 4.7   BILITOT 0.5   ALKPHOS 79   AST 27   ALT 16   ANIONGAP 12     Cardiac Markers:   Recent Labs   Lab 12/18/24 2129   *     Coagulation: No results for input(s): "PT", "INR", "APTT" in the last 48 hours.  Lactic Acid: No results for input(s): "LACTATE" in the last 48 hours.  Magnesium:   Recent Labs   Lab 12/18/24 2129   MG 2.7*     Troponin:   Recent Labs   Lab 12/18/24 2129   TROPONINIHS 38.8*     TSH: No results for input(s): "TSH" in the last 4320 hours.  Urine Studies: No results for input(s): "COLORU", "APPEARANCEUA", "PHUR", "SPECGRAV", "PROTEINUA", "GLUCUA", "KETONESU", "BILIRUBINUA", "OCCULTUA", "NITRITE", "UROBILINOGEN", "LEUKOCYTESUR", "RBCUA", "WBCUA", "BACTERIA", "SQUAMEPITHEL", "HYALINECASTS" in the last 48 hours.    Invalid input(s): "WRIGHTSUR"    Significant Imaging: I have reviewed all pertinent imaging results/findings within the past 24 hours.  "

## 2024-12-19 NOTE — H&P
[facility] [place] - Emergency Dept  Hospital Medicine  History & Physical    Patient Name: Karma Chen  MRN: [id number]  Patient Class: OP- Observation  Admission Date: 12/18/2024  Attending Physician: Flaquita Morales MD   Primary Care Provider: Elena Sullivan MD         Patient information was obtained from patient, spouse/SO, past medical records, and ER records.     Subjective:     Principal Problem:Tachycardia    Chief Complaint:   Chief Complaint   Patient presents with    Tachycardia      at home; pt took 1 nitro     Palpitations        HPI: 89-year-old female presented to ED for eval of palpitations. pMHx CAD s/p CABG and PCI, MI, DM diet-controlled, HTN, fibromyalgia, HLD, PAF, PPM, LBBB.  Patient reported prior to arrival this afternoon she felt like her heart was beating fast, subsequently checked her heart rate which was in the 140s.  She states she also took her blood pressure at that time and noted her systolic blood pressure was in the 90s.  She also states she tried to call the hospital for advice, states she was told by nurse over the phone that she needed to go to ER for eval.  Patient denies chest pain, shortness of breath.  Denies fever, chills.  Denies abdominal pain, nausea, vomiting, changes in bowel habits.  Patient does have extensive cardiac history including PAF for which she takes Eliquis at home however she denies previous episodes of rapid HR, reported she has not taken her metoprolol for the last 3 days 2/2 SBP< 130.  Patient was recently admitted with elevated BNP and found to have EF 25-30%, she was evaluated by Cardiology, discharged 12/13/2024 with the addition of Jardiance, Lasix, and Entresto to her med list.  On arrival to ED today, patient found with wide QRS tachycardia with HR 140s, HR improved with IV Lopressor x1 dose.  Also noted with LBBB on today's EKG, which was also noted on EKG dated 12/10/2024.  Initial troponin 38.8, repeat pending.  .   Patient also found to have FRANKLIN with creatinine 2, baseline appears to be 1.1-1.4.  CXR impression with enlarged cardiac silhouette, atherosclerosis of the aortic arch, no focal consolidation or pleural effusion.  Admit to hospital medicine for further eval.    Past Medical History:   Diagnosis Date    Coronary artery disease     Dermatitis     Dermatitis 12/8/2017    Diabetes mellitus     Diabetes mellitus type II     Leah Barr virus infection     Fibromyalgia     GERD (gastroesophageal reflux disease)     Hypertension     MI (myocardial infarction) 09/23/2011    Pure hypercholesterolemia 2/26/2020       Past Surgical History:   Procedure Laterality Date    adenoids      ANGIOPLASTY  1987    APPENDECTOMY      CARDIAC PACEMAKER PLACEMENT  01/12/2017    CATARACT EXTRACTION, BILATERAL Bilateral 9/2/15, 3/17/15    right eye-9/2/15, left eye-3/17/15    CHOLECYSTECTOMY  1987    CORONARY ARTERY BYPASS GRAFT  2006    quadruple    coronary stents  1999    x2    CYST REMOVAL  04/25/2017    on back    HYSTERECTOMY  1966    OVARY SURGERY  1948    Ovary burst & was repaired    RHIZOTOMY  09/14/2018    TONSILLECTOMY  1940       Review of patient's allergies indicates:   Allergen Reactions    Diclofenac-misoprostol      Other reaction(s): Not available    Doxycycline     Effexor [venlafaxine]     Estradiol     Flagyl [metronidazole]     Fluconazole     Gabapentin     Iodine     Levaquin [levofloxacin]     Nexium [esomeprazole magnesium]     Nexletol [bempedoic acid] Other (See Comments)    Penicillins     Red yeast rice (monascus purpureus)     Shellfish containing products     Statins-hmg-coa reductase inhibitors     Sulfa (sulfonamide antibiotics)     Tea tree oil     Tegaserod      ZOLNORM    Tegaserod hydrogen maleate     Trazodone     Yeast, dried     Adhesive Rash     Band-aids       Current Facility-Administered Medications on File Prior to Encounter   Medication    evolocumab PnIj 140 mg     Current Outpatient  Medications on File Prior to Encounter   Medication Sig    acetaminophen (TYLENOL) 650 MG TbSR Take 650 mg by mouth every 6 (six) hours as needed for Pain.    ALPRAZolam (XANAX) 0.25 MG tablet TAKE 1 TABLET BY MOUTH EVERY DAY AS NEEDED FOR ANXIETY (Patient taking differently: Take 0.25 mg by mouth daily as needed for Anxiety. TAKE 1 TABLET BY MOUTH EVERY DAY AS NEEDED FOR ANXIETY)    apixaban (ELIQUIS) 2.5 mg Tab Take 1 tablet (2.5 mg total) by mouth 2 (two) times daily.    aspirin (ECOTRIN) 81 MG EC tablet Take 1 tablet (81 mg total) by mouth once daily. (Patient taking differently: Take 81 mg by mouth every evening.)    coenzyme Q10 100 mg capsule Take 1 capsule (100 mg total) by mouth 2 (two) times daily.    cyanocobalamin 1,000 mcg/mL injection 2cc I'm every other week (Patient taking differently: Inject 1,000 mcg into the muscle every 14 (fourteen) days. 2cc I'm every other week)    empagliflozin (JARDIANCE) 10 mg tablet Take 1 tablet (10 mg total) by mouth once daily.    fexofenadine (ALLEGRA) 180 MG tablet Take 180 mg by mouth once daily.    fluocinonide-emollient (FLUOCINONIDE-E) 0.05 % Crea Apply topically 2 (two) times daily.    fluticasone propionate (FLONASE) 50 mcg/actuation nasal spray 1 spray (50 mcg total) by Each Nostril route once daily.    furosemide (LASIX) 20 MG tablet Take 1 tablet (20 mg total) by mouth once daily.    levothyroxine (SYNTHROID) 88 MCG tablet Take 1 tablet (88 mcg total) by mouth once daily.    LIDOcaine (LIDODERM) 5 % Place 1 patch onto the skin once daily. Remove & Discard patch within 12 hours or as directed by MD    MAGNESIUM ORAL Take 100 mg by mouth once daily.    metoprolol succinate (TOPROL-XL) 25 MG 24 hr tablet Take 0.5 tablets (12.5 mg total) by mouth once daily.    montelukast (SINGULAIR) 10 mg tablet TAKE 1 TABLET BY MOUTH EVERY DAY IN THE EVENING    multivitamin (THERAGRAN) tablet Take 1 tablet by mouth 2 (two) times a day.     nitroGLYCERIN (NITROSTAT) 0.4 MG SL  tablet Place 1 tablet (0.4 mg total) under the tongue every 5 (five) minutes as needed for Chest pain.    nystatin-triamcinolone (MYCOLOG II) cream Apply 1 g topically 4 (four) times daily.    omeprazole (PRILOSEC) 20 MG capsule Take 1 capsule (20 mg total) by mouth once daily.    traMADoL (ULTRAM) 50 mg tablet Take 1 tablet (50 mg total) by mouth 2 (two) times daily as needed (pain).    TURMERIC ORAL Take 200 mg by mouth once daily.    UNABLE TO FIND Take 1 capsule by mouth once daily. Joint health    UNABLE TO FIND Take 1 capsule by mouth 2 (two) times a day. Cardio platinum     Family History       Problem Relation (Age of Onset)    No Known Problems Mother, Father          Tobacco Use    Smoking status: Never    Smokeless tobacco: Never   Substance and Sexual Activity    Alcohol use: No    Drug use: No    Sexual activity: Not on file     Review of Systems   Constitutional:  Negative for chills and fever.   HENT:  Negative for congestion and sore throat.    Respiratory:  Negative for shortness of breath.    Cardiovascular:  Positive for palpitations. Negative for chest pain and leg swelling.   Gastrointestinal:  Negative for abdominal pain, constipation, diarrhea, nausea and vomiting.   Genitourinary:  Negative for difficulty urinating.   Neurological:  Negative for syncope.   Psychiatric/Behavioral:  Negative for confusion.      Objective:     Vital Signs (Most Recent):  Temp: 97.9 °F (36.6 °C) (12/18/24 2100)  Pulse: (!) 147 (12/18/24 2130)  Resp: (!) 26 (12/18/24 2130)  BP: 124/75 (12/18/24 2151)  SpO2: 99 % (12/18/24 2130) Vital Signs (24h Range):  Temp:  [97.9 °F (36.6 °C)] 97.9 °F (36.6 °C)  Pulse:  [147-149] 147  Resp:  [16-26] 26  SpO2:  [98 %-99 %] 99 %  BP: (124-127)/(75-76) 124/75     Weight: 60.8 kg (134 lb)  Body mass index is 26.17 kg/m².     Physical Exam  Vitals reviewed.   Constitutional:       General: She is not in acute distress.  HENT:      Head: Normocephalic and atraumatic.       "Mouth/Throat:      Mouth: Mucous membranes are dry.   Eyes:      Conjunctiva/sclera: Conjunctivae normal.   Cardiovascular:      Rate and Rhythm: Normal rate and regular rhythm.   Pulmonary:      Effort: Pulmonary effort is normal. No respiratory distress.      Breath sounds: Normal breath sounds.   Abdominal:      General: Bowel sounds are normal.      Palpations: Abdomen is soft.      Tenderness: There is no abdominal tenderness.   Musculoskeletal:         General: Normal range of motion.      Cervical back: Normal range of motion.      Right lower leg: No edema.      Left lower leg: No edema.   Skin:     General: Skin is warm and dry.      Coloration: Skin is pale.   Neurological:      Mental Status: She is alert. Mental status is at baseline.   Psychiatric:         Mood and Affect: Mood normal.                Significant Labs: All pertinent labs within the past 24 hours have been reviewed.  CBC:   Recent Labs   Lab 12/18/24 2129 12/18/24  2301   WBC 10.21  --    HGB 14.4  --    HCT 45.2 43     --      CMP:   Recent Labs   Lab 12/18/24 2129   *   K 4.2   CL 98   CO2 25   *   BUN 44*   CREATININE 2.0*   CALCIUM 9.5   PROT 8.3   ALBUMIN 4.7   BILITOT 0.5   ALKPHOS 79   AST 27   ALT 16   ANIONGAP 12     Cardiac Markers:   Recent Labs   Lab 12/18/24 2129   *     Coagulation: No results for input(s): "PT", "INR", "APTT" in the last 48 hours.  Lactic Acid: No results for input(s): "LACTATE" in the last 48 hours.  Magnesium:   Recent Labs   Lab 12/18/24 2129   MG 2.7*     Troponin:   Recent Labs   Lab 12/18/24 2129   TROPONINIHS 38.8*     TSH: No results for input(s): "TSH" in the last 4320 hours.  Urine Studies: No results for input(s): "COLORU", "APPEARANCEUA", "PHUR", "SPECGRAV", "PROTEINUA", "GLUCUA", "KETONESU", "BILIRUBINUA", "OCCULTUA", "NITRITE", "UROBILINOGEN", "LEUKOCYTESUR", "RBCUA", "WBCUA", "BACTERIA", "SQUAMEPITHEL", "HYALINECASTS" in the last 48 hours.    Invalid input(s): " ""WRIGHTSUR"    Significant Imaging: I have reviewed all pertinent imaging results/findings within the past 24 hours.  Assessment/Plan:     * Tachycardia  Wide QRS tachy on arrival with HR 140s, elevated troponin  Serial troponins x 3, TSH, UA  Interrogate PPM  Metoprolol PRN HR >110  EKG PRN  Tele monitoring  Consult cardiology given new onset of wide QRS tachycardia     Left bundle branch block  See above  LBBB noted on EKG 12/10/24    PAF (paroxysmal atrial fibrillation)  Patient has paroxysmal (<7 days) atrial fibrillation. Patient is currently in  atrial paced . [id number] Score: 5. The patients heart rate in the last 24 hours is as follows:  Pulse  Min: 147  Max: 149     Antiarrhythmics  metoprolol injection 5 mg, Every 4 hours PRN, Intravenous    Anticoagulants  apixaban tablet 2.5 mg, 2 times daily, Oral    Plan  - Replete lytes with a goal of K>4, Mg >2  - Patient is anticoagulated, see medications listed above.  - Patient's afib is currently controlled  - Coags      FRANKLIN (acute kidney injury)  FRANKLIN is likely due to acute tubular necrosis caused by medication changes . Baseline creatinine is  1.1-1.5 . Most recent creatinine and eGFR are listed below.      Recent Labs     12/18/24 2129   CREATININE 2.0*   EGFRNORACEVR 23.7*       Plan  - FRANLKIN is stable  - Avoid nephrotoxins and renally dose meds for GFR listed above  - Monitor urine output, serial CMP, and adjust therapy as needed  - Holding new home meds entresto, jardiance, lasix  -Unable to give IVF 2/2 HFrEF    Prolonged QT interval  Avoid qt prolonging meds  Tele monitoring  Repeat EKG in am      CHF (congestive heart failure)  Patient has Systolic (HFrEF) heart failure that is Chronic. On presentation their CHF was well compensated. Most recent BNP and echo results are listed below.  Recent Labs     12/18/24 2129   *     Latest ECHO  Results for orders placed during the hospital encounter of 12/10/24    Echo    Interpretation Summary    Left " Ventricle: The left ventricle is normal in size. Normal wall thickness. Normal wall motion. Septal motion is consistent with pacing. There is severely reduced systolic function with a visually estimated ejection fraction of 25 - 30%. There is normal diastolic function. E/A ratio is 1.61.    Right Ventricle: Right ventricle was not assessed. Right ventricular enlargement. Systolic function is reduced.TAPSE is 1.80 cm. Pacemaker lead present in the ventricle.    Left Atrium: Left atrium is severely dilated.    Right Atrium: Right atrium is severely dilated.    Aortic Valve: There is mild aortic regurgitation.    Mitral Valve: There is moderate to severe regurgitation.    Tricuspid Valve: There is mild to moderate regurgitation. There is moderate pulmonary hypertension. The estimated PA systolic pressure is at least 59 mmHg.    Pulmonic Valve: There is mild regurgitation.    Current Heart Failure Medications       Plan  - Monitor strict I&Os and daily weights.    - Place on telemetry  - Low sodium diet  - Cardiology has been consulted  - The patient's volume status is at their baseline  - Holding off on resuming home lasix, entresto, jardiance for now 2/2 FRANKLIN        Type 2 diabetes mellitus, without long-term current use of insulin  Diet controlled  Accuchecks q6h while NPO  Holding off on SSI given allergy         VTE Risk Mitigation (From admission, onward)           Ordered     apixaban tablet 2.5 mg  2 times daily         12/18/24 2357     Reason for No Pharmacological VTE Prophylaxis  Once        Question:  Reasons:  Answer:  Already adequately anticoagulated on oral Anticoagulants    12/18/24 2354     IP VTE HIGH RISK PATIENT  Once         12/18/24 2354     Place sequential compression device  Until discontinued         12/18/24 2354                     **Review/resume home meds once reconciled**    On 12/19/2024, patient should be placed in hospital observation services under my care in collaboration with   Andrew.           Kimberly Barker NP  Department of Hospital Medicine  [facility] [place] - Emergency Dept

## 2024-12-19 NOTE — PLAN OF CARE
Hospital f/u scheduled with pt's PCP 01/02/2025 @ 10:40 AM.      12/19/24 1138   Post-Acute Status   Hospital Resources/Appts/Education Provided Appointments scheduled and added to AVS

## 2024-12-19 NOTE — SUBJECTIVE & OBJECTIVE
Interval History:  Patient is seen and examined during multidisciplinary rounds.  Patient's  is present at bedside.  Patient denies any further palpitations, chest pain, shortness of breath.  Patient is scheduled to undergo pacemaker interrogation and cardiologist evaluation pending.  Serum troponin is starting to trend.  Patient is already on apixaban.    Review of Systems   Constitutional:  Negative for chills and fever.   HENT:  Negative for congestion and sore throat.    Respiratory:  Positive for shortness of breath.    Cardiovascular:  Positive for palpitations. Negative for chest pain and leg swelling.   Gastrointestinal:  Negative for abdominal pain, constipation, diarrhea, nausea and vomiting.   Genitourinary:  Negative for difficulty urinating.   Neurological:  Negative for syncope.   Psychiatric/Behavioral:  Negative for confusion.      Objective:     Vital Signs (Most Recent):  Temp: 97.5 °F (36.4 °C) (12/19/24 0715)  Pulse: 68 (12/19/24 0715)  Resp: 18 (12/19/24 0715)  BP: 117/65 (12/19/24 0715)  SpO2: (!) 94 % (12/19/24 0715) Vital Signs (24h Range):  Temp:  [97.5 °F (36.4 °C)-97.9 °F (36.6 °C)] 97.5 °F (36.4 °C)  Pulse:  [] 68  Resp:  [16-24] 18  SpO2:  [94 %-99 %] 94 %  BP: (110-150)/(58-76) 117/65     Weight: 60.8 kg (134 lb)  Body mass index is 26.17 kg/m².  No intake or output data in the 24 hours ending 12/19/24 0758      Physical Exam  Vitals reviewed.   Constitutional:       General: She is not in acute distress.  HENT:      Head: Normocephalic and atraumatic.      Mouth/Throat:      Mouth: Mucous membranes are dry.   Eyes:      Conjunctiva/sclera: Conjunctivae normal.   Cardiovascular:      Rate and Rhythm: Normal rate and regular rhythm.   Pulmonary:      Effort: Pulmonary effort is normal. No respiratory distress.      Breath sounds: Normal breath sounds.   Abdominal:      General: Bowel sounds are normal.      Palpations: Abdomen is soft.      Tenderness: There is no abdominal  "tenderness.   Musculoskeletal:         General: Normal range of motion.      Cervical back: Normal range of motion.      Right lower leg: No edema.      Left lower leg: No edema.   Skin:     General: Skin is warm and dry.      Coloration: Skin is pale.   Neurological:      Mental Status: She is alert. Mental status is at baseline.   Psychiatric:         Mood and Affect: Mood normal.             Significant Labs: All pertinent labs within the past 24 hours have been reviewed.  CBC:   Recent Labs   Lab 12/18/24 2129 12/18/24  2301 12/19/24  0655   WBC 10.21  --  6.60   HGB 14.4  --  12.8   HCT 45.2 43 40.0     --  280     CMP:   Recent Labs   Lab 12/18/24 2129   *   K 4.2   CL 98   CO2 25   *   BUN 44*   CREATININE 2.0*   CALCIUM 9.5   PROT 8.3   ALBUMIN 4.7   BILITOT 0.5   ALKPHOS 79   AST 27   ALT 16   ANIONGAP 12     Coagulation:   Recent Labs   Lab 12/19/24  0020   INR 1.0   APTT 31.9     Troponin:   Recent Labs   Lab 12/18/24 2129 12/19/24  0020   TROPONINIHS 38.8* 94.1*     TSH: No results for input(s): "TSH" in the last 4320 hours.  Urine Studies:   Recent Labs   Lab 12/19/24  0027   COLORU Yellow   APPEARANCEUA Clear   PHUR 5.0   SPECGRAV 1.010   PROTEINUA Negative   GLUCUA Negative   KETONESU Negative   BILIRUBINUA Negative   OCCULTUA Negative   NITRITE Negative   UROBILINOGEN Negative   LEUKOCYTESUR Trace*   RBCUA 0   WBCUA 3   SQUAMEPITHEL 0   HYALINECASTS 3*     Microbiology Results (last 7 days)       ** No results found for the last 168 hours. **          Significant Imaging:   Recent Leximyoview (12/12/24):  1. No scintigraphic evidence of left ventricular myocardial ischemia.  2. Global left ventricular hypokinesis.  3. Calculated left ventricular ejection fraction of 31 %.    CXR:  Enlarged cardiac silhouette. Atherosclerosis of the aortic arch. No focal consolidation or pleural effusion. Left chest wall cardiac pacemaker.   "

## 2024-12-19 NOTE — ASSESSMENT & PLAN NOTE
Patient has paroxysmal (<7 days) atrial fibrillation. Patient is currently in  atrial paced . [id number] Score: 5. The patients heart rate in the last 24 hours is as follows:  Pulse  Min: 147  Max: 149     Antiarrhythmics  metoprolol injection 5 mg, Every 4 hours PRN, Intravenous    Anticoagulants  apixaban tablet 2.5 mg, 2 times daily, Oral    Plan  - Replete lytes with a goal of K>4, Mg >2  - Patient is anticoagulated, see medications listed above.  - Patient's afib is currently controlled  - Gibran

## 2024-12-19 NOTE — ASSESSMENT & PLAN NOTE
Wide QRS tachy on arrival with HR 140s, elevated troponin  Possible ACS.  Follow Cardiology recommendations.  Continue long-term anticoagulation therapy.  Interrogate PPM  Metoprolol PRN HR >110  EKG PRN  Tele monitoring

## 2024-12-19 NOTE — ASSESSMENT & PLAN NOTE
Patient has Systolic (HFrEF) heart failure that is Chronic. On presentation their CHF was well compensated. Most recent BNP and echo results are listed below.  Recent Labs     12/18/24 2129   *       Latest ECHO  Results for orders placed during the hospital encounter of 12/10/24    Echo    Interpretation Summary    Left Ventricle: The left ventricle is normal in size. Normal wall thickness. Normal wall motion. Septal motion is consistent with pacing. There is severely reduced systolic function with a visually estimated ejection fraction of 25 - 30%. There is normal diastolic function. E/A ratio is 1.61.    Right Ventricle: Right ventricle was not assessed. Right ventricular enlargement. Systolic function is reduced.TAPSE is 1.80 cm. Pacemaker lead present in the ventricle.    Left Atrium: Left atrium is severely dilated.    Right Atrium: Right atrium is severely dilated.    Aortic Valve: There is mild aortic regurgitation.    Mitral Valve: There is moderate to severe regurgitation.    Tricuspid Valve: There is mild to moderate regurgitation. There is moderate pulmonary hypertension. The estimated PA systolic pressure is at least 59 mmHg.    Pulmonic Valve: There is mild regurgitation.    Current Heart Failure Medications  metoprolol succinate (TOPROL-XL) 24 hr split tablet 12.5 mg, Daily, Oral  , Every morning, Oral    Plan  - Monitor strict I&Os and daily weights.    - Place on telemetry  - Low sodium diet  - Cardiology has been consulted  - The patient's volume status is at their baseline  - Holding off on resuming home lasix, entresto, jardiance for now 2/2 [place]

## 2024-12-19 NOTE — PT/OT/SLP EVAL
Physical Therapy Evaluation    Patient Name:  Karma Chen   MRN:  [id number]    Recommendations:     Discharge Recommendations: No Therapy Indicated   Discharge Equipment Recommendations: none   Barriers to discharge:  high fall risk, multiple rehospitalization,     Assessment:     Karma Chen is a 89 y.o. female admitted with a medical diagnosis of Tachycardia.  She presents with the following impairments/functional limitations: weakness, impaired endurance, impaired self care skills, impaired functional mobility, gait instability, impaired balance, decreased lower extremity function, decreased safety awareness, pain, impaired cardiopulmonary response to activity.    Pt found in bed with HOB elevated. Pt agreeable to visit. Pt performed bed mobility Independently. Sit to stand with supervision no AD. Pt ambulated 90 ft iwht CGA initially but improved to close supervision with increase ambulation. Gait pattern consistent of decrease foot clearance and decrease stride length occ reaching out to self steady    Rehab Prognosis: Fair; patient would benefit from acute skilled PT services to address these deficits and reach maximum level of function.    Recent Surgery: * No surgery found *      Plan:     During this hospitalization, patient to be seen 3 x/week to address the identified rehab impairments via therapeutic activities, therapeutic exercises, gait training, neuromuscular re-education and progress toward the following goals:    Plan of Care Expires:  01/19/25    Subjective     Chief Complaint: none stated, reports feeling better but still has a slight headache  Patient/Family Comments/goals: go home  Pain/Comfort:  Pain Rating 1: 0/10    Patients cultural, spiritual, Temple conflicts given the current situation: no    Living Environment:  Pt lives with her  in a one story home with no FEMI  Prior to admission, patients level of function was Independent oc use of rollator/SPC.  Equipment used at  home: none.  DME owned (not currently used): single point cane and rollator .  Upon discharge, patient will have assistance from .    Objective:     Communicated with RN prior to session.  Patient found HOB elevated with peripheral IV, telemetry  upon PT entry to room.    General Precautions: Standard, fall  Orthopedic Precautions:N/A   Braces: N/A  Respiratory Status: Room air    Exams:  RLE ROM: WFL  RLE Strength: WFL  LLE ROM: WFL  LLE Strength: WFL    Functional Mobility:  Bed Mobility:     Supine to Sit: independence  Sit to Supine: independence  Transfers:     Sit to Stand:  supervision with no AD  Gait: 90 ft no AD and CGA      AM-PAC 6 CLICK MOBILITY  Total Score:18       Treatment & Education:  Pt educated on POC, discharge recommendation, importance of time OOB, pacing/energy conservation, benefit of rollator/SPC use initially at discharge, need for assist with mobility, use of call bell to seek assistance as needed and fall prevention      Patient left HOB elevated with all lines intact, call button in reach, and OT present.    GOALS:   Multidisciplinary Problems       Physical Therapy Goals          Problem: Physical Therapy    Goal Priority Disciplines Outcome Interventions   Physical Therapy Goal     PT, PT/OT Progressing    Description: Goals to be met by: 25     Patient will increase functional independence with mobility by performin. Supine to sit with Independent  2. Sit to stand transfer with Supervision  3. Bed to chair transfer with Supervision using no AD  4. Gait  x 150 feet with Supervision using no AD                             History:     Past Medical History:   Diagnosis Date    Coronary artery disease     Dermatitis     Dermatitis 2017    Diabetes mellitus     Diabetes mellitus type II     Leah Barr virus infection     Fibromyalgia     GERD (gastroesophageal reflux disease)     Hypertension     MI (myocardial infarction) 2011    Pure hypercholesterolemia  2/26/2020       Past Surgical History:   Procedure Laterality Date    adenoids      ANGIOPLASTY  1987    APPENDECTOMY      CARDIAC PACEMAKER PLACEMENT  01/12/2017    CATARACT EXTRACTION, BILATERAL Bilateral 9/2/15, 3/17/15    right eye-9/2/15, left eye-3/17/15    CHOLECYSTECTOMY  1987    CORONARY ARTERY BYPASS GRAFT  2006    quadruple    coronary stents  1999    x2    CYST REMOVAL  04/25/2017    on back    HYSTERECTOMY  1966    OVARY SURGERY  1948    Ovary burst & was repaired    RHIZOTOMY  09/14/2018    TONSILLECTOMY  1940       Time Tracking:     PT Received On: 12/19/24  PT Start Time: 0902     PT Stop Time: 0909  PT Total Time (min): 7 min     Billable Minutes: Evaluation 7      12/19/2024

## 2024-12-19 NOTE — PLAN OF CARE
Patient cleared for discharge from case management standpoint.       12/19/24 9667   Final Note   Assessment Type Final Discharge Note   Anticipated Discharge Disposition Home   Post-Acute Status   Discharge Delays None known at this time

## 2024-12-27 LAB
OHS QRS DURATION: 142 MS
OHS QTC CALCULATION: 508 MS

## 2025-01-06 ENCOUNTER — OFFICE VISIT (OUTPATIENT)
Dept: CARDIOLOGY | Facility: CLINIC | Age: 89
End: 2025-01-06
Payer: MEDICARE

## 2025-01-06 VITALS
SYSTOLIC BLOOD PRESSURE: 100 MMHG | RESPIRATION RATE: 17 BRPM | OXYGEN SATURATION: 98 % | BODY MASS INDEX: 26.31 KG/M2 | DIASTOLIC BLOOD PRESSURE: 58 MMHG | HEART RATE: 78 BPM | HEIGHT: 60 IN | WEIGHT: 134 LBS

## 2025-01-06 DIAGNOSIS — Z78.9 STATIN INTOLERANCE: ICD-10-CM

## 2025-01-06 DIAGNOSIS — I48.0 PAF (PAROXYSMAL ATRIAL FIBRILLATION): Primary | Chronic | ICD-10-CM

## 2025-01-06 DIAGNOSIS — I25.10 CORONARY ARTERY DISEASE INVOLVING NATIVE CORONARY ARTERY OF NATIVE HEART WITHOUT ANGINA PECTORIS: ICD-10-CM

## 2025-01-06 DIAGNOSIS — I50.20 SYSTOLIC CONGESTIVE HEART FAILURE, UNSPECIFIED HF CHRONICITY: ICD-10-CM

## 2025-01-06 DIAGNOSIS — N17.9 AKI (ACUTE KIDNEY INJURY): ICD-10-CM

## 2025-01-06 DIAGNOSIS — Z95.0 PACEMAKER: Chronic | ICD-10-CM

## 2025-01-06 DIAGNOSIS — E78.00 PURE HYPERCHOLESTEROLEMIA: ICD-10-CM

## 2025-01-06 DIAGNOSIS — I44.7 LEFT BUNDLE BRANCH BLOCK: ICD-10-CM

## 2025-01-06 PROCEDURE — 99999 PR PBB SHADOW E&M-EST. PATIENT-LVL V: CPT | Mod: PBBFAC,,, | Performed by: INTERNAL MEDICINE

## 2025-01-06 PROCEDURE — 99215 OFFICE O/P EST HI 40 MIN: CPT | Mod: PBBFAC,PN | Performed by: INTERNAL MEDICINE

## 2025-01-06 PROCEDURE — 99215 OFFICE O/P EST HI 40 MIN: CPT | Mod: S$PBB,,, | Performed by: INTERNAL MEDICINE

## 2025-01-06 NOTE — ASSESSMENT & PLAN NOTE
History of dyslipidemia and she has statin intolerance.  And apparently she had allergic reaction to the injection as well-Repatha

## 2025-01-06 NOTE — ASSESSMENT & PLAN NOTE
History of coronary artery disease continue on current therapy to include metoprolol succinate 12.5 mg daily she is off isosorbide because of low blood pressure maintain on baby aspirin and she is on Eliquis 2.5 b.i.d.

## 2025-01-06 NOTE — PROGRESS NOTES
Subjective:    Patient ID:  Karma Chen is a 89 y.o. female patient here for evaluation Follow-up (Pt states she went to [facility] on 12/13/24 due to high blood pressure and stomach pains. )      History of Present Illness:  Mr. Karma Chen is 89-year-old with history of LV dysfunction coronary artery disease type 2 diabetes arterial hypertension was admitted to the hospital twice in December.  Initially she was noted to have elevated blood pressure she was optimize discharged home then she developed rapid heart rates with low blood pressure was readmitted again and treated with intravenous Lopressor.  She is noted to have underlying chronic kidney disease with last creatinine level of 1.6.  She was started on Jardiance in addition to her current therapy  Patient has not been taking her losartan because of lower blood pressure apparently.  And she is monitoring her blood pressure at home she is on metoprolol succinate 25 mg half a tablet a day and she takes an extra half on p.r.n. basis she has a functioning pacemaker in Situ.  This has no symptoms of PND orthopnea or any significant pedal edema.  Her blood pressure is borderline at  100/58 mm Hg today      HPI:   89-year-old female presented to ED for eval of palpitations. pMHx CAD s/p CABG and PCI, MI, DM diet-controlled, HTN, fibromyalgia, HLD, PAF, PPM, LBBB.  Patient reported prior to arrival this afternoon she felt like her heart was beating fast, subsequently checked her heart rate which was in the 140s.  She states she also took her blood pressure at that time and noted her systolic blood pressure was in the 90s.  She also states she tried to call the hospital for advice, states she was told by nurse over the phone that she needed to go to ER for eval.  Patient denies chest pain, shortness of breath.  Denies fever, chills.  Denies abdominal pain, nausea, vomiting, changes in bowel habits.  Patient does have extensive cardiac history including PAF for which  she takes Eliquis at home however she denies previous episodes of rapid HR, reported she has not taken her metoprolol for the last 3 days 2/2 SBP< 130.  Patient was recently admitted with elevated BNP and found to have EF 25-30%, she was evaluated by Cardiology, discharged 12/13/2024 with the addition of Jardiance, Lasix, and Entresto to her med list.  On arrival to ED today, patient found with wide QRS tachycardia with HR 140s, HR improved with IV Lopressor x1 dose.  Also noted with LBBB on today's EKG, which was also noted on EKG dated 12/10/2024.  Initial troponin 38.8, repeat pending.  .  Patient also found to have FRANKLIN with creatinine 2, baseline appears to be 1.1-1.4.  CXR impression with enlarged cardiac silhouette, atherosclerosis of the aortic arch, no focal consolidation or pleural effusion.  Admit to hospital medicine for further eval.     * No surgery found *       Hospital Course:   Patient admitted to medicine telemetry service where patient's serum troponin levels were trended which noted to be mildly elevated which were thought to be related to demand ischemia and acute kidney injury. Patient maintained on long-term anticoagulation therapy.  Cardiology team consulted.  Pacemaker interrogated.  No further cardiac intervention recommended.  Patient's symptoms resolved.  Patient feels ready to go home.  Patient instructed to avoid use of any nonsteroidal anti-inflammatory medications.  Patient to hold Lasix for 2 days and will have BMP checked next week.  Patient also counseled to hold metoprolol if systolic blood pressure under 100 or heart rate less than 50 per minute.  Patient will continue close follow-up with primary care physician and cardiologist as outpatient.  Patient has been cleared for discharge by cardiology team.      Review of patient's allergies indicates:   Allergen Reactions    Diclofenac-misoprostol      Other reaction(s): Not available    Doxycycline     Effexor [venlafaxine]      Estradiol     Flagyl [metronidazole]     Fluconazole     Gabapentin     Iodine     Levaquin [levofloxacin]     Nexium [esomeprazole magnesium]     Nexletol [bempedoic acid] Other (See Comments)    Penicillins     Red yeast rice (monascus purpureus)     Shellfish containing products     Statins-hmg-coa reductase inhibitors     Sulfa (sulfonamide antibiotics)     Tea tree oil     Tegaserod      ZOLNORM    Tegaserod hydrogen maleate     Trazodone     Yeast, dried     Adhesive Rash     Band-aids       Past Medical History:   Diagnosis Date    Coronary artery disease     Dermatitis     Dermatitis 12/8/2017    Diabetes mellitus     Diabetes mellitus type II     Leah Barr virus infection     Fibromyalgia     GERD (gastroesophageal reflux disease)     Hypertension     MI (myocardial infarction) 09/23/2011    Pure hypercholesterolemia 2/26/2020     Past Surgical History:   Procedure Laterality Date    adenoids      ANGIOPLASTY  1987    APPENDECTOMY      CARDIAC PACEMAKER PLACEMENT  01/12/2017    CATARACT EXTRACTION, BILATERAL Bilateral 9/2/15, 3/17/15    right eye-9/2/15, left eye-3/17/15    CHOLECYSTECTOMY  1987    CORONARY ARTERY BYPASS GRAFT  2006    quadruple    coronary stents  1999    x2    CYST REMOVAL  04/25/2017    on back    HYSTERECTOMY  1966    OVARY SURGERY  1948    Ovary burst & was repaired    RHIZOTOMY  09/14/2018    TONSILLECTOMY  1940     Social History     Tobacco Use    Smoking status: Never    Smokeless tobacco: Never   Substance Use Topics    Alcohol use: No    Drug use: No        Review of Systems:    As noted in HPI in addition      REVIEW OF SYSTEMS  CARDIOVASCULAR: No recent chest pain, palpitations, arm, neck, or jaw pain  RESPIRATORY: No recent fever, cough chills, SOB or congestion  : No blood in the urine  GI: No Nausea, vomiting, constipation, diarrhea, blood, or reflux.  MUSCULOSKELETAL: No myalgias  NEURO: No lightheadedness or dizziness  EYES: No Double vision, blurry, vision or  headache              Objective        Vitals:    01/06/25 0935   BP: (!) 100/58   Pulse: 78   Resp: 17       LIPIDS - LAST 2   Lab Results   Component Value Date    CHOL 193 04/03/2024    CHOL 229 (H) 08/23/2023    HDL 42 04/03/2024    HDL 38 (L) 08/23/2023    LDLCALC 110.0 04/03/2024    LDLCALC 124.0 08/23/2023    TRIG 205 (H) 04/03/2024    TRIG 335 (H) 08/23/2023    CHOLHDL 21.8 04/03/2024    CHOLHDL 16.6 (L) 08/23/2023       CBC - LAST 2  Lab Results   Component Value Date    WBC 6.60 12/19/2024    WBC 10.21 12/18/2024    RBC 4.56 12/19/2024    RBC 5.05 12/18/2024    HGB 12.8 12/19/2024    HGB 14.4 12/18/2024    HCT 40.0 12/19/2024    HCT 43 12/18/2024    MCV 88 12/19/2024    MCV 90 12/18/2024    MCH 28.1 12/19/2024    MCH 28.5 12/18/2024    MCHC 32.0 12/19/2024    MCHC 31.9 (L) 12/18/2024    RDW 14.2 12/19/2024    RDW 14.2 12/18/2024     12/19/2024     12/18/2024    MPV 10.3 12/19/2024    MPV 10.5 12/18/2024    GRAN 2.9 12/19/2024    GRAN 43.3 12/19/2024    LYMPH 2.4 12/19/2024    LYMPH 36.8 12/19/2024    MONO 1.0 12/19/2024    MONO 15.5 (H) 12/19/2024    BASO 0.06 12/19/2024    BASO 0.06 12/18/2024    NRBC 0 12/19/2024    NRBC 0 12/18/2024       CHEMISTRY & LIVER FUNCTION - LAST 2  Lab Results   Component Value Date     12/19/2024     (L) 12/18/2024    K 4.2 12/19/2024    K 4.2 12/18/2024     12/19/2024    CL 98 12/18/2024    CO2 29 12/19/2024    CO2 25 12/18/2024    ANIONGAP 7 (L) 12/19/2024    ANIONGAP 12 12/18/2024    BUN 41 (H) 12/19/2024    BUN 44 (H) 12/18/2024    CREATININE 1.6 (H) 12/19/2024    CREATININE 2.0 (H) 12/18/2024    GLU 94 12/19/2024     (H) 12/18/2024    CALCIUM 8.9 12/19/2024    CALCIUM 9.5 12/18/2024    PH 7.408 12/18/2024    MG 2.6 12/19/2024    MG 2.7 (H) 12/18/2024    ALBUMIN 3.8 12/19/2024    ALBUMIN 4.7 12/18/2024    PROT 6.8 12/19/2024    PROT 8.3 12/18/2024    ALKPHOS 64 12/19/2024    ALKPHOS 79 12/18/2024    ALT 13 12/19/2024    ALT 16  12/18/2024    AST 22 12/19/2024    AST 27 12/18/2024    BILITOT 0.6 12/19/2024    BILITOT 0.5 12/18/2024        CARDIAC PROFILE - LAST 2  Lab Results   Component Value Date     (H) 12/18/2024    BNP 2,493 (H) 12/11/2024    CPK 53 10/22/2021    CPKMB 1.57 02/20/2012    TROPONINI <0.030 10/22/2021    TROPONINI 0.077 (HH) 07/25/2020    TROPONINIHS 167.8 (HH) 12/19/2024    TROPONINIHS 227.0 (HH) 12/19/2024        COAGULATION - LAST 2  Lab Results   Component Value Date    LABPT 13.9 (H) 10/22/2021    LABPT 13.6 05/02/2020    INR 1.0 12/19/2024    INR 1.2 10/22/2021    APTT 31.9 12/19/2024    APTT 26.9 12/10/2017       ENDOCRINE & PSA - LAST 2  Lab Results   Component Value Date    HGBA1C 5.9 12/19/2024    HGBA1C 6.0 05/28/2024    TSH 3.817 12/19/2024    TSH 0.960 03/08/2023        ECHOCARDIOGRAM RESULTS  Results for orders placed during the hospital encounter of 12/10/24    Echo    Interpretation Summary    Left Ventricle: The left ventricle is normal in size. Normal wall thickness. Normal wall motion. Septal motion is consistent with pacing. There is severely reduced systolic function with a visually estimated ejection fraction of 25 - 30%. There is normal diastolic function. E/A ratio is 1.61.    Right Ventricle: Right ventricle was not assessed. Right ventricular enlargement. Systolic function is reduced.TAPSE is 1.80 cm. Pacemaker lead present in the ventricle.    Left Atrium: Left atrium is severely dilated.    Right Atrium: Right atrium is severely dilated.    Aortic Valve: There is mild aortic regurgitation.    Mitral Valve: There is moderate to severe regurgitation.    Tricuspid Valve: There is mild to moderate regurgitation. There is moderate pulmonary hypertension. The estimated PA systolic pressure is at least 59 mmHg.    Pulmonic Valve: There is mild regurgitation.      CURRENT/PREVIOUS VISIT EKG  Results for orders placed or performed during the hospital encounter of 12/18/24   EKG 12-lead     Collection Time: 12/18/24 10:34 PM   Result Value Ref Range    QRS Duration 142 ms    OHS QTC Calculation 508 ms    Narrative    Test Reason : I48.91,    Vent. Rate :  71 BPM     Atrial Rate :  71 BPM     P-R Int :    ms          QRS Dur : 142 ms      QT Int : 468 ms       P-R-T Axes :   2 -26 156 degrees    QTcB Int : 508 ms    Atrial-paced rhythm  Left bundle branch block  Abnormal ECG  Confirmed by Quin Lawson ([id number]) on 12/27/2024 9:56:29 AM    Referred By: DANETTEREFERRAL SELF           Confirmed By: Quin Lawson     No valid procedures specified.   Results for orders placed during the hospital encounter of 12/10/24    Nuclear Stress Test    Interpretation Summary    The study's ECG is uninterpretable due to left bundle branch block.    The patient reported no chest pain during the stress test.    There were no arrhythmias during stress.    The nuclear portion of this study will be reported separately.    No valid procedures specified.    PHYSICAL EXAM  CONSTITUTIONAL: Well built, well nourished in no apparent distress  NECK: no carotid bruit, no JVD  LUNGS: CTA  CHEST WALL: no tenderness  HEART: regular rate and rhythm, S1, S2 normal, no murmur, click, rub or gallop   ABDOMEN: soft, non-tender; bowel sounds normal; no masses,  no organomegaly  EXTREMITIES: Extremities normal, no edema, no calf tenderness noted  NEURO: AAO X 3    I HAVE REVIEWED :    The vital signs, nurses notes, and all the pertinent radiology and labs.        Current Outpatient Medications   Medication Instructions    acetaminophen (TYLENOL) 650 mg, Every 6 hours PRN    ALPRAZolam (XANAX) 0.25 MG tablet TAKE 1 TABLET BY MOUTH EVERY DAY AS NEEDED FOR ANXIETY    aspirin (ECOTRIN) 81 mg, Oral, Daily    coenzyme Q10 100 mg, Oral, 2 times daily    cyanocobalamin 1,000 mcg/mL injection 2cc I'm every other week    ELIQUIS 2.5 mg, Oral, 2 times daily    empagliflozin (JARDIANCE) 10 mg, Oral, Daily    fexofenadine (ALLEGRA) 180 mg, Daily     fluocinonide-emollient (FLUOCINONIDE-E) 0.05 % Crea Topical (Top), 2 times daily    fluticasone propionate (FLONASE) 50 mcg, Each Nostril, Daily    furosemide (LASIX) 20 mg, Oral, Daily    levothyroxine (SYNTHROID) 88 mcg, Oral, Daily    LIDOcaine (LIDODERM) 5 % 1 patch, Transdermal, Daily, Remove & Discard patch within 12 hours or as directed by MD    losartan (COZAAR) 25 mg, Every morning    MAGNESIUM ORAL 100 mg, Daily    metoprolol succinate (TOPROL-XL) 12.5 mg, Oral, Daily    montelukast (SINGULAIR) 10 mg tablet TAKE 1 TABLET BY MOUTH EVERY DAY IN THE EVENING    multivitamin (THERAGRAN) tablet 1 tablet, 2 times daily    nitroGLYCERIN (NITROSTAT) 0.4 mg, Sublingual, Every 5 min PRN    nystatin-triamcinolone (MYCOLOG II) cream 1 g, 4 times daily    omeprazole (PRILOSEC) 20 mg, Oral, Daily    tiZANidine (ZANAFLEX) 4 mg, Daily PRN    TURMERIC ORAL 200 mg, Daily    UNABLE TO FIND 1 capsule, Daily    UNABLE TO FIND 1 capsule, 2 times daily          Assessment & Plan     1. PAF (paroxysmal atrial fibrillation)  Assessment & Plan:  Patient has a functioning pacemaker in Situ and she is tolerating Lopressor Toprol-XL 12.5 mg daily encouraged her to continue same is also on magnesium supplements low doses.  Maintain the same regimen  Continue on Eliquis 2.5 mg p.o. b.i.d.      2. Pacemaker in place  Assessment & Plan:  Has a functioning pacemaker in Situ.  As above continue on Lopressor      3. Coronary artery disease involving native coronary artery of native heart without angina pectoris  Assessment & Plan:  History of coronary artery disease continue on current therapy to include metoprolol succinate 12.5 mg daily she is off isosorbide because of low blood pressure maintain on baby aspirin and she is on Eliquis 2.5 b.i.d.      4. Pure hypercholesterolemia  Assessment & Plan:  History of dyslipidemia and she has statin intolerance.  And apparently she had allergic reaction to the injection as well-Repatha      5.  "Left bundle branch block  Assessment & Plan:  Chronic left bundle branch block morphology stable      6. Systolic congestive heart failure, unspecified HF chronicity  Assessment & Plan:  Patient has Systolic (HFrEF) heart failure that is Chronic. On presentation their CHF was well compensated. Most recent BNP and echo results are listed below.  No results for input(s): "BNP" in the last 72 hours.  Latest ECHO  Results for orders placed during the hospital encounter of 12/10/24    Echo    Interpretation Summary    Left Ventricle: The left ventricle is normal in size. Normal wall thickness. Normal wall motion. Septal motion is consistent with pacing. There is severely reduced systolic function with a visually estimated ejection fraction of 25 - 30%. There is normal diastolic function. E/A ratio is 1.61.    Right Ventricle: Right ventricle was not assessed. Right ventricular enlargement. Systolic function is reduced.TAPSE is 1.80 cm. Pacemaker lead present in the ventricle.    Left Atrium: Left atrium is severely dilated.    Right Atrium: Right atrium is severely dilated.    Aortic Valve: There is mild aortic regurgitation.    Mitral Valve: There is moderate to severe regurgitation.    Tricuspid Valve: There is mild to moderate regurgitation. There is moderate pulmonary hypertension. The estimated PA systolic pressure is at least 59 mmHg.    Pulmonic Valve: There is mild regurgitation.    Current Heart Failure Medications       Plan  - Monitor strict I&Os and daily weights.    - Place on telemetry  - Low sodium diet  - Place on fluid restriction of 1.5 L.   - Cardiology has been consulted  - The patient's volume status is at their baseline  - however she is not able to tolerate Entresto.  Recommend to restart back on losartan 25 mg half a tablet at bedtime for few days and if the blood pressure remains stable to increase it to full tablet a day at nighttime.  In addition to this continue on metoprolol 12.5 mg daily "   Lasix she is taking every other day 20 mg I have encouraged her to cut down to maybe once a twice a day week.  She is fairly well compensated at this time because of soft blood pressure I am unable to titrate her medications adequately and she is very sensitive to blood pressure changes          7. FRANKLIN (acute kidney injury)  Assessment & Plan:  Her renal function has stabilized at 1.6.  May consider restarting back on losartan 12.5 mg daily at bedtime and increase it to 25 if tolerance      8. Statin intolerance  Assessment & Plan:  Patient has severe statin intolerance and also intolerance to Repatha maintain on diet control alone            No follow-ups on file.

## 2025-01-06 NOTE — ASSESSMENT & PLAN NOTE
Patient has a functioning pacemaker in Situ and she is tolerating Lopressor Toprol-XL 12.5 mg daily encouraged her to continue same is also on magnesium supplements low doses.  Maintain the same regimen  Continue on Eliquis 2.5 mg p.o. b.i.d.

## 2025-01-06 NOTE — ASSESSMENT & PLAN NOTE
Her renal function has stabilized at 1.6.  May consider restarting back on losartan 12.5 mg daily at bedtime and increase it to 25 if tolerance

## 2025-01-06 NOTE — ASSESSMENT & PLAN NOTE
"Patient has Systolic (HFrEF) heart failure that is Chronic. On presentation their CHF was well compensated. Most recent BNP and echo results are listed below.  No results for input(s): "BNP" in the last 72 hours.  Latest ECHO  Results for orders placed during the hospital encounter of 12/10/24    Echo    Interpretation Summary    Left Ventricle: The left ventricle is normal in size. Normal wall thickness. Normal wall motion. Septal motion is consistent with pacing. There is severely reduced systolic function with a visually estimated ejection fraction of 25 - 30%. There is normal diastolic function. E/A ratio is 1.61.    Right Ventricle: Right ventricle was not assessed. Right ventricular enlargement. Systolic function is reduced.TAPSE is 1.80 cm. Pacemaker lead present in the ventricle.    Left Atrium: Left atrium is severely dilated.    Right Atrium: Right atrium is severely dilated.    Aortic Valve: There is mild aortic regurgitation.    Mitral Valve: There is moderate to severe regurgitation.    Tricuspid Valve: There is mild to moderate regurgitation. There is moderate pulmonary hypertension. The estimated PA systolic pressure is at least 59 mmHg.    Pulmonic Valve: There is mild regurgitation.    Current Heart Failure Medications       Plan  - Monitor strict I&Os and daily weights.    - Place on telemetry  - Low sodium diet  - Place on fluid restriction of 1.5 L.   - Cardiology has been consulted  - The patient's volume status is at their baseline  - however she is not able to tolerate Entresto.  Recommend to restart back on losartan 25 mg half a tablet at bedtime for few days and if the blood pressure remains stable to increase it to full tablet a day at nighttime.  In addition to this continue on metoprolol 12.5 mg daily   Lasix she is taking every other day 20 mg I have encouraged her to cut down to maybe once a twice a day week.  She is fairly well compensated at this time because of soft blood pressure I " am unable to titrate her medications adequately and she is very sensitive to blood pressure changes

## 2025-01-06 NOTE — ASSESSMENT & PLAN NOTE
Patient has severe statin intolerance and also intolerance to Repatha maintain on diet control alone

## 2025-01-13 ENCOUNTER — TELEPHONE (OUTPATIENT)
Dept: CARDIOLOGY | Facility: CLINIC | Age: 89
End: 2025-01-13
Payer: MEDICARE

## 2025-01-13 NOTE — TELEPHONE ENCOUNTER
----- Message from Elisaeugene sent at 1/13/2025 10:28 AM CST -----  Regarding: appt  Type:  Sooner Apoointment Request      Name of Caller:pt    When is the first available appointment? Dept  booked     Symptoms:2m fu       Best Call Back Number:[phone]      Additional Information: pt needs to be schedule in march per , and she wants to get her  schedule at the same time.  please call to discuss.

## 2025-01-24 ENCOUNTER — TELEPHONE (OUTPATIENT)
Dept: CARDIOLOGY | Facility: CLINIC | Age: 89
End: 2025-01-24
Payer: MEDICARE

## 2025-01-24 ENCOUNTER — HOSPITAL ENCOUNTER (INPATIENT)
Facility: HOSPITAL | Age: 89
LOS: 2 days | Discharge: HOME | DRG: 310 | End: 2025-01-27
Attending: STUDENT IN AN ORGANIZED HEALTH CARE EDUCATION/TRAINING PROGRAM
Payer: MEDICARE

## 2025-01-24 ENCOUNTER — NURSE TRIAGE (OUTPATIENT)
Dept: ADMINISTRATIVE | Facility: CLINIC | Age: 89
End: 2025-01-24
Payer: MEDICARE

## 2025-01-24 DIAGNOSIS — R79.89 ELEVATED TROPONIN: Primary | ICD-10-CM

## 2025-01-24 DIAGNOSIS — R07.9 CHEST PAIN: ICD-10-CM

## 2025-01-24 DIAGNOSIS — R00.0 TACHYCARDIA: ICD-10-CM

## 2025-01-24 DIAGNOSIS — I50.9 CONGESTIVE HEART FAILURE, UNSPECIFIED HF CHRONICITY, UNSPECIFIED HEART FAILURE TYPE: ICD-10-CM

## 2025-01-24 LAB
ALBUMIN SERPL BCP-MCNC: 4 G/DL (ref 3.5–5.2)
ALP SERPL-CCNC: 59 U/L (ref 55–135)
ALT SERPL W/O P-5'-P-CCNC: 13 U/L (ref 10–44)
ANION GAP SERPL CALC-SCNC: 8 MMOL/L (ref 8–16)
AST SERPL-CCNC: 20 U/L (ref 10–40)
BASOPHILS # BLD AUTO: 0.07 K/UL (ref 0–0.2)
BASOPHILS NFR BLD: 0.8 % (ref 0–1.9)
BILIRUB SERPL-MCNC: 0.4 MG/DL (ref 0.1–1)
BNP SERPL-MCNC: 768 PG/ML (ref 0–99)
BUN SERPL-MCNC: 27 MG/DL (ref 8–23)
CALCIUM SERPL-MCNC: 9.4 MG/DL (ref 8.7–10.5)
CHLORIDE SERPL-SCNC: 105 MMOL/L (ref 95–110)
CO2 SERPL-SCNC: 27 MMOL/L (ref 23–29)
CREAT SERPL-MCNC: 1.2 MG/DL (ref 0.5–1.4)
DIFFERENTIAL METHOD BLD: ABNORMAL
EOSINOPHIL # BLD AUTO: 0.3 K/UL (ref 0–0.5)
EOSINOPHIL NFR BLD: 3.1 % (ref 0–8)
ERYTHROCYTE [DISTWIDTH] IN BLOOD BY AUTOMATED COUNT: 13.3 % (ref 11.5–14.5)
EST. GFR  (NO RACE VARIABLE): 43.8 ML/MIN/1.73 M^2
GLUCOSE SERPL-MCNC: 110 MG/DL (ref 70–110)
HCT VFR BLD AUTO: 40.2 % (ref 37–48.5)
HGB BLD-MCNC: 12.6 G/DL (ref 12–16)
IMM GRANULOCYTES # BLD AUTO: 0.02 K/UL (ref 0–0.04)
IMM GRANULOCYTES NFR BLD AUTO: 0.2 % (ref 0–0.5)
INFLUENZA A, MOLECULAR: NEGATIVE
INFLUENZA B, MOLECULAR: NEGATIVE
LYMPHOCYTES # BLD AUTO: 2.4 K/UL (ref 1–4.8)
LYMPHOCYTES NFR BLD: 26.5 % (ref 18–48)
MAGNESIUM SERPL-MCNC: 2.1 MG/DL (ref 1.6–2.6)
MCH RBC QN AUTO: 27.8 PG (ref 27–31)
MCHC RBC AUTO-ENTMCNC: 31.3 G/DL (ref 32–36)
MCV RBC AUTO: 89 FL (ref 82–98)
MONOCYTES # BLD AUTO: 1 K/UL (ref 0.3–1)
MONOCYTES NFR BLD: 11.3 % (ref 4–15)
NEUTROPHILS # BLD AUTO: 5.3 K/UL (ref 1.8–7.7)
NEUTROPHILS NFR BLD: 58.1 % (ref 38–73)
NRBC BLD-RTO: 0 /100 WBC
PLATELET # BLD AUTO: 268 K/UL (ref 150–450)
PMV BLD AUTO: 9.8 FL (ref 9.2–12.9)
POTASSIUM SERPL-SCNC: 4.1 MMOL/L (ref 3.5–5.1)
PROT SERPL-MCNC: 7.3 G/DL (ref 6–8.4)
RBC # BLD AUTO: 4.54 M/UL (ref 4–5.4)
SARS-COV-2 RDRP RESP QL NAA+PROBE: NEGATIVE
SODIUM SERPL-SCNC: 140 MMOL/L (ref 136–145)
SPECIMEN SOURCE: NORMAL
TROPONIN I SERPL HS-MCNC: 106.1 PG/ML (ref 0–14.9)
TROPONIN I SERPL HS-MCNC: 298.9 PG/ML (ref 0–14.9)
TROPONIN I SERPL HS-MCNC: 48.1 PG/ML (ref 0–14.9)
WBC # BLD AUTO: 9.09 K/UL (ref 3.9–12.7)

## 2025-01-24 PROCEDURE — 85025 COMPLETE CBC W/AUTO DIFF WBC: CPT | Performed by: STUDENT IN AN ORGANIZED HEALTH CARE EDUCATION/TRAINING PROGRAM

## 2025-01-24 PROCEDURE — 84484 ASSAY OF TROPONIN QUANT: CPT | Performed by: STUDENT IN AN ORGANIZED HEALTH CARE EDUCATION/TRAINING PROGRAM

## 2025-01-24 PROCEDURE — 63600175 PHARM REV CODE 636 W HCPCS: Performed by: HOSPITALIST

## 2025-01-24 PROCEDURE — 36415 COLL VENOUS BLD VENIPUNCTURE: CPT | Performed by: HOSPITALIST

## 2025-01-24 PROCEDURE — 25000003 PHARM REV CODE 250: Performed by: HOSPITALIST

## 2025-01-24 PROCEDURE — 83735 ASSAY OF MAGNESIUM: CPT | Performed by: STUDENT IN AN ORGANIZED HEALTH CARE EDUCATION/TRAINING PROGRAM

## 2025-01-24 PROCEDURE — 99900035 HC TECH TIME PER 15 MIN (STAT)

## 2025-01-24 PROCEDURE — 94799 UNLISTED PULMONARY SVC/PX: CPT

## 2025-01-24 PROCEDURE — 25000003 PHARM REV CODE 250: Performed by: STUDENT IN AN ORGANIZED HEALTH CARE EDUCATION/TRAINING PROGRAM

## 2025-01-24 PROCEDURE — 94761 N-INVAS EAR/PLS OXIMETRY MLT: CPT

## 2025-01-24 PROCEDURE — 93010 ELECTROCARDIOGRAM REPORT: CPT | Mod: ,,, | Performed by: GENERAL PRACTICE

## 2025-01-24 PROCEDURE — 93005 ELECTROCARDIOGRAM TRACING: CPT | Performed by: GENERAL PRACTICE

## 2025-01-24 PROCEDURE — G0378 HOSPITAL OBSERVATION PER HR: HCPCS

## 2025-01-24 PROCEDURE — 87635 SARS-COV-2 COVID-19 AMP PRB: CPT | Performed by: STUDENT IN AN ORGANIZED HEALTH CARE EDUCATION/TRAINING PROGRAM

## 2025-01-24 PROCEDURE — 87502 INFLUENZA DNA AMP PROBE: CPT | Performed by: STUDENT IN AN ORGANIZED HEALTH CARE EDUCATION/TRAINING PROGRAM

## 2025-01-24 PROCEDURE — 96372 THER/PROPH/DIAG INJ SC/IM: CPT | Performed by: HOSPITALIST

## 2025-01-24 PROCEDURE — 80053 COMPREHEN METABOLIC PANEL: CPT | Performed by: STUDENT IN AN ORGANIZED HEALTH CARE EDUCATION/TRAINING PROGRAM

## 2025-01-24 PROCEDURE — 83880 ASSAY OF NATRIURETIC PEPTIDE: CPT | Performed by: STUDENT IN AN ORGANIZED HEALTH CARE EDUCATION/TRAINING PROGRAM

## 2025-01-24 PROCEDURE — 84484 ASSAY OF TROPONIN QUANT: CPT | Mod: 91 | Performed by: HOSPITALIST

## 2025-01-24 RX ORDER — LIDOCAINE 50 MG/G
1 PATCH TOPICAL DAILY PRN
Status: DISCONTINUED | OUTPATIENT
Start: 2025-01-24 | End: 2025-01-27 | Stop reason: HOSPADM

## 2025-01-24 RX ORDER — ACETAMINOPHEN 325 MG/1
650 TABLET ORAL EVERY 8 HOURS PRN
Status: DISCONTINUED | OUTPATIENT
Start: 2025-01-24 | End: 2025-01-27 | Stop reason: HOSPADM

## 2025-01-24 RX ORDER — TIZANIDINE 4 MG/1
4 TABLET ORAL NIGHTLY PRN
Status: DISCONTINUED | OUTPATIENT
Start: 2025-01-24 | End: 2025-01-27 | Stop reason: HOSPADM

## 2025-01-24 RX ORDER — ALPRAZOLAM 0.25 MG/1
0.25 TABLET ORAL DAILY PRN
Status: DISCONTINUED | OUTPATIENT
Start: 2025-01-24 | End: 2025-01-27 | Stop reason: HOSPADM

## 2025-01-24 RX ORDER — TRAMADOL HYDROCHLORIDE 50 MG/1
50 TABLET ORAL 2 TIMES DAILY PRN
Status: DISCONTINUED | OUTPATIENT
Start: 2025-01-24 | End: 2025-01-27 | Stop reason: HOSPADM

## 2025-01-24 RX ORDER — LORATADINE 10 MG/1
10 TABLET ORAL DAILY
COMMUNITY

## 2025-01-24 RX ORDER — ONDANSETRON HYDROCHLORIDE 2 MG/ML
4 INJECTION, SOLUTION INTRAVENOUS EVERY 8 HOURS PRN
Status: DISCONTINUED | OUTPATIENT
Start: 2025-01-24 | End: 2025-01-27 | Stop reason: HOSPADM

## 2025-01-24 RX ORDER — MONTELUKAST SODIUM 10 MG/1
10 TABLET ORAL NIGHTLY
Status: DISCONTINUED | OUTPATIENT
Start: 2025-01-24 | End: 2025-01-27 | Stop reason: HOSPADM

## 2025-01-24 RX ORDER — SODIUM,POTASSIUM PHOSPHATES 280-250MG
2 POWDER IN PACKET (EA) ORAL
Status: DISCONTINUED | OUTPATIENT
Start: 2025-01-24 | End: 2025-01-27 | Stop reason: HOSPADM

## 2025-01-24 RX ORDER — LANOLIN ALCOHOL/MO/W.PET/CERES
800 CREAM (GRAM) TOPICAL
Status: DISCONTINUED | OUTPATIENT
Start: 2025-01-24 | End: 2025-01-27 | Stop reason: HOSPADM

## 2025-01-24 RX ORDER — NALOXONE HCL 0.4 MG/ML
0.02 VIAL (ML) INJECTION
Status: DISCONTINUED | OUTPATIENT
Start: 2025-01-24 | End: 2025-01-27 | Stop reason: HOSPADM

## 2025-01-24 RX ORDER — BISACODYL 5 MG
5 TABLET, DELAYED RELEASE (ENTERIC COATED) ORAL 2 TIMES DAILY
Status: DISCONTINUED | OUTPATIENT
Start: 2025-01-24 | End: 2025-01-27 | Stop reason: HOSPADM

## 2025-01-24 RX ORDER — ACETAMINOPHEN 325 MG/1
650 TABLET ORAL EVERY 4 HOURS PRN
Status: DISCONTINUED | OUTPATIENT
Start: 2025-01-24 | End: 2025-01-27 | Stop reason: HOSPADM

## 2025-01-24 RX ORDER — FUROSEMIDE 20 MG/1
20 TABLET ORAL EVERY OTHER DAY
Status: DISCONTINUED | OUTPATIENT
Start: 2025-01-26 | End: 2025-01-27 | Stop reason: HOSPADM

## 2025-01-24 RX ORDER — LEVOTHYROXINE SODIUM 88 UG/1
88 TABLET ORAL DAILY
Status: DISCONTINUED | OUTPATIENT
Start: 2025-01-25 | End: 2025-01-27 | Stop reason: HOSPADM

## 2025-01-24 RX ORDER — ASPIRIN 81 MG/1
81 TABLET ORAL NIGHTLY
Status: DISCONTINUED | OUTPATIENT
Start: 2025-01-25 | End: 2025-01-27

## 2025-01-24 RX ORDER — METOPROLOL TARTRATE 1 MG/ML
5 INJECTION, SOLUTION INTRAVENOUS
Status: COMPLETED | OUTPATIENT
Start: 2025-01-24 | End: 2025-01-24

## 2025-01-24 RX ORDER — ENOXAPARIN SODIUM 100 MG/ML
30 INJECTION SUBCUTANEOUS EVERY 24 HOURS
Status: DISCONTINUED | OUTPATIENT
Start: 2025-01-24 | End: 2025-01-24

## 2025-01-24 RX ORDER — ALUMINUM HYDROXIDE, MAGNESIUM HYDROXIDE, AND SIMETHICONE 1200; 120; 1200 MG/30ML; MG/30ML; MG/30ML
30 SUSPENSION ORAL 4 TIMES DAILY PRN
Status: DISCONTINUED | OUTPATIENT
Start: 2025-01-24 | End: 2025-01-27 | Stop reason: HOSPADM

## 2025-01-24 RX ORDER — SODIUM CHLORIDE 0.9 % (FLUSH) 0.9 %
10 SYRINGE (ML) INJECTION EVERY 12 HOURS PRN
Status: DISCONTINUED | OUTPATIENT
Start: 2025-01-24 | End: 2025-01-27 | Stop reason: HOSPADM

## 2025-01-24 RX ORDER — TRAMADOL HYDROCHLORIDE 50 MG/1
50 TABLET ORAL 2 TIMES DAILY PRN
COMMUNITY

## 2025-01-24 RX ORDER — PANTOPRAZOLE SODIUM 40 MG/1
40 TABLET, DELAYED RELEASE ORAL NIGHTLY
Status: DISCONTINUED | OUTPATIENT
Start: 2025-01-24 | End: 2025-01-27 | Stop reason: HOSPADM

## 2025-01-24 RX ORDER — GLUCAGON 1 MG
1 KIT INJECTION
Status: DISCONTINUED | OUTPATIENT
Start: 2025-01-24 | End: 2025-01-27 | Stop reason: HOSPADM

## 2025-01-24 RX ORDER — BISACODYL 5 MG
5 TABLET, DELAYED RELEASE (ENTERIC COATED) ORAL 2 TIMES DAILY
COMMUNITY

## 2025-01-24 RX ORDER — ASPIRIN 325 MG
325 TABLET ORAL
Status: COMPLETED | OUTPATIENT
Start: 2025-01-24 | End: 2025-01-24

## 2025-01-24 RX ORDER — DICLOFENAC SODIUM 10 MG/G
2 GEL TOPICAL DAILY PRN
COMMUNITY

## 2025-01-24 RX ORDER — NALOXONE HYDROCHLORIDE 4 MG/.1ML
1 SPRAY NASAL DAILY PRN
COMMUNITY

## 2025-01-24 RX ORDER — IBUPROFEN 100 MG/5ML
1000 SUSPENSION, ORAL (FINAL DOSE FORM) ORAL DAILY
COMMUNITY

## 2025-01-24 RX ORDER — BENZONATATE 100 MG/1
100 CAPSULE ORAL 3 TIMES DAILY PRN
Status: DISCONTINUED | OUTPATIENT
Start: 2025-01-24 | End: 2025-01-27 | Stop reason: HOSPADM

## 2025-01-24 RX ORDER — ALPRAZOLAM 0.5 MG/1
0.5 TABLET ORAL NIGHTLY PRN
Status: DISCONTINUED | OUTPATIENT
Start: 2025-01-24 | End: 2025-01-24

## 2025-01-24 RX ORDER — ENOXAPARIN SODIUM 100 MG/ML
40 INJECTION SUBCUTANEOUS EVERY 24 HOURS
Status: DISCONTINUED | OUTPATIENT
Start: 2025-01-24 | End: 2025-01-24

## 2025-01-24 RX ORDER — BENZONATATE 100 MG/1
100 CAPSULE ORAL 3 TIMES DAILY PRN
COMMUNITY

## 2025-01-24 RX ORDER — EMPAGLIFLOZIN 10 MG/1
10 TABLET, FILM COATED ORAL EVERY MORNING
Status: ON HOLD | COMMUNITY
End: 2025-01-27

## 2025-01-24 RX ORDER — IBUPROFEN 200 MG
16 TABLET ORAL
Status: DISCONTINUED | OUTPATIENT
Start: 2025-01-24 | End: 2025-01-27 | Stop reason: HOSPADM

## 2025-01-24 RX ORDER — IBUPROFEN 200 MG
24 TABLET ORAL
Status: DISCONTINUED | OUTPATIENT
Start: 2025-01-24 | End: 2025-01-27 | Stop reason: HOSPADM

## 2025-01-24 RX ADMIN — ASPIRIN 325 MG ORAL TABLET 325 MG: 325 PILL ORAL at 02:01

## 2025-01-24 RX ADMIN — ALPRAZOLAM 0.25 MG: 0.25 TABLET ORAL at 09:01

## 2025-01-24 RX ADMIN — PANTOPRAZOLE SODIUM 40 MG: 40 TABLET, DELAYED RELEASE ORAL at 09:01

## 2025-01-24 RX ADMIN — METOPROLOL SUCCINATE 12.5 MG: 25 TABLET, EXTENDED RELEASE ORAL at 09:01

## 2025-01-24 RX ADMIN — METOROPROLOL TARTRATE 5 MG: 5 INJECTION, SOLUTION INTRAVENOUS at 04:01

## 2025-01-24 RX ADMIN — APIXABAN 2.5 MG: 2.5 TABLET, FILM COATED ORAL at 09:01

## 2025-01-24 RX ADMIN — ENOXAPARIN SODIUM 30 MG: 30 INJECTION SUBCUTANEOUS at 07:01

## 2025-01-24 NOTE — ED PROVIDER NOTES
Encounter Date: 1/24/2025       History     Chief Complaint   Patient presents with    Tachycardia     Pt c/o upper back pain and shortness of breath intermittent x 2 weeks. Pt states she checked her heart rate at home and it was high.     HPI    Patient isn't 89-year-old female with history of CAD status post multiple stents and bypass, diabetes, hypertension presenting with palpitations and back pain.  Patient woke up feeling tired this morning with worse fatigue than normal.  She laid on the bed and took her blood pressure which showed a normal blood pressure of 135/68, however patient also noted a heart rate to be around 130.  Patient took a nitro sublingual which she said improved her back pain little.  Denies any nausea, vomiting, shortness of breath, lower extremity swelling, orthopnea.  Recently followed with Chaz who restarted her losartan.  Per patient's med list she does not appear to be taking metoprolol.    Review of patient's allergies indicates:   Allergen Reactions    Diclofenac-misoprostol      Other reaction(s): Not available    Doxycycline     Effexor [venlafaxine]     Estradiol     Flagyl [metronidazole]     Fluconazole     Gabapentin     Iodine     Levaquin [levofloxacin]     Nexium [esomeprazole magnesium]     Nexletol [bempedoic acid] Other (See Comments)    Penicillins     Red yeast rice (monascus purpureus)     Shellfish containing products     Statins-hmg-coa reductase inhibitors     Sulfa (sulfonamide antibiotics)     Tea tree oil     Tegaserod      ZOLNORM    Tegaserod hydrogen maleate     Trazodone     Yeast, dried     Adhesive Rash     Band-aids     Past Medical History:   Diagnosis Date    Coronary artery disease     Dermatitis     Dermatitis 12/8/2017    Diabetes mellitus     Diabetes mellitus type II     Leah Barr virus infection     Fibromyalgia     GERD (gastroesophageal reflux disease)     Hypertension     MI (myocardial infarction) 09/23/2011    Pure hypercholesterolemia  2/26/2020     Past Surgical History:   Procedure Laterality Date    adenoids      ANGIOPLASTY  1987    APPENDECTOMY      CARDIAC PACEMAKER PLACEMENT  01/12/2017    CATARACT EXTRACTION, BILATERAL Bilateral 9/2/15, 3/17/15    right eye-9/2/15, left eye-3/17/15    CHOLECYSTECTOMY  1987    CORONARY ARTERY BYPASS GRAFT  2006    quadruple    coronary stents  1999    x2    CYST REMOVAL  04/25/2017    on back    HYSTERECTOMY  1966    OVARY SURGERY  1948    Ovary burst & was repaired    RHIZOTOMY  09/14/2018    TONSILLECTOMY  1940     Family History   Problem Relation Name Age of Onset    No Known Problems Mother      No Known Problems Father       Social History     Tobacco Use    Smoking status: Never    Smokeless tobacco: Never   Substance Use Topics    Alcohol use: No    Drug use: No     Review of Systems    As noted above    Physical Exam     Initial Vitals [01/24/25 1339]   BP Pulse Resp Temp SpO2   (!) 143/82 (!) 128 18 98 °F (36.7 °C) 98 %      MAP       --         Physical Exam    Constitutional: She appears well-developed and well-nourished. She is not diaphoretic. No distress.   HENT:   Head: Normocephalic and atraumatic.   Eyes: Conjunctivae and EOM are normal. Pupils are equal, round, and reactive to light.   Cardiovascular:            No murmur heard.  Tachycardia with a rate around 120   Pulmonary/Chest: Breath sounds normal. No respiratory distress. She has no wheezes. She has no rhonchi. She has no rales.   Abdominal: Abdomen is soft. She exhibits no distension. There is no abdominal tenderness.   Musculoskeletal:         General: No tenderness or edema. Normal range of motion.     Neurological: She is alert and oriented to person, place, and time.   Skin: Skin is warm and dry. No rash noted.         ED Course   Procedures  Labs Reviewed   CBC W/ AUTO DIFFERENTIAL - Abnormal       Result Value    WBC 9.09      RBC 4.54      Hemoglobin 12.6      Hematocrit 40.2      MCV 89      MCH 27.8      MCHC 31.3 (*)      RDW 13.3      Platelets 268      MPV 9.8      Immature Granulocytes 0.2      Gran # (ANC) 5.3      Immature Grans (Abs) 0.02      Lymph # 2.4      Mono # 1.0      Eos # 0.3      Baso # 0.07      nRBC 0      Gran % 58.1      Lymph % 26.5      Mono % 11.3      Eosinophil % 3.1      Basophil % 0.8      Differential Method Automated     COMPREHENSIVE METABOLIC PANEL - Abnormal    Sodium 140      Potassium 4.1      Chloride 105      CO2 27      Glucose 110      BUN 27 (*)     Creatinine 1.2      Calcium 9.4      Total Protein 7.3      Albumin 4.0      Total Bilirubin 0.4      Alkaline Phosphatase 59      AST 20      ALT 13      eGFR 43.8 (*)     Anion Gap 8     TROPONIN I HIGH SENSITIVITY - Abnormal    Troponin I High Sensitivity 48.1 (*)    B-TYPE NATRIURETIC PEPTIDE - Abnormal     (*)    TROPONIN I HIGH SENSITIVITY - Abnormal    Troponin I High Sensitivity 106.1 (*)    MAGNESIUM    Magnesium 2.1     SARS-COV-2 RNA AMPLIFICATION, QUAL    SARS-CoV-2 RNA, Amplification, Qual Negative     INFLUENZA A AND B ANTIGEN    Influenza A, Molecular Negative      Influenza B, Molecular Negative      Flu A & B Source Nasal swab      Narrative:     Specimen Source->Nasopharyngeal Swab        ECG Results              EKG 12-lead (In process)        Collection Time Result Time QRS Duration [facility] QTC Calculation    01/24/25 13:46:10 01/24/25 14:21:35 140 508                     In process by Interface, Lab In [facility] (01/24/25 14:21:43)                   Narrative:    Test Reason : R07.9,    Vent. Rate : 125 BPM     Atrial Rate :    BPM     P-R Int :    ms          QRS Dur : 140 ms      QT Int : 352 ms       P-R-T Axes :     -9 181 degrees    QTcB Int : 508 ms    Wide QRS tachycardia  Left bundle branch block  Abnormal ECG  No previous ECGs available    Referred By: AAAREFERRAL SELF           Confirmed By:                                   Imaging Results              X-Ray Chest PA And Lateral (Final result)  Result time 01/24/25  15:17:49   Procedure changed from X-Ray Chest AP Portable     Final result by Martha Brady IV, MD (01/24/25 15:17:49)                   Impression:      No acute cardiopulmonary disease.    Borderline heart size.    Additional incidental observations as above.      Electronically signed by: Martha Brady  Date:    01/24/2025  Time:    15:17               Narrative:    EXAMINATION:  XR CHEST PA AND LATERAL    CLINICAL HISTORY:  Chest Pain;    COMPARISON:  12/18/2024.    FINDINGS:  The cardiac silhouette is borderline enlarged.  There is no evidence of acute pulmonary vascular engorgement.  Surgical changes are present in the sternum.  There is calcified plaque formation within the arch of the aorta.  A dual lead left-sided cardiac device has leads positioned over the right side of the cardiac silhouette.    The lungs are appropriately expanded.  There are no confluent infiltrates or significant volume loss.  There is no effusion.  There is mild apical pleural thickening.                                       Medications   aspirin tablet 325 mg (325 mg Oral Given 1/24/25 1450)   metoprolol injection 5 mg (5 mg Intravenous Given 1/24/25 1639)     Medical Decision Making  89-year-old female with history of CAD, diabetes, hypertension presenting with palpitations.  Patient with recent admission similar to this in December.  Patient reports adherence to her metoprolol 12.5 daily.  Reported history of hypotension but recently started back on losartan due to stable blood pressure.  Vital signs here are stable other than heart rate of 128.  Patient overall asymptomatic at this time.  Differential includes a flutter, sinus tachycardia, ACS, CHF exacerbation.  EKG with left bundle-branch block with rate of 128 with similar findings compared to prior.  Labs reviewed with troponin initially in the 40s.  CBC, CMP, Mag overall unremarkable.  BNP near baseline.  Patient given 5 of IV metoprolol with improvement of heart rate to  105.  Repeat troponin did increase to 100.  Spoke to Cardiology in the phone who recommended admission to trend blood pressure and heart rate while increasing metoprolol to 12.5 b.i.d..  Patient agreeable to plan and hospitalist team was consulted.    Vikash Brooks MD  Emergency Medicine      Amount and/or Complexity of Data Reviewed  Labs: ordered. Decision-making details documented in ED Course.  Radiology: ordered.    Risk  OTC drugs.  Prescription drug management.               ED Course as of 01/24/25 1751 Fri Jan 24, 2025   1553 BNP(!): 768 [KH]   1601 Troponin I High Sensitivity(!): 48.1 [KH]      ED Course User Index  [KH] Vikash Brooks MD                           Clinical Impression:  Final diagnoses:  [R07.9] Chest pain                 Vikash Brooks MD  01/24/25 1751

## 2025-01-24 NOTE — TELEPHONE ENCOUNTER
----- Message from Miladis sent at 1/24/2025 12:58 PM CST -----  Type:  Needs Medical Advice    Who Called: PT   Symptoms (please be specific):  133 HEART RATE AND  BACK PAIN   How long has patient had these symptoms:   2 HOURS   Pharmacy name and phone #:  N/A  Would the patient rather a call back or a response via [facility]? CALL   Best Call Back Number: [phone]  Additional Information:  THANK YOU     PT TRANS TO THE ON CALL NURSE     Symptoms: Heartbeat Symptoms (Fast, Slow, or Irregular), Back Pain - Not From Injury  Outcome: Transfer to a nurse or provider NOW!  Reason: Caller denied all higher acuity questions    The caller accepted this outcome.

## 2025-01-24 NOTE — TELEPHONE ENCOUNTER
Pt stated she was having light pain in her back she took nitroglycerin and it seemed to help her back pain. Back pain is currently 3/10. Back pain has been present about an hour. Pt stated she has a headache 4/10.  Her heart rate is 133. No chest pain. No difficulty breathing. Pox 98%. She stated she has been having allergy problems this am. Pacemaker. Bp 135/85 heart 133. Pt feels tired. Pt stated she never had chest pain with her heart attacks. Care advice recommends pt go to ER. Pt verbalized understanding.   Reason for Disposition   Feeling weak or lightheaded (e.g., woozy, feeling like they might faint)    Additional Information   Negative: Passed out (e.g., fainted, lost consciousness, blacked out and was not responding)   Negative: Shock suspected (e.g., cold/pale/clammy skin, too weak to stand, low BP, rapid pulse)   Negative: Difficult to awaken or acting confused (e.g., disoriented, slurred speech)   Negative: Visible sweat on face or sweat dripping down face   Negative: Unable to walk, or can only walk with assistance (e.g., requires support)   Negative: Received SHOCK from implantable cardiac defibrillator and has persisting symptoms (i.e., palpitations, lightheadedness)   Negative: Feeling weak or lightheaded (e.g., woozy, feeling like they might faint) AND heart beating very rapidly (e.g., > 140 / minute)   Negative: Feeling weak or lightheaded (e.g., woozy, feeling like they might faint) AND heart beating very slowly (e.g., < 50 / minute)   Negative: Sounds like a life-threatening emergency to the triager   Negative: Chest pain   Negative: Difficulty breathing    Protocols used: Heart Rate and Heartbeat [id number]

## 2025-01-25 ENCOUNTER — CLINICAL SUPPORT (OUTPATIENT)
Dept: CARDIOLOGY | Facility: HOSPITAL | Age: 89
DRG: 310 | End: 2025-01-25
Attending: STUDENT IN AN ORGANIZED HEALTH CARE EDUCATION/TRAINING PROGRAM
Payer: MEDICARE

## 2025-01-25 VITALS — HEIGHT: 60 IN | WEIGHT: 135.56 LBS | BODY MASS INDEX: 26.61 KG/M2

## 2025-01-25 LAB
ALBUMIN SERPL BCP-MCNC: 3.7 G/DL (ref 3.5–5.2)
ALP SERPL-CCNC: 57 U/L (ref 55–135)
ALT SERPL W/O P-5'-P-CCNC: 12 U/L (ref 10–44)
ANION GAP SERPL CALC-SCNC: 8 MMOL/L (ref 8–16)
APICAL FOUR CHAMBER EJECTION FRACTION: 21 %
APICAL TWO CHAMBER EJECTION FRACTION: 40 %
APTT PPP: 132.1 SEC (ref 21–32)
APTT PPP: 29.2 SEC (ref 21–32)
APTT PPP: 47.2 SEC (ref 21–32)
AST SERPL-CCNC: 21 U/L (ref 10–40)
BACTERIA #/AREA URNS HPF: NORMAL /HPF
BASOPHILS # BLD AUTO: 0.04 K/UL (ref 0–0.2)
BASOPHILS NFR BLD: 0.5 % (ref 0–1.9)
BILIRUB SERPL-MCNC: 0.4 MG/DL (ref 0.1–1)
BILIRUB UR QL STRIP: NEGATIVE
BSA FOR ECHO PROCEDURE: 1.61 M2
BUN SERPL-MCNC: 28 MG/DL (ref 8–23)
CALCIUM SERPL-MCNC: 9.4 MG/DL (ref 8.7–10.5)
CHLORIDE SERPL-SCNC: 107 MMOL/L (ref 95–110)
CLARITY UR: CLEAR
CO2 SERPL-SCNC: 27 MMOL/L (ref 23–29)
COLOR UR: YELLOW
CREAT SERPL-MCNC: 1.1 MG/DL (ref 0.5–1.4)
CV ECHO LV RWT: 0.32 CM
D DIMER PPP IA.FEU-MCNC: 0.38 MG/L FEU (ref 0–0.49)
DIFFERENTIAL METHOD BLD: NORMAL
ECHO LV POSTERIOR WALL: 0.7 CM (ref 0.6–1.1)
EOSINOPHIL # BLD AUTO: 0.3 K/UL (ref 0–0.5)
EOSINOPHIL NFR BLD: 3.3 % (ref 0–8)
ERYTHROCYTE [DISTWIDTH] IN BLOOD BY AUTOMATED COUNT: 13.4 % (ref 11.5–14.5)
EST. GFR  (NO RACE VARIABLE): 48.6 ML/MIN/1.73 M^2
FRACTIONAL SHORTENING: 13.6 % (ref 28–44)
GLUCOSE SERPL-MCNC: 97 MG/DL (ref 70–110)
GLUCOSE UR QL STRIP: NEGATIVE
HCT VFR BLD AUTO: 37.8 % (ref 37–48.5)
HGB BLD-MCNC: 12.3 G/DL (ref 12–16)
HGB UR QL STRIP: NEGATIVE
IMM GRANULOCYTES # BLD AUTO: 0.02 K/UL (ref 0–0.04)
IMM GRANULOCYTES NFR BLD AUTO: 0.2 % (ref 0–0.5)
INR PPP: 1.1 (ref 0.8–1.2)
INTERVENTRICULAR SEPTUM: 0.9 CM (ref 0.6–1.1)
KETONES UR QL STRIP: NEGATIVE
LEFT ATRIUM SIZE: 4.4 CM
LEFT INTERNAL DIMENSION IN SYSTOLE: 3.8 CM (ref 2.1–4)
LEFT VENTRICLE DIASTOLIC VOLUME INDEX: 55.51 ML/M2
LEFT VENTRICLE DIASTOLIC VOLUME: 87.7 ML
LEFT VENTRICLE END DIASTOLIC VOLUME APICAL 2 CHAMBER: 54.8 ML
LEFT VENTRICLE END DIASTOLIC VOLUME APICAL 4 CHAMBER: 109 ML
LEFT VENTRICLE MASS INDEX: 69.3 G/M2
LEFT VENTRICLE SYSTOLIC VOLUME INDEX: 39.2 ML/M2
LEFT VENTRICLE SYSTOLIC VOLUME: 62 ML
LEFT VENTRICULAR INTERNAL DIMENSION IN DIASTOLE: 4.4 CM (ref 3.5–6)
LEFT VENTRICULAR MASS: 109.4 G
LEUKOCYTE ESTERASE UR QL STRIP: ABNORMAL
LVED V (TEICH): 87.7 ML
LVES V (TEICH): 62 ML
LYMPHOCYTES # BLD AUTO: 3.2 K/UL (ref 1–4.8)
LYMPHOCYTES NFR BLD: 38.3 % (ref 18–48)
MAGNESIUM SERPL-MCNC: 2.1 MG/DL (ref 1.6–2.6)
MCH RBC QN AUTO: 28.6 PG (ref 27–31)
MCHC RBC AUTO-ENTMCNC: 32.5 G/DL (ref 32–36)
MCV RBC AUTO: 88 FL (ref 82–98)
MICROSCOPIC COMMENT: NORMAL
MONOCYTES # BLD AUTO: 0.9 K/UL (ref 0.3–1)
MONOCYTES NFR BLD: 11.4 % (ref 4–15)
NEUTROPHILS # BLD AUTO: 3.8 K/UL (ref 1.8–7.7)
NEUTROPHILS NFR BLD: 46.3 % (ref 38–73)
NITRITE UR QL STRIP: NEGATIVE
NRBC BLD-RTO: 0 /100 WBC
OHS CV RV/LV RATIO: 0.43 CM
OHS LV EJECTION FRACTION SIMPSONS BIPLANE MOD: 33 %
PH UR STRIP: 7 [PH] (ref 5–8)
PHOSPHATE SERPL-MCNC: 4.4 MG/DL (ref 2.7–4.5)
PLATELET # BLD AUTO: 264 K/UL (ref 150–450)
PMV BLD AUTO: 10 FL (ref 9.2–12.9)
POTASSIUM SERPL-SCNC: 3.9 MMOL/L (ref 3.5–5.1)
PROT SERPL-MCNC: 6.5 G/DL (ref 6–8.4)
PROT UR QL STRIP: NEGATIVE
PROTHROMBIN TIME: 11.7 SEC (ref 9–12.5)
RBC # BLD AUTO: 4.3 M/UL (ref 4–5.4)
RBC #/AREA URNS HPF: 3 /HPF (ref 0–4)
RIGHT VENTRICLE DIASTOLIC BASEL DIMENSION: 1.9 CM
RIGHT VENTRICULAR END-DIASTOLIC DIMENSION: 1.9 CM
SODIUM SERPL-SCNC: 142 MMOL/L (ref 136–145)
SP GR UR STRIP: 1.01 (ref 1–1.03)
SQUAMOUS #/AREA URNS HPF: 2 /HPF
TROPONIN I SERPL HS-MCNC: 603.3 PG/ML (ref 0–14.9)
TROPONIN I SERPL HS-MCNC: 692.9 PG/ML (ref 0–14.9)
TROPONIN I SERPL HS-MCNC: 885.8 PG/ML (ref 0–14.9)
URN SPEC COLLECT METH UR: ABNORMAL
UROBILINOGEN UR STRIP-ACNC: NEGATIVE EU/DL
WBC # BLD AUTO: 8.25 K/UL (ref 3.9–12.7)
WBC #/AREA URNS HPF: 4 /HPF (ref 0–5)
Z-SCORE OF LEFT VENTRICULAR DIMENSION IN END DIASTOLE: -0.28
Z-SCORE OF LEFT VENTRICULAR DIMENSION IN END SYSTOLE: 2.39

## 2025-01-25 PROCEDURE — 63600175 PHARM REV CODE 636 W HCPCS

## 2025-01-25 PROCEDURE — 83735 ASSAY OF MAGNESIUM: CPT | Performed by: HOSPITALIST

## 2025-01-25 PROCEDURE — 93005 ELECTROCARDIOGRAM TRACING: CPT | Performed by: INTERNAL MEDICINE

## 2025-01-25 PROCEDURE — 21400001 HC TELEMETRY ROOM

## 2025-01-25 PROCEDURE — 36415 COLL VENOUS BLD VENIPUNCTURE: CPT

## 2025-01-25 PROCEDURE — 93308 TTE F-UP OR LMTD: CPT | Mod: 26,,, | Performed by: INTERNAL MEDICINE

## 2025-01-25 PROCEDURE — 85730 THROMBOPLASTIN TIME PARTIAL: CPT | Mod: 91

## 2025-01-25 PROCEDURE — 84484 ASSAY OF TROPONIN QUANT: CPT | Mod: 91

## 2025-01-25 PROCEDURE — 36415 COLL VENOUS BLD VENIPUNCTURE: CPT | Performed by: HOSPITALIST

## 2025-01-25 PROCEDURE — 93010 ELECTROCARDIOGRAM REPORT: CPT | Mod: ,,, | Performed by: INTERNAL MEDICINE

## 2025-01-25 PROCEDURE — 36415 COLL VENOUS BLD VENIPUNCTURE: CPT | Performed by: NURSE PRACTITIONER

## 2025-01-25 PROCEDURE — 99223 1ST HOSP IP/OBS HIGH 75: CPT | Mod: GC,,, | Performed by: INTERNAL MEDICINE

## 2025-01-25 PROCEDURE — 84484 ASSAY OF TROPONIN QUANT: CPT | Performed by: HOSPITALIST

## 2025-01-25 PROCEDURE — 25000003 PHARM REV CODE 250: Performed by: HOSPITALIST

## 2025-01-25 PROCEDURE — 84100 ASSAY OF PHOSPHORUS: CPT | Performed by: HOSPITALIST

## 2025-01-25 PROCEDURE — 85379 FIBRIN DEGRADATION QUANT: CPT | Performed by: HOSPITALIST

## 2025-01-25 PROCEDURE — 85610 PROTHROMBIN TIME: CPT

## 2025-01-25 PROCEDURE — 85025 COMPLETE CBC W/AUTO DIFF WBC: CPT | Performed by: HOSPITALIST

## 2025-01-25 PROCEDURE — 81001 URINALYSIS AUTO W/SCOPE: CPT

## 2025-01-25 PROCEDURE — 93308 TTE F-UP OR LMTD: CPT

## 2025-01-25 PROCEDURE — 84484 ASSAY OF TROPONIN QUANT: CPT | Mod: 91 | Performed by: NURSE PRACTITIONER

## 2025-01-25 PROCEDURE — 85730 THROMBOPLASTIN TIME PARTIAL: CPT

## 2025-01-25 PROCEDURE — 80053 COMPREHEN METABOLIC PANEL: CPT | Performed by: HOSPITALIST

## 2025-01-25 RX ORDER — HEPARIN SODIUM,PORCINE/D5W 25000/250
0-40 INTRAVENOUS SOLUTION INTRAVENOUS CONTINUOUS
Status: DISCONTINUED | OUTPATIENT
Start: 2025-01-25 | End: 2025-01-26

## 2025-01-25 RX ADMIN — HEPARIN SODIUM 15.1 UNITS/KG/HR: 10000 INJECTION, SOLUTION INTRAVENOUS at 01:01

## 2025-01-25 RX ADMIN — METOPROLOL SUCCINATE 12.5 MG: 25 TABLET, EXTENDED RELEASE ORAL at 09:01

## 2025-01-25 RX ADMIN — AMIODARONE HYDROCHLORIDE 1 MG/MIN: 1.8 INJECTION, SOLUTION INTRAVENOUS at 12:01

## 2025-01-25 RX ADMIN — MONTELUKAST 10 MG: 10 TABLET, FILM COATED ORAL at 09:01

## 2025-01-25 RX ADMIN — PANTOPRAZOLE SODIUM 40 MG: 40 TABLET, DELAYED RELEASE ORAL at 09:01

## 2025-01-25 RX ADMIN — BISACODYL 5 MG: 5 TABLET, COATED ORAL at 09:01

## 2025-01-25 RX ADMIN — METOPROLOL SUCCINATE 12.5 MG: 25 TABLET, EXTENDED RELEASE ORAL at 08:01

## 2025-01-25 RX ADMIN — AMIODARONE HYDROCHLORIDE 0.5 MG/MIN: 1.8 INJECTION, SOLUTION INTRAVENOUS at 06:01

## 2025-01-25 RX ADMIN — APIXABAN 2.5 MG: 2.5 TABLET, FILM COATED ORAL at 08:01

## 2025-01-25 RX ADMIN — ALPRAZOLAM 0.25 MG: 0.25 TABLET ORAL at 09:01

## 2025-01-25 RX ADMIN — LOSARTAN POTASSIUM 12.5 MG: 25 TABLET, FILM COATED ORAL at 08:01

## 2025-01-25 RX ADMIN — LEVOTHYROXINE SODIUM 88 MCG: 0.09 TABLET ORAL at 05:01

## 2025-01-25 RX ADMIN — BISACODYL 5 MG: 5 TABLET, COATED ORAL at 08:01

## 2025-01-25 NOTE — CONSULTS
Inpatient consult to Cardiology  Consult performed by: Amaya Dickens NP  Consult ordered by: Vikash Brooks MD      Consult done see note

## 2025-01-25 NOTE — ASSESSMENT & PLAN NOTE
Patient's metoprolol increased to 12.5 mg p.o. twice daily   Observation on telemetry  Cardiology consulted follow recommendations

## 2025-01-25 NOTE — PROGRESS NOTES
[facility]  Progress Note    Patient Name: Karma Chen  MRN: [id number]  Patient Class: IP- Inpatient   Admission Date: 1/24/2025  Length of Stay: 0 days  Attending Physician: Alma Rosa Henning, *  Primary Care Provider: Elena Sullivan MD        Subjective     Principal Problem:Tachycardia        HPI:  Patient presented to the emergency department complaining of an elevated heart rate.  Patient states that over the past 2 hours, she has not felt well.  Patient complained of mild nausea, headache and lightheadedness but denied fever, chills, diaphoresis, chest pain, abdominal pain or any urinary symptoms.    Overview/Hospital Course:  No notes on file    Interval History: troponin peaked at 885.8 and has now started a downward trend.  IV heparin infusion ordered by cardiology.  Denies chest pain.    Review of Systems   Cardiovascular:  Positive for palpitations.   Gastrointestinal:  Positive for nausea.   Musculoskeletal:  Positive for back pain.   Skin: Negative.    Neurological:  Positive for dizziness and light-headedness.     Objective:     Vital Signs (Most Recent):  Temp: 98.1 °F (36.7 °C) (01/25/25 1027)  Pulse: (!) 111 (01/25/25 1027)  Resp: 20 (01/25/25 0824)  BP: (!) 142/86 (01/25/25 1027)  SpO2: 95 % (01/25/25 0824) Vital Signs (24h Range):  Temp:  [97.1 °F (36.2 °C)-98.5 °F (36.9 °C)] 98.1 °F (36.7 °C)  Pulse:  [] 111  Resp:  [15-20] 20  SpO2:  [93 %-99 %] 95 %  BP: (128-160)/(60-86) 142/86     Weight: 61.5 kg (135 lb 9.3 oz)  Body mass index is 26.48 kg/m².  No intake or output data in the 24 hours ending 01/25/25 1531      Physical Exam  Constitutional:       Appearance: Normal appearance. She is normal weight.   HENT:      Head: Normocephalic and atraumatic.   Eyes:      Extraocular Movements: Extraocular movements intact.      Conjunctiva/sclera: Conjunctivae normal.      Pupils: Pupils are equal, round, and reactive to light.   Cardiovascular:      Rate  and Rhythm: Tachycardia present. Rhythm irregular.   Pulmonary:      Effort: Pulmonary effort is normal.      Breath sounds: Normal breath sounds.   Abdominal:      General: Abdomen is flat. Bowel sounds are normal.      Palpations: Abdomen is soft.   Genitourinary:     General: Normal vulva.      Rectum: Normal.   Musculoskeletal:         General: Normal range of motion.      Cervical back: Normal range of motion and neck supple.   Skin:     General: Skin is warm and dry.      Capillary Refill: Capillary refill takes less than 2 seconds.   Neurological:      General: No focal deficit present.      Mental Status: She is alert and oriented to person, place, and time. Mental status is at baseline.   Psychiatric:         Mood and Affect: Mood normal.         Behavior: Behavior normal.         Thought Content: Thought content normal.         Judgment: Judgment normal.             Significant Labs: All pertinent labs within the past 24 hours have been reviewed.  CBC:   Recent Labs   Lab 01/24/25  1436 01/25/25  0239   WBC 9.09 8.25   HGB 12.6 12.3   HCT 40.2 37.8    264     CMP:   Recent Labs   Lab 01/24/25  1436 01/25/25  0239    142   K 4.1 3.9    107   CO2 27 27    97   BUN 27* 28*   CREATININE 1.2 1.1   CALCIUM 9.4 9.4   PROT 7.3 6.5   ALBUMIN 4.0 3.7   BILITOT 0.4 0.4   ALKPHOS 59 57   AST 20 21   ALT 13 12   ANIONGAP 8 8     Troponin:   Recent Labs   Lab 01/25/25  0239 01/25/25  0814 01/25/25  1148   TROPONINIHS 603.3* 885.8* 692.9*       Significant Imaging: I have reviewed all pertinent imaging results/findings within the past 24 hours.    Assessment and Plan     * Tachycardia  Patient's metoprolol increased to 12.5 mg p.o. twice daily   Observation on telemetry  Cardiology consulted follow recommendations      Elevated troponin  Delta troponin positive   Troponins trended up, now downward trend  Observation on telemetry   Cardiology consulted follow up recommendations  Will begin  heparin drip      PAF (paroxysmal atrial fibrillation)  Patient has paroxysmal (<7 days) atrial fibrillation. Patient is currently in sinus rhythm. [id number] Score: 5. The patients heart rate in the last 24 hours is as follows:  Pulse  Min: 79  Max: 132     Antiarrhythmics  metoprolol succinate (TOPROL-XL) 24 hr split tablet 12.5 mg, 2 times daily, Oral  amiodarone 360 mg/200 mL (1.8 mg/mL) infusion, Continuous, Intravenous    Anticoagulants  heparin 25,000 units in dextrose 5% 250 mL (100 units/mL) infusion LOW INTENSITY nomogram - [facility], Continuous, Intravenous  heparin 25,000 units in dextrose 5% (100 units/ml) IV bolus from bag LOW INTENSITY nomogram - [facility], As needed (PRN), Intravenous  heparin 25,000 units in dextrose 5% (100 units/ml) IV bolus from bag LOW INTENSITY nomogram - [facility], As needed (PRN), Intravenous    Plan  - Replete lytes with a goal of K>4, Mg >2  - Patient is anticoagulated, see medications listed above.  - Patient's afib is currently uncontrolled. Will adjust treatment as follows:  Increase metoprolol to 12.5 p.o. twice  daily; placed on amiodarone drip  - cardiology consulted follow up recommendations        Coronary artery disease involving left main coronary artery  Patient with known CAD s/p stent placement and CABG, which is controlled Will continue ASA and Statin and monitor for S/Sx of angina/ACS. Continue to monitor on telemetry.     Essential hypertension  Patient's blood pressure range in the last 24 hours was: BP  Min: 128/60  Max: 160/86.The patient's inpatient anti-hypertensive regimen is listed below:  Current Antihypertensives  furosemide tablet 20 mg, Every other day, Oral  losartan split tablet 12.5 mg, Every morning, Oral  metoprolol succinate (TOPROL-XL) 24 hr split tablet 12.5 mg, 2 times daily, Oral    Plan  - BP is controlled, no changes needed to their regimen        VTE Risk Mitigation (From admission, onward)           Ordered     heparin 25,000 units in dextrose 5% (100  units/ml) IV bolus from bag LOW INTENSITY nomogram - [facility]  As needed (PRN)        Question:  Heparin Infusion Adjustment (DO NOT MODIFY ANSWER)  Answer:  [email or web address]\epic\Images\Pharmacy\HeparinInfusions\heparin LOW INTENSITY nomogram for [facility] [id number].pdf    01/25/25 1127     heparin 25,000 units in dextrose 5% (100 units/ml) IV bolus from bag LOW INTENSITY nomogram - [facility]  As needed (PRN)        Question:  Heparin Infusion Adjustment (DO NOT MODIFY ANSWER)  Answer:  [email or web address]\epic\Images\Pharmacy\HeparinInfusions\heparin LOW INTENSITY nomogram for [facility] [id number].pdf    01/25/25 1127     heparin 25,000 units in dextrose 5% 250 mL (100 units/mL) infusion LOW INTENSITY nomogram - [facility]  Continuous        Question:  Begin at (units/kg/hr)  Answer:  12    01/25/25 1127     IP VTE HIGH RISK PATIENT  Once         01/24/25 1818     Place sequential compression device  Until discontinued         01/24/25 1818                    Discharge Planning   JANNA: 1/27/2025     Code Status: Full Code   Medical Readiness for Discharge Date:   Discharge Plan A: Home with family            MAGDA Romero  Department of Hospital Medicine   [facility]

## 2025-01-25 NOTE — ASSESSMENT & PLAN NOTE
Patient's blood pressure range in the last 24 hours was: BP  Min: 143/82  Max: 149/75.The patient's inpatient anti-hypertensive regimen is listed below:  Current Antihypertensives       Plan  - BP is controlled, no changes needed to their regimen

## 2025-01-25 NOTE — ASSESSMENT & PLAN NOTE
Patient has paroxysmal (<7 days) atrial fibrillation. Patient is currently in sinus rhythm. [id number] Score: 5. The patients heart rate in the last 24 hours is as follows:  Pulse  Min: 103  Max: 132     Antiarrhythmics       Anticoagulants  enoxaparin injection 40 mg, Every 24 hours, Subcutaneous    Plan  - Replete lytes with a goal of K>4, Mg >2  - Patient is anticoagulated, see medications listed above.  - Patient's afib is currently uncontrolled. Will adjust treatment as follows:  Increase metoprolol to 12.5 p.o. twice  - cardiology consulted follow up recommendations

## 2025-01-25 NOTE — PLAN OF CARE
[facility]  Initial Discharge Assessment       Primary Care Provider: Elena Sullivan MD    Admission Diagnosis: Tachycardia [R00.0]    Admission Date: 1/24/2025  Expected Discharge Date: 1/27/2025    Transition of Care Barriers: None    Pt assessment completed at bedside with pt. All demographic info verified as correct. Pt reports she lives in a wheelchair accessible home with her spouse who is capable of caring for her if needed. Pt denies need for any additional DME at discharge. Spouse to provide transport at discharge. Case management to follow.     Payor: MEDICARE / Plan: MEDICARE PART A & B / Product Type: Government /     Extended Emergency Contact Information  Primary Emergency Contact: Jacinto Zacarias  Address: [address] [place] [address] [id number] [place] [address]  Home Phone: [phone]  Mobile Phone: [phone]  Relation: Spouse  Preferred language: English   needed? No    Discharge Plan A: Home with family  Discharge Plan B: Home Health      [facility] [address] [place], [place] - [address] [facility]  [address] [facility] [address]  Phone: [phone] Fax: [phone]    [facility] - [place] [facility]  [place] [address]  Phone: [phone] Fax: [phone]    [facility]  [address]  [facility] [address]  Phone: [phone] Fax: [phone]      Initial Assessment (most recent)       Adult Discharge Assessment - 01/25/25 1200          Discharge Assessment    Assessment Type Discharge Planning Assessment     Confirmed/corrected address, phone number and insurance Yes     Confirmed Demographics Correct on Facesheet     Source of Information patient     When was your last doctors appointment? 01/06/25     Does patient/caregiver understand observation status Yes     Communicated JANNA with patient/caregiver  Yes     Reason For Admission tachycardia     People in Home spouse     Facility Arrived From: home     Do you expect to return to your current living situation? Yes     Do you have help at home or someone to help you manage your care at home? Yes     Who are your caregiver(s) and their phone number(s)? spouse, Jacinto     Prior to hospitilization cognitive status: Alert/Oriented     Current cognitive status: Alert/Oriented     Walking or Climbing Stairs Difficulty yes     Walking or Climbing Stairs ambulation difficulty, requires equipment;stair climbing difficulty, assistance 1 person     Dressing/Bathing Difficulty no     Home Accessibility wheelchair accessible     Home Layout Able to live on 1st floor     Equipment Currently Used at Home rollator;shower chair;grab bar     Readmission within 30 days? No     Patient currently being followed by outpatient case management? No     Do you currently have service(s) that help you manage your care at home? No     Do you take prescription medications? Yes     Do you have prescription coverage? Yes     Do you have any problems affording any of your prescribed medications? No     Is the patient taking medications as prescribed? yes     Who is going to help you get home at discharge? spouse.     How do you get to doctors appointments? family or friend will provide     Are you on dialysis? No     Do you take coumadin? No     Discharge Plan A Home with family     Discharge Plan B Home Health     DME Needed Upon Discharge  none     Discharge Plan discussed with: Patient     Transition of Care Barriers None

## 2025-01-25 NOTE — ASSESSMENT & PLAN NOTE
Delta troponin positive   Trend troponins x3   Observation on telemetry   Cardiology consulted follow up recommendations

## 2025-01-25 NOTE — ASSESSMENT & PLAN NOTE
Patient's blood pressure range in the last 24 hours was: BP  Min: 128/60  Max: 160/86.The patient's inpatient anti-hypertensive regimen is listed below:  Current Antihypertensives  furosemide tablet 20 mg, Every other day, Oral  losartan split tablet 12.5 mg, Every morning, Oral  metoprolol succinate (TOPROL-XL) 24 hr split tablet 12.5 mg, 2 times daily, Oral    Plan  - BP is controlled, no changes needed to their regimen

## 2025-01-25 NOTE — SUBJECTIVE & OBJECTIVE
Past Medical History:   Diagnosis Date    Coronary artery disease     Dermatitis     Dermatitis 12/8/2017    Diabetes mellitus     Diabetes mellitus type II     Leah Barr virus infection     Fibromyalgia     GERD (gastroesophageal reflux disease)     Hypertension     MI (myocardial infarction) 09/23/2011    Pure hypercholesterolemia 2/26/2020       Past Surgical History:   Procedure Laterality Date    adenoids      ANGIOPLASTY  1987    APPENDECTOMY      CARDIAC PACEMAKER PLACEMENT  01/12/2017    CATARACT EXTRACTION, BILATERAL Bilateral 9/2/15, 3/17/15    right eye-9/2/15, left eye-3/17/15    CHOLECYSTECTOMY  1987    CORONARY ARTERY BYPASS GRAFT  2006    quadruple    coronary stents  1999    x2    CYST REMOVAL  04/25/2017    on back    HYSTERECTOMY  1966    OVARY SURGERY  1948    Ovary burst & was repaired    RHIZOTOMY  09/14/2018    TONSILLECTOMY  1940       Review of patient's allergies indicates:   Allergen Reactions    Diclofenac-misoprostol      Other reaction(s): Not available    Doxycycline     Effexor [venlafaxine]     Estradiol     Flagyl [metronidazole]     Fluconazole     Gabapentin     Iodine     Levaquin [levofloxacin]     Nexium [esomeprazole magnesium]     Nexletol [bempedoic acid] Other (See Comments)    Penicillins     Red yeast rice (monascus purpureus)     Shellfish containing products     Statins-hmg-coa reductase inhibitors     Sulfa (sulfonamide antibiotics)     Tea tree oil     Tegaserod      ZOLNORM    Tegaserod hydrogen maleate     Trazodone     Yeast, dried     Adhesive Rash     Band-aids       Current Facility-Administered Medications on File Prior to Encounter   Medication    evolocumab PnIj 140 mg     Current Outpatient Medications on File Prior to Encounter   Medication Sig    acetaminophen (TYLENOL) 650 MG TbSR Take 650 mg by mouth every 6 (six) hours as needed for Pain.    ALPRAZolam (XANAX) 0.25 MG tablet TAKE 1 TABLET BY MOUTH EVERY DAY AS NEEDED FOR ANXIETY (Patient taking  differently: Take 0.25 mg by mouth daily as needed for Anxiety. TAKE 1 TABLET BY MOUTH EVERY DAY AS NEEDED FOR ANXIETY)    apixaban (ELIQUIS) 2.5 mg Tab Take 1 tablet (2.5 mg total) by mouth 2 (two) times daily.    ascorbic acid, vitamin C, (VITAMIN C) 1000 MG tablet Take 1,000 mg by mouth once daily.    aspirin (ECOTRIN) 81 MG EC tablet Take 1 tablet (81 mg total) by mouth once daily. (Patient taking differently: Take 81 mg by mouth every evening.)    benzonatate (TESSALON) 100 MG capsule Take 100 mg by mouth 3 (three) times daily as needed for Cough.    bisacodyL (DULCOLAX) 5 mg EC tablet Take 5 mg by mouth 2 (two) times a day.    coenzyme Q10 100 mg capsule Take 1 capsule (100 mg total) by mouth 2 (two) times daily.    diclofenac sodium (VOLTAREN ARTHRITIS PAIN) 1 % Gel Apply 2 g topically daily as needed (pain).    ECHINACEA ORAL Take 750 mg by mouth 2 (two) times a day.    furosemide (LASIX) 20 MG tablet Take 1 tablet (20 mg total) by mouth once daily. (Patient taking differently: Take 20 mg by mouth every other day.)    levothyroxine (SYNTHROID) 88 MCG tablet Take 1 tablet (88 mcg total) by mouth once daily.    LIDOcaine (LIDODERM) 5 % Place 1 patch onto the skin once daily. Remove & Discard patch within 12 hours or as directed by MD (Patient taking differently: Place 1 patch onto the skin daily as needed (pain). Remove & Discard patch within 12 hours or as directed by MD)    loratadine (CLARITIN) 10 mg tablet Take 10 mg by mouth once daily.    losartan (COZAAR) 25 MG tablet Take 25 mg by mouth every morning.    MAGNESIUM ORAL Take 1,000 mg by mouth once daily.    metoprolol succinate (TOPROL-XL) 25 MG 24 hr tablet Take 0.5 tablets (12.5 mg total) by mouth once daily.    montelukast (SINGULAIR) 10 mg tablet TAKE 1 TABLET BY MOUTH EVERY DAY IN THE EVENING (Patient taking differently: Take 10 mg by mouth every evening.)    multivitamin (THERAGRAN) tablet Take 1 tablet by mouth 2 (two) times a day.     naloxone  (NARCAN) 4 mg/actuation Spry 1 spray by Nasal route daily as needed.    nitroGLYCERIN (NITROSTAT) 0.4 MG SL tablet Place 1 tablet (0.4 mg total) under the tongue every 5 (five) minutes as needed for Chest pain.    omeprazole (PRILOSEC) 20 MG capsule Take 1 capsule (20 mg total) by mouth once daily. (Patient taking differently: Take 20 mg by mouth every evening.)    tiZANidine (ZANAFLEX) 4 MG tablet Take 4 mg by mouth nightly as needed (muscle spasms).    traMADoL (ULTRAM) 50 mg tablet Take 50 mg by mouth 2 (two) times daily as needed for Pain.    TURMERIC ORAL Take 200 mg by mouth once daily.    UNABLE TO FIND Take 1 capsule by mouth 2 (two) times a day. Cardio platinum    JARDIANCE 10 mg tablet Take 10 mg by mouth every morning. (Patient not taking: Reported on 1/24/2025)    UNABLE TO FIND Take 1 capsule by mouth once daily. Joint health    [DISCONTINUED] cyanocobalamin 1,000 mcg/mL injection 2cc I'm every other week    [DISCONTINUED] fexofenadine (ALLEGRA) 180 MG tablet Take 180 mg by mouth once daily.    [DISCONTINUED] fluocinonide-emollient (FLUOCINONIDE-E) 0.05 % Crea Apply topically 2 (two) times daily.    [DISCONTINUED] fluticasone propionate (FLONASE) 50 mcg/actuation nasal spray 1 spray (50 mcg total) by Each Nostril route once daily.    [DISCONTINUED] nystatin-triamcinolone (MYCOLOG II) cream Apply 1 g topically 4 (four) times daily.     Family History       Problem Relation (Age of Onset)    No Known Problems Mother, Father          Tobacco Use    Smoking status: Never    Smokeless tobacco: Never   Substance and Sexual Activity    Alcohol use: No    Drug use: No    Sexual activity: Not on file     Review of Systems   Constitutional: Negative.    HENT: Negative.     Eyes: Negative.    Respiratory: Negative.     Cardiovascular:  Positive for palpitations.   Gastrointestinal:  Positive for nausea.   Endocrine: Negative.    Genitourinary: Negative.    Musculoskeletal:  Positive for back pain.   Skin:  Negative.    Allergic/Immunologic: Negative.    Neurological:  Positive for dizziness and light-headedness.   Hematological: Negative.    Psychiatric/Behavioral: Negative.       Objective:     Vital Signs (Most Recent):  Temp: 98 °F (36.7 °C) (01/24/25 1339)  Pulse: 103 (01/24/25 1745)  Resp: 17 (01/24/25 1745)  BP: (!) 149/75 (01/24/25 1730)  SpO2: 96 % (01/24/25 1745) Vital Signs (24h Range):  Temp:  [98 °F (36.7 °C)] 98 °F (36.7 °C)  Pulse:  [103-132] 103  Resp:  [17-19] 17  SpO2:  [96 %-99 %] 96 %  BP: (143-149)/(72-82) 149/75     Weight: 61.2 kg (135 lb)  Body mass index is 26.37 kg/m².     Physical Exam  Constitutional:       Appearance: Normal appearance. She is normal weight.   HENT:      Head: Normocephalic and atraumatic.   Eyes:      Extraocular Movements: Extraocular movements intact.      Conjunctiva/sclera: Conjunctivae normal.      Pupils: Pupils are equal, round, and reactive to light.   Cardiovascular:      Rate and Rhythm: Tachycardia present. Rhythm irregular.   Pulmonary:      Effort: Pulmonary effort is normal.      Breath sounds: Normal breath sounds.   Abdominal:      General: Abdomen is flat. Bowel sounds are normal.      Palpations: Abdomen is soft.   Genitourinary:     General: Normal vulva.      Rectum: Normal.   Musculoskeletal:         General: Normal range of motion.      Cervical back: Normal range of motion and neck supple.   Skin:     General: Skin is warm and dry.      Capillary Refill: Capillary refill takes less than 2 seconds.   Neurological:      General: No focal deficit present.      Mental Status: She is alert and oriented to person, place, and time. Mental status is at baseline.   Psychiatric:         Mood and Affect: Mood normal.         Behavior: Behavior normal.         Thought Content: Thought content normal.         Judgment: Judgment normal.              CRANIAL NERVES     CN III, IV, VI   Pupils are equal, round, and reactive to light.       Significant Labs: All  pertinent labs within the past 24 hours have been reviewed.  Recent Lab Results         01/24/25  1638   01/24/25  1436   01/24/25  1434        Influenza A, Molecular     Negative       Influenza B, Molecular     Negative       Albumin   4.0         ALP   59         ALT   13         Anion Gap   8         AST   20         Baso #   0.07         Basophil %   0.8         BILIRUBIN TOTAL   0.4  Comment: For infants and newborns, interpretation of results should be based  on gestational age, weight and in agreement with clinical  observations.    Premature Infant recommended reference ranges:  Up to 24 hours.............<8.0 mg/dL  Up to 48 hours............<12.0 mg/dL  3-5 days..................<15.0 mg/dL  6-29 days.................<15.0 mg/dL           BNP   768  Comment: Values of less than 100 pg/ml are consistent with non-CHF populations.         BUN   27         Calcium   9.4         Chloride   105         CO2   27         Creatinine   1.2         Differential Method   Automated         eGFR   43.8         Eos #   0.3         Eos %   3.1         Flu A & B Source     Nasal swab       Glucose   110         Gran # (ANC)   5.3         Gran %   58.1         Hematocrit   40.2         Hemoglobin   12.6         Immature Grans (Abs)   0.02  Comment: Mild elevation in immature granulocytes is non specific and   can be seen in a variety of conditions including stress response,   acute inflammation, trauma and pregnancy. Correlation with other   laboratory and clinical findings is essential.           Immature Granulocytes   0.2         Lymph #   2.4         Lymph %   26.5         Magnesium    2.1         MCH   27.8         MCHC   31.3         MCV   89         Mono #   1.0         Mono %   11.3         MPV   9.8         nRBC   0         Platelet Count   268         Potassium   4.1         PROTEIN TOTAL   7.3         RBC   4.54         RDW   13.3         SARS-CoV-2 RNA, Amplification, Qual     Negative  Comment: This test  utilizes isothermal nucleic acid amplification technology   to   detect the SARS-CoV-2 RdRp nucleic acid segment. The analytical   sensitivity   (limit of detection) is 500 copies/swab.     A POSITIVE result is indicative of the presence of SARS-CoV-2 RNA;   clinical   correlation with patient history and other diagnostic information is   necessary to determine patient infection status.    A NEGATIVE result means that SARS-CoV-2 nucleic acids are not present   above   the limit of detection. A NEGATIVE result should be treated as   presumptive.   It does not rule out the possibility of COVID-19 and should not be   the sole   basis for treatment decisions. If COVID-19 is strongly suspected   based on   clinical and exposure history, re-testing using an alternate   molecular assay   should be considered.     This test is FDA approved.Performance characteristics of this test   has been   independently verified by [facility] in conjunction   with   [facility] Department of Pathology and Laboratory   Medicine.         Sodium   140         Troponin I High Sensitivity 106.1  Comment: Troponin results differ between methods. Do not use   results between Troponin methods interchangeably.    Alkaline Phospatase levels above 400 U/L may   cause false positive results.    Access hsTnI should not be used for patients taking   Asfotase karis (Strensiq).  Critical result TNIHS 106.1 pg/mL called to and read back by Amy Reynoso RN/ed at 24-Jan-2025 17:32 by [facility].     48.1  Comment: Troponin results differ between methods. Do not use   results between Troponin methods interchangeably.    Alkaline Phospatase levels above 400 U/L may   cause false positive results.    Access hsTnI should not be used for patients taking   Asfotase karis (Strensiq).           WBC   9.09                 Significant Imaging: I have reviewed all pertinent imaging results/findings within the past 24 hours.

## 2025-01-25 NOTE — H&P
[facility] - Emergency Dept  Hospital Medicine  History & Physical    Patient Name: Karma Chen  MRN: [id number]  Patient Class: OP- Observation  Admission Date: 1/24/2025  Attending Physician: Urbano Rider MD   Primary Care Provider: Elena Sullivna MD         Patient information was obtained from patient and ER records.     Subjective:     Principal Problem:<principal problem not specified>    Chief Complaint:   Chief Complaint   Patient presents with    Tachycardia     Pt c/o upper back pain and shortness of breath intermittent x 2 weeks. Pt states she checked her heart rate at home and it was high.        HPI: Patient presented to the emergency department complaining of an elevated heart rate.  Patient states that over the past 2 hours, she has not felt well.  Patient complained of mild nausea, headache and lightheadedness but denied fever, chills, diaphoresis, chest pain, abdominal pain or any urinary symptoms.    Past Medical History:   Diagnosis Date    Coronary artery disease     Dermatitis     Dermatitis 12/8/2017    Diabetes mellitus     Diabetes mellitus type II     Leah Barr virus infection     Fibromyalgia     GERD (gastroesophageal reflux disease)     Hypertension     MI (myocardial infarction) 09/23/2011    Pure hypercholesterolemia 2/26/2020       Past Surgical History:   Procedure Laterality Date    adenoids      ANGIOPLASTY  1987    APPENDECTOMY      CARDIAC PACEMAKER PLACEMENT  01/12/2017    CATARACT EXTRACTION, BILATERAL Bilateral 9/2/15, 3/17/15    right eye-9/2/15, left eye-3/17/15    CHOLECYSTECTOMY  1987    CORONARY ARTERY BYPASS GRAFT  2006    quadruple    coronary stents  1999    x2    CYST REMOVAL  04/25/2017    on back    HYSTERECTOMY  1966    OVARY SURGERY  1948    Ovary burst & was repaired    RHIZOTOMY  09/14/2018    TONSILLECTOMY  1940       Review of patient's allergies indicates:   Allergen Reactions    Diclofenac-misoprostol      Other reaction(s): Not  available    Doxycycline     Effexor [venlafaxine]     Estradiol     Flagyl [metronidazole]     Fluconazole     Gabapentin     Iodine     Levaquin [levofloxacin]     Nexium [esomeprazole magnesium]     Nexletol [bempedoic acid] Other (See Comments)    Penicillins     Red yeast rice (monascus purpureus)     Shellfish containing products     Statins-hmg-coa reductase inhibitors     Sulfa (sulfonamide antibiotics)     Tea tree oil     Tegaserod      ZOLNORM    Tegaserod hydrogen maleate     Trazodone     Yeast, dried     Adhesive Rash     Band-aids       Current Facility-Administered Medications on File Prior to Encounter   Medication    evolocumab PnIj 140 mg     Current Outpatient Medications on File Prior to Encounter   Medication Sig    acetaminophen (TYLENOL) 650 MG TbSR Take 650 mg by mouth every 6 (six) hours as needed for Pain.    ALPRAZolam (XANAX) 0.25 MG tablet TAKE 1 TABLET BY MOUTH EVERY DAY AS NEEDED FOR ANXIETY (Patient taking differently: Take 0.25 mg by mouth daily as needed for Anxiety. TAKE 1 TABLET BY MOUTH EVERY DAY AS NEEDED FOR ANXIETY)    apixaban (ELIQUIS) 2.5 mg Tab Take 1 tablet (2.5 mg total) by mouth 2 (two) times daily.    ascorbic acid, vitamin C, (VITAMIN C) 1000 MG tablet Take 1,000 mg by mouth once daily.    aspirin (ECOTRIN) 81 MG EC tablet Take 1 tablet (81 mg total) by mouth once daily. (Patient taking differently: Take 81 mg by mouth every evening.)    benzonatate (TESSALON) 100 MG capsule Take 100 mg by mouth 3 (three) times daily as needed for Cough.    bisacodyL (DULCOLAX) 5 mg EC tablet Take 5 mg by mouth 2 (two) times a day.    coenzyme Q10 100 mg capsule Take 1 capsule (100 mg total) by mouth 2 (two) times daily.    diclofenac sodium (VOLTAREN ARTHRITIS PAIN) 1 % Gel Apply 2 g topically daily as needed (pain).    ECHINACEA ORAL Take 750 mg by mouth 2 (two) times a day.    furosemide (LASIX) 20 MG tablet Take 1 tablet (20 mg total) by mouth once daily. (Patient taking  differently: Take 20 mg by mouth every other day.)    levothyroxine (SYNTHROID) 88 MCG tablet Take 1 tablet (88 mcg total) by mouth once daily.    LIDOcaine (LIDODERM) 5 % Place 1 patch onto the skin once daily. Remove & Discard patch within 12 hours or as directed by MD (Patient taking differently: Place 1 patch onto the skin daily as needed (pain). Remove & Discard patch within 12 hours or as directed by MD)    loratadine (CLARITIN) 10 mg tablet Take 10 mg by mouth once daily.    losartan (COZAAR) 25 MG tablet Take 25 mg by mouth every morning.    MAGNESIUM ORAL Take 1,000 mg by mouth once daily.    metoprolol succinate (TOPROL-XL) 25 MG 24 hr tablet Take 0.5 tablets (12.5 mg total) by mouth once daily.    montelukast (SINGULAIR) 10 mg tablet TAKE 1 TABLET BY MOUTH EVERY DAY IN THE EVENING (Patient taking differently: Take 10 mg by mouth every evening.)    multivitamin (THERAGRAN) tablet Take 1 tablet by mouth 2 (two) times a day.     naloxone (NARCAN) 4 mg/actuation Spry 1 spray by Nasal route daily as needed.    nitroGLYCERIN (NITROSTAT) 0.4 MG SL tablet Place 1 tablet (0.4 mg total) under the tongue every 5 (five) minutes as needed for Chest pain.    omeprazole (PRILOSEC) 20 MG capsule Take 1 capsule (20 mg total) by mouth once daily. (Patient taking differently: Take 20 mg by mouth every evening.)    tiZANidine (ZANAFLEX) 4 MG tablet Take 4 mg by mouth nightly as needed (muscle spasms).    traMADoL (ULTRAM) 50 mg tablet Take 50 mg by mouth 2 (two) times daily as needed for Pain.    TURMERIC ORAL Take 200 mg by mouth once daily.    UNABLE TO FIND Take 1 capsule by mouth 2 (two) times a day. Cardio platinum    JARDIANCE 10 mg tablet Take 10 mg by mouth every morning. (Patient not taking: Reported on 1/24/2025)    UNABLE TO FIND Take 1 capsule by mouth once daily. Joint health    [DISCONTINUED] cyanocobalamin 1,000 mcg/mL injection 2cc I'm every other week    [DISCONTINUED] fexofenadine (ALLEGRA) 180 MG tablet  Take 180 mg by mouth once daily.    [DISCONTINUED] fluocinonide-emollient (FLUOCINONIDE-E) 0.05 % Crea Apply topically 2 (two) times daily.    [DISCONTINUED] fluticasone propionate (FLONASE) 50 mcg/actuation nasal spray 1 spray (50 mcg total) by Each Nostril route once daily.    [DISCONTINUED] nystatin-triamcinolone (MYCOLOG II) cream Apply 1 g topically 4 (four) times daily.     Family History       Problem Relation (Age of Onset)    No Known Problems Mother, Father          Tobacco Use    Smoking status: Never    Smokeless tobacco: Never   Substance and Sexual Activity    Alcohol use: No    Drug use: No    Sexual activity: Not on file     Review of Systems   Constitutional: Negative.    HENT: Negative.     Eyes: Negative.    Respiratory: Negative.     Cardiovascular:  Positive for palpitations.   Gastrointestinal:  Positive for nausea.   Endocrine: Negative.    Genitourinary: Negative.    Musculoskeletal:  Positive for back pain.   Skin: Negative.    Allergic/Immunologic: Negative.    Neurological:  Positive for dizziness and light-headedness.   Hematological: Negative.    Psychiatric/Behavioral: Negative.       Objective:     Vital Signs (Most Recent):  Temp: 98 °F (36.7 °C) (01/24/25 1339)  Pulse: 103 (01/24/25 1745)  Resp: 17 (01/24/25 1745)  BP: (!) 149/75 (01/24/25 1730)  SpO2: 96 % (01/24/25 1745) Vital Signs (24h Range):  Temp:  [98 °F (36.7 °C)] 98 °F (36.7 °C)  Pulse:  [103-132] 103  Resp:  [17-19] 17  SpO2:  [96 %-99 %] 96 %  BP: (143-149)/(72-82) 149/75     Weight: 61.2 kg (135 lb)  Body mass index is 26.37 kg/m².     Physical Exam  Constitutional:       Appearance: Normal appearance. She is normal weight.   HENT:      Head: Normocephalic and atraumatic.   Eyes:      Extraocular Movements: Extraocular movements intact.      Conjunctiva/sclera: Conjunctivae normal.      Pupils: Pupils are equal, round, and reactive to light.   Cardiovascular:      Rate and Rhythm: Tachycardia present. Rhythm irregular.    Pulmonary:      Effort: Pulmonary effort is normal.      Breath sounds: Normal breath sounds.   Abdominal:      General: Abdomen is flat. Bowel sounds are normal.      Palpations: Abdomen is soft.   Genitourinary:     General: Normal vulva.      Rectum: Normal.   Musculoskeletal:         General: Normal range of motion.      Cervical back: Normal range of motion and neck supple.   Skin:     General: Skin is warm and dry.      Capillary Refill: Capillary refill takes less than 2 seconds.   Neurological:      General: No focal deficit present.      Mental Status: She is alert and oriented to person, place, and time. Mental status is at baseline.   Psychiatric:         Mood and Affect: Mood normal.         Behavior: Behavior normal.         Thought Content: Thought content normal.         Judgment: Judgment normal.              CRANIAL NERVES     CN III, IV, VI   Pupils are equal, round, and reactive to light.       Significant Labs: All pertinent labs within the past 24 hours have been reviewed.  Recent Lab Results         01/24/25  1638   01/24/25  1436   01/24/25  1434        Influenza A, Molecular     Negative       Influenza B, Molecular     Negative       Albumin   4.0         ALP   59         ALT   13         Anion Gap   8         AST   20         Baso #   0.07         Basophil %   0.8         BILIRUBIN TOTAL   0.4  Comment: For infants and newborns, interpretation of results should be based  on gestational age, weight and in agreement with clinical  observations.    Premature Infant recommended reference ranges:  Up to 24 hours.............<8.0 mg/dL  Up to 48 hours............<12.0 mg/dL  3-5 days..................<15.0 mg/dL  6-29 days.................<15.0 mg/dL           BNP   768  Comment: Values of less than 100 pg/ml are consistent with non-CHF populations.         BUN   27         Calcium   9.4         Chloride   105         CO2   27         Creatinine   1.2         Differential Method   Automated          eGFR   43.8         Eos #   0.3         Eos %   3.1         Flu A & B Source     Nasal swab       Glucose   110         Gran # (ANC)   5.3         Gran %   58.1         Hematocrit   40.2         Hemoglobin   12.6         Immature Grans (Abs)   0.02  Comment: Mild elevation in immature granulocytes is non specific and   can be seen in a variety of conditions including stress response,   acute inflammation, trauma and pregnancy. Correlation with other   laboratory and clinical findings is essential.           Immature Granulocytes   0.2         Lymph #   2.4         Lymph %   26.5         Magnesium    2.1         MCH   27.8         MCHC   31.3         MCV   89         Mono #   1.0         Mono %   11.3         MPV   9.8         nRBC   0         Platelet Count   268         Potassium   4.1         PROTEIN TOTAL   7.3         RBC   4.54         RDW   13.3         SARS-CoV-2 RNA, Amplification, Qual     Negative  Comment: This test utilizes isothermal nucleic acid amplification technology   to   detect the SARS-CoV-2 RdRp nucleic acid segment. The analytical   sensitivity   (limit of detection) is 500 copies/swab.     A POSITIVE result is indicative of the presence of SARS-CoV-2 RNA;   clinical   correlation with patient history and other diagnostic information is   necessary to determine patient infection status.    A NEGATIVE result means that SARS-CoV-2 nucleic acids are not present   above   the limit of detection. A NEGATIVE result should be treated as   presumptive.   It does not rule out the possibility of COVID-19 and should not be   the sole   basis for treatment decisions. If COVID-19 is strongly suspected   based on   clinical and exposure history, re-testing using an alternate   molecular assay   should be considered.     This test is FDA approved.Performance characteristics of this test   has been   independently verified by [facility] in conjunction   with   [facility]  Department of Pathology and Laboratory   Medicine.         Sodium   140         Troponin I High Sensitivity 106.1  Comment: Troponin results differ between methods. Do not use   results between Troponin methods interchangeably.    Alkaline Phospatase levels above 400 U/L may   cause false positive results.    Access hsTnI should not be used for patients taking   Asfotase karis (Strensiq).  Critical result TNIHS 106.1 pg/mL called to and read back by Amy Reynoso RN/ed at 24-Jan-2025 17:32 [facility].     48.1  Comment: Troponin results differ between methods. Do not use   results between Troponin methods interchangeably.    Alkaline Phospatase levels above 400 U/L may   cause false positive results.    Access hsTnI should not be used for patients taking   Asfotase karis (Strensiq).           WBC   9.09                 Significant Imaging: I have reviewed all pertinent imaging results/findings within the past 24 hours.  Assessment/Plan:     Tachycardia  Patient's metoprolol increased to 12.5 mg p.o. twice daily   Observation on telemetry  Cardiology consulted follow recommendations      Elevated troponin  Delta troponin positive   Trend troponins x3   Observation on telemetry   Cardiology consulted follow up recommendations      PAF (paroxysmal atrial fibrillation)  Patient has paroxysmal (<7 days) atrial fibrillation. Patient is currently in sinus rhythm. [id number] Score: 5. The patients heart rate in the last 24 hours is as follows:  Pulse  Min: 103  Max: 132     Antiarrhythmics       Anticoagulants  enoxaparin injection 40 mg, Every 24 hours, Subcutaneous    Plan  - Replete lytes with a goal of K>4, Mg >2  - Patient is anticoagulated, see medications listed above.  - Patient's afib is currently uncontrolled. Will adjust treatment as follows:  Increase metoprolol to 12.5 p.o. twice  - cardiology consulted follow up recommendations        Coronary artery disease involving left main coronary artery  Patient  with known CAD s/p stent placement and CABG, which is controlled Will continue ASA and Statin and monitor for S/Sx of angina/ACS. Continue to monitor on telemetry.     Essential hypertension  Patient's blood pressure range in the last 24 hours was: BP  Min: 143/82  Max: 149/75.The patient's inpatient anti-hypertensive regimen is listed below:  Current Antihypertensives       Plan  - BP is controlled, no changes needed to their regimen        VTE Risk Mitigation (From admission, onward)           Ordered     enoxaparin injection 40 mg  Daily         01/24/25 1818     IP VTE HIGH RISK PATIENT  Once         01/24/25 1818     Place sequential compression device  Until discontinued         01/24/25 1818                       On 01/24/2025, patient should be placed in hospital observation services under my care.             Urbano Rider MD  Department of Hospital Medicine  [facility] - Emergency Dept

## 2025-01-25 NOTE — HPI
Patient presented to the emergency department complaining of an elevated heart rate.  Patient states that over the past 2 hours, she has not felt well.  Patient complained of mild nausea, headache and lightheadedness but denied fever, chills, diaphoresis, chest pain, abdominal pain or any urinary symptoms.

## 2025-01-25 NOTE — ASSESSMENT & PLAN NOTE
Delta troponin positive   Troponins trended up, now downward trend  Observation on telemetry   Cardiology consulted follow up recommendations  Will begin heparin drip

## 2025-01-25 NOTE — NURSING
Dr. Guevara notified pt went from NS converted to afib  HR 668noted.  2158 Notified of Troponin 298.9 noted.  0418 Troponin 603.3 noted.  Pt asymptomatic.

## 2025-01-25 NOTE — PROGRESS NOTES
Pharmacist Renal Dose Adjustment Note    Karma Chen is a 89 y.o. female being treated with the medication Enoxaparin    Patient Data:    Vital Signs (Most Recent):  Temp: 98 °F (36.7 °C) (01/24/25 1339)  Pulse: 103 (01/24/25 1745)  Resp: 17 (01/24/25 1745)  BP: (!) 149/75 (01/24/25 1730)  SpO2: 96 % (01/24/25 1745) Vital Signs (72h Range):  Temp:  [98 °F (36.7 °C)]   Pulse:  [103-132]   Resp:  [17-19]   BP: (143-149)/(72-82)   SpO2:  [96 %-99 %]      Recent Labs   Lab 01/24/25  1436   CREATININE 1.2     Serum creatinine: 1.2 mg/dL 01/24/25 1436  Estimated creatinine clearance: 27 mL/min    Medication:Enoxaparin dose: 40 mg frequency daily will be changed to medication:Enoxaparin dose:30 mg frequency:daily    Pharmacist's Name: Bernie Mullins  Pharmacist's Extension: [phone]

## 2025-01-25 NOTE — ASSESSMENT & PLAN NOTE
Patient has paroxysmal (<7 days) atrial fibrillation. Patient is currently in sinus rhythm. [id number] Score: 5. The patients heart rate in the last 24 hours is as follows:  Pulse  Min: 79  Max: 132     Antiarrhythmics  metoprolol succinate (TOPROL-XL) 24 hr split tablet 12.5 mg, 2 times daily, Oral  amiodarone 360 mg/200 mL (1.8 mg/mL) infusion, Continuous, Intravenous    Anticoagulants  heparin 25,000 units in dextrose 5% 250 mL (100 units/mL) infusion LOW INTENSITY nomogram - [facility], Continuous, Intravenous  heparin 25,000 units in dextrose 5% (100 units/ml) IV bolus from bag LOW INTENSITY nomogram - [facility], As needed (PRN), Intravenous  heparin 25,000 units in dextrose 5% (100 units/ml) IV bolus from bag LOW INTENSITY nomogram - [facility], As needed (PRN), Intravenous    Plan  - Replete lytes with a goal of K>4, Mg >2  - Patient is anticoagulated, see medications listed above.  - Patient's afib is currently uncontrolled. Will adjust treatment as follows:  Increase metoprolol to 12.5 p.o. twice  daily; placed on amiodarone drip  - cardiology consulted follow up recommendations

## 2025-01-25 NOTE — SUBJECTIVE & OBJECTIVE
Interval History: troponin peaked at 885.8 and has now started a downward trend.  IV heparin infusion ordered by cardiology.  Denies chest pain.    Review of Systems   Cardiovascular:  Positive for palpitations.   Gastrointestinal:  Positive for nausea.   Musculoskeletal:  Positive for back pain.   Skin: Negative.    Neurological:  Positive for dizziness and light-headedness.     Objective:     Vital Signs (Most Recent):  Temp: 98.1 °F (36.7 °C) (01/25/25 1027)  Pulse: (!) 111 (01/25/25 1027)  Resp: 20 (01/25/25 0824)  BP: (!) 142/86 (01/25/25 1027)  SpO2: 95 % (01/25/25 0824) Vital Signs (24h Range):  Temp:  [97.1 °F (36.2 °C)-98.5 °F (36.9 °C)] 98.1 °F (36.7 °C)  Pulse:  [] 111  Resp:  [15-20] 20  SpO2:  [93 %-99 %] 95 %  BP: (128-160)/(60-86) 142/86     Weight: 61.5 kg (135 lb 9.3 oz)  Body mass index is 26.48 kg/m².  No intake or output data in the 24 hours ending 01/25/25 1531      Physical Exam  Constitutional:       Appearance: Normal appearance. She is normal weight.   HENT:      Head: Normocephalic and atraumatic.   Eyes:      Extraocular Movements: Extraocular movements intact.      Conjunctiva/sclera: Conjunctivae normal.      Pupils: Pupils are equal, round, and reactive to light.   Cardiovascular:      Rate and Rhythm: Tachycardia present. Rhythm irregular.   Pulmonary:      Effort: Pulmonary effort is normal.      Breath sounds: Normal breath sounds.   Abdominal:      General: Abdomen is flat. Bowel sounds are normal.      Palpations: Abdomen is soft.   Genitourinary:     General: Normal vulva.      Rectum: Normal.   Musculoskeletal:         General: Normal range of motion.      Cervical back: Normal range of motion and neck supple.   Skin:     General: Skin is warm and dry.      Capillary Refill: Capillary refill takes less than 2 seconds.   Neurological:      General: No focal deficit present.      Mental Status: She is alert and oriented to person, place, and time. Mental status is at  baseline.   Psychiatric:         Mood and Affect: Mood normal.         Behavior: Behavior normal.         Thought Content: Thought content normal.         Judgment: Judgment normal.             Significant Labs: All pertinent labs within the past 24 hours have been reviewed.  CBC:   Recent Labs   Lab 01/24/25  1436 01/25/25  0239   WBC 9.09 8.25   HGB 12.6 12.3   HCT 40.2 37.8    264     CMP:   Recent Labs   Lab 01/24/25  1436 01/25/25  0239    142   K 4.1 3.9    107   CO2 27 27    97   BUN 27* 28*   CREATININE 1.2 1.1   CALCIUM 9.4 9.4   PROT 7.3 6.5   ALBUMIN 4.0 3.7   BILITOT 0.4 0.4   ALKPHOS 59 57   AST 20 21   ALT 13 12   ANIONGAP 8 8     Troponin:   Recent Labs   Lab 01/25/25  0239 01/25/25  0814 01/25/25  1148   TROPONINIHS 603.3* 885.8* 692.9*       Significant Imaging: I have reviewed all pertinent imaging results/findings within the past 24 hours.

## 2025-01-25 NOTE — CONSULTS
[facility]  Department of Cardiology  Consult Note      PATIENT NAME: Karma Chen  MRN: [id number]  TODAY'S DATE: 01/25/2025  ADMIT DATE: 1/24/2025                          CONSULT REQUESTED BY: Alma Rosa Henning, *    SUBJECTIVE     PRINCIPAL PROBLEM: Tachycardia      REASON FOR CONSULT:  Tachycardia      HPI:  89yoF with history of paroxysmal Afib, HTN, dyslipidemia, T2DM, CAD, [employer] PPM, and GERD presented with worsening upper back pain and shortness of breath for 2 weeks. She checked her HR at home and noticed it was elevated and  came to the ED for further evaluation. She was found in Afib RVR.    She reports ongoing left neck and left shoulder musculoskeletal pain in which she sees an outpatient chiropractor. She reports new right arm musculoskeletal tenderness which she thinks originated from pulling herself up in the bed. She states her upper back pain has been intermittent for days and resolves with rest, noting increased fatigue recently as well. She reports medication adherence including her Eliquis.    HR primarily 100-120s, -160s  In the ED, her Toprol-XL 12.5 was increased to BID.    From Hospitalist H&P:  Chief Complaint:        Chief Complaint   Patient presents with    Tachycardia       Pt c/o upper back pain and shortness of breath intermittent x 2 weeks. Pt states she checked her heart rate at home and it was high.         HPI: Patient presented to the emergency department complaining of an elevated heart rate.  Patient states that over the past 2 hours, she has not felt well.  Patient complained of mild nausea, headache and lightheadedness but denied fever, chills, diaphoresis, chest pain, abdominal pain or any urinary symptoms.    Review of patient's allergies indicates:   Allergen Reactions    Diclofenac-misoprostol      Other reaction(s): Not available    Doxycycline     Effexor [venlafaxine]     Estradiol     Flagyl [metronidazole]     Fluconazole      Gabapentin     Iodine     Levaquin [levofloxacin]     Nexium [esomeprazole magnesium]     Nexletol [bempedoic acid] Other (See Comments)    Penicillins     Red yeast rice (monascus purpureus)     Shellfish containing products     Statins-hmg-coa reductase inhibitors     Sulfa (sulfonamide antibiotics)     Tea tree oil     Tegaserod      ZOLNORM    Tegaserod hydrogen maleate     Trazodone     Yeast, dried     Adhesive Rash     Band-aids       Past Medical History:   Diagnosis Date    Coronary artery disease     Dermatitis     Dermatitis 12/8/2017    Diabetes mellitus     Diabetes mellitus type II     Leah Barr virus infection     Fibromyalgia     GERD (gastroesophageal reflux disease)     Hypertension     MI (myocardial infarction) 09/23/2011    Pure hypercholesterolemia 2/26/2020     Past Surgical History:   Procedure Laterality Date    adenoids      ANGIOPLASTY  1987    APPENDECTOMY      CARDIAC PACEMAKER PLACEMENT  01/12/2017    CATARACT EXTRACTION, BILATERAL Bilateral 9/2/15, 3/17/15    right eye-9/2/15, left eye-3/17/15    CHOLECYSTECTOMY  1987    CORONARY ARTERY BYPASS GRAFT  2006    quadruple    coronary stents  1999    x2    CYST REMOVAL  04/25/2017    on back    HYSTERECTOMY  1966    OVARY SURGERY  1948    Ovary burst & was repaired    RHIZOTOMY  09/14/2018    TONSILLECTOMY  1940     Social History     Tobacco Use    Smoking status: Never    Smokeless tobacco: Never   Substance Use Topics    Alcohol use: No    Drug use: No        REVIEW OF SYSTEMS    As mentioned in HPI    OBJECTIVE     VITAL SIGNS (Most Recent)  Temp: 97.7 °F (36.5 °C) (01/25/25 0824)  Pulse: 105 (01/25/25 0824)  Resp: 20 (01/25/25 0824)  BP: (!) 145/83 (01/25/25 0824)  SpO2: 95 % (01/25/25 0824)    VENTILATION STATUS  Resp: 20 (01/25/25 0824)  SpO2: 95 % (01/25/25 0824)           I & O (Last 24H):No intake or output data in the 24 hours ending 01/25/25 0838    WEIGHTS  Wt Readings from Last 3 Encounters:   01/24/25 1901 61.5 kg (135 lb  9.3 oz)   01/24/25 1340 61.2 kg (135 lb)   01/06/25 0935 60.8 kg (134 lb)   12/19/24 0745 60.8 kg (134 lb)   12/19/24 0358 60.8 kg (134 lb)   12/18/24 2100 60.8 kg (134 lb)       PHYSICAL EXAM    CONSTITUTIONAL: NAD  HEENT: Normocephalic. No pallor  NECK: no JVD  LUNGS: CTA b/l  HEART: IRR, S1, S2 normal, no murmur   ABDOMEN: soft, non-tender, bowel sounds normal  EXTREMITIES: No edema. Pulses intact  SKIN: No rash  NEURO: AAO X 3  PSYCH: normal affect      HOME MEDICATIONS:  No current facility-administered medications on file prior to encounter.     Current Outpatient Medications on File Prior to Encounter   Medication Sig Dispense Refill    acetaminophen (TYLENOL) 650 MG TbSR Take 650 mg by mouth every 6 (six) hours as needed for Pain.      ALPRAZolam (XANAX) 0.25 MG tablet TAKE 1 TABLET BY MOUTH EVERY DAY AS NEEDED FOR ANXIETY (Patient taking differently: Take 0.25 mg by mouth daily as needed for Anxiety. TAKE 1 TABLET BY MOUTH EVERY DAY AS NEEDED FOR ANXIETY) 90 tablet 0    apixaban (ELIQUIS) 2.5 mg Tab Take 1 tablet (2.5 mg total) by mouth 2 (two) times daily. 180 tablet 3    ascorbic acid, vitamin C, (VITAMIN C) 1000 MG tablet Take 1,000 mg by mouth once daily.      aspirin (ECOTRIN) 81 MG EC tablet Take 1 tablet (81 mg total) by mouth once daily. (Patient taking differently: Take 81 mg by mouth every evening.)  0    benzonatate (TESSALON) 100 MG capsule Take 100 mg by mouth 3 (three) times daily as needed for Cough.      bisacodyL (DULCOLAX) 5 mg EC tablet Take 5 mg by mouth 2 (two) times a day.      coenzyme Q10 100 mg capsule Take 1 capsule (100 mg total) by mouth 2 (two) times daily. 60 capsule 11    diclofenac sodium (VOLTAREN ARTHRITIS PAIN) 1 % Gel Apply 2 g topically daily as needed (pain).      ECHINACEA ORAL Take 750 mg by mouth 2 (two) times a day.      furosemide (LASIX) 20 MG tablet Take 1 tablet (20 mg total) by mouth once daily. (Patient taking differently: Take 20 mg by mouth every other day.)       levothyroxine (SYNTHROID) 88 MCG tablet Take 1 tablet (88 mcg total) by mouth once daily. 90 tablet 1    LIDOcaine (LIDODERM) 5 % Place 1 patch onto the skin once daily. Remove & Discard patch within 12 hours or as directed by MD (Patient taking differently: Place 1 patch onto the skin daily as needed (pain). Remove & Discard patch within 12 hours or as directed by MD) 30 patch 1    loratadine (CLARITIN) 10 mg tablet Take 10 mg by mouth once daily.      losartan (COZAAR) 25 MG tablet Take 25 mg by mouth every morning.      MAGNESIUM ORAL Take 1,000 mg by mouth once daily.      metoprolol succinate (TOPROL-XL) 25 MG 24 hr tablet Take 0.5 tablets (12.5 mg total) by mouth once daily. 15 tablet 0    montelukast (SINGULAIR) 10 mg tablet TAKE 1 TABLET BY MOUTH EVERY DAY IN THE EVENING (Patient taking differently: Take 10 mg by mouth every evening.) 90 tablet 1    multivitamin (THERAGRAN) tablet Take 1 tablet by mouth 2 (two) times a day.       naloxone (NARCAN) 4 mg/actuation Spry 1 spray by Nasal route daily as needed.      nitroGLYCERIN (NITROSTAT) 0.4 MG SL tablet Place 1 tablet (0.4 mg total) under the tongue every 5 (five) minutes as needed for Chest pain. 30 tablet 1    omeprazole (PRILOSEC) 20 MG capsule Take 1 capsule (20 mg total) by mouth once daily. (Patient taking differently: Take 20 mg by mouth every evening.) 90 capsule 3    tiZANidine (ZANAFLEX) 4 MG tablet Take 4 mg by mouth nightly as needed (muscle spasms).      traMADoL (ULTRAM) 50 mg tablet Take 50 mg by mouth 2 (two) times daily as needed for Pain.      TURMERIC ORAL Take 200 mg by mouth once daily.      UNABLE TO FIND Take 1 capsule by mouth 2 (two) times a day. Cardio platinum      JARDIANCE 10 mg tablet Take 10 mg by mouth every morning. (Patient not taking: Reported on 1/24/2025)      UNABLE TO FIND Take 1 capsule by mouth once daily. [facility]         SCHEDULED MEDS:   apixaban  2.5 mg Oral BID    aspirin  81 mg Oral QHS    bisacodyL  5  "mg Oral BID    [START ON 1/26/2025] furosemide  20 mg Oral Every other day    levothyroxine  88 mcg Oral Daily    losartan  12.5 mg Oral QAM    metoprolol succinate  12.5 mg Oral BID    montelukast  10 mg Oral QHS    pantoprazole  40 mg Oral QHS       CONTINUOUS INFUSIONS:    PRN MEDS:  Current Facility-Administered Medications:     acetaminophen, 650 mg, Oral, Q4H PRN    acetaminophen, 650 mg, Oral, Q8H PRN    ALPRAZolam, 0.25 mg, Oral, Daily PRN    aluminum-magnesium hydroxide-simethicone, 30 mL, Oral, QID PRN    benzonatate, 100 mg, Oral, TID PRN    dextrose 50%, 12.5 g, Intravenous, PRN    dextrose 50%, 25 g, Intravenous, PRN    glucagon (human recombinant), 1 mg, Intramuscular, PRN    glucose, 16 g, Oral, PRN    glucose, 24 g, Oral, PRN    LIDOcaine, 1 patch, Transdermal, Daily PRN    magnesium oxide, 800 mg, Oral, PRN    magnesium oxide, 800 mg, Oral, PRN    naloxone, 0.02 mg, Intravenous, PRN    ondansetron, 4 mg, Intravenous, Q8H PRN    potassium bicarbonate, 35 mEq, Oral, PRN    potassium bicarbonate, 50 mEq, Oral, PRN    potassium bicarbonate, 60 mEq, Oral, PRN    potassium, sodium phosphates, 2 packet, Oral, PRN    potassium, sodium phosphates, 2 packet, Oral, PRN    potassium, sodium phosphates, 2 packet, Oral, PRN    sodium chloride 0.9%, 10 mL, Intravenous, Q12H PRN    tiZANidine, 4 mg, Oral, Nightly PRN    traMADoL, 50 mg, Oral, BID PRN    LABS AND DIAGNOSTICS     CBC LAST 3 DAYS  Recent Labs   Lab 01/24/25  1436 01/25/25  0239   WBC 9.09 8.25   RBC 4.54 4.30   HGB 12.6 12.3   HCT 40.2 37.8   MCV 89 88   MCH 27.8 28.6   MCHC 31.3* 32.5   RDW 13.3 13.4    264   MPV 9.8 10.0   GRAN 58.1  5.3 46.3  3.8   LYMPH 26.5  2.4 38.3  3.2   MONO 11.3  1.0 11.4  0.9   BASO 0.07 0.04   NRBC 0 0       COAGULATION LAST 3 DAYS  No results for input(s): "LABPT", "INR", "APTT" in the last 168 hours.    CHEMISTRY LAST 3 DAYS  Recent Labs   Lab 01/24/25  1436 01/25/25  0239    142   K 4.1 3.9    " "107   CO2 27 27   ANIONGAP 8 8   BUN 27* 28*   CREATININE 1.2 1.1    97   CALCIUM 9.4 9.4   MG 2.1 2.1   ALBUMIN 4.0 3.7   PROT 7.3 6.5   ALKPHOS 59 57   ALT 13 12   AST 20 21   BILITOT 0.4 0.4       CARDIAC PROFILE LAST 3 DAYS  Recent Labs   Lab 01/24/25  1436 01/24/25  1638 01/24/25  2110 01/25/25  0239   *  --   --   --    TROPONINIHS 48.1* 106.1* 298.9* 603.3*       ENDOCRINE LAST 3 DAYS  No results for input(s): "TSH", "PROCAL" in the last 168 hours.    LAST ARTERIAL BLOOD GAS  ABG  No results for input(s): "PH", "PO2", "PCO2", "HCO3", "BE" in the last 168 hours.    LAST 7 DAYS MICROBIOLOGY   Microbiology Results (last 7 days)       ** No results found for the last 168 hours. **            MOST RECENT IMAGING    X-Ray Chest PA And Lateral  Narrative: EXAMINATION:  XR CHEST PA AND LATERAL    CLINICAL HISTORY:  Chest Pain;    COMPARISON:  12/18/2024.    FINDINGS:  The cardiac silhouette is borderline enlarged.  There is no evidence of acute pulmonary vascular engorgement.  Surgical changes are present in the sternum.  There is calcified plaque formation within the arch of the aorta.  A dual lead left-sided cardiac device has leads positioned over the right side of the cardiac silhouette.    The lungs are appropriately expanded.  There are no confluent infiltrates or significant volume loss.  There is no effusion.  There is mild apical pleural thickening.  Impression: No acute cardiopulmonary disease.    Borderline heart size.    Additional incidental observations as above.    Electronically signed by: Martha Brady  Date:    01/24/2025  Time:    15:17      ECHOCARDIOGRAM RESULTS (last 5)  Results for orders placed during the hospital encounter of 12/10/24    Echo    Interpretation Summary    Left Ventricle: The left ventricle is normal in size. Normal wall thickness. Normal wall motion. Septal motion is consistent with pacing. There is severely reduced systolic function with a visually estimated " ejection fraction of 25 - 30%. There is normal diastolic function. E/A ratio is 1.61.    Right Ventricle: Right ventricle was not assessed. Right ventricular enlargement. Systolic function is reduced.TAPSE is 1.80 cm. Pacemaker lead present in the ventricle.    Left Atrium: Left atrium is severely dilated.    Right Atrium: Right atrium is severely dilated.    Aortic Valve: There is mild aortic regurgitation.    Mitral Valve: There is moderate to severe regurgitation.    Tricuspid Valve: There is mild to moderate regurgitation. There is moderate pulmonary hypertension. The estimated PA systolic pressure is at least 59 mmHg.    Pulmonic Valve: There is mild regurgitation.      Results for orders placed during the hospital encounter of 06/10/21    Echo    Interpretation Summary  · The left ventricle is mildly enlarged with mild concentric hypertrophy  · The estimated ejection fraction is 49%. Which is decreased  · Grade II left ventricular diastolic dysfunction.  · There is mild left ventricular global hypokinesis.  · Atrial fibrillation not observed.  · Normal right ventricular size with normal right ventricular systolic function.  · Moderate left atrial enlargement.  · Mild-to-moderate aortic regurgitation.  · There is mild aortic valve stenosis.  · Aortic valve area is 1.99 cm2; peak velocity is 1.41 m/s; mean gradient is 4 mmHg.  · Mild mitral regurgitation.  · Mild to moderate tricuspid regurgitation.  · Mild pulmonic regurgitation.  · Normal central venous pressure (3 mmHg).  · The estimated PA systolic pressure is 35 mmHg. Mild pulmonary hypertension  · Pacemaker lead is present      Results for orders placed during the hospital encounter of 07/24/20    Echo Color Flow Doppler? Yes; Bubble Contrast? No    Interpretation Summary  · Concentric left ventricular remodeling.  · Normal left ventricular systolic function. The estimated ejection fraction is 55%.  · Normal LV diastolic function.  · No wall motion  abnormalities.  · Normal right ventricular systolic function.  · Mild left atrial enlargement.  · Mild aortic regurgitation.  · The mitral valve is mildly sclerotic.  · Mild tricuspid regurgitation.  · Normal central venous pressure (3 mmHg).  · The estimated PA systolic pressure is 27 mmHg.      Results for orders placed during the hospital encounter of 12/08/17    2D echo with color flow doppler    Narrative  Date of Procedure: 12/08/2017        TEST DESCRIPTION  Technical Quality: This is a portable study performed at the patient's bedside. This is a technically adequate study.    General: A catheter is present in the right-sided cardiac chambers.    Aorta: The aortic root is normal in size, measuring 2.5 cm at sinotubular junction and 3.0 cm at Sinuses of Valsalva. The proximal ascending aorta is normal in size, measuring 3.0 cm across.    Left Atrium: The left atrial volume index is severely enlarged, measuring 55.08 cc/m2.    Left Ventricle: The left ventricle is normal in size, with an end-diastolic diameter of 4.3 cm, and an end-systolic diameter of 3.4 cm. LV wall thickness is normal, with the septum measuring 1.1 cm and the posterior wall measuring 0.9 cm across. Relative  wall thickness was normal at 0.42, and the LV mass index was 98.1 g/m2 consistent with normal left ventricular mass. There is global hypokinesis. Left ventricular systolic function appears low normal to mildly depressed. Visually estimated ejection  fraction is 50-55%. The LV Doppler derived stroke volume equals 56.0 ccs.    Diastolic indices: E wave velocity 0.8 m/s, E/A ratio 1.1,  msec., E/e' ratio(avg) 14. Pulmonary vein systolic/diastolic ratio is normal. There is pseudonormalization of mitral inflow pattern consistent with significant diastolic dysfunction.    Right Atrium: The right atrium is normal in size, measuring 4.3 cm in length and 3.8 cm in width in the apical view.    Right Ventricle: The right ventricle is upper  limit of normal measuring 4.1 cm at the base in the apical right ventricle-focused view. Global right ventricular systolic function appears normal. There is abnormal septal motion. Tricuspid annular plane  systolic excursion (TAPSE) is 1.8 cm. Tissue Doppler-derived tricuspid annular peak systolic velocity (S prime) is 9.6 cm/s. The estimated PA systolic pressure is 37 mmHg.    Aortic Valve:  The aortic valve is mildly sclerotic with normal leaflet mobility. The aortic valve is tri-leaflet in structure. Using a left ventricular outflow tract diameter of 2.0 cm, a left ventricular outflow tract velocity time integral of 18 cm,  and a peak instantaneous transvalvular velocity time integral of 26 cm, the calculated aortic valve area is 2.15 cm2(AVAi is 1.29 cm2/m2). Additionally, there is mild aortic regurgitation. There is aortic annular calcification.    Mitral Valve:  The mitral valve is normal in structure with normal leaflet mobility. There is mitral annular calcification.    Tricuspid Valve:  The tricuspid valve is normal in structure with normal leaflet mobility. There is mild to moderate tricuspid regurgitation.    Pulmonary Valve:  The pulmonic valve is normal in structure with normal leaflet mobility. There is trivial pulmonic regurgitation.    IVC: IVC is normal in size and collapses > 50% with a sniff, suggesting normal right atrial pressure of 3 mmHg.    Intracavitary: There is no evidence of pericardial effusion, intracavity mass, thrombi, or vegetation.        CONCLUSIONS  1 - Low normal to mildly depressed left ventricular systolic function (EF 50-55%) - in some views the basal and mid lateral wall appear hypokinetic, but endocardial definition is suboptimal for these walls.  2 - Right ventricle is upper limit of normal in size with normal systolic function.  3 - Impaired LV relaxation, elevated LAP (grade 2 diastolic dysfunction).  4 - Severe left atrial enlargement.  5 - Mild to moderate tricuspid  regurgitation.  6 - Mild aortic regurgitation.  7 - The estimated PA systolic pressure is 37 mmHg.            This document has been electronically  SIGNED BY: Chava Ponce MD On: 12/11/2017 10:02      CURRENT/PREVIOUS VISIT EKG      Results for orders placed or performed during the hospital encounter of 01/24/25   EKG 12-lead    Collection Time: 01/24/25  1:46 PM   Result Value Ref Range    QRS Duration 140 ms    [facility] QTC Calculation 508 ms    Narrative    Test Reason : R07.9,    Vent. Rate : 125 BPM     Atrial Rate :    BPM     P-R Int :    ms          QRS Dur : 140 ms      QT Int : 352 ms       P-R-T Axes :     -9 181 degrees    QTcB Int : 508 ms    Wide QRS tachycardia  Left bundle branch block  Abnormal ECG  No previous ECGs available    Referred By: AAAREFERRAL SELF           Confirmed By:            ASSESSMENT/PLAN:     Active Hospital Problems    Diagnosis    *Tachycardia    Elevated troponin    PAF (paroxysmal atrial fibrillation)    Essential hypertension    Coronary artery disease involving left main coronary artery       ASSESSMENT & PLAN:     Tachycardia   Elevated troponin  Paroxysmal Atrial Fibrillation  HTN  Dyslipidemia  CAD  T2DM  GERD  [employer] PPM      RECOMMENDATIONS:    - Starting amiodarone gtt. LFTs wnl  - Continue Toprol-XL 12.5 mg BID and losartan 12.5 mg for BP control  - Electrolytes replaced per protocol.  - On Synthroid  - BNP elevated. Prior Echo 12/11/2024 showed LVEF 25-30%. Repeat echo pending. Patient may benefit from BiV ICD.  - Plan to restart home GDMT prior to discharge.  - Continue oral home Lasix 20 mg every other day. Strict I/Os. Low sodium diet. Daily weights.  - Troponin elevated to 885.8 and peaked. Stopped oral Eliquis and started heparin gtt, will continue overnight and likely restart Eliquis tomorrow.  - Cardiac monitoring    Thank you for consulting cardiology. We will continue to follow.      BONILLA Arambula-BC  Department of Cardiology  Date of Service:  01/25/2025        I have personally interviewed and examined the patient, I have reviewed the Nurse Practitioner's history and physical, assessment, and plan. I agree with the findings and plan.      VICKI Doty MD  Interventional Cardiology  Date of Service: 01/25/2025  8:38 AM

## 2025-01-25 NOTE — PLAN OF CARE
01/25/25 1035   FRAGA Message   Medicare Outpatient and Observation Notification regarding financial responsibility Given to patient/caregiver;Explained to patient/caregiver;Signed/date by patient/caregiver   Date FRAGA was signed 01/25/25   Time FRAGA was signed 1030

## 2025-01-26 LAB
ALBUMIN SERPL BCP-MCNC: 3.6 G/DL (ref 3.5–5.2)
ALP SERPL-CCNC: 53 U/L (ref 55–135)
ALT SERPL W/O P-5'-P-CCNC: 11 U/L (ref 10–44)
ANION GAP SERPL CALC-SCNC: 9 MMOL/L (ref 8–16)
APTT PPP: 45.3 SEC (ref 21–32)
APTT PPP: 49.5 SEC (ref 21–32)
AST SERPL-CCNC: 21 U/L (ref 10–40)
BASOPHILS # BLD AUTO: 0.06 K/UL (ref 0–0.2)
BASOPHILS # BLD AUTO: 0.06 K/UL (ref 0–0.2)
BASOPHILS NFR BLD: 0.7 % (ref 0–1.9)
BASOPHILS NFR BLD: 0.7 % (ref 0–1.9)
BILIRUB SERPL-MCNC: 0.3 MG/DL (ref 0.1–1)
BUN SERPL-MCNC: 27 MG/DL (ref 8–23)
CALCIUM SERPL-MCNC: 9.1 MG/DL (ref 8.7–10.5)
CHLORIDE SERPL-SCNC: 102 MMOL/L (ref 95–110)
CO2 SERPL-SCNC: 25 MMOL/L (ref 23–29)
CREAT SERPL-MCNC: 1.3 MG/DL (ref 0.5–1.4)
DIFFERENTIAL METHOD BLD: NORMAL
DIFFERENTIAL METHOD BLD: NORMAL
EOSINOPHIL # BLD AUTO: 0.3 K/UL (ref 0–0.5)
EOSINOPHIL # BLD AUTO: 0.3 K/UL (ref 0–0.5)
EOSINOPHIL NFR BLD: 3 % (ref 0–8)
EOSINOPHIL NFR BLD: 3 % (ref 0–8)
ERYTHROCYTE [DISTWIDTH] IN BLOOD BY AUTOMATED COUNT: 13.5 % (ref 11.5–14.5)
ERYTHROCYTE [DISTWIDTH] IN BLOOD BY AUTOMATED COUNT: 13.5 % (ref 11.5–14.5)
EST. GFR  (NO RACE VARIABLE): 39.8 ML/MIN/1.73 M^2
GLUCOSE SERPL-MCNC: 94 MG/DL (ref 70–110)
HCT VFR BLD AUTO: 37.6 % (ref 37–48.5)
HCT VFR BLD AUTO: 37.6 % (ref 37–48.5)
HGB BLD-MCNC: 12.2 G/DL (ref 12–16)
HGB BLD-MCNC: 12.2 G/DL (ref 12–16)
IMM GRANULOCYTES # BLD AUTO: 0.02 K/UL (ref 0–0.04)
IMM GRANULOCYTES # BLD AUTO: 0.02 K/UL (ref 0–0.04)
IMM GRANULOCYTES NFR BLD AUTO: 0.2 % (ref 0–0.5)
IMM GRANULOCYTES NFR BLD AUTO: 0.2 % (ref 0–0.5)
LYMPHOCYTES # BLD AUTO: 3.6 K/UL (ref 1–4.8)
LYMPHOCYTES # BLD AUTO: 3.6 K/UL (ref 1–4.8)
LYMPHOCYTES NFR BLD: 38.5 % (ref 18–48)
LYMPHOCYTES NFR BLD: 38.5 % (ref 18–48)
MAGNESIUM SERPL-MCNC: 1.9 MG/DL (ref 1.6–2.6)
MCH RBC QN AUTO: 28.3 PG (ref 27–31)
MCH RBC QN AUTO: 28.3 PG (ref 27–31)
MCHC RBC AUTO-ENTMCNC: 32.4 G/DL (ref 32–36)
MCHC RBC AUTO-ENTMCNC: 32.4 G/DL (ref 32–36)
MCV RBC AUTO: 87 FL (ref 82–98)
MCV RBC AUTO: 87 FL (ref 82–98)
MONOCYTES # BLD AUTO: 1 K/UL (ref 0.3–1)
MONOCYTES # BLD AUTO: 1 K/UL (ref 0.3–1)
MONOCYTES NFR BLD: 11.3 % (ref 4–15)
MONOCYTES NFR BLD: 11.3 % (ref 4–15)
NEUTROPHILS # BLD AUTO: 4.3 K/UL (ref 1.8–7.7)
NEUTROPHILS # BLD AUTO: 4.3 K/UL (ref 1.8–7.7)
NEUTROPHILS NFR BLD: 46.3 % (ref 38–73)
NEUTROPHILS NFR BLD: 46.3 % (ref 38–73)
NRBC BLD-RTO: 0 /100 WBC
NRBC BLD-RTO: 0 /100 WBC
PHOSPHATE SERPL-MCNC: 3.6 MG/DL (ref 2.7–4.5)
PLATELET # BLD AUTO: 268 K/UL (ref 150–450)
PLATELET # BLD AUTO: 268 K/UL (ref 150–450)
PMV BLD AUTO: 10.2 FL (ref 9.2–12.9)
PMV BLD AUTO: 10.2 FL (ref 9.2–12.9)
POTASSIUM SERPL-SCNC: 3.6 MMOL/L (ref 3.5–5.1)
PROT SERPL-MCNC: 6.4 G/DL (ref 6–8.4)
RBC # BLD AUTO: 4.31 M/UL (ref 4–5.4)
RBC # BLD AUTO: 4.31 M/UL (ref 4–5.4)
SODIUM SERPL-SCNC: 136 MMOL/L (ref 136–145)
WBC # BLD AUTO: 9.22 K/UL (ref 3.9–12.7)
WBC # BLD AUTO: 9.22 K/UL (ref 3.9–12.7)

## 2025-01-26 PROCEDURE — 63600175 PHARM REV CODE 636 W HCPCS

## 2025-01-26 PROCEDURE — 25000003 PHARM REV CODE 250: Performed by: NURSE PRACTITIONER

## 2025-01-26 PROCEDURE — 21400001 HC TELEMETRY ROOM

## 2025-01-26 PROCEDURE — 85025 COMPLETE CBC W/AUTO DIFF WBC: CPT | Performed by: HOSPITALIST

## 2025-01-26 PROCEDURE — 25000003 PHARM REV CODE 250

## 2025-01-26 PROCEDURE — 83735 ASSAY OF MAGNESIUM: CPT | Performed by: HOSPITALIST

## 2025-01-26 PROCEDURE — 84100 ASSAY OF PHOSPHORUS: CPT | Performed by: HOSPITALIST

## 2025-01-26 PROCEDURE — 80053 COMPREHEN METABOLIC PANEL: CPT | Performed by: HOSPITALIST

## 2025-01-26 PROCEDURE — 25000003 PHARM REV CODE 250: Performed by: HOSPITALIST

## 2025-01-26 PROCEDURE — 36415 COLL VENOUS BLD VENIPUNCTURE: CPT

## 2025-01-26 PROCEDURE — 85730 THROMBOPLASTIN TIME PARTIAL: CPT | Mod: 91

## 2025-01-26 PROCEDURE — 99233 SBSQ HOSP IP/OBS HIGH 50: CPT | Mod: ,,, | Performed by: INTERNAL MEDICINE

## 2025-01-26 RX ORDER — AMIODARONE HYDROCHLORIDE 200 MG/1
200 TABLET ORAL 2 TIMES DAILY
Status: DISCONTINUED | OUTPATIENT
Start: 2025-01-26 | End: 2025-01-27 | Stop reason: HOSPADM

## 2025-01-26 RX ORDER — LANOLIN ALCOHOL/MO/W.PET/CERES
800 CREAM (GRAM) TOPICAL ONCE
Status: COMPLETED | OUTPATIENT
Start: 2025-01-26 | End: 2025-01-26

## 2025-01-26 RX ORDER — POTASSIUM CHLORIDE 20 MEQ/1
40 TABLET, EXTENDED RELEASE ORAL ONCE
Status: DISCONTINUED | OUTPATIENT
Start: 2025-01-26 | End: 2025-01-26

## 2025-01-26 RX ADMIN — ALPRAZOLAM 0.25 MG: 0.25 TABLET ORAL at 08:01

## 2025-01-26 RX ADMIN — METOPROLOL SUCCINATE 12.5 MG: 25 TABLET, EXTENDED RELEASE ORAL at 08:01

## 2025-01-26 RX ADMIN — MONTELUKAST 10 MG: 10 TABLET, FILM COATED ORAL at 08:01

## 2025-01-26 RX ADMIN — FUROSEMIDE 20 MG: 20 TABLET ORAL at 08:01

## 2025-01-26 RX ADMIN — AMIODARONE HYDROCHLORIDE 200 MG: 200 TABLET ORAL at 08:01

## 2025-01-26 RX ADMIN — LEVOTHYROXINE SODIUM 88 MCG: 0.09 TABLET ORAL at 05:01

## 2025-01-26 RX ADMIN — Medication 800 MG: at 08:01

## 2025-01-26 RX ADMIN — ASPIRIN 81 MG: 81 TABLET, COATED ORAL at 08:01

## 2025-01-26 RX ADMIN — APIXABAN 2.5 MG: 2.5 TABLET, FILM COATED ORAL at 12:01

## 2025-01-26 RX ADMIN — LOSARTAN POTASSIUM 12.5 MG: 25 TABLET, FILM COATED ORAL at 08:01

## 2025-01-26 RX ADMIN — APIXABAN 2.5 MG: 2.5 TABLET, FILM COATED ORAL at 08:01

## 2025-01-26 RX ADMIN — PANTOPRAZOLE SODIUM 40 MG: 40 TABLET, DELAYED RELEASE ORAL at 08:01

## 2025-01-26 RX ADMIN — POTASSIUM BICARBONATE 40 MEQ: 782 TABLET, EFFERVESCENT ORAL at 08:01

## 2025-01-26 RX ADMIN — AMIODARONE HYDROCHLORIDE 200 MG: 200 TABLET ORAL at 12:01

## 2025-01-26 RX ADMIN — AMIODARONE HYDROCHLORIDE 1 MG/MIN: 1.8 INJECTION, SOLUTION INTRAVENOUS at 03:01

## 2025-01-26 NOTE — ASSESSMENT & PLAN NOTE
Troponins trended up, now downward trend  Observation on telemetry   Cardiology consulted follow up recommendations  Currently on IV heparin gtt-to be discontinued and Eliquis resumed.

## 2025-01-26 NOTE — ASSESSMENT & PLAN NOTE
Patient has chronic hypothyroidism. TFTs reviewed-   Lab Results   Component Value Date    TSH 3.817 12/19/2024   . Will continue chronic levothyroxine and adjust for and clinical changes.

## 2025-01-26 NOTE — ASSESSMENT & PLAN NOTE
Patient's metoprolol increased to 12.5 mg p.o. twice daily   Observation on telemetry  Amiodarone drip, now oral

## 2025-01-26 NOTE — PROGRESS NOTES
[facility]  Department of Cardiology  Progress Note      PATIENT NAME: Karma Chen  MRN: [id number]  TODAY'S DATE: 01/26/2025  ADMIT DATE: 1/24/2025                           SUBJECTIVE     PRINCIPAL PROBLEM: Tachycardia      REASON FOR CONSULT:  Tachycardia    Interval history:  01/26/2025  Patient denies chest pain, shortness of breath, palpitations, or dizziness. Reports being up and richard in the room.   Telemetry reviewed showing sinus rhythm with a 1st degree block with occasional pacing.    HPI:  89yoF with history of paroxysmal Afib, HTN, dyslipidemia, T2DM, CAD, Medtronic PPM, and GERD presented with worsening upper back pain and shortness of breath for 2 weeks. She checked her HR at home and noticed it was elevated and  came to the ED for further evaluation. She was found in Afib RVR.    She reports ongoing left neck and left shoulder musculoskeletal pain in which she sees an outpatient chiropractor. She reports new right arm musculoskeletal tenderness which she thinks originated from pulling herself up in the bed. She states her upper back pain has been intermittent for days and resolves with rest, noting increased fatigue recently as well. She reports medication adherence including her Eliquis.    HR primarily 100-120s, -160s  In the ED, her Toprol-XL 12.5 was increased to BID.    From Hospitalist H&P:  Chief Complaint:        Chief Complaint   Patient presents with    Tachycardia       Pt c/o upper back pain and shortness of breath intermittent x 2 weeks. Pt states she checked her heart rate at home and it was high.         HPI: Patient presented to the emergency department complaining of an elevated heart rate.  Patient states that over the past 2 hours, she has not felt well.  Patient complained of mild nausea, headache and lightheadedness but denied fever, chills, diaphoresis, chest pain, abdominal pain or any urinary symptoms.    Review of patient's allergies indicates:    Allergen Reactions    Diclofenac-misoprostol      Other reaction(s): Not available    Doxycycline     Effexor [venlafaxine]     Estradiol     Flagyl [metronidazole]     Fluconazole     Gabapentin     Iodine     Levaquin [levofloxacin]     Nexium [esomeprazole magnesium]     Nexletol [bempedoic acid] Other (See Comments)    Penicillins     Red yeast rice (monascus purpureus)     Shellfish containing products     Statins-hmg-coa reductase inhibitors     Sulfa (sulfonamide antibiotics)     Tea tree oil     Tegaserod      ZOLNORM    Tegaserod hydrogen maleate     Trazodone     Yeast, dried     Adhesive Rash     Band-aids       Past Medical History:   Diagnosis Date    Coronary artery disease     Dermatitis     Dermatitis 12/8/2017    Diabetes mellitus     Diabetes mellitus type II     Leah Barr virus infection     Fibromyalgia     GERD (gastroesophageal reflux disease)     Hypertension     MI (myocardial infarction) 09/23/2011    Pure hypercholesterolemia 2/26/2020     Past Surgical History:   Procedure Laterality Date    adenoids      ANGIOPLASTY  1987    APPENDECTOMY      CARDIAC PACEMAKER PLACEMENT  01/12/2017    CATARACT EXTRACTION, BILATERAL Bilateral 9/2/15, 3/17/15    right eye-9/2/15, left eye-3/17/15    CHOLECYSTECTOMY  1987    CORONARY ARTERY BYPASS GRAFT  2006    quadruple    coronary stents  1999    x2    CYST REMOVAL  04/25/2017    on back    HYSTERECTOMY  1966    OVARY SURGERY  1948    Ovary burst & was repaired    RHIZOTOMY  09/14/2018    TONSILLECTOMY  1940     Social History     Tobacco Use    Smoking status: Never    Smokeless tobacco: Never   Substance Use Topics    Alcohol use: No    Drug use: No        REVIEW OF SYSTEMS    As mentioned in HPI    OBJECTIVE     VITAL SIGNS (Most Recent)  Temp: 97.7 °F (36.5 °C) (01/26/25 1608)  Pulse: 69 (01/26/25 1608)  Resp: 20 (01/26/25 1608)  BP: 137/66 (01/26/25 1608)  SpO2: 98 % (01/26/25 1608)    VENTILATION STATUS  Resp: 20 (01/26/25 1608)  SpO2: 98 %  (01/26/25 1608)           I & O (Last 24H):  Intake/Output Summary (Last 24 hours) at 1/26/2025 1626  Last data filed at 1/26/2025 1605  Gross per 24 hour   Intake 576.71 ml   Output --   Net 576.71 ml       WEIGHTS  Wt Readings from Last 3 Encounters:   01/24/25 1901 61.5 kg (135 lb 9.3 oz)   01/24/25 1340 61.2 kg (135 lb)   01/25/25 1135 61.5 kg (135 lb 9.3 oz)   01/06/25 0935 60.8 kg (134 lb)       PHYSICAL EXAM    CONSTITUTIONAL: NAD  HEENT: Normocephalic. No pallor  NECK: no JVD  LUNGS: CTA b/l  HEART: IRR, S1, S2 normal, no murmur   ABDOMEN: soft, non-tender, bowel sounds normal  EXTREMITIES: No edema. Pulses intact  SKIN: No rash  NEURO: AAO X 3  PSYCH: normal affect      HOME MEDICATIONS:  No current facility-administered medications on file prior to encounter.     Current Outpatient Medications on File Prior to Encounter   Medication Sig Dispense Refill    acetaminophen (TYLENOL) 650 MG TbSR Take 650 mg by mouth every 6 (six) hours as needed for Pain.      ALPRAZolam (XANAX) 0.25 MG tablet TAKE 1 TABLET BY MOUTH EVERY DAY AS NEEDED FOR ANXIETY (Patient taking differently: Take 0.25 mg by mouth daily as needed for Anxiety. TAKE 1 TABLET BY MOUTH EVERY DAY AS NEEDED FOR ANXIETY) 90 tablet 0    apixaban (ELIQUIS) 2.5 mg Tab Take 1 tablet (2.5 mg total) by mouth 2 (two) times daily. 180 tablet 3    ascorbic acid, vitamin C, (VITAMIN C) 1000 MG tablet Take 1,000 mg by mouth once daily.      aspirin (ECOTRIN) 81 MG EC tablet Take 1 tablet (81 mg total) by mouth once daily. (Patient taking differently: Take 81 mg by mouth every evening.)  0    benzonatate (TESSALON) 100 MG capsule Take 100 mg by mouth 3 (three) times daily as needed for Cough.      bisacodyL (DULCOLAX) 5 mg EC tablet Take 5 mg by mouth 2 (two) times a day.      coenzyme Q10 100 mg capsule Take 1 capsule (100 mg total) by mouth 2 (two) times daily. 60 capsule 11    diclofenac sodium (VOLTAREN ARTHRITIS PAIN) 1 % Gel Apply 2 g topically daily as  needed (pain).      ECHINACEA ORAL Take 750 mg by mouth 2 (two) times a day.      furosemide (LASIX) 20 MG tablet Take 1 tablet (20 mg total) by mouth once daily. (Patient taking differently: Take 20 mg by mouth every other day.)      levothyroxine (SYNTHROID) 88 MCG tablet Take 1 tablet (88 mcg total) by mouth once daily. 90 tablet 1    LIDOcaine (LIDODERM) 5 % Place 1 patch onto the skin once daily. Remove & Discard patch within 12 hours or as directed by MD (Patient taking differently: Place 1 patch onto the skin daily as needed (pain). Remove & Discard patch within 12 hours or as directed by MD) 30 patch 1    loratadine (CLARITIN) 10 mg tablet Take 10 mg by mouth once daily.      losartan (COZAAR) 25 MG tablet Take 25 mg by mouth every morning.      MAGNESIUM ORAL Take 1,000 mg by mouth once daily.      metoprolol succinate (TOPROL-XL) 25 MG 24 hr tablet Take 0.5 tablets (12.5 mg total) by mouth once daily. 15 tablet 0    montelukast (SINGULAIR) 10 mg tablet TAKE 1 TABLET BY MOUTH EVERY DAY IN THE EVENING (Patient taking differently: Take 10 mg by mouth every evening.) 90 tablet 1    multivitamin (THERAGRAN) tablet Take 1 tablet by mouth 2 (two) times a day.       naloxone (NARCAN) 4 mg/actuation Spry 1 spray by Nasal route daily as needed.      nitroGLYCERIN (NITROSTAT) 0.4 MG SL tablet Place 1 tablet (0.4 mg total) under the tongue every 5 (five) minutes as needed for Chest pain. 30 tablet 1    omeprazole (PRILOSEC) 20 MG capsule Take 1 capsule (20 mg total) by mouth once daily. (Patient taking differently: Take 20 mg by mouth every evening.) 90 capsule 3    tiZANidine (ZANAFLEX) 4 MG tablet Take 4 mg by mouth nightly as needed (muscle spasms).      traMADoL (ULTRAM) 50 mg tablet Take 50 mg by mouth 2 (two) times daily as needed for Pain.      TURMERIC ORAL Take 200 mg by mouth once daily.      UNABLE TO FIND Take 1 capsule by mouth 2 (two) times a day. Cardio platinum      JARDIANCE 10 mg tablet Take 10 mg by  mouth every morning. (Patient not taking: Reported on 1/24/2025)      UNABLE TO FIND Take 1 capsule by mouth once daily. [facility]         SCHEDULED MEDS:   amiodarone  200 mg Oral BID    apixaban  2.5 mg Oral BID    aspirin  81 mg Oral QHS    bisacodyL  5 mg Oral BID    furosemide  20 mg Oral Every other day    levothyroxine  88 mcg Oral Daily    losartan  12.5 mg Oral QAM    metoprolol succinate  12.5 mg Oral BID    montelukast  10 mg Oral QHS    pantoprazole  40 mg Oral QHS       CONTINUOUS INFUSIONS:    PRN MEDS:  Current Facility-Administered Medications:     acetaminophen, 650 mg, Oral, Q4H PRN    acetaminophen, 650 mg, Oral, Q8H PRN    ALPRAZolam, 0.25 mg, Oral, Daily PRN    aluminum-magnesium hydroxide-simethicone, 30 mL, Oral, QID PRN    benzonatate, 100 mg, Oral, TID PRN    dextrose 50%, 12.5 g, Intravenous, PRN    dextrose 50%, 25 g, Intravenous, PRN    glucagon (human recombinant), 1 mg, Intramuscular, PRN    glucose, 16 g, Oral, PRN    glucose, 24 g, Oral, PRN    LIDOcaine, 1 patch, Transdermal, Daily PRN    magnesium oxide, 800 mg, Oral, PRN    magnesium oxide, 800 mg, Oral, PRN    naloxone, 0.02 mg, Intravenous, PRN    ondansetron, 4 mg, Intravenous, Q8H PRN    potassium bicarbonate, 35 mEq, Oral, PRN    potassium bicarbonate, 50 mEq, Oral, PRN    potassium bicarbonate, 60 mEq, Oral, PRN    potassium, sodium phosphates, 2 packet, Oral, PRN    potassium, sodium phosphates, 2 packet, Oral, PRN    potassium, sodium phosphates, 2 packet, Oral, PRN    sodium chloride 0.9%, 10 mL, Intravenous, Q12H PRN    tiZANidine, 4 mg, Oral, Nightly PRN    traMADoL, 50 mg, Oral, BID PRN    LABS AND DIAGNOSTICS     CBC LAST 3 DAYS  Recent Labs   Lab 01/24/25  1436 01/25/25  0239 01/26/25  0318   WBC 9.09 8.25 9.22  9.22   RBC 4.54 4.30 4.31  4.31   HGB 12.6 12.3 12.2  12.2   HCT 40.2 37.8 37.6  37.6   MCV 89 88 87  87   MCH 27.8 28.6 28.3  28.3   MCHC 31.3* 32.5 32.4  32.4   RDW 13.3 13.4 13.5  13.5     "264 268  268   MPV 9.8 10.0 10.2  10.2   GRAN 58.1  5.3 46.3  3.8 46.3  46.3  4.3  4.3   LYMPH 26.5  2.4 38.3  3.2 38.5  38.5  3.6  3.6   MONO 11.3  1.0 11.4  0.9 11.3  11.3  1.0  1.0   BASO 0.07 0.04 0.06  0.06   NRBC 0 0 0  0       COAGULATION LAST 3 DAYS  Recent Labs   Lab 01/25/25  1148 01/25/25  2013 01/25/25  2251 01/26/25  0318 01/26/25  0634   INR 1.1  --   --   --   --    APTT 29.2   < > 47.2* 45.3* 49.5*    < > = values in this interval not displayed.       CHEMISTRY LAST 3 DAYS  Recent Labs   Lab 01/24/25  1436 01/25/25 0239 01/26/25  0318    142 136   K 4.1 3.9 3.6    107 102   CO2 27 27 25   ANIONGAP 8 8 9   BUN 27* 28* 27*   CREATININE 1.2 1.1 1.3    97 94   CALCIUM 9.4 9.4 9.1   MG 2.1 2.1 1.9   ALBUMIN 4.0 3.7 3.6   PROT 7.3 6.5 6.4   ALKPHOS 59 57 53*   ALT 13 12 11   AST 20 21 21   BILITOT 0.4 0.4 0.3       CARDIAC PROFILE LAST 3 DAYS  Recent Labs   Lab 01/24/25  1436 01/24/25  1638 01/25/25  0239 01/25/25  0814 01/25/25  1148   *  --   --   --   --    TROPONINIHS 48.1*   < > 603.3* 885.8* 692.9*    < > = values in this interval not displayed.       ENDOCRINE LAST 3 DAYS  No results for input(s): "TSH", "PROCAL" in the last 168 hours.    LAST ARTERIAL BLOOD GAS  ABG  No results for input(s): "PH", "PO2", "PCO2", "HCO3", "BE" in the last 168 hours.    LAST 7 DAYS MICROBIOLOGY   Microbiology Results (last 7 days)       ** No results found for the last 168 hours. **            MOST RECENT IMAGING    Echo    Limited echo to evaluate left ventricular systolic function.    Left Ventricle: The left ventricle is normal in size. Moderate global   hypokinesis present. There is moderately reduced systolic function with a   visually estimated ejection fraction of 30 - 35%.      ECHOCARDIOGRAM RESULTS (last 5)  Results for orders placed during the hospital encounter of 12/10/24    Echo    Interpretation Summary    Left Ventricle: The left ventricle is normal in " size. Normal wall thickness. Normal wall motion. Septal motion is consistent with pacing. There is severely reduced systolic function with a visually estimated ejection fraction of 25 - 30%. There is normal diastolic function. E/A ratio is 1.61.    Right Ventricle: Right ventricle was not assessed. Right ventricular enlargement. Systolic function is reduced.TAPSE is 1.80 cm. Pacemaker lead present in the ventricle.    Left Atrium: Left atrium is severely dilated.    Right Atrium: Right atrium is severely dilated.    Aortic Valve: There is mild aortic regurgitation.    Mitral Valve: There is moderate to severe regurgitation.    Tricuspid Valve: There is mild to moderate regurgitation. There is moderate pulmonary hypertension. The estimated PA systolic pressure is at least 59 mmHg.    Pulmonic Valve: There is mild regurgitation.      Results for orders placed during the hospital encounter of 06/10/21    Echo    Interpretation Summary  · The left ventricle is mildly enlarged with mild concentric hypertrophy  · The estimated ejection fraction is 49%. Which is decreased  · Grade II left ventricular diastolic dysfunction.  · There is mild left ventricular global hypokinesis.  · Atrial fibrillation not observed.  · Normal right ventricular size with normal right ventricular systolic function.  · Moderate left atrial enlargement.  · Mild-to-moderate aortic regurgitation.  · There is mild aortic valve stenosis.  · Aortic valve area is 1.99 cm2; peak velocity is 1.41 m/s; mean gradient is 4 mmHg.  · Mild mitral regurgitation.  · Mild to moderate tricuspid regurgitation.  · Mild pulmonic regurgitation.  · Normal central venous pressure (3 mmHg).  · The estimated PA systolic pressure is 35 mmHg. Mild pulmonary hypertension  · Pacemaker lead is present      Results for orders placed during the hospital encounter of 07/24/20    Echo Color Flow Doppler? Yes; Bubble Contrast? No    Interpretation Summary  · Concentric left  ventricular remodeling.  · Normal left ventricular systolic function. The estimated ejection fraction is 55%.  · Normal LV diastolic function.  · No wall motion abnormalities.  · Normal right ventricular systolic function.  · Mild left atrial enlargement.  · Mild aortic regurgitation.  · The mitral valve is mildly sclerotic.  · Mild tricuspid regurgitation.  · Normal central venous pressure (3 mmHg).  · The estimated PA systolic pressure is 27 mmHg.      Results for orders placed during the hospital encounter of 12/08/17    2D echo with color flow doppler    Narrative  Date of Procedure: 12/08/2017        TEST DESCRIPTION  Technical Quality: This is a portable study performed at the patient's bedside. This is a technically adequate study.    General: A catheter is present in the right-sided cardiac chambers.    Aorta: The aortic root is normal in size, measuring 2.5 cm at sinotubular junction and 3.0 cm at Sinuses of Valsalva. The proximal ascending aorta is normal in size, measuring 3.0 cm across.    Left Atrium: The left atrial volume index is severely enlarged, measuring 55.08 cc/m2.    Left Ventricle: The left ventricle is normal in size, with an end-diastolic diameter of 4.3 cm, and an end-systolic diameter of 3.4 cm. LV wall thickness is normal, with the septum measuring 1.1 cm and the posterior wall measuring 0.9 cm across. Relative  wall thickness was normal at 0.42, and the LV mass index was 98.1 g/m2 consistent with normal left ventricular mass. There is global hypokinesis. Left ventricular systolic function appears low normal to mildly depressed. Visually estimated ejection  fraction is 50-55%. The LV Doppler derived stroke volume equals 56.0 ccs.    Diastolic indices: E wave velocity 0.8 m/s, E/A ratio 1.1,  msec., E/e' ratio(avg) 14. Pulmonary vein systolic/diastolic ratio is normal. There is pseudonormalization of mitral inflow pattern consistent with significant diastolic dysfunction.    Right  Atrium: The right atrium is normal in size, measuring 4.3 cm in length and 3.8 cm in width in the apical view.    Right Ventricle: The right ventricle is upper limit of normal measuring 4.1 cm at the base in the apical right ventricle-focused view. Global right ventricular systolic function appears normal. There is abnormal septal motion. Tricuspid annular plane  systolic excursion (TAPSE) is 1.8 cm. Tissue Doppler-derived tricuspid annular peak systolic velocity (S prime) is 9.6 cm/s. The estimated PA systolic pressure is 37 mmHg.    Aortic Valve:  The aortic valve is mildly sclerotic with normal leaflet mobility. The aortic valve is tri-leaflet in structure. Using a left ventricular outflow tract diameter of 2.0 cm, a left ventricular outflow tract velocity time integral of 18 cm,  and a peak instantaneous transvalvular velocity time integral of 26 cm, the calculated aortic valve area is 2.15 cm2(AVAi is 1.29 cm2/m2). Additionally, there is mild aortic regurgitation. There is aortic annular calcification.    Mitral Valve:  The mitral valve is normal in structure with normal leaflet mobility. There is mitral annular calcification.    Tricuspid Valve:  The tricuspid valve is normal in structure with normal leaflet mobility. There is mild to moderate tricuspid regurgitation.    Pulmonary Valve:  The pulmonic valve is normal in structure with normal leaflet mobility. There is trivial pulmonic regurgitation.    IVC: IVC is normal in size and collapses > 50% with a sniff, suggesting normal right atrial pressure of 3 mmHg.    Intracavitary: There is no evidence of pericardial effusion, intracavity mass, thrombi, or vegetation.        CONCLUSIONS  1 - Low normal to mildly depressed left ventricular systolic function (EF 50-55%) - in some views the basal and mid lateral wall appear hypokinetic, but endocardial definition is suboptimal for these walls.  2 - Right ventricle is upper limit of normal in size with normal  systolic function.  3 - Impaired LV relaxation, elevated LAP (grade 2 diastolic dysfunction).  4 - Severe left atrial enlargement.  5 - Mild to moderate tricuspid regurgitation.  6 - Mild aortic regurgitation.  7 - The estimated PA systolic pressure is 37 mmHg.            This document has been electronically  SIGNED BY: Chava Ponce MD On: 12/11/2017 10:02      CURRENT/PREVIOUS VISIT EKG      Results for orders placed or performed during the hospital encounter of 01/24/25   EKG 12-lead    Collection Time: 01/25/25 10:24 AM   Result Value Ref Range    QRS Duration 140 ms    OHS QTC Calculation 537 ms    Narrative    Test Reason : R07.9,    Vent. Rate : 119 BPM     Atrial Rate :    BPM     P-R Int :    ms          QRS Dur : 140 ms      QT Int : 382 ms       P-R-T Axes :    -34 146 degrees    QTcB Int : 537 ms    Wide QRS rhythm  Left axis deviation  Left bundle branch block  Abnormal ECG  No previous ECGs available    Referred By: AAAREFERRAL SELF           Confirmed By:            ASSESSMENT/PLAN:     Active Hospital Problems    Diagnosis    *Tachycardia    Elevated troponin    PAF (paroxysmal atrial fibrillation)    Acquired hypothyroidism    Essential hypertension    Coronary artery disease involving left main coronary artery       ASSESSMENT & PLAN:     Tachycardia   Elevated troponin, peaked at 885.8 on 01/25/2025  Paroxysmal Atrial Fibrillation  HTN  Dyslipidemia  CAD  T2DM  GERD  [employer] PPM      RECOMMENDATIONS:    - Stopped amiodarone gtt. Started amiodarone 200 mg BID.   - Electrolytes replaced per protocol.  - On Synthroid  - Repeat echo showed LVEF 30-35%. Patient may benefit from BiV ICD. Recommend ongoing discussion as outpatient. Recommend LifeVest on discharge  - Continue Toprol-XL 12.5 mg BID and losartan 12.5 mg for BP control. Restart other home GDMT on prior to discharge.  - Continue oral home Lasix 20 mg every other day. Strict I/Os. Low sodium diet. Daily weights.  - Denies chest pain.  Troponin peaked at 885.8.   - Stopped heparin gtt. Restarted home Eliquis 2.5 mg BID    - Plan for close outpatient cardiology follow up.        BONILLA Arambula-BC  Department of Cardiology  Date of Service: 01/26/2025        I have personally interviewed and examined the patient, I have reviewed the Nurse Practitioner's history and physical, assessment, and plan. I agree with the findings and plan.      VICKI Doty MD  Interventional Cardiology  Date of Service: 01/26/2025  8:38 AM

## 2025-01-26 NOTE — ASSESSMENT & PLAN NOTE
Patient's blood pressure range in the last 24 hours was: BP  Min: 102/62  Max: 147/83.The patient's inpatient anti-hypertensive regimen is listed below:  Current Antihypertensives  furosemide tablet 20 mg, Every other day, Oral  losartan split tablet 12.5 mg, Every morning, Oral  metoprolol succinate (TOPROL-XL) 24 hr split tablet 12.5 mg, 2 times daily, Oral    Plan  - BP is controlled, no changes needed to their regimen

## 2025-01-26 NOTE — SUBJECTIVE & OBJECTIVE
"Interval History: No acute events overnight.  Feeling better today.   See "hospital course"      Review of Systems   Cardiovascular:  Negative for palpitations.   Gastrointestinal:  Negative for nausea.   Musculoskeletal:  Positive for arthralgias. Negative for back pain.   Skin: Negative.    Neurological:  Negative for dizziness and light-headedness.     Objective:     Vital Signs (Most Recent):  Temp: 97.4 °F (36.3 °C) (01/26/25 0836)  Pulse: 70 (01/26/25 0836)  Resp: 20 (01/26/25 0836)  BP: (!) 132/90 (01/26/25 0836)  SpO2: (!) 93 % (01/26/25 0836) Vital Signs (24h Range):  Temp:  [97.4 °F (36.3 °C)-98.6 °F (37 °C)] 97.4 °F (36.3 °C)  Pulse:  [] 70  Resp:  [20] 20  SpO2:  [92 %-97 %] 93 %  BP: (102-147)/(62-92) 132/90     Weight: 61.5 kg (135 lb 9.3 oz)  Body mass index is 26.48 kg/m².    Intake/Output Summary (Last 24 hours) at 1/26/2025 1023  Last data filed at 1/26/2025 0913  Gross per 24 hour   Intake 636.71 ml   Output --   Net 636.71 ml         Physical Exam  Constitutional:       Appearance: Normal appearance. She is normal weight.   HENT:      Head: Normocephalic and atraumatic.   Eyes:      Extraocular Movements: Extraocular movements intact.      Conjunctiva/sclera: Conjunctivae normal.      Pupils: Pupils are equal, round, and reactive to light.   Cardiovascular:      Rate and Rhythm: Normal rate and regular rhythm.   Pulmonary:      Effort: Pulmonary effort is normal.      Breath sounds: Normal breath sounds.   Abdominal:      General: Abdomen is flat. Bowel sounds are normal. There is no distension.      Palpations: Abdomen is soft.   Genitourinary:     General: Normal vulva.      Rectum: Normal.   Musculoskeletal:         General: Tenderness (left and right shoulders, point tenderness) present. Normal range of motion.      Cervical back: Normal range of motion and neck supple.   Skin:     General: Skin is warm and dry.      Capillary Refill: Capillary refill takes less than 2 seconds. "   Neurological:      General: No focal deficit present.      Mental Status: She is alert and oriented to person, place, and time. Mental status is at baseline.   Psychiatric:         Mood and Affect: Mood normal.         Behavior: Behavior normal.         Thought Content: Thought content normal.         Judgment: Judgment normal.             Significant Labs: All pertinent labs within the past 24 hours have been reviewed.  CBC:   Recent Labs   Lab 01/24/25  1436 01/25/25  0239 01/26/25  0318   WBC 9.09 8.25 9.22  9.22   HGB 12.6 12.3 12.2  12.2   HCT 40.2 37.8 37.6  37.6    264 268  268     CMP:   Recent Labs   Lab 01/24/25  1436 01/25/25  0239 01/26/25  0318    142 136   K 4.1 3.9 3.6    107 102   CO2 27 27 25    97 94   BUN 27* 28* 27*   CREATININE 1.2 1.1 1.3   CALCIUM 9.4 9.4 9.1   PROT 7.3 6.5 6.4   ALBUMIN 4.0 3.7 3.6   BILITOT 0.4 0.4 0.3   ALKPHOS 59 57 53*   AST 20 21 21   ALT 13 12 11   ANIONGAP 8 8 9     Troponin:   Recent Labs   Lab 01/25/25  0239 01/25/25  0814 01/25/25  1148   TROPONINIHS 603.3* 885.8* 692.9*       Significant Imaging: I have reviewed all pertinent imaging results/findings within the past 24 hours.

## 2025-01-26 NOTE — ASSESSMENT & PLAN NOTE
Patient has paroxysmal (<7 days) atrial fibrillation. Patient is currently in sinus rhythm. [id number] Score: 5. The patients heart rate in the last 24 hours is as follows:  Pulse  Min: 70  Max: 119     Antiarrhythmics  metoprolol succinate (TOPROL-XL) 24 hr split tablet 12.5 mg, 2 times daily, Oral  amiodarone 360 mg/200 mL (1.8 mg/mL) infusion, Continuous, Intravenous    Anticoagulants  heparin 25,000 units in dextrose 5% 250 mL (100 units/mL) infusion LOW INTENSITY nomogram - [facility], Continuous, Intravenous  heparin 25,000 units in dextrose 5% (100 units/ml) IV bolus from bag LOW INTENSITY nomogram - [facility], As needed (PRN), Intravenous  heparin 25,000 units in dextrose 5% (100 units/ml) IV bolus from bag LOW INTENSITY nomogram - [facility], As needed (PRN), Intravenous    Plan  - Replete lytes with a goal of K>4, Mg >2  - Patient is anticoagulated, see medications listed above.  - Patient's afib is currently uncontrolled. Will adjust treatment as follows:  Increase metoprolol to 12.5 p.o. twice  daily;  on amiodarone drip-oral amiodarone started today.  Drip to be discontinued.  - cardiology consulted follow up recommendations

## 2025-01-26 NOTE — PROGRESS NOTES
"[facility] Medicine  Progress Note    Patient Name: Karma Chen  MRN: [id number]  Patient Class: IP- Inpatient   Admission Date: 1/24/2025  Length of Stay: 1 days  Attending Physician: Alma Rosa Henning, *  Primary Care Provider: Elena Sullivan MD        Subjective     Principal Problem:Tachycardia        HPI:  Patient presented to the emergency department complaining of an elevated heart rate.  Patient states that over the past 2 hours, she has not felt well.  Patient complained of mild nausea, headache and lightheadedness but denied fever, chills, diaphoresis, chest pain, abdominal pain or any urinary symptoms.    Overview/Hospital Course:  Karma Chen was monitored closely during her hospitalization.  Her troponins were trended and peaked at 885.8 before trending down.  She was placed on an IV heparin drip.  Cardiology was consulted who placed her on an amiodarone drip with rate control achieved.  A limited echocardiogram was performed which showed an EF of 30-35%.  The heparin drip was discontinued and her chronic Eliquis was resumed.  She was started on oral amiodarone and the amiodarone drip was discontinued.    Interval History: No acute events overnight.  Feeling better today.   See "hospital course"      Review of Systems   Cardiovascular:  Negative for palpitations.   Gastrointestinal:  Negative for nausea.   Musculoskeletal:  Positive for arthralgias. Negative for back pain.   Skin: Negative.    Neurological:  Negative for dizziness and light-headedness.     Objective:     Vital Signs (Most Recent):  Temp: 97.4 °F (36.3 °C) (01/26/25 0836)  Pulse: 70 (01/26/25 0836)  Resp: 20 (01/26/25 0836)  BP: (!) 132/90 (01/26/25 0836)  SpO2: (!) 93 % (01/26/25 0836) Vital Signs (24h Range):  Temp:  [97.4 °F (36.3 °C)-98.6 °F (37 °C)] 97.4 °F (36.3 °C)  Pulse:  [] 70  Resp:  [20] 20  SpO2:  [92 %-97 %] 93 %  BP: (102-147)/(62-92) 132/90     Weight: 61.5 kg (135 lb 9.3 " oz)  Body mass index is 26.48 kg/m².    Intake/Output Summary (Last 24 hours) at 1/26/2025 1023  Last data filed at 1/26/2025 0913  Gross per 24 hour   Intake 636.71 ml   Output --   Net 636.71 ml         Physical Exam  Constitutional:       Appearance: Normal appearance. She is normal weight.   HENT:      Head: Normocephalic and atraumatic.   Eyes:      Extraocular Movements: Extraocular movements intact.      Conjunctiva/sclera: Conjunctivae normal.      Pupils: Pupils are equal, round, and reactive to light.   Cardiovascular:      Rate and Rhythm: Normal rate and regular rhythm.   Pulmonary:      Effort: Pulmonary effort is normal.      Breath sounds: Normal breath sounds.   Abdominal:      General: Abdomen is flat. Bowel sounds are normal. There is no distension.      Palpations: Abdomen is soft.   Genitourinary:     General: Normal vulva.      Rectum: Normal.   Musculoskeletal:         General: Tenderness (left and right shoulders, point tenderness) present. Normal range of motion.      Cervical back: Normal range of motion and neck supple.   Skin:     General: Skin is warm and dry.      Capillary Refill: Capillary refill takes less than 2 seconds.   Neurological:      General: No focal deficit present.      Mental Status: She is alert and oriented to person, place, and time. Mental status is at baseline.   Psychiatric:         Mood and Affect: Mood normal.         Behavior: Behavior normal.         Thought Content: Thought content normal.         Judgment: Judgment normal.             Significant Labs: All pertinent labs within the past 24 hours have been reviewed.  CBC:   Recent Labs   Lab 01/24/25  1436 01/25/25  0239 01/26/25  0318   WBC 9.09 8.25 9.22  9.22   HGB 12.6 12.3 12.2  12.2   HCT 40.2 37.8 37.6  37.6    264 268  268     CMP:   Recent Labs   Lab 01/24/25  1436 01/25/25  0239 01/26/25  0318    142 136   K 4.1 3.9 3.6    107 102   CO2 27 27 25    97 94   BUN 27* 28* 27*    CREATININE 1.2 1.1 1.3   CALCIUM 9.4 9.4 9.1   PROT 7.3 6.5 6.4   ALBUMIN 4.0 3.7 3.6   BILITOT 0.4 0.4 0.3   ALKPHOS 59 57 53*   AST 20 21 21   ALT 13 12 11   ANIONGAP 8 8 9     Troponin:   Recent Labs   Lab 01/25/25  0239 01/25/25  0814 01/25/25  1148   TROPONINIHS 603.3* 885.8* 692.9*       Significant Imaging: I have reviewed all pertinent imaging results/findings within the past 24 hours.    Assessment and Plan     * Tachycardia  Patient's metoprolol increased to 12.5 mg p.o. twice daily   Observation on telemetry  Amiodarone drip, now oral        Elevated troponin  Troponins trended up, now downward trend  Observation on telemetry   Cardiology consulted follow up recommendations  Currently on IV heparin gtt-to be discontinued and Eliquis resumed.    PAF (paroxysmal atrial fibrillation)  Patient has paroxysmal (<7 days) atrial fibrillation. Patient is currently in sinus rhythm. [id number] Score: 5. The patients heart rate in the last 24 hours is as follows:  Pulse  Min: 70  Max: 119     Antiarrhythmics  metoprolol succinate (TOPROL-XL) 24 hr split tablet 12.5 mg, 2 times daily, Oral  amiodarone 360 mg/200 mL (1.8 mg/mL) infusion, Continuous, Intravenous    Anticoagulants  heparin 25,000 units in dextrose 5% 250 mL (100 units/mL) infusion LOW INTENSITY nomogram - [facility], Continuous, Intravenous  heparin 25,000 units in dextrose 5% (100 units/ml) IV bolus from bag LOW INTENSITY nomogram - [facility], As needed (PRN), Intravenous  heparin 25,000 units in dextrose 5% (100 units/ml) IV bolus from bag LOW INTENSITY nomogram - [facility], As needed (PRN), Intravenous    Plan  - Replete lytes with a goal of K>4, Mg >2  - Patient is anticoagulated, see medications listed above.  - Patient's afib is currently uncontrolled. Will adjust treatment as follows:  Increase metoprolol to 12.5 p.o. twice  daily;  on amiodarone drip-oral amiodarone started today.  Drip to be discontinued.  - cardiology consulted follow up  recommendations        Acquired hypothyroidism  Patient has chronic hypothyroidism. TFTs reviewed-   Lab Results   Component Value Date    TSH 3.817 12/19/2024   . Will continue chronic levothyroxine and adjust for and clinical changes.        Coronary artery disease involving left main coronary artery  Patient with known CAD s/p stent placement and CABG, which is controlled Will continue ASA and Statin and monitor for S/Sx of angina/ACS. Continue to monitor on telemetry.     Essential hypertension  Patient's blood pressure range in the last 24 hours was: BP  Min: 102/62  Max: 147/83.The patient's inpatient anti-hypertensive regimen is listed below:  Current Antihypertensives  furosemide tablet 20 mg, Every other day, Oral  losartan split tablet 12.5 mg, Every morning, Oral  metoprolol succinate (TOPROL-XL) 24 hr split tablet 12.5 mg, 2 times daily, Oral    Plan  - BP is controlled, no changes needed to their regimen        VTE Risk Mitigation (From admission, onward)           Ordered     apixaban tablet 2.5 mg  2 times daily         01/26/25 1200     IP VTE HIGH RISK PATIENT  Once         01/24/25 1818     Place sequential compression device  Until discontinued         01/24/25 1818                    Discharge Planning   JANNA: 1/27/2025     Code Status: Full Code   Medical Readiness for Discharge Date:   Discharge Plan A: Home with family                        MAGDA Romero  Department of Hospital Medicine   [facility]

## 2025-01-26 NOTE — HOSPITAL COURSE
Karma Chen was monitored closely during her hospitalization.  Her troponins were trended and peaked at 885.8 before trending down.  She was placed on an IV heparin drip.  Cardiology was consulted who placed her on an amiodarone drip with rate control achieved.  A limited echocardiogram was performed which showed an EF of 30-35%.  The heparin drip was discontinued and her chronic Eliquis was resumed.  She was started on oral amiodarone and the amiodarone drip was discontinued.  Her heart rate remained stable.  She showed no evidence of volume overload.  Pt was feeling well and eager for discharge.  She was cleared by Cardiology to discharge with outpatient evaluation for potential Life[employer] set up-AICD requirement.  Jardiance which was supposed to be a home medication was sent in on day of discharge and filled by the pharmacy.  Pt declined increase of her Lasix to 20 mg daily is recommended by Cardiology stating that she has had a controlled weight and maintains good record and feels that her volume is stable-she will follow-up with her cardiologist as an outpatient discuss any changes from her every other day dosing which she feels is working appropriately.  Declined home health.  Return precautions were discussed-she verbalized understanding and agreement with discharge plan.    Pt seen on day of discharge and deemed appropriate for discharge.

## 2025-01-27 ENCOUNTER — TELEPHONE (OUTPATIENT)
Dept: CARDIOLOGY | Facility: CLINIC | Age: 89
End: 2025-01-27
Payer: MEDICARE

## 2025-01-27 VITALS
HEIGHT: 60 IN | SYSTOLIC BLOOD PRESSURE: 134 MMHG | TEMPERATURE: 98 F | WEIGHT: 137.38 LBS | DIASTOLIC BLOOD PRESSURE: 73 MMHG | HEART RATE: 69 BPM | OXYGEN SATURATION: 98 % | RESPIRATION RATE: 20 BRPM | BODY MASS INDEX: 26.97 KG/M2

## 2025-01-27 LAB
ALBUMIN SERPL BCP-MCNC: 3.5 G/DL (ref 3.5–5.2)
ALP SERPL-CCNC: 54 U/L (ref 55–135)
ALT SERPL W/O P-5'-P-CCNC: 12 U/L (ref 10–44)
ANION GAP SERPL CALC-SCNC: 8 MMOL/L (ref 8–16)
AST SERPL-CCNC: 19 U/L (ref 10–40)
BASOPHILS # BLD AUTO: 0.04 K/UL (ref 0–0.2)
BASOPHILS NFR BLD: 0.6 % (ref 0–1.9)
BILIRUB SERPL-MCNC: 0.5 MG/DL (ref 0.1–1)
BUN SERPL-MCNC: 28 MG/DL (ref 8–23)
CALCIUM SERPL-MCNC: 9 MG/DL (ref 8.7–10.5)
CHLORIDE SERPL-SCNC: 104 MMOL/L (ref 95–110)
CO2 SERPL-SCNC: 27 MMOL/L (ref 23–29)
CREAT SERPL-MCNC: 1.4 MG/DL (ref 0.5–1.4)
DIFFERENTIAL METHOD BLD: NORMAL
EOSINOPHIL # BLD AUTO: 0.3 K/UL (ref 0–0.5)
EOSINOPHIL NFR BLD: 4.3 % (ref 0–8)
ERYTHROCYTE [DISTWIDTH] IN BLOOD BY AUTOMATED COUNT: 13.4 % (ref 11.5–14.5)
EST. GFR  (NO RACE VARIABLE): 36.4 ML/MIN/1.73 M^2
GLUCOSE SERPL-MCNC: 85 MG/DL (ref 70–110)
HCT VFR BLD AUTO: 37.3 % (ref 37–48.5)
HGB BLD-MCNC: 12.2 G/DL (ref 12–16)
IMM GRANULOCYTES # BLD AUTO: 0.01 K/UL (ref 0–0.04)
IMM GRANULOCYTES NFR BLD AUTO: 0.2 % (ref 0–0.5)
LYMPHOCYTES # BLD AUTO: 2.1 K/UL (ref 1–4.8)
LYMPHOCYTES NFR BLD: 31.4 % (ref 18–48)
MAGNESIUM SERPL-MCNC: 1.8 MG/DL (ref 1.6–2.6)
MCH RBC QN AUTO: 28.4 PG (ref 27–31)
MCHC RBC AUTO-ENTMCNC: 32.7 G/DL (ref 32–36)
MCV RBC AUTO: 87 FL (ref 82–98)
MONOCYTES # BLD AUTO: 0.9 K/UL (ref 0.3–1)
MONOCYTES NFR BLD: 13.7 % (ref 4–15)
NEUTROPHILS # BLD AUTO: 3.3 K/UL (ref 1.8–7.7)
NEUTROPHILS NFR BLD: 49.8 % (ref 38–73)
NRBC BLD-RTO: 0 /100 WBC
PHOSPHATE SERPL-MCNC: 3.7 MG/DL (ref 2.7–4.5)
PLATELET # BLD AUTO: 250 K/UL (ref 150–450)
PMV BLD AUTO: 10 FL (ref 9.2–12.9)
POTASSIUM SERPL-SCNC: 3.9 MMOL/L (ref 3.5–5.1)
PROT SERPL-MCNC: 6.3 G/DL (ref 6–8.4)
RBC # BLD AUTO: 4.3 M/UL (ref 4–5.4)
SODIUM SERPL-SCNC: 139 MMOL/L (ref 136–145)
WBC # BLD AUTO: 6.52 K/UL (ref 3.9–12.7)

## 2025-01-27 PROCEDURE — 97165 OT EVAL LOW COMPLEX 30 MIN: CPT

## 2025-01-27 PROCEDURE — 25000003 PHARM REV CODE 250: Performed by: HOSPITALIST

## 2025-01-27 PROCEDURE — 97535 SELF CARE MNGMENT TRAINING: CPT

## 2025-01-27 PROCEDURE — 83735 ASSAY OF MAGNESIUM: CPT | Performed by: HOSPITALIST

## 2025-01-27 PROCEDURE — 80053 COMPREHEN METABOLIC PANEL: CPT | Performed by: HOSPITALIST

## 2025-01-27 PROCEDURE — 84100 ASSAY OF PHOSPHORUS: CPT | Performed by: HOSPITALIST

## 2025-01-27 PROCEDURE — 85025 COMPLETE CBC W/AUTO DIFF WBC: CPT | Performed by: HOSPITALIST

## 2025-01-27 PROCEDURE — 97161 PT EVAL LOW COMPLEX 20 MIN: CPT

## 2025-01-27 PROCEDURE — 99233 SBSQ HOSP IP/OBS HIGH 50: CPT | Mod: ,,, | Performed by: INTERNAL MEDICINE

## 2025-01-27 PROCEDURE — 36415 COLL VENOUS BLD VENIPUNCTURE: CPT | Performed by: HOSPITALIST

## 2025-01-27 PROCEDURE — 25000003 PHARM REV CODE 250

## 2025-01-27 RX ORDER — EMPAGLIFLOZIN 10 MG/1
10 TABLET, FILM COATED ORAL EVERY MORNING
Qty: 30 TABLET | Refills: 0 | Status: SHIPPED | OUTPATIENT
Start: 2025-01-27

## 2025-01-27 RX ORDER — LOSARTAN POTASSIUM 25 MG/1
12.5 TABLET ORAL EVERY MORNING
Start: 2025-01-27 | End: 2025-07-26

## 2025-01-27 RX ORDER — AMIODARONE HYDROCHLORIDE 200 MG/1
200 TABLET ORAL 2 TIMES DAILY
Qty: 60 TABLET | Refills: 0 | Status: SHIPPED | OUTPATIENT
Start: 2025-01-27 | End: 2026-01-27

## 2025-01-27 RX ORDER — AMIODARONE HYDROCHLORIDE 200 MG/1
200 TABLET ORAL 2 TIMES DAILY
Qty: 60 TABLET | Refills: 0 | Status: SHIPPED | OUTPATIENT
Start: 2025-01-27 | End: 2025-01-27

## 2025-01-27 RX ORDER — EMPAGLIFLOZIN 10 MG/1
10 TABLET, FILM COATED ORAL EVERY MORNING
Qty: 30 TABLET | Refills: 0 | Status: SHIPPED | OUTPATIENT
Start: 2025-01-27 | End: 2025-01-27

## 2025-01-27 RX ADMIN — LOSARTAN POTASSIUM 12.5 MG: 25 TABLET, FILM COATED ORAL at 06:01

## 2025-01-27 RX ADMIN — LEVOTHYROXINE SODIUM 88 MCG: 0.09 TABLET ORAL at 05:01

## 2025-01-27 RX ADMIN — APIXABAN 2.5 MG: 2.5 TABLET, FILM COATED ORAL at 08:01

## 2025-01-27 RX ADMIN — METOPROLOL SUCCINATE 12.5 MG: 25 TABLET, EXTENDED RELEASE ORAL at 08:01

## 2025-01-27 RX ADMIN — AMIODARONE HYDROCHLORIDE 200 MG: 200 TABLET ORAL at 08:01

## 2025-01-27 NOTE — PLAN OF CARE
Pt clear for DC from case management standpoint. Discharging home. Life[facility] will be setup at home, Tamie thomas.    [facility] will deliver medications to her room    Patients spouse is her source of transportation.         01/27/25 1412   Final Note   Assessment Type Final Discharge Note   Anticipated Discharge Disposition Home

## 2025-01-27 NOTE — PT/OT/SLP EVAL
Physical Therapy Evaluation and Discharge Note    Patient Name:  Karma Chen   MRN:  [id number]    Recommendations:     Discharge Recommendations: No Therapy Indicated  Discharge Equipment Recommendations: none   Barriers to discharge:  medical status    Assessment:     Karma Chen is a 89 y.o. female admitted with a medical diagnosis of Tachycardia. .  At this time, patient is functioning at their prior level of function and does not require further acute PT services.     Recent Surgery: * No surgery found *      Plan:     During this hospitalization, patient does not require further acute PT services.  Please re-consult if situation changes.      Subjective     Chief Complaint: none stated  Patient/Family Comments/goals: go home  Pain/Comfort:  Pain Rating 1: 0/10    Patients cultural, spiritual, Mu-ism conflicts given the current situation: no    Living Environment:  Pt lives with her  in a one story home.   Prior to admission, patients level of function was MI occ rollator use.  Equipment used at home: rollator, shower chair.  DME owned (not currently used): none.  Upon discharge, patient will have assistance from .    Objective:     Communicated with RN prior to session.  Patient found HOB elevated with peripheral IV, telemetry upon PT entry to room.    General Precautions: Standard, fall    Orthopedic Precautions:N/A   Braces: N/A  Respiratory Status: Room air    Exams:  RLE ROM: WFL  RLE Strength: WFL  LLE ROM: WFL  LLE Strength: WFL    Functional Mobility:  Bed Mobility:     Supine to Sit: independence  Transfers:     Sit to Stand:  independence with no AD  Gait: 120 ft with no AD and supervision    AM-PAC 6 CLICK MOBILITY  Total Score:24       Treatment and Education:  Pt educated on POC, discharge recommendation, importance of time OOB, need for assist with mobility, use of call bell to seek assistance as needed and fall prevention      AM-PAC 6 CLICK MOBILITY  Total  Score:24     Patient left sitting edge of bed with all lines intact and call button in reach.    GOALS:   Multidisciplinary Problems       Physical Therapy Goals       Not on file                    DME Justifications:  No DME recommended requiring DME justifications    History:     Past Medical History:   Diagnosis Date    Coronary artery disease     Dermatitis     Dermatitis 12/8/2017    Diabetes mellitus     Diabetes mellitus type II     Leah Barr virus infection     Fibromyalgia     GERD (gastroesophageal reflux disease)     Hypertension     MI (myocardial infarction) 09/23/2011    Pure hypercholesterolemia 2/26/2020       Past Surgical History:   Procedure Laterality Date    adenoids      ANGIOPLASTY  1987    APPENDECTOMY      CARDIAC PACEMAKER PLACEMENT  01/12/2017    CATARACT EXTRACTION, BILATERAL Bilateral 9/2/15, 3/17/15    right eye-9/2/15, left eye-3/17/15    CHOLECYSTECTOMY  1987    CORONARY ARTERY BYPASS GRAFT  2006    quadruple    coronary stents  1999    x2    CYST REMOVAL  04/25/2017    on back    HYSTERECTOMY  1966    OVARY SURGERY  1948    Ovary burst & was repaired    RHIZOTOMY  09/14/2018    TONSILLECTOMY  1940       Time Tracking:     PT Received On: 01/27/25  PT Start Time: 1022     PT Stop Time: 1029  PT Total Time (min): 7 min     Billable Minutes: Evaluation 7      01/27/2025

## 2025-01-27 NOTE — NURSING
Discharge instructions reviewed with pt and spouse. Pt and spouse voice understanding. Pharmacy delivered meds to bedside. IV removed and tele monitor off. Tele monitor returned to monitor room . Pt discharged home via spouse vehicle.

## 2025-01-27 NOTE — DISCHARGE SUMMARY
[facility]  Discharge Summary      Patient Name: Karma Chen  MRN: [id number]  KERRIE: [id number]  Patient Class: IP- Inpatient  Admission Date: 1/24/2025  Hospital Length of Stay: 2 days  Discharge Date and Time:  01/27/2025 2:36 PM  Attending Physician: Alma Rosa Henning, *   Discharging Provider: Peace Carvajal NP  Primary Care Provider: Elena Sullivan MD    Primary Care Team: Networked reference to record PCT     HPI:   Patient presented to the emergency department complaining of an elevated heart rate.  Patient states that over the past 2 hours, she has not felt well.  Patient complained of mild nausea, headache and lightheadedness but denied fever, chills, diaphoresis, chest pain, abdominal pain or any urinary symptoms.    * No surgery found *      Hospital Course:   Karma Chen was monitored closely during her hospitalization.  Her troponins were trended and peaked at 885.8 before trending down.  She was placed on an IV heparin drip.  Cardiology was consulted who placed her on an amiodarone drip with rate control achieved.  A limited echocardiogram was performed which showed an EF of 30-35%.  The heparin drip was discontinued and her chronic Eliquis was resumed.  She was started on oral amiodarone and the amiodarone drip was discontinued.  Her heart rate remained stable.  She showed no evidence of volume overload.  Pt was feeling well and eager for discharge.  She was cleared by Cardiology to discharge with outpatient evaluation for potential LifeVest set up-AICD requirement.  Jardiance which was supposed to be a home medication was sent in on day of discharge and filled by the pharmacy.  Pt declined increase of her Lasix to 20 mg daily is recommended by Cardiology stating that she has had a controlled weight and maintains good record and feels that her volume is stable-she will follow-up with her cardiologist as an outpatient discuss any changes from her every  other day dosing which she feels is working appropriately.  Declined home health.  Return precautions were discussed-she verbalized understanding and agreement with discharge plan.    Pt seen on day of discharge and deemed appropriate for discharge.     Goals of Care Treatment Preferences:  Code Status: Full Code      SDOH Screening:  The patient declined to be screened for utility difficulties, food insecurity, transport difficulties, housing insecurity, and interpersonal safety, so no concerns could be identified this admission.     Consults:   Consults (From admission, onward)          Status Ordering Provider     Inpatient consult to Social Work/Case Management  Once        Provider:  (Not yet assigned)    Acknowledged PETE GIL     Inpatient consult to Cardiology  Once        Provider:  Cheryl Doty MD    Completed MICHAEL RAPHAEL     Inpatient consult to Cardiology  Once        Provider:  Kenton Ware MD    Completed DONN FERGUSON            * Tachycardia  Patient's metoprolol increased to 12.5 mg p.o. twice daily   Observation on telemetry  Amiodarone drip, now oral        Elevated troponin  Troponins trended up, now downward trend  Observation on telemetry   Cardiology consulted follow up recommendations  Currently on IV heparin gtt-to be discontinued and Eliquis resumed.    PAF (paroxysmal atrial fibrillation)  Patient has paroxysmal (<7 days) atrial fibrillation. Patient is currently in sinus rhythm. [id number] Score: 5. The patients heart rate in the last 24 hours is as follows:  Pulse  Min: 70  Max: 119     Antiarrhythmics  metoprolol succinate (TOPROL-XL) 24 hr split tablet 12.5 mg, 2 times daily, Oral  amiodarone 360 mg/200 mL (1.8 mg/mL) infusion, Continuous, Intravenous    Anticoagulants  heparin 25,000 units in dextrose 5% 250 mL (100 units/mL) infusion LOW INTENSITY nomogram - [facility], Continuous, Intravenous  heparin 25,000 units in dextrose 5% (100 units/ml) IV bolus from bag LOW  INTENSITY nomogram - [facility], As needed (PRN), Intravenous  heparin 25,000 units in dextrose 5% (100 units/ml) IV bolus from bag LOW INTENSITY nomogram - [facility], As needed (PRN), Intravenous    Plan  - Replete lytes with a goal of K>4, Mg >2  - Patient is anticoagulated, see medications listed above.  - Patient's afib is currently uncontrolled. Will adjust treatment as follows:  Increase metoprolol to 12.5 p.o. twice  daily;  on amiodarone drip-oral amiodarone started today.  Drip to be discontinued.  - cardiology consulted follow up recommendations        Acquired hypothyroidism  Patient has chronic hypothyroidism. TFTs reviewed-   Lab Results   Component Value Date    TSH 3.817 12/19/2024   . Will continue chronic levothyroxine and adjust for and clinical changes.        Coronary artery disease involving left main coronary artery  Patient with known CAD s/p stent placement and CABG, which is controlled Will continue ASA and Statin and monitor for S/Sx of angina/ACS. Continue to monitor on telemetry.     Essential hypertension  Patient's blood pressure range in the last 24 hours was: BP  Min: 102/62  Max: 147/83.The patient's inpatient anti-hypertensive regimen is listed below:  Current Antihypertensives  furosemide tablet 20 mg, Every other day, Oral  losartan split tablet 12.5 mg, Every morning, Oral  metoprolol succinate (TOPROL-XL) 24 hr split tablet 12.5 mg, 2 times daily, Oral    Plan  - BP is controlled, no changes needed to their regimen        Final Active Diagnoses:    Diagnosis Date Noted POA    PRINCIPAL PROBLEM:  Tachycardia [R00.0] 12/18/2024 Yes    Elevated troponin [R79.89] 12/11/2024 Yes    PAF (paroxysmal atrial fibrillation) [I48.0] 07/25/2020 Yes     Chronic    Acquired hypothyroidism [E03.9] 12/09/2017 Yes    Essential hypertension [I10] 02/20/2012 Yes    Coronary artery disease involving left main coronary artery [I25.10] 02/20/2012 Yes      Problems Resolved During this Admission:       Discharged  "Condition: good    Disposition: Home or Self Care    Follow Up:   Follow-up Information       Mirza Khan FNP. Go to.    Specialty: Family Medicine  Why: Hospital follow up scheduled at 4:00 PM on 1/31  Contact information:  [address]  [address]  [place] [address]  [phone]               Amaya Dickens NP Follow up.    Specialty: Cardiology  Why: In basket message sent to clinic. Clinic to contact pt with appointment date and time.  Contact information:  [address]  [address]  [place] [address]  [phone]               Elena Sullivan MD Follow up on 1/28/2025.    Specialty: Internal Medicine  Why: 10:00 am  Contact information:  [address]  [place] [address]  [phone]                           Patient Instructions:      Diet Cardiac     Notify your health care provider if you experience any of the following:  severe uncontrolled pain     Notify your health care provider if you experience any of the following:  persistent dizziness, light-headedness, or visual disturbances     Notify your health care provider if you experience any of the following:  difficulty breathing or increased cough     Activity as tolerated       Significant Diagnostic Studies: Labs: CMP   Recent Labs   Lab 01/26/25  0318 01/27/25  0606    139   K 3.6 3.9    104   CO2 25 27   GLU 94 85   BUN 27* 28*   CREATININE 1.3 1.4   CALCIUM 9.1 9.0   PROT 6.4 6.3   ALBUMIN 3.6 3.5   BILITOT 0.3 0.5   ALKPHOS 53* 54*   AST 21 19   ALT 11 12   ANIONGAP 9 8   , CBC   Recent Labs   Lab 01/26/25  0318 01/27/25  0607   WBC 9.22  9.22 6.52   HGB 12.2  12.2 12.2   HCT 37.6  37.6 37.3     268 250   , and Troponin No results for input(s): "TROPONINI" in the last 168 hours.    Pending Diagnostic Studies:       Procedure Component Value Units Date/Time    EKG 12-lead [[id number]] Collected: 01/25/25 1024    Order Status: Sent Lab Status: In process Updated: 01/25/25 1032     QRS Duration 140 ms     "  [facility] QTC Calculation 537 ms     Narrative:      Test Reason : R07.9,    Vent. Rate : 119 BPM     Atrial Rate :    BPM     P-R Int :    ms          QRS Dur : 140 ms      QT Int : 382 ms       P-R-T Axes :    -34 146 degrees    QTcB Int : 537 ms    Wide QRS rhythm  Left axis deviation  Left bundle branch block  Abnormal ECG  No previous ECGs available    Referred By: AAAREFERRAL SELF           Confirmed By:            Medications:  Reconciled Home Medications:      Medication List        START taking these medications      amiodarone 200 MG Tab  Commonly known as: PACERONE  Take 1 tablet (200 mg total) by mouth 2 (two) times daily.            CHANGE how you take these medications      ALPRAZolam 0.25 MG tablet  Commonly known as: XANAX  TAKE 1 TABLET BY MOUTH EVERY DAY AS NEEDED FOR ANXIETY  What changed:   how much to take  how to take this  when to take this  reasons to take this     aspirin 81 MG EC tablet  Commonly known as: ECOTRIN  Take 1 tablet (81 mg total) by mouth once daily.  What changed: when to take this     furosemide 20 MG tablet  Commonly known as: LASIX  Take 1 tablet (20 mg total) by mouth once daily.  What changed: when to take this     losartan 25 MG tablet  Commonly known as: COZAAR  Take 0.5 tablets (12.5 mg total) by mouth every morning.  What changed: how much to take     omeprazole 20 MG capsule  Commonly known as: PRILOSEC  Take 1 capsule (20 mg total) by mouth once daily.  What changed: when to take this            CONTINUE taking these medications      acetaminophen 650 MG Tbsr  Commonly known as: TYLENOL  Take 650 mg by mouth every 6 (six) hours as needed for Pain.     benzonatate 100 MG capsule  Commonly known as: TESSALON  Take 100 mg by mouth 3 (three) times daily as needed for Cough.     bisacodyL 5 mg EC tablet  Commonly known as: DULCOLAX  Take 5 mg by mouth 2 (two) times a day.     coenzyme Q10 100 mg capsule  Take 1 capsule (100 mg total) by mouth 2 (two) times daily.      ECHINACEA ORAL  Take 750 mg by mouth 2 (two) times a day.     ELIQUIS 2.5 mg Tab  Generic drug: apixaban  Take 1 tablet (2.5 mg total) by mouth 2 (two) times daily.     JARDIANCE 10 mg tablet  Generic drug: empagliflozin  Take 1 tablet (10 mg total) by mouth every morning.     levothyroxine 88 MCG tablet  Commonly known as: SYNTHROID  Take 1 tablet (88 mcg total) by mouth once daily.     LIDOcaine 5 %  Commonly known as: LIDODERM  Place 1 patch onto the skin once daily. Remove & Discard patch within 12 hours or as directed by MD     loratadine 10 mg tablet  Commonly known as: CLARITIN  Take 10 mg by mouth once daily.     MAGNESIUM ORAL  Take 1,000 mg by mouth once daily.     metoprolol succinate 25 MG 24 hr tablet  Commonly known as: TOPROL-XL  Take 0.5 tablets (12.5 mg total) by mouth once daily.     montelukast 10 mg tablet  Commonly known as: SINGULAIR  TAKE 1 TABLET BY MOUTH EVERY DAY IN THE EVENING     multivitamin tablet  Commonly known as: THERAGRAN  Take 1 tablet by mouth 2 (two) times a day.     naloxone 4 mg/actuation Spry  Commonly known as: NARCAN  1 spray by Nasal route daily as needed.     nitroGLYCERIN 0.4 MG SL tablet  Commonly known as: NITROSTAT  Place 1 tablet (0.4 mg total) under the tongue every 5 (five) minutes as needed for Chest pain.     tiZANidine 4 MG tablet  Commonly known as: ZANAFLEX  Take 4 mg by mouth nightly as needed (muscle spasms).     traMADoL 50 mg tablet  Commonly known as: ULTRAM  Take 50 mg by mouth 2 (two) times daily as needed for Pain.     TURMERIC ORAL  Take 200 mg by mouth once daily.     UNABLE TO FIND  Take 1 capsule by mouth once daily. Joint health     UNABLE TO FIND  Take 1 capsule by mouth 2 (two) times a day. Cardio platinum     VITAMIN C 1000 MG tablet  Generic drug: ascorbic acid (vitamin C)  Take 1,000 mg by mouth once daily.     VOLTAREN ARTHRITIS PAIN 1 % Gel  Generic drug: diclofenac sodium  Apply 2 g topically daily as needed (pain).               Indwelling Lines/Drains at time of discharge:   Lines/Drains/Airways       None                   Time spent on the discharge of patient: 34 minutes         Peace Carvajal NP  Department of Hospital Medicine  [facility]

## 2025-01-27 NOTE — PLAN OF CARE
Aware life vest needed, request order from cardiology. Cardiology aware of potential discharge today.       01/27/25 0949   Post-Acute Status   Post-Acute Authorization [facility]

## 2025-01-27 NOTE — PLAN OF CARE
Life Vest referral sent per cardiology order, okay if set up outpatient    Note sent to cardiology clinic to update status on their patient.       01/27/25 1214   Post-Acute Status   Post-Acute Authorization [facility] Status Referrals Sent

## 2025-01-27 NOTE — TELEPHONE ENCOUNTER
----- Message from  Madalyn sent at 1/27/2025 12:24 PM CST -----  Regarding: Hospital Follow Up  Patient is discharging today, she will be fitted for a life vest this week.     Thank you

## 2025-01-27 NOTE — PT/OT/SLP EVAL
Occupational Therapy   Evaluation and Discharge Note    Name: Karma Chen  MRN: [id number]  Admitting Diagnosis: Tachycardia  Recent Surgery: * No surgery found *      Recommendations:     Discharge Recommendations: No Therapy Indicated  Discharge Equipment Recommendations: none  Barriers to discharge:  None    Assessment:     Karma Chen is a 89 y.o. female with a medical diagnosis of Tachycardia. At this time, patient is functioning at their prior level of function and does not require further acute OT services.     Plan:     During this hospitalization, patient does not require further acute OT services.  Please re-consult if situation changes.    Plan of Care Reviewed with: patient    Subjective     Chief Complaint: none  Patient/Family Comments/goals: none stated    Occupational Profile:  Living Environment: Patient lives with spouse in [facility], no steps to entry  Previous level of function: patient drives, independent with ADL's , IADL's  Roles and Routines: homemaking, gardening  Equipment Used at home: rollator, grab bar, bath bench  Assistance upon Discharge: patient will have assistance from her     Pain/Comfort:  Pain Rating 1: 0/10  Pain Rating Post-Intervention 1: 0/10    Patients cultural, spiritual, [employer] conflicts given the current situation:      Objective:     Communicated with: nurse Eldridge prior to session.  Patient found sitting edge of bed with peripheral IV, telemetry upon OT entry to room.    General Precautions: Standard, fall  Orthopedic Precautions: N/A  Braces: N/A  Respiratory Status: Room air     Occupational Performance:        Functional Mobility/Transfers:  Patient completed Sit <> Stand Transfer with modified independence  with  no assistive device   Patient completed Toilet Transfer Step Transfer technique with supervision with  no AD  Functional Mobility: patient ambulated to bathroom and back to bed with supervision and no AD, she demonstrated good balance and  safety awareness.    Activities of Daily Living:  Grooming: modified independence for brushing hair  Lower Body Dressing: stand by assistance for donning socks, patient states that her chair at home is lower so she can reach her feet better.     Cognitive/Visual Perceptual:  Cognitive/Psychosocial Skills:     -       Oriented to: Person, Place, Time, and Situation   -       Follows Commands/attention:Follows multistep  commands  -       Communication: clear/fluent  -       Memory: No Deficits noted  -       Safety awareness/insight to disability: intact   -       Mood/Affect/Coping skills/emotional control: Appropriate to situation  Visual/Perceptual:      -Intact no deficits noted    Physical Exam:  Balance:    -       good sitting, good standing  Upper Extremity Range of Motion:     -       Right Upper Extremity: WFL  -       Left Upper Extremity: WFL  Upper Extremity Strength:    -       Right Upper Extremity: WFL   Strength:    -       Right Upper Extremity: WFL  -       Left Upper Extremity: WFL    AMPAC 6 Click ADL:  AMPAC Total Score: 22    Treatment & Education:  Therapist provided facilitation and instruction of proper body mechanics and fall prevention strategies during tasks listed above.   Instructed patient to sit in bedside chair as tolerated daily to increase OOB/activity tolerance.   Instructed patient to use call light to have nursing staff assist with needs/transfers.   Discussed OT POC and answered all questions within OT scope of practice.      Patient left sitting edge of bed with all lines intact, call button in reach, and bed alarm on     GOALS:   Multidisciplinary Problems       Occupational Therapy Goals       Not on file                    DME Justifications:  No DME recommended requiring DME justifications    History:     Past Medical History:   Diagnosis Date    Coronary artery disease     Dermatitis     Dermatitis 12/8/2017    Diabetes mellitus     Diabetes mellitus type II      Leah Madrigal virus infection     Fibromyalgia     GERD (gastroesophageal reflux disease)     Hypertension     MI (myocardial infarction) 09/23/2011    Pure hypercholesterolemia 2/26/2020         Past Surgical History:   Procedure Laterality Date    adenoids      ANGIOPLASTY  1987    APPENDECTOMY      CARDIAC PACEMAKER PLACEMENT  01/12/2017    CATARACT EXTRACTION, BILATERAL Bilateral 9/2/15, 3/17/15    right eye-9/2/15, left eye-3/17/15    CHOLECYSTECTOMY  1987    CORONARY ARTERY BYPASS GRAFT  2006    quadruple    coronary stents  1999    x2    CYST REMOVAL  04/25/2017    on back    HYSTERECTOMY  1966    OVARY SURGERY  1948    Ovary burst & was repaired    RHIZOTOMY  09/14/2018    TONSILLECTOMY  1940       Time Tracking:     OT Date of Treatment: 01/27/25  OT Start Time: 0921  OT Stop Time: 0938  OT Total Time (min): 17 min    Billable Minutes:Evaluation 7  Self Care/Home Management 10    1/27/2025

## 2025-01-27 NOTE — PLAN OF CARE
Problem: Adult Inpatient Plan of Care  Goal: Plan of Care Review  Outcome: Met  Goal: Patient-Specific Goal (Individualized)  Outcome: Met  Goal: Absence of Hospital-Acquired Illness or Injury  Outcome: Met  Goal: Optimal Comfort and Wellbeing  Outcome: Met  Goal: Readiness for Transition of Care  Outcome: Met     Problem: Diabetes Comorbidity  Goal: Blood Glucose Level Within Targeted Range  Outcome: Met     Problem: Acute Kidney Injury/Impairment  Goal: Fluid and Electrolyte Balance  Outcome: Met  Goal: Improved Oral Intake  Outcome: Met  Goal: Effective Renal Function  Outcome: Met     Problem: Fall Injury Risk  Goal: Absence of Fall and Fall-Related Injury  Outcome: Met

## 2025-01-27 NOTE — PROGRESS NOTES
[facility]  Department of Cardiology  Progress Note      PATIENT NAME: Karma Chen  MRN: [id number]  TODAY'S DATE: 01/27/2025  ADMIT DATE: 1/24/2025                           SUBJECTIVE     PRINCIPAL PROBLEM: Tachycardia      REASON FOR CONSULT:  Tachycardia    Interval history:    1/27/25    Resting in bed. NAD.  Ambulated in halls without distress. No acute events on tele. Anxious to go home.       01/26/2025  Patient denies chest pain, shortness of breath, palpitations, or dizziness. Reports being up and richard in the room.   Telemetry reviewed showing sinus rhythm with a 1st degree block with occasional pacing.    HPI:  89yoF with history of paroxysmal Afib, HTN, dyslipidemia, T2DM, CAD, [employer] PPM, and GERD presented with worsening upper back pain and shortness of breath for 2 weeks. She checked her HR at home and noticed it was elevated and  came to the ED for further evaluation. She was found in Afib RVR.    She reports ongoing left neck and left shoulder musculoskeletal pain in which she sees an outpatient chiropractor. She reports new right arm musculoskeletal tenderness which she thinks originated from pulling herself up in the bed. She states her upper back pain has been intermittent for days and resolves with rest, noting increased fatigue recently as well. She reports medication adherence including her Eliquis.    HR primarily 100-120s, -160s  In the ED, her Toprol-XL 12.5 was increased to BID.    From Hospitalist H&P:  Chief Complaint:        Chief Complaint   Patient presents with    Tachycardia       Pt c/o upper back pain and shortness of breath intermittent x 2 weeks. Pt states she checked her heart rate at home and it was high.         HPI: Patient presented to the emergency department complaining of an elevated heart rate.  Patient states that over the past 2 hours, she has not felt well.  Patient complained of mild nausea, headache and lightheadedness but denied fever,  chills, diaphoresis, chest pain, abdominal pain or any urinary symptoms.    Review of patient's allergies indicates:   Allergen Reactions    Diclofenac-misoprostol      Other reaction(s): Not available    Doxycycline     Effexor [venlafaxine]     Estradiol     Flagyl [metronidazole]     Fluconazole     Gabapentin     Iodine     Levaquin [levofloxacin]     Nexium [esomeprazole magnesium]     Nexletol [bempedoic acid] Other (See Comments)    Penicillins     Red yeast rice (monascus purpureus)     Shellfish containing products     Statins-hmg-coa reductase inhibitors     Sulfa (sulfonamide antibiotics)     Tea tree oil     Tegaserod      ZOLNORM    Tegaserod hydrogen maleate     Trazodone     Yeast, dried     Adhesive Rash     Band-aids       Past Medical History:   Diagnosis Date    Coronary artery disease     Dermatitis     Dermatitis 12/8/2017    Diabetes mellitus     Diabetes mellitus type II     Leah Barr virus infection     Fibromyalgia     GERD (gastroesophageal reflux disease)     Hypertension     MI (myocardial infarction) 09/23/2011    Pure hypercholesterolemia 2/26/2020     Past Surgical History:   Procedure Laterality Date    adenoids      ANGIOPLASTY  1987    APPENDECTOMY      CARDIAC PACEMAKER PLACEMENT  01/12/2017    CATARACT EXTRACTION, BILATERAL Bilateral 9/2/15, 3/17/15    right eye-9/2/15, left eye-3/17/15    CHOLECYSTECTOMY  1987    CORONARY ARTERY BYPASS GRAFT  2006    quadruple    coronary stents  1999    x2    CYST REMOVAL  04/25/2017    on back    HYSTERECTOMY  1966    OVARY SURGERY  1948    Ovary burst & was repaired    RHIZOTOMY  09/14/2018    TONSILLECTOMY  1940     Social History     Tobacco Use    Smoking status: Never    Smokeless tobacco: Never   Substance Use Topics    Alcohol use: No    Drug use: No        REVIEW OF SYSTEMS    As mentioned in HPI    OBJECTIVE     VITAL SIGNS (Most Recent)  Temp: 97.9 °F (36.6 °C) (01/27/25 1209)  Pulse: 72 (01/27/25 1209)  Resp: 20 (01/27/25  1209)  BP: 134/73 (01/27/25 1209)  SpO2: 98 % (01/27/25 1209)    VENTILATION STATUS  Resp: 20 (01/27/25 1209)  SpO2: 98 % (01/27/25 1209)           I & O (Last 24H):  Intake/Output Summary (Last 24 hours) at 1/27/2025 1324  Last data filed at 1/27/2025 0921  Gross per 24 hour   Intake 420 ml   Output --   Net 420 ml       WEIGHTS  Wt Readings from Last 3 Encounters:   01/27/25 0530 62.3 kg (137 lb 5.6 oz)   01/24/25 1901 61.5 kg (135 lb 9.3 oz)   01/24/25 1340 61.2 kg (135 lb)   01/25/25 1135 61.5 kg (135 lb 9.3 oz)   01/06/25 0935 60.8 kg (134 lb)       PHYSICAL EXAM    CONSTITUTIONAL: NAD  HEENT: Normocephalic. No pallor  NECK: no JVD  LUNGS: CTA b/l  HEART: IRR, S1, S2 normal, no murmur   ABDOMEN: soft, non-tender, bowel sounds normal  EXTREMITIES: No edema. Pulses intact  SKIN: No rash  NEURO: AAO X 3  PSYCH: normal affect      HOME MEDICATIONS:  No current facility-administered medications on file prior to encounter.     Current Outpatient Medications on File Prior to Encounter   Medication Sig Dispense Refill    acetaminophen (TYLENOL) 650 MG TbSR Take 650 mg by mouth every 6 (six) hours as needed for Pain.      ALPRAZolam (XANAX) 0.25 MG tablet TAKE 1 TABLET BY MOUTH EVERY DAY AS NEEDED FOR ANXIETY (Patient taking differently: Take 0.25 mg by mouth daily as needed for Anxiety. TAKE 1 TABLET BY MOUTH EVERY DAY AS NEEDED FOR ANXIETY) 90 tablet 0    apixaban (ELIQUIS) 2.5 mg Tab Take 1 tablet (2.5 mg total) by mouth 2 (two) times daily. 180 tablet 3    ascorbic acid, vitamin C, (VITAMIN C) 1000 MG tablet Take 1,000 mg by mouth once daily.      aspirin (ECOTRIN) 81 MG EC tablet Take 1 tablet (81 mg total) by mouth once daily. (Patient taking differently: Take 81 mg by mouth every evening.)  0    benzonatate (TESSALON) 100 MG capsule Take 100 mg by mouth 3 (three) times daily as needed for Cough.      bisacodyL (DULCOLAX) 5 mg EC tablet Take 5 mg by mouth 2 (two) times a day.      coenzyme Q10 100 mg capsule Take  1 capsule (100 mg total) by mouth 2 (two) times daily. 60 capsule 11    diclofenac sodium (VOLTAREN ARTHRITIS PAIN) 1 % Gel Apply 2 g topically daily as needed (pain).      ECHINACEA ORAL Take 750 mg by mouth 2 (two) times a day.      furosemide (LASIX) 20 MG tablet Take 1 tablet (20 mg total) by mouth once daily.      levothyroxine (SYNTHROID) 88 MCG tablet Take 1 tablet (88 mcg total) by mouth once daily. 90 tablet 1    LIDOcaine (LIDODERM) 5 % Place 1 patch onto the skin once daily. Remove & Discard patch within 12 hours or as directed by MD (Patient taking differently: Place 1 patch onto the skin daily as needed (pain). Remove & Discard patch within 12 hours or as directed by MD) 30 patch 1    loratadine (CLARITIN) 10 mg tablet Take 10 mg by mouth once daily.      MAGNESIUM ORAL Take 1,000 mg by mouth once daily.      metoprolol succinate (TOPROL-XL) 25 MG 24 hr tablet Take 0.5 tablets (12.5 mg total) by mouth once daily. 15 tablet 0    montelukast (SINGULAIR) 10 mg tablet TAKE 1 TABLET BY MOUTH EVERY DAY IN THE EVENING (Patient taking differently: Take 10 mg by mouth every evening.) 90 tablet 1    multivitamin (THERAGRAN) tablet Take 1 tablet by mouth 2 (two) times a day.       naloxone (NARCAN) 4 mg/actuation Spry 1 spray by Nasal route daily as needed.      nitroGLYCERIN (NITROSTAT) 0.4 MG SL tablet Place 1 tablet (0.4 mg total) under the tongue every 5 (five) minutes as needed for Chest pain. 30 tablet 1    omeprazole (PRILOSEC) 20 MG capsule Take 1 capsule (20 mg total) by mouth once daily. (Patient taking differently: Take 20 mg by mouth every evening.) 90 capsule 3    tiZANidine (ZANAFLEX) 4 MG tablet Take 4 mg by mouth nightly as needed (muscle spasms).      traMADoL (ULTRAM) 50 mg tablet Take 50 mg by mouth 2 (two) times daily as needed for Pain.      TURMERIC ORAL Take 200 mg by mouth once daily.      UNABLE TO FIND Take 1 capsule by mouth 2 (two) times a day. Cardio platinum      [DISCONTINUED]  losartan (COZAAR) 25 MG tablet Take 25 mg by mouth every morning.      UNABLE TO FIND Take 1 capsule by mouth once daily. Joint health      [DISCONTINUED] JARDIANCE 10 mg tablet Take 10 mg by mouth every morning. (Patient not taking: Reported on 1/24/2025)         SCHEDULED MEDS:   amiodarone  200 mg Oral BID    apixaban  2.5 mg Oral BID    bisacodyL  5 mg Oral BID    furosemide  20 mg Oral Every other day    levothyroxine  88 mcg Oral Daily    losartan  12.5 mg Oral QAM    metoprolol succinate  12.5 mg Oral BID    montelukast  10 mg Oral QHS    pantoprazole  40 mg Oral QHS       CONTINUOUS INFUSIONS:    PRN MEDS:  Current Facility-Administered Medications:     acetaminophen, 650 mg, Oral, Q4H PRN    acetaminophen, 650 mg, Oral, Q8H PRN    ALPRAZolam, 0.25 mg, Oral, Daily PRN    aluminum-magnesium hydroxide-simethicone, 30 mL, Oral, QID PRN    benzonatate, 100 mg, Oral, TID PRN    dextrose 50%, 12.5 g, Intravenous, PRN    dextrose 50%, 25 g, Intravenous, PRN    glucagon (human recombinant), 1 mg, Intramuscular, PRN    glucose, 16 g, Oral, PRN    glucose, 24 g, Oral, PRN    LIDOcaine, 1 patch, Transdermal, Daily PRN    magnesium oxide, 800 mg, Oral, PRN    magnesium oxide, 800 mg, Oral, PRN    naloxone, 0.02 mg, Intravenous, PRN    ondansetron, 4 mg, Intravenous, Q8H PRN    potassium bicarbonate, 35 mEq, Oral, PRN    potassium bicarbonate, 50 mEq, Oral, PRN    potassium bicarbonate, 60 mEq, Oral, PRN    potassium, sodium phosphates, 2 packet, Oral, PRN    potassium, sodium phosphates, 2 packet, Oral, PRN    potassium, sodium phosphates, 2 packet, Oral, PRN    sodium chloride 0.9%, 10 mL, Intravenous, Q12H PRN    tiZANidine, 4 mg, Oral, Nightly PRN    traMADoL, 50 mg, Oral, BID PRN    LABS AND DIAGNOSTICS     CBC LAST 3 DAYS  Recent Labs   Lab 01/25/25  0239 01/26/25  0318 01/27/25  0607   WBC 8.25 9.22  9.22 6.52   RBC 4.30 4.31  4.31 4.30   HGB 12.3 12.2  12.2 12.2   HCT 37.8 37.6  37.6 37.3   MCV 88 87  87 87  "  MCH 28.6 28.3  28.3 28.4   MCHC 32.5 32.4  32.4 32.7   RDW 13.4 13.5  13.5 13.4    268  268 250   MPV 10.0 10.2  10.2 10.0   GRAN 46.3  3.8 46.3  46.3  4.3  4.3 49.8  3.3   LYMPH 38.3  3.2 38.5  38.5  3.6  3.6 31.4  2.1   MONO 11.4  0.9 11.3  11.3  1.0  1.0 13.7  0.9   BASO 0.04 0.06  0.06 0.04   NRBC 0 0  0 0       COAGULATION LAST 3 DAYS  Recent Labs   Lab 01/25/25  1148 01/25/25 2013 01/25/25  2251 01/26/25  0318 01/26/25  0634   INR 1.1  --   --   --   --    APTT 29.2   < > 47.2* 45.3* 49.5*    < > = values in this interval not displayed.       CHEMISTRY LAST 3 DAYS  Recent Labs   Lab 01/25/25  0239 01/26/25  0318 01/27/25  0606    136 139   K 3.9 3.6 3.9    102 104   CO2 27 25 27   ANIONGAP 8 9 8   BUN 28* 27* 28*   CREATININE 1.1 1.3 1.4   GLU 97 94 85   CALCIUM 9.4 9.1 9.0   MG 2.1 1.9 1.8   ALBUMIN 3.7 3.6 3.5   PROT 6.5 6.4 6.3   ALKPHOS 57 53* 54*   ALT 12 11 12   AST 21 21 19   BILITOT 0.4 0.3 0.5       CARDIAC PROFILE LAST 3 DAYS  Recent Labs   Lab 01/24/25  1436 01/24/25  1638 01/25/25  0239 01/25/25  0814 01/25/25  1148   *  --   --   --   --    TROPONINIHS 48.1*   < > 603.3* 885.8* 692.9*    < > = values in this interval not displayed.       ENDOCRINE LAST 3 DAYS  No results for input(s): "TSH", "PROCAL" in the last 168 hours.    LAST ARTERIAL BLOOD GAS  ABG  No results for input(s): "PH", "PO2", "PCO2", "HCO3", "BE" in the last 168 hours.    LAST 7 DAYS MICROBIOLOGY   Microbiology Results (last 7 days)       ** No results found for the last 168 hours. **            MOST RECENT IMAGING    Echo    Limited echo to evaluate left ventricular systolic function.    Left Ventricle: The left ventricle is normal in size. Moderate global   hypokinesis present. There is moderately reduced systolic function with a   visually estimated ejection fraction of 30 - 35%.      ECHOCARDIOGRAM RESULTS (last 5)  Results for orders placed during the hospital encounter of " 12/10/24    Echo    Interpretation Summary    Left Ventricle: The left ventricle is normal in size. Normal wall thickness. Normal wall motion. Septal motion is consistent with pacing. There is severely reduced systolic function with a visually estimated ejection fraction of 25 - 30%. There is normal diastolic function. E/A ratio is 1.61.    Right Ventricle: Right ventricle was not assessed. Right ventricular enlargement. Systolic function is reduced.TAPSE is 1.80 cm. Pacemaker lead present in the ventricle.    Left Atrium: Left atrium is severely dilated.    Right Atrium: Right atrium is severely dilated.    Aortic Valve: There is mild aortic regurgitation.    Mitral Valve: There is moderate to severe regurgitation.    Tricuspid Valve: There is mild to moderate regurgitation. There is moderate pulmonary hypertension. The estimated PA systolic pressure is at least 59 mmHg.    Pulmonic Valve: There is mild regurgitation.      Results for orders placed during the hospital encounter of 06/10/21    Echo    Interpretation Summary  · The left ventricle is mildly enlarged with mild concentric hypertrophy  · The estimated ejection fraction is 49%. Which is decreased  · Grade II left ventricular diastolic dysfunction.  · There is mild left ventricular global hypokinesis.  · Atrial fibrillation not observed.  · Normal right ventricular size with normal right ventricular systolic function.  · Moderate left atrial enlargement.  · Mild-to-moderate aortic regurgitation.  · There is mild aortic valve stenosis.  · Aortic valve area is 1.99 cm2; peak velocity is 1.41 m/s; mean gradient is 4 mmHg.  · Mild mitral regurgitation.  · Mild to moderate tricuspid regurgitation.  · Mild pulmonic regurgitation.  · Normal central venous pressure (3 mmHg).  · The estimated PA systolic pressure is 35 mmHg. Mild pulmonary hypertension  · Pacemaker lead is present      Results for orders placed during the hospital encounter of 07/24/20    Echo  Color Flow Doppler? Yes; Bubble Contrast? No    Interpretation Summary  · Concentric left ventricular remodeling.  · Normal left ventricular systolic function. The estimated ejection fraction is 55%.  · Normal LV diastolic function.  · No wall motion abnormalities.  · Normal right ventricular systolic function.  · Mild left atrial enlargement.  · Mild aortic regurgitation.  · The mitral valve is mildly sclerotic.  · Mild tricuspid regurgitation.  · Normal central venous pressure (3 mmHg).  · The estimated PA systolic pressure is 27 mmHg.      Results for orders placed during the hospital encounter of 12/08/17    2D echo with color flow doppler    Narrative  Date of Procedure: 12/08/2017        TEST DESCRIPTION  Technical Quality: This is a portable study performed at the patient's bedside. This is a technically adequate study.    General: A catheter is present in the right-sided cardiac chambers.    Aorta: The aortic root is normal in size, measuring 2.5 cm at sinotubular junction and 3.0 cm at Sinuses of Valsalva. The proximal ascending aorta is normal in size, measuring 3.0 cm across.    Left Atrium: The left atrial volume index is severely enlarged, measuring 55.08 cc/m2.    Left Ventricle: The left ventricle is normal in size, with an end-diastolic diameter of 4.3 cm, and an end-systolic diameter of 3.4 cm. LV wall thickness is normal, with the septum measuring 1.1 cm and the posterior wall measuring 0.9 cm across. Relative  wall thickness was normal at 0.42, and the LV mass index was 98.1 g/m2 consistent with normal left ventricular mass. There is global hypokinesis. Left ventricular systolic function appears low normal to mildly depressed. Visually estimated ejection  fraction is 50-55%. The LV Doppler derived stroke volume equals 56.0 ccs.    Diastolic indices: E wave velocity 0.8 m/s, E/A ratio 1.1,  msec., E/e' ratio(avg) 14. Pulmonary vein systolic/diastolic ratio is normal. There is  pseudonormalization of mitral inflow pattern consistent with significant diastolic dysfunction.    Right Atrium: The right atrium is normal in size, measuring 4.3 cm in length and 3.8 cm in width in the apical view.    Right Ventricle: The right ventricle is upper limit of normal measuring 4.1 cm at the base in the apical right ventricle-focused view. Global right ventricular systolic function appears normal. There is abnormal septal motion. Tricuspid annular plane  systolic excursion (TAPSE) is 1.8 cm. Tissue Doppler-derived tricuspid annular peak systolic velocity (S prime) is 9.6 cm/s. The estimated PA systolic pressure is 37 mmHg.    Aortic Valve:  The aortic valve is mildly sclerotic with normal leaflet mobility. The aortic valve is tri-leaflet in structure. Using a left ventricular outflow tract diameter of 2.0 cm, a left ventricular outflow tract velocity time integral of 18 cm,  and a peak instantaneous transvalvular velocity time integral of 26 cm, the calculated aortic valve area is 2.15 cm2(AVAi is 1.29 cm2/m2). Additionally, there is mild aortic regurgitation. There is aortic annular calcification.    Mitral Valve:  The mitral valve is normal in structure with normal leaflet mobility. There is mitral annular calcification.    Tricuspid Valve:  The tricuspid valve is normal in structure with normal leaflet mobility. There is mild to moderate tricuspid regurgitation.    Pulmonary Valve:  The pulmonic valve is normal in structure with normal leaflet mobility. There is trivial pulmonic regurgitation.    IVC: IVC is normal in size and collapses > 50% with a sniff, suggesting normal right atrial pressure of 3 mmHg.    Intracavitary: There is no evidence of pericardial effusion, intracavity mass, thrombi, or vegetation.        CONCLUSIONS  1 - Low normal to mildly depressed left ventricular systolic function (EF 50-55%) - in some views the basal and mid lateral wall appear hypokinetic, but endocardial  definition is suboptimal for these walls.  2 - Right ventricle is upper limit of normal in size with normal systolic function.  3 - Impaired LV relaxation, elevated LAP (grade 2 diastolic dysfunction).  4 - Severe left atrial enlargement.  5 - Mild to moderate tricuspid regurgitation.  6 - Mild aortic regurgitation.  7 - The estimated PA systolic pressure is 37 mmHg.            This document has been electronically  SIGNED BY: Chava Ponce MD On: 12/11/2017 10:02      CURRENT/PREVIOUS VISIT EKG      Results for orders placed or performed during the hospital encounter of 01/24/25   EKG 12-lead    Collection Time: 01/25/25 10:24 AM   Result Value Ref Range    QRS Duration 140 ms    [facility] QTC Calculation 537 ms    Narrative    Test Reason : R07.9,    Vent. Rate : 119 BPM     Atrial Rate :    BPM     P-R Int :    ms          QRS Dur : 140 ms      QT Int : 382 ms       P-R-T Axes :    -34 146 degrees    QTcB Int : 537 ms    Wide QRS rhythm  Left axis deviation  Left bundle branch block  Abnormal ECG  No previous ECGs available    Referred By: AAAREFERRAL SELF           Confirmed By:            ASSESSMENT/PLAN:     Active Hospital Problems    Diagnosis    *Tachycardia    Elevated troponin    PAF (paroxysmal atrial fibrillation)    Acquired hypothyroidism    Essential hypertension    Coronary artery disease involving left main coronary artery       ASSESSMENT & PLAN:     Tachycardia   Elevated troponin, peaked at 885.8 on 01/25/2025  Paroxysmal Atrial Fibrillation  HTN  Dyslipidemia  CAD s/p CABG  T2DM  GERD  [employer] PPM      RECOMMENDATIONS:    - Continue amiodarone 200 mg BID.   - Repeat echo showed LVEF 30-35%. Patient may benefit from BiV ICD. Lifevest can be placed outpatient.    - Continue Eliquis 2.5 mg BID  - GDMT on dc. Continue losartan 12.5 mg daily, metoprolol succinate 12.5 mg BID, and Jardiance on dc.  Lasix 20 mg daily on dc.   - On Synthroid  - Plan for close outpatient cardiology follow up in next 2  birdie.      Nhung Greene NP  Department of Cardiology  Date of Service: 01/27/2025

## 2025-01-27 NOTE — TELEPHONE ENCOUNTER
----- Message from  Reema sent at 1/26/2025  1:42 PM CST -----  Regarding: [facility] staff  Pt expected to discharge soon. Will need hospital follow up appointment. Please contact pt with appointment date and time. Thank you, Reema Martins RN CM

## 2025-01-28 ENCOUNTER — OFFICE VISIT (OUTPATIENT)
Dept: CARDIOLOGY | Facility: CLINIC | Age: 89
End: 2025-01-28
Payer: MEDICARE

## 2025-01-28 VITALS
HEIGHT: 60 IN | DIASTOLIC BLOOD PRESSURE: 60 MMHG | WEIGHT: 134 LBS | BODY MASS INDEX: 26.31 KG/M2 | SYSTOLIC BLOOD PRESSURE: 120 MMHG | HEART RATE: 76 BPM | RESPIRATION RATE: 17 BRPM | OXYGEN SATURATION: 99 %

## 2025-01-28 DIAGNOSIS — R79.89 ELEVATED TROPONIN: ICD-10-CM

## 2025-01-28 DIAGNOSIS — I10 ESSENTIAL HYPERTENSION: ICD-10-CM

## 2025-01-28 DIAGNOSIS — I48.0 PAF (PAROXYSMAL ATRIAL FIBRILLATION): Chronic | ICD-10-CM

## 2025-01-28 DIAGNOSIS — I25.10 CORONARY ARTERY DISEASE INVOLVING LEFT MAIN CORONARY ARTERY: Primary | ICD-10-CM

## 2025-01-28 DIAGNOSIS — R00.0 TACHYCARDIA: ICD-10-CM

## 2025-01-28 PROCEDURE — 99999 PR PBB SHADOW E&M-EST. PATIENT-LVL III: CPT | Mod: PBBFAC,,, | Performed by: INTERNAL MEDICINE

## 2025-01-28 PROCEDURE — 99215 OFFICE O/P EST HI 40 MIN: CPT | Mod: S$PBB,,, | Performed by: INTERNAL MEDICINE

## 2025-01-28 PROCEDURE — 99213 OFFICE O/P EST LOW 20 MIN: CPT | Mod: PBBFAC,PN | Performed by: INTERNAL MEDICINE

## 2025-01-28 RX ORDER — NITROGLYCERIN 0.4 MG/1
0.4 TABLET SUBLINGUAL EVERY 5 MIN PRN
Qty: 30 TABLET | Refills: 1 | Status: SHIPPED | OUTPATIENT
Start: 2025-01-28

## 2025-01-28 NOTE — PROGRESS NOTES
Subjective:    Patient ID:  Karma Chen is a 89 y.o. female patient here for evaluation Follow-up (Pt states that she went to [facility] 01/24/25 states HR was 133, pt states that she is doing better.) and Medication Refill      History of Present Illness:   89-year-old lady with history of coronary artery disease and coronary artery bypass surgery has been doing well and on January 24 she felt poorly and she presented to the emergency room with elevated heart rate lasting about 2 hours duration.  She did not feel good mild nausea headache lightheadedness was noted but did not have any fevers or chills or chest discomfort.  She picked troponins at 885 and then trended down.  She was placed on intravenous heparin and amiodarone drip was initiated workup was notable for ejection fraction 30-35% echocardiogram.  EKG showed wide complex tachycardia.  Since discharge patient has been taking amiodarone twice a day currently at 200 mg and also Jardiance 10 mg daily in addition to Eliquis 2.5 mg p.o. b.i.d..  She had some weak feeling this morning after initial dose but otherwise no rash and no new symptoms are noted.  She is scheduled to have LifeVest fitted later today.      HPI:   Patient presented to the emergency department complaining of an elevated heart rate.  Patient states that over the past 2 hours, she has not felt well.  Patient complained of mild nausea, headache and lightheadedness but denied fever, chills, diaphoresis, chest pain, abdominal pain or any urinary symptoms.     * No surgery found *       Hospital Course:   Karma Chen was monitored closely during her hospitalization.  Her troponins were trended and peaked at 885.8 before trending down.  She was placed on an IV heparin drip.  Cardiology was consulted who placed her on an amiodarone drip with rate control achieved.  A limited echocardiogram was performed which showed an EF of 30-35%.  The heparin drip was discontinued and her chronic Eliquis was  resumed.  She was started on oral amiodarone and the amiodarone drip was discontinued.  Her heart rate remained stable.  She showed no evidence of volume overload.  Pt was feeling well and eager for discharge.  She was cleared by Cardiology to discharge with outpatient evaluation for potential LifeVest set up-AICD requirement.  Jardiance which was supposed to be a home medication was sent in on day of discharge and filled by the pharmacy.  Pt declined increase of her Lasix to 20 mg daily is recommended by Cardiology stating that she has had a controlled weight and maintains good record and feels that her volume is stable-she will follow-up with her cardiologist as an outpatient discuss any changes from her every other day dosing which she feels is working appropriately.  Declined home health.  Return precautions were discussed-she verbalized understanding and agreement with discharge plan.     Pt seen on day of discharge and deemed appropriate for discharge.        Review of patient's allergies indicates:   Allergen Reactions    Diclofenac-misoprostol      Other reaction(s): Not available    Doxycycline     Effexor [venlafaxine]     Estradiol     Flagyl [metronidazole]     Fluconazole     Gabapentin     Iodine     Levaquin [levofloxacin]     Nexium [esomeprazole magnesium]     Nexletol [bempedoic acid] Other (See Comments)    Penicillins     Red yeast rice (monascus purpureus)     Shellfish containing products     Statins-hmg-coa reductase inhibitors     Sulfa (sulfonamide antibiotics)     Tea tree oil     Tegaserod      ZOLNORM    Tegaserod hydrogen maleate     Trazodone     Yeast, dried     Adhesive Rash     Band-aids       Past Medical History:   Diagnosis Date    Coronary artery disease     Dermatitis     Dermatitis 12/8/2017    Diabetes mellitus     Diabetes mellitus type II     Leah Barr virus infection     Fibromyalgia     GERD (gastroesophageal reflux disease)     Hypertension     MI (myocardial  infarction) 09/23/2011    Pure hypercholesterolemia 2/26/2020     Past Surgical History:   Procedure Laterality Date    adenoids      ANGIOPLASTY  1987    APPENDECTOMY      CARDIAC PACEMAKER PLACEMENT  01/12/2017    CATARACT EXTRACTION, BILATERAL Bilateral 9/2/15, 3/17/15    right eye-9/2/15, left eye-3/17/15    CHOLECYSTECTOMY  1987    CORONARY ARTERY BYPASS GRAFT  2006    quadruple    coronary stents  1999    x2    CYST REMOVAL  04/25/2017    on back    HYSTERECTOMY  1966    OVARY SURGERY  1948    Ovary burst & was repaired    RHIZOTOMY  09/14/2018    TONSILLECTOMY  1940     Social History     Tobacco Use    Smoking status: Never    Smokeless tobacco: Never   Substance Use Topics    Alcohol use: No    Drug use: No        Review of Systems:    As noted in HPI in addition      REVIEW OF SYSTEMS  CARDIOVASCULAR: No recent chest pain, palpitations, arm, neck, or jaw pain  RESPIRATORY: No recent fever, cough chills, SOB or congestion  : No blood in the urine  GI: No Nausea, vomiting, constipation, diarrhea, blood, or reflux.  MUSCULOSKELETAL: No myalgias  NEURO: No lightheadedness or dizziness  EYES: No Double vision, blurry, vision or headache              Objective        Vitals:    01/28/25 1040   BP: 120/60   Pulse: 76   Resp: 17       LIPIDS - LAST 2   Lab Results   Component Value Date    CHOL 193 04/03/2024    CHOL 229 (H) 08/23/2023    HDL 42 04/03/2024    HDL 38 (L) 08/23/2023    LDLCALC 110.0 04/03/2024    LDLCALC 124.0 08/23/2023    TRIG 205 (H) 04/03/2024    TRIG 335 (H) 08/23/2023    CHOLHDL 21.8 04/03/2024    CHOLHDL 16.6 (L) 08/23/2023       CBC - LAST 2  Lab Results   Component Value Date    WBC 6.52 01/27/2025    WBC 9.22 01/26/2025    WBC 9.22 01/26/2025    RBC 4.30 01/27/2025    RBC 4.31 01/26/2025    RBC 4.31 01/26/2025    HGB 12.2 01/27/2025    HGB 12.2 01/26/2025    HGB 12.2 01/26/2025    HCT 37.3 01/27/2025    HCT 37.6 01/26/2025    HCT 37.6 01/26/2025    MCV 87 01/27/2025    MCV 87 01/26/2025     MCV 87 01/26/2025    MCH 28.4 01/27/2025    MCH 28.3 01/26/2025    MCH 28.3 01/26/2025    MCHC 32.7 01/27/2025    MCHC 32.4 01/26/2025    MCHC 32.4 01/26/2025    RDW 13.4 01/27/2025    RDW 13.5 01/26/2025    RDW 13.5 01/26/2025     01/27/2025     01/26/2025     01/26/2025    MPV 10.0 01/27/2025    MPV 10.2 01/26/2025    MPV 10.2 01/26/2025    GRAN 3.3 01/27/2025    GRAN 49.8 01/27/2025    LYMPH 2.1 01/27/2025    LYMPH 31.4 01/27/2025    MONO 0.9 01/27/2025    MONO 13.7 01/27/2025    BASO 0.04 01/27/2025    BASO 0.06 01/26/2025    BASO 0.06 01/26/2025    NRBC 0 01/27/2025    NRBC 0 01/26/2025    NRBC 0 01/26/2025       CHEMISTRY & LIVER FUNCTION - LAST 2  Lab Results   Component Value Date     01/27/2025     01/26/2025    K 3.9 01/27/2025    K 3.6 01/26/2025     01/27/2025     01/26/2025    CO2 27 01/27/2025    CO2 25 01/26/2025    ANIONGAP 8 01/27/2025    ANIONGAP 9 01/26/2025    BUN 28 (H) 01/27/2025    BUN 27 (H) 01/26/2025    CREATININE 1.4 01/27/2025    CREATININE 1.3 01/26/2025    GLU 85 01/27/2025    GLU 94 01/26/2025    CALCIUM 9.0 01/27/2025    CALCIUM 9.1 01/26/2025    PH 7.408 12/18/2024    MG 1.8 01/27/2025    MG 1.9 01/26/2025    ALBUMIN 3.5 01/27/2025    ALBUMIN 3.6 01/26/2025    PROT 6.3 01/27/2025    PROT 6.4 01/26/2025    ALKPHOS 54 (L) 01/27/2025    ALKPHOS 53 (L) 01/26/2025    ALT 12 01/27/2025    ALT 11 01/26/2025    AST 19 01/27/2025    AST 21 01/26/2025    BILITOT 0.5 01/27/2025    BILITOT 0.3 01/26/2025        CARDIAC PROFILE - LAST 2  Lab Results   Component Value Date     (H) 01/24/2025     (H) 12/18/2024    CPK 53 10/22/2021    CPKMB 1.57 02/20/2012    TROPONINI <0.030 10/22/2021    TROPONINI 0.077 (HH) 07/25/2020    TROPONINIHS 692.9 (HH) 01/25/2025    TROPONINIHS 885.8 (HH) 01/25/2025        COAGULATION - LAST 2  Lab Results   Component Value Date    LABPT 13.9 (H) 10/22/2021    LABPT 13.6 05/02/2020    INR 1.1 01/25/2025    INR  1.0 12/19/2024    APTT 49.5 (H) 01/26/2025    APTT 45.3 (H) 01/26/2025       ENDOCRINE & PSA - LAST 2  Lab Results   Component Value Date    HGBA1C 5.9 12/19/2024    HGBA1C 6.0 05/28/2024    TSH 3.817 12/19/2024    TSH 0.960 03/08/2023        ECHOCARDIOGRAM RESULTS  Results for orders placed during the hospital encounter of 01/24/25    Echo    Interpretation Summary    Limited echo to evaluate left ventricular systolic function.    Left Ventricle: The left ventricle is normal in size. Moderate global hypokinesis present. There is moderately reduced systolic function with a visually estimated ejection fraction of 30 - 35%.      CURRENT/PREVIOUS VISIT EKG  Results for orders placed or performed during the hospital encounter of 01/24/25   EKG 12-lead    Collection Time: 01/25/25 10:24 AM   Result Value Ref Range    QRS Duration 140 ms    OHS QTC Calculation 537 ms    Narrative    Test Reason : R07.9,    Vent. Rate : 119 BPM     Atrial Rate :    BPM     P-R Int :    ms          QRS Dur : 140 ms      QT Int : 382 ms       P-R-T Axes :    -34 146 degrees    QTcB Int : 537 ms    Wide QRS rhythm  Left axis deviation  Left bundle branch block  Abnormal ECG  No previous ECGs available  Confirmed by Kenton Ware ([id number]) on 1/31/2025 2:41:25 PM    Referred By: AAAREFERRAL SELF           Confirmed By: Kenton Ware     No valid procedures specified.   Results for orders placed during the hospital encounter of 12/10/24    Nuclear Stress Test    Interpretation Summary    The study's ECG is uninterpretable due to left bundle branch block.    The patient reported no chest pain during the stress test.    There were no arrhythmias during stress.    The nuclear portion of this study will be reported separately.  Nuclear imaging study  FINDINGS:  Tomographic images reveal symmetric radiopharmaceutical distribution throughout the left ventricular myocardium on stress and rest images. No reversible perfusion defect to suggest  myocardial ischemia.     Gated SPECT images show global left ventricular hypokinesis calculated left ventricular ejection fraction is 31 %.     Impression:     1. No scintigraphic evidence of left ventricular myocardial ischemia.     2. Global left ventricular hypokinesis.     3. Calculated left ventricular ejection fraction of 31 %.        Electronically signed by:Dany Bills  Date:                                            12/13/2024  Time:                                           10:44        Exam Ended: 12/13/24 10:32 CST     PHYSICAL EXAM  CONSTITUTIONAL: Well built, well nourished in no apparent distress  NECK: no carotid bruit, no JVD  LUNGS: CTA  CHEST WALL: no tenderness  HEART: regular rate and rhythm, S1, S2 normal, no murmur, click, rub or gallop   ABDOMEN: soft, non-tender; bowel sounds normal; no masses,  no organomegaly  EXTREMITIES: Extremities normal, no edema, no calf tenderness noted  NEURO: AAO X 3    I HAVE REVIEWED :    The vital signs, nurses notes, and all the pertinent radiology and labs.        Current Outpatient Medications   Medication Instructions    acetaminophen (TYLENOL) 650 mg, Every 6 hours PRN    ALPRAZolam (XANAX) 0.25 MG tablet TAKE 1 TABLET BY MOUTH EVERY DAY AS NEEDED FOR ANXIETY    amiodarone (PACERONE) 200 mg, Oral, 2 times daily    ascorbic acid (vitamin C) (VITAMIN C) 1,000 mg, Daily    aspirin (ECOTRIN) 81 mg, Oral, Daily    benzonatate (TESSALON) 100 mg, 3 times daily PRN    bisacodyL (DULCOLAX) 5 mg, 2 times daily    coenzyme Q10 100 mg, Oral, 2 times daily    diclofenac sodium (VOLTAREN ARTHRITIS PAIN) 2 g, Daily PRN    ECHINACEA ORAL 750 mg, 2 times daily    ELIQUIS 2.5 mg, Oral, 2 times daily    furosemide (LASIX) 20 mg, Oral, Daily    JARDIANCE 10 mg, Oral, Every morning    levothyroxine (SYNTHROID) 88 mcg, Oral, Daily    LIDOcaine (LIDODERM) 5 % 1 patch, Transdermal, Daily, Remove & Discard patch within 12 hours or as directed by MD sultana (CLARITIN) 10  mg, Daily    losartan (COZAAR) 12.5 mg, Oral, Every morning    MAGNESIUM ORAL 1,000 mg, Daily    metoprolol succinate (TOPROL-XL) 12.5 mg, Oral, Daily    montelukast (SINGULAIR) 10 mg tablet TAKE 1 TABLET BY MOUTH EVERY DAY IN THE EVENING    multivitamin (THERAGRAN) tablet 1 tablet, 2 times daily    naloxone (NARCAN) 4 mg/actuation Spry 1 spray, Daily PRN    nitroGLYCERIN (NITROSTAT) 0.4 mg, Sublingual, Every 5 min PRN    omeprazole (PRILOSEC) 20 mg, Oral, Daily    tiZANidine (ZANAFLEX) 4 mg, Nightly PRN    traMADoL (ULTRAM) 50 mg, 2 times daily PRN    TURMERIC ORAL 200 mg, Daily    UNABLE TO FIND 1 capsule, Daily    UNABLE TO FIND 1 capsule, 2 times daily          Assessment & Plan     1. Coronary artery disease involving left main coronary artery  Assessment & Plan:  No active anginal symptoms are noted at this time.  Continue on Toprol-XL 25 mg half a tablet daily severe statin intolerance.  She had reaction to Repatha to it      2. Essential hypertension  Assessment & Plan:  Blood pressure is stable at 1 20/60 mm Hg continue on present therapy      3. Tachycardia  Assessment & Plan:  Patient had developed tachycardic rhythm with bundle branch block morphology given that she has LV dysfunction may consider upgrading it to ICD and she has wide complex rhythm may benefit from biventricular ICD upgrade.  Will discuss this further with electrophysiology      4. PAF (paroxysmal atrial fibrillation)  Assessment & Plan:  Patient was presently on Eliquis as well as metoprolol and amiodarone has been initiated.  Continue loading up the amiodarone I will see her back in the office in 4 weeks time a meanwhile she will use LifeVest      5. Elevated troponin  Assessment & Plan:  Recent myocardial perfusion study did not show any evidence of ischemia therefore will continue on medical therapy.  The elevation in troponin maybe related to rapid heart rate and myocardial strain      Other orders  -     nitroGLYCERIN (NITROSTAT)  0.4 MG SL tablet; Place 1 tablet (0.4 mg total) under the tongue every 5 (five) minutes as needed for Chest pain.  Dispense: 30 tablet; Refill: 1          Follow up in about 4 weeks (around 2/25/2025).

## 2025-01-28 NOTE — ASSESSMENT & PLAN NOTE
Patient was presently on Eliquis as well as metoprolol and amiodarone has been initiated.  Continue loading up the amiodarone I will see her back in the office in 4 weeks time a meanwhile she will use LifeVest

## 2025-01-28 NOTE — ASSESSMENT & PLAN NOTE
Patient had developed tachycardic rhythm with bundle branch block morphology given that she has LV dysfunction may consider upgrading it to ICD and she has wide complex rhythm may benefit from biventricular ICD upgrade.  Will discuss this further with electrophysiology

## 2025-01-28 NOTE — ASSESSMENT & PLAN NOTE
No active anginal symptoms are noted at this time.  Continue on Toprol-XL 25 mg half a tablet daily severe statin intolerance.  She had reaction to Repatha to it

## 2025-01-28 NOTE — ASSESSMENT & PLAN NOTE
Recent myocardial perfusion study did not show any evidence of ischemia therefore will continue on medical therapy.  The elevation in troponin maybe related to rapid heart rate and myocardial strain

## 2025-01-29 DIAGNOSIS — I70.213 ATHEROSCLEROSIS OF NATIVE ARTERIES OF EXTREMITIES WITH INTERMITTENT CLAUDICATION, BILATERAL LEGS: Primary | ICD-10-CM

## 2025-01-30 LAB
OHS QRS DURATION: 140 MS
OHS QTC CALCULATION: 508 MS

## 2025-01-31 ENCOUNTER — HOSPITAL ENCOUNTER (OUTPATIENT)
Dept: RADIOLOGY | Facility: HOSPITAL | Age: 89
Discharge: HOME | End: 2025-01-31
Attending: INTERNAL MEDICINE
Payer: MEDICARE

## 2025-01-31 DIAGNOSIS — I70.213 ATHEROSCLEROSIS OF NATIVE ARTERIES OF EXTREMITIES WITH INTERMITTENT CLAUDICATION, BILATERAL LEGS: ICD-10-CM

## 2025-01-31 LAB
OHS QRS DURATION: 140 MS
OHS QTC CALCULATION: 537 MS

## 2025-01-31 PROCEDURE — 93925 LOWER EXTREMITY STUDY: CPT | Mod: TC,PO

## 2025-01-31 PROCEDURE — 93925 LOWER EXTREMITY STUDY: CPT | Mod: 26,,, | Performed by: RADIOLOGY

## 2025-02-04 ENCOUNTER — HOSPITAL ENCOUNTER (OUTPATIENT)
Dept: CARDIOLOGY | Facility: CLINIC | Age: 89
Discharge: HOME | End: 2025-02-04
Attending: INTERNAL MEDICINE
Payer: MEDICARE

## 2025-02-04 DIAGNOSIS — Z95.0 PACEMAKER: ICD-10-CM

## 2025-02-04 DIAGNOSIS — Z95.0 PACEMAKER: Primary | ICD-10-CM

## 2025-02-04 PROCEDURE — 93288 INTERROG EVL PM/LDLS PM IP: CPT | Mod: 26,S$PBB,, | Performed by: INTERNAL MEDICINE

## 2025-02-04 PROCEDURE — 93288 INTERROG EVL PM/LDLS PM IP: CPT | Mod: PBBFAC,PN

## 2025-02-07 LAB
BATTERY VOLTAGE (V): 2.95 V
IMPEDANCE RA LEAD (NATIVE): 494 OHMS
IMPEDANCE RA LEAD: 475 OHMS
P/R-WAVE RA LEAD (NATIVE): 13.6 MV
P/R-WAVE RA LEAD: 1.8 MV
THRESHOLD MS RA LEAD (NATIVE): 0.4 MS
THRESHOLD MS RA LEAD: 0.4 MS
THRESHOLD V RA LEAD (NATIVE): 0.6 V
THRESHOLD V RA LEAD: 0.7 V

## 2025-02-11 LAB
OHS CV AF BURDEN PERCENT: < 1
OHS CV DC REMOTE DEVICE TYPE: NORMAL
OHS CV ICD SHOCK: NO
OHS CV RV PACING PERCENT: 0.05 %

## 2025-02-19 ENCOUNTER — CLINICAL SUPPORT (OUTPATIENT)
Dept: CARDIOLOGY | Facility: CLINIC | Age: 89
End: 2025-02-19

## 2025-02-19 ENCOUNTER — HOSPITAL ENCOUNTER (OUTPATIENT)
Dept: CARDIOLOGY | Facility: CLINIC | Age: 89
Discharge: HOME | End: 2025-02-19
Attending: INTERNAL MEDICINE
Payer: MEDICARE

## 2025-02-19 DIAGNOSIS — R00.1 BRADYCARDIA, UNSPECIFIED: ICD-10-CM

## 2025-02-19 DIAGNOSIS — Z95.0 PRESENCE OF CARDIAC PACEMAKER: ICD-10-CM

## 2025-02-19 PROCEDURE — 93294 REM INTERROG EVL PM/LDLS PM: CPT | Mod: ,,, | Performed by: INTERNAL MEDICINE

## 2025-02-24 ENCOUNTER — TELEPHONE (OUTPATIENT)
Dept: CARDIOLOGY | Facility: CLINIC | Age: 89
End: 2025-02-24
Payer: MEDICARE

## 2025-02-24 NOTE — TELEPHONE ENCOUNTER
Per Dr. Ware pt can stop taking the Amiodarone. Spoke with pt she understood with no questions or concerns.

## 2025-02-24 NOTE — TELEPHONE ENCOUNTER
----- Message from Janae sent at 2/24/2025 12:40 PM CST -----  Regarding: Needs Medical Advice-side effects to medication  Contact: patient at [phone]  Type: Needs Medical AdviceWho Called:  patient at [phone] Information: calling to discuss the side effects of taking the medications. She feels like she is going to pass out. Please call and advise. Thank youJARDIANCE 10 mg tablet 30 tablet 0 1/27/2025 - Sig - Route: Take 1 tablet (10 mg total) by mouth every morning. - Oral Sent to pharmacy as: JARDIANCE 10 mg tablet  amiodarone (PACERONE) 200 MG Tab 60 tablet 0 1/27/2025 1/27/2026 Sig - Route: Take 1 tablet (200 mg total) by mouth 2 (two) times daily. - Oral Sent to pharmacy as: amiodarone (PACERONE) 200 MG Tab

## 2025-02-24 NOTE — TELEPHONE ENCOUNTER
----- Message from Janae sent at 2/24/2025 12:40 PM CST -----  Regarding: Needs Medical Advice-side effects to medication  Contact: patient at [phone]  Type: Needs Medical AdviceWho Called:  patient at [phone] Information: calling to discuss the side effects of taking the medications. She feels like she is going to pass out. Please call and advise. Thank youJARDIANCE 10 mg tablet 30 tablet 0 1/27/2025 - Sig - Route: Take 1 tablet (10 mg total) by mouth every morning. - Oral Sent to pharmacy as: JARDIANCE 10 mg tablet  amiodarone (PACERONE) 200 MG Tab 60 tablet 0 1/27/2025 1/27/2026 Sig - Route: Take 1 tablet (200 mg total) by mouth 2 (two) times daily. - Oral Sent to pharmacy as: amiodarone (PACERONE) 200 MG Tab

## 2025-02-24 NOTE — TELEPHONE ENCOUNTER
Pt states that she can't take off her clothes, very weak, arms hurting, /60, pt states that she can't function with new medications Jardiance and Amiodarone. Pt doesn't know why she has a life vest and it's uncomfortable. Pt states that she is refusing to take the Amiodarone.

## 2025-02-28 ENCOUNTER — TELEPHONE (OUTPATIENT)
Dept: CARDIOLOGY | Facility: CLINIC | Age: 89
End: 2025-02-28
Payer: MEDICARE

## 2025-02-28 DIAGNOSIS — I25.10 CORONARY ARTERY DISEASE INVOLVING NATIVE CORONARY ARTERY OF NATIVE HEART WITHOUT ANGINA PECTORIS: ICD-10-CM

## 2025-02-28 DIAGNOSIS — I10 ESSENTIAL HYPERTENSION: Primary | ICD-10-CM

## 2025-02-28 NOTE — TELEPHONE ENCOUNTER
----- Message from Raymon sent at 2/28/2025  1:36 PM CST -----  Regarding: advice  Type:  Needs Medical AdviceWho Called: shawna howe [facility] Call Back Number: [phone] Information: shawna st that pt st for the pass week when she sits down in eats breakfast.  that she feels like she's having shortness of breath afterwards with a little bit of tightness. please call to discuss.

## 2025-02-28 NOTE — TELEPHONE ENCOUNTER
Per Amaya Dickens NP, take Lasix BID for 3 days ordered lab work BNP and CMP. Spoke with pt she understood and stated that she will come on Monday for her Lab work.

## 2025-03-03 ENCOUNTER — LAB VISIT (OUTPATIENT)
Dept: LAB | Facility: HOSPITAL | Age: 89
End: 2025-03-03
Attending: INTERNAL MEDICINE
Payer: MEDICARE

## 2025-03-03 ENCOUNTER — TELEPHONE (OUTPATIENT)
Dept: CARDIOLOGY | Facility: CLINIC | Age: 89
End: 2025-03-03
Payer: MEDICARE

## 2025-03-03 DIAGNOSIS — I10 ESSENTIAL HYPERTENSION: ICD-10-CM

## 2025-03-03 DIAGNOSIS — I25.10 CORONARY ARTERY DISEASE INVOLVING NATIVE CORONARY ARTERY OF NATIVE HEART WITHOUT ANGINA PECTORIS: ICD-10-CM

## 2025-03-03 LAB
ALBUMIN SERPL BCP-MCNC: 4.4 G/DL (ref 3.5–5.2)
ALP SERPL-CCNC: 69 U/L (ref 55–135)
ALT SERPL W/O P-5'-P-CCNC: 16 U/L (ref 10–44)
ANION GAP SERPL CALC-SCNC: 13 MMOL/L (ref 8–16)
AST SERPL-CCNC: 20 U/L (ref 10–40)
BILIRUB SERPL-MCNC: 0.7 MG/DL (ref 0.1–1)
BUN SERPL-MCNC: 57 MG/DL (ref 8–23)
CALCIUM SERPL-MCNC: 9.1 MG/DL (ref 8.7–10.5)
CHLORIDE SERPL-SCNC: 96 MMOL/L (ref 95–110)
CO2 SERPL-SCNC: 30 MMOL/L (ref 23–29)
CREAT SERPL-MCNC: 2.1 MG/DL (ref 0.5–1.4)
EST. GFR  (NO RACE VARIABLE): 22.4 ML/MIN/1.73 M^2
GLUCOSE SERPL-MCNC: 99 MG/DL (ref 70–110)
POTASSIUM SERPL-SCNC: 3.8 MMOL/L (ref 3.5–5.1)
PROT SERPL-MCNC: 7.9 G/DL (ref 6–8.4)
SODIUM SERPL-SCNC: 139 MMOL/L (ref 136–145)

## 2025-03-03 PROCEDURE — 80053 COMPREHEN METABOLIC PANEL: CPT | Performed by: INTERNAL MEDICINE

## 2025-03-03 PROCEDURE — 36415 COLL VENOUS BLD VENIPUNCTURE: CPT | Performed by: INTERNAL MEDICINE

## 2025-03-03 NOTE — TELEPHONE ENCOUNTER
----- Message from Sara sent at 3/3/2025  1:08 PM CST -----  Regarding: advice  Contact: Latonya with Home health  Type: Needs Medical AdviceWho Called:  Latonya with Home HealthSymptoms (please be specific):  How long has patient had these symptoms:  Pharmacy name and phone #:  [facility] Call Back Number: [phone] Information: Patient completed 3 day of Lasix.  Patient is planning on taking it every day.  Should she continue?  Thanks!

## 2025-03-06 ENCOUNTER — TELEPHONE (OUTPATIENT)
Dept: CARDIOLOGY | Facility: CLINIC | Age: 89
End: 2025-03-06
Payer: MEDICARE

## 2025-03-06 NOTE — TELEPHONE ENCOUNTER
Called [facility] let her know that the pt can take Lasix every day Per Dr. Ware she stated that she would let the pt know.

## 2025-03-06 NOTE — TELEPHONE ENCOUNTER
----- Message from Sara sent at 3/6/2025  4:27 PM CST -----  Regarding: advice  Contact: patient  Type: Needs Medical AdviceWho Called:  patientSymptoms (please be specific):  medication side effectsHow long has patient had these symptoms:  2 weeksPharmacy name and phone #:  [facility] [address] [place], [place] - [address] [place] [address]Szxhj: [phone] Fax: [phone] Call Back Number: [phone] (home) Additional Information: Patient states she is having side effects from JARDIANCE 10 mg tablet.  Please call patient to advise.  Thanks!

## 2025-03-10 ENCOUNTER — OFFICE VISIT (OUTPATIENT)
Dept: CARDIOLOGY | Facility: CLINIC | Age: 89
End: 2025-03-10
Payer: MEDICARE

## 2025-03-10 VITALS
RESPIRATION RATE: 17 BRPM | WEIGHT: 138 LBS | HEIGHT: 60 IN | BODY MASS INDEX: 27.09 KG/M2 | SYSTOLIC BLOOD PRESSURE: 118 MMHG | DIASTOLIC BLOOD PRESSURE: 60 MMHG | HEART RATE: 58 BPM

## 2025-03-10 DIAGNOSIS — N18.9 ACUTE KIDNEY INJURY SUPERIMPOSED ON CHRONIC KIDNEY DISEASE: ICD-10-CM

## 2025-03-10 DIAGNOSIS — I10 ESSENTIAL HYPERTENSION: ICD-10-CM

## 2025-03-10 DIAGNOSIS — I25.10 CORONARY ARTERY DISEASE INVOLVING NATIVE CORONARY ARTERY OF NATIVE HEART WITHOUT ANGINA PECTORIS: Primary | ICD-10-CM

## 2025-03-10 DIAGNOSIS — E11.22 TYPE 2 DIABETES MELLITUS WITH STAGE 3A CHRONIC KIDNEY DISEASE, WITHOUT LONG-TERM CURRENT USE OF INSULIN: Chronic | ICD-10-CM

## 2025-03-10 DIAGNOSIS — I70.0 AORTIC ATHEROSCLEROSIS: ICD-10-CM

## 2025-03-10 DIAGNOSIS — I50.42 CHRONIC COMBINED SYSTOLIC AND DIASTOLIC CONGESTIVE HEART FAILURE: ICD-10-CM

## 2025-03-10 DIAGNOSIS — N18.31 TYPE 2 DIABETES MELLITUS WITH STAGE 3A CHRONIC KIDNEY DISEASE, WITHOUT LONG-TERM CURRENT USE OF INSULIN: Chronic | ICD-10-CM

## 2025-03-10 DIAGNOSIS — N17.9 ACUTE KIDNEY INJURY SUPERIMPOSED ON CHRONIC KIDNEY DISEASE: ICD-10-CM

## 2025-03-10 PROCEDURE — 99215 OFFICE O/P EST HI 40 MIN: CPT | Mod: PBBFAC,PN | Performed by: INTERNAL MEDICINE

## 2025-03-10 PROCEDURE — 99999 PR PBB SHADOW E&M-EST. PATIENT-LVL V: CPT | Mod: PBBFAC,,, | Performed by: INTERNAL MEDICINE

## 2025-03-10 PROCEDURE — 99215 OFFICE O/P EST HI 40 MIN: CPT | Mod: S$PBB,,, | Performed by: INTERNAL MEDICINE

## 2025-03-10 NOTE — ASSESSMENT & PLAN NOTE
Blood pressure is very well controlled 118/60 mm Hg continue on Toprol-XL 25 mg half a tablet daily losartan has been hold Lasix 20 mg every other

## 2025-03-10 NOTE — PROGRESS NOTES
Subjective:    Patient ID:  Karma Chen is a 89 y.o. female patient here for evaluation Follow-up (Doing well /Pt states that she had lab work done would like to discuss results )      History of Present Illness:  Patient is 89-year-old with history of LV dysfunction ejection fraction about 30-35% has a pacemaker in Situ had outpatient LifeVest placed.  And she is annoyed by using it but otherwise no discharges from the LifeVest is noted.    She denies having chest discomfort her heart failure symptoms are also fairly well compensated she does have some fatigue and tiredness frequently in his especially today with the time change with daylight savings time  No chest discomfort no arm neck or jaw pain noted and no palpitations no swelling in the lower extremities  Patient had blood work done on 03/03 2025 noted to have elevated creatinine to 2.1.  She has been taking Lasix 20 mg every day.  Has some fatigue and tiredness associated her medications     Details of recent hospitalization is as below  HPI:   Patient presented to the emergency department complaining of an elevated heart rate.  Patient states that over the past 2 hours, she has not felt well.  Patient complained of mild nausea, headache and lightheadedness but denied fever, chills, diaphoresis, chest pain, abdominal pain or any urinary symptoms.     * No surgery found *       Hospital Course:   Karma Chen was monitored closely during her hospitalization.  Her troponins were trended and peaked at 885.8 before trending down.  She was placed on an IV heparin drip.  Cardiology was consulted who placed her on an amiodarone drip with rate control achieved.  A limited echocardiogram was performed which showed an EF of 30-35%.  The heparin drip was discontinued and her chronic Eliquis was resumed.  She was started on oral amiodarone and the amiodarone drip was discontinued.  Her heart rate remained stable.  She showed no evidence of volume overload.  Pt  was feeling well and eager for discharge.  She was cleared by Cardiology to discharge with outpatient evaluation for potential LifeVest set up-AICD requirement.  Jardiance which was supposed to be a home medication was sent in on day of discharge and filled by the pharmacy.  Pt declined increase of her Lasix to 20 mg daily is recommended by Cardiology stating that she has had a controlled weight and maintains good record and feels that her volume is stable-she will follow-up with her cardiologist as an outpatient discuss any changes from her every other day dosing which she feels is working appropriately.  Declined home health.  Return precautions were discussed-she verbalized understanding and agreement with discharge plan.       Review of patient's allergies indicates:   Allergen Reactions    Diclofenac-misoprostol      Other reaction(s): Not available    Doxycycline     Effexor [venlafaxine]     Estradiol     Flagyl [metronidazole]     Fluconazole     Gabapentin     Iodine     Levaquin [levofloxacin]     Nexium [esomeprazole magnesium]     Nexletol [bempedoic acid] Other (See Comments)    Penicillins     Red yeast rice (monascus purpureus)     Shellfish containing products     Statins-hmg-coa reductase inhibitors     Sulfa (sulfonamide antibiotics)     Tea tree oil     Tegaserod      ZOLNORM    Tegaserod hydrogen maleate     Trazodone     Yeast, dried     Adhesive Rash     Band-aids       Past Medical History:   Diagnosis Date    Coronary artery disease     Dermatitis     Dermatitis 12/8/2017    Diabetes mellitus     Diabetes mellitus type II     Leah Barr virus infection     Fibromyalgia     GERD (gastroesophageal reflux disease)     Hypertension     MI (myocardial infarction) 09/23/2011    Pure hypercholesterolemia 2/26/2020     Past Surgical History:   Procedure Laterality Date    adenoids      ANGIOPLASTY  1987    APPENDECTOMY      CARDIAC PACEMAKER PLACEMENT  01/12/2017    CATARACT EXTRACTION, BILATERAL  Bilateral 9/2/15, 3/17/15    right eye-9/2/15, left eye-3/17/15    CHOLECYSTECTOMY  1987    CORONARY ARTERY BYPASS GRAFT  2006    quadruple    coronary stents  1999    x2    CYST REMOVAL  04/25/2017    on back    HYSTERECTOMY  1966    OVARY SURGERY  1948    Ovary burst & was repaired    RHIZOTOMY  09/14/2018    TONSILLECTOMY  1940     Social History[1]     Review of Systems:    As noted in HPI in addition      REVIEW OF SYSTEMS  CARDIOVASCULAR: No recent chest pain, palpitations, arm, neck, or jaw pain  RESPIRATORY: No recent fever, cough chills, SOB or congestion  : No blood in the urine  GI: No Nausea, vomiting, constipation, diarrhea, blood, or reflux.  MUSCULOSKELETAL: No myalgias  NEURO: No lightheadedness or dizziness  EYES: No Double vision, blurry, vision or headache              Objective        Vitals:    03/10/25 1013   BP: 118/60   Pulse: (!) 58   Resp: 17       LIPIDS - LAST 2   Lab Results   Component Value Date    CHOL 193 04/03/2024    CHOL 229 (H) 08/23/2023    HDL 42 04/03/2024    HDL 38 (L) 08/23/2023    LDLCALC 110.0 04/03/2024    LDLCALC 124.0 08/23/2023    TRIG 205 (H) 04/03/2024    TRIG 335 (H) 08/23/2023    CHOLHDL 21.8 04/03/2024    CHOLHDL 16.6 (L) 08/23/2023       CBC - LAST 2  Lab Results   Component Value Date    WBC 6.52 01/27/2025    WBC 9.22 01/26/2025    WBC 9.22 01/26/2025    RBC 4.30 01/27/2025    RBC 4.31 01/26/2025    RBC 4.31 01/26/2025    HGB 12.2 01/27/2025    HGB 12.2 01/26/2025    HGB 12.2 01/26/2025    HCT 37.3 01/27/2025    HCT 37.6 01/26/2025    HCT 37.6 01/26/2025    MCV 87 01/27/2025    MCV 87 01/26/2025    MCV 87 01/26/2025    MCH 28.4 01/27/2025    MCH 28.3 01/26/2025    MCH 28.3 01/26/2025    MCHC 32.7 01/27/2025    MCHC 32.4 01/26/2025    MCHC 32.4 01/26/2025    RDW 13.4 01/27/2025    RDW 13.5 01/26/2025    RDW 13.5 01/26/2025     01/27/2025     01/26/2025     01/26/2025    MPV 10.0 01/27/2025    MPV 10.2 01/26/2025    MPV 10.2 01/26/2025     GRAN 3.3 01/27/2025    GRAN 49.8 01/27/2025    LYMPH 2.1 01/27/2025    LYMPH 31.4 01/27/2025    MONO 0.9 01/27/2025    MONO 13.7 01/27/2025    BASO 0.04 01/27/2025    BASO 0.06 01/26/2025    BASO 0.06 01/26/2025    NRBC 0 01/27/2025    NRBC 0 01/26/2025    NRBC 0 01/26/2025       CHEMISTRY & LIVER FUNCTION - LAST 2  Lab Results   Component Value Date     03/03/2025     01/27/2025    K 3.8 03/03/2025    K 3.9 01/27/2025    CL 96 03/03/2025     01/27/2025    CO2 30 (H) 03/03/2025    CO2 27 01/27/2025    ANIONGAP 13 03/03/2025    ANIONGAP 8 01/27/2025    BUN 57 (H) 03/03/2025    BUN 28 (H) 01/27/2025    CREATININE 2.1 (H) 03/03/2025    CREATININE 1.4 01/27/2025    GLU 99 03/03/2025    GLU 85 01/27/2025    CALCIUM 9.1 03/03/2025    CALCIUM 9.0 01/27/2025    PH 7.408 12/18/2024    MG 1.8 01/27/2025    MG 1.9 01/26/2025    ALBUMIN 4.4 03/03/2025    ALBUMIN 3.5 01/27/2025    PROT 7.9 03/03/2025    PROT 6.3 01/27/2025    ALKPHOS 69 03/03/2025    ALKPHOS 54 (L) 01/27/2025    ALT 16 03/03/2025    ALT 12 01/27/2025    AST 20 03/03/2025    AST 19 01/27/2025    BILITOT 0.7 03/03/2025    BILITOT 0.5 01/27/2025        CARDIAC PROFILE - LAST 2  Lab Results   Component Value Date     (H) 01/24/2025     (H) 12/18/2024    CPK 53 10/22/2021    CPKMB 1.57 02/20/2012    TROPONINI <0.030 10/22/2021    TROPONINI 0.077 (HH) 07/25/2020    TROPONINIHS 692.9 (HH) 01/25/2025    TROPONINIHS 885.8 () 01/25/2025        COAGULATION - LAST 2  Lab Results   Component Value Date    LABPT 13.9 (H) 10/22/2021    LABPT 13.6 05/02/2020    INR 1.1 01/25/2025    INR 1.0 12/19/2024    APTT 49.5 (H) 01/26/2025    APTT 45.3 (H) 01/26/2025       ENDOCRINE & PSA - LAST 2  Lab Results   Component Value Date    HGBA1C 5.9 12/19/2024    HGBA1C 6.0 05/28/2024    TSH 3.817 12/19/2024    TSH 0.960 03/08/2023        ECHOCARDIOGRAM RESULTS  Results for orders placed during the hospital encounter of 01/24/25    Echo    Interpretation  Summary    Limited echo to evaluate left ventricular systolic function.    Left Ventricle: The left ventricle is normal in size. Moderate global hypokinesis present. There is moderately reduced systolic function with a visually estimated ejection fraction of 30 - 35%.      CURRENT/PREVIOUS VISIT EKG  Results for orders placed or performed during the hospital encounter of 01/24/25   EKG 12-lead    Collection Time: 01/25/25 10:24 AM   Result Value Ref Range    QRS Duration 140 ms    OHS QTC Calculation 537 ms    Narrative    Test Reason : R07.9,    Vent. Rate : 119 BPM     Atrial Rate :    BPM     P-R Int :    ms          QRS Dur : 140 ms      QT Int : 382 ms       P-R-T Axes :    -34 146 degrees    QTcB Int : 537 ms    Wide QRS rhythm  Left axis deviation  Left bundle branch block  Abnormal ECG  No previous ECGs available  Confirmed by Kenton Ware ([id number]) on 1/31/2025 2:41:25 PM    Referred By: AAAREFERRAL SELF           Confirmed By: Kenton Ware     No valid procedures specified.   Results for orders placed during the hospital encounter of 12/10/24    Nuclear Stress Test    Interpretation Summary    The study's ECG is uninterpretable due to left bundle branch block.    The patient reported no chest pain during the stress test.    There were no arrhythmias during stress.    The nuclear portion of this study will be reported separately.  Perfusion imaging study  Impression:     1. No scintigraphic evidence of left ventricular myocardial ischemia.     2. Global left ventricular hypokinesis.     3. Calculated left ventricular ejection fraction of 31 %.        Electronically signed by:Dany Bills  Date:                                            12/13/2024  Time:                                           10:44           PHYSICAL EXAM  CONSTITUTIONAL: Well built, well nourished in no apparent distress  NECK: no carotid bruit, no JVD  LUNGS: CTA  CHEST WALL: no tenderness  HEART: regular rate and rhythm, S1, S2  normal, no murmur, click, rub or gallop   ABDOMEN: soft, non-tender; bowel sounds normal; no masses,  no organomegaly  EXTREMITIES: Extremities normal, no edema, no calf tenderness noted  NEURO: AAO X 3    I HAVE REVIEWED :    The vital signs, nurses notes, and all the pertinent radiology and labs.        Current Outpatient Medications   Medication Instructions    acetaminophen (TYLENOL) 650 mg, Every 6 hours PRN    ALPRAZolam (XANAX) 0.25 MG tablet TAKE 1 TABLET BY MOUTH EVERY DAY AS NEEDED FOR ANXIETY    amiodarone (PACERONE) 200 mg, Oral, 2 times daily    ascorbic acid (vitamin C) (VITAMIN C) 1,000 mg, Daily    aspirin (ECOTRIN) 81 mg, Oral, Daily    benzonatate (TESSALON) 100 mg, 3 times daily PRN    bisacodyL (DULCOLAX) 5 mg, 2 times daily    coenzyme Q10 100 mg, Oral, 2 times daily    diclofenac sodium (VOLTAREN ARTHRITIS PAIN) 2 g, Daily PRN    ECHINACEA ORAL 750 mg, 2 times daily    ELIQUIS 2.5 mg, Oral, 2 times daily    furosemide (LASIX) 20 mg, Oral, Daily    JARDIANCE 10 mg, Oral, Every morning    levothyroxine (SYNTHROID) 88 mcg, Oral, Daily    LIDOcaine (LIDODERM) 5 % 1 patch, Transdermal, Daily, Remove & Discard patch within 12 hours or as directed by MD    loratadine (CLARITIN) 10 mg, Daily    losartan (COZAAR) 12.5 mg, Oral, Every morning    MAGNESIUM ORAL 1,000 mg, Daily    metoprolol succinate (TOPROL-XL) 12.5 mg, Oral, Daily    montelukast (SINGULAIR) 10 mg tablet TAKE 1 TABLET BY MOUTH EVERY DAY IN THE EVENING    multivitamin (THERAGRAN) tablet 1 tablet, 2 times daily    naloxone (NARCAN) 4 mg/actuation Spry 1 spray, Daily PRN    nitroGLYCERIN (NITROSTAT) 0.4 mg, Sublingual, Every 5 min PRN    omeprazole (PRILOSEC) 20 mg, Oral, Daily    tiZANidine (ZANAFLEX) 4 mg, Nightly PRN    traMADoL (ULTRAM) 50 mg, 2 times daily PRN    TURMERIC ORAL 200 mg, Daily    UNABLE TO FIND 1 capsule, Daily    UNABLE TO FIND 1 capsule, 2 times daily          Assessment & Plan     1. Coronary artery disease involving  "native coronary artery of native heart without angina pectoris  Assessment & Plan:  No further episodes of angina noted continue on risk factor modification and present antianginal therapy      2. Chronic combined systolic and diastolic congestive heart failure  Assessment & Plan:  Patient has Combined Systolic and Diastolic heart failure that is Chronic. On presentation their CHF was well compensated. Most recent BNP and echo results are listed below.  No results for input(s): "BNP" in the last 72 hours.  Latest ECHO  Results for orders placed during the hospital encounter of 01/24/25    Echo    Interpretation Summary    Limited echo to evaluate left ventricular systolic function.    Left Ventricle: The left ventricle is normal in size. Moderate global hypokinesis present. There is moderately reduced systolic function with a visually estimated ejection fraction of 30 - 35%.    Current Heart Failure Medications       Plan  - Monitor strict I&Os and daily weights.    - Place on telemetry  - Low sodium diet  - Place on fluid restriction of 1.5 L.   - Cardiology has been consulted  - The patient's volume status is at their baseline  - in fact she is developing some prerenal azotemia reduce Lasix to every other day 20 mg.  Encouraged her to continue on all other medications she has developed side effect profile to using Jardiance which has been discontinued.  Losartan has been on hold because of weakness and prerenal azotemia on low blood pressure   Continue on Toprol-XL 12.5 mg daily          3. Aortic atherosclerosis  Assessment & Plan:  History of aortic arteriosclerosis continue on risk factor modification      4. Essential hypertension  Assessment & Plan:  Blood pressure is very well controlled 118/60 mm Hg continue on Toprol-XL 25 mg half a tablet daily losartan has been hold Lasix 20 mg every other      5. Acute kidney injury superimposed on chronic kidney disease  Assessment & Plan:  Patient has a acute kidney " injury recommend to cut back on Lasix to 20 mg every other day and losartan has been on hold.      6. Type 2 diabetes mellitus with stage 3a chronic kidney disease, without long-term current use of insulin  Assessment & Plan:  Continue on present therapy      Will consult electrophysiology to upgrade to biventricular ICD as patient has underlying left bundle branch block morphology wide complex as well as severe LV dysfunction ejection fraction about 30%  And congestive heart failure inability to use certain medications      Follow up in about 2 months (around 5/10/2025).            [1]   Social History  Tobacco Use    Smoking status: Never    Smokeless tobacco: Never   Substance Use Topics    Alcohol use: No    Drug use: No

## 2025-03-10 NOTE — ASSESSMENT & PLAN NOTE
"Patient has Combined Systolic and Diastolic heart failure that is Chronic. On presentation their CHF was well compensated. Most recent BNP and echo results are listed below.  No results for input(s): "BNP" in the last 72 hours.  Latest ECHO  Results for orders placed during the hospital encounter of 01/24/25    Echo    Interpretation Summary    Limited echo to evaluate left ventricular systolic function.    Left Ventricle: The left ventricle is normal in size. Moderate global hypokinesis present. There is moderately reduced systolic function with a visually estimated ejection fraction of 30 - 35%.    Current Heart Failure Medications       Plan  - Monitor strict I&Os and daily weights.    - Place on telemetry  - Low sodium diet  - Place on fluid restriction of 1.5 L.   - Cardiology has been consulted  - The patient's volume status is at their baseline  - in fact she is developing some prerenal azotemia reduce Lasix to every other day 20 mg.  Encouraged her to continue on all other medications she has developed side effect profile to using Jardiance which has been discontinued.  Losartan has been on hold because of weakness and prerenal azotemia on low blood pressure   Continue on Toprol-XL 12.5 mg daily      "

## 2025-03-10 NOTE — ASSESSMENT & PLAN NOTE
No further episodes of angina noted continue on risk factor modification and present antianginal therapy

## 2025-03-10 NOTE — ASSESSMENT & PLAN NOTE
Patient has a acute kidney injury recommend to cut back on Lasix to 20 mg every other day and losartan has been on hold.

## 2025-03-19 ENCOUNTER — TELEPHONE (OUTPATIENT)
Dept: CARDIOLOGY | Facility: CLINIC | Age: 89
End: 2025-03-19
Payer: MEDICARE

## 2025-03-19 NOTE — TELEPHONE ENCOUNTER
----- Message from Raymon sent at 3/19/2025  2:25 PM CDT -----  Regarding: advice  Type:  Needs Medical AdviceWho Called: Marium Call Back Number: [phone] Information: pt st that she wants a callback to discuss some concerns she's having with her life vest.  please call to discuss.

## 2025-03-20 NOTE — TELEPHONE ENCOUNTER
Spoke with Dr. Ware he said to go down on Amiodarone 200 mg daily and hold metoprolol.   Spoke with pt she stated that her blood sugar has been running low and she will see  at the end of April.

## 2025-04-09 ENCOUNTER — DOCUMENT SCAN (OUTPATIENT)
Dept: HOME HEALTH SERVICES | Facility: HOSPITAL | Age: 89
End: 2025-04-09
Payer: MEDICARE

## 2025-04-17 DIAGNOSIS — Z12.31 ENCOUNTER FOR SCREENING MAMMOGRAM FOR MALIGNANT NEOPLASM OF BREAST: Primary | ICD-10-CM

## 2025-04-29 ENCOUNTER — TELEPHONE (OUTPATIENT)
Dept: CARDIOLOGY | Facility: CLINIC | Age: 89
End: 2025-04-29
Payer: MEDICARE

## 2025-04-29 NOTE — TELEPHONE ENCOUNTER
Spoke with patient and informed patient of what Dr. Marroquin said and patient said she sees Dr. Ware tomorrow will discuss with him but if she feels ok she may take the Life vest off for a little bit and give herself a break. Advised patient if she does and starts feeling bad to put it back on and she understood.

## 2025-04-30 ENCOUNTER — OFFICE VISIT (OUTPATIENT)
Dept: CARDIOLOGY | Facility: CLINIC | Age: 89
End: 2025-04-30
Payer: MEDICARE

## 2025-04-30 VITALS
HEART RATE: 76 BPM | DIASTOLIC BLOOD PRESSURE: 65 MMHG | OXYGEN SATURATION: 98 % | BODY MASS INDEX: 26.87 KG/M2 | WEIGHT: 137.63 LBS | SYSTOLIC BLOOD PRESSURE: 126 MMHG

## 2025-04-30 DIAGNOSIS — I10 ESSENTIAL HYPERTENSION: ICD-10-CM

## 2025-04-30 DIAGNOSIS — I25.10 CORONARY ARTERY DISEASE INVOLVING NATIVE CORONARY ARTERY OF NATIVE HEART WITHOUT ANGINA PECTORIS: Primary | ICD-10-CM

## 2025-04-30 DIAGNOSIS — Z95.0 PACEMAKER: Chronic | ICD-10-CM

## 2025-04-30 DIAGNOSIS — I48.0 PAF (PAROXYSMAL ATRIAL FIBRILLATION): Chronic | ICD-10-CM

## 2025-04-30 DIAGNOSIS — E03.9 ACQUIRED HYPOTHYROIDISM: ICD-10-CM

## 2025-04-30 DIAGNOSIS — I50.42 CHRONIC COMBINED SYSTOLIC AND DIASTOLIC CONGESTIVE HEART FAILURE: ICD-10-CM

## 2025-04-30 PROCEDURE — 99999 PR PBB SHADOW E&M-EST. PATIENT-LVL III: CPT | Mod: PBBFAC,,, | Performed by: INTERNAL MEDICINE

## 2025-04-30 PROCEDURE — 99215 OFFICE O/P EST HI 40 MIN: CPT | Mod: S$PBB,,, | Performed by: INTERNAL MEDICINE

## 2025-04-30 PROCEDURE — 99213 OFFICE O/P EST LOW 20 MIN: CPT | Mod: PBBFAC,PN | Performed by: INTERNAL MEDICINE

## 2025-04-30 NOTE — PROGRESS NOTES
Subjective:    Patient ID:  Karma Chen is a 89 y.o. female patient here for evaluation Follow-up      History of Present Illness:   Is 89-year-old with history of ischemic cardiomyopathy previous coronary artery bypass surgery has external defibrillator LifeVest in Situ she is getting tired of varying this.  She has a part with electrophysiologist in his unfortunately got moved back to May 20th discuss further therapeutic interventions.    This has not sure as to what to have at this time she has a indwelling pacemaker in Situ and she will need an upgrade to at least ICD if she wishes to have life-saving measures.  No anginal symptoms are noted no arm neck or jaw pain no edema in the lower extremities.        Review of patient's allergies indicates:   Allergen Reactions    Diclofenac-misoprostol      Other reaction(s): Not available    Doxycycline     Effexor [venlafaxine]     Estradiol     Flagyl [metronidazole]     Fluconazole     Gabapentin     Iodine     Levaquin [levofloxacin]     Nexium [esomeprazole magnesium]     Nexletol [bempedoic acid] Other (See Comments)    Penicillins     Red yeast rice (monascus purpureus)     Shellfish containing products     Statins-hmg-coa reductase inhibitors     Sulfa (sulfonamide antibiotics)     Tea tree oil     Tegaserod      ZOLNORM    Tegaserod hydrogen maleate     Trazodone     Yeast, dried     Adhesive Rash     Band-aids       Past Medical History:   Diagnosis Date    Coronary artery disease     Dermatitis     Dermatitis 12/8/2017    Diabetes mellitus     Diabetes mellitus type II     Leah Barr virus infection     Fibromyalgia     GERD (gastroesophageal reflux disease)     Hypertension     MI (myocardial infarction) 09/23/2011    Pure hypercholesterolemia 2/26/2020     Past Surgical History:   Procedure Laterality Date    adenoids      ANGIOPLASTY  1987    APPENDECTOMY      CARDIAC PACEMAKER PLACEMENT  01/12/2017    CATARACT EXTRACTION, BILATERAL Bilateral 9/2/15,  3/17/15    right eye-9/2/15, left eye-3/17/15    CHOLECYSTECTOMY  1987    CORONARY ARTERY BYPASS GRAFT  2006    quadruple    coronary stents  1999    x2    CYST REMOVAL  04/25/2017    on back    HYSTERECTOMY  1966    OVARY SURGERY  1948    Ovary burst & was repaired    RHIZOTOMY  09/14/2018    TONSILLECTOMY  1940     Social History[1]     Review of Systems:    As noted in HPI in addition      REVIEW OF SYSTEMS  CARDIOVASCULAR: No recent chest pain, palpitations, arm, neck, or jaw pain  RESPIRATORY: No recent fever, cough chills, SOB or congestion  : No blood in the urine  GI: No Nausea, vomiting, constipation, diarrhea, blood, or reflux.  MUSCULOSKELETAL: No myalgias  NEURO: No lightheadedness or dizziness  EYES: No Double vision, blurry, vision or headache   So far no significant discharges from the LifeVest- defibrillator noted.           Objective        Vitals:    04/30/25 1336   BP: 126/65   Pulse: 76       LIPIDS - LAST 2   Lab Results   Component Value Date    CHOL 193 04/03/2024    CHOL 229 (H) 08/23/2023    HDL 42 04/03/2024    HDL 38 (L) 08/23/2023    LDLCALC 110.0 04/03/2024    LDLCALC 124.0 08/23/2023    TRIG 205 (H) 04/03/2024    TRIG 335 (H) 08/23/2023    CHOLHDL 21.8 04/03/2024    CHOLHDL 16.6 (L) 08/23/2023       CBC - LAST 2  Lab Results   Component Value Date    WBC 6.52 01/27/2025    WBC 9.22 01/26/2025    WBC 9.22 01/26/2025    RBC 4.30 01/27/2025    RBC 4.31 01/26/2025    RBC 4.31 01/26/2025    HGB 12.2 01/27/2025    HGB 12.2 01/26/2025    HGB 12.2 01/26/2025    HCT 37.3 01/27/2025    HCT 37.6 01/26/2025    HCT 37.6 01/26/2025    MCV 87 01/27/2025    MCV 87 01/26/2025    MCV 87 01/26/2025    MCH 28.4 01/27/2025    MCH 28.3 01/26/2025    MCH 28.3 01/26/2025    MCHC 32.7 01/27/2025    MCHC 32.4 01/26/2025    MCHC 32.4 01/26/2025    RDW 13.4 01/27/2025    RDW 13.5 01/26/2025    RDW 13.5 01/26/2025     01/27/2025     01/26/2025     01/26/2025    MPV 10.0 01/27/2025    MPV 10.2  01/26/2025    MPV 10.2 01/26/2025    GRAN 3.3 01/27/2025    GRAN 49.8 01/27/2025    LYMPH 2.1 01/27/2025    LYMPH 31.4 01/27/2025    MONO 0.9 01/27/2025    MONO 13.7 01/27/2025    BASO 0.04 01/27/2025    BASO 0.06 01/26/2025    BASO 0.06 01/26/2025    NRBC 0 01/27/2025    NRBC 0 01/26/2025    NRBC 0 01/26/2025       CHEMISTRY & LIVER FUNCTION - LAST 2  Lab Results   Component Value Date     03/03/2025     01/27/2025    K 3.8 03/03/2025    K 3.9 01/27/2025    CL 96 03/03/2025     01/27/2025    CO2 30 (H) 03/03/2025    CO2 27 01/27/2025    ANIONGAP 13 03/03/2025    ANIONGAP 8 01/27/2025    BUN 57 (H) 03/03/2025    BUN 28 (H) 01/27/2025    CREATININE 2.1 (H) 03/03/2025    CREATININE 1.4 01/27/2025    GLU 99 03/03/2025    GLU 85 01/27/2025    CALCIUM 9.1 03/03/2025    CALCIUM 9.0 01/27/2025    PH 7.408 12/18/2024    MG 1.8 01/27/2025    MG 1.9 01/26/2025    ALBUMIN 4.4 03/03/2025    ALBUMIN 3.5 01/27/2025    PROT 7.9 03/03/2025    PROT 6.3 01/27/2025    ALKPHOS 69 03/03/2025    ALKPHOS 54 (L) 01/27/2025    ALT 16 03/03/2025    ALT 12 01/27/2025    AST 20 03/03/2025    AST 19 01/27/2025    BILITOT 0.7 03/03/2025    BILITOT 0.5 01/27/2025        CARDIAC PROFILE - LAST 2  Lab Results   Component Value Date     (H) 01/24/2025     (H) 12/18/2024    CPK 53 10/22/2021    CPKMB 1.57 02/20/2012    TROPONINI <0.030 10/22/2021    TROPONINI 0.077 (HH) 07/25/2020    TROPONINIHS 692.9 (HH) 01/25/2025    TROPONINIHS 885.8 () 01/25/2025        COAGULATION - LAST 2  Lab Results   Component Value Date    LABPT 13.9 (H) 10/22/2021    LABPT 13.6 05/02/2020    INR 1.1 01/25/2025    INR 1.0 12/19/2024    APTT 49.5 (H) 01/26/2025    APTT 45.3 (H) 01/26/2025       ENDOCRINE & PSA - LAST 2  Lab Results   Component Value Date    HGBA1C 5.9 12/19/2024    HGBA1C 6.0 05/28/2024    TSH 3.817 12/19/2024    TSH 0.960 03/08/2023        ECHOCARDIOGRAM RESULTS  Results for orders placed during the hospital encounter of  01/24/25    Echo    Interpretation Summary    Limited echo to evaluate left ventricular systolic function.    Left Ventricle: The left ventricle is normal in size. Moderate global hypokinesis present. There is moderately reduced systolic function with a visually estimated ejection fraction of 30 - 35%.      CURRENT/PREVIOUS VISIT EKG  Results for orders placed or performed during the hospital encounter of 01/24/25   EKG 12-lead    Collection Time: 01/25/25 10:24 AM   Result Value Ref Range    QRS Duration 140 ms    OHS QTC Calculation 537 ms    Narrative    Test Reason : R07.9,    Vent. Rate : 119 BPM     Atrial Rate :    BPM     P-R Int :    ms          QRS Dur : 140 ms      QT Int : 382 ms       P-R-T Axes :    -34 146 degrees    QTcB Int : 537 ms    Wide QRS rhythm  Left axis deviation  Left bundle branch block  Abnormal ECG  No previous ECGs available  Confirmed by Kenton Ware ([id number]) on 1/31/2025 2:41:25 PM    Referred By: AAAREFERRAL SELF           Confirmed By: Kenton Ware     No valid procedures specified.   Results for orders placed during the hospital encounter of 12/10/24    Nuclear Stress Test    Interpretation Summary    The study's ECG is uninterpretable due to left bundle branch block.    The patient reported no chest pain during the stress test.    There were no arrhythmias during stress.    The nuclear portion of this study will be reported separately.    No valid procedures specified.    PHYSICAL EXAM  CONSTITUTIONAL: Well built, well nourished in no apparent distress  NECK: no carotid bruit, no JVD  LUNGS: CTA  CHEST WALL: no tenderness  HEART: regular rate and rhythm, S1, S2 normal, soft systolic murmur   ABDOMEN: soft, non-tender; bowel sounds normal; no masses,  no organomegaly  EXTREMITIES: Extremities normal, no edema, no calf tenderness noted  NEURO: AAO X 3    I HAVE REVIEWED :    The vital signs, nurses notes, and all the pertinent radiology and labs.        Current Outpatient  Medications   Medication Instructions    acetaminophen (TYLENOL) 650 mg, Every 6 hours PRN    ALPRAZolam (XANAX) 0.25 MG tablet TAKE 1 TABLET BY MOUTH EVERY DAY AS NEEDED FOR ANXIETY    amiodarone (PACERONE) 200 mg, Oral, 2 times daily    ascorbic acid (vitamin C) (VITAMIN C) 1,000 mg, Daily    aspirin (ECOTRIN) 81 mg, Oral, Daily    benzonatate (TESSALON) 100 mg, 3 times daily PRN    bisacodyL (DULCOLAX) 5 mg, 2 times daily    coenzyme Q10 100 mg, Oral, 2 times daily    diclofenac sodium (VOLTAREN ARTHRITIS PAIN) 2 g, Daily PRN    ECHINACEA ORAL 750 mg, 2 times daily    ELIQUIS 2.5 mg, Oral, 2 times daily    furosemide (LASIX) 20 mg, Oral, Daily    levothyroxine (SYNTHROID) 88 mcg, Oral, Daily    LIDOcaine (LIDODERM) 5 % 1 patch, Transdermal, Daily, Remove & Discard patch within 12 hours or as directed by MD    loratadine (CLARITIN) 10 mg, Daily    MAGNESIUM ORAL 1,000 mg, Daily    metoprolol succinate (TOPROL-XL) 12.5 mg, Oral, Daily    montelukast (SINGULAIR) 10 mg tablet TAKE 1 TABLET BY MOUTH EVERY DAY IN THE EVENING    multivitamin (THERAGRAN) tablet 1 tablet, 2 times daily    naloxone (NARCAN) 4 mg/actuation Spry 1 spray, Daily PRN    nitroGLYCERIN (NITROSTAT) 0.4 mg, Sublingual, Every 5 min PRN    omeprazole (PRILOSEC) 20 mg, Oral, Daily    tiZANidine (ZANAFLEX) 4 mg, Nightly PRN    traMADoL (ULTRAM) 50 mg, 2 times daily PRN    TURMERIC ORAL 200 mg, Daily    UNABLE TO FIND 1 capsule, Daily    UNABLE TO FIND 1 capsule, 2 times daily          Assessment & Plan     1. Coronary artery disease involving native coronary artery of native heart without angina pectoris  Assessment & Plan:  No active anginal episodes are noted.  Continue on present therapy      2. Chronic combined systolic and diastolic congestive heart failure  Assessment & Plan:  Patient has Combined Systolic and Diastolic heart failure that is Chronic. On presentation their CHF was well compensated. Most recent BNP and echo results are listed  "below.  No results for input(s): "BNP" in the last 72 hours.  Latest ECHO  Results for orders placed during the hospital encounter of 01/24/25    Echo    Interpretation Summary    Limited echo to evaluate left ventricular systolic function.    Left Ventricle: The left ventricle is normal in size. Moderate global hypokinesis present. There is moderately reduced systolic function with a visually estimated ejection fraction of 30 - 35%.    Current Heart Failure Medications       Plan  - Monitor strict I&Os and daily weights.    - Place on telemetry  - Low sodium diet  - Place on fluid restriction of 1.5 L.   - Cardiology has been consulted  - The patient's volume status is at their baseline  - fairly stable on the present regimen to include Lasix 20 mg daily,   Patient has stopped taking Jardiance and losartan because of side effect profile.  She has numerous medications she has been intolerant to she is however on Toprol-XL 12.5 mg daily        3. Essential hypertension  Assessment & Plan:  Blood pressure has been soft and currently at 126/65 not able to tolerate any afterload reducing agents.  Continue on present therapy      4. PAF (paroxysmal atrial fibrillation)  Assessment & Plan:  Has paroxysmal atrial fibrillation clinically stable.      5. Pacemaker in place  Assessment & Plan:  She has a functioning pacemaker in Situ and recommended her to consider having an upgrade to ICD function.  Recommend EP evaluation      6. Acquired hypothyroidism  Assessment & Plan:  Continue on levothyroxine 88 mcg        I had a lengthy discussion with the patient.  She has started of having this has all LifeVest monitored externally carrying it around and she wants to get through this procedure at the earliest and has several plans in his lives she wants to attend to in the next several weeks.  I have explained the risks and benefits and she is anxious to have this upgrade to ICD done at the earliest    Follow up in about 3 months " (around 7/30/2025).            [1]   Social History  Tobacco Use    Smoking status: Never    Smokeless tobacco: Never   Substance Use Topics    Alcohol use: No    Drug use: No

## 2025-04-30 NOTE — ASSESSMENT & PLAN NOTE
Blood pressure has been soft and currently at 126/65 not able to tolerate any afterload reducing agents.  Continue on present therapy

## 2025-04-30 NOTE — ASSESSMENT & PLAN NOTE
"Patient has Combined Systolic and Diastolic heart failure that is Chronic. On presentation their CHF was well compensated. Most recent BNP and echo results are listed below.  No results for input(s): "BNP" in the last 72 hours.  Latest ECHO  Results for orders placed during the hospital encounter of 01/24/25    Echo    Interpretation Summary    Limited echo to evaluate left ventricular systolic function.    Left Ventricle: The left ventricle is normal in size. Moderate global hypokinesis present. There is moderately reduced systolic function with a visually estimated ejection fraction of 30 - 35%.    Current Heart Failure Medications       Plan  - Monitor strict I&Os and daily weights.    - Place on telemetry  - Low sodium diet  - Place on fluid restriction of 1.5 L.   - Cardiology has been consulted  - The patient's volume status is at their baseline  - fairly stable on the present regimen to include Lasix 20 mg daily,   Patient has stopped taking Jardiance and losartan because of side effect profile.  She has numerous medications she has been intolerant to she is however on Toprol-XL 12.5 mg daily    "

## 2025-04-30 NOTE — ASSESSMENT & PLAN NOTE
She has a functioning pacemaker in Situ and recommended her to consider having an upgrade to ICD function.  Recommend EP evaluation

## 2025-05-01 ENCOUNTER — TELEPHONE (OUTPATIENT)
Dept: CARDIOLOGY | Facility: CLINIC | Age: 89
End: 2025-05-01
Payer: MEDICARE

## 2025-05-01 DIAGNOSIS — Z95.0 PACEMAKER: Chronic | ICD-10-CM

## 2025-05-01 DIAGNOSIS — I48.0 PAF (PAROXYSMAL ATRIAL FIBRILLATION): Primary | Chronic | ICD-10-CM

## 2025-05-01 NOTE — TELEPHONE ENCOUNTER
----- Message from Sara sent at 5/1/2025 10:11 AM CDT -----  Regarding: advice  Contact: Jesika with Dr. Reyes  Type:  Needs Medical AdviceWho Called: Jesika with Dr. Painter (please be specific):  How long has patient had these symptoms:  Pharmacy name and phone #:  Would the patient rather a call back or a response via [facility]? callBest Call Back Number: [phone]  fax [phone] Information: Jesika states she doesn't have any patient demographics or office notes for patient.  Please call Jesika to advise.  Thanks!

## 2025-05-02 NOTE — TELEPHONE ENCOUNTER
----- Message from Petra sent at 5/2/2025 11:20 AM CDT -----  Contact: patient  Type:  Needs Medical AdviceWho Called: patientWould the patient rather a call back or a response via [facility]? callBest Call Back Number: [phone] Additional Information: patient states she wants to speak to someone about a referral that was supposed to be placed on Wednesday 4/30 to Dr Chandler Reyes fax to [phone]. She is very anxious to have her pacemaker upgraded as previously discussed. Please call

## 2025-05-05 NOTE — TELEPHONE ENCOUNTER
Sent information to Dr. Reyes office and spoke with Ghazal at our office as well because original referral sent to [facility] office.

## 2025-05-06 ENCOUNTER — TELEPHONE (OUTPATIENT)
Dept: CARDIOLOGY | Facility: CLINIC | Age: 89
End: 2025-05-06
Payer: MEDICARE

## 2025-05-06 DIAGNOSIS — I50.20 SYSTOLIC CONGESTIVE HEART FAILURE, UNSPECIFIED HF CHRONICITY: ICD-10-CM

## 2025-05-06 DIAGNOSIS — I48.0 PAF (PAROXYSMAL ATRIAL FIBRILLATION): Chronic | ICD-10-CM

## 2025-05-06 DIAGNOSIS — Z95.0 PACEMAKER: Primary | ICD-10-CM

## 2025-05-08 ENCOUNTER — TELEPHONE (OUTPATIENT)
Dept: CARDIOLOGY | Facility: CLINIC | Age: 89
End: 2025-05-08
Payer: MEDICARE

## 2025-05-08 ENCOUNTER — HOSPITAL ENCOUNTER (OUTPATIENT)
Dept: CARDIOLOGY | Facility: HOSPITAL | Age: 89
Discharge: HOME | End: 2025-05-08
Attending: INTERNAL MEDICINE
Payer: MEDICARE

## 2025-05-08 VITALS — WEIGHT: 137.56 LBS | BODY MASS INDEX: 27.01 KG/M2 | HEIGHT: 60 IN

## 2025-05-08 DIAGNOSIS — I50.20 SYSTOLIC CONGESTIVE HEART FAILURE, UNSPECIFIED HF CHRONICITY: ICD-10-CM

## 2025-05-08 DIAGNOSIS — I48.0 PAF (PAROXYSMAL ATRIAL FIBRILLATION): Chronic | ICD-10-CM

## 2025-05-08 DIAGNOSIS — Z95.0 PACEMAKER: ICD-10-CM

## 2025-05-08 LAB
AORTIC ROOT ANNULUS: 2.9 CM
AORTIC VALVE CUSP SEPERATION: 1.3 CM
APICAL FOUR CHAMBER EJECTION FRACTION: 37 %
APICAL TWO CHAMBER EJECTION FRACTION: 39 %
AV INDEX (PROSTH): 0.5
AV MEAN GRADIENT: 4 MMHG
AV PEAK GRADIENT: 7 MMHG
AV REGURGITATION PRESSURE HALF TIME: 324 MS
AV VALVE AREA BY VELOCITY RATIO: 1.7 CM²
AV VALVE AREA: 1.6 CM²
AV VELOCITY RATIO: 0.54
BSA FOR ECHO PROCEDURE: 1.63 M2
CV ECHO LV RWT: 0.42 CM
DOP CALC AO PEAK VEL: 1.3 M/S
DOP CALC AO VTI: 30.7 CM
DOP CALC LVOT AREA: 3.1 CM2
DOP CALC LVOT DIAMETER: 2 CM
DOP CALC LVOT PEAK VEL: 0.7 M/S
DOP CALC LVOT STROKE VOLUME: 48.4 CM3
DOP CALC MV VTI: 24.6 CM
DOP CALCLVOT PEAK VEL VTI: 15.4 CM
E WAVE DECELERATION TIME: 169 MSEC
E/A RATIO: 1.7
E/E' RATIO: 17 M/S
ECHO LV POSTERIOR WALL: 1 CM (ref 0.6–1.1)
FRACTIONAL SHORTENING: 14.6 % (ref 28–44)
INTERVENTRICULAR SEPTUM: 1 CM (ref 0.6–1.1)
IVC DIAMETER: 1.3 CM
IVRT: 70 MSEC
LEFT ATRIUM AREA SYSTOLIC (APICAL 2 CHAMBER): 19.5 CM2
LEFT ATRIUM AREA SYSTOLIC (APICAL 4 CHAMBER): 24.8 CM2
LEFT ATRIUM SIZE: 4.4 CM
LEFT ATRIUM VOLUME INDEX MOD: 44 ML/M2
LEFT ATRIUM VOLUME MOD: 70 ML
LEFT INTERNAL DIMENSION IN SYSTOLE: 4.1 CM (ref 2.1–4)
LEFT VENTRICLE DIASTOLIC VOLUME INDEX: 67.92 ML/M2
LEFT VENTRICLE DIASTOLIC VOLUME: 108 ML
LEFT VENTRICLE END DIASTOLIC VOLUME APICAL 2 CHAMBER: 73.1 ML
LEFT VENTRICLE END DIASTOLIC VOLUME APICAL 4 CHAMBER: 114 ML
LEFT VENTRICLE END SYSTOLIC VOLUME APICAL 2 CHAMBER: 55.8 ML
LEFT VENTRICLE END SYSTOLIC VOLUME APICAL 4 CHAMBER: 89.7 ML
LEFT VENTRICLE MASS INDEX: 107 G/M2
LEFT VENTRICLE SYSTOLIC VOLUME INDEX: 46.5 ML/M2
LEFT VENTRICLE SYSTOLIC VOLUME: 74 ML
LEFT VENTRICULAR INTERNAL DIMENSION IN DIASTOLE: 4.8 CM (ref 3.5–6)
LEFT VENTRICULAR MASS: 170.2 G
LV LATERAL E/E' RATIO: 13.1 M/S
LV SEPTAL E/E' RATIO: 23 M/S
LVED V (TEICH): 108 ML
LVES V (TEICH): 74.2 ML
LVOT MG: 1 MMHG
LVOT MV: 0.46 CM/S
MV MEAN GRADIENT: 2 MMHG
MV PEAK A VEL: 0.54 M/S
MV PEAK E VEL: 0.92 M/S
MV PEAK GRADIENT: 4 MMHG
MV VALVE AREA BY CONTINUITY EQUATION: 1.97 CM2
OHS CV RV/LV RATIO: 0.46 CM
OHS LV EJECTION FRACTION SIMPSONS BIPLANE MOD: 38 %
PISA AR MAX VEL: 3.78 M/S
PISA TR MAX VEL: 3 M/S
PV MV: 0.44 M/S
PV PEAK GRADIENT: 2 MMHG
PV PEAK VELOCITY: 0.66 M/S
RIGHT VENTRICLE DIASTOLIC BASEL DIMENSION: 2.2 CM
RIGHT VENTRICULAR END-DIASTOLIC DIMENSION: 2.2 CM
RV TISSUE DOPPLER FREE WALL SYSTOLIC VELOCITY 1 (APICAL 4 CHAMBER VIEW): 8.27 CM/S
TDI LATERAL: 0.07 M/S
TDI SEPTAL: 0.04 M/S
TDI: 0.06 M/S
TR MAX PG: 35 MMHG
TRICUSPID ANNULAR PLANE SYSTOLIC EXCURSION: 1.8 CM
Z-SCORE OF LEFT VENTRICULAR DIMENSION IN END DIASTOLE: 0.56
Z-SCORE OF LEFT VENTRICULAR DIMENSION IN END SYSTOLE: 2.97

## 2025-05-08 PROCEDURE — 93306 TTE W/DOPPLER COMPLETE: CPT

## 2025-05-08 NOTE — TELEPHONE ENCOUNTER
----- Message from Stephanie sent at 5/8/2025  4:09 PM CDT -----  Regarding: Needs Medical Advice  Type: Needs Medical AdviceWho Called:  Patient at [phone] Information:  Pt is very confused and would like to speak with a nurse. Please call and advise. Pt would like a copy of test that she took today. Thank you.

## 2025-05-09 NOTE — TELEPHONE ENCOUNTER
Dr Ware referred a Dr in [place] they called yesterday and she can see them Monday had an ultra sound yesterday and wants results faxed to Dr alphonso pulido fax [phone]

## 2025-05-16 ENCOUNTER — TELEPHONE (OUTPATIENT)
Dept: CARDIOLOGY | Facility: CLINIC | Age: 89
End: 2025-05-16

## 2025-06-04 ENCOUNTER — HOSPITAL ENCOUNTER (OUTPATIENT)
Dept: CARDIOLOGY | Facility: CLINIC | Age: 89
Discharge: HOME | End: 2025-06-04
Attending: INTERNAL MEDICINE
Payer: MEDICARE

## 2025-06-04 ENCOUNTER — LAB VISIT (OUTPATIENT)
Dept: LAB | Facility: HOSPITAL | Age: 89
End: 2025-06-04
Attending: INTERNAL MEDICINE
Payer: MEDICARE

## 2025-06-04 ENCOUNTER — CLINICAL SUPPORT (OUTPATIENT)
Dept: CARDIOLOGY | Facility: CLINIC | Age: 89
End: 2025-06-04

## 2025-06-04 DIAGNOSIS — R53.1 ASTHENIA: ICD-10-CM

## 2025-06-04 DIAGNOSIS — R00.1 BRADYCARDIA, UNSPECIFIED: ICD-10-CM

## 2025-06-04 DIAGNOSIS — Z95.0 PRESENCE OF CARDIAC PACEMAKER: ICD-10-CM

## 2025-06-04 DIAGNOSIS — R23.1 PALLOR: Primary | ICD-10-CM

## 2025-06-04 LAB
ABSOLUTE EOSINOPHIL (SMH): 0.28 K/UL
ABSOLUTE MONOCYTE (SMH): 1.07 K/UL (ref 0.3–1)
ABSOLUTE NEUTROPHIL COUNT (SMH): 5.5 K/UL (ref 1.8–7.7)
ANION GAP (SMH): 10 MMOL/L (ref 8–16)
BASOPHILS # BLD AUTO: 0.07 K/UL
BASOPHILS NFR BLD AUTO: 0.7 %
BUN SERPL-MCNC: 25 MG/DL (ref 8–23)
CALCIUM SERPL-MCNC: 8.8 MG/DL (ref 8.7–10.5)
CHLORIDE SERPL-SCNC: 104 MMOL/L (ref 95–110)
CO2 SERPL-SCNC: 24 MMOL/L (ref 23–29)
CREAT SERPL-MCNC: 1.3 MG/DL (ref 0.5–1.4)
ERYTHROCYTE [DISTWIDTH] IN BLOOD BY AUTOMATED COUNT: 13.4 % (ref 11.5–14.5)
GFR SERPLBLD CREATININE-BSD FMLA CKD-EPI: 40 ML/MIN/1.73/M2
GLUCOSE SERPL-MCNC: 115 MG/DL (ref 70–110)
HCT VFR BLD AUTO: 36.3 % (ref 37–48.5)
HGB BLD-MCNC: 11.5 GM/DL (ref 12–16)
IMM GRANULOCYTES # BLD AUTO: 0.06 K/UL (ref 0–0.04)
IMM GRANULOCYTES NFR BLD AUTO: 0.6 % (ref 0–0.5)
LYMPHOCYTES # BLD AUTO: 2.66 K/UL (ref 1–4.8)
MCH RBC QN AUTO: 29.3 PG (ref 27–31)
MCHC RBC AUTO-ENTMCNC: 31.7 G/DL (ref 32–36)
MCV RBC AUTO: 92 FL (ref 82–98)
NUCLEATED RBC (/100WBC) (SMH): 0 /100 WBC
PLATELET # BLD AUTO: 282 K/UL (ref 150–450)
PMV BLD AUTO: 9.9 FL (ref 9.2–12.9)
POTASSIUM SERPL-SCNC: 3.9 MMOL/L (ref 3.5–5.1)
RBC # BLD AUTO: 3.93 M/UL (ref 4–5.4)
RELATIVE EOSINOPHIL (SMH): 2.9 % (ref 0–8)
RELATIVE LYMPHOCYTE (SMH): 27.5 % (ref 18–48)
RELATIVE MONOCYTE (SMH): 11.1 % (ref 4–15)
RELATIVE NEUTROPHIL (SMH): 57.2 % (ref 38–73)
SODIUM SERPL-SCNC: 138 MMOL/L (ref 136–145)
WBC # BLD AUTO: 9.66 K/UL (ref 3.9–12.7)

## 2025-06-04 PROCEDURE — 36415 COLL VENOUS BLD VENIPUNCTURE: CPT | Mod: PO

## 2025-06-04 PROCEDURE — 93295 DEV INTERROG REMOTE 1/2/MLT: CPT | Mod: ,,, | Performed by: INTERNAL MEDICINE

## 2025-06-04 PROCEDURE — 85025 COMPLETE CBC W/AUTO DIFF WBC: CPT

## 2025-06-04 PROCEDURE — 80048 BASIC METABOLIC PNL TOTAL CA: CPT

## 2025-06-07 ENCOUNTER — HOSPITAL ENCOUNTER (EMERGENCY)
Facility: HOSPITAL | Age: 89
Discharge: HOME | End: 2025-06-07
Attending: STUDENT IN AN ORGANIZED HEALTH CARE EDUCATION/TRAINING PROGRAM
Payer: MEDICARE

## 2025-06-07 VITALS
RESPIRATION RATE: 16 BRPM | WEIGHT: 137 LBS | HEART RATE: 70 BPM | HEIGHT: 60 IN | DIASTOLIC BLOOD PRESSURE: 74 MMHG | BODY MASS INDEX: 26.9 KG/M2 | TEMPERATURE: 98 F | OXYGEN SATURATION: 98 % | SYSTOLIC BLOOD PRESSURE: 138 MMHG

## 2025-06-07 DIAGNOSIS — R53.1 GENERALIZED WEAKNESS: Primary | ICD-10-CM

## 2025-06-07 DIAGNOSIS — R53.1 WEAKNESS: ICD-10-CM

## 2025-06-07 DIAGNOSIS — R79.89 ABNORMAL BUN-TO-CREATININE RATIO: ICD-10-CM

## 2025-06-07 DIAGNOSIS — Z79.01 ANTICOAGULATED: ICD-10-CM

## 2025-06-07 LAB
ABSOLUTE EOSINOPHIL (SMH): 0.17 K/UL
ABSOLUTE MONOCYTE (SMH): 0.99 K/UL (ref 0.3–1)
ABSOLUTE NEUTROPHIL COUNT (SMH): 4.3 K/UL (ref 1.8–7.7)
ALBUMIN SERPL-MCNC: 4 G/DL (ref 3.5–5.2)
ALP SERPL-CCNC: 73 UNIT/L (ref 55–135)
ALT SERPL-CCNC: 13 UNIT/L (ref 10–44)
ANION GAP (SMH): 10 MMOL/L (ref 8–16)
AST SERPL-CCNC: 23 UNIT/L (ref 10–40)
BASOPHILS # BLD AUTO: 0.07 K/UL
BASOPHILS NFR BLD AUTO: 0.9 %
BILIRUB SERPL-MCNC: 0.4 MG/DL (ref 0.1–1)
BNP SERPL-MCNC: 839 PG/ML
BUN SERPL-MCNC: 26 MG/DL (ref 8–23)
CALCIUM SERPL-MCNC: 9 MG/DL (ref 8.7–10.5)
CHLORIDE SERPL-SCNC: 103 MMOL/L (ref 95–110)
CO2 SERPL-SCNC: 26 MMOL/L (ref 23–29)
CREAT SERPL-MCNC: 1.4 MG/DL (ref 0.5–1.4)
ERYTHROCYTE [DISTWIDTH] IN BLOOD BY AUTOMATED COUNT: 13.4 % (ref 11.5–14.5)
GFR SERPLBLD CREATININE-BSD FMLA CKD-EPI: 36 ML/MIN/1.73/M2
GLUCOSE SERPL-MCNC: 113 MG/DL (ref 70–110)
HCT VFR BLD AUTO: 37.2 % (ref 37–48.5)
HGB BLD-MCNC: 11.8 GM/DL (ref 12–16)
IMM GRANULOCYTES # BLD AUTO: 0.03 K/UL (ref 0–0.04)
IMM GRANULOCYTES NFR BLD AUTO: 0.4 % (ref 0–0.5)
LYMPHOCYTES # BLD AUTO: 2.2 K/UL (ref 1–4.8)
MAGNESIUM SERPL-MCNC: 2.5 MG/DL (ref 1.6–2.6)
MCH RBC QN AUTO: 29 PG (ref 27–31)
MCHC RBC AUTO-ENTMCNC: 31.7 G/DL (ref 32–36)
MCV RBC AUTO: 91 FL (ref 82–98)
NUCLEATED RBC (/100WBC) (SMH): 0 /100 WBC
PLATELET # BLD AUTO: 272 K/UL (ref 150–450)
PMV BLD AUTO: 9.7 FL (ref 9.2–12.9)
POTASSIUM SERPL-SCNC: 3.9 MMOL/L (ref 3.5–5.1)
PROT SERPL-MCNC: 7.2 GM/DL (ref 6–8.4)
RBC # BLD AUTO: 4.07 M/UL (ref 4–5.4)
RELATIVE EOSINOPHIL (SMH): 2.2 % (ref 0–8)
RELATIVE LYMPHOCYTE (SMH): 28.3 % (ref 18–48)
RELATIVE MONOCYTE (SMH): 12.7 % (ref 4–15)
RELATIVE NEUTROPHIL (SMH): 55.5 % (ref 38–73)
SODIUM SERPL-SCNC: 139 MMOL/L (ref 136–145)
TROPONIN HIGH SENSITIVE (SMH): 18 PG/ML
WBC # BLD AUTO: 7.78 K/UL (ref 3.9–12.7)

## 2025-06-07 PROCEDURE — 93005 ELECTROCARDIOGRAM TRACING: CPT | Performed by: GENERAL PRACTICE

## 2025-06-07 PROCEDURE — 99285 EMERGENCY DEPT VISIT HI MDM: CPT | Mod: 25

## 2025-06-07 PROCEDURE — 80053 COMPREHEN METABOLIC PANEL: CPT | Performed by: PHYSICIAN ASSISTANT

## 2025-06-07 PROCEDURE — 83880 ASSAY OF NATRIURETIC PEPTIDE: CPT | Performed by: PHYSICIAN ASSISTANT

## 2025-06-07 PROCEDURE — 83735 ASSAY OF MAGNESIUM: CPT | Performed by: PHYSICIAN ASSISTANT

## 2025-06-07 PROCEDURE — 93010 ELECTROCARDIOGRAM REPORT: CPT | Mod: ,,, | Performed by: GENERAL PRACTICE

## 2025-06-07 PROCEDURE — 85025 COMPLETE CBC W/AUTO DIFF WBC: CPT | Performed by: PHYSICIAN ASSISTANT

## 2025-06-07 PROCEDURE — 84484 ASSAY OF TROPONIN QUANT: CPT | Performed by: PHYSICIAN ASSISTANT

## 2025-06-07 NOTE — ED PROVIDER NOTES
Encounter Date: 6/7/2025       History     Chief Complaint   Patient presents with    GEN WEAKNESS     X 4 DAYS WHILE AT DR LATIA GRAY. RECFENT PACEMAKER PLACEMENT MAY 27     89-year-old female presents with generalized fatigue, worsened over the last 4 days, no associated motor deficit, no infectious history.  No viral-like syndrome.  Pinpoint left cheek abnormality unsure if tingling versus numbness however lasting sec with no associated with unilateral motor or sensory extremity deficit or slurred speech or difficulty word finding.  Currently anticoagulated    The history is provided by the patient and the spouse.     Review of patient's allergies indicates:   Allergen Reactions    Diclofenac-misoprostol      Other reaction(s): Not available    Doxycycline     Effexor [venlafaxine]     Estradiol     Flagyl [metronidazole]     Fluconazole     Gabapentin     Iodine     Levaquin [levofloxacin]     Nexium [esomeprazole magnesium]     Nexletol [bempedoic acid] Other (See Comments)    Penicillins     Red yeast rice (monascus purpureus)     Shellfish containing products     Statins-hmg-coa reductase inhibitors     Sulfa (sulfonamide antibiotics)     Tea tree oil     Tegaserod      ZOLNORM    Tegaserod hydrogen maleate     Trazodone     Yeast, dried     Adhesive Rash     Band-aids     Past Medical History:   Diagnosis Date    Coronary artery disease     Dermatitis     Dermatitis 12/8/2017    Diabetes mellitus     Diabetes mellitus type II     Leah Barr virus infection     Fibromyalgia     GERD (gastroesophageal reflux disease)     Hypertension     MI (myocardial infarction) 09/23/2011    Pure hypercholesterolemia 2/26/2020     Past Surgical History:   Procedure Laterality Date    adenoids      ANGIOPLASTY  1987    APPENDECTOMY      CARDIAC PACEMAKER PLACEMENT  01/12/2017    CATARACT EXTRACTION, BILATERAL Bilateral 9/2/15, 3/17/15    right eye-9/2/15, left eye-3/17/15    CHOLECYSTECTOMY  1987    CORONARY ARTERY BYPASS  GRAFT  2006    quadruple    coronary stents  1999    x2    CYST REMOVAL  04/25/2017    on back    HYSTERECTOMY  1966    OVARY SURGERY  1948    Ovary burst & was repaired    RHIZOTOMY  09/14/2018    TONSILLECTOMY  1940     Family History   Problem Relation Name Age of Onset    No Known Problems Mother      No Known Problems Father       Social History[1]  Review of Systems   All other systems reviewed and are negative.      Physical Exam     Initial Vitals [06/07/25 1311]   BP Pulse Resp Temp SpO2   (!) 140/79 70 17 97.6 °F (36.4 °C) 99 %      MAP       --         Physical Exam    Nursing note and vitals reviewed.  Constitutional: No distress.   HENT:   Head: Normocephalic.   Eyes: No scleral icterus.   Cardiovascular:  Normal rate.           Pulmonary/Chest: Effort normal. No stridor. No respiratory distress.   Abdominal: There is no guarding.     Neurological: She is alert.   GCS 15 with no focal deficit   Skin: No rash noted. No erythema.         ED Course   Procedures  Labs Reviewed   COMPREHENSIVE METABOLIC PANEL - Abnormal       Result Value    Sodium 139      Potassium 3.9      Chloride 103      CO2 26      Glucose 113 (*)     BUN 26 (*)     Creatinine 1.4      Calcium 9.0      Protein Total 7.2      Albumin 4.0      Bilirubin Total 0.4      ALP 73      AST 23      ALT 13      Anion Gap 10      eGFR 36 (*)    TROPONIN I HIGH SENSITIVITY - Abnormal    Troponin High Sensitive 18.0 (*)    B-TYPE NATRIURETIC PEPTIDE - Abnormal     (*)    CBC WITH DIFFERENTIAL - Abnormal    WBC 7.78      RBC 4.07      Hgb 11.8 (*)     Hct 37.2      MCV 91      MCH 29.0      MCHC 31.7 (*)     RDW 13.4      Platelet Count 272      MPV 9.7      Nucleated RBC 0      Neut % 55.5      Lymph % 28.3      Mono % 12.7      Eos % 2.2      Basophil % 0.9      Imm Grans % 0.4      Neut # 4.3      Lymph # 2.20      Mono # 0.99      Eos # 0.17      Baso # 0.07      Imm Grans # 0.03     MAGNESIUM - Normal    Magnesium 2.5     CBC W/ AUTO  DIFFERENTIAL    Narrative:     The following orders were created for panel order CBC auto differential.  Procedure                               Abnormality         Status                     ---------                               -----------         ------                     CBC with Differential[[id number]]       Abnormal            Final result                 Please view results for these tests on the individual orders.   TROPONIN I HIGH SENSITIVITY        ECG Results              EKG 12-lead (In process)        Collection Time Result Time QRS Duration OHS QTC Calculation    06/07/25 13:04:44 06/07/25 14:11:49 152 499                     In process by Interface, Lab In [facility] (06/07/25 14:11:58)                   Narrative:    Test Reason : R53.1,    Vent. Rate :  71 BPM     Atrial Rate :  71 BPM     P-R Int :    ms          QRS Dur : 152 ms      QT Int : 460 ms       P-R-T Axes :     66 178 degrees    QTcB Int : 499 ms    Atrial-paced rhythm  Nonspecific intraventricular block  Abnormal ECG  No previous ECGs available    Referred By: AAAREFERRAL SELF           Confirmed By:                                   Imaging Results              X-Ray Chest AP Portable (Final result)  Result time 06/07/25 14:27:37      Final result by Hever Lemons MD (06/07/25 14:27:37)                   Impression:      Cardiomegaly and aortic atherosclerosis.    Interval revision/replacement of AICD.    No acute pulmonary pathology.      Electronically signed by: Hever Lemons  Date:    06/07/2025  Time:    14:27               Narrative:    EXAMINATION:  XR CHEST AP PORTABLE    CLINICAL HISTORY:  Chest Pain;    COMPARISON:  01/24/2025    FINDINGS:  Cardiac silhouette size is enlarged.  Atherosclerotic calcification of the aorta.  Status post median sternotomy.  Since the previous exam there has been revision or replacement of AICD and leads.    No pulmonary edema.  No confluent airspace disease.  No large pleural effusion or  pneumothorax.  No acute osseous abnormality.                                       Medications - No data to display  Medical Decision Making  89-year-old female presents for evaluation of generalized fatigue, recent pacemaker/AICD placement.  Bedside ultrasound performed by myself with no significant pericardial effusion, chest x-ray with cardiomegaly with no focal infiltrate, no preceding infectious symptoms.  No associated speech deficits or unilateral motor or sensory deficits.  Do not suspect any acute stroke, patient currently anticoagulated at this time.  BNP elevated however previously has been elevated close to the same number and troponin minimally elevated however has been higher in the past.  Do not suspect acute coronary syndrome.  Through shared decision-making with patient and  will discharge home with close outpatient follow up and strict return precautions for worsening symptoms.  No indication for hospitalization at this time.  Patient denies any UTI symptoms therefore urinalysis declined.    Amount and/or Complexity of Data Reviewed  Independent Historian: spouse  ECG/medicine tests: ordered and independent interpretation performed.     Details: EKG rate 71, , , no STEMI    Risk  Decision regarding hospitalization.                                      Clinical Impression:  Final diagnoses:  [R53.1] Generalized weakness (Primary)  [R79.89] Abnormal BUN-to-creatinine ratio  [Z79.01] Anticoagulated          ED Disposition Condition    Discharge Stable          ED Prescriptions    None       Follow-up Information    None                [1]   Social History  Tobacco Use    Smoking status: Never    Smokeless tobacco: Never   Substance Use Topics    Alcohol use: No    Drug use: No        Bandar Nayak Jr., DO  06/07/25 1523

## 2025-06-07 NOTE — DISCHARGE INSTRUCTIONS
Follow up PCP and cardiologist for further management and evaluation and return to ED for any worsening symptoms

## 2025-06-09 LAB
OHS QRS DURATION: 152 MS
OHS QTC CALCULATION: 499 MS

## 2025-06-28 NOTE — ASSESSMENT & PLAN NOTE
Troponin stable   Patient does not have any chest pain      Pupils equal, round and reactive to light, tracking appropriately.

## 2025-07-08 ENCOUNTER — LAB VISIT (OUTPATIENT)
Dept: LAB | Facility: HOSPITAL | Age: 89
End: 2025-07-08
Attending: INTERNAL MEDICINE
Payer: MEDICARE

## 2025-07-08 DIAGNOSIS — N18.30 CHRONIC KIDNEY DISEASE, STAGE III (MODERATE): Primary | ICD-10-CM

## 2025-07-08 LAB
ALBUMIN SERPL-MCNC: 4.1 G/DL (ref 3.5–5.2)
ALBUMIN/CREAT UR: NORMAL
ANION GAP (SMH): 9 MMOL/L (ref 8–16)
BACTERIA #/AREA URNS AUTO: NORMAL /HPF
BUN SERPL-MCNC: 23 MG/DL (ref 8–23)
CALCIUM SERPL-MCNC: 9.1 MG/DL (ref 8.7–10.5)
CHLORIDE SERPL-SCNC: 104 MMOL/L (ref 95–110)
CO2 SERPL-SCNC: 29 MMOL/L (ref 23–29)
CREAT SERPL-MCNC: 1.4 MG/DL (ref 0.5–1.4)
CREAT UR-MCNC: 33.1 MG/DL (ref 15–325)
CREAT UR-MCNC: 33.3 MG/DL (ref 15–325)
GFR SERPLBLD CREATININE-BSD FMLA CKD-EPI: 36 ML/MIN/1.73/M2
GLUCOSE SERPL-MCNC: 81 MG/DL (ref 70–110)
HYALINE CASTS UR QL AUTO: 1 /LPF (ref 0–1)
MICROALBUMIN UR-MCNC: <7 UG/ML
MICROSCOPIC COMMENT: NORMAL
PHOSPHATE SERPL-MCNC: 3.6 MG/DL (ref 2.7–4.5)
POTASSIUM SERPL-SCNC: 3.7 MMOL/L (ref 3.5–5.1)
PROT UR-MCNC: <4 MG/DL
PROT/CREAT UR: NORMAL MG/G{CREAT}
RBC #/AREA URNS AUTO: 1 /HPF
SODIUM SERPL-SCNC: 142 MMOL/L (ref 136–145)
WBC #/AREA URNS AUTO: <1 /HPF

## 2025-07-08 PROCEDURE — 82570 ASSAY OF URINE CREATININE: CPT

## 2025-07-08 PROCEDURE — 36415 COLL VENOUS BLD VENIPUNCTURE: CPT | Mod: PO

## 2025-07-08 PROCEDURE — 81001 URINALYSIS AUTO W/SCOPE: CPT

## 2025-07-08 PROCEDURE — 82570 ASSAY OF URINE CREATININE: CPT | Mod: 59

## 2025-07-08 PROCEDURE — 82040 ASSAY OF SERUM ALBUMIN: CPT

## 2025-07-23 ENCOUNTER — HOSPITAL ENCOUNTER (OUTPATIENT)
Facility: HOSPITAL | Age: 89
Discharge: HOME | End: 2025-07-24
Attending: STUDENT IN AN ORGANIZED HEALTH CARE EDUCATION/TRAINING PROGRAM | Admitting: INTERNAL MEDICINE
Payer: MEDICARE

## 2025-07-23 DIAGNOSIS — R53.83 FATIGUE, UNSPECIFIED TYPE: Primary | ICD-10-CM

## 2025-07-23 DIAGNOSIS — R79.89 ELEVATED TROPONIN: ICD-10-CM

## 2025-07-23 DIAGNOSIS — Z13.6 SCREENING FOR CARDIOVASCULAR CONDITION: ICD-10-CM

## 2025-07-23 DIAGNOSIS — R09.89 LABILE BLOOD PRESSURE: ICD-10-CM

## 2025-07-23 DIAGNOSIS — I10 HYPERTENSION: ICD-10-CM

## 2025-07-23 DIAGNOSIS — R07.9 CHEST PAIN: ICD-10-CM

## 2025-07-23 LAB
ABSOLUTE EOSINOPHIL (SMH): 0.17 K/UL
ABSOLUTE MONOCYTE (SMH): 1.07 K/UL (ref 0.3–1)
ABSOLUTE NEUTROPHIL COUNT (SMH): 6.2 K/UL (ref 1.8–7.7)
ALBUMIN SERPL-MCNC: 4.7 G/DL (ref 3.5–5.2)
ALP SERPL-CCNC: 79 UNIT/L (ref 55–135)
ALT SERPL-CCNC: 20 UNIT/L (ref 10–44)
ANION GAP (SMH): 12 MMOL/L (ref 8–16)
AST SERPL-CCNC: 30 UNIT/L (ref 10–40)
BACTERIA #/AREA URNS AUTO: NORMAL /HPF
BASOPHILS # BLD AUTO: 0.08 K/UL
BASOPHILS NFR BLD AUTO: 0.7 %
BILIRUB SERPL-MCNC: 0.5 MG/DL (ref 0.1–1)
BILIRUB UR QL STRIP.AUTO: NEGATIVE
BNP SERPL-MCNC: 1423 PG/ML
BUN SERPL-MCNC: 27 MG/DL (ref 8–23)
CALCIUM SERPL-MCNC: 9.9 MG/DL (ref 8.7–10.5)
CHLORIDE SERPL-SCNC: 101 MMOL/L (ref 95–110)
CLARITY UR: CLEAR
CO2 SERPL-SCNC: 27 MMOL/L (ref 23–29)
COLOR UR AUTO: YELLOW
CREAT SERPL-MCNC: 1.5 MG/DL (ref 0.5–1.4)
ERYTHROCYTE [DISTWIDTH] IN BLOOD BY AUTOMATED COUNT: 13.2 % (ref 11.5–14.5)
GFR SERPLBLD CREATININE-BSD FMLA CKD-EPI: 34 ML/MIN/1.73/M2
GLUCOSE SERPL-MCNC: 106 MG/DL (ref 70–110)
GLUCOSE UR QL STRIP: NEGATIVE
HCT VFR BLD AUTO: 43.8 % (ref 37–48.5)
HGB BLD-MCNC: 13.5 GM/DL (ref 12–16)
HGB UR QL STRIP: NEGATIVE
IMM GRANULOCYTES # BLD AUTO: 0.06 K/UL (ref 0–0.04)
IMM GRANULOCYTES NFR BLD AUTO: 0.5 % (ref 0–0.5)
INFLUENZA A MOLECULAR (OHS): NEGATIVE
INFLUENZA B MOLECULAR (OHS): NEGATIVE
KETONES UR QL STRIP: NEGATIVE
LEUKOCYTE ESTERASE UR QL STRIP: ABNORMAL
LYMPHOCYTES # BLD AUTO: 3.59 K/UL (ref 1–4.8)
MCH RBC QN AUTO: 28.1 PG (ref 27–31)
MCHC RBC AUTO-ENTMCNC: 30.8 G/DL (ref 32–36)
MCV RBC AUTO: 91 FL (ref 82–98)
MICROSCOPIC COMMENT: NORMAL
NITRITE UR QL STRIP: NEGATIVE
NUCLEATED RBC (/100WBC) (SMH): 0 /100 WBC
PH UR STRIP: 6 [PH]
PLATELET # BLD AUTO: 302 K/UL (ref 150–450)
PMV BLD AUTO: 10.2 FL (ref 9.2–12.9)
POTASSIUM SERPL-SCNC: 3.6 MMOL/L (ref 3.5–5.1)
PROT SERPL-MCNC: 8.3 GM/DL (ref 6–8.4)
PROT UR QL STRIP: NEGATIVE
RBC # BLD AUTO: 4.8 M/UL (ref 4–5.4)
RELATIVE EOSINOPHIL (SMH): 1.5 % (ref 0–8)
RELATIVE LYMPHOCYTE (SMH): 32.2 % (ref 18–48)
RELATIVE MONOCYTE (SMH): 9.6 % (ref 4–15)
RELATIVE NEUTROPHIL (SMH): 55.5 % (ref 38–73)
SARS-COV-2 RDRP RESP QL NAA+PROBE: NEGATIVE
SODIUM SERPL-SCNC: 140 MMOL/L (ref 136–145)
SP GR UR STRIP: 1.01
SQUAMOUS #/AREA URNS AUTO: <1 /HPF
TROPONIN HIGH SENSITIVE (SMH): 31.1 PG/ML
TSH SERPL-ACNC: 1.3 UIU/ML (ref 0.34–5.6)
UROBILINOGEN UR STRIP-ACNC: NEGATIVE EU/DL
WBC # BLD AUTO: 11.14 K/UL (ref 3.9–12.7)
WBC #/AREA URNS AUTO: 3 /HPF

## 2025-07-23 PROCEDURE — U0002 COVID-19 LAB TEST NON-CDC: HCPCS | Performed by: STUDENT IN AN ORGANIZED HEALTH CARE EDUCATION/TRAINING PROGRAM

## 2025-07-23 PROCEDURE — 87502 INFLUENZA DNA AMP PROBE: CPT | Performed by: STUDENT IN AN ORGANIZED HEALTH CARE EDUCATION/TRAINING PROGRAM

## 2025-07-23 PROCEDURE — 80053 COMPREHEN METABOLIC PANEL: CPT | Performed by: PHYSICIAN ASSISTANT

## 2025-07-23 PROCEDURE — 86803 HEPATITIS C AB TEST: CPT | Performed by: EMERGENCY MEDICINE

## 2025-07-23 PROCEDURE — 81003 URINALYSIS AUTO W/O SCOPE: CPT | Performed by: PHYSICIAN ASSISTANT

## 2025-07-23 PROCEDURE — 87389 HIV-1 AG W/HIV-1&-2 AB AG IA: CPT | Performed by: EMERGENCY MEDICINE

## 2025-07-23 PROCEDURE — 84484 ASSAY OF TROPONIN QUANT: CPT | Performed by: PHYSICIAN ASSISTANT

## 2025-07-23 PROCEDURE — 25000003 PHARM REV CODE 250: Performed by: STUDENT IN AN ORGANIZED HEALTH CARE EDUCATION/TRAINING PROGRAM

## 2025-07-23 PROCEDURE — 85025 COMPLETE CBC W/AUTO DIFF WBC: CPT | Performed by: PHYSICIAN ASSISTANT

## 2025-07-23 PROCEDURE — 84443 ASSAY THYROID STIM HORMONE: CPT | Performed by: STUDENT IN AN ORGANIZED HEALTH CARE EDUCATION/TRAINING PROGRAM

## 2025-07-23 PROCEDURE — 83880 ASSAY OF NATRIURETIC PEPTIDE: CPT | Performed by: STUDENT IN AN ORGANIZED HEALTH CARE EDUCATION/TRAINING PROGRAM

## 2025-07-23 RX ORDER — ASPIRIN 81 MG/1
81 TABLET ORAL DAILY
Status: DISCONTINUED | OUTPATIENT
Start: 2025-07-24 | End: 2025-07-24 | Stop reason: HOSPADM

## 2025-07-23 RX ORDER — IBUPROFEN 200 MG
24 TABLET ORAL
Status: DISCONTINUED | OUTPATIENT
Start: 2025-07-24 | End: 2025-07-24 | Stop reason: HOSPADM

## 2025-07-23 RX ORDER — SODIUM CHLORIDE 0.9 % (FLUSH) 0.9 %
10 SYRINGE (ML) INJECTION EVERY 12 HOURS PRN
Status: DISCONTINUED | OUTPATIENT
Start: 2025-07-24 | End: 2025-07-24 | Stop reason: HOSPADM

## 2025-07-23 RX ORDER — IBUPROFEN 200 MG
16 TABLET ORAL
Status: DISCONTINUED | OUTPATIENT
Start: 2025-07-24 | End: 2025-07-24 | Stop reason: HOSPADM

## 2025-07-23 RX ORDER — FUROSEMIDE 20 MG/1
20 TABLET ORAL DAILY
Status: DISCONTINUED | OUTPATIENT
Start: 2025-07-24 | End: 2025-07-24 | Stop reason: HOSPADM

## 2025-07-23 RX ORDER — CHOLECALCIFEROL (VITAMIN D3) 25 MCG
1000 TABLET ORAL DAILY
Status: DISCONTINUED | OUTPATIENT
Start: 2025-07-24 | End: 2025-07-24 | Stop reason: HOSPADM

## 2025-07-23 RX ORDER — ALPRAZOLAM 0.25 MG/1
0.25 TABLET ORAL 2 TIMES DAILY PRN
Status: DISCONTINUED | OUTPATIENT
Start: 2025-07-24 | End: 2025-07-24 | Stop reason: HOSPADM

## 2025-07-23 RX ORDER — ASPIRIN 325 MG
325 TABLET ORAL
Status: COMPLETED | OUTPATIENT
Start: 2025-07-23 | End: 2025-07-23

## 2025-07-23 RX ORDER — GLUCAGON 1 MG
1 KIT INJECTION
Status: DISCONTINUED | OUTPATIENT
Start: 2025-07-24 | End: 2025-07-24 | Stop reason: HOSPADM

## 2025-07-23 RX ORDER — ACETAMINOPHEN 325 MG/1
650 TABLET ORAL EVERY 4 HOURS PRN
Status: DISCONTINUED | OUTPATIENT
Start: 2025-07-24 | End: 2025-07-24 | Stop reason: HOSPADM

## 2025-07-23 RX ORDER — ONDANSETRON HYDROCHLORIDE 2 MG/ML
4 INJECTION, SOLUTION INTRAVENOUS EVERY 6 HOURS PRN
Status: DISCONTINUED | OUTPATIENT
Start: 2025-07-24 | End: 2025-07-24 | Stop reason: HOSPADM

## 2025-07-23 RX ORDER — TRAMADOL HYDROCHLORIDE 50 MG/1
50 TABLET, FILM COATED ORAL EVERY 8 HOURS PRN
Status: DISCONTINUED | OUTPATIENT
Start: 2025-07-24 | End: 2025-07-24 | Stop reason: HOSPADM

## 2025-07-23 RX ORDER — NALOXONE HCL 0.4 MG/ML
0.02 VIAL (ML) INJECTION
Status: DISCONTINUED | OUTPATIENT
Start: 2025-07-24 | End: 2025-07-24 | Stop reason: HOSPADM

## 2025-07-23 RX ORDER — MORPHINE SULFATE 2 MG/ML
2 INJECTION, SOLUTION INTRAMUSCULAR; INTRAVENOUS EVERY 6 HOURS PRN
Status: DISCONTINUED | OUTPATIENT
Start: 2025-07-24 | End: 2025-07-24 | Stop reason: HOSPADM

## 2025-07-23 RX ORDER — PROCHLORPERAZINE EDISYLATE 5 MG/ML
5 INJECTION INTRAMUSCULAR; INTRAVENOUS EVERY 6 HOURS PRN
Status: DISCONTINUED | OUTPATIENT
Start: 2025-07-24 | End: 2025-07-24 | Stop reason: HOSPADM

## 2025-07-23 RX ORDER — ALUMINUM HYDROXIDE, MAGNESIUM HYDROXIDE, AND SIMETHICONE 1200; 120; 1200 MG/30ML; MG/30ML; MG/30ML
30 SUSPENSION ORAL 4 TIMES DAILY PRN
Status: DISCONTINUED | OUTPATIENT
Start: 2025-07-24 | End: 2025-07-24 | Stop reason: HOSPADM

## 2025-07-23 RX ORDER — SODIUM CHLORIDE 0.9 % (FLUSH) 0.9 %
10 SYRINGE (ML) INJECTION
Status: DISCONTINUED | OUTPATIENT
Start: 2025-07-24 | End: 2025-07-24 | Stop reason: HOSPADM

## 2025-07-23 RX ORDER — AMOXICILLIN 250 MG
1 CAPSULE ORAL 2 TIMES DAILY PRN
Status: DISCONTINUED | OUTPATIENT
Start: 2025-07-24 | End: 2025-07-24 | Stop reason: HOSPADM

## 2025-07-23 RX ORDER — PANTOPRAZOLE SODIUM 40 MG/1
40 TABLET, DELAYED RELEASE ORAL
Status: DISCONTINUED | OUTPATIENT
Start: 2025-07-24 | End: 2025-07-24 | Stop reason: HOSPADM

## 2025-07-23 RX ORDER — LEVOTHYROXINE SODIUM 88 UG/1
88 TABLET ORAL DAILY
Status: DISCONTINUED | OUTPATIENT
Start: 2025-07-24 | End: 2025-07-24 | Stop reason: HOSPADM

## 2025-07-23 RX ORDER — TALC
9 POWDER (GRAM) TOPICAL NIGHTLY PRN
Status: DISCONTINUED | OUTPATIENT
Start: 2025-07-24 | End: 2025-07-24 | Stop reason: HOSPADM

## 2025-07-23 RX ADMIN — ASPIRIN 325 MG ORAL TABLET 325 MG: 325 PILL ORAL at 11:07

## 2025-07-24 ENCOUNTER — TELEPHONE (OUTPATIENT)
Dept: CARDIOLOGY | Facility: CLINIC | Age: 89
End: 2025-07-24
Payer: MEDICARE

## 2025-07-24 VITALS
DIASTOLIC BLOOD PRESSURE: 65 MMHG | RESPIRATION RATE: 23 BRPM | HEART RATE: 70 BPM | OXYGEN SATURATION: 99 % | TEMPERATURE: 97 F | WEIGHT: 134 LBS | BODY MASS INDEX: 26.17 KG/M2 | SYSTOLIC BLOOD PRESSURE: 162 MMHG

## 2025-07-24 PROBLEM — R53.1 GENERALIZED WEAKNESS: Status: ACTIVE | Noted: 2025-07-24

## 2025-07-24 LAB
ABSOLUTE EOSINOPHIL (SMH): 0.17 K/UL
ABSOLUTE MONOCYTE (SMH): 1.19 K/UL (ref 0.3–1)
ABSOLUTE NEUTROPHIL COUNT (SMH): 4.8 K/UL (ref 1.8–7.7)
ALBUMIN SERPL-MCNC: 3.8 G/DL (ref 3.5–5.2)
ALP SERPL-CCNC: 66 UNIT/L (ref 55–135)
ALT SERPL-CCNC: 13 UNIT/L (ref 10–44)
ANION GAP (SMH): 8 MMOL/L (ref 8–16)
AST SERPL-CCNC: 22 UNIT/L (ref 10–40)
BASOPHILS # BLD AUTO: 0.06 K/UL
BASOPHILS NFR BLD AUTO: 0.7 %
BILIRUB SERPL-MCNC: 0.5 MG/DL (ref 0.1–1)
BUN SERPL-MCNC: 25 MG/DL (ref 8–23)
CALCIUM SERPL-MCNC: 9.2 MG/DL (ref 8.7–10.5)
CHLORIDE SERPL-SCNC: 104 MMOL/L (ref 95–110)
CHOLEST SERPL-MCNC: 204 MG/DL (ref 120–199)
CHOLEST/HDLC SERPL: 4.2 {RATIO} (ref 2–5)
CO2 SERPL-SCNC: 30 MMOL/L (ref 23–29)
CREAT SERPL-MCNC: 1.3 MG/DL (ref 0.5–1.4)
ERYTHROCYTE [DISTWIDTH] IN BLOOD BY AUTOMATED COUNT: 13.1 % (ref 11.5–14.5)
GFR SERPLBLD CREATININE-BSD FMLA CKD-EPI: 40 ML/MIN/1.73/M2
GLUCOSE SERPL-MCNC: 107 MG/DL (ref 70–110)
HCT VFR BLD AUTO: 37.5 % (ref 37–48.5)
HCV AB SERPL QL IA: NORMAL
HDLC SERPL-MCNC: 49 MG/DL (ref 40–75)
HDLC SERPL: 24 % (ref 20–50)
HGB BLD-MCNC: 12 GM/DL (ref 12–16)
HIV 1+2 AB+HIV1 P24 AG SERPL QL IA: NORMAL
IMM GRANULOCYTES # BLD AUTO: 0.03 K/UL (ref 0–0.04)
IMM GRANULOCYTES NFR BLD AUTO: 0.3 % (ref 0–0.5)
LDLC SERPL CALC-MCNC: 129.2 MG/DL (ref 63–159)
LYMPHOCYTES # BLD AUTO: 2.51 K/UL (ref 1–4.8)
MCH RBC QN AUTO: 28.7 PG (ref 27–31)
MCHC RBC AUTO-ENTMCNC: 32 G/DL (ref 32–36)
MCV RBC AUTO: 90 FL (ref 82–98)
NONHDLC SERPL-MCNC: 155 MG/DL
NUCLEATED RBC (/100WBC) (SMH): 0 /100 WBC
PLATELET # BLD AUTO: 257 K/UL (ref 150–450)
PMV BLD AUTO: 10.1 FL (ref 9.2–12.9)
POCT GLUCOSE: 80 MG/DL (ref 70–110)
POTASSIUM SERPL-SCNC: 3.4 MMOL/L (ref 3.5–5.1)
PROT SERPL-MCNC: 6.9 GM/DL (ref 6–8.4)
RBC # BLD AUTO: 4.18 M/UL (ref 4–5.4)
RELATIVE EOSINOPHIL (SMH): 1.9 % (ref 0–8)
RELATIVE LYMPHOCYTE (SMH): 28.6 % (ref 18–48)
RELATIVE MONOCYTE (SMH): 13.5 % (ref 4–15)
RELATIVE NEUTROPHIL (SMH): 55 % (ref 38–73)
SODIUM SERPL-SCNC: 142 MMOL/L (ref 136–145)
TRIGL SERPL-MCNC: 129 MG/DL (ref 30–150)
TROPONIN HIGH SENSITIVE (SMH): 29.2 PG/ML
WBC # BLD AUTO: 8.79 K/UL (ref 3.9–12.7)

## 2025-07-24 PROCEDURE — 97165 OT EVAL LOW COMPLEX 30 MIN: CPT

## 2025-07-24 PROCEDURE — 84484 ASSAY OF TROPONIN QUANT: CPT

## 2025-07-24 PROCEDURE — 85025 COMPLETE CBC W/AUTO DIFF WBC: CPT

## 2025-07-24 PROCEDURE — 80061 LIPID PANEL: CPT

## 2025-07-24 PROCEDURE — 93010 ELECTROCARDIOGRAM REPORT: CPT | Mod: ,,, | Performed by: INTERNAL MEDICINE

## 2025-07-24 PROCEDURE — 94761 N-INVAS EAR/PLS OXIMETRY MLT: CPT

## 2025-07-24 PROCEDURE — 25000003 PHARM REV CODE 250

## 2025-07-24 PROCEDURE — 94799 UNLISTED PULMONARY SVC/PX: CPT

## 2025-07-24 PROCEDURE — 80053 COMPREHEN METABOLIC PANEL: CPT

## 2025-07-24 PROCEDURE — G0378 HOSPITAL OBSERVATION PER HR: HCPCS

## 2025-07-24 PROCEDURE — 99900035 HC TECH TIME PER 15 MIN (STAT)

## 2025-07-24 PROCEDURE — 93005 ELECTROCARDIOGRAM TRACING: CPT | Performed by: INTERNAL MEDICINE

## 2025-07-24 PROCEDURE — 36415 COLL VENOUS BLD VENIPUNCTURE: CPT

## 2025-07-24 PROCEDURE — 25000003 PHARM REV CODE 250: Performed by: NURSE PRACTITIONER

## 2025-07-24 RX ORDER — INSULIN ASPART 100 [IU]/ML
0-5 INJECTION, SOLUTION INTRAVENOUS; SUBCUTANEOUS
Status: DISCONTINUED | OUTPATIENT
Start: 2025-07-24 | End: 2025-07-24 | Stop reason: HOSPADM

## 2025-07-24 RX ORDER — BUSPIRONE HYDROCHLORIDE 5 MG/1
5 TABLET ORAL 3 TIMES DAILY
COMMUNITY
Start: 2025-07-17 | End: 2026-07-17

## 2025-07-24 RX ORDER — HYDRALAZINE HYDROCHLORIDE 10 MG/1
5 TABLET, FILM COATED ORAL EVERY 12 HOURS PRN
Qty: 30 TABLET | Refills: 0 | Status: SHIPPED | OUTPATIENT
Start: 2025-07-24 | End: 2025-08-23

## 2025-07-24 RX ADMIN — Medication 1000 UNITS: at 09:07

## 2025-07-24 RX ADMIN — LEVOTHYROXINE SODIUM 88 MCG: 0.09 TABLET ORAL at 09:07

## 2025-07-24 RX ADMIN — FUROSEMIDE 20 MG: 20 TABLET ORAL at 09:07

## 2025-07-24 RX ADMIN — APIXABAN 2.5 MG: 2.5 TABLET, FILM COATED ORAL at 09:07

## 2025-07-24 RX ADMIN — LACTULOSE 20 G: 10 SOLUTION ORAL at 09:07

## 2025-07-24 RX ADMIN — ALPRAZOLAM 0.25 MG: 0.25 TABLET ORAL at 04:07

## 2025-07-24 RX ADMIN — ASPIRIN 81 MG: 81 TABLET ORAL at 09:07

## 2025-07-24 RX ADMIN — PANTOPRAZOLE SODIUM 40 MG: 40 TABLET, DELAYED RELEASE ORAL at 04:07

## 2025-07-24 RX ADMIN — Medication 9 MG: at 04:07

## 2025-07-24 RX ADMIN — METOPROLOL SUCCINATE 12.5 MG: 25 TABLET, EXTENDED RELEASE ORAL at 09:07

## 2025-07-24 RX ADMIN — ALPRAZOLAM 0.25 MG: 0.25 TABLET ORAL at 01:07

## 2025-07-24 NOTE — ASSESSMENT & PLAN NOTE
"..Patient's FSGs are controlled on current medication regimen.  Last A1c reviewed-   Lab Results   Component Value Date    HGBA1C 5.9 12/19/2024     Most recent fingerstick glucose reviewed- No results for input(s): "POCTGLUCOSE" in the last 24 hours.  Current correctional scale  Low  Maintain anti-hyperglycemic dose as follows-   Antihyperglycemics (From admission, onward)      Start     Stop Route Frequency Ordered    07/24/25 0127  insulin aspart U-100 pen 0-5 Units         -- SubQ Before meals & nightly PRN 07/24/25 0028          Hold Oral hypoglycemics while patient is in the hospital.      "

## 2025-07-24 NOTE — FIRST PROVIDER EVALUATION
" Emergency Department TeleTriage Encounter Note      CHIEF COMPLAINT    Chief Complaint   Patient presents with    Blood Pressure Issues     Patient states that her blood pressure has been inconsistent all day today.  She says it has been either high or low and she "just don't feel good" - she does take HTN medication        VITAL SIGNS   Initial Vitals   BP Pulse Resp Temp SpO2   07/23/25 1838 07/23/25 1838 07/23/25 1838 07/23/25 1840 07/23/25 1838   (!) 152/77 82 18 98.2 °F (36.8 °C) 99 %      MAP       --                   ALLERGIES    Review of patient's allergies indicates:   Allergen Reactions    Cephalosporins Hives    Amiodarone Other (See Comments)     "KNOCKED ME OUT"    Candida albicans Other (See Comments)    Clindamycin     Diclofenac-misoprostol      Other reaction(s): Not available    Doxycycline     Effexor [venlafaxine]     Empagliflozin Other (See Comments)     "MADE ME VERY WEAK"    Estradiol     Flagyl [metronidazole]     Fluconazole     Gabapentin     Iodinated contrast media Other (See Comments)    Iodine     Levaquin [levofloxacin]     Nexium [esomeprazole magnesium]     Nexletol [bempedoic acid] Other (See Comments)    Penicillins     Red yeast rice (monascus purpureus)     Shellfish containing products     Statins-hmg-coa reductase inhibitors     Sulfa (sulfonamide antibiotics)     Tea tree oil     Tegaserod      ZOLNORM    Tegaserod hydrogen maleate     Trazodone     Yeast, dried     Adhesive Rash     Band-aids    Cefazolin Rash     Biopsy-proven LCV       PROVIDER TRIAGE NOTE  This is a teletriage evaluation of a 89 y.o. female presenting to the ED complaining of multiple complaints. Patient has fluctuating blood pressure, not feeling well, jaw pain, and fatigued.    Patient is alert and oriented. She speaks in complete sentences. She is sitting upright in the chair in no distress.     Initial orders will be placed and care will be transferred to an alternate provider when patient is " "roomed for a full evaluation. Any additional orders and the final disposition will be determined by that provider.         ORDERS  Labs Reviewed   HEPATITIS C ANTIBODY   HEP C VIRUS HOLD SPECIMEN   HIV 1 / 2 ANTIBODY   HIV VIRUS CONFIRMATION HOLD SPECIMEN   CBC W/ AUTO DIFFERENTIAL    Narrative:     The following orders were created for panel order CBC auto differential.  Procedure                               Abnormality         Status                     ---------                               -----------         ------                     CBC with Differential[[id number]]                                                        Please view results for these tests on the individual orders.   COMPREHENSIVE METABOLIC PANEL   URINALYSIS, REFLEX TO URINE CULTURE   CBC WITH DIFFERENTIAL   TROPONIN I HIGH SENSITIVITY       ED Orders (720h ago, onward)      Start Ordered     Status Ordering Provider    07/23/25 1904 07/23/25 1903  CBC auto differential  STAT         Ordered ABY FRANCES G.    07/23/25 1904 07/23/25 1903  Comprehensive metabolic panel  STAT         Ordered ABY FRANCES DENIS    07/23/25 1904 07/23/25 1903  Insert Saline lock IV  Once         Ordered FRANCES BADILLO    07/23/25 1904 07/23/25 1903  EKG 12-lead  Once         Completed by TRINI PORTER on 7/23/2025 at  7:04 PM ABY FRANCES G.    07/23/25 1904 07/23/25 1903  Urinalysis, Reflex to Urine Culture Urine, Clean Catch  STAT         Ordered ABY FRANCES G.    07/23/25 1904 07/23/25 1903  CBC with Differential  PROCEDURE ONCE         Ordered ABY FRANCES G.    07/23/25 1904 07/23/25 1903  Troponin I High Sensitivity  STAT         Ordered ABY FRANCES DENIS    07/23/25 1842 07/23/25 1841  Hepatitis C Antibody  STAT        Placed in "And" Linked Group    Ordered CHINO SWAIN    07/23/25 1842 07/23/25 1841  HCV Virus Hold Specimen  STAT        Placed in "And" Linked Group    Ordered CHINO SWAIN    07/23/25 1842 07/23/25 1841  HIV 1/2 Ag/Ab (4th Gen)  " "STAT        Placed in "And" Linked Group    Ordered LUCILABRIAN CHINO BEREKET    07/23/25 1842 07/23/25 1841  HIV Virus Confirmation Hold Specimen  STAT        Placed in "And" Linked Group    Ordered LUCILABRIANCHINO BEREKET    07/23/25 1817 07/23/25 1817  EKG 12-lead  Once         Completed by SOFIE CHAMBERS on 7/23/2025 at  6:17 PM FLORENCIO DUKE              Virtual Visit Note: The provider triage portion of this emergency department evaluation and documentation was performed via [employer], a HIPAA-compliant telemedicine application, in concert with a tele-presenter in the room. A face to face patient evaluation with one of my colleagues will occur once the patient is placed in an emergency department room.      DISCLAIMER: This note was prepared with "[employer] voice recognition transcription software. Garbled syntax, mangled pronouns, and other bizarre constructions may be attributed to that software system.    "

## 2025-07-24 NOTE — PLAN OF CARE
[facility] - Emergency Dept  Initial Discharge Assessment       Primary Care Provider: Elena Sullivan MD    Admission Diagnosis: Elevated troponin [R79.89]    Admission Date: 7/23/2025  Expected Discharge Date:   CM met with patient bedside. Daughter and Spouse Jacinto was present in the room assisting with assessment. CM verified demographics, insurance, supports, and PCP.  CM assessed patient's needs. Patient is able to complete ADLs independently. Patient verified at home DME: Rolling Walker, Shower Chair, Grab bars, Quad Cane.  Patient verified No HH, No Dialysis, Yes- Eliquis Blood Thinners, and No Oxygen. No needs identified.    Patient verified pharmacy of choice: [facility] in [place]  Patient confirmed Spouse will be source of transportation at the time of discharge.       Transition of Care Barriers: None    Payor: MEDICARE / Plan: MEDICARE PART A & B / Product Type: Government /     Extended Emergency Contact Information  Primary Emergency Contact: Jacinto Zacarias  Address: [address] [place] [address]  Home Phone: [phone]  Mobile Phone: [phone]  Relation: Spouse  Preferred language: English   needed? No    Discharge Plan A: Home with family  Discharge Plan B: Home with family      [facility] [address] [facility] [address] [place] [address]  [address] [facility] [address]  Phone: [phone] Fax: [phone]    [facility] - [facility]ERIY [facility] - [facility] AT Portal to Registered [facility] Sites  [address] [facility]  [place] [address]  Phone: [phone] Fax: [phone]    [facility]  [address] [facility] [address]  [facility] [address]  Phone: [phone] Fax: [phone]      Initial Assessment (most recent)       Adult Discharge Assessment - 07/24/25 0918          Discharge Assessment    Assessment Type Discharge Planning Assessment     Confirmed/corrected address,  phone number and insurance Yes     Confirmed Demographics Correct on Facesheet     Source of Information patient     People in Home spouse     Name(s) of People in Home Jacinto     Do you expect to return to your current living situation? Yes     Do you have help at home or someone to help you manage your care at home? Yes     Who are your caregiver(s) and their phone number(s)? Spouse Jacinto [phone]     Prior to hospitilization cognitive status: Alert/Oriented     Current cognitive status: Alert/Oriented     Walking or Climbing Stairs Difficulty no     Dressing/Bathing Difficulty no     Home Accessibility wheelchair accessible     Home Layout Able to live on 1st floor     Equipment Currently Used at Home walker, rolling;shower chair;grab bar;cane, quad     Readmission within 30 days? No     Patient currently being followed by outpatient case management? No     Do you currently have service(s) that help you manage your care at home? No     Do you take prescription medications? Yes     Do you have prescription coverage? Yes     Do you have any problems affording any of your prescribed medications? No     Is the patient taking medications as prescribed? yes     Who is going to help you get home at discharge? Spouse     How do you get to doctors appointments? family or friend will provide     Are you on dialysis? No     Do you take coumadin? No   Eliquis    Discharge Plan A Home with family     Discharge Plan B Home with family     DME Needed Upon Discharge  none     Discharge Plan discussed with: Patient     Transition of Care Barriers None

## 2025-07-24 NOTE — ASSESSMENT & PLAN NOTE
-flu and COVID negative  -urinalysis unremarkable  -PT/OT  -fall precaution  -encourage p.o. intake  -monitor I's and O's

## 2025-07-24 NOTE — PLAN OF CARE
Pt cleared from cm- PENDING dc orders per NP Ewa    at bedside to transport home at dc  Appts added to AVS, referrals added to AVS  Cardio appt kept for July 30, messaged office for earlier appt as requested per patient.   Unable to make pcp appt- no answer through number provided.      07/24/25 0942   Final Note   Assessment Type Final Discharge Note   Anticipated Discharge Disposition Home   Hospital Resources/Appts/Education Provided Appointments scheduled and added to AVS   Post-Acute Status   Discharge Delays None known at this time

## 2025-07-24 NOTE — PT/OT/SLP EVAL
Occupational Therapy   Evaluation    Name: Karma Chen  MRN: [id number]  Admitting Diagnosis: Acute kidney injury superimposed on chronic kidney disease  Recent Surgery: * No surgery found *      Recommendations:     Discharge Recommendations: Low Intensity Therapy (Resume OP)  Discharge Equipment Recommendations:  none  Barriers to discharge:  None    Assessment:     Karma Chen is a 89 y.o. female with a medical diagnosis of Acute kidney injury superimposed on chronic kidney disease. Pt agreeable to OT eval this AM. Performance deficits affecting function: weakness, impaired endurance, impaired self care skills, impaired functional mobility, gait instability, impaired balance, decreased safety awareness, impaired cardiopulmonary response to activity, decreased upper extremity function, decreased lower extremity function.      Rehab Prognosis: Good; patient would benefit from acute skilled OT services to address these deficits and reach maximum level of function.       Plan:     Patient to be seen 3 x/week to address the above listed problems via self-care/home management, therapeutic activities  Plan of Care Expires: 08/24/25  Plan of Care Reviewed with: patient, family    Subjective     Chief Complaint: none stated  Patient/Family Comments/goals: none stated    Occupational Profile:  Living Environment: Pt lives with  in a 1 story home with no FEMI. 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  Patient found HOB elevated with pulse ox (continuous), telemetry, PureWick, blood pressure cuff  upon OT entry to room.    General Precautions: Standard, fall  Orthopedic Precautions: N/A  Braces: N/A  Respiratory Status: Room air    Occupational Performance:    Bed Mobility:    Patient completed Supine to Sit with contact guard assistance  Patient completed Sit to Supine with stand by assistance    Functional Mobility/Transfers:  Patient completed Sit <> Stand Transfer with stand by assistance  with  rolling walker   Functional Mobility: pt amb in room with SBA/CGA with RW with no LOB or SOB    Activities of Daily Living:  Feeding:  independence per pt  Lower Body Dressing: stand by assistance seated EOB to don/doff socks on left foot but unable to complete on right foot, requiring assist from OT  Toileting: tristen      Cognitive/Visual Perceptual:  Cognitive/Psychosocial Skills:     -       Oriented to: Person, Place, Time, and Situation   -       Follows Commands/attention:Follows two-step commands  -       Communication: clear/fluent  -       Memory: No Deficits noted  -       Safety awareness/insight to disability: impaired   -       Mood/Affect/Coping skills/emotional control: Appropriate to situation, Cooperative, and Pleasant    Physical Exam:  Balance:    -       SBA seated balance; SBA/CGA standing balance  Upper Extremity Range of Motion:     -       Right Upper Extremity: WFL  -       Left Upper Extremity: WFL  Upper Extremity Strength:    -       Right Upper Extremity: WFL  -       Left Upper Extremity: WFL   Strength:    -       Right Upper Extremity: WFL  -       Left Upper Extremity: WFL  Fine Motor Coordination:    -       Intact  Gross motor coordination:   WFL    AMPAC 6 Click ADL:  AMPAC Total Score: 20    Treatment & Education:  Pt educated on role of OT/POC, importance of OOB/EOB activity, use of call bell, and safety during ADLs, transfers, and functional mobility.    Patient left HOB elevated with all lines intact, call button in reach, and family present    GOALS: GOALS NOT SET AS  PATIENT WAS DISCHARGED PRIOR TO WRITING NOTE  Multidisciplinary Problems       Occupational Therapy Goals       Not on file                      History:     Past Medical History:   Diagnosis Date    Coronary artery disease     Dermatitis     Dermatitis 12/8/2017    Diabetes mellitus     Diabetes mellitus type II     Leah Barr virus infection     Fibromyalgia     GERD (gastroesophageal reflux disease)     Hypertension     MI (myocardial infarction) 09/23/2011    Pure hypercholesterolemia 2/26/2020         Past Surgical History:   Procedure Laterality Date    adenoids      ANGIOPLASTY  1987    APPENDECTOMY      CARDIAC PACEMAKER PLACEMENT  01/12/2017    CATARACT EXTRACTION, BILATERAL Bilateral 9/2/15, 3/17/15    right eye-9/2/15, left eye-3/17/15    CHOLECYSTECTOMY  1987    CORONARY ARTERY BYPASS GRAFT  2006    quadruple    coronary stents  1999    x2    CYST REMOVAL  04/25/2017    on back    HYSTERECTOMY  1966    OVARY SURGERY  1948    Ovary burst & was repaired    RHIZOTOMY  09/14/2018    TONSILLECTOMY  1940       Time Tracking:     OT Date of Treatment: 07/24/25  OT Start Time: 0933  OT Stop Time: 0953  OT Total Time (min): 20 min    Billable Minutes:Evaluation 20    7/24/2025

## 2025-07-24 NOTE — PT/OT/SLP PROGRESS
Physical Therapy      Patient Name:  Karma Chen   MRN:  [id number]    Patient not seen today secondary to first attempt patient working with OT. Patient discharged prior to second attempt for PT evaluation.

## 2025-07-24 NOTE — ED PROVIDER NOTES
"Encounter Date: 7/23/2025       History     Chief Complaint   Patient presents with    Blood Pressure Issues     Patient states that her blood pressure has been inconsistent all day today.  She says it has been either high or low and she "just don't feel good" - she does take HTN medication      HPI  89-year-old woman presents for evaluation of not feeling well, she describes feeling very tired.  She reports it has been going on for a couple of days.  She reports having labile blood pressures.  She reports having a high blood pressure on Monday and then she took an extra metoprolol and some nitroglycerin in her blood pressure got low she then felt worse.  She waited a little bit and kicked her legs in her blood pressure came up and she felt better.  She denies fever chills chest pain shortness of breath belly pain nausea or vomiting change in bowel or bladder habits.  She reports having minutes long TMJ pain earlier today that has resolved.  She takes metoprolol.  She has a an extensive cardiac history.  Review of patient's allergies indicates:   Allergen Reactions    Cephalosporins Hives    Amiodarone Other (See Comments)     "KNOCKED ME OUT"    Candida albicans Other (See Comments)    Clindamycin     Diclofenac-misoprostol      Other reaction(s): Not available    Doxycycline     Effexor [venlafaxine]     Empagliflozin Other (See Comments)     "MADE ME VERY WEAK"    Estradiol     Flagyl [metronidazole]     Fluconazole     Gabapentin     Iodinated contrast media Other (See Comments)    Iodine     Levaquin [levofloxacin]     Nexium [esomeprazole magnesium]     Nexletol [bempedoic acid] Other (See Comments)    Penicillins     Red yeast rice (monascus purpureus)     Shellfish containing products     Statins-hmg-coa reductase inhibitors     Sulfa (sulfonamide antibiotics)     Tea tree oil     Tegaserod      ZOLNORM    Tegaserod hydrogen maleate     Trazodone     Yeast, dried     Adhesive Rash     Band-aids    Cefazolin " Rash     Biopsy-proven LCV     Past Medical History:   Diagnosis Date    Coronary artery disease     Dermatitis     Dermatitis 12/8/2017    Diabetes mellitus     Diabetes mellitus type II     Leah Barr virus infection     Fibromyalgia     GERD (gastroesophageal reflux disease)     Hypertension     MI (myocardial infarction) 09/23/2011    Pure hypercholesterolemia 2/26/2020     Past Surgical History:   Procedure Laterality Date    adenoids      ANGIOPLASTY  1987    APPENDECTOMY      CARDIAC PACEMAKER PLACEMENT  01/12/2017    CATARACT EXTRACTION, BILATERAL Bilateral 9/2/15, 3/17/15    right eye-9/2/15, left eye-3/17/15    CHOLECYSTECTOMY  1987    CORONARY ARTERY BYPASS GRAFT  2006    quadruple    coronary stents  1999    x2    CYST REMOVAL  04/25/2017    on back    HYSTERECTOMY  1966    OVARY SURGERY  1948    Ovary burst & was repaired    RHIZOTOMY  09/14/2018    TONSILLECTOMY  1940     Family History   Problem Relation Name Age of Onset    No Known Problems Mother      No Known Problems Father       Social History[1]  Review of Systems   All other systems reviewed and are negative.      Physical Exam     Initial Vitals   BP Pulse Resp Temp SpO2   07/23/25 1838 07/23/25 1838 07/23/25 1838 07/23/25 1840 07/23/25 1838   (!) 152/77 82 18 98.2 °F (36.8 °C) 99 %      MAP       --                Physical Exam    Nursing note and vitals reviewed.  Constitutional: She appears well-developed. She is not diaphoretic.   HENT:   Head: Normocephalic and atraumatic.   Eyes: Right eye exhibits no discharge. Left eye exhibits no discharge.   Neck: No tracheal deviation present.   Cardiovascular:  Normal rate, regular rhythm and intact distal pulses.           Pulmonary/Chest: Breath sounds normal. No stridor. No respiratory distress.   Abdominal: Abdomen is soft. There is no abdominal tenderness. There is no rebound and no guarding.   Musculoskeletal:         General: No tenderness.     Neurological: She is alert and oriented to  person, place, and time. She has normal strength. No cranial nerve deficit.   Skin: Skin is warm and dry.         ED Course   Procedures  Labs Reviewed   COMPREHENSIVE METABOLIC PANEL - Abnormal       Result Value    Sodium 140      Potassium 3.6      Chloride 101      CO2 27      Glucose 106      BUN 27 (*)     Creatinine 1.5 (*)     Calcium 9.9      Protein Total 8.3      Albumin 4.7      Bilirubin Total 0.5      ALP 79      AST 30      ALT 20      Anion Gap 12      eGFR 34 (*)    URINALYSIS, REFLEX TO URINE CULTURE - Abnormal    Color, UA Yellow      Appearance, UA Clear      Spec Grav UA 1.010      pH, UA 6.0      Protein, UA Negative      Glucose, UA Negative      Ketones, UA Negative      Blood, UA Negative      Bilirubin, UA Negative      Urobilinogen, UA Negative      Nitrites, UA Negative      Leukocyte Esterase, UA Trace (*)    CBC WITH DIFFERENTIAL - Abnormal    WBC 11.14      RBC 4.80      Hgb 13.5      Hct 43.8      MCV 91      MCH 28.1      MCHC 30.8 (*)     RDW 13.2      Platelet Count 302      MPV 10.2      Nucleated RBC 0      Neut % 55.5      Lymph % 32.2      Mono % 9.6      Eos % 1.5      Basophil % 0.7      Imm Grans % 0.5      Neut # 6.2      Lymph # 3.59      Mono # 1.07 (*)     Eos # 0.17      Baso # 0.08      Imm Grans # 0.06 (*)    TROPONIN I HIGH SENSITIVITY - Abnormal    Troponin High Sensitive 31.1 (*)    B-TYPE NATRIURETIC PEPTIDE - Abnormal    BNP 1,423 (*)    INFLUENZA A & B BY MOLECULAR - Normal    INFLUENZA A MOLECULAR Negative      INFLUENZA B MOLECULAR  Negative     SARS-COV-2 RNA AMPLIFICATION, QUAL - Normal    SARS COV-2 Molecular Negative     TSH - Normal    TSH 1.296     CBC W/ AUTO DIFFERENTIAL    Narrative:     The following orders were created for panel order CBC auto differential.  Procedure                               Abnormality         Status                     ---------                               -----------         ------                     CBC with  Differential[[id number]]       Abnormal            Final result                 Please view results for these tests on the individual orders.   URINALYSIS MICROSCOPIC    WBC, UA 3      Bacteria, UA Rare      Squamous Epithelial Cells, UA <1      Microscopic Comment       HEPATITIS C ANTIBODY   HEP C VIRUS HOLD SPECIMEN   HIV 1 / 2 ANTIBODY   HIV VIRUS CONFIRMATION HOLD SPECIMEN   TROPONIN I HIGH SENSITIVITY   LIPID PANEL   CBC W/ AUTO DIFFERENTIAL    Narrative:     The following orders were created for panel order CBC auto differential.  Procedure                               Abnormality         Status                     ---------                               -----------         ------                     CBC with Differential[[id number]]                                                        Please view results for these tests on the individual orders.   COMPREHENSIVE METABOLIC PANEL   CBC WITH DIFFERENTIAL          Imaging Results              X-Ray Chest PA And Lateral (Final result)  Result time 07/23/25 22:09:52   Procedure changed from X-Ray Chest AP Portable     Final result by Kale Rowell MD (07/23/25 22:09:52)                   Impression:      No evidence of acute cardiopulmonary process.      Electronically signed by: Kale Rowell  Date:    07/23/2025  Time:    22:09               Narrative:    EXAMINATION:  XR CHEST PA AND LATERAL    CLINICAL HISTORY:  cardiovascular condition; Encounter for screening for cardiovascular disorders    TECHNIQUE:  PA and lateral views of the chest were performed.    COMPARISON:  Radiograph of the chest from 06/07/2025    FINDINGS:  Heart size and pulmonary vasculature are within normal limits.  A left subclavian multi-lead cardiac conduction device is present with lead terminations overlying the right atrium and right ventricle.  Atherosclerotic calcification is present involving the thoracic aorta.  Postoperative changes with median sternotomy wires are  present.  No evidence of focal consolidation, pneumothorax, or pleural effusion.  No evidence of acute osseous process.                                       Medications   ALPRAZolam tablet 0.25 mg (has no administration in time range)   apixaban tablet 2.5 mg (has no administration in time range)   furosemide tablet 20 mg (has no administration in time range)   levothyroxine tablet 88 mcg (has no administration in time range)   metoprolol succinate (TOPROL-XL) 24 hr split tablet 12.5 mg (has no administration in time range)   pantoprazole EC tablet 40 mg (has no administration in time range)   traMADoL tablet 50 mg (has no administration in time range)   vitamin D 1000 units tablet 1,000 Units (has no administration in time range)   sodium chloride 0.9% flush 10 mL (has no administration in time range)   acetaminophen tablet 650 mg (has no administration in time range)   morphine injection 2 mg (has no administration in time range)   senna-docusate 8.6-50 mg per tablet 1 tablet (has no administration in time range)   ondansetron injection 4 mg (has no administration in time range)   prochlorperazine injection Soln 5 mg (has no administration in time range)   aspirin EC tablet 81 mg (has no administration in time range)   sodium chloride 0.9% flush 10 mL (has no administration in time range)   melatonin tablet 9 mg (has no administration in time range)   naloxone 0.4 mg/mL injection 0.02 mg (has no administration in time range)   glucose chewable tablet 16 g (has no administration in time range)   glucose chewable tablet 24 g (has no administration in time range)   dextrose 50% injection 12.5 g (has no administration in time range)   dextrose 50% injection 25 g (has no administration in time range)   glucagon (human recombinant) injection 1 mg (has no administration in time range)   aluminum-magnesium hydroxide-simethicone 200-200-20 mg/5 mL suspension 30 mL (has no administration in time range)   insulin aspart  U-100 pen 0-5 Units (has no administration in time range)   aspirin tablet 325 mg (325 mg Oral Given 7/23/25 7190)     Medical Decision Making  89-year-old woman presents for fatigue and labile blood pressures.  Her blood pressure here is 150s over 70s, otherwise her vitals are within normal limits, she is intact neurologically she is very well-appearing.  Differential includes medication effect metabolic or endocrine derangement, viral syndrome, I think it is less likely to be ACS but we will evaluate with cardiac marker with her history.  Do not think her jaw pain in his likely to be a atypical cardiac sign.  Do not think she is suffering from a CVA.     Troponin is elevated, I will give her an aspirin, BNP was elevated, I will admit to Hospital Medicine, patient is in agreement with this plan.  I do not think she needs emergent correction of her blood pressure at this time with systolics in the 150s.  Repeat exam she is resting comfortably in bed        Amount and/or Complexity of Data Reviewed  Labs: ordered.  Radiology: ordered and independent interpretation performed. Decision-making details documented in ED Course.  ECG/medicine tests: ordered and independent interpretation performed. Decision-making details documented in ED Course.    Risk  OTC drugs.               ED Course as of 07/24/25 0122   Wed Jul 23, 2025 2022 EKG on my independent interpretation atrial paced rhythm, appears similar to prior from 06/07/2025 [IC]   2242 Patient is resting comfortably in bed still reports no chest pain or shortness of breath, we discussed elevated troponin, discussed admission for ACS rule out versus repeat troponin and discharge home, she is more comfortable being admitted to the hospital I think this is the safer course of action.  I will give her an aspirin. [IC]      ED Course User Index  [IC] Milo Carbajal MD                               Clinical Impression:  Final diagnoses:  [Z13.6] Screening for  cardiovascular condition  [R53.83] Fatigue, unspecified type (Primary)  [R09.89] Labile blood pressure  [R79.89] Elevated troponin          ED Disposition Condition    Observation                       [1]   Social History  Tobacco Use    Smoking status: Never    Smokeless tobacco: Never   Substance Use Topics    Alcohol use: No    Drug use: No        Milo Carbajal MD  07/24/25 0122

## 2025-07-24 NOTE — SUBJECTIVE & OBJECTIVE
Interval History: Patient seen and examined. NAD. Denies chest pain or SOB. Troponins trending downward. Replaced potassium. FRANKLIN improved. Sr Cr back to baseline. Awaiting Cardiology recommendations.     Review of Systems   Constitutional: Negative.    HENT: Negative.     Respiratory: Negative.     Cardiovascular: Negative.    Gastrointestinal: Negative.    Genitourinary: Negative.    Musculoskeletal: Negative.    Neurological: Negative.    Psychiatric/Behavioral: Negative.       Objective:     Vital Signs (Most Recent):  Temp: 98.2 °F (36.8 °C) (07/23/25 1840)  Pulse: 69 (07/24/25 0100)  Resp: 18 (07/24/25 0245)  BP: (!) 150/66 (07/24/25 0100)  SpO2: 95 % (07/24/25 0100) Vital Signs (24h Range):  Temp:  [98.2 °F (36.8 °C)] 98.2 °F (36.8 °C)  Pulse:  [69-82] 69  Resp:  [18] 18  SpO2:  [95 %-100 %] 95 %  BP: (142-187)/(66-79) 150/66     Weight: 60.8 kg (134 lb)  Body mass index is 26.17 kg/m².  No intake or output data in the 24 hours ending 07/24/25 0738      Physical Exam  Vitals reviewed.   Constitutional:       General: She is not in acute distress.     Appearance: Normal appearance. She is not ill-appearing.   HENT:      Head: Normocephalic and atraumatic.      Mouth/Throat:      Mouth: Mucous membranes are moist.   Eyes:      Extraocular Movements: Extraocular movements intact.      Pupils: Pupils are equal, round, and reactive to light.   Cardiovascular:      Comments: Atrial paced  AICD to left chest wall  Pulmonary:      Effort: Pulmonary effort is normal. No respiratory distress.      Breath sounds: No wheezing.   Abdominal:      General: Bowel sounds are normal.      Palpations: Abdomen is soft.      Tenderness: There is no abdominal tenderness.   Musculoskeletal:         General: Normal range of motion.      Cervical back: Neck supple.   Skin:     General: Skin is warm and dry.      Capillary Refill: Capillary refill takes less than 2 seconds.   Neurological:      General: No focal deficit present.       "Mental Status: She is alert. Mental status is at baseline.   Psychiatric:         Mood and Affect: Mood normal.             Significant Labs: All pertinent labs within the past 24 hours have been reviewed.  Blood Culture: No results for input(s): "LABBLOO" in the last 48 hours.  CBC:   Recent Labs   Lab 07/23/25 2039 07/24/25  0529   WBC 11.14 8.79   HGB 13.5 12.0   HCT 43.8 37.5    257     CMP:   Recent Labs   Lab 07/23/25 2039 07/24/25  0528    142   K 3.6 3.4*    104   CO2 27 30*    107   BUN 27* 25*   CREATININE 1.5* 1.3   CALCIUM 9.9 9.2   PROT 8.3 6.9   ALBUMIN 4.7 3.8   BILITOT 0.5 0.5   ALKPHOS 79 66   AST 30 22   ALT 20 13   ANIONGAP 12 8       Significant Imaging: I have reviewed all pertinent imaging results/findings within the past 24 hours.  "

## 2025-07-24 NOTE — ASSESSMENT & PLAN NOTE
-ACS rule out  -cardiology consult  -initial troponin 31.1 continue to trend  -ECG atrial paced with prolonged AV conduction and chronic LBBB  -patient remains free of chest pain  -nitroglycerin PRN chest pain  -aspirin 325 given in ED; restart home medication aspirin 81 mg daily  - 12/2024 stress test ECG uninterpretable due to LBBB and left axis deviation  -05/2025 echocardiogram EF 30-32% with concentric remodeling PAS 39 mm Hg  -TSH 1.29  -continue home medication Eliquis

## 2025-07-24 NOTE — ASSESSMENT & PLAN NOTE
Patient has unknown atrial fibrillation. Patient is currently in atrial paced. [id number] Score: 5. The patients heart rate in the last 24 hours is as follows:  Pulse  Min: 69  Max: 82     Antiarrhythmics  metoprolol succinate (TOPROL-XL) 24 hr split tablet 12.5 mg, Daily, Oral    Anticoagulants  apixaban tablet 2.5 mg, 2 times daily, Oral    Plan  - Replete lytes with a goal of K>4, Mg >2  - Patient is anticoagulated, see medications listed above.  - Patient's afib is currently controlled

## 2025-07-24 NOTE — HOSPITAL COURSE
Karma Chen was monitored closely during her hospitalization. She was admitted hospital medicines for chest pain rule out ACS.  She has a significant cardiac history including ischemic cardiomyopathy, paroxysmal atrial fibrillation on Eliquis, MI, CAD, s/p AICD placement 05/22/2025, HFrEF 30-32%, Pacemaker [employer] 2024, hypertension, chronic kidney disease, diabetes mellitus, hypothyroidism, hyperlipidemia.  The ED up was pertinent for elevated serum creatinine of 1.5 with a baseline of 1.3.  But overnight IV hydration.  Initial mildly bumped troponin 31.1 which has trended downward.She has a chronically elevated troponin, ECG noted atrial paced with prolonged AV conduction, left axis deviation, and chronic LBBB. On 12/2024, stress test ECG portion noted left axis deviation but uninterpretable due to LBBB. On 05/2025, echocardiogram EF 30-32% with concentric remodeling and PAS 39 mm Hg. Cardiology consulted. I spoke with LUIS Greene NP for Cardiology, no intervention planned at this time. Recommend discharge with follow up in Cardiology clinic. Patient was discharged on Hydralazine 5 mg daily as needed for SBP > 160 per cardiology recommendations. Patient will follow up in Cardiology clinic on 7/30/25. Return instructions given, all questions answered. Patient and spouse verbalized understanding.   She was seen on day of dc and deemed appropriate for dc.

## 2025-07-24 NOTE — DISCHARGE SUMMARY
"[facility] - Emergency Dept  Hospital Medicine  Discharge Summary      Patient Name: Karma Chen  MRN: [id number]  KERRIE: [id number]  Patient Class: OP- Observation  Admission Date: 7/23/2025  Hospital Length of Stay: 0 days  Discharge Date and Time: 07/24/2025 9:54 AM  Attending Physician: Al Calderon MD   Discharging Provider: Ewa Leo NP  Primary Care Provider: Elena Sullivan MD    Primary Care Team: Networked reference to record PCT     HPI:   Karma Chen is an 89 year old with the past medical history of s/p AICD placement 05/22/2025, HFrEF 30-32%, Pacemaker Medtronic 2024, ischemic cardiomyopathy, paroxysmal atrial fibrillation on Eliquis, MI, CAD, chronic kidney disease, diabetes mellitus, hypothyroidism, hyperlipidemia, and GERD who presents with complaints of inconsistent blood pressure since earlier today in addition to generalized fatigue for 2 months. She reports having a high blood pressure on Monday and took an extra metoprolol and a nitroglycerin then her blood pressure became low and she felt worse. She reports ever since her AICD surgery which was 2 months ago she has not been feeling well. She reports during the procedure she received an injection of Ancef and 3 days later she developed a rash which was treated by her dermatologist. At this time she reports the rash has resolved. ED MD provider reported patient complained of "minutes long TMJ pain which has resolved," however she denies any pain during time assessment. She denies chest pain, shortness of breath, dizziness, lightheadedness, vision changes, numbness/tingling, abdominal pain, nausea, or vomiting. ED workup included: Chest x-ray no evidence of acute cardiopulmonary process. CBC unremarkable. CMP with BUN 27, creatinine 1.5, and eGFR 34. Baseline creatinine 1.3. She is flu and COVID negative. Urinalysis with trace leukocytes otherwise unremarkable. She has a mildly bumped troponin 31.1. She has a " chronically elevated troponin, about a month ago troponin 18.0 and back in January troponin was in the 600s. ECG noted atrial paced with prolonged AV conduction, left axis deviation, and chronic LBBB. On 12/2024, stress test ECG portion noted left axis deviation but uninterpretable due to LBBB. On 05/2025, echocardiogram EF 30-32% with concentric remodeling and PAS 39 mm Hg. She follows Dr. Ware cardiologist outpatient. Cardiology consult placed. Admitted to hospital medicine for further management and treatment.           * No surgery found *      Hospital Course:   Karma Chen was monitored closely during her hospitalization. She was admitted hospital [facility] for chest pain rule out ACS.  She has a significant cardiac history including ischemic cardiomyopathy, paroxysmal atrial fibrillation on Eliquis, MI, CAD, s/p AICD placement 05/22/2025, HFrEF 30-32%, Pacemaker Medtronic 2024, hypertension, chronic kidney disease, diabetes mellitus, hypothyroidism, hyperlipidemia.  The ED up was pertinent for elevated serum creatinine of 1.5 with a baseline of 1.3.  But overnight IV hydration.  Initial mildly bumped troponin 31.1 which has trended downward.She has a chronically elevated troponin, ECG noted atrial paced with prolonged AV conduction, left axis deviation, and chronic LBBB. On 12/2024, stress test ECG portion noted left axis deviation but uninterpretable due to LBBB. On 05/2025, echocardiogram EF 30-32% with concentric remodeling and PAS 39 mm Hg. Cardiology consulted. I spoke with LUIS Greene NP for Cardiology, no intervention planned at this time. Recommend discharge with follow up in Cardiology clinic. Patient was discharged on Hydralazine 5 mg daily as needed for SBP > 160 per cardiology recommendations. Patient will follow up in Cardiology clinic on 7/30/25. Return instructions given, all questions answered. Patient and spouse verbalized understanding.   She was seen on day of dc and deemed appropriate  for dc.      Goals of Care Treatment Preferences:  Code Status: Full Code         Consults:     Final Active Diagnoses:    Diagnosis Date Noted POA    PRINCIPAL PROBLEM:  Acute kidney injury superimposed on chronic kidney disease [N17.9, N18.9] 12/18/2024 Yes    Generalized weakness [R53.1] 07/24/2025 Yes    Left bundle branch block [I44.7] 12/18/2024 Yes    Elevated troponin [R79.89] 12/11/2024 Yes    PAF (paroxysmal atrial fibrillation) [I48.0] 07/25/2020 Yes     Chronic    Type 2 diabetes mellitus, without long-term current use of insulin [E11.9] 07/25/2020 Yes     Chronic    Acquired hypothyroidism [E03.9] 12/09/2017 Yes    Coronary artery disease involving left main coronary artery [I25.10] 02/20/2012 Yes    Essential hypertension [I10] 02/20/2012 Yes      Problems Resolved During this Admission:       Discharged Condition: good    Disposition: Home or Self Care    Follow Up:   Follow-up Information       Kenton Ware MD Follow up on 7/30/2025.    Specialties: Cardiology, Interventional Cardiology  Why: As scheduled, staff messaged for earlier appt. They will reach out if an earlier appt becomes available.  Contact information:  [address] [place] [address]  CARDIOLOGY [place] [address]  [phone]               Elena Sullivan MD. Schedule an appointment as soon as possible for a visit.    Specialty: Internal Medicine  Why: Call to schedule an appt for a hospital follow.   An attempt was made to schedule a follow up, but no answer was received from the office.  Contact information:  [address]  [facility] [address]  [place] [address]  [phone]               Elena Sullivan MD Follow up.    Specialty: Internal Medicine  Contact information:  [address] [facility] [address]  [facility] [address]  [phone]               [facility] - Emergency Dept Follow up.    Specialty: Emergency Medicine  Why: As needed, If symptoms worsen  Contact information:  [address] [facility]  [address]  [phone]  Additional information:  1st floor                         Patient Instructions:      Diet Cardiac     Notify your health care provider if you experience any of the following:  increased confusion or weakness     Notify your health care provider if you experience any of the following:  persistent dizziness, light-headedness, or visual disturbances     Notify your health care provider if you experience any of the following:  worsening rash     Notify your health care provider if you experience any of the following:  severe persistent headache     Notify your health care provider if you experience any of the following:  difficulty breathing or increased cough     Notify your health care provider if you experience any of the following:  redness, tenderness, or signs of infection (pain, swelling, redness, odor or green/yellow discharge around incision site)     Notify your health care provider if you experience any of the following:  severe uncontrolled pain     Notify your health care provider if you experience any of the following:  persistent nausea and vomiting or diarrhea     Notify your health care provider if you experience any of the following:  temperature >100.4     Activity as tolerated       Significant Diagnostic Studies: N/A    Pending Diagnostic Studies:       Procedure Component Value Units Date/Time    HCV Virus Hold Specimen [[id number]] Collected: 07/23/25 2039    Order Status: Sent Lab Status: In process Updated: 07/23/25 2053    Specimen: Blood     HIV 1/2 Ag/Ab (4th Gen) [[id number]] Collected: 07/23/25 2039    Order Status: Sent Lab Status: In process Updated: 07/23/25 2053    Specimen: Blood     HIV Virus Confirmation Hold Specimen [[id number]] Collected: 07/23/25 2039    Order Status: Sent Lab Status: In process Updated: 07/23/25 2053    Specimen: Blood     Hepatitis C Antibody [[id number]] Collected: 07/23/25 2039    Order Status: Sent Lab Status: In process Updated:  07/23/25 2053    Specimen: Blood            Medications:  Reconciled Home Medications:      Medication List        START taking these medications      hydrALAZINE 10 MG tablet  Commonly known as: APRESOLINE  Take 0.5 tablets (5 mg total) by mouth every 12 (twelve) hours as needed (systolic blood pressure greater than 160).            CHANGE how you take these medications      furosemide 20 MG tablet  Commonly known as: LASIX  Take 1 tablet (20 mg total) by mouth once daily.  What changed: when to take this     omeprazole 20 MG capsule  Commonly known as: PRILOSEC  Take 1 capsule (20 mg total) by mouth once daily.  What changed: when to take this            CONTINUE taking these medications      acetaminophen 650 MG Tbsr  Commonly known as: TYLENOL  Take 650 mg by mouth every 6 (six) hours as needed for Pain.     ALPRAZolam 0.25 MG tablet  Commonly known as: XANAX  TAKE 1 TABLET BY MOUTH EVERY DAY AS NEEDED FOR ANXIETY     aspirin 81 MG EC tablet  Commonly known as: ECOTRIN  Take 1 tablet (81 mg total) by mouth once daily.     bisacodyL 5 mg EC tablet  Commonly known as: DULCOLAX  Take 5 mg by mouth 2 (two) times a day.     busPIRone 5 MG Tab  Commonly known as: BUSPAR  Take 5 mg by mouth 3 (three) times daily.     clobetasol 0.05% 0.05 % Oint  Commonly known as: TEMOVATE  Apply twice a day to to rash on left arm and back.  Avoid face and skin folds.     coenzyme Q10 100 mg capsule  Take 1 capsule (100 mg total) by mouth 2 (two) times daily.     cyanocobalamin 1,000 mcg/mL injection  Inject 1,000 mcg into the muscle.     ELIQUIS 2.5 mg Tab  Generic drug: apixaban  Take 1 tablet (2.5 mg total) by mouth 2 (two) times daily.     JARDIANCE 25 mg tablet  Generic drug: empagliflozin  Take 25 mg by mouth.     Lactobacillus rhamnosus GG 10 billion cell capsule  Commonly known as: CULTURELLE  Take 1 capsule by mouth every morning.     levothyroxine 88 MCG tablet  Commonly known as: SYNTHROID  Take 1 tablet (88 mcg total) by  mouth once daily.     loratadine 10 mg tablet  Commonly known as: CLARITIN  Take 10 mg by mouth once daily.     MAGNESIUM ORAL  Take 1,000 mg by mouth once daily.     metoprolol succinate 25 MG 24 hr tablet  Commonly known as: TOPROL-XL  Take 0.5 tablets (12.5 mg total) by mouth once daily.     MG-PLUS-PROTEIN 133 mg tablet  Generic drug: magnesium oxide-Mg AA chelate  Take by mouth.     montelukast 10 mg tablet  Commonly known as: SINGULAIR  TAKE 1 TABLET BY MOUTH EVERY DAY IN THE EVENING     multivitamin tablet  Commonly known as: THERAGRAN  Take 1 tablet by mouth 2 (two) times a day.     nitroGLYCERIN 0.4 MG SL tablet  Commonly known as: NITROSTAT  Place 1 tablet (0.4 mg total) under the tongue every 5 (five) minutes as needed for Chest pain.     nystatin-triamcinolone cream  Commonly known as: MYCOLOG II  Apply topically as needed.     pantoprazole 20 MG tablet  Commonly known as: PROTONIX  Take 20 mg by mouth every morning.     tiZANidine 4 MG tablet  Commonly known as: ZANAFLEX  Take 4 mg by mouth nightly as needed (muscle spasms).     traMADoL 50 mg tablet  Commonly known as: ULTRAM  Take 50 mg by mouth 2 (two) times daily as needed for Pain.     TURMERIC ORAL  Take 200 mg by mouth once daily.     vitamin D 1000 units Tab  Commonly known as: VITAMIN D3  Take 1,000 Units by mouth.            STOP taking these medications      multivitamin per tablet  Commonly known as: THERAGRAN     UNABLE TO FIND     UNABLE TO FIND              Indwelling Lines/Drains at time of discharge:   Lines/Drains/Airways       Drain  Duration             Female External Urinary Catheter w/ Suction 07/24/25 0315 <1 day                    Time spent on the discharge of patient: 35 minutes        Ewa Leo NP  Department of Hospital Medicine  [facility] - Emergency Dept

## 2025-07-24 NOTE — SUBJECTIVE & OBJECTIVE
"Past Medical History:   Diagnosis Date    Coronary artery disease     Dermatitis     Dermatitis 12/8/2017    Diabetes mellitus     Diabetes mellitus type II     Leah Barr virus infection     Fibromyalgia     GERD (gastroesophageal reflux disease)     Hypertension     MI (myocardial infarction) 09/23/2011    Pure hypercholesterolemia 2/26/2020       Past Surgical History:   Procedure Laterality Date    adenoids      ANGIOPLASTY  1987    APPENDECTOMY      CARDIAC PACEMAKER PLACEMENT  01/12/2017    CATARACT EXTRACTION, BILATERAL Bilateral 9/2/15, 3/17/15    right eye-9/2/15, left eye-3/17/15    CHOLECYSTECTOMY  1987    CORONARY ARTERY BYPASS GRAFT  2006    quadruple    coronary stents  1999    x2    CYST REMOVAL  04/25/2017    on back    HYSTERECTOMY  1966    OVARY SURGERY  1948    Ovary burst & was repaired    RHIZOTOMY  09/14/2018    TONSILLECTOMY  1940       Review of patient's allergies indicates:   Allergen Reactions    Cephalosporins Hives    Amiodarone Other (See Comments)     "KNOCKED ME OUT"    Candida albicans Other (See Comments)    Clindamycin     Diclofenac-misoprostol      Other reaction(s): Not available    Doxycycline     Effexor [venlafaxine]     Empagliflozin Other (See Comments)     "MADE ME VERY WEAK"    Estradiol     Flagyl [metronidazole]     Fluconazole     Gabapentin     Iodinated contrast media Other (See Comments)    Iodine     Levaquin [levofloxacin]     Nexium [esomeprazole magnesium]     Nexletol [bempedoic acid] Other (See Comments)    Penicillins     Red yeast rice (monascus purpureus)     Shellfish containing products     Statins-hmg-coa reductase inhibitors     Sulfa (sulfonamide antibiotics)     Tea tree oil     Tegaserod      ZOLNORM    Tegaserod hydrogen maleate     Trazodone     Yeast, dried     Adhesive Rash     Band-aids    Cefazolin Rash     Biopsy-proven LCV       Current Facility-Administered Medications on File Prior to Encounter   Medication    evolocumab PnIj 140 mg "     Current Outpatient Medications on File Prior to Encounter   Medication Sig    acetaminophen (TYLENOL) 650 MG TbSR Take 650 mg by mouth every 6 (six) hours as needed for Pain.    ALPRAZolam (XANAX) 0.25 MG tablet TAKE 1 TABLET BY MOUTH EVERY DAY AS NEEDED FOR ANXIETY    amiodarone (PACERONE) 200 MG Tab Take 1 tablet (200 mg total) by mouth 2 (two) times daily. (Patient not taking: Reported on 3/10/2025)    apixaban (ELIQUIS) 2.5 mg Tab Take 1 tablet (2.5 mg total) by mouth 2 (two) times daily.    ascorbic acid, vitamin C, (VITAMIN C) 1000 MG tablet Take 1,000 mg by mouth once daily. (Patient not taking: Reported on 4/30/2025)    aspirin (ECOTRIN) 81 MG EC tablet Take 1 tablet (81 mg total) by mouth once daily.    benzonatate (TESSALON) 100 MG capsule Take 100 mg by mouth 3 (three) times daily as needed for Cough. (Patient not taking: Reported on 4/30/2025)    bisacodyL (DULCOLAX) 5 mg EC tablet Take 5 mg by mouth 2 (two) times a day.    clobetasol 0.05% (TEMOVATE) 0.05 % Oint Apply twice a day to to rash on left arm and back.  Avoid face and skin folds.    coenzyme Q10 100 mg capsule Take 1 capsule (100 mg total) by mouth 2 (two) times daily.    cyanocobalamin 1,000 mcg/mL injection Inject 1,000 mcg into the muscle.    diclofenac sodium (VOLTAREN ARTHRITIS PAIN) 1 % Gel Apply 2 g topically daily as needed (pain). (Patient not taking: Reported on 4/30/2025)    ECHINACEA ORAL Take 750 mg by mouth 2 (two) times a day. (Patient not taking: Reported on 5/29/2025)    furosemide (LASIX) 20 MG tablet Take 1 tablet (20 mg total) by mouth once daily.    JARDIANCE 25 mg tablet Take 25 mg by mouth.    Lactobacillus rhamnosus GG (CULTURELLE) 10 billion cell capsule Take 1 capsule by mouth every morning.    levothyroxine (SYNTHROID) 88 MCG tablet Take 1 tablet (88 mcg total) by mouth once daily.    LIDOcaine (LIDODERM) 5 % Place 1 patch onto the skin once daily. Remove & Discard patch within 12 hours or as directed by MD  (Patient not taking: Reported on 5/29/2025)    loratadine (CLARITIN) 10 mg tablet Take 10 mg by mouth once daily.    MAGNESIUM ORAL Take 1,000 mg by mouth once daily.    magnesium oxide-Mg AA chelate (MG-PLUS-PROTEIN) 133 mg Tab Take by mouth.    metoprolol succinate (TOPROL-XL) 25 MG 24 hr tablet Take 0.5 tablets (12.5 mg total) by mouth once daily.    montelukast (SINGULAIR) 10 mg tablet TAKE 1 TABLET BY MOUTH EVERY DAY IN THE EVENING    multivitamin (THERAGRAN) per tablet Take 1 tablet by mouth.    multivitamin (THERAGRAN) tablet Take 1 tablet by mouth 2 (two) times a day.     naloxone (NARCAN) 4 mg/actuation Spry 1 spray by Nasal route daily as needed. (Patient not taking: Reported on 5/29/2025)    nitroGLYCERIN (NITROSTAT) 0.4 MG SL tablet Place 1 tablet (0.4 mg total) under the tongue every 5 (five) minutes as needed for Chest pain.    nystatin-triamcinolone (MYCOLOG II) cream Apply topically as needed.    omeprazole (PRILOSEC) 20 MG capsule Take 1 capsule (20 mg total) by mouth once daily. (Patient taking differently: Take 20 mg by mouth every evening.)    pantoprazole (PROTONIX) 20 MG tablet Take 20 mg by mouth every morning.    tiZANidine (ZANAFLEX) 4 MG tablet Take 4 mg by mouth nightly as needed (muscle spasms).    traMADoL (ULTRAM) 50 mg tablet Take 50 mg by mouth 2 (two) times daily as needed for Pain.    TURMERIC ORAL Take 200 mg by mouth once daily.    UNABLE TO FIND Take 1 capsule by mouth once daily. Joint health (Patient not taking: Reported on 5/29/2025)    UNABLE TO FIND Take 1 capsule by mouth 2 (two) times a day. Cardio platinum    vitamin D (VITAMIN D3) 1000 units Tab Take 1,000 Units by mouth.     Family History       Problem Relation (Age of Onset)    No Known Problems Mother, Father          Tobacco Use    Smoking status: Never    Smokeless tobacco: Never   Substance and Sexual Activity    Alcohol use: No    Drug use: No    Sexual activity: Not on file     Review of Systems    Constitutional:  Negative for activity change, appetite change, chills, diaphoresis and fatigue.   HENT:  Negative for congestion and rhinorrhea.    Eyes:  Negative for visual disturbance.   Respiratory:  Negative for cough and shortness of breath.    Cardiovascular:  Negative for chest pain and leg swelling.   Gastrointestinal:  Negative for abdominal distention, abdominal pain, nausea and vomiting.   Genitourinary:  Negative for difficulty urinating.   Musculoskeletal:  Positive for arthralgias. Negative for back pain.   Skin:  Negative for rash.   Neurological:  Positive for weakness. Negative for dizziness, seizures, syncope, facial asymmetry, speech difficulty, light-headedness, numbness and headaches.     Objective:     Vital Signs (Most Recent):  Temp: 98.2 °F (36.8 °C) (07/23/25 1840)  Pulse: 75 (07/24/25 0000)  Resp: 18 (07/23/25 1838)  BP: (!) 160/69 (07/24/25 0000)  SpO2: 100 % (07/24/25 0000) Vital Signs (24h Range):  Temp:  [98.2 °F (36.8 °C)] 98.2 °F (36.8 °C)  Pulse:  [69-82] 75  Resp:  [18] 18  SpO2:  [96 %-100 %] 100 %  BP: (142-187)/(66-79) 160/69     Weight: 60.8 kg (134 lb)  Body mass index is 26.17 kg/m².     Physical Exam  Vitals and nursing note reviewed.   Constitutional:       Appearance: She is not ill-appearing.   Eyes:      Pupils: Pupils are equal, round, and reactive to light.   Cardiovascular:      Rate and Rhythm: Normal rate.      Pulses: Normal pulses.      Heart sounds: Normal heart sounds.      Comments: Atrial Paced; Left chest AICD  Pulmonary:      Effort: Pulmonary effort is normal. No respiratory distress.      Breath sounds: Normal breath sounds. No wheezing.   Abdominal:      General: Bowel sounds are normal. There is no distension.      Palpations: Abdomen is soft.      Tenderness: There is no abdominal tenderness.   Musculoskeletal:      Right lower leg: No edema.      Left lower leg: No edema.   Skin:     General: Skin is dry.      Capillary Refill: Capillary refill  takes less than 2 seconds.   Neurological:      Mental Status: She is alert and oriented to person, place, and time.      GCS: GCS eye subscore is 4. GCS verbal subscore is 5. GCS motor subscore is 6.      Motor: Weakness present.              CRANIAL NERVES     CN III, IV, VI   Pupils are equal, round, and reactive to light.       Significant Labs: All pertinent labs within the past 24 hours have been reviewed.    Bilirubin:   Recent Labs   Lab 07/23/25 2039   BILITOT 0.5       BMP:   Recent Labs   Lab 07/23/25 2039         K 3.6      CO2 27   BUN 27*   CREATININE 1.5*   CALCIUM 9.9     CBC:   Recent Labs   Lab 07/23/25 2039   WBC 11.14   HGB 13.5   HCT 43.8        CMP:   Recent Labs   Lab 07/23/25 2039      K 3.6      CO2 27      BUN 27*   CREATININE 1.5*   CALCIUM 9.9   PROT 8.3   ALBUMIN 4.7   BILITOT 0.5   ALKPHOS 79   AST 30   ALT 20   ANIONGAP 12     Cardiac Markers:   Recent Labs   Lab 07/23/25  2312   BNP 1,423*       TSH:   Recent Labs   Lab 07/23/25 2039   TSH 1.296       Urine Studies:   Recent Labs   Lab 07/23/25 2039   APPEARANCEUA Clear   SPECGRAV 1.010   PROTEINUA Negative   BILIRUBINUA Negative   UROBILINOGEN Negative   LEUKOCYTESUR Trace*   WBCUA 3   BACTERIA Rare       Significant Imaging: I have reviewed all pertinent imaging results/findings within the past 24 hours.    ..  Imaging Results              X-Ray Chest PA And Lateral (Final result)  Result time 07/23/25 22:09:52   Procedure changed from X-Ray Chest AP Portable     Final result by Kale Rowell MD (07/23/25 22:09:52)                   Impression:      No evidence of acute cardiopulmonary process.      Electronically signed by: Kale Rowell  Date:    07/23/2025  Time:    22:09               Narrative:    EXAMINATION:  XR CHEST PA AND LATERAL    CLINICAL HISTORY:  cardiovascular condition; Encounter for screening for cardiovascular disorders    TECHNIQUE:  PA and lateral  views of the chest were performed.    COMPARISON:  Radiograph of the chest from 06/07/2025    FINDINGS:  Heart size and pulmonary vasculature are within normal limits.  A left subclavian multi-lead cardiac conduction device is present with lead terminations overlying the right atrium and right ventricle.  Atherosclerotic calcification is present involving the thoracic aorta.  Postoperative changes with median sternotomy wires are present.  No evidence of focal consolidation, pneumothorax, or pleural effusion.  No evidence of acute osseous process.

## 2025-07-24 NOTE — ASSESSMENT & PLAN NOTE
FRANKLIN is likely due to pre-renal azotemia due to dehydration. Baseline creatinine is 1.3. Most recent creatinine and eGFR are listed below.  Recent Labs     07/23/25 2039   CREATININE 1.5*   EGFRNORACEVR 34*      Plan  - FRANKLIN is stable  - Avoid nephrotoxins and renally dose meds for GFR listed above  - Monitor urine output, serial BMP, and adjust therapy as needed  -hold off on IV fluids hydration due to history of CHF  -encourage oral p.o. intake

## 2025-07-24 NOTE — ASSESSMENT & PLAN NOTE
-chronic condition noted  -ECG noted atrial paced with prolonged AV conduction w/left axis deviation and LBBB

## 2025-07-24 NOTE — DISCHARGE INSTRUCTIONS
Discharge Instructions, [facility]    Thank you for choosing [facility] for your medical care. The primary doctor who is taking care of you at the time of your discharge is Al Calderon MD.     You were admitted to the hospital with Acute kidney injury superimposed on chronic kidney disease, hypertension    Continue home scheduled Metoprolol succinate 12.5 mg daily  May take Hydralazine 5 mg by mouth daily as needed for systolic blood pressure greater than 160  Keep blood pressure diary, bring with you to Cardiology clinic on 7/30/25.  Stay hydrated  Follow up with PCP next week    Please note your discharge instructions, including diet/activity restrictions, follow-up appointments, and medication changes.  If you have any questions about your medical issues, prescriptions, or any other questions, please feel free to contact the [facility] Medicine Dept at [phone] and we will help.    If you are previously with Home health, outpatient PT/OT or under a therapy program, you are cleared to return to those programs.    Please direct all long term medication refills and follow up to your primary care provider, Elena Sullivan MD. Thank you again for letting us take care of your health care needs.    Please note the following discharge instructions per your discharging physician-  SURENDRA Oseguera

## 2025-07-24 NOTE — HPI
"Karma Chen is an 89 year old with the past medical history of s/p AICD placement 05/22/2025, HFrEF 30-32%, Pacemaker Medtronic 2024, ischemic cardiomyopathy, paroxysmal atrial fibrillation on Eliquis, MI, CAD, chronic kidney disease, diabetes mellitus, hypothyroidism, hyperlipidemia, and GERD who presents with complaints of inconsistent blood pressure since earlier today in addition to generalized fatigue for 2 months. She reports having a high blood pressure on Monday and took an extra metoprolol and a nitroglycerin then her blood pressure became low and she felt worse. She reports ever since her AICD surgery which was 2 months ago she has not been feeling well. She reports during the procedure she received an injection of Ancef and 3 days later she developed a rash which was treated by her dermatologist. At this time she reports the rash has resolved. ED MD provider reported patient complained of "minutes long TMJ pain which has resolved," however she denies any pain during time assessment. She denies chest pain, shortness of breath, dizziness, lightheadedness, vision changes, numbness/tingling, abdominal pain, nausea, or vomiting. ED workup included: Chest x-ray no evidence of acute cardiopulmonary process. CBC unremarkable. CMP with BUN 27, creatinine 1.5, and eGFR 34. Baseline creatinine 1.3. She is flu and COVID negative. Urinalysis with trace leukocytes otherwise unremarkable. She has a mildly bumped troponin 31.1. She has a chronically elevated troponin, about a month ago troponin 18.0 and back in January troponin was in the 600s. ECG noted atrial paced with prolonged AV conduction, left axis deviation, and chronic LBBB. On 12/2024, stress test ECG portion noted left axis deviation but uninterpretable due to LBBB. On 05/2025, echocardiogram EF 30-32% with concentric remodeling and PAS 39 mm Hg. She follows Dr. Ware cardiologist outpatient. Cardiology consult placed. Admitted to hospital medicine for " further management and treatment.

## 2025-07-24 NOTE — H&P
"  [facility] - Emergency Dept  Hospital Medicine  History & Physical    Patient Name: Karma Chen  MRN: [id number]  Patient Class: OP- Observation  Admission Date: 7/23/2025  Attending Physician: Flaquita Morales MD   Primary Care Provider: Elena Sullivan MD         Patient information was obtained from patient, past medical records, and ER records.     Subjective:     Principal Problem:Acute kidney injury superimposed on chronic kidney disease    Chief Complaint:   Chief Complaint   Patient presents with    Blood Pressure Issues     Patient states that her blood pressure has been inconsistent all day today.  She says it has been either high or low and she "just don't feel good" - she does take HTN medication         HPI: Karma Chen is an 89 year old with the past medical history of s/p AICD placement 05/22/2025, HFrEF 30-32%, Pacemaker Medtronic 2024, ischemic cardiomyopathy, paroxysmal atrial fibrillation on Eliquis, MI, CAD, chronic kidney disease, diabetes mellitus, hypothyroidism, hyperlipidemia, and GERD who presents with complaints of inconsistent blood pressure since earlier today in addition to generalized fatigue for 2 months. She reports having a high blood pressure on Monday and took an extra metoprolol and a nitroglycerin then her blood pressure became low and she felt worse. She reports ever since her AICD surgery which was 2 months ago she has not been feeling well. She reports during the procedure she received an injection of Ancef and 3 days later she developed a rash which was treated by her dermatologist. At this time she reports the rash has resolved. ED MD provider reported patient complained of "minutes long TMJ pain which has resolved," however she denies any pain during time assessment. She denies chest pain, shortness of breath, dizziness, lightheadedness, vision changes, numbness/tingling, abdominal pain, nausea, or vomiting. ED workup included: Chest x-ray no evidence of " "acute cardiopulmonary process. CBC unremarkable. CMP with BUN 27, creatinine 1.5, and eGFR 34. Baseline creatinine 1.3. She is flu and COVID negative. Urinalysis with trace leukocytes otherwise unremarkable. She has a mildly bumped troponin 31.1. She has a chronically elevated troponin, about a month ago troponin 18.0 and back in January troponin was in the 600s. ECG noted atrial paced with prolonged AV conduction, left axis deviation, and chronic LBBB. On 12/2024, stress test ECG portion noted left axis deviation but uninterpretable due to LBBB. On 05/2025, echocardiogram EF 30-32% with concentric remodeling and PAS 39 mm Hg. She follows Dr. Ware cardiologist outpatient. Cardiology consult placed. Admitted to hospital medicine for further management and treatment.           Past Medical History:   Diagnosis Date    Coronary artery disease     Dermatitis     Dermatitis 12/8/2017    Diabetes mellitus     Diabetes mellitus type II     Leah Barr virus infection     Fibromyalgia     GERD (gastroesophageal reflux disease)     Hypertension     MI (myocardial infarction) 09/23/2011    Pure hypercholesterolemia 2/26/2020       Past Surgical History:   Procedure Laterality Date    adenoids      ANGIOPLASTY  1987    APPENDECTOMY      CARDIAC PACEMAKER PLACEMENT  01/12/2017    CATARACT EXTRACTION, BILATERAL Bilateral 9/2/15, 3/17/15    right eye-9/2/15, left eye-3/17/15    CHOLECYSTECTOMY  1987    CORONARY ARTERY BYPASS GRAFT  2006    quadruple    coronary stents  1999    x2    CYST REMOVAL  04/25/2017    on back    HYSTERECTOMY  1966    OVARY SURGERY  1948    Ovary burst & was repaired    RHIZOTOMY  09/14/2018    TONSILLECTOMY  1940       Review of patient's allergies indicates:   Allergen Reactions    Cephalosporins Hives    Amiodarone Other (See Comments)     "KNOCKED ME OUT"    Candida albicans Other (See Comments)    Clindamycin     Diclofenac-misoprostol      Other reaction(s): Not available    Doxycycline     " "Effexor [venlafaxine]     Empagliflozin Other (See Comments)     "MADE ME VERY WEAK"    Estradiol     Flagyl [metronidazole]     Fluconazole     Gabapentin     Iodinated contrast media Other (See Comments)    Iodine     Levaquin [levofloxacin]     Nexium [esomeprazole magnesium]     Nexletol [bempedoic acid] Other (See Comments)    Penicillins     Red yeast rice (monascus purpureus)     Shellfish containing products     Statins-hmg-coa reductase inhibitors     Sulfa (sulfonamide antibiotics)     Tea tree oil     Tegaserod      ZOLNORM    Tegaserod hydrogen maleate     Trazodone     Yeast, dried     Adhesive Rash     Band-aids    Cefazolin Rash     Biopsy-proven LCV       Current Facility-Administered Medications on File Prior to Encounter   Medication    evolocumab PnIj 140 mg     Current Outpatient Medications on File Prior to Encounter   Medication Sig    acetaminophen (TYLENOL) 650 MG TbSR Take 650 mg by mouth every 6 (six) hours as needed for Pain.    ALPRAZolam (XANAX) 0.25 MG tablet TAKE 1 TABLET BY MOUTH EVERY DAY AS NEEDED FOR ANXIETY    amiodarone (PACERONE) 200 MG Tab Take 1 tablet (200 mg total) by mouth 2 (two) times daily. (Patient not taking: Reported on 3/10/2025)    apixaban (ELIQUIS) 2.5 mg Tab Take 1 tablet (2.5 mg total) by mouth 2 (two) times daily.    ascorbic acid, vitamin C, (VITAMIN C) 1000 MG tablet Take 1,000 mg by mouth once daily. (Patient not taking: Reported on 4/30/2025)    aspirin (ECOTRIN) 81 MG EC tablet Take 1 tablet (81 mg total) by mouth once daily.    benzonatate (TESSALON) 100 MG capsule Take 100 mg by mouth 3 (three) times daily as needed for Cough. (Patient not taking: Reported on 4/30/2025)    bisacodyL (DULCOLAX) 5 mg EC tablet Take 5 mg by mouth 2 (two) times a day.    clobetasol 0.05% (TEMOVATE) 0.05 % Oint Apply twice a day to to rash on left arm and back.  Avoid face and skin folds.    coenzyme Q10 100 mg capsule Take 1 capsule (100 mg total) by mouth 2 (two) times " daily.    cyanocobalamin 1,000 mcg/mL injection Inject 1,000 mcg into the muscle.    diclofenac sodium (VOLTAREN ARTHRITIS PAIN) 1 % Gel Apply 2 g topically daily as needed (pain). (Patient not taking: Reported on 4/30/2025)    ECHINACEA ORAL Take 750 mg by mouth 2 (two) times a day. (Patient not taking: Reported on 5/29/2025)    furosemide (LASIX) 20 MG tablet Take 1 tablet (20 mg total) by mouth once daily.    JARDIANCE 25 mg tablet Take 25 mg by mouth.    Lactobacillus rhamnosus GG (CULTURELLE) 10 billion cell capsule Take 1 capsule by mouth every morning.    levothyroxine (SYNTHROID) 88 MCG tablet Take 1 tablet (88 mcg total) by mouth once daily.    LIDOcaine (LIDODERM) 5 % Place 1 patch onto the skin once daily. Remove & Discard patch within 12 hours or as directed by MD (Patient not taking: Reported on 5/29/2025)    loratadine (CLARITIN) 10 mg tablet Take 10 mg by mouth once daily.    MAGNESIUM ORAL Take 1,000 mg by mouth once daily.    magnesium oxide-Mg AA chelate (MG-PLUS-PROTEIN) 133 mg Tab Take by mouth.    metoprolol succinate (TOPROL-XL) 25 MG 24 hr tablet Take 0.5 tablets (12.5 mg total) by mouth once daily.    montelukast (SINGULAIR) 10 mg tablet TAKE 1 TABLET BY MOUTH EVERY DAY IN THE EVENING    multivitamin (THERAGRAN) per tablet Take 1 tablet by mouth.    multivitamin (THERAGRAN) tablet Take 1 tablet by mouth 2 (two) times a day.     naloxone (NARCAN) 4 mg/actuation Spry 1 spray by Nasal route daily as needed. (Patient not taking: Reported on 5/29/2025)    nitroGLYCERIN (NITROSTAT) 0.4 MG SL tablet Place 1 tablet (0.4 mg total) under the tongue every 5 (five) minutes as needed for Chest pain.    nystatin-triamcinolone (MYCOLOG II) cream Apply topically as needed.    omeprazole (PRILOSEC) 20 MG capsule Take 1 capsule (20 mg total) by mouth once daily. (Patient taking differently: Take 20 mg by mouth every evening.)    pantoprazole (PROTONIX) 20 MG tablet Take 20 mg by mouth every morning.     tiZANidine (ZANAFLEX) 4 MG tablet Take 4 mg by mouth nightly as needed (muscle spasms).    traMADoL (ULTRAM) 50 mg tablet Take 50 mg by mouth 2 (two) times daily as needed for Pain.    TURMERIC ORAL Take 200 mg by mouth once daily.    UNABLE TO FIND Take 1 capsule by mouth once daily. Joint health (Patient not taking: Reported on 5/29/2025)    UNABLE TO FIND Take 1 capsule by mouth 2 (two) times a day. Cardio platinum    vitamin D (VITAMIN D3) 1000 units Tab Take 1,000 Units by mouth.     Family History       Problem Relation (Age of Onset)    No Known Problems Mother, Father          Tobacco Use    Smoking status: Never    Smokeless tobacco: Never   Substance and Sexual Activity    Alcohol use: No    Drug use: No    Sexual activity: Not on file     Review of Systems   Constitutional:  Negative for activity change, appetite change, chills, diaphoresis and fatigue.   HENT:  Negative for congestion and rhinorrhea.    Eyes:  Negative for visual disturbance.   Respiratory:  Negative for cough and shortness of breath.    Cardiovascular:  Negative for chest pain and leg swelling.   Gastrointestinal:  Negative for abdominal distention, abdominal pain, nausea and vomiting.   Genitourinary:  Negative for difficulty urinating.   Musculoskeletal:  Positive for arthralgias. Negative for back pain.   Skin:  Negative for rash.   Neurological:  Positive for weakness. Negative for dizziness, seizures, syncope, facial asymmetry, speech difficulty, light-headedness, numbness and headaches.     Objective:     Vital Signs (Most Recent):  Temp: 98.2 °F (36.8 °C) (07/23/25 1840)  Pulse: 75 (07/24/25 0000)  Resp: 18 (07/23/25 1838)  BP: (!) 160/69 (07/24/25 0000)  SpO2: 100 % (07/24/25 0000) Vital Signs (24h Range):  Temp:  [98.2 °F (36.8 °C)] 98.2 °F (36.8 °C)  Pulse:  [69-82] 75  Resp:  [18] 18  SpO2:  [96 %-100 %] 100 %  BP: (142-187)/(66-79) 160/69     Weight: 60.8 kg (134 lb)  Body mass index is 26.17 kg/m².     Physical Exam  Vitals  and nursing note reviewed.   Constitutional:       Appearance: She is not ill-appearing.   Eyes:      Pupils: Pupils are equal, round, and reactive to light.   Cardiovascular:      Rate and Rhythm: Normal rate.      Pulses: Normal pulses.      Heart sounds: Normal heart sounds.      Comments: Atrial Paced; Left chest AICD  Pulmonary:      Effort: Pulmonary effort is normal. No respiratory distress.      Breath sounds: Normal breath sounds. No wheezing.   Abdominal:      General: Bowel sounds are normal. There is no distension.      Palpations: Abdomen is soft.      Tenderness: There is no abdominal tenderness.   Musculoskeletal:      Right lower leg: No edema.      Left lower leg: No edema.   Skin:     General: Skin is dry.      Capillary Refill: Capillary refill takes less than 2 seconds.   Neurological:      Mental Status: She is alert and oriented to person, place, and time.      GCS: GCS eye subscore is 4. GCS verbal subscore is 5. GCS motor subscore is 6.      Motor: Weakness present.              CRANIAL NERVES     CN III, IV, VI   Pupils are equal, round, and reactive to light.       Significant Labs: All pertinent labs within the past 24 hours have been reviewed.    Bilirubin:   Recent Labs   Lab 07/23/25 2039   BILITOT 0.5       BMP:   Recent Labs   Lab 07/23/25 2039         K 3.6      CO2 27   BUN 27*   CREATININE 1.5*   CALCIUM 9.9     CBC:   Recent Labs   Lab 07/23/25 2039   WBC 11.14   HGB 13.5   HCT 43.8        CMP:   Recent Labs   Lab 07/23/25 2039      K 3.6      CO2 27      BUN 27*   CREATININE 1.5*   CALCIUM 9.9   PROT 8.3   ALBUMIN 4.7   BILITOT 0.5   ALKPHOS 79   AST 30   ALT 20   ANIONGAP 12     Cardiac Markers:   Recent Labs   Lab 07/23/25  2312   BNP 1,423*       TSH:   Recent Labs   Lab 07/23/25 2039   TSH 1.296       Urine Studies:   Recent Labs   Lab 07/23/25 2039   APPEARANCEUA Clear   SPECGRAV 1.010   PROTEINUA Negative   BILIRUBINUA  Negative   UROBILINOGEN Negative   LEUKOCYTESUR Trace*   WBCUA 3   BACTERIA Rare       Significant Imaging: I have reviewed all pertinent imaging results/findings within the past 24 hours.    ..  Imaging Results              X-Ray Chest PA And Lateral (Final result)  Result time 07/23/25 22:09:52   Procedure changed from X-Ray Chest AP Portable     Final result by Kale Rowell MD (07/23/25 22:09:52)                   Impression:      No evidence of acute cardiopulmonary process.      Electronically signed by: Kale Rowell  Date:    07/23/2025  Time:    22:09               Narrative:    EXAMINATION:  XR CHEST PA AND LATERAL    CLINICAL HISTORY:  cardiovascular condition; Encounter for screening for cardiovascular disorders    TECHNIQUE:  PA and lateral views of the chest were performed.    COMPARISON:  Radiograph of the chest from 06/07/2025    FINDINGS:  Heart size and pulmonary vasculature are within normal limits.  A left subclavian multi-lead cardiac conduction device is present with lead terminations overlying the right atrium and right ventricle.  Atherosclerotic calcification is present involving the thoracic aorta.  Postoperative changes with median sternotomy wires are present.  No evidence of focal consolidation, pneumothorax, or pleural effusion.  No evidence of acute osseous process.                                      Assessment/Plan:     Assessment & Plan  Acute kidney injury superimposed on chronic kidney disease  FRANKLIN is likely due to pre-renal azotemia due to dehydration. Baseline creatinine is 1.3. Most recent creatinine and eGFR are listed below.  Recent Labs     07/23/25 2039   CREATININE 1.5*   EGFRNORACEVR 34*      Plan  - FRANKLIN is stable  - Avoid nephrotoxins and renally dose meds for GFR listed above  - Monitor urine output, serial BMP, and adjust therapy as needed  -hold off on IV fluid hydration due to history CHF  -encourage p.o. hydration    Essential hypertension  Patient's  "blood pressure range in the last 24 hours was: BP  Min: 142/66  Max: 187/79.The patient's inpatient anti-hypertensive regimen is listed below:  Current Antihypertensives  furosemide tablet 20 mg, Daily, Oral  metoprolol succinate (TOPROL-XL) 24 hr split tablet 12.5 mg, Daily, Oral    Plan  - BP is controlled, no changes needed to their regimen    Coronary artery disease involving left main coronary artery  Patient with known CAD, which is controlled Will continue ASA and Statin and monitor for S/Sx of angina/ACS. Continue to monitor on telemetry.   Acquired hypothyroidism  -chronic condition noted  -continue levothyroxine home medication  -TSH 1.296    Type 2 diabetes mellitus, without long-term current use of insulin  ..Patient's FSGs are controlled on current medication regimen.  Last A1c reviewed-   Lab Results   Component Value Date    HGBA1C 5.9 12/19/2024     Most recent fingerstick glucose reviewed- No results for input(s): "POCTGLUCOSE" in the last 24 hours.  Current correctional scale  Low  Maintain anti-hyperglycemic dose as follows-   Antihyperglycemics (From admission, onward)      Start     Stop Route Frequency Ordered    07/24/25 0127  insulin aspart U-100 pen 0-5 Units         -- SubQ Before meals & nightly PRN 07/24/25 0028          Hold Oral hypoglycemics while patient is in the hospital.      PAF (paroxysmal atrial fibrillation)  Patient has unknown atrial fibrillation. Patient is currently in atrial paced. [id number] Score: 5. The patients heart rate in the last 24 hours is as follows:  Pulse  Min: 69  Max: 82     Antiarrhythmics  metoprolol succinate (TOPROL-XL) 24 hr split tablet 12.5 mg, Daily, Oral    Anticoagulants  apixaban tablet 2.5 mg, 2 times daily, Oral    Plan  - Replete lytes with a goal of K>4, Mg >2  - Patient is anticoagulated, see medications listed above.  - Patient's afib is currently controlled  Elevated troponin  -ACS rule out  -cardiology consult  -initial troponin 31.1 " continue to trend  -ECG atrial paced with prolonged AV conduction and chronic LBBB  -patient remains free of chest pain  -nitroglycerin PRN chest pain  -aspirin 325 given in ED; restart home medication aspirin 81 mg daily  - 12/2024 stress test ECG uninterpretable due to LBBB and left axis deviation  -05/2025 echocardiogram EF 30-32% with concentric remodeling PAS 39 mm Hg  -TSH 1.29  -continue home medication Eliquis    Left bundle branch block  -chronic condition noted  -ECG noted atrial paced with prolonged AV conduction w/left axis deviation and LBBB    Generalized weakness  -flu and COVID negative  -urinalysis unremarkable  -PT/OT  -fall precaution  -encourage p.o. intake  -monitor I's and O's    VTE Risk Mitigation (From admission, onward)           Ordered     apixaban tablet 2.5 mg  2 times daily         07/23/25 2340     IP VTE HIGH RISK PATIENT  Once         07/23/25 2340     Place sequential compression device  Until discontinued         07/23/25 2340                                            Madalyn Vickers DNP  Department of Hospital Medicine  [facility] - Emergency Dept

## 2025-07-24 NOTE — TELEPHONE ENCOUNTER
Copied from CRM #[id number]. Topic: Appointments - Appointment Access  >> Jul 24, 2025  1:26 PM Raymon wrote:  Type:  Sooner Apoointment Request      Name of Caller:pt     When is the first available appointment?dept booked     Symptoms:hosp fu     Best Call Back Number: [phone]      Additional Information: pt got discharge yesterday needs hosp fu.

## 2025-07-24 NOTE — ASSESSMENT & PLAN NOTE
FRANKLIN is likely due to pre-renal azotemia due to dehydration. Baseline creatinine is 1.3. Most recent creatinine and eGFR are listed below.  Recent Labs     07/23/25 2039   CREATININE 1.5*   EGFRNORACEVR 34*      Plan  - FRANKLIN is stable  - Avoid nephrotoxins and renally dose meds for GFR listed above  - Monitor urine output, serial BMP, and adjust therapy as needed  -hold off on IV fluid hydration due to history CHF  -encourage p.o. hydration

## 2025-07-26 LAB
OHS QRS DURATION: 154 MS
OHS QTC CALCULATION: 514 MS

## 2025-07-30 ENCOUNTER — OFFICE VISIT (OUTPATIENT)
Dept: CARDIOLOGY | Facility: CLINIC | Age: 89
End: 2025-07-30
Payer: MEDICARE

## 2025-07-30 VITALS
DIASTOLIC BLOOD PRESSURE: 58 MMHG | OXYGEN SATURATION: 98 % | BODY MASS INDEX: 26.17 KG/M2 | HEART RATE: 82 BPM | WEIGHT: 134 LBS | SYSTOLIC BLOOD PRESSURE: 122 MMHG

## 2025-07-30 DIAGNOSIS — E78.00 PURE HYPERCHOLESTEROLEMIA: ICD-10-CM

## 2025-07-30 DIAGNOSIS — I25.10 CORONARY ARTERY DISEASE INVOLVING NATIVE CORONARY ARTERY OF NATIVE HEART WITHOUT ANGINA PECTORIS: ICD-10-CM

## 2025-07-30 DIAGNOSIS — I50.23 ACUTE ON CHRONIC SYSTOLIC CONGESTIVE HEART FAILURE: ICD-10-CM

## 2025-07-30 DIAGNOSIS — Z95.0 PACEMAKER: Chronic | ICD-10-CM

## 2025-07-30 DIAGNOSIS — I48.0 PAF (PAROXYSMAL ATRIAL FIBRILLATION): Primary | Chronic | ICD-10-CM

## 2025-07-30 PROCEDURE — 99213 OFFICE O/P EST LOW 20 MIN: CPT | Mod: PBBFAC,PN | Performed by: INTERNAL MEDICINE

## 2025-07-30 PROCEDURE — 99214 OFFICE O/P EST MOD 30 MIN: CPT | Mod: S$PBB,,, | Performed by: INTERNAL MEDICINE

## 2025-07-30 PROCEDURE — 99999 PR PBB SHADOW E&M-EST. PATIENT-LVL III: CPT | Mod: PBBFAC,,, | Performed by: INTERNAL MEDICINE

## 2025-07-30 RX ORDER — HYDRALAZINE HYDROCHLORIDE 10 MG/1
5 TABLET, FILM COATED ORAL 2 TIMES DAILY
Qty: 90 TABLET | Refills: 3 | Status: SHIPPED | OUTPATIENT
Start: 2025-07-30 | End: 2026-07-30

## 2025-07-30 NOTE — PROGRESS NOTES
" Subjective:    Patient ID:  Karma Chen is a 89 y.o. female patient here for evaluation Follow-up      History of Present Illness:     This has 89-year-old with history of sinus node dysfunction cardiomyopathy had successful ICD implantation and battery change on 05/22/2025  Patient had developed some rash after receiving antibiotics.  It appears the patient may have received Ancef for clindamycin.  She was subsequently seen by dermatologist and now the with localized treatment and systemic therapy she has improved  Patient had reactive elevation of her blood pressure presented to the emergency room details are as below    HPI:   Karma Chen is an 89 year old with the past medical history of s/p AICD placement 05/22/2025, HFrEF 30-32%, Pacemaker Medtronic 2024, ischemic cardiomyopathy, paroxysmal atrial fibrillation on Eliquis, MI, CAD, chronic kidney disease, diabetes mellitus, hypothyroidism, hyperlipidemia, and GERD who presents with complaints of inconsistent blood pressure since earlier today in addition to generalized fatigue for 2 months. She reports having a high blood pressure on Monday and took an extra metoprolol and a nitroglycerin then her blood pressure became low and she felt worse. She reports ever since her AICD surgery which was 2 months ago she has not been feeling well. She reports during the procedure she received an injection of Ancef and 3 days later she developed a rash which was treated by her dermatologist. At this time she reports the rash has resolved. ED MD provider reported patient complained of "minutes long TMJ pain which has resolved," however she denies any pain during time assessment. She denies chest pain, shortness of breath, dizziness, lightheadedness, vision changes, numbness/tingling, abdominal pain, nausea, or vomiting. ED workup included: Chest x-ray no evidence of acute cardiopulmonary process. CBC unremarkable. CMP with BUN 27, creatinine 1.5, and eGFR 34. Baseline " creatinine 1.3. She is flu and COVID negative. Urinalysis with trace leukocytes otherwise unremarkable. She has a mildly bumped troponin 31.1. She has a chronically elevated troponin, about a month ago troponin 18.0 and back in January troponin was in the 600s. ECG noted atrial paced with prolonged AV conduction, left axis deviation, and chronic LBBB. On 12/2024, stress test ECG portion noted left axis deviation but uninterpretable due to LBBB. On 05/2025, echocardiogram EF 30-32% with concentric remodeling and PAS 39 mm Hg. She follows Dr. Ware cardiologist outpatient. Cardiology consult placed. Admitted to hospital medicine for further management and treatment.              * No surgery found *       Hospital Course:   Karma Chen was monitored closely during her hospitalization. She was admitted hospital medicines for chest pain rule out ACS.  She has a significant cardiac history including ischemic cardiomyopathy, paroxysmal atrial fibrillation on Eliquis, MI, CAD, s/p AICD placement 05/22/2025, HFrEF 30-32%, Pacemaker Medtronic 2024, hypertension, chronic kidney disease, diabetes mellitus, hypothyroidism, hyperlipidemia.  The ED up was pertinent for elevated serum creatinine of 1.5 with a baseline of 1.3.  But overnight IV hydration.  Initial mildly bumped troponin 31.1 which has trended downward.She has a chronically elevated troponin, ECG noted atrial paced with prolonged AV conduction, left axis deviation, and chronic LBBB. On 12/2024, stress test ECG portion noted left axis deviation but uninterpretable due to LBBB. On 05/2025, echocardiogram EF 30-32% with concentric remodeling and PAS 39 mm Hg. Cardiology consulted. I spoke with LUIS Greene NP for Cardiology, no intervention planned at this time. Recommend discharge with follow up in Cardiology clinic. Patient was discharged on Hydralazine 5 mg daily as needed for SBP > 160 per cardiology recommendations. Patient will follow up in Cardiology clinic on  "7/30/25. Return instructions given, all questions answered. Patient and spouse verbalized understanding.   She was seen on day of dc and deemed appropriate for dc.         Review of patient's allergies indicates:   Allergen Reactions    Cephalosporins Hives    Amiodarone Other (See Comments)     "KNOCKED ME OUT"    Candida albicans Other (See Comments)    Clindamycin     Diclofenac-misoprostol      Other reaction(s): Not available    Doxycycline     Effexor [venlafaxine]     Empagliflozin Other (See Comments)     "MADE ME VERY WEAK"    Estradiol     Flagyl [metronidazole]     Fluconazole     Gabapentin     Iodinated contrast media Other (See Comments)    Iodine     Levaquin [levofloxacin]     Nexium [esomeprazole magnesium]     Nexletol [bempedoic acid] Other (See Comments)    Penicillins     Red yeast rice (monascus purpureus)     Shellfish containing products     Statins-hmg-coa reductase inhibitors     Sulfa (sulfonamide antibiotics)     Tea tree oil     Tegaserod      ZOLNORM    Tegaserod hydrogen maleate     Trazodone     Yeast, dried     Adhesive Rash     Band-aids    Cefazolin Rash     Biopsy-proven LCV       Past Medical History:   Diagnosis Date    Coronary artery disease     Dermatitis     Dermatitis 12/8/2017    Diabetes mellitus     Diabetes mellitus type II     Leah Barr virus infection     Fibromyalgia     GERD (gastroesophageal reflux disease)     Hypertension     MI (myocardial infarction) 09/23/2011    Pure hypercholesterolemia 2/26/2020     Past Surgical History:   Procedure Laterality Date    adenoids      ANGIOPLASTY  1987    APPENDECTOMY      CARDIAC PACEMAKER PLACEMENT  01/12/2017    CATARACT EXTRACTION, BILATERAL Bilateral 9/2/15, 3/17/15    right eye-9/2/15, left eye-3/17/15    CHOLECYSTECTOMY  1987    CORONARY ARTERY BYPASS GRAFT  2006    quadruple    coronary stents  1999    x2    CYST REMOVAL  04/25/2017    on back    HYSTERECTOMY  1966    OVARY SURGERY  1948    Ovary burst & was " repaired    RHIZOTOMY  09/14/2018    TONSILLECTOMY  1940     Social History[1]     Review of Systems:    As noted in HPI in addition      REVIEW OF SYSTEMS  CARDIOVASCULAR: No recent chest pain, palpitations, arm, neck, or jaw pain  RESPIRATORY: No recent fever, cough chills, SOB or congestion  : No blood in the urine  GI: No Nausea, vomiting, diarrhea, blood, or reflux.  Positive for constipation  MUSCULOSKELETAL: No myalgias  NEURO: No lightheadedness or dizziness  EYES: No Double vision, blurry, vision or headache     She has musculoskeletal pains and scheduled to receive some physical therapy to be arranged with primary care.         Objective        Vitals:    07/30/25 1052   BP: (!) 122/58   Pulse: 82       LIPIDS - LAST 2   Lab Results   Component Value Date    CHOL 204 (H) 07/24/2025    CHOL 193 04/03/2024    HDL 49 07/24/2025    HDL 42 04/03/2024    LDLCALC 129.2 07/24/2025    LDLCALC 110.0 04/03/2024    TRIG 129 07/24/2025    TRIG 205 (H) 04/03/2024    CHOLHDL 24.0 07/24/2025    CHOLHDL 21.8 04/03/2024       CBC - LAST 2  Lab Results   Component Value Date    WBC 8.79 07/24/2025    WBC 11.14 07/23/2025    RBC 4.18 07/24/2025    RBC 4.80 07/23/2025    HGB 12.0 07/24/2025    HGB 13.5 07/23/2025    HCT 37.5 07/24/2025    HCT 43.8 07/23/2025    MCV 90 07/24/2025    MCV 91 07/23/2025    MCH 28.7 07/24/2025    MCH 28.1 07/23/2025    MCHC 32.0 07/24/2025    MCHC 30.8 (L) 07/23/2025    RDW 13.1 07/24/2025    RDW 13.2 07/23/2025     07/24/2025     07/23/2025    MPV 10.1 07/24/2025    MPV 10.2 07/23/2025    GRAN 3.3 01/27/2025    GRAN 49.8 01/27/2025    LYMPH 2.1 01/27/2025    LYMPH 31.4 01/27/2025    MONO 0.9 01/27/2025    MONO 13.7 01/27/2025    BASO 0.04 01/27/2025    BASO 0.06 01/26/2025    BASO 0.06 01/26/2025    NRBC 0 07/24/2025    NRBC 0 07/23/2025       CHEMISTRY & LIVER FUNCTION - LAST 2  Lab Results   Component Value Date     07/24/2025     07/23/2025    K 3.4 (L) 07/24/2025     K 3.6 07/23/2025     07/24/2025     07/23/2025    CO2 30 (H) 07/24/2025    CO2 27 07/23/2025    ANIONGAP 8 07/24/2025    ANIONGAP 12 07/23/2025    BUN 25 (H) 07/24/2025    BUN 27 (H) 07/23/2025    CREATININE 1.3 07/24/2025    CREATININE 1.5 (H) 07/23/2025     07/24/2025     07/23/2025    CALCIUM 9.2 07/24/2025    CALCIUM 9.9 07/23/2025    PH 6.0 05/22/2025    PH 7.408 12/18/2024    MG 2.5 06/07/2025    MG 1.8 01/27/2025    ALBUMIN 3.8 07/24/2025    ALBUMIN 4.7 07/23/2025    PROT 6.9 07/24/2025    PROT 8.3 07/23/2025    ALKPHOS 66 07/24/2025    ALKPHOS 79 07/23/2025    ALT 13 07/24/2025    ALT 20 07/23/2025    AST 22 07/24/2025    AST 30 07/23/2025    BILITOT 0.5 07/24/2025    BILITOT 0.5 07/23/2025        CARDIAC PROFILE - LAST 2  Lab Results   Component Value Date    BNP 1,423 (H) 07/23/2025     (H) 06/07/2025    CPK 53 10/22/2021    CPKMB 1.57 02/20/2012    TROPONINI <0.030 10/22/2021    TROPONINI 0.077 (HH) 07/25/2020    TROPONINIHS 692.9 (HH) 01/25/2025    TROPONINIHS 885.8 (HH) 01/25/2025        COAGULATION - LAST 2  Lab Results   Component Value Date    LABPT 13.9 (H) 10/22/2021    LABPT 13.6 05/02/2020    INR 1.0 05/22/2025    INR 1.1 01/25/2025    APTT 26.2 05/22/2025    APTT 49.5 (H) 01/26/2025       ENDOCRINE & PSA - LAST 2  Lab Results   Component Value Date    HGBA1C 5.9 12/19/2024    HGBA1C 6.0 05/28/2024    TSH 1.296 07/23/2025    TSH 3.817 12/19/2024        ECHOCARDIOGRAM RESULTS  Results for orders placed during the hospital encounter of 05/08/25    Echo Saline Bubble? No; Ultrasound enhancing contrast? No    Interpretation Summary    Left Ventricle: The left ventricle is normal in size. Normal wall thickness. There is concentric remodeling. Moderate global hypokinesis present. Septal motion is consistent with bundle branch block. There is moderately reduced systolic function with a visually estimated ejection fraction of 30 - 32% There is diastolic dysfunction.     Right Ventricle: The right ventricle is normal in size Wall thickness is normal. Systolic function is normal.    Left Atrium: The left atrium is moderately dilated    Aortic Valve: The aortic valve is a trileaflet valve. There is moderate aortic valve sclerosis. Mildly restricted motion. There is mild stenosis. Aortic valve area by VTI is 1.6 cm². Aortic valve peak velocity is 1.3 m/s. Mean gradient is 4 mmHg. The dimensionless index is 0.50. There is mild to moderate aortic regurgitation with a centrally directed jet.    Mitral Valve: There is mild regurgitation.    Tricuspid Valve: There is mild regurgitation.    Pulmonic Valve: There is mild regurgitation.    Pulmonary Artery: There is mild pulmonary hypertension. The estimated pulmonary artery systolic pressure is 39 mmHg.    IVC/SVC: Normal venous pressure at 3 mmHg.      CURRENT/PREVIOUS VISIT EKG  Results for orders placed or performed during the hospital encounter of 07/23/25   EKG 12-lead    Collection Time: 07/24/25 11:48 AM   Result Value Ref Range    QRS Duration 154 ms    OHS QTC Calculation 514 ms    Narrative    Test Reason : I10,    Vent. Rate :  70 BPM     Atrial Rate :  71 BPM     P-R Int : 264 ms          QRS Dur : 154 ms      QT Int : 476 ms       P-R-T Axes :  52 -18 170 degrees    QTcB Int : 514 ms    Atrial-paced rhythm with prolonged AV conduction  Left bundle branch block  Abnormal ECG  When compared with ECG of 23-Jul-2025 18:11,  No significant change was found  Confirmed by Kenton Ware ([id number]) on 7/26/2025 8:26:52 PM    Referred By: AAAREFERRAL SELF           Confirmed By: Kenton Ware     No valid procedures specified.   Results for orders placed during the hospital encounter of 12/10/24    Nuclear Stress Test    Interpretation Summary    The study's ECG is uninterpretable due to left bundle branch block.    The patient reported no chest pain during the stress test.    There were no arrhythmias during stress.    The nuclear  portion of this study will be reported separately.    No valid procedures specified.    MOST RECENT NUCLEAR STRESS  Results for orders placed during the hospital encounter of 12/10/24    Nuclear Stress Test    Interpretation Summary    The study's ECG is uninterpretable due to left bundle branch block.    The patient reported no chest pain during the stress test.    There were no arrhythmias during stress.    The nuclear portion of this study will be reported separately.      MOST RECENT CARDIAC PET  No results found for this or any previous visit.      MOST RECENT NM MYOCARDIAL PERFUSION  Results for orders placed during the hospital encounter of 12/10/24    NM Myocardial Perfusion Spect Multi Pharmacologic    Narrative  EXAMINATION:  NM MYOCARDIAL PERFUSION SPECT MULTI PHARM    CLINICAL HISTORY:  Chest pain/anginal equiv, high CAD risk, treadmill candidate; Chest pain, unspecified    TECHNIQUE:  Radiopharmaceutical: 10.8 mCi Technetium 99m Myoview utilized for rest imaging. 26.6 mCi Technetium 99m Myoview utilized for stress imaging.    0.4 mg Lexiscan administered for pharmacologic stress.    COMPARISON:  None    FINDINGS:  Tomographic images reveal symmetric radiopharmaceutical distribution throughout the left ventricular myocardium on stress and rest images. No reversible perfusion defect to suggest myocardial ischemia.    Gated SPECT images show global left ventricular hypokinesis calculated left ventricular ejection fraction is 31 %.      MOST RECENT ANGIOGRAM  Results for orders placed during the hospital encounter of 12/08/17    Cath lab procedure    Narrative  Date of Procedure:  12/11/2017    A. Indication/Pre-Operative Diagnosis    The patient is a 82 year old female that was referred for catheterization by Dr. Nadeem Schultz for ACS (unstable angina). The MAVERICK risk score is 4.    B. Summary/Post-Operative Diagnosis    Left Main disease.  Successful PCI.  Successful PCI with OSMANY to LM  bifurcation.  DK crush technique used: LCx was treated as mother branch, LAD as daughter.  Successful PCI with OSMANY to mid LCx and OMB.    C. HPI    I have reviewed the history and physical completed on 12/08/2017. The patient has been examined and the following changes have been noted:    Presented with unstable angina to the [facility] and was found to have distal LM disease into unbypassed LCx with a patent LIMA to LAD but this is a small vessel.    Patient history was obtained from the patient.    Counseling: The patient was counseled regarding the potential risks and benefits of this procedure, as well as possible alternative approaches to the problem and gave informed consent.    The ASA Score was Class III.    [facility] Risk Score for MACE is 6.70%. [facility] Risk Score for Death is 2.00%.    The patient had a previous angiogram at an outside facility. The films were available for review.    Height: 60 in.      Weight: 139 lbs.      BMI: 27.10 kg/m2    OUTPATIENT MEDICATIONS: Medications were reviewed.  ALLERGIES: Allergies were reviewed.    Laboratory data revealed:    12/10/2017 INR  1.0, APTT  26.9, PTI  10.7  12/11/2017 CREAT  1.2, HGB  11.4, HCT  34.9, NA  138, K  4.5, PLT  329, GLU  198, WBCIR  7.70, BUN  26        Manual Labs:  ACT Reference Range:  sec, ACT-PLUS cartridge Cardiopulmonary Bypass: 410-440 sec, ACT-LR cartridge Indwelling Vascular sheath Removal:  sec  12/11/2017 14:54  , 15:53        D. Hemodynamic Results    LVP: 172/6  LVPEDP: 14      E. Angiographic Results    Diagnostic:    Patient has a right dominant coronary artery.    - Left Main Coronary Artery:  The LM has a 95% stenosis. There is MAVERICK 3 flow.  The distal LM has a 95% stenosis. There is MAVERICK 3 flow.  - Left Anterior Descending Artery:  The ostial LAD has a 95% stenosis. There is MAVERICK 3 flow.  Lesion Details:   This is a Type C lesion.  The native LAD gives diagonals which do not fill retrograde  from LIMA graft in previous diagnostic angiogram.  - D1:  The D1 has a 75% stenosis. There is MAVERICK 3 flow. The remaining portion of the vessel is of small caliber.  - Left Anterior Descending Artery:  The proximal LAD. There is MAVERICK 3 flow. The remaining portion of the vessel is diffusely diseased (75).  - Left Circumflex Artery:  The ostial LCX has a 95% stenosis. There is MAVERICK 3 flow.  The proximal LCX has a 80% stenosis. There is MAVERICK 3 flow.  The mid LCX has a 80% stenosis. There is MAVERICK 3 flow.  The distal LCX has a 90% stenosis. There is MAVERICK 3 flow.  - Right Coronary Artery:  The RCA was not studied.  - Common Femoral Artery:  The right CFA has luminal irregularities.      Intervention    LM:  The lesion was successfully intervened. Post-stenosis of 0% and post-MAVERICK 3 flow. The vessel was accessed natively.  The following items were used: 2.5MM 15MM [place] Balloon.  Distal LM:  The lesion was successfully intervened. Post-stenosis of 0% and post-MAVERICK 3 flow. The vessel was accessed natively.  The following items were used: Stent Resolute Rx 3.00x22 (OSMANY).  Ostial LAD:  The lesion was successfully intervened. Post-stenosis of 0% and post-MAVERICK 3 flow. The vessel was accessed natively.  The following items were used: Stent Resolute Rx 3.00x15 (OSMANY) and 3.0MM 15MM Anchor Point Balloon.  Proximal LAD:  The lesion was successfully intervened. Post-stenosis of 0% and post-MAVERICK 3 flow. The vessel was accessed natively.  The following items were used: 1.50MM 12MM [place] Flex Balloon and 3.5MM 15MM [place] Balloon.  Ostial LCX:  The lesion was successfully intervened. Post-stenosis of 0% and post-MAVERICK 3 flow. The vessel was accessed natively.  The following items were used: 3.0MM 15MM An[place] Balloon and 3.5MM 15MM Anchor Point Balloon.  Proximal LCX:  The lesion was successfully intervened. Post-stenosis of 0% and post-MAVERICK 3 flow. The vessel was accessed natively.  The following items were used: Stent Resolute Rx 2.50x14 (OSMANY).  Mid LCX:  The  lesion was successfully intervened. Post-stenosis of 0% and post-MAVERICK 3 flow. The vessel was accessed natively.  The following items were used: Stent Resolute Rx 3.00x26 (OSMANY).  Distal LCX:  The lesion was successfully intervened. Post-stenosis of 0% and post-MAVERICK 3 flow. The vessel was accessed natively.  The following items were used: 2.5MM 15MM [place] Balloon and Stent Resolute Rx 2.50x14 (OSMANY).    F. Details of Procedure    PROCEDURES PERFORMED: Drug Eluting Stent (Multi vessel) - Coronary and Coronary Angio    ANESTHESIA: Conscious sedation was achieved with 75 mcg of FENTANYL and 1.5 mg of MIDAZOLAM (VERSED) 1MG/ML. Local anesthesia was achieved with 20 ml of LIDOCAINE 2% 20ML. Moderate conscious sedation was performed and cardiorespiratory functions were  monitored the entire procedure by Bridgett Arreguin RN. Sedation began at 02:30 PM and concluded at 03:56 PM, totalling 86.0 minutes.    PRIMARY SURGEON: Devante Garces MD  FIRST ASSISTING PHYSICIAN: Francisco Rogers MD  FELLOW: Madelyn Scott MD    COMPLICATIONS: There were no complications.    Medications given on sterile field: Lidocaine 2% 20ml (20 ml) and Nitroglycerine 200mcg/ml (10 ml).    Medications given during procedure: Heparin Bag 1000 Units/500ml (2 bags), Fentanyl (75 mcg), Midazolam (versed) 1mg/ml (1.5 mg), Heparin 1000 Units/ml (8 iu) and Clindamycin (900 mg).    The patient was brought to the catheterization laboratory after premedication with oral Benadryl 50 mg. Bilateral groin prepped and draped. The Custom Kit was flushed and connected to contrast. After local anesthesia, a Sheath Vsi Micro Introducer Kit  was inserted into the right Femoral vein. The Sheath Vsi Micro Introducer Kit was removed. A [id number] Sheath was inserted into the right Femoral vein. A Sheath Vsi Micro Introducer Kit was inserted into the right femoral artery. The Sheath Vsi Micro  Introducer Kit was removed. A 8FR 11cm Sheath was inserted into the right femoral  artery.    AORTA    A Wire Supracore .035 X 190 was inserted into the Aorta.    LM    A 8FR Ebu 3.5 Guide Cath Launcher was inserted into the ostial LM. The Wire Supracore .035 X 190 was removed. Angiography performed in multiple views in the left coronary arteries.    LAD    A X190 14 Bmw Guidewire was inserted into the distal LAD.    LCX    A X190 14 Bmw Guidewire was inserted into the distal LCX.    LM    A 2.5MM 15MM [employer] Balloon was inserted into the LM and was inflated with indeflator at 14.0 raúl. for 2.0 secs. The 2.5MM 15MM [employer] Balloon was removed.    LCX    A 2.5MM 15MM [employer] Balloon was inserted into the distal LCX and was inflated with indeflator at 12.0 raúl. for 9.0 secs. The 2.5MM 15MM [employer] Balloon was removed. A Stent Resolute Rx 2.50x14 (OSMANY) was inserted into the distal LCX. Stent balloon inflated  with indeflator and stent deployed at 10.0 raúl. for 4.0 secs. in the distal LCX. A Stent Resolute Rx 3.00x26 (OSMANY) was inserted into the mid LCX. Stent balloon inflated with indeflator and stent deployed at 10.0 raúl. for 24.0 secs. in the mid LCX.    LM    A Stent Resolute Rx 3.00x22 (OSMANY) was inserted into the distal LM.    LAD    A Stent Resolute Rx 3.00x15 (OSMANY) was inserted into the ostial LAD.    LM    Stent balloon inflated with indeflator and stent deployed at 16.0 raúl. for 3.0 secs. in the distal LM.    LAD    Stent balloon inflated with indeflator and stent deployed at 16.0 raúl. for 3.0 secs. in the ostial LAD.    LCX    The X190 14 Bmw Guidewire was repositioned into the distal LCX. The X190 14 Bmw Guidewire was removed.    LAD    A .014X182 Choice Xs Guidewire was inserted into the distal LAD. A 3.0MM 15MM [employer] Balloon was inserted into the ostial LAD.    LCX    A 3.0MM 15MM [employer] Balloon was inserted into the ostial LCX. The 3.0MM 15MM [employer] Balloon was removed. The .014X182 [employer] Guidewire was repositioned into the distal LCX. The X190 14 [employer]mw Guidewire was removed. A 3.5MM 15MM [employer] Balloon  was inserted into  the ostial LCX and was inflated with indeflator at 16.0 raúl. for 2.0 secs. The 3.5MM 15MM [place] Balloon was removed.    LAD    The .014X182 [employer] Guidewire was repositioned into the proximal LAD. The .014X182 [employer] Guidewire was removed. A .014X182 [employer] Guidewire was inserted into the mid LAD. A 1.50MM 12MM [place] Push Balloon was inserted into the proximal LAD.    LCX    A X190 14 Bmw Guidewire was inserted into the distal LCX. 1.50MM 12MM [place] Push Balloon was inflated with indeflator at for. The 1.50MM 12MM [place] Push Balloon was removed.    LAD    A 3.5MM 15MM [place] Balloon was inserted into the proximal LAD.    LCX    Stent balloon inserted into proximal LCX. 3.5MM 15MM [place] Balloon was inflated with indeflator at 16.0 raúl. for 5.0 secs. Stent balloon inflated with indeflator at 16.0 raúl. for 5.0 secs. The 3.5MM 15MM [place] Balloon was removed. Stent balloon removed.  Angiography performed in the left coronary arteries. The .014X182 [employer] Guidewire was removed. The X190 14 [employer]mw Guidewire was removed. Post angios performed in the left coronary arteries. The 8FR Ebu 3.5 Guide Cath Launcher was removed.    Left  VENTRICLE    A 4FR Pigtail Ang Catheter was inserted into the left ventricle. Hemodynamics recorded in the left ventricle.    The 4FR Pigtail Ang Catheter was removed. A 6FR Perclose Proglide was inserted into the right femoral artery. The 8FR 11cm Sheath was removed. A 6FR Perclose Proglide was deployed into the right femoral artery. The [id number] Sheath was removed and manual  pressure was applied. A 6FR Perclose Proglide was inserted into the right femoral artery. A 6FR Perclose Proglide was deployed into the right femoral artery. Total Omnipaque injected was 30.0 ml. Total Omnipaque used was 100.0 ml.      Fluoroscopy Time 27.2 minutes  Contrast Injected 30 ml Omnipaque  Contrast Used  100 ml Omnipaque        Procedure log documented by RT Catia and verified by Devante  Jennifer Garces MD    ESTIMATED BLOOD LOSS is < 50 cc.    SPECIMEN: No specimen.    G. Recommendations    1. Routine post PCI care.  2. Maximize medical management.  3. ASA 81mg.  4. Clopidogrel (Plavix) for one year.  5. Cardiac rehab referral.  6. Statin therapy.    I certify that I was present for catheter insertion, catheter manipulation, angiography, angiographic interpretation, and all interventional procedures performed on this patient.    This document has been electronically  SIGNED BY: Devante Garces MD On: 12/11/2017 18:42      MOST RECENT CTA OF CHEST  No results found for this or any previous visit.      CAROTID ULTRASOUND RESULTS  No results found for this or any previous visit.    No results found for this or any previous visit.    No results found for this or any previous visit.      PHYSICAL EXAM  CONSTITUTIONAL: Well built, well nourished in no apparent distress  NECK:  Soft carotid bruit, no JVD   LUNGS: CTA  CHEST WALL: no tenderness  HEART: regular rate and rhythm, normal S1 diminished S2 soft systolic murmur noted   ABDOMEN: soft, non-tender; bowel sounds normal; no masses,  no organomegaly  EXTREMITIES: Extremities normal, no edema, no calf tenderness noted  NEURO: AAO X 3    I HAVE REVIEWED :    The vital signs, nurses notes, and all the pertinent radiology and labs.        Current Outpatient Medications   Medication Instructions    acetaminophen (TYLENOL) 650 mg, Every 6 hours PRN    ALPRAZolam (XANAX) 0.25 MG tablet TAKE 1 TABLET BY MOUTH EVERY DAY AS NEEDED FOR ANXIETY    aspirin (ECOTRIN) 81 mg, Oral, Daily    bisacodyL (DULCOLAX) 5 mg, 2 times daily    busPIRone (BUSPAR) 5 mg, 3 times daily    clobetasol 0.05% (TEMOVATE) 0.05 % Oint Apply twice a day to to rash on left arm and back.  Avoid face and skin folds.    coenzyme Q10 100 mg, Oral, 2 times daily    cyanocobalamin 1,000 mcg    ELIQUIS 2.5 mg, Oral, 2 times daily    furosemide (LASIX) 20 mg, Oral, Daily    hydrALAZINE (APRESOLINE) 5  "mg, Oral, 2 times daily    inositol/choline/biofla/vitB,C (LIPO-FLAVONOID ORAL) Take by mouth.    JARDIANCE 25 mg    Lactobacillus rhamnosus GG (CULTURELLE) 10 billion cell capsule 1 capsule, Every morning    levothyroxine (SYNTHROID) 88 mcg, Oral, Daily    loratadine (CLARITIN) 10 mg, Daily    MAGNESIUM ORAL 1,000 mg, Daily    magnesium oxide-Mg AA chelate (MG-PLUS-PROTEIN) 133 mg Tab Take by mouth.    metoprolol succinate (TOPROL-XL) 12.5 mg, Oral, Daily    montelukast (SINGULAIR) 10 mg tablet TAKE 1 TABLET BY MOUTH EVERY DAY IN THE EVENING    multivitamin (THERAGRAN) tablet 1 tablet, 2 times daily    nitroGLYCERIN (NITROSTAT) 0.4 mg, Sublingual, Every 5 min PRN    nystatin-triamcinolone (MYCOLOG II) cream As needed (PRN)    omeprazole (PRILOSEC) 20 mg, Oral, Daily    pantoprazole (PROTONIX) 20 mg, Every morning    tiZANidine (ZANAFLEX) 4 mg, Nightly PRN    traMADoL (ULTRAM) 50 mg, 2 times daily PRN    TURMERIC ORAL 200 mg, Daily    vitamin D (VITAMIN D3) 1,000 Units          Assessment & Plan     1. PAF (paroxysmal atrial fibrillation)  Assessment & Plan:  Encouraged her to continue on Eliquis at low dose of 2.5 mg p.o. b.i.d. continue on metoprolol 25 mg half a tablet daily and magnesium supplements      2. Pure hypercholesterolemia  Assessment & Plan:  She is off Repatha is reportedly she had some reactive response to the injection      3. Coronary artery disease involving native coronary artery of native heart without angina pectoris  Assessment & Plan:  Status post coronary revascularization encouraged to continue the same maintain on aspirin, metoprolol clinically stable      4. Acute on chronic systolic congestive heart failure  Assessment & Plan:  Patient has Combined Systolic and Diastolic heart failure that is Chronic. On presentation their CHF was well compensated. Most recent BNP and echo results are listed below.  No results for input(s): "BNP" in the last 72 hours.  Latest ECHO  Results for orders " placed during the hospital encounter of 05/08/25    Echo Saline Bubble? No; Ultrasound enhancing contrast? No    Interpretation Summary    Left Ventricle: The left ventricle is normal in size. Normal wall thickness. There is concentric remodeling. Moderate global hypokinesis present. Septal motion is consistent with bundle branch block. There is moderately reduced systolic function with a visually estimated ejection fraction of 30 - 32% There is diastolic dysfunction.    Right Ventricle: The right ventricle is normal in size Wall thickness is normal. Systolic function is normal.    Left Atrium: The left atrium is moderately dilated    Aortic Valve: The aortic valve is a trileaflet valve. There is moderate aortic valve sclerosis. Mildly restricted motion. There is mild stenosis. Aortic valve area by VTI is 1.6 cm². Aortic valve peak velocity is 1.3 m/s. Mean gradient is 4 mmHg. The dimensionless index is 0.50. There is mild to moderate aortic regurgitation with a centrally directed jet.    Mitral Valve: There is mild regurgitation.    Tricuspid Valve: There is mild regurgitation.    Pulmonic Valve: There is mild regurgitation.    Pulmonary Artery: There is mild pulmonary hypertension. The estimated pulmonary artery systolic pressure is 39 mmHg.    IVC/SVC: Normal venous pressure at 3 mmHg.    Current Heart Failure Medications       Plan  - Monitor strict I&Os and daily weights.    - Place on telemetry  - Low sodium diet  - Place on fluid restriction of 1.5 L.   - Cardiology has been consulted  - The patient's volume status is at their baseline    Continue on present therapy to include Jardiance 25 mg daily Lasix 20 mg a day metoprolol and she is not taking hydralazine presently              5. Pacemaker in place  Assessment & Plan:  ICD in Situ doing well      Other orders  -     hydrALAZINE (APRESOLINE) 10 MG tablet; Take 0.5 tablets (5 mg total) by mouth 2 (two) times a day.  Dispense: 90 tablet; Refill:  3          No follow-ups on file.            [1]   Social History  Tobacco Use    Smoking status: Never    Smokeless tobacco: Never   Substance Use Topics    Alcohol use: No    Drug use: No

## 2025-07-30 NOTE — PATIENT INSTRUCTIONS
Patient instructions:     1. Add hydralazine 10 mg tablet half a tablet twice a day   2. Continue on metoprolol at the same doses

## 2025-07-30 NOTE — ASSESSMENT & PLAN NOTE
"Patient has Combined Systolic and Diastolic heart failure that is Chronic. On presentation their CHF was well compensated. Most recent BNP and echo results are listed below.  No results for input(s): "BNP" in the last 72 hours.  Latest ECHO  Results for orders placed during the hospital encounter of 05/08/25    Echo Saline Bubble? No; Ultrasound enhancing contrast? No    Interpretation Summary    Left Ventricle: The left ventricle is normal in size. Normal wall thickness. There is concentric remodeling. Moderate global hypokinesis present. Septal motion is consistent with bundle branch block. There is moderately reduced systolic function with a visually estimated ejection fraction of 30 - 32% There is diastolic dysfunction.    Right Ventricle: The right ventricle is normal in size Wall thickness is normal. Systolic function is normal.    Left Atrium: The left atrium is moderately dilated    Aortic Valve: The aortic valve is a trileaflet valve. There is moderate aortic valve sclerosis. Mildly restricted motion. There is mild stenosis. Aortic valve area by VTI is 1.6 cm². Aortic valve peak velocity is 1.3 m/s. Mean gradient is 4 mmHg. The dimensionless index is 0.50. There is mild to moderate aortic regurgitation with a centrally directed jet.    Mitral Valve: There is mild regurgitation.    Tricuspid Valve: There is mild regurgitation.    Pulmonic Valve: There is mild regurgitation.    Pulmonary Artery: There is mild pulmonary hypertension. The estimated pulmonary artery systolic pressure is 39 mmHg.    IVC/SVC: Normal venous pressure at 3 mmHg.    Current Heart Failure Medications       Plan  - Monitor strict I&Os and daily weights.    - Place on telemetry  - Low sodium diet  - Place on fluid restriction of 1.5 L.   - Cardiology has been consulted  - The patient's volume status is at their baseline    Continue on present therapy to include Jardiance 25 mg daily Lasix 20 mg a day metoprolol and she is not taking " hydralazine presently

## 2025-07-30 NOTE — ASSESSMENT & PLAN NOTE
Status post coronary revascularization encouraged to continue the same maintain on aspirin, metoprolol clinically stable

## 2025-07-30 NOTE — ASSESSMENT & PLAN NOTE
Encouraged her to continue on Eliquis at low dose of 2.5 mg p.o. b.i.d. continue on metoprolol 25 mg half a tablet daily and magnesium supplements

## 2025-07-31 ENCOUNTER — LAB VISIT (OUTPATIENT)
Dept: LAB | Facility: HOSPITAL | Age: 89
End: 2025-07-31
Attending: INTERNAL MEDICINE
Payer: MEDICARE

## 2025-07-31 DIAGNOSIS — N18.30 CHRONIC KIDNEY DISEASE, STAGE III (MODERATE): Primary | ICD-10-CM

## 2025-07-31 LAB
ALBUMIN SERPL-MCNC: 4 G/DL (ref 3.5–5.2)
ANION GAP (SMH): 10 MMOL/L (ref 8–16)
BUN SERPL-MCNC: 41 MG/DL (ref 8–23)
CALCIUM SERPL-MCNC: 9 MG/DL (ref 8.7–10.5)
CHLORIDE SERPL-SCNC: 103 MMOL/L (ref 95–110)
CO2 SERPL-SCNC: 27 MMOL/L (ref 23–29)
CREAT SERPL-MCNC: 1.5 MG/DL (ref 0.5–1.4)
GFR SERPLBLD CREATININE-BSD FMLA CKD-EPI: 34 ML/MIN/1.73/M2
GLUCOSE SERPL-MCNC: 119 MG/DL (ref 70–110)
PHOSPHATE SERPL-MCNC: 4 MG/DL (ref 2.7–4.5)
POTASSIUM SERPL-SCNC: 3.5 MMOL/L (ref 3.5–5.1)
SODIUM SERPL-SCNC: 140 MMOL/L (ref 136–145)

## 2025-07-31 PROCEDURE — 82040 ASSAY OF SERUM ALBUMIN: CPT

## 2025-07-31 PROCEDURE — 36415 COLL VENOUS BLD VENIPUNCTURE: CPT | Mod: PO

## 2025-08-01 LAB
OHS CV AF BURDEN PERCENT: 4.3
OHS CV AF BURDEN PERCENT: < 1
OHS CV DC REMOTE DEVICE TYPE: NORMAL
OHS CV DC REMOTE DEVICE TYPE: NORMAL
OHS CV ICD SHOCK: NO
OHS CV RV PACING PERCENT: 0.25 %
OHS CV RV PACING PERCENT: 0.85 %